# Patient Record
Sex: FEMALE | Race: WHITE | Employment: OTHER | URBAN - METROPOLITAN AREA
[De-identification: names, ages, dates, MRNs, and addresses within clinical notes are randomized per-mention and may not be internally consistent; named-entity substitution may affect disease eponyms.]

---

## 2018-04-10 ENCOUNTER — HOSPITAL ENCOUNTER (OUTPATIENT)
Dept: ULTRASOUND IMAGING | Age: 68
Discharge: HOME | End: 2018-04-12
Payer: MEDICARE

## 2018-04-10 DIAGNOSIS — M79.672 BILATERAL PAIN OF LEG AND FOOT: ICD-10-CM

## 2018-04-10 DIAGNOSIS — M79.605 BILATERAL PAIN OF LEG AND FOOT: ICD-10-CM

## 2018-04-10 DIAGNOSIS — G60.3 IDIOPATHIC PROGRESSIVE NEUROPATHY: ICD-10-CM

## 2018-04-10 DIAGNOSIS — M79.604 BILATERAL PAIN OF LEG AND FOOT: ICD-10-CM

## 2018-04-10 DIAGNOSIS — M79.671 BILATERAL PAIN OF LEG AND FOOT: ICD-10-CM

## 2018-04-10 PROCEDURE — 93922 UPR/L XTREMITY ART 2 LEVELS: CPT

## 2018-05-03 ENCOUNTER — OFFICE VISIT (OUTPATIENT)
Dept: VASCULAR SURGERY | Age: 68
End: 2018-05-03
Payer: MEDICARE

## 2018-05-03 DIAGNOSIS — M79.672 PAIN IN BOTH FEET: ICD-10-CM

## 2018-05-03 DIAGNOSIS — R09.89 BRUIT OF RIGHT CAROTID ARTERY: ICD-10-CM

## 2018-05-03 DIAGNOSIS — Z87.891 HISTORY OF TOBACCO USE: ICD-10-CM

## 2018-05-03 DIAGNOSIS — I70.222 ATHEROSCLEROSIS OF NATIVE ARTERY OF LEFT LOWER EXTREMITY WITH REST PAIN (HCC): ICD-10-CM

## 2018-05-03 DIAGNOSIS — I73.9 PVD (PERIPHERAL VASCULAR DISEASE) WITH CLAUDICATION (HCC): Primary | ICD-10-CM

## 2018-05-03 DIAGNOSIS — M79.671 PAIN IN BOTH FEET: ICD-10-CM

## 2018-05-03 PROBLEM — I70.229 ATHEROSCLEROSIS OF NATIVE ARTERIES OF EXTREMITY WITH REST PAIN (HCC): Status: ACTIVE | Noted: 2018-05-03

## 2018-05-03 PROCEDURE — G8400 PT W/DXA NO RESULTS DOC: HCPCS | Performed by: SURGERY

## 2018-05-03 PROCEDURE — G8419 CALC BMI OUT NRM PARAM NOF/U: HCPCS | Performed by: SURGERY

## 2018-05-03 PROCEDURE — G8427 DOCREV CUR MEDS BY ELIG CLIN: HCPCS | Performed by: SURGERY

## 2018-05-03 PROCEDURE — 4040F PNEUMOC VAC/ADMIN/RCVD: CPT | Performed by: SURGERY

## 2018-05-03 PROCEDURE — 3017F COLORECTAL CA SCREEN DOC REV: CPT | Performed by: SURGERY

## 2018-05-03 PROCEDURE — 1036F TOBACCO NON-USER: CPT | Performed by: SURGERY

## 2018-05-03 PROCEDURE — G8598 ASA/ANTIPLAT THER USED: HCPCS | Performed by: SURGERY

## 2018-05-03 PROCEDURE — 1123F ACP DISCUSS/DSCN MKR DOCD: CPT | Performed by: SURGERY

## 2018-05-03 PROCEDURE — 99205 OFFICE O/P NEW HI 60 MIN: CPT | Performed by: SURGERY

## 2018-05-03 PROCEDURE — 1090F PRES/ABSN URINE INCON ASSESS: CPT | Performed by: SURGERY

## 2018-05-03 RX ORDER — ASPIRIN 81 MG/1
81 TABLET ORAL DAILY
Qty: 30 TABLET | Refills: 11 | Status: SHIPPED | OUTPATIENT
Start: 2018-05-03 | End: 2019-01-11 | Stop reason: ALTCHOICE

## 2018-05-03 RX ORDER — BUPROPION HYDROCHLORIDE 150 MG/1
150 TABLET, EXTENDED RELEASE ORAL DAILY
Refills: 0 | COMMUNITY
Start: 2018-02-16 | End: 2019-01-11 | Stop reason: ALTCHOICE

## 2018-05-03 RX ORDER — LIOTHYRONINE SODIUM 5 UG/1
5 TABLET ORAL DAILY
Refills: 0 | Status: ON HOLD | COMMUNITY
Start: 2018-03-30 | End: 2020-01-22

## 2018-05-03 RX ORDER — ETODOLAC 400 MG/1
400 TABLET, FILM COATED ORAL 2 TIMES DAILY
Refills: 0 | Status: ON HOLD | COMMUNITY
Start: 2018-04-03 | End: 2020-01-22

## 2018-05-03 RX ORDER — CILOSTAZOL 100 MG/1
100 TABLET ORAL 2 TIMES DAILY
Qty: 60 TABLET | Refills: 11 | Status: SHIPPED | OUTPATIENT
Start: 2018-05-03 | End: 2019-01-11 | Stop reason: ALTCHOICE

## 2018-05-04 DIAGNOSIS — I70.222 ATHEROSCLEROSIS OF NATIVE ARTERY OF LEFT LOWER EXTREMITY WITH REST PAIN (HCC): ICD-10-CM

## 2018-05-04 DIAGNOSIS — I70.229 ATHEROSCLEROSIS OF NATIVE ARTERY OF LOWER EXTREMITY WITH REST PAIN, UNSPECIFIED LATERALITY (HCC): Primary | ICD-10-CM

## 2018-05-15 ENCOUNTER — HOSPITAL ENCOUNTER (OUTPATIENT)
Dept: INTERVENTIONAL RADIOLOGY/VASCULAR | Age: 68
Discharge: HOME | End: 2018-05-17
Payer: MEDICARE

## 2018-05-15 ENCOUNTER — HOSPITAL ENCOUNTER (OUTPATIENT)
Dept: CT IMAGING | Age: 68
Discharge: HOME | End: 2018-05-17
Payer: MEDICARE

## 2018-05-15 ENCOUNTER — HOSPITAL ENCOUNTER (OUTPATIENT)
Dept: ULTRASOUND IMAGING | Age: 68
Discharge: HOME | End: 2018-05-17
Payer: MEDICARE

## 2018-05-15 DIAGNOSIS — R09.89 BRUIT OF RIGHT CAROTID ARTERY: ICD-10-CM

## 2018-05-15 DIAGNOSIS — I70.222 ATHEROSCLEROSIS OF NATIVE ARTERY OF LEFT LOWER EXTREMITY WITH REST PAIN (HCC): ICD-10-CM

## 2018-05-15 DIAGNOSIS — R09.89 BRUIT OF RIGHT CAROTID ARTERY: Primary | ICD-10-CM

## 2018-05-15 PROCEDURE — 93880 EXTRACRANIAL BILAT STUDY: CPT

## 2018-05-15 PROCEDURE — 6360000004 HC RX CONTRAST MEDICATION: Performed by: RADIOLOGY

## 2018-05-15 PROCEDURE — 75635 CT ANGIO ABDOMINAL ARTERIES: CPT

## 2018-05-15 PROCEDURE — 2580000003 HC RX 258: Performed by: RADIOLOGY

## 2018-05-15 PROCEDURE — 93923 UPR/LXTR ART STDY 3+ LVLS: CPT

## 2018-05-15 RX ORDER — SODIUM CHLORIDE 0.9 % (FLUSH) 0.9 %
10 SYRINGE (ML) INJECTION ONCE
Status: COMPLETED | OUTPATIENT
Start: 2018-05-15 | End: 2018-05-15

## 2018-05-15 RX ADMIN — IOPAMIDOL 140 ML: 755 INJECTION, SOLUTION INTRAVENOUS at 13:03

## 2018-05-15 RX ADMIN — Medication 10 ML: at 13:04

## 2018-05-18 ENCOUNTER — TELEPHONE (OUTPATIENT)
Dept: VASCULAR SURGERY | Age: 68
End: 2018-05-18

## 2018-05-22 PROBLEM — I65.23 BILATERAL CAROTID ARTERY STENOSIS: Status: ACTIVE | Noted: 2018-05-22

## 2018-05-24 ENCOUNTER — OFFICE VISIT (OUTPATIENT)
Dept: VASCULAR SURGERY | Age: 68
End: 2018-05-24
Payer: MEDICARE

## 2018-05-24 DIAGNOSIS — R09.89 BRUIT OF RIGHT CAROTID ARTERY: ICD-10-CM

## 2018-05-24 DIAGNOSIS — I70.229 ATHEROSCLEROSIS OF NATIVE ARTERY OF LOWER EXTREMITY WITH REST PAIN, UNSPECIFIED LATERALITY (HCC): ICD-10-CM

## 2018-05-24 DIAGNOSIS — I73.9 PVD (PERIPHERAL VASCULAR DISEASE) WITH CLAUDICATION (HCC): Primary | ICD-10-CM

## 2018-05-24 DIAGNOSIS — Z87.891 HISTORY OF TOBACCO USE: ICD-10-CM

## 2018-05-24 DIAGNOSIS — I65.23 BILATERAL CAROTID ARTERY STENOSIS: ICD-10-CM

## 2018-05-24 PROCEDURE — 1123F ACP DISCUSS/DSCN MKR DOCD: CPT | Performed by: SURGERY

## 2018-05-24 PROCEDURE — G8427 DOCREV CUR MEDS BY ELIG CLIN: HCPCS | Performed by: SURGERY

## 2018-05-24 PROCEDURE — G8400 PT W/DXA NO RESULTS DOC: HCPCS | Performed by: SURGERY

## 2018-05-24 PROCEDURE — G8419 CALC BMI OUT NRM PARAM NOF/U: HCPCS | Performed by: SURGERY

## 2018-05-24 PROCEDURE — 99214 OFFICE O/P EST MOD 30 MIN: CPT | Performed by: SURGERY

## 2018-05-24 PROCEDURE — G8598 ASA/ANTIPLAT THER USED: HCPCS | Performed by: SURGERY

## 2018-05-24 PROCEDURE — 1090F PRES/ABSN URINE INCON ASSESS: CPT | Performed by: SURGERY

## 2018-05-24 PROCEDURE — 3017F COLORECTAL CA SCREEN DOC REV: CPT | Performed by: SURGERY

## 2018-05-24 PROCEDURE — 4040F PNEUMOC VAC/ADMIN/RCVD: CPT | Performed by: SURGERY

## 2018-05-24 PROCEDURE — 1036F TOBACCO NON-USER: CPT | Performed by: SURGERY

## 2018-05-25 ENCOUNTER — TELEPHONE (OUTPATIENT)
Dept: VASCULAR SURGERY | Age: 68
End: 2018-05-25

## 2018-06-08 ENCOUNTER — TELEPHONE (OUTPATIENT)
Dept: VASCULAR SURGERY | Age: 68
End: 2018-06-08

## 2018-09-04 LAB
ALBUMIN SERPL-MCNC: NORMAL G/DL
ALP BLD-CCNC: NORMAL U/L
ALT SERPL-CCNC: NORMAL U/L
ANION GAP SERPL CALCULATED.3IONS-SCNC: NORMAL MMOL/L
AST SERPL-CCNC: NORMAL U/L
BILIRUB SERPL-MCNC: NORMAL MG/DL
BUN BLDV-MCNC: NORMAL MG/DL
CALCIUM SERPL-MCNC: NORMAL MG/DL
CHLORIDE BLD-SCNC: NORMAL MMOL/L
CO2: NORMAL
CREAT SERPL-MCNC: NORMAL MG/DL
GFR CALCULATED: NORMAL
GLUCOSE BLD-MCNC: NORMAL MG/DL
POTASSIUM SERPL-SCNC: NORMAL MMOL/L
SODIUM BLD-SCNC: NORMAL MMOL/L
TOTAL PROTEIN: NORMAL
TSH SERPL DL<=0.05 MIU/L-ACNC: NORMAL M[IU]/L

## 2018-10-10 ENCOUNTER — TELEPHONE (OUTPATIENT)
Dept: VASCULAR SURGERY | Age: 68
End: 2018-10-10

## 2018-12-03 ENCOUNTER — TELEPHONE (OUTPATIENT)
Dept: VASCULAR SURGERY | Age: 68
End: 2018-12-03

## 2019-01-11 ENCOUNTER — HOSPITAL ENCOUNTER (OUTPATIENT)
Dept: WOUND CARE | Age: 69
Discharge: HOME | End: 2019-01-11
Payer: MEDICARE

## 2019-01-11 VITALS
DIASTOLIC BLOOD PRESSURE: 64 MMHG | BODY MASS INDEX: 17.68 KG/M2 | SYSTOLIC BLOOD PRESSURE: 122 MMHG | HEART RATE: 72 BPM | TEMPERATURE: 98.4 F | RESPIRATION RATE: 18 BRPM | WEIGHT: 110 LBS | HEIGHT: 66 IN

## 2019-01-11 PROCEDURE — 99212 OFFICE O/P EST SF 10 MIN: CPT

## 2019-01-11 RX ORDER — SULFAMETHOXAZOLE AND TRIMETHOPRIM 800; 160 MG/1; MG/1
1 TABLET ORAL 2 TIMES DAILY
COMMUNITY
End: 2019-01-16 | Stop reason: ALTCHOICE

## 2019-01-11 RX ORDER — CILOSTAZOL 100 MG/1
100 TABLET ORAL 2 TIMES DAILY
COMMUNITY
End: 2019-02-15 | Stop reason: ALTCHOICE

## 2019-01-11 RX ORDER — GENTAMICIN SULFATE 1 MG/G
CREAM TOPICAL 2 TIMES DAILY
Status: ON HOLD | COMMUNITY
End: 2019-01-30 | Stop reason: HOSPADM

## 2019-01-11 RX ORDER — ESCITALOPRAM OXALATE 10 MG/1
10 TABLET ORAL DAILY
Status: ON HOLD | COMMUNITY
End: 2020-01-22

## 2019-01-11 ASSESSMENT — PAIN DESCRIPTION - PROGRESSION: CLINICAL_PROGRESSION: NOT CHANGED

## 2019-01-11 ASSESSMENT — PAIN DESCRIPTION - DESCRIPTORS: DESCRIPTORS: BURNING;SHARP

## 2019-01-11 ASSESSMENT — PAIN SCALES - GENERAL: PAINLEVEL_OUTOF10: 0

## 2019-01-11 ASSESSMENT — PAIN DESCRIPTION - FREQUENCY: FREQUENCY: INTERMITTENT

## 2019-01-11 ASSESSMENT — PAIN DESCRIPTION - ONSET: ONSET: ON-GOING

## 2019-01-11 ASSESSMENT — PAIN DESCRIPTION - ORIENTATION: ORIENTATION: LEFT

## 2019-01-11 ASSESSMENT — PAIN DESCRIPTION - PAIN TYPE: TYPE: ACUTE PAIN

## 2019-01-11 ASSESSMENT — PAIN DESCRIPTION - LOCATION: LOCATION: LEG

## 2019-01-16 ENCOUNTER — HOSPITAL ENCOUNTER (OUTPATIENT)
Dept: WOUND CARE | Age: 69
Discharge: HOME | End: 2019-01-16
Payer: MEDICARE

## 2019-01-16 ENCOUNTER — TELEPHONE (OUTPATIENT)
Dept: VASCULAR SURGERY | Age: 69
End: 2019-01-16

## 2019-01-16 VITALS
BODY MASS INDEX: 17.68 KG/M2 | RESPIRATION RATE: 20 BRPM | HEART RATE: 90 BPM | WEIGHT: 110 LBS | TEMPERATURE: 97.8 F | HEIGHT: 66 IN

## 2019-01-16 DIAGNOSIS — I74.09 AORTOILIAC OCCLUSIVE DISEASE (HCC): Chronic | ICD-10-CM

## 2019-01-16 DIAGNOSIS — I70.222 ATHEROSCLEROSIS OF NATIVE ARTERY OF LEFT LOWER EXTREMITY WITH REST PAIN (HCC): Chronic | ICD-10-CM

## 2019-01-16 DIAGNOSIS — I73.9 FEMORO-POPLITEAL ARTERY DISEASE (HCC): Chronic | ICD-10-CM

## 2019-01-16 DIAGNOSIS — I70.242 ATHEROSCLEROSIS OF NATIVE ARTERY OF LEFT LOWER EXTREMITY WITH ULCERATION OF CALF (HCC): Chronic | ICD-10-CM

## 2019-01-16 PROBLEM — I70.25 ATHEROSCLEROSIS OF NATIVE ARTERY OF EXTREMITY WITH ULCERATION (HCC): Chronic | Status: ACTIVE | Noted: 2019-01-16

## 2019-01-16 PROCEDURE — 11042 DBRDMT SUBQ TIS 1ST 20SQCM/<: CPT | Performed by: SURGERY

## 2019-01-16 PROCEDURE — 99214 OFFICE O/P EST MOD 30 MIN: CPT | Performed by: SURGERY

## 2019-01-16 PROCEDURE — 97597 DBRDMT OPN WND 1ST 20 CM/<: CPT

## 2019-01-16 PROCEDURE — 99213 OFFICE O/P EST LOW 20 MIN: CPT

## 2019-01-16 ASSESSMENT — PAIN DESCRIPTION - PROGRESSION: CLINICAL_PROGRESSION: NOT CHANGED

## 2019-01-16 ASSESSMENT — PAIN SCALES - GENERAL: PAINLEVEL_OUTOF10: 0

## 2019-01-17 ENCOUNTER — TELEPHONE (OUTPATIENT)
Dept: VASCULAR SURGERY | Age: 69
End: 2019-01-17

## 2019-01-25 ENCOUNTER — HOSPITAL ENCOUNTER (OUTPATIENT)
Dept: WOUND CARE | Age: 69
Discharge: HOME | End: 2019-01-25
Payer: MEDICARE

## 2019-01-25 VITALS
DIASTOLIC BLOOD PRESSURE: 70 MMHG | HEART RATE: 84 BPM | SYSTOLIC BLOOD PRESSURE: 140 MMHG | RESPIRATION RATE: 18 BRPM | TEMPERATURE: 97.8 F

## 2019-01-25 DIAGNOSIS — I70.242 ATHEROSCLEROSIS OF NATIVE ARTERY OF LEFT LOWER EXTREMITY WITH ULCERATION OF CALF (HCC): ICD-10-CM

## 2019-01-25 DIAGNOSIS — I73.9 FEMORO-POPLITEAL ARTERY DISEASE (HCC): ICD-10-CM

## 2019-01-25 DIAGNOSIS — I74.09 AORTOILIAC OCCLUSIVE DISEASE (HCC): ICD-10-CM

## 2019-01-25 DIAGNOSIS — I70.222 ATHEROSCLEROSIS OF NATIVE ARTERY OF LEFT LOWER EXTREMITY WITH REST PAIN (HCC): ICD-10-CM

## 2019-01-25 PROCEDURE — 11042 DBRDMT SUBQ TIS 1ST 20SQCM/<: CPT

## 2019-01-25 ASSESSMENT — PAIN DESCRIPTION - LOCATION: LOCATION: LEG

## 2019-01-25 ASSESSMENT — PAIN SCALES - GENERAL: PAINLEVEL_OUTOF10: 0

## 2019-01-25 ASSESSMENT — PAIN DESCRIPTION - DESCRIPTORS: DESCRIPTORS: BURNING

## 2019-01-25 ASSESSMENT — PAIN DESCRIPTION - PAIN TYPE: TYPE: ACUTE PAIN

## 2019-01-25 ASSESSMENT — PAIN DESCRIPTION - ORIENTATION: ORIENTATION: LEFT

## 2019-01-25 ASSESSMENT — PAIN DESCRIPTION - FREQUENCY: FREQUENCY: INTERMITTENT

## 2019-01-29 ENCOUNTER — HOSPITAL ENCOUNTER (OUTPATIENT)
Dept: CARDIAC CATH/INVASIVE PROCEDURES | Age: 69
Setting detail: OBSERVATION
Discharge: HOME | End: 2019-01-30
Attending: SURGERY | Admitting: SURGERY
Payer: MEDICARE

## 2019-01-29 DIAGNOSIS — I73.9 PVD (PERIPHERAL VASCULAR DISEASE) (HCC): ICD-10-CM

## 2019-01-29 DIAGNOSIS — I73.9 PAD (PERIPHERAL ARTERY DISEASE) (HCC): ICD-10-CM

## 2019-01-29 LAB
ABO/RH: NORMAL
ANION GAP SERPL CALCULATED.3IONS-SCNC: 10 MMOL/L (ref 7–16)
ANTIBODY SCREEN: NORMAL
BUN BLDV-MCNC: 32 MG/DL (ref 8–23)
CALCIUM SERPL-MCNC: 9.2 MG/DL (ref 8.6–10.2)
CHLORIDE BLD-SCNC: 102 MMOL/L (ref 98–107)
CO2: 29 MMOL/L (ref 22–29)
CREAT SERPL-MCNC: 0.6 MG/DL (ref 0.5–1)
GFR AFRICAN AMERICAN: >60
GFR NON-AFRICAN AMERICAN: >60 ML/MIN/1.73
GLUCOSE BLD-MCNC: 86 MG/DL (ref 74–99)
HCT VFR BLD CALC: 40.2 % (ref 34–48)
HEMOGLOBIN: 13 G/DL (ref 11.5–15.5)
INR BLD: 0.9
MCH RBC QN AUTO: 31.6 PG (ref 26–35)
MCHC RBC AUTO-ENTMCNC: 32.3 % (ref 32–34.5)
MCV RBC AUTO: 97.8 FL (ref 80–99.9)
PDW BLD-RTO: 13.2 FL (ref 11.5–15)
PLATELET # BLD: 446 E9/L (ref 130–450)
PMV BLD AUTO: 8.6 FL (ref 7–12)
POTASSIUM REFLEX MAGNESIUM: 3.8 MMOL/L (ref 3.5–5)
PROTHROMBIN TIME: 10.7 SEC (ref 9.3–12.4)
RBC # BLD: 4.11 E12/L (ref 3.5–5.5)
SODIUM BLD-SCNC: 141 MMOL/L (ref 132–146)
WBC # BLD: 6.5 E9/L (ref 4.5–11.5)

## 2019-01-29 PROCEDURE — 2709999900 HC NON-CHARGEABLE SUPPLY

## 2019-01-29 PROCEDURE — C2623 CATH, TRANSLUMIN, DRUG-COAT: HCPCS

## 2019-01-29 PROCEDURE — C1760 CLOSURE DEV, VASC: HCPCS

## 2019-01-29 PROCEDURE — C1714 CATH, TRANS ATHERECTOMY, DIR: HCPCS

## 2019-01-29 PROCEDURE — C1894 INTRO/SHEATH, NON-LASER: HCPCS

## 2019-01-29 PROCEDURE — 6370000000 HC RX 637 (ALT 250 FOR IP): Performed by: SURGERY

## 2019-01-29 PROCEDURE — 86850 RBC ANTIBODY SCREEN: CPT

## 2019-01-29 PROCEDURE — 6360000002 HC RX W HCPCS: Performed by: SURGERY

## 2019-01-29 PROCEDURE — 6360000002 HC RX W HCPCS

## 2019-01-29 PROCEDURE — 36415 COLL VENOUS BLD VENIPUNCTURE: CPT

## 2019-01-29 PROCEDURE — 2500000003 HC RX 250 WO HCPCS

## 2019-01-29 PROCEDURE — 37225 HC FEM POP TERRITORY ATHERECTOMY: CPT | Performed by: SURGERY

## 2019-01-29 PROCEDURE — 75710 ARTERY X-RAYS ARM/LEG: CPT | Performed by: SURGERY

## 2019-01-29 PROCEDURE — 37225 PR REVSC OPN/PRQ FEM/POP W/ATHRC/ANGIOP SM VSL: CPT | Performed by: SURGERY

## 2019-01-29 PROCEDURE — 85610 PROTHROMBIN TIME: CPT

## 2019-01-29 PROCEDURE — C1884 EMBOLIZATION PROTECT SYST: HCPCS

## 2019-01-29 PROCEDURE — 2580000003 HC RX 258: Performed by: SURGERY

## 2019-01-29 PROCEDURE — 80048 BASIC METABOLIC PNL TOTAL CA: CPT

## 2019-01-29 PROCEDURE — 85027 COMPLETE CBC AUTOMATED: CPT

## 2019-01-29 PROCEDURE — G0378 HOSPITAL OBSERVATION PER HR: HCPCS

## 2019-01-29 PROCEDURE — C1887 CATHETER, GUIDING: HCPCS

## 2019-01-29 PROCEDURE — 75774 ARTERY X-RAY EACH VESSEL: CPT | Performed by: SURGERY

## 2019-01-29 PROCEDURE — 86901 BLOOD TYPING SEROLOGIC RH(D): CPT

## 2019-01-29 PROCEDURE — 86900 BLOOD TYPING SEROLOGIC ABO: CPT

## 2019-01-29 PROCEDURE — C1769 GUIDE WIRE: HCPCS

## 2019-01-29 RX ORDER — SODIUM CHLORIDE 0.9 % (FLUSH) 0.9 %
10 SYRINGE (ML) INJECTION PRN
Status: DISCONTINUED | OUTPATIENT
Start: 2019-01-29 | End: 2019-01-29

## 2019-01-29 RX ORDER — SODIUM CHLORIDE 0.9 % (FLUSH) 0.9 %
10 SYRINGE (ML) INJECTION EVERY 12 HOURS SCHEDULED
Status: DISCONTINUED | OUTPATIENT
Start: 2019-01-29 | End: 2019-01-29

## 2019-01-29 RX ORDER — LOSARTAN POTASSIUM 25 MG/1
25 TABLET ORAL DAILY
Status: DISCONTINUED | OUTPATIENT
Start: 2019-01-30 | End: 2019-01-30 | Stop reason: HOSPADM

## 2019-01-29 RX ORDER — CILOSTAZOL 100 MG/1
100 TABLET ORAL 2 TIMES DAILY
Status: DISCONTINUED | OUTPATIENT
Start: 2019-01-29 | End: 2019-01-30 | Stop reason: HOSPADM

## 2019-01-29 RX ORDER — SODIUM CHLORIDE 9 MG/ML
INJECTION, SOLUTION INTRAVENOUS CONTINUOUS
Status: DISCONTINUED | OUTPATIENT
Start: 2019-01-29 | End: 2019-01-29

## 2019-01-29 RX ORDER — IBUPROFEN 200 MG
400 TABLET ORAL
Status: DISCONTINUED | OUTPATIENT
Start: 2019-01-29 | End: 2019-01-30 | Stop reason: HOSPADM

## 2019-01-29 RX ORDER — LEVOTHYROXINE SODIUM 112 UG/1
112 TABLET ORAL DAILY
Status: DISCONTINUED | OUTPATIENT
Start: 2019-01-30 | End: 2019-01-30 | Stop reason: HOSPADM

## 2019-01-29 RX ORDER — MAGNESIUM HYDROXIDE/ALUMINUM HYDROXICE/SIMETHICONE 120; 1200; 1200 MG/30ML; MG/30ML; MG/30ML
30 SUSPENSION ORAL EVERY 6 HOURS PRN
Status: DISCONTINUED | OUTPATIENT
Start: 2019-01-29 | End: 2019-01-30 | Stop reason: HOSPADM

## 2019-01-29 RX ORDER — ESCITALOPRAM OXALATE 10 MG/1
10 TABLET ORAL DAILY
Status: DISCONTINUED | OUTPATIENT
Start: 2019-01-30 | End: 2019-01-30 | Stop reason: HOSPADM

## 2019-01-29 RX ORDER — CLOPIDOGREL BISULFATE 75 MG/1
75 TABLET ORAL DAILY
Status: DISCONTINUED | OUTPATIENT
Start: 2019-01-29 | End: 2019-01-30 | Stop reason: HOSPADM

## 2019-01-29 RX ORDER — LIOTHYRONINE SODIUM 5 UG/1
5 TABLET ORAL DAILY
Status: DISCONTINUED | OUTPATIENT
Start: 2019-01-30 | End: 2019-01-30 | Stop reason: HOSPADM

## 2019-01-29 RX ORDER — CEFAZOLIN SODIUM 2 G/50ML
2 SOLUTION INTRAVENOUS
Status: COMPLETED | OUTPATIENT
Start: 2019-01-29 | End: 2019-01-29

## 2019-01-29 RX ADMIN — ALUMINUM HYDROXIDE, MAGNESIUM HYDROXIDE, AND SIMETHICONE 30 ML: 200; 200; 20 SUSPENSION ORAL at 16:31

## 2019-01-29 RX ADMIN — CILOSTAZOL 100 MG: 100 TABLET ORAL at 21:19

## 2019-01-29 RX ADMIN — SODIUM CHLORIDE: 9 INJECTION, SOLUTION INTRAVENOUS at 08:50

## 2019-01-29 RX ADMIN — CEFAZOLIN SODIUM 2 G: 2 SOLUTION INTRAVENOUS at 11:36

## 2019-01-29 RX ADMIN — IBUPROFEN 400 MG: 200 TABLET, FILM COATED ORAL at 21:19

## 2019-01-29 ASSESSMENT — PAIN DESCRIPTION - ORIENTATION
ORIENTATION: LEFT
ORIENTATION: LEFT

## 2019-01-29 ASSESSMENT — PAIN SCALES - GENERAL
PAINLEVEL_OUTOF10: 0
PAINLEVEL_OUTOF10: 5
PAINLEVEL_OUTOF10: 0
PAINLEVEL_OUTOF10: 9
PAINLEVEL_OUTOF10: 9
PAINLEVEL_OUTOF10: 0

## 2019-01-29 ASSESSMENT — PAIN DESCRIPTION - PAIN TYPE
TYPE: CHRONIC PAIN
TYPE: CHRONIC PAIN

## 2019-01-29 ASSESSMENT — PAIN DESCRIPTION - LOCATION
LOCATION: LEG
LOCATION: LEG

## 2019-01-30 VITALS
OXYGEN SATURATION: 96 % | WEIGHT: 110 LBS | HEART RATE: 86 BPM | RESPIRATION RATE: 16 BRPM | HEIGHT: 66 IN | BODY MASS INDEX: 17.68 KG/M2 | TEMPERATURE: 98.6 F | SYSTOLIC BLOOD PRESSURE: 131 MMHG | DIASTOLIC BLOOD PRESSURE: 85 MMHG

## 2019-01-30 PROBLEM — I70.25 ATHEROSCLEROSIS OF NATIVE ARTERY OF EXTREMITY WITH ULCERATION (HCC): Chronic | Status: RESOLVED | Noted: 2019-01-16 | Resolved: 2019-01-30

## 2019-01-30 PROBLEM — I74.09 AORTOILIAC OCCLUSIVE DISEASE (HCC): Chronic | Status: RESOLVED | Noted: 2019-01-16 | Resolved: 2019-01-30

## 2019-01-30 LAB
ANION GAP SERPL CALCULATED.3IONS-SCNC: 9 MMOL/L (ref 7–16)
BUN BLDV-MCNC: 24 MG/DL (ref 8–23)
CALCIUM SERPL-MCNC: 8.3 MG/DL (ref 8.6–10.2)
CHLORIDE BLD-SCNC: 106 MMOL/L (ref 98–107)
CHOLESTEROL, TOTAL: 169 MG/DL (ref 0–199)
CO2: 27 MMOL/L (ref 22–29)
CREAT SERPL-MCNC: 0.7 MG/DL (ref 0.5–1)
GFR AFRICAN AMERICAN: >60
GFR NON-AFRICAN AMERICAN: >60 ML/MIN/1.73
GLUCOSE BLD-MCNC: 89 MG/DL (ref 74–99)
HBA1C MFR BLD: 5.3 % (ref 4–5.6)
HCT VFR BLD CALC: 30.8 % (ref 34–48)
HDLC SERPL-MCNC: 76 MG/DL
HEMOGLOBIN: 10.2 G/DL (ref 11.5–15.5)
LDL CHOLESTEROL CALCULATED: 76 MG/DL (ref 0–99)
MCH RBC QN AUTO: 32.1 PG (ref 26–35)
MCHC RBC AUTO-ENTMCNC: 33.1 % (ref 32–34.5)
MCV RBC AUTO: 96.9 FL (ref 80–99.9)
PDW BLD-RTO: 13.3 FL (ref 11.5–15)
PLATELET # BLD: 369 E9/L (ref 130–450)
PMV BLD AUTO: 9 FL (ref 7–12)
POTASSIUM SERPL-SCNC: 3.9 MMOL/L (ref 3.5–5)
PREALBUMIN: 20 MG/DL (ref 20–40)
RBC # BLD: 3.18 E12/L (ref 3.5–5.5)
SODIUM BLD-SCNC: 142 MMOL/L (ref 132–146)
TRIGL SERPL-MCNC: 85 MG/DL (ref 0–149)
VLDLC SERPL CALC-MCNC: 17 MG/DL
WBC # BLD: 6.2 E9/L (ref 4.5–11.5)

## 2019-01-30 PROCEDURE — 80061 LIPID PANEL: CPT

## 2019-01-30 PROCEDURE — 84134 ASSAY OF PREALBUMIN: CPT

## 2019-01-30 PROCEDURE — 80048 BASIC METABOLIC PNL TOTAL CA: CPT

## 2019-01-30 PROCEDURE — 83036 HEMOGLOBIN GLYCOSYLATED A1C: CPT

## 2019-01-30 PROCEDURE — 85027 COMPLETE CBC AUTOMATED: CPT

## 2019-01-30 PROCEDURE — 36415 COLL VENOUS BLD VENIPUNCTURE: CPT

## 2019-01-30 PROCEDURE — 6370000000 HC RX 637 (ALT 250 FOR IP): Performed by: SURGERY

## 2019-01-30 PROCEDURE — G0378 HOSPITAL OBSERVATION PER HR: HCPCS

## 2019-01-30 RX ORDER — CLOPIDOGREL BISULFATE 75 MG/1
75 TABLET ORAL DAILY
Qty: 30 TABLET | Refills: 3 | Status: SHIPPED | OUTPATIENT
Start: 2019-01-31 | End: 2020-01-07 | Stop reason: SDUPTHER

## 2019-01-30 RX ADMIN — VITAMIN D, TAB 1000IU (100/BT) 1000 UNITS: 25 TAB at 09:00

## 2019-01-30 RX ADMIN — LEVOTHYROXINE SODIUM 112 MCG: 112 TABLET ORAL at 06:22

## 2019-01-30 RX ADMIN — CLOPIDOGREL 75 MG: 75 TABLET, FILM COATED ORAL at 09:00

## 2019-01-30 RX ADMIN — CILOSTAZOL 100 MG: 100 TABLET ORAL at 09:00

## 2019-01-30 RX ADMIN — ESCITALOPRAM OXALATE 10 MG: 10 TABLET, FILM COATED ORAL at 09:00

## 2019-01-30 RX ADMIN — IBUPROFEN 400 MG: 200 TABLET, FILM COATED ORAL at 09:00

## 2019-01-30 RX ADMIN — LIOTHYRONINE SODIUM 5 MCG: 5 TABLET ORAL at 09:00

## 2019-01-30 RX ADMIN — LOSARTAN POTASSIUM 25 MG: 25 TABLET, FILM COATED ORAL at 09:00

## 2019-01-30 ASSESSMENT — PAIN SCALES - GENERAL
PAINLEVEL_OUTOF10: 0
PAINLEVEL_OUTOF10: 0

## 2019-02-06 ENCOUNTER — HOSPITAL ENCOUNTER (OUTPATIENT)
Dept: ULTRASOUND IMAGING | Age: 69
Discharge: HOME | End: 2019-02-08
Payer: MEDICARE

## 2019-02-06 ENCOUNTER — HOSPITAL ENCOUNTER (OUTPATIENT)
Age: 69
Discharge: HOME | End: 2019-02-08
Payer: MEDICARE

## 2019-02-06 DIAGNOSIS — I82.402 DEEP VEIN THROMBOSIS (DVT) OF LEFT LOWER EXTREMITY, UNSPECIFIED CHRONICITY, UNSPECIFIED VEIN (HCC): ICD-10-CM

## 2019-02-06 PROCEDURE — 93971 EXTREMITY STUDY: CPT

## 2019-02-08 ENCOUNTER — HOSPITAL ENCOUNTER (OUTPATIENT)
Dept: WOUND CARE | Age: 69
Discharge: HOME | End: 2019-02-08
Payer: MEDICARE

## 2019-02-08 VITALS
RESPIRATION RATE: 18 BRPM | TEMPERATURE: 97.6 F | SYSTOLIC BLOOD PRESSURE: 140 MMHG | HEART RATE: 80 BPM | DIASTOLIC BLOOD PRESSURE: 74 MMHG

## 2019-02-08 PROCEDURE — 11042 DBRDMT SUBQ TIS 1ST 20SQCM/<: CPT

## 2019-02-15 ENCOUNTER — HOSPITAL ENCOUNTER (OUTPATIENT)
Dept: WOUND CARE | Age: 69
Discharge: HOME | End: 2019-02-15
Payer: MEDICARE

## 2019-02-15 VITALS
DIASTOLIC BLOOD PRESSURE: 74 MMHG | RESPIRATION RATE: 18 BRPM | HEART RATE: 80 BPM | SYSTOLIC BLOOD PRESSURE: 130 MMHG | TEMPERATURE: 97.6 F

## 2019-02-15 PROCEDURE — 11042 DBRDMT SUBQ TIS 1ST 20SQCM/<: CPT

## 2019-02-15 ASSESSMENT — PAIN DESCRIPTION - ONSET: ONSET: AWAKENED FROM SLEEP

## 2019-02-15 ASSESSMENT — PAIN DESCRIPTION - ORIENTATION: ORIENTATION: LEFT

## 2019-02-15 ASSESSMENT — PAIN SCALES - GENERAL: PAINLEVEL_OUTOF10: 3

## 2019-02-15 ASSESSMENT — PAIN DESCRIPTION - LOCATION: LOCATION: LEG

## 2019-02-15 ASSESSMENT — PAIN DESCRIPTION - DESCRIPTORS: DESCRIPTORS: BURNING

## 2019-02-15 ASSESSMENT — PAIN DESCRIPTION - PROGRESSION: CLINICAL_PROGRESSION: NOT CHANGED

## 2019-02-15 ASSESSMENT — PAIN DESCRIPTION - FREQUENCY: FREQUENCY: INTERMITTENT

## 2019-02-15 ASSESSMENT — PAIN DESCRIPTION - PAIN TYPE: TYPE: ACUTE PAIN

## 2019-02-20 ENCOUNTER — HOSPITAL ENCOUNTER (OUTPATIENT)
Dept: WOUND CARE | Age: 69
Discharge: HOME | End: 2019-02-20
Payer: MEDICARE

## 2019-02-27 ENCOUNTER — HOSPITAL ENCOUNTER (OUTPATIENT)
Dept: WOUND CARE | Age: 69
Discharge: HOME | End: 2019-02-27
Payer: MEDICARE

## 2019-02-27 VITALS
WEIGHT: 110 LBS | BODY MASS INDEX: 17.68 KG/M2 | SYSTOLIC BLOOD PRESSURE: 136 MMHG | TEMPERATURE: 97.9 F | HEIGHT: 66 IN | DIASTOLIC BLOOD PRESSURE: 66 MMHG | RESPIRATION RATE: 18 BRPM | HEART RATE: 62 BPM

## 2019-02-27 DIAGNOSIS — I70.242 ATHEROSCLEROSIS OF NATIVE ARTERY OF LEFT LOWER EXTREMITY WITH ULCERATION OF CALF (HCC): Primary | Chronic | ICD-10-CM

## 2019-02-27 DIAGNOSIS — L97.222 VENOUS STASIS ULCER OF LEFT CALF WITH FAT LAYER EXPOSED WITHOUT VARICOSE VEINS (HCC): Chronic | ICD-10-CM

## 2019-02-27 DIAGNOSIS — I73.9 FEMORO-POPLITEAL ARTERY DISEASE (HCC): ICD-10-CM

## 2019-02-27 DIAGNOSIS — I87.2 VENOUS STASIS ULCER OF LEFT CALF WITH FAT LAYER EXPOSED WITHOUT VARICOSE VEINS (HCC): Chronic | ICD-10-CM

## 2019-02-27 DIAGNOSIS — I70.222 ATHEROSCLEROSIS OF NATIVE ARTERY OF LEFT LOWER EXTREMITY WITH REST PAIN (HCC): ICD-10-CM

## 2019-02-27 PROCEDURE — 11042 DBRDMT SUBQ TIS 1ST 20SQCM/<: CPT | Performed by: SURGERY

## 2019-02-27 PROCEDURE — 11042 DBRDMT SUBQ TIS 1ST 20SQCM/<: CPT

## 2019-02-27 RX ORDER — CILOSTAZOL 50 MG/1
50 TABLET ORAL 2 TIMES DAILY
Qty: 60 TABLET | Refills: 3 | Status: SHIPPED | OUTPATIENT
Start: 2019-02-27 | End: 2020-01-07 | Stop reason: SDUPTHER

## 2019-03-01 ENCOUNTER — HOSPITAL ENCOUNTER (OUTPATIENT)
Dept: WOUND CARE | Age: 69
Discharge: HOME | End: 2019-03-01
Payer: MEDICARE

## 2019-03-08 ENCOUNTER — HOSPITAL ENCOUNTER (OUTPATIENT)
Dept: WOUND CARE | Age: 69
Discharge: HOME | End: 2019-03-08
Payer: MEDICARE

## 2019-03-08 VITALS
BODY MASS INDEX: 17.68 KG/M2 | TEMPERATURE: 97.8 F | RESPIRATION RATE: 18 BRPM | WEIGHT: 110 LBS | HEIGHT: 66 IN | DIASTOLIC BLOOD PRESSURE: 70 MMHG | HEART RATE: 78 BPM | SYSTOLIC BLOOD PRESSURE: 140 MMHG

## 2019-03-08 DIAGNOSIS — I70.222 ATHEROSCLEROSIS OF NATIVE ARTERY OF LEFT LOWER EXTREMITY WITH REST PAIN (HCC): ICD-10-CM

## 2019-03-08 DIAGNOSIS — I70.242 ATHEROSCLEROSIS OF NATIVE ARTERY OF LEFT LOWER EXTREMITY WITH ULCERATION OF CALF (HCC): ICD-10-CM

## 2019-03-08 PROCEDURE — 11042 DBRDMT SUBQ TIS 1ST 20SQCM/<: CPT

## 2019-03-12 ENCOUNTER — HOSPITAL ENCOUNTER (OUTPATIENT)
Dept: INTERVENTIONAL RADIOLOGY/VASCULAR | Age: 69
Discharge: HOME | End: 2019-03-14
Payer: MEDICARE

## 2019-03-12 DIAGNOSIS — I70.242 ATHEROSCLEROSIS OF NATIVE ARTERY OF LEFT LOWER EXTREMITY WITH ULCERATION OF CALF (HCC): Chronic | ICD-10-CM

## 2019-03-12 PROCEDURE — 93923 UPR/LXTR ART STDY 3+ LVLS: CPT

## 2019-03-13 ENCOUNTER — TELEPHONE (OUTPATIENT)
Dept: VASCULAR SURGERY | Age: 69
End: 2019-03-13

## 2019-03-15 ENCOUNTER — HOSPITAL ENCOUNTER (OUTPATIENT)
Dept: WOUND CARE | Age: 69
Discharge: HOME | End: 2019-03-15
Payer: MEDICARE

## 2019-03-15 VITALS
WEIGHT: 110 LBS | TEMPERATURE: 97.7 F | DIASTOLIC BLOOD PRESSURE: 74 MMHG | RESPIRATION RATE: 16 BRPM | HEART RATE: 76 BPM | HEIGHT: 66 IN | BODY MASS INDEX: 17.68 KG/M2 | SYSTOLIC BLOOD PRESSURE: 140 MMHG

## 2019-03-15 DIAGNOSIS — I70.242 ATHEROSCLEROSIS OF NATIVE ARTERY OF LEFT LOWER EXTREMITY WITH ULCERATION OF CALF (HCC): ICD-10-CM

## 2019-03-15 DIAGNOSIS — I70.222 ATHEROSCLEROSIS OF NATIVE ARTERY OF LEFT LOWER EXTREMITY WITH REST PAIN (HCC): ICD-10-CM

## 2019-03-15 PROCEDURE — 11042 DBRDMT SUBQ TIS 1ST 20SQCM/<: CPT

## 2019-03-22 ENCOUNTER — HOSPITAL ENCOUNTER (OUTPATIENT)
Dept: WOUND CARE | Age: 69
Discharge: HOME | End: 2019-03-22
Payer: MEDICARE

## 2019-03-22 VITALS
SYSTOLIC BLOOD PRESSURE: 120 MMHG | RESPIRATION RATE: 16 BRPM | WEIGHT: 110 LBS | HEIGHT: 66 IN | HEART RATE: 64 BPM | BODY MASS INDEX: 17.68 KG/M2 | TEMPERATURE: 97.8 F | DIASTOLIC BLOOD PRESSURE: 70 MMHG

## 2019-03-22 PROCEDURE — 11042 DBRDMT SUBQ TIS 1ST 20SQCM/<: CPT

## 2019-03-29 ENCOUNTER — HOSPITAL ENCOUNTER (OUTPATIENT)
Dept: WOUND CARE | Age: 69
Discharge: HOME | End: 2019-03-29
Payer: MEDICARE

## 2019-03-29 VITALS
TEMPERATURE: 97.9 F | DIASTOLIC BLOOD PRESSURE: 74 MMHG | HEIGHT: 66 IN | BODY MASS INDEX: 18.8 KG/M2 | SYSTOLIC BLOOD PRESSURE: 132 MMHG | HEART RATE: 64 BPM | WEIGHT: 117 LBS

## 2019-03-29 DIAGNOSIS — I70.222 ATHEROSCLEROSIS OF NATIVE ARTERY OF LEFT LOWER EXTREMITY WITH REST PAIN (HCC): ICD-10-CM

## 2019-03-29 DIAGNOSIS — I70.242 ATHEROSCLEROSIS OF NATIVE ARTERY OF LEFT LOWER EXTREMITY WITH ULCERATION OF CALF (HCC): ICD-10-CM

## 2019-03-29 PROCEDURE — 11042 DBRDMT SUBQ TIS 1ST 20SQCM/<: CPT

## 2019-03-29 ASSESSMENT — PAIN SCALES - GENERAL: PAINLEVEL_OUTOF10: 0

## 2019-04-05 ENCOUNTER — HOSPITAL ENCOUNTER (OUTPATIENT)
Dept: WOUND CARE | Age: 69
Discharge: HOME | End: 2019-04-05
Payer: MEDICARE

## 2019-04-05 VITALS
TEMPERATURE: 97.9 F | RESPIRATION RATE: 16 BRPM | HEART RATE: 66 BPM | WEIGHT: 117 LBS | SYSTOLIC BLOOD PRESSURE: 150 MMHG | HEIGHT: 66 IN | DIASTOLIC BLOOD PRESSURE: 68 MMHG | BODY MASS INDEX: 18.8 KG/M2

## 2019-04-05 DIAGNOSIS — I70.242 ATHEROSCLEROSIS OF NATIVE ARTERY OF LEFT LOWER EXTREMITY WITH ULCERATION OF CALF (HCC): ICD-10-CM

## 2019-04-05 DIAGNOSIS — I70.222 ATHEROSCLEROSIS OF NATIVE ARTERY OF LEFT LOWER EXTREMITY WITH REST PAIN (HCC): ICD-10-CM

## 2019-04-05 PROCEDURE — 11042 DBRDMT SUBQ TIS 1ST 20SQCM/<: CPT

## 2019-04-05 ASSESSMENT — PAIN SCALES - GENERAL: PAINLEVEL_OUTOF10: 0

## 2019-04-05 NOTE — PROGRESS NOTES
[facility] Followup Visit Note    Referring Physician : Simeon Hodgson  MEDICAL RECORD NUMBER:  [id number]  AGE: 68 y.o. GENDER: female  : 1950  EPISODE DATE:  2019    Subjective:     Chief Complaint   Patient presents with    Wound Check     lt leg      HISTORY of PRESENT ILLNESS HPI   Brittany Harris is a 68 y.o. female who presents today in regards to follow up evaluation and treatment of wound/ulcer. That patient's past medical, family and social hx were reviewed and changes were made if present. History of Wound Context:  Patient denies any nausea, vomiting, fever, chills, shortness of breath, chest pain, calf cramping and/or pain. Patient is tolerating the current dressing regimen without issues. No other new issues noted at this time.      Wound/Ulcer Pain Timing/Severity: none  Quality of pain: N/A  Severity:  0 / 10   Modifying Factors: None  Associated Signs/Symptoms: none    Ulcer Identification:  Ulcer Type: venous and arterial  Contributing Factors: edema and arterial insufficiency    Diabetic/Pressure/Non Pressure Ulcers only:  Ulcer: Non-Pressure ulcer, fat layer exposed    Wound: Arterial ulceration left leg        PAST MEDICAL HISTORY      Diagnosis Date    Aortoiliac occlusive disease ([address] 2019    Atherosclerosis of native arteries of extremity with rest pain ([address] 5/3/2018    Atherosclerosis of native artery of extremity with ulceration ([address] 2019    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 with rest pain ([address] 2019    Bilateral carotid artery stenosis 2018    Bruit of right carotid artery 5/3/2018    CAD (coronary artery disease)     Cancer (HCC)     SKIN CA/Thigh    [facility]) [facility]     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    History of tobacco use 5/3/2018    Hydrocephalus     Hyperlipidemia     Hypertension     Pain in both feet 5/3/2018    [facility]VD [facility]peripheral vascular disease) [facility]     PVD (peripheral vascular disease) with claudication (HCC) 5/3/2018    Rheumatoid arthritis (HCC)     Thyroid disease     Venous stasis ulcer of left calf with fat layer exposed without varicose veins ([facility] 2019     Past Surgical History:   Procedure Laterality Date     SECTION      COLONOSCOPY      DILATION AND CURETTAGE OF UTERUS      PERIPHERAL PERCUTANEOUS ARTERIAL INTERVENTION  2019    L SFA angioplasty    TUBAL LIGATION       Family History   Problem Relation Age of Onset    Kidney Disease Mother     Coronary Art Dis Mother     High Blood Pressure Mother     Cancer Father     Other Father      Social History     Tobacco Use    Smoking status: Former Smoker     Packs/day: 0.25     Types: Cigarettes     Last attempt to quit: 5/3/2017     Years since quittin.9    Smokeless tobacco: Never Used   Substance Use Topics    Alcohol use: Yes     Comment: beer/Weekly     Drug use: No     Allergies   Allergen Reactions    Flagyl [Metronidazole]     Hornet Venom     Wasp Venom      Current Outpatient Medications on File Prior to Encounter   Medication Sig Dispense Refill    collagenase 250 UNIT/GM ointment Apply topically daily Apply topically daily.  cilostazol (PLETAL) 50 MG tablet Take 1 tablet by mouth 2 times daily 60 tablet 3    clopidogrel (PLAVIX) 75 MG tablet Take 1 tablet by mouth daily 30 tablet 3    escitalopram (LEXAPRO) 10 MG tablet Take 10 mg by mouth daily      Umeclidinium Bromide 62.5 MCG/INH AEPB Inhale 62.5 mcg into the lungs      liothyronine (CYTOMEL) 5 MCG tablet Take 5 mcg by mouth daily  0    etodolac (LODINE) 400 MG tablet Take 400 mg by mouth 2 times daily  0    B Complex Vitamins (VITAMIN B COMPLEX 100 IJ) Inject as directed      vitamin D (CHOLECALCIFEROL) 1000 UNIT TABS tablet Take 1,000 Units by mouth daily      levothyroxine (SYNTHROID) 112 MCG tablet Take 112 mcg by mouth Daily.       4/5/2019  1:46 PM   Post-Procedure Volume (cm^3) 0.08 cm^3 4/5/2019  1:46 PM   Wound Assessment Red 4/5/2019  1:30 PM   Drainage Amount Small 4/5/2019  1:30 PM   Drainage Description Serosanguinous 4/5/2019  1:30 PM   Odor None 4/5/2019  1:30 PM   Rhoda-wound Assessment Pink 4/5/2019  1:30 PM   Number of days: 69     Percent of Wound/Ulcer Debrided: 100%    Total Surface Area Debrided:  1.2 sq cm     Estimated Blood Loss:  Minimal  Hemostasis Achieved:  by pressure    Procedural Pain:  2  / 10   Post Procedural Pain:  0 / 10     Response to treatment:  Well tolerated by patient. Plan:   Treatment Note please see attached Discharge Instructions    Written patient dismissal instructions given to patient and signed by patient or POA. Discharge Instructions       Visit Discharge/Physician Orders     Discharge condition: Stable     Assessment of pain at discharge: none     Anesthetic used: 2 % lidocaine     Discharge to: Home     Left via:Private automobile     Accompanied by: self     ECF/HHA:      Dressing Orders:  Cleanse left lower leg ulcer with normal saline apply alginate and coban 2, weekly at wound care.        Treatment Orders:       Arterial studies post arteriogram reviewed     Avoid foods high in vitamin k     Eat foods high in protein and vitamin c     Take multivitamin daily      WCC followup visit 1 week with  Dr. Díaz__________________________________  (Please note your next appointment above and if you are unable to keep, kindly give a 24 hour notice.  Thank you.)     Physician signature:__________________________        If you experience any of the following, please call the [address] [place] [address] during business hours:     * Increase in Pain  * Temperature over 101  * Increase in drainage from your wound  * Drainage with a foul odor  * Bleeding  * Increase in swelling  * Need for compression bandage changes due to slippage, breakthrough drainage.     If you need medical attention outside of the business hours of the [address] [place] [address] Road please contact your PCP or go to the n                                                                                                                                  Electronically signed by Bernice Cassidy DPM on 4/5/2019 at 2:21 PM

## 2019-04-08 ENCOUNTER — OFFICE VISIT (OUTPATIENT)
Dept: VASCULAR SURGERY | Age: 69
End: 2019-04-08

## 2019-04-08 DIAGNOSIS — I70.242 ATHEROSCLEROSIS OF NATIVE ARTERY OF LEFT LOWER EXTREMITY WITH ULCERATION OF CALF (HCC): Primary | ICD-10-CM

## 2019-04-08 PROCEDURE — 99024 POSTOP FOLLOW-UP VISIT: CPT | Performed by: SURGERY

## 2019-04-08 NOTE — PATIENT INSTRUCTIONS
Patient Education        Learning About Peripheral Arterial Disease of the Legs  What is peripheral arterial disease? Peripheral arterial disease (PAD) is narrowing or blockage of arteries in your arms and legs. The most common cause of PAD is the buildup of plaque on the inside of arteries. Plaque is made of extra cholesterol, calcium, and other material in your blood. Over time, plaque builds up in the walls of the arteries, including those that supply blood to your legs. This buildup leads to poor blood flow. When you have PAD, you have a risk of having plaque in other arteries in your body. This raises your risk of a heart attack and stroke. This information focuses on peripheral arterial disease of the legs, the area where it is most common. When you have PAD of the legs and you walk or exercise, your leg muscles may not get enough blood. This may cause symptoms, such as leg pain during exercise. Peripheral arterial disease is also called peripheral vascular disease. What are the symptoms? Many people who have PAD do not have any symptoms. But if you have symptoms, you may have weak or tired legs, difficulty walking or balancing, or pain. If you have pain, you might feel a tight, aching, or squeezing pain in the calf, thigh, or buttock. This pain usually happens after you have walked a certain distance. The pain goes away if you stop walking. This pain is called intermittent claudication. If PAD gets worse, you may have other symptoms that are caused by poor blood flow to your legs and feet. You may have:  · Cold or numb feet or toes. · Sores that are slow to heal.  · Leg or foot pain while you are at rest.  · Feet and toes that become pale from exercise or when elevated. · Feet that turn red when dangled. · Blue or purple marks on your legs, feet, or toes. How can you prevent PAD? · Quit smoking. Quitting smoking is one of the best things you can do to help prevent PAD.  If you need help stroke. · Procedures, such as opening narrowed or blocked arteries (angioplasty) or using healthy blood vessels to create detours around narrowed or blocked arteries (bypass surgery). Follow-up care is a key part of your treatment and safety. Be sure to make and go to all appointments, and call your doctor if you are having problems. It's also a good idea to know your test results and keep a list of the medicines you take. Where can you learn more? Go to https://[employer]. org and sign in to your [employer] account. Enter V650 in the [employer] box to learn more about \"Learning About Peripheral Arterial Disease of the Legs. \"     If you do not have an account, please click on the \"Sign Up Now\" link. Current as of: July 22, 2018  Content Version: 11.9  © 5029-0592 [employer]. Care instructions adapted under license by [facility] ([facility]). If you have questions about a medical condition or this instruction, always ask your healthcare professional. [place] [address] any warranty or liability for your use of this information.

## 2019-04-08 NOTE — PROGRESS NOTES
4/8/2019    Landen [facility]  1950    Previous Procedures  1/29/19 L sfa, popliteal atherectomy, dcb     Patient returns for post operative evaluation. It was done for L LE tissue loss and currently patient has had significant improvement in this issue. She is happy with her progress. She has been tolerating being in a wrap per Dr. Denise Dietrich    She has been having issues with diarrhea associated with pletal.  She cut back to daily pletal and has noted improvement but still has diarrhea.       Physical Exam:    Gen Awake and Alert  L LE wounds are present but much improved   DP biphasic, PT monophasic    A/P PVD with L LE ulceration  S/P sfa, popliteal atherectomy, dcb   · Continue Medical management with ASA and plavix  · If she feels diarrhea still an issue ok to stop pletal entirely  · Discussed with pt tobacco use and significant relationship to PVD and potential to cause increased problems post intervention   pt currently is not a smoker  · Walking Program  · Emphasized importance of foot care, and to call if develops any wounds or ulcerations, changes in appearance or symptoms  · I reviewed with the patient that normal activities can be resumed as tolerated  · Return in 6 months for follow-up office visit with abis and pvrs or sooner if wound is not continuing to progress    Dung

## 2019-04-12 ENCOUNTER — HOSPITAL ENCOUNTER (OUTPATIENT)
Dept: WOUND CARE | Age: 69
Discharge: HOME | End: 2019-04-12
Payer: MEDICARE

## 2019-04-12 VITALS
SYSTOLIC BLOOD PRESSURE: 150 MMHG | HEIGHT: 66 IN | RESPIRATION RATE: 16 BRPM | TEMPERATURE: 98.3 F | WEIGHT: 117 LBS | HEART RATE: 72 BPM | DIASTOLIC BLOOD PRESSURE: 70 MMHG | BODY MASS INDEX: 18.8 KG/M2

## 2019-04-12 DIAGNOSIS — I70.242 ATHEROSCLEROSIS OF NATIVE ARTERY OF LEFT LOWER EXTREMITY WITH ULCERATION OF CALF (HCC): ICD-10-CM

## 2019-04-12 DIAGNOSIS — I70.222 ATHEROSCLEROSIS OF NATIVE ARTERY OF LEFT LOWER EXTREMITY WITH REST PAIN (HCC): ICD-10-CM

## 2019-04-12 PROCEDURE — 11042 DBRDMT SUBQ TIS 1ST 20SQCM/<: CPT

## 2019-04-12 NOTE — PROGRESS NOTES
Wound Healing Center Followup Visit Note    Referring Physician : Shelly Bell  MEDICAL RECORD NUMBER:  [id number]  AGE: 68 y.o. GENDER: female  : 1950  EPISODE DATE:  2019    Subjective:     Chief Complaint   Patient presents with    Wound Check     patient here for treatment of left lower leg ulcers      HISTORY of PRESENT ILLNESS HPI   Chilo Man is a 68 y.o. female who presents today in regards to follow up evaluation and treatment of wound/ulcer. That patient's past medical, family and social hx were reviewed and changes were made if present. History of Wound Context:  Patient denies any nausea, vomiting, fever, chills, shortness of breath, chest pain, calf cramping and/or pain. Patient is tolerating the current dressing regimen without issues. No other new issues noted at this time.      Wound/Ulcer Pain Timing/Severity: none  Quality of pain: N/A  Severity:  0 / 10   Modifying Factors: None  Associated Signs/Symptoms: none    Ulcer Identification:  Ulcer Type: venous and arterial  Contributing Factors: edema and arterial insufficiency    Diabetic/Pressure/Non Pressure Ulcers only:  Ulcer: Non-Pressure ulcer, fat layer exposed    Wound: Arterial ulceration left lower extremity        PAST MEDICAL HISTORY      Diagnosis Date    Aortoiliac occlusive disease ([address] 2019    Atherosclerosis of native arteries of extremity with rest pain ([address] 5/3/2018    Atherosclerosis of native artery of extremity with ulceration ([address] 2019    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 with rest pain ([address] 2019    Bilateral carotid artery stenosis 2018    Bruit of right carotid artery 5/3/2018    CAD (coronary artery disease)     Cancer (HCC)     SKIN CA/Thigh    COPD (chronic obstructive pulmonary disease) ([address]     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    History of tobacco use 5/3/2018    Hydrocephalus     Hyperlipidemia     Hypertension     Pain in both feet 5/3/2018    [facility]     PVD (peripheral vascular disease) with claudication (HCC) 5/3/2018    Rheumatoid arthritis [facility]     Thyroid disease     Venous stasis ulcer of left calf with fat layer exposed without varicose veins ([facility] 2019     Past Surgical History:   Procedure Laterality Date     SECTION      COLONOSCOPY      DILATION AND CURETTAGE OF UTERUS      PERIPHERAL PERCUTANEOUS ARTERIAL INTERVENTION  2019    L SFA angioplasty    TUBAL LIGATION       Family History   Problem Relation Age of Onset    Kidney Disease Mother     Coronary Art Dis Mother     High Blood Pressure Mother     Cancer Father     Other Father      Social History     Tobacco Use    Smoking status: Former Smoker     Packs/day: 0.25     Types: Cigarettes     Last attempt to quit: 5/3/2017     Years since quittin.9    Smokeless tobacco: Never Used   Substance Use Topics    Alcohol use: Yes     Comment: beer/Weekly     Drug use: No     Allergies   Allergen Reactions    Flagyl [Metronidazole]     Hornet Venom     Wasp Venom      Current Outpatient Medications on File Prior to Encounter   Medication Sig Dispense Refill    collagenase 250 UNIT/GM ointment Apply topically daily Apply topically daily.  cilostazol (PLETAL) 50 MG tablet Take 1 tablet by mouth 2 times daily 60 tablet 3    clopidogrel (PLAVIX) 75 MG tablet Take 1 tablet by mouth daily 30 tablet 3    escitalopram (LEXAPRO) 10 MG tablet Take 10 mg by mouth daily      liothyronine (CYTOMEL) 5 MCG tablet Take 5 mcg by mouth daily  0    etodolac (LODINE) 400 MG tablet Take 400 mg by mouth 2 times daily  0    B Complex Vitamins (VITAMIN B COMPLEX 100 IJ) Inject as directed      vitamin D (CHOLECALCIFEROL) 1000 UNIT TABS tablet Take 1,000 Units by mouth daily      levothyroxine (SYNTHROID) 112 MCG tablet Take 112 mcg by mouth Daily.       losartan (COZAAR) 25 MG tablet Take 25 mg by mouth daily.  Umeclidinium Bromide 62.5 MCG/INH AEPB Inhale 62.5 mcg into the lungs       No current facility-administered medications on file prior to encounter. REVIEW OF SYSTEMS See HPI    Objective:    BP (!) 150/70   Pulse 72   Temp 98.3 °F (36.8 °C) (Oral)   Resp 16   Ht 5' 6\" (1.676 m)   Wt 117 lb (53.1 kg)   BMI 18.88 kg/m²   Wt Readings from Last 3 Encounters:   04/12/19 117 lb (53.1 kg)   04/05/19 117 lb (53.1 kg)   03/29/19 117 lb (53.1 kg)     PHYSICAL EXAM  CONSTITUTIONAL:   Awake, alert, cooperative and in no acute distress  SKIN:  Open wound Present    Assessment:     Problem List Items Addressed This Visit     Atherosclerosis of native artery of extremity with ulceration ([address] (Chronic)    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 with rest pain (HCC) (Chronic)        Procedure Note  Indications:  Based on my examination of this patient's wound(s)/ulcer(s) today, debridement is required to promote healing and evaluate the wound base. Performed by: Mary Alice Mchugh DPM    Consent obtained:  Yes    Time out taken:  Yes    Pain Control: Anesthetic  Anesthetic: 2% Lidocaine Gel Topical     Debridement:Excisional Debridement    Using curette the wound(s)/ulcer(s) was/were sharply debrided down through and including the removal of subcutaneous tissue. Devitalized Tissue Debrided:  fibrin and biofilm to stimulate bleeding to promote healing, post debridement good bleeding base and wound edges noted    Pre Debridement Measurements:  Are located in the Wound/Ulcer Documentation Flow Sheet    Wound/Ulcer #: 1 and 2    Post Debridement Measurements:  Wound/Ulcer Descriptions are Pre Debridement except measurements:    Wound 01/11/19 Leg Anterior;Mid;Lower; Left # 1 Left Lower Leg Mid anterior Venous Ulcer (Acquired August 2018) (Active)   Wound Image   3/22/2019 12:59 PM   Wound Venous 1/11/2019  3:18 PM   Dressing Status Clean;Dry; Intact 3/22/2019  1:25 PM   Dressing Changed Changed/New 3/22/2019  1:25 PM   Dressing/Treatment Alginate 3/22/2019  1:25 PM   Wound Cleansed Rinsed/Irrigated with saline 3/22/2019  1:25 PM   Wound Length (cm) 1.4 cm 4/12/2019  1:13 PM   Wound Width (cm) 0.9 cm 4/12/2019  1:13 PM   Wound Depth (cm) 0.1 cm 4/12/2019  1:13 PM   Wound Surface Area (cm^2) 1.26 cm^2 4/12/2019  1:13 PM   Change in Wound Size % (l*w) 41.67 4/12/2019  1:13 PM   Wound Volume (cm^3) 0.13 cm^3 4/12/2019  1:13 PM   Wound Healing % 80 4/12/2019  1:13 PM   Post-Procedure Length (cm) 1.5 cm 4/12/2019  1:30 PM   Post-Procedure Width (cm) 0.9 cm 4/12/2019  1:30 PM   Post-Procedure Depth (cm) 0.2 cm 4/12/2019  1:30 PM   Post-Procedure Surface Area (cm^2) 1.35 cm^2 4/12/2019  1:30 PM   Post-Procedure Volume (cm^3) 0.27 cm^3 4/12/2019  1:30 PM   Wound Assessment Dry;Brown 4/12/2019  1:13 PM   Drainage Amount None 4/12/2019  1:13 PM   Drainage Description Serosanguinous 4/5/2019  1:30 PM   Odor None 4/12/2019  1:13 PM   Rhoda-wound Assessment Pink; Intact 4/12/2019  1:13 PM   Number of days: 90       Wound 01/25/19 Leg Left;Medial #2 LLE Medial Venous Ulcer (Acquired January 2019) (Active)   Wound Image   3/22/2019 12:59 PM   Dressing Status Clean;Dry; Intact 3/29/2019  1:37 PM   Dressing Changed Changed/New 3/29/2019  1:37 PM   Dressing/Treatment Alginate;Dry Dressing 3/29/2019  1:37 PM   Wound Cleansed Rinsed/Irrigated with saline; Other (Comment) 3/29/2019  1:37 PM   Wound Length (cm) 0.4 cm 4/12/2019  1:13 PM   Wound Width (cm) 0.2 cm 4/12/2019  1:13 PM   Wound Depth (cm) 0.1 cm 4/12/2019  1:13 PM   Wound Surface Area (cm^2) 0.08 cm^2 4/12/2019  1:13 PM   Change in Wound Size % (l*w) 86.67 4/12/2019  1:13 PM   Wound Volume (cm^3) 0.01 cm^3 4/12/2019  1:13 PM   Wound Healing % 83 4/12/2019  1:13 PM   Post-Procedure Length (cm) 0.4 cm 4/12/2019  1:30 PM   Post-Procedure Width (cm) 0.2 cm 4/12/2019  1:30 PM   Post-Procedure Depth (cm) 0.2 cm 4/12/2019  1:30 PM   Post-Procedure Surface Area (cm^2) 0.08 cm^2 4/12/2019  1:30 PM   Post-Procedure Volume (cm^3) 0.02 cm^3 4/12/2019  1:30 PM   Wound Assessment Pink 4/12/2019  1:13 PM   Drainage Amount Scant 4/12/2019  1:13 PM   Drainage Description Serosanguinous 4/12/2019  1:13 PM   Odor None 4/12/2019  1:13 PM   Rhoda-wound Assessment Pink; Intact;Dry 4/12/2019  1:13 PM   Number of days: 76     Percent of Wound/Ulcer Debrided: 100%    Total Surface Area Debrided:  1.34 sq cm     Estimated Blood Loss:  Minimal  Hemostasis Achieved:  by pressure    Procedural Pain:  3  / 10   Post Procedural Pain:  0 / 10     Response to treatment:  Well tolerated by patient. Plan:   Treatment Note please see attached Discharge Instructions    Written patient dismissal instructions given to patient and signed by patient or POA. Discharge Instructions         Visit Discharge/Physician Orders     Discharge condition: Stable     Assessment of pain at discharge: none     Anesthetic used: 2 % lidocaine     Discharge to: Home     Left via:Private automobile     Accompanied by: self     ECF/HHA:      Dressing Orders:  Cleanse left lower leg ulcer with normal saline apply alginate and coban 2, weekly at wound care.        Treatment Orders:       Arterial studies post arteriogram reviewed     Avoid foods high in vitamin k     Eat foods high in protein and vitamin c     Take multivitamin daily      [facility] followup visit  1 week at Dr. Sridevi Beckham office  2 week with  Dr. Radha Hughes wound care_________________________________  (Please note your next appointment above and if you are unable to keep, kindly give a 24 hour notice.  Thank you.)     Physician signature:__________________________        If you experience any of the following, please call the [facility] [address] during business hours:     * Increase in Pain  * Temperature over 101  * Increase in drainage from your wound  * Drainage with a foul odor  * Bleeding  * Increase in

## 2019-04-26 ENCOUNTER — HOSPITAL ENCOUNTER (OUTPATIENT)
Dept: WOUND CARE | Age: 69
Discharge: HOME | End: 2019-04-26
Payer: MEDICARE

## 2019-04-26 VITALS
TEMPERATURE: 97.6 F | DIASTOLIC BLOOD PRESSURE: 64 MMHG | HEIGHT: 66 IN | WEIGHT: 117 LBS | HEART RATE: 72 BPM | SYSTOLIC BLOOD PRESSURE: 136 MMHG | BODY MASS INDEX: 18.8 KG/M2 | RESPIRATION RATE: 16 BRPM

## 2019-04-26 DIAGNOSIS — I70.222 ATHEROSCLEROSIS OF NATIVE ARTERY OF LEFT LOWER EXTREMITY WITH REST PAIN (HCC): ICD-10-CM

## 2019-04-26 DIAGNOSIS — I70.242 ATHEROSCLEROSIS OF NATIVE ARTERY OF LEFT LOWER EXTREMITY WITH ULCERATION OF CALF (HCC): ICD-10-CM

## 2019-04-26 PROCEDURE — 11042 DBRDMT SUBQ TIS 1ST 20SQCM/<: CPT

## 2019-04-26 ASSESSMENT — PAIN SCALES - GENERAL: PAINLEVEL_OUTOF10: 0

## 2019-04-26 NOTE — PROGRESS NOTES
Wound Healing Center Followup Visit Note    Referring Physician : Darwin Contreras  MEDICAL RECORD NUMBER:  [id number]  AGE: 68 y.o. GENDER: female  : 1950  EPISODE DATE:  2019    Subjective:     Chief Complaint   Patient presents with    Wound Check      [facility] [address] [place] [address],[address] Follow Up  Appt with Dr Saw Ramirez of PRESENT ILLNESS HPI   Rory Frank is a 68 y.o. female who presents today in regards to follow up evaluation and treatment of wound/ulcer. That patient's past medical, family and social hx were reviewed and changes were made if present. History of Wound Context:  Patient denies any nausea, vomiting, fever, chills, shortness of breath, chest pain, calf cramping and/or pain. Patient is tolerating the current dressing regimen without issues. No other new issues noted at this time.      Wound/Ulcer Pain Timing/Severity: none  Quality of pain: N/A  Severity:  0 / 10   Modifying Factors: None  Associated Signs/Symptoms: edema    Ulcer Identification:  Ulcer Type: venous and arterial  Contributing Factors: venous stasis and arterial insufficiency    Diabetic/Pressure/Non Pressure Ulcers only:  Ulcer: Non-Pressure ulcer, fat layer exposed    Wound: Contusion        PAST MEDICAL HISTORY      Diagnosis Date    Aortoiliac occlusive disease ([address] 2019    Atherosclerosis of native arteries of extremity with rest pain ([address] 5/3/2018    Atherosclerosis of native artery of extremity with ulceration ([address] 2019    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 with rest pain ([address] 2019    Bilateral carotid artery stenosis 2018    Bruit of right carotid artery 5/3/2018    CAD (coronary artery disease)     Cancer (HCC)     SKIN CA/Thigh    [facility]     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    History of tobacco use 5/3/2018    Hydrocephalus     Hyperlipidemia     Hypertension     Pain in both feet 5/3/2018    [facility]     PVD (peripheral vascular disease) with claudication (HCC) 5/3/2018    Rheumatoid arthritis (HCC)     Thyroid disease     Venous stasis ulcer of left calf with fat layer exposed without varicose veins ([facility] 2019     Past Surgical History:   Procedure Laterality Date     SECTION      COLONOSCOPY      DILATION AND CURETTAGE OF UTERUS      PERIPHERAL PERCUTANEOUS ARTERIAL INTERVENTION  2019    L SFA angioplasty    TUBAL LIGATION       Family History   Problem Relation Age of Onset    Kidney Disease Mother     Coronary Art Dis Mother     High Blood Pressure Mother     Cancer Father     Other Father      Social History     Tobacco Use    Smoking status: Former Smoker     Packs/day: 0.25     Types: Cigarettes     Last attempt to quit: 5/3/2017     Years since quittin.9    Smokeless tobacco: Never Used   Substance Use Topics    Alcohol use: Yes     Comment: beer/Weekly     Drug use: No     Allergies   Allergen Reactions    Flagyl [Metronidazole]     Hornet Venom     Wasp Venom      Current Outpatient Medications on File Prior to Encounter   Medication Sig Dispense Refill    Apoaequorin 10 MG CAPS Take by mouth      collagenase 250 UNIT/GM ointment Apply topically daily Apply topically daily.       cilostazol (PLETAL) 50 MG tablet Take 1 tablet by mouth 2 times daily 60 tablet 3    clopidogrel (PLAVIX) 75 MG tablet Take 1 tablet by mouth daily 30 tablet 3    escitalopram (LEXAPRO) 10 MG tablet Take 10 mg by mouth daily      Umeclidinium Bromide 62.5 MCG/INH AEPB Inhale 62.5 mcg into the lungs      liothyronine (CYTOMEL) 5 MCG tablet Take 5 mcg by mouth daily  0    etodolac (LODINE) 400 MG tablet Take 400 mg by mouth 2 times daily  0    B Complex Vitamins (VITAMIN B COMPLEX 100 IJ) Inject as directed      vitamin D (CHOLECALCIFEROL) 1000 UNIT TABS tablet Take 1,000 Units by mouth daily      care.        Treatment Orders:       Arterial studies post arteriogram reviewed     Avoid foods high in vitamin k     Eat foods high in protein and vitamin c     Take multivitamin daily      [facility] followup visit  1 week at Dr. Gino Chi _________________________  (Please note your next appointment above and if you are unable to keep, kindly give a 24 hour notice.  Thank you.)     Physician signature:__________________________        If you experience any of the following, please call the [facility] during business hours:     * Increase in Pain  * Temperature over 101  * Increase in drainage from your wound  * Drainage with a foul odor  * Bleeding  * Increase in swelling  * Need for compression bandage changes due to slippage, breakthrough drainage.     If you need medical attention outside of the business hours of the [facility] please contact your PCP or go to the n                                                                                                                                                                  Electronically signed by Nemo Martinez DPM on 4/26/2019 at 1:53 PM

## 2019-05-03 ENCOUNTER — HOSPITAL ENCOUNTER (OUTPATIENT)
Dept: WOUND CARE | Age: 69
Discharge: HOME | End: 2019-05-03
Payer: MEDICARE

## 2019-05-03 VITALS
WEIGHT: 117 LBS | HEIGHT: 66 IN | TEMPERATURE: 97.9 F | DIASTOLIC BLOOD PRESSURE: 76 MMHG | SYSTOLIC BLOOD PRESSURE: 130 MMHG | BODY MASS INDEX: 18.8 KG/M2 | RESPIRATION RATE: 16 BRPM | HEART RATE: 76 BPM

## 2019-05-03 PROCEDURE — 99212 OFFICE O/P EST SF 10 MIN: CPT

## 2019-05-03 NOTE — PROGRESS NOTES
Wound Healing Center Followup Visit Note    Referring Physician : Landon Serrato  MEDICAL RECORD NUMBER:  [id number]  AGE: 68 y.o. GENDER: female  : 1950  EPISODE DATE:  5/3/2019    Subjective:     Chief Complaint   Patient presents with    Wound Check     patient here for treatment of left lower leg ulcer      HISTORY of PRESENT ILLNESS RAMSEY Jara is a 68 y.o. female who presents today in regards to follow up evaluation and treatment of wound/ulcer. That patient's past medical, family and social hx were reviewed and changes were made if present. History of Wound Context:  Patient denies any nausea, vomiting, fever, chills, shortness of breath, chest pain, calf cramping and/or pain. Patient is tolerating the current dressing regimen without issues. No other new issues noted at this time.      Wound/Ulcer Pain Timing/Severity: none  Quality of pain: N/A  Severity:  0 / 10   Modifying Factors: None  Associated Signs/Symptoms: edema    Ulcer Identification:  Ulcer Type: arterial and traumatic  Contributing Factors: edema and arterial insufficiency    Diabetic/Pressure/Non Pressure Ulcers only:  Ulcer: Ulcerations left lower extremity are healed    Wound: Contusion        PAST MEDICAL HISTORY      Diagnosis Date    Aortoiliac occlusive disease ([address] 2019    Atherosclerosis of native arteries of extremity with rest pain ([address] 5/3/2018    Atherosclerosis of native artery of extremity with ulceration ([address] 2019    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 with rest pain ([address] 2019    Bilateral carotid artery stenosis 2018    Bruit of right carotid artery 5/3/2018    CAD (coronary artery disease)     Cancer (HCC)     SKIN CA/Thigh    COPD (chronic obstructive pulmonary disease) ([address]     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    History of tobacco use 5/3/2018    Hydrocephalus     Hyperlipidemia     Hypertension  Pain in both feet 5/3/2018    [facility]     PVD (peripheral vascular disease) with claudication (HCC) 5/3/2018    Rheumatoid arthritis (HCC)     Thyroid disease     Venous stasis ulcer of left calf with fat layer exposed without varicose veins ([facility] 2019     Past Surgical History:   Procedure Laterality Date     SECTION      COLONOSCOPY      DILATION AND CURETTAGE OF UTERUS      PERIPHERAL PERCUTANEOUS ARTERIAL INTERVENTION  2019    L SFA angioplasty    TUBAL LIGATION       Family History   Problem Relation Age of Onset    Kidney Disease Mother     Coronary Art Dis Mother     High Blood Pressure Mother     Cancer Father     Other Father      Social History     Tobacco Use    Smoking status: Former Smoker     Packs/day: 0.25     Types: Cigarettes     Last attempt to quit: 5/3/2017     Years since quittin.0    Smokeless tobacco: Never Used   Substance Use Topics    Alcohol use: Yes     Comment: beer/Weekly     Drug use: No     Allergies   Allergen Reactions    Flagyl [Metronidazole]     Hornet Venom     Wasp Venom      Current Outpatient Medications on File Prior to Encounter   Medication Sig Dispense Refill    Apoaequorin 10 MG CAPS Take by mouth      collagenase 250 UNIT/GM ointment Apply topically daily Apply topically daily.       cilostazol (PLETAL) 50 MG tablet Take 1 tablet by mouth 2 times daily 60 tablet 3    clopidogrel (PLAVIX) 75 MG tablet Take 1 tablet by mouth daily 30 tablet 3    escitalopram (LEXAPRO) 10 MG tablet Take 10 mg by mouth daily      Umeclidinium Bromide 62.5 MCG/INH AEPB Inhale 62.5 mcg into the lungs      liothyronine (CYTOMEL) 5 MCG tablet Take 5 mcg by mouth daily  0    etodolac (LODINE) 400 MG tablet Take 400 mg by mouth 2 times daily  0    B Complex Vitamins (VITAMIN B COMPLEX 100 IJ) Inject as directed      vitamin D (CHOLECALCIFEROL) 1000 UNIT TABS tablet Take 1,000 Units by mouth daily      levothyroxine (SYNTHROID) 112 MCG tablet Take 112 mcg by mouth Daily.  losartan (COZAAR) 25 MG tablet Take 25 mg by mouth daily. No current facility-administered medications on file prior to encounter. REVIEW OF SYSTEMS See HPI    Objective:    /76   Pulse 76   Temp 97.9 °F (36.6 °C) (Oral)   Resp 16   Ht 5' 6\" (1.676 m)   Wt 117 lb (53.1 kg)   BMI 18.88 kg/m²   Wt Readings from Last 3 Encounters:   05/03/19 117 lb (53.1 kg)   04/26/19 117 lb (53.1 kg)   04/12/19 117 lb (53.1 kg)     PHYSICAL EXAM  CONSTITUTIONAL:   Awake, alert, cooperative and in no acute distress  SKIN:  Open wound Absent     Assessment:     Problem List Items Addressed This Visit     None        Procedure Note  Indications:  Based on my examination of this patient's wound(s)/ulcer(s) today, debridement is not required to promote healing and evaluate the wound base. 1. Evaluation & Management  2. No debridement performed, wound is healed. Wound 01/11/19 Leg Anterior;Mid;Lower; Left # 1 Left Lower Leg Mid anterior Venous Ulcer (Acquired August 2018) (Active)   Wound Image   5/3/2019  1:10 PM   Wound Venous 1/11/2019  3:18 PM   Dressing Status Clean;Dry; Intact 4/26/2019  1:54 PM   Dressing Changed Changed/New 4/26/2019  1:54 PM   Dressing/Treatment Alginate;Dry Dressing 4/26/2019  1:54 PM   Wound Cleansed Rinsed/Irrigated with saline 4/26/2019  1:54 PM   Wound Length (cm) 0 cm 5/3/2019  1:10 PM   Wound Width (cm) 0 cm 5/3/2019  1:10 PM   Wound Depth (cm) 0 cm 5/3/2019  1:10 PM   Wound Surface Area (cm^2) 0 cm^2 5/3/2019  1:10 PM   Change in Wound Size % (l*w) 100 5/3/2019  1:10 PM   Wound Volume (cm^3) 0 cm^3 5/3/2019  1:10 PM   Wound Healing % 100 5/3/2019  1:10 PM   Post-Procedure Length (cm) 0.3 cm 4/26/2019  1:42 PM   Post-Procedure Width (cm) 0.2 cm 4/26/2019  1:42 PM   Post-Procedure Depth (cm) 0.1 cm 4/26/2019  1:42 PM   Post-Procedure Surface Area (cm^2) 0.06 cm^2 4/26/2019  1:42 PM   Post-Procedure Volume (cm^3) 0.01 cm^3 4/26/2019  1:42 PM   Wound Assessment Intact; Alon Mc; Hernandes 4/26/2019  1:19 PM   Drainage Amount None 4/26/2019  1:19 PM   Drainage Description Serosanguinous 4/5/2019  1:30 PM   Odor None 4/26/2019  1:19 PM   Rhoda-wound Assessment Clean;Dry; Intact; Pink 4/26/2019  1:19 PM   Number of days: 111     -  Patient to be discharged from the wound care center.   -  Patient was advised on proper skin care techniques to prevent reoccurrence. -  Patient was advised to notify the wound care center and/or health care professional if any new wounds arise. Patient is to follow up in my office in one month or sooner if needed. Plan:   Treatment Note please see attached Discharge Instructions    Written patient dismissal instructions given to patient and signed by patient or POA. Discharge Instructions         Visit Discharge/Physician Orders     Discharge condition: Stable     Assessment of pain at discharge: none     Anesthetic used: 2 % lidocaine     Discharge to: Home     Left via:Private automobile     Accompanied by: self     ECF/HHA:      Dressing Orders: healed      Treatment Orders:  single tubi  left leg     Arterial studies post arteriogram reviewed     Avoid foods high in vitamin k     Eat foods high in protein and vitamin c     Take multivitamin daily      C followup visit  1 month at office in [facility]______________________  (Please note your next appointment above and if you are unable to keep, kindly give a 24 hour notice.  Thank you.)     Physician signature:__________________________        If you experience any of the following, please call the [address] [place] [address] Road during business hours:     * Increase in Pain  * Temperature over 101  * Increase in drainage from your wound  * Drainage with a foul odor  * Bleeding  * Increase in swelling  * Need for compression bandage changes due to slippage, breakthrough drainage.     If you need medical attention outside of the business hours of the [address] please contact your PCP or go to the n                                                                                                                                                                                 Electronically signed by Bernice Cassidy DPM on 5/3/2019 at 1:22 PM

## 2019-05-31 ENCOUNTER — OFFICE VISIT (OUTPATIENT)
Dept: PODIATRY | Age: 69
End: 2019-05-31
Payer: MEDICARE

## 2019-05-31 VITALS
HEIGHT: 66 IN | SYSTOLIC BLOOD PRESSURE: 114 MMHG | BODY MASS INDEX: 18.8 KG/M2 | WEIGHT: 117 LBS | DIASTOLIC BLOOD PRESSURE: 66 MMHG

## 2019-05-31 DIAGNOSIS — L97.222 VENOUS STASIS ULCER OF LEFT CALF WITH FAT LAYER EXPOSED WITHOUT VARICOSE VEINS (HCC): Primary | Chronic | ICD-10-CM

## 2019-05-31 DIAGNOSIS — F17.219 CIGARETTE NICOTINE DEPENDENCE WITH NICOTINE-INDUCED DISORDER: ICD-10-CM

## 2019-05-31 DIAGNOSIS — I87.2 VENOUS STASIS ULCER OF LEFT CALF WITH FAT LAYER EXPOSED WITHOUT VARICOSE VEINS (HCC): Primary | Chronic | ICD-10-CM

## 2019-05-31 DIAGNOSIS — I70.242 ATHEROSCLEROSIS OF NATIVE ARTERY OF LEFT LOWER EXTREMITY WITH ULCERATION OF CALF (HCC): Chronic | ICD-10-CM

## 2019-05-31 PROCEDURE — 99213 OFFICE O/P EST LOW 20 MIN: CPT | Performed by: PODIATRIST

## 2019-05-31 NOTE — PROGRESS NOTES
19     Kg Hernandez    : 1950   Sex: female    Age: 68 y.o. Patient's PCP/Provider is:  Mala Radford DO    Subjective:  Patient is seen today follow-up regarding continued treatment stasis/traumatic ulcerative area noted to the left lower extremity. Patient has been wearing her compression garment as recommended without issues. He shouldn't stated that she has maintained the previous ulcer any healed status. Patient denies any nausea, vomiting, fever, chills. No other new complaints noted at this time. Chief Complaint   Patient presents with    Follow-up       ROS:  Const: Positives and pertinent negatives as per HPI. Musculo: Denies symptoms other than stated above. Neuro: Denies symptoms other than stated above. Skin: Denies symptoms other than stated above. Current Medications:    Current Outpatient Medications:     Apoaequorin 10 MG CAPS, Take by mouth, Disp: , Rfl:     collagenase 250 UNIT/GM ointment, Apply topically daily Apply topically daily. , Disp: , Rfl:     cilostazol (PLETAL) 50 MG tablet, Take 1 tablet by mouth 2 times daily, Disp: 60 tablet, Rfl: 3    clopidogrel (PLAVIX) 75 MG tablet, Take 1 tablet by mouth daily, Disp: 30 tablet, Rfl: 3    escitalopram (LEXAPRO) 10 MG tablet, Take 10 mg by mouth daily, Disp: , Rfl:     Umeclidinium Bromide 62.5 MCG/INH AEPB, Inhale 62.5 mcg into the lungs, Disp: , Rfl:     liothyronine (CYTOMEL) 5 MCG tablet, Take 5 mcg by mouth daily, Disp: , Rfl: 0    etodolac (LODINE) 400 MG tablet, Take 400 mg by mouth 2 times daily, Disp: , Rfl: 0    B Complex Vitamins (VITAMIN B COMPLEX 100 IJ), Inject as directed, Disp: , Rfl:     vitamin D (CHOLECALCIFEROL) 1000 UNIT TABS tablet, Take 1,000 Units by mouth daily, Disp: , Rfl:     levothyroxine (SYNTHROID) 112 MCG tablet, Take 112 mcg by mouth Daily. , Disp: , Rfl:     losartan (COZAAR) 25 MG tablet, Take 25 mg by mouth daily. , Disp: , Rfl:     Allergies:   Allergies   Allergen Reactions    Flagyl [Metronidazole]     Hornet Venom     Wasp Venom        Vitals:    05/31/19 1109   BP: 114/66   Weight: 117 lb (53.1 kg)   Height: 5' 6\" (1.676 m)       Exam:  Neurovascular status unchanged. Previous ulcerative area anterior aspect left lower extremity remains healed at this time. Edematous changes are stable with use of current compression dressings. No ecchymotic skin changes noted or any hyperpigmentation areas noted healed ulcerative area left lower extremity. No tenderness noted to palpation left lower extremity. Diagnostic Studies:     No results found. No results found. Procedures:    None    Plan Per Assessment  James Foley was seen today for follow-up. Diagnoses and all orders for this visit:    Venous stasis ulcer of left calf with fat layer exposed without varicose veins (HCC)    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 with ulceration of calf (HCC)    Cigarette nicotine dependence with nicotine-induced disorder      1. Evaluation and management  2. We did discuss continued skincare techniques to prevent ulcer reoccurrence. We did discuss continued use of the compression garment on the left lower extremity for protection and prevention of recurrence. We did discuss the importance of cutting back on her cigarette usage to prevent reoccurrence of symptoms. 3. It was discussed in detail with the patient proper caring for the vascular compromised foot. The fact that they have compromised blood flow put the patient at risk for infection/gangrene/amputation. The patient should not walk barefoot. Shoe gear should fit properly and socks should be worn with shoes. Exercise is very important to prevent worsening of the disease process but before performing an exercise program should check with their family physician first.  If any skin lesions are noted, they are instructed to contact the office immediately.     4. Patient will be followed up in 3 months time or sooner if needed for reevaluation. She was advised to call the office with any questions or concerns in the interim. Seen By:    Brennan Wilkinson DPM    Electronically signed by Brennan Wilkinson DPM on 5/31/2019 at 11:54 AM    This note was created using voice recognition software. The note was reviewed however may contain grammatical errors.

## 2019-05-31 NOTE — PROGRESS NOTES
Patient in today for follow up evaluation from wound care center.  pcp is Becca Pompa DO last ov December 2018

## 2019-08-30 ENCOUNTER — OFFICE VISIT (OUTPATIENT)
Dept: PODIATRY | Age: 69
End: 2019-08-30
Payer: MEDICARE

## 2019-08-30 VITALS — BODY MASS INDEX: 17.71 KG/M2 | WEIGHT: 110.2 LBS | RESPIRATION RATE: 18 BRPM | HEIGHT: 66 IN

## 2019-08-30 DIAGNOSIS — I73.9 PVD (PERIPHERAL VASCULAR DISEASE) WITH CLAUDICATION (HCC): Primary | ICD-10-CM

## 2019-08-30 DIAGNOSIS — I70.242 ATHEROSCLEROSIS OF NATIVE ARTERY OF LEFT LOWER EXTREMITY WITH ULCERATION OF CALF (HCC): Chronic | ICD-10-CM

## 2019-08-30 PROBLEM — L97.222 VENOUS STASIS ULCER OF LEFT CALF WITH FAT LAYER EXPOSED WITHOUT VARICOSE VEINS (HCC): Chronic | Status: RESOLVED | Noted: 2019-02-27 | Resolved: 2019-08-30

## 2019-08-30 PROBLEM — M79.671 PAIN IN BOTH FEET: Status: RESOLVED | Noted: 2018-05-03 | Resolved: 2019-08-30

## 2019-08-30 PROBLEM — I87.2 VENOUS STASIS ULCER OF LEFT CALF WITH FAT LAYER EXPOSED WITHOUT VARICOSE VEINS (HCC): Chronic | Status: RESOLVED | Noted: 2019-02-27 | Resolved: 2019-08-30

## 2019-08-30 PROBLEM — M79.672 PAIN IN BOTH FEET: Status: RESOLVED | Noted: 2018-05-03 | Resolved: 2019-08-30

## 2019-08-30 PROCEDURE — 99213 OFFICE O/P EST LOW 20 MIN: CPT | Performed by: PODIATRIST

## 2019-08-30 NOTE — PROGRESS NOTES
19     Britany Hung    : 1950   Sex: female    Age: 68 y.o. Patient's PCP/Provider is:  Wendie Carrillo MD    Subjective:  Patient is seen today follow-up regarding continued treatment ulceration left lower extremity. Patient has been doing great since the ulceration has healed. She has been performing daily skin care techniques as previously discussed to prevent recurrence. She does have her follow-up appointment with the vascular surgeon within the next 2 weeks for follow-up. No new complaints noted at this time. Chief Complaint   Patient presents with    Nail Problem    Wound Check       ROS:  Const: Positives and pertinent negatives as per HPI. Musculo: Denies symptoms other than stated above. Neuro: Denies symptoms other than stated above. Skin: Denies symptoms other than stated above. Current Medications:    Current Outpatient Medications:     Apoaequorin 10 MG CAPS, Take by mouth, Disp: , Rfl:     collagenase 250 UNIT/GM ointment, Apply topically daily Apply topically daily. , Disp: , Rfl:     cilostazol (PLETAL) 50 MG tablet, Take 1 tablet by mouth 2 times daily, Disp: 60 tablet, Rfl: 3    clopidogrel (PLAVIX) 75 MG tablet, Take 1 tablet by mouth daily, Disp: 30 tablet, Rfl: 3    escitalopram (LEXAPRO) 10 MG tablet, Take 10 mg by mouth daily, Disp: , Rfl:     Umeclidinium Bromide 62.5 MCG/INH AEPB, Inhale 62.5 mcg into the lungs, Disp: , Rfl:     liothyronine (CYTOMEL) 5 MCG tablet, Take 5 mcg by mouth daily, Disp: , Rfl: 0    etodolac (LODINE) 400 MG tablet, Take 400 mg by mouth 2 times daily, Disp: , Rfl: 0    B Complex Vitamins (VITAMIN B COMPLEX 100 IJ), Inject as directed, Disp: , Rfl:     vitamin D (CHOLECALCIFEROL) 1000 UNIT TABS tablet, Take 1,000 Units by mouth daily, Disp: , Rfl:     levothyroxine (SYNTHROID) 112 MCG tablet, Take 112 mcg by mouth Daily. , Disp: , Rfl:     losartan (COZAAR) 25 MG tablet, Take 25 mg by mouth daily. , Disp: , Rfl:

## 2019-10-02 DIAGNOSIS — I70.242 ATHEROSCLEROSIS OF NATIVE ARTERY OF LEFT LOWER EXTREMITY WITH ULCERATION OF CALF (HCC): Primary | ICD-10-CM

## 2019-12-02 ENCOUNTER — OFFICE VISIT (OUTPATIENT)
Dept: VASCULAR SURGERY | Age: 69
End: 2019-12-02
Payer: MEDICARE

## 2019-12-02 ENCOUNTER — HOSPITAL ENCOUNTER (OUTPATIENT)
Dept: CARDIOLOGY | Age: 69
Discharge: HOME | End: 2019-12-02
Payer: MEDICARE

## 2019-12-02 DIAGNOSIS — I70.242 ATHEROSCLEROSIS OF NATIVE ARTERY OF LEFT LOWER EXTREMITY WITH ULCERATION OF CALF (HCC): ICD-10-CM

## 2019-12-02 DIAGNOSIS — I73.9 PVD (PERIPHERAL VASCULAR DISEASE) WITH CLAUDICATION (HCC): Primary | ICD-10-CM

## 2019-12-02 PROCEDURE — G8419 CALC BMI OUT NRM PARAM NOF/U: HCPCS | Performed by: SURGERY

## 2019-12-02 PROCEDURE — G8427 DOCREV CUR MEDS BY ELIG CLIN: HCPCS | Performed by: SURGERY

## 2019-12-02 PROCEDURE — 1123F ACP DISCUSS/DSCN MKR DOCD: CPT | Performed by: SURGERY

## 2019-12-02 PROCEDURE — G8484 FLU IMMUNIZE NO ADMIN: HCPCS | Performed by: SURGERY

## 2019-12-02 PROCEDURE — 4040F PNEUMOC VAC/ADMIN/RCVD: CPT | Performed by: SURGERY

## 2019-12-02 PROCEDURE — 1036F TOBACCO NON-USER: CPT | Performed by: SURGERY

## 2019-12-02 PROCEDURE — 93923 UPR/LXTR ART STDY 3+ LVLS: CPT

## 2019-12-02 PROCEDURE — G8400 PT W/DXA NO RESULTS DOC: HCPCS | Performed by: SURGERY

## 2019-12-02 PROCEDURE — 1090F PRES/ABSN URINE INCON ASSESS: CPT | Performed by: SURGERY

## 2019-12-02 PROCEDURE — 99213 OFFICE O/P EST LOW 20 MIN: CPT | Performed by: SURGERY

## 2019-12-02 PROCEDURE — G8598 ASA/ANTIPLAT THER USED: HCPCS | Performed by: SURGERY

## 2019-12-02 PROCEDURE — 3017F COLORECTAL CA SCREEN DOC REV: CPT | Performed by: SURGERY

## 2019-12-02 RX ORDER — VENLAFAXINE 75 MG/1
75 TABLET ORAL DAILY
Status: ON HOLD | COMMUNITY
End: 2020-02-17

## 2019-12-02 RX ORDER — MELOXICAM 15 MG/1
15 TABLET ORAL DAILY
Status: ON HOLD | COMMUNITY
End: 2020-03-06 | Stop reason: HOSPADM

## 2019-12-02 RX ORDER — OXYBUTYNIN CHLORIDE 5 MG/1
5 TABLET, EXTENDED RELEASE ORAL DAILY
Status: ON HOLD | COMMUNITY
End: 2020-03-06 | Stop reason: HOSPADM

## 2019-12-09 ENCOUNTER — TELEPHONE (OUTPATIENT)
Dept: VASCULAR SURGERY | Age: 69
End: 2019-12-09

## 2020-01-06 ENCOUNTER — HOSPITAL ENCOUNTER (OUTPATIENT)
Dept: GENERAL RADIOLOGY | Age: 70
Discharge: HOME | End: 2020-01-08
Payer: MEDICARE

## 2020-01-06 ENCOUNTER — HOSPITAL ENCOUNTER (OUTPATIENT)
Age: 70
Discharge: HOME | End: 2020-01-08
Payer: MEDICARE

## 2020-01-06 PROBLEM — I70.25 ATHEROSCLEROSIS OF NATIVE ARTERY OF EXTREMITY WITH ULCERATION (HCC): Chronic | Status: RESOLVED | Noted: 2019-01-16 | Resolved: 2020-01-06

## 2020-01-06 PROBLEM — I70.222 ATHEROSCLEROSIS OF NATIVE ARTERY OF LEFT LOWER EXTREMITY WITH REST PAIN (HCC): Chronic | Status: RESOLVED | Noted: 2019-01-16 | Resolved: 2020-01-06

## 2020-01-06 PROCEDURE — 73030 X-RAY EXAM OF SHOULDER: CPT

## 2020-01-07 ENCOUNTER — HOSPITAL ENCOUNTER (OUTPATIENT)
Dept: CARDIAC CATH/INVASIVE PROCEDURES | Age: 70
Discharge: HOME | End: 2020-01-07
Payer: MEDICARE

## 2020-01-07 PROBLEM — I65.23 ASYMPTOMATIC BILATERAL CAROTID ARTERY STENOSIS: Chronic | Status: ACTIVE | Noted: 2018-05-22

## 2020-01-07 LAB
ABO/RH: NORMAL
ANION GAP SERPL CALCULATED.3IONS-SCNC: 10 MMOL/L (ref 7–16)
ANTIBODY SCREEN: NORMAL
BUN BLDV-MCNC: 23 MG/DL (ref 8–23)
CALCIUM SERPL-MCNC: 9.2 MG/DL (ref 8.6–10.2)
CHLORIDE BLD-SCNC: 104 MMOL/L (ref 98–107)
CO2: 30 MMOL/L (ref 22–29)
CREAT SERPL-MCNC: 0.7 MG/DL (ref 0.5–1)
GFR AFRICAN AMERICAN: >60
GFR NON-AFRICAN AMERICAN: >60 ML/MIN/1.73
GLUCOSE BLD-MCNC: 93 MG/DL (ref 74–99)
HCT VFR BLD CALC: 44 % (ref 34–48)
HEMOGLOBIN: 13.9 G/DL (ref 11.5–15.5)
INR BLD: 1
MCH RBC QN AUTO: 32.3 PG (ref 26–35)
MCHC RBC AUTO-ENTMCNC: 31.6 % (ref 32–34.5)
MCV RBC AUTO: 102.3 FL (ref 80–99.9)
PDW BLD-RTO: 14.2 FL (ref 11.5–15)
PLATELET # BLD: 395 E9/L (ref 130–450)
PMV BLD AUTO: 9.3 FL (ref 7–12)
POTASSIUM REFLEX MAGNESIUM: 3.9 MMOL/L (ref 3.5–5)
PROTHROMBIN TIME: 11 SEC (ref 9.3–12.4)
RBC # BLD: 4.3 E12/L (ref 3.5–5.5)
SODIUM BLD-SCNC: 144 MMOL/L (ref 132–146)
WBC # BLD: 7.1 E9/L (ref 4.5–11.5)

## 2020-01-07 PROCEDURE — 76937 US GUIDE VASCULAR ACCESS: CPT | Performed by: SURGERY

## 2020-01-07 PROCEDURE — C1769 GUIDE WIRE: HCPCS

## 2020-01-07 PROCEDURE — 6360000002 HC RX W HCPCS: Performed by: SURGERY

## 2020-01-07 PROCEDURE — 6360000002 HC RX W HCPCS

## 2020-01-07 PROCEDURE — 80048 BASIC METABOLIC PNL TOTAL CA: CPT

## 2020-01-07 PROCEDURE — 85027 COMPLETE CBC AUTOMATED: CPT

## 2020-01-07 PROCEDURE — 86901 BLOOD TYPING SEROLOGIC RH(D): CPT

## 2020-01-07 PROCEDURE — 86900 BLOOD TYPING SEROLOGIC ABO: CPT

## 2020-01-07 PROCEDURE — 85610 PROTHROMBIN TIME: CPT

## 2020-01-07 PROCEDURE — 2500000003 HC RX 250 WO HCPCS

## 2020-01-07 PROCEDURE — 75716 ARTERY X-RAYS ARMS/LEGS: CPT | Performed by: SURGERY

## 2020-01-07 PROCEDURE — G0269 OCCLUSIVE DEVICE IN VEIN ART: HCPCS | Performed by: SURGERY

## 2020-01-07 PROCEDURE — 36415 COLL VENOUS BLD VENIPUNCTURE: CPT

## 2020-01-07 PROCEDURE — C1887 CATHETER, GUIDING: HCPCS

## 2020-01-07 PROCEDURE — C1760 CLOSURE DEV, VASC: HCPCS

## 2020-01-07 PROCEDURE — 36247 INS CATH ABD/L-EXT ART 3RD: CPT | Performed by: SURGERY

## 2020-01-07 PROCEDURE — 2709999900 HC NON-CHARGEABLE SUPPLY

## 2020-01-07 PROCEDURE — 86850 RBC ANTIBODY SCREEN: CPT

## 2020-01-07 PROCEDURE — C1894 INTRO/SHEATH, NON-LASER: HCPCS

## 2020-01-07 RX ORDER — CLOPIDOGREL BISULFATE 75 MG/1
75 TABLET ORAL DAILY
Qty: 30 TABLET | Refills: 3 | Status: ON HOLD
Start: 2020-01-07 | End: 2020-03-06 | Stop reason: HOSPADM

## 2020-01-07 RX ORDER — SODIUM CHLORIDE 9 MG/ML
INJECTION, SOLUTION INTRAVENOUS CONTINUOUS
Status: DISCONTINUED | OUTPATIENT
Start: 2020-01-07 | End: 2020-01-08 | Stop reason: HOSPADM

## 2020-01-07 RX ORDER — CEFAZOLIN SODIUM 2 G/50ML
2 SOLUTION INTRAVENOUS
Status: COMPLETED | OUTPATIENT
Start: 2020-01-07 | End: 2020-01-07

## 2020-01-07 RX ORDER — CILOSTAZOL 50 MG/1
100 TABLET ORAL 2 TIMES DAILY
Qty: 60 TABLET | Refills: 3 | Status: ON HOLD
Start: 2020-01-07 | End: 2020-03-06 | Stop reason: HOSPADM

## 2020-01-07 RX ORDER — SODIUM CHLORIDE 0.9 % (FLUSH) 0.9 %
10 SYRINGE (ML) INJECTION PRN
Status: DISCONTINUED | OUTPATIENT
Start: 2020-01-07 | End: 2020-01-08 | Stop reason: HOSPADM

## 2020-01-07 RX ORDER — BUPROPION HYDROCHLORIDE 100 MG/1
150 TABLET ORAL DAILY
Status: ON HOLD | COMMUNITY
End: 2020-01-22 | Stop reason: SDUPTHER

## 2020-01-07 RX ORDER — ASPIRIN 325 MG
325 TABLET ORAL DAILY
COMMUNITY
End: 2020-01-07 | Stop reason: HOSPADM

## 2020-01-07 RX ORDER — SODIUM CHLORIDE 0.9 % (FLUSH) 0.9 %
10 SYRINGE (ML) INJECTION EVERY 12 HOURS SCHEDULED
Status: DISCONTINUED | OUTPATIENT
Start: 2020-01-07 | End: 2020-01-08 | Stop reason: HOSPADM

## 2020-01-07 RX ADMIN — CEFAZOLIN SODIUM 2 G: 2 SOLUTION INTRAVENOUS at 08:14

## 2020-01-07 NOTE — OP NOTE
Cardiovascular Lab Procedure Report    Elissa Wright  1950    Date : 1/7/2020  Surgeon: Chao Knox M.D. Pre-procedure Diagnosis: R LE lifestyle limiting claudication  Post-procedure Diagnosis: Same  Procedure:      Left  common femoral artery access   with US guidance    5 fr exoseal used for closure   31138 Bilateral lower extremity angiogram    TF Right lower extremity angiogram via right common femoral   53376 Catheter placement in right sfa   Anesthesia: Local with IV sedation  Assistants: Cath Lab Staff  Estimated Blood Loss: less than 50   Complications: none  Findings: Abdominal aorta :  Patent   Right Left   Common Iliac Art Patent Patent   External Iliac Art Patent Patent but diseased > 50% stenosis distally   Internal Iliac Art Minimal flow Minimal flow   Common Femoral Art Patent but diseased Patent but diseased   Superficial Femoral Art Proximally occluded, recons at addutor hiatus Patent   Profunda Femoral Art Patent Patent   AK Popliteal Art Patent Patent   BK Popliteal Art Patent Patent   Anterior Tibial Art Proximally occluded Patent proximally   Tibioperoneal Trunk patent Patent   Peroneal Art Patent, occludes distally Patent proximally   Posterior Tibial Art Patent, primary runoff Patent proximally     Procedure Details : There was not previous CTA  or catheter based diagnostic imaging preformed prior to today's procedure. Timeout preformed identifying pt and procedure. Groins prepped and draped in sterile fashion. Patient given sedation as needed throughout the case. Left  common femoral artery was noted to be patent and was accessed under ultrasound guidance after infiltrating with local.  A printer was used to print out an image. Micropuncture placed, exchanged out for 5 fr sheath. Advantage glide wire and contra 2 catheter advanced into distal aorta. Bilateral lower extremity angiogram preformed.  Contra and wire used to access Right  iliac system and catheter placed at common femoral and [facility]  [facility] preformed. Significant occlusive disease was noted in the sfa which was chronically occluded proximally. Attempts to cross  were unsuccessful.       After femoral angiogram a 5 fr exooseal device used to close the Left common femoral artery    Postop Exam  Right DP no signal  PT monophasic  Left DP weakly biphasic  PT monophasic    Plan  Stop asa 325 mg  Restart plavix 75 mg, and change pletal to 100 mg bid  Encourage contniued tobacco cessation  Patient needs R fem ak pop bypass   Imaging done does note some distal L external iliac disease - but previous intervention on L sfa is patent    Radha Carranza    PCP : Reanna Yadav MD  Podiatrist : Dr. Chano Giang     Previous Vascular Procedure  1/29/19 L sfa, popliteal atherectomy, dcb   1/7/2020 R LE angiogram - SFA  - LL claudication - needs fem ak pop bypass

## 2020-01-20 ENCOUNTER — APPOINTMENT (OUTPATIENT)
Dept: CT IMAGING | Age: 70
DRG: 300 | End: 2020-01-20
Payer: MEDICARE

## 2020-01-20 ENCOUNTER — APPOINTMENT (OUTPATIENT)
Dept: ULTRASOUND IMAGING | Age: 70
DRG: 300 | End: 2020-01-20
Payer: MEDICARE

## 2020-01-20 ENCOUNTER — HOSPITAL ENCOUNTER (INPATIENT)
Age: 70
LOS: 3 days | Discharge: NURSING, REHAB OR LONG-TERM CARE | DRG: 300 | End: 2020-01-23
Attending: EMERGENCY MEDICINE | Admitting: INTERNAL MEDICINE
Payer: MEDICARE

## 2020-01-20 PROBLEM — I73.9 LEFT LEG CLAUDICATION (HCC): Status: ACTIVE | Noted: 2020-01-20

## 2020-01-20 PROBLEM — R29.6 FALLS FREQUENTLY: Status: ACTIVE | Noted: 2020-01-20

## 2020-01-20 PROBLEM — I73.9 PVD (PERIPHERAL VASCULAR DISEASE) (HCC): Status: RESOLVED | Noted: 2019-01-29 | Resolved: 2020-01-20

## 2020-01-20 PROBLEM — M79.652 LEFT THIGH PAIN: Status: ACTIVE | Noted: 2020-01-20

## 2020-01-20 LAB
ALBUMIN SERPL-MCNC: 4.2 G/DL (ref 3.5–5.2)
ALP BLD-CCNC: 83 U/L (ref 35–104)
ALT SERPL-CCNC: 21 U/L (ref 0–32)
ANION GAP SERPL CALCULATED.3IONS-SCNC: 11 MMOL/L (ref 7–16)
APTT: 31.3 SEC (ref 24.5–35.1)
AST SERPL-CCNC: 30 U/L (ref 0–31)
BASOPHILS ABSOLUTE: 0.02 E9/L (ref 0–0.2)
BASOPHILS RELATIVE PERCENT: 0.3 % (ref 0–2)
BILIRUB SERPL-MCNC: 0.7 MG/DL (ref 0–1.2)
BUN BLDV-MCNC: 30 MG/DL (ref 8–23)
CALCIUM SERPL-MCNC: 9.5 MG/DL (ref 8.6–10.2)
CHLORIDE BLD-SCNC: 104 MMOL/L (ref 98–107)
CO2: 27 MMOL/L (ref 22–29)
CREAT SERPL-MCNC: 0.6 MG/DL (ref 0.5–1)
EOSINOPHILS ABSOLUTE: 0.06 E9/L (ref 0.05–0.5)
EOSINOPHILS RELATIVE PERCENT: 0.9 % (ref 0–6)
GFR AFRICAN AMERICAN: >60
GFR NON-AFRICAN AMERICAN: >60 ML/MIN/1.73
GLUCOSE BLD-MCNC: 95 MG/DL (ref 74–99)
HCT VFR BLD CALC: 40.7 % (ref 34–48)
HEMOGLOBIN: 12.9 G/DL (ref 11.5–15.5)
IMMATURE GRANULOCYTES #: 0.01 E9/L
IMMATURE GRANULOCYTES %: 0.2 % (ref 0–5)
INR BLD: 1
LYMPHOCYTES ABSOLUTE: 0.67 E9/L (ref 1.5–4)
LYMPHOCYTES RELATIVE PERCENT: 10.3 % (ref 20–42)
MCH RBC QN AUTO: 31.7 PG (ref 26–35)
MCHC RBC AUTO-ENTMCNC: 31.7 % (ref 32–34.5)
MCV RBC AUTO: 100 FL (ref 80–99.9)
MONOCYTES ABSOLUTE: 0.53 E9/L (ref 0.1–0.95)
MONOCYTES RELATIVE PERCENT: 8.1 % (ref 2–12)
NEUTROPHILS ABSOLUTE: 5.22 E9/L (ref 1.8–7.3)
NEUTROPHILS RELATIVE PERCENT: 80.2 % (ref 43–80)
PDW BLD-RTO: 13.6 FL (ref 11.5–15)
PLATELET # BLD: 414 E9/L (ref 130–450)
PMV BLD AUTO: 8.9 FL (ref 7–12)
POTASSIUM REFLEX MAGNESIUM: 4 MMOL/L (ref 3.5–5)
PROTHROMBIN TIME: 11.2 SEC (ref 9.3–12.4)
RBC # BLD: 4.07 E12/L (ref 3.5–5.5)
SODIUM BLD-SCNC: 142 MMOL/L (ref 132–146)
T4 FREE: 1.29 NG/DL (ref 0.93–1.7)
TOTAL PROTEIN: 7.3 G/DL (ref 6.4–8.3)
TSH SERPL DL<=0.05 MIU/L-ACNC: 10.75 UIU/ML (ref 0.27–4.2)
WBC # BLD: 6.5 E9/L (ref 4.5–11.5)

## 2020-01-20 PROCEDURE — 6360000004 HC RX CONTRAST MEDICATION: Performed by: RADIOLOGY

## 2020-01-20 PROCEDURE — 84439 ASSAY OF FREE THYROXINE: CPT

## 2020-01-20 PROCEDURE — 80053 COMPREHEN METABOLIC PANEL: CPT

## 2020-01-20 PROCEDURE — 85730 THROMBOPLASTIN TIME PARTIAL: CPT

## 2020-01-20 PROCEDURE — 84443 ASSAY THYROID STIM HORMONE: CPT

## 2020-01-20 PROCEDURE — 2140000000 HC CCU INTERMEDIATE R&B

## 2020-01-20 PROCEDURE — 99285 EMERGENCY DEPT VISIT HI MDM: CPT

## 2020-01-20 PROCEDURE — 99222 1ST HOSP IP/OBS MODERATE 55: CPT | Performed by: SURGERY

## 2020-01-20 PROCEDURE — 85610 PROTHROMBIN TIME: CPT

## 2020-01-20 PROCEDURE — 96374 THER/PROPH/DIAG INJ IV PUSH: CPT

## 2020-01-20 PROCEDURE — 2580000003 HC RX 258: Performed by: NURSE PRACTITIONER

## 2020-01-20 PROCEDURE — 2580000003 HC RX 258: Performed by: RADIOLOGY

## 2020-01-20 PROCEDURE — 85025 COMPLETE CBC W/AUTO DIFF WBC: CPT

## 2020-01-20 PROCEDURE — 36415 COLL VENOUS BLD VENIPUNCTURE: CPT

## 2020-01-20 PROCEDURE — 6360000002 HC RX W HCPCS: Performed by: EMERGENCY MEDICINE

## 2020-01-20 PROCEDURE — 75635 CT ANGIO ABDOMINAL ARTERIES: CPT

## 2020-01-20 PROCEDURE — 93971 EXTREMITY STUDY: CPT

## 2020-01-20 RX ORDER — CLOPIDOGREL BISULFATE 75 MG/1
75 TABLET ORAL DAILY
Status: DISCONTINUED | OUTPATIENT
Start: 2020-01-21 | End: 2020-01-23 | Stop reason: HOSPADM

## 2020-01-20 RX ORDER — LIOTHYRONINE SODIUM 5 UG/1
5 TABLET ORAL DAILY
Status: DISCONTINUED | OUTPATIENT
Start: 2020-01-21 | End: 2020-01-23 | Stop reason: HOSPADM

## 2020-01-20 RX ORDER — SODIUM CHLORIDE 0.9 % (FLUSH) 0.9 %
10 SYRINGE (ML) INJECTION PRN
Status: DISCONTINUED | OUTPATIENT
Start: 2020-01-20 | End: 2020-01-20 | Stop reason: SDUPTHER

## 2020-01-20 RX ORDER — MORPHINE SULFATE 2 MG/ML
2 INJECTION, SOLUTION INTRAMUSCULAR; INTRAVENOUS ONCE
Status: COMPLETED | OUTPATIENT
Start: 2020-01-20 | End: 2020-01-20

## 2020-01-20 RX ORDER — SODIUM CHLORIDE 0.9 % (FLUSH) 0.9 %
10 SYRINGE (ML) INJECTION PRN
Status: DISCONTINUED | OUTPATIENT
Start: 2020-01-20 | End: 2020-01-23 | Stop reason: HOSPADM

## 2020-01-20 RX ORDER — LEVOTHYROXINE SODIUM 112 UG/1
112 TABLET ORAL DAILY
Status: DISCONTINUED | OUTPATIENT
Start: 2020-01-21 | End: 2020-01-23 | Stop reason: HOSPADM

## 2020-01-20 RX ORDER — OXYBUTYNIN CHLORIDE 5 MG/1
5 TABLET, EXTENDED RELEASE ORAL DAILY
Status: DISCONTINUED | OUTPATIENT
Start: 2020-01-21 | End: 2020-01-23 | Stop reason: HOSPADM

## 2020-01-20 RX ORDER — VENLAFAXINE 75 MG/1
75 TABLET ORAL 2 TIMES DAILY
Status: DISCONTINUED | OUTPATIENT
Start: 2020-01-20 | End: 2020-01-23 | Stop reason: HOSPADM

## 2020-01-20 RX ORDER — MELOXICAM 7.5 MG/1
15 TABLET ORAL DAILY
Status: DISCONTINUED | OUTPATIENT
Start: 2020-01-21 | End: 2020-01-23 | Stop reason: HOSPADM

## 2020-01-20 RX ORDER — ESCITALOPRAM OXALATE 10 MG/1
10 TABLET ORAL DAILY
Status: DISCONTINUED | OUTPATIENT
Start: 2020-01-21 | End: 2020-01-23 | Stop reason: HOSPADM

## 2020-01-20 RX ORDER — SODIUM CHLORIDE 0.9 % (FLUSH) 0.9 %
10 SYRINGE (ML) INJECTION EVERY 12 HOURS SCHEDULED
Status: DISCONTINUED | OUTPATIENT
Start: 2020-01-20 | End: 2020-01-23 | Stop reason: HOSPADM

## 2020-01-20 RX ORDER — CILOSTAZOL 100 MG/1
100 TABLET ORAL 2 TIMES DAILY
Status: DISCONTINUED | OUTPATIENT
Start: 2020-01-20 | End: 2020-01-23 | Stop reason: HOSPADM

## 2020-01-20 RX ORDER — ONDANSETRON 2 MG/ML
4 INJECTION INTRAMUSCULAR; INTRAVENOUS EVERY 6 HOURS PRN
Status: DISCONTINUED | OUTPATIENT
Start: 2020-01-20 | End: 2020-01-23 | Stop reason: HOSPADM

## 2020-01-20 RX ORDER — ACETAMINOPHEN 325 MG/1
650 TABLET ORAL EVERY 4 HOURS PRN
Status: DISCONTINUED | OUTPATIENT
Start: 2020-01-20 | End: 2020-01-23 | Stop reason: HOSPADM

## 2020-01-20 RX ORDER — BUPROPION HYDROCHLORIDE 75 MG/1
150 TABLET ORAL DAILY
Status: DISCONTINUED | OUTPATIENT
Start: 2020-01-21 | End: 2020-01-23 | Stop reason: HOSPADM

## 2020-01-20 RX ORDER — LOSARTAN POTASSIUM 25 MG/1
25 TABLET ORAL DAILY
Status: DISCONTINUED | OUTPATIENT
Start: 2020-01-21 | End: 2020-01-23 | Stop reason: HOSPADM

## 2020-01-20 RX ADMIN — Medication 10 ML: at 17:10

## 2020-01-20 RX ADMIN — MORPHINE SULFATE 2 MG: 2 INJECTION, SOLUTION INTRAMUSCULAR; INTRAVENOUS at 16:02

## 2020-01-20 RX ADMIN — IOPAMIDOL 120 ML: 755 INJECTION, SOLUTION INTRAVENOUS at 17:10

## 2020-01-20 RX ADMIN — SODIUM CHLORIDE, PRESERVATIVE FREE 10 ML: 5 INJECTION INTRAVENOUS at 23:08

## 2020-01-20 ASSESSMENT — ENCOUNTER SYMPTOMS
DIARRHEA: 0
NAUSEA: 0
SHORTNESS OF BREATH: 0
COUGH: 0
ABDOMINAL PAIN: 0
VOMITING: 0
BACK PAIN: 0
BLOOD IN STOOL: 0

## 2020-01-20 ASSESSMENT — PAIN SCALES - GENERAL
PAINLEVEL_OUTOF10: 8
PAINLEVEL_OUTOF10: 8
PAINLEVEL_OUTOF10: 5

## 2020-01-20 NOTE — CONSULTS
Vascular Surgery Consult Note      Chief Complaint: Left groin and thigh pain. HISTORY OF PRESENT ILLNESS:                The patient is a 68 y.o. female who presents to the hospital secondary to left groin and thigh pain. She has a history of extensive peripheral vascular disease. She is underwent prior interventions. She presents with a 1 to 2-day history of left groin and thigh pain she describes it is sharp and shooting down the middle of portion of her thigh. On a scale of 1-10 she states is about a 5 or 6 worse with pressure and walking. She has chronic left lower extremity claudication. She denies any motor sensation loss to the left or right lower extremity. She denies any active chest pain or shortness of breath. According to her history as well is the daughter's history my partner is planning a possible bypass of the left extremity. She had on 1/7/2020. A left common femoral artery access. With a 5 [employer] XO seal for closure.   She had bilateral lower extremity arteriograms      Past Medical History:   Diagnosis Date    Aortoiliac occlusive disease ([address] 1/16/2019    Atherosclerosis of native arteries of extremity with rest pain ([address] 5/3/2018    Atherosclerosis of native artery of extremity with ulceration ([address] 1/16/2019    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 with rest pain ([address] 1/16/2019    Bilateral carotid artery stenosis 5/22/2018    Bruit of right carotid artery 5/3/2018    CAD (coronary artery disease)     Cancer (HCC)     SKIN CA/Thigh    COPD (chronic obstructive pulmonary disease) (HCC)     Depression     Femoro-popliteal artery disease ([address] 1/16/2019    History of tobacco use 5/3/2018    Hydrocephalus (HCC)     Hyperlipidemia     Hypertension     Pain in both feet 5/3/2018    PVD (peripheral vascular disease) ([address]     PVD (peripheral vascular disease) with claudication ([address] 5/3/2018    Rheumatoid arthritis ([address]     Thyroid disease  Venous stasis ulcer of left calf with fat layer exposed without varicose veins (HCC) 2019        Past Surgical History:   Procedure Laterality Date     SECTION      COLONOSCOPY      DILATION AND CURETTAGE OF UTERUS      PERIPHERAL PERCUTANEOUS ARTERIAL INTERVENTION  2019    L SFA angioplasty    TUBAL LIGATION         Current Medications:     Current Facility-Administered Medications:     morphine (PF) injection 2 mg, 2 mg, Intravenous, Once, Mera Pelayo,     Current Outpatient Medications:     buPROPion (WELLBUTRIN) 100 MG tablet, Take 150 mg by mouth daily, Disp: , Rfl:     cilostazol (PLETAL) 50 MG tablet, Take 2 tablets by mouth 2 times daily, Disp: 60 tablet, Rfl: 3    clopidogrel (PLAVIX) 75 MG tablet, Take 1 tablet by mouth daily, Disp: 30 tablet, Rfl: 3    venlafaxine (EFFEXOR) 75 MG tablet, Take 75 mg by mouth 2 times daily, Disp: , Rfl:     oxybutynin (DITROPAN-XL) 5 MG extended release tablet, Take 5 mg by mouth daily, Disp: , Rfl:     meloxicam (MOBIC) 15 MG tablet, Take 15 mg by mouth daily, Disp: , Rfl:     Apoaequorin 10 MG CAPS, Take by mouth, Disp: , Rfl:     escitalopram (LEXAPRO) 10 MG tablet, Take 10 mg by mouth daily, Disp: , Rfl:     Umeclidinium Bromide 62.5 MCG/INH AEPB, Inhale 62.5 mcg into the lungs, Disp: , Rfl:     liothyronine (CYTOMEL) 5 MCG tablet, Take 5 mcg by mouth daily, Disp: , Rfl: 0    etodolac (LODINE) 400 MG tablet, Take 400 mg by mouth 2 times daily, Disp: , Rfl: 0    B Complex Vitamins (VITAMIN B COMPLEX 100 IJ), Inject as directed, Disp: , Rfl:     vitamin D (CHOLECALCIFEROL) 1000 UNIT TABS tablet, Take 1,000 Units by mouth daily, Disp: , Rfl:     levothyroxine (SYNTHROID) 112 MCG tablet, Take 112 mcg by mouth Daily. , Disp: , Rfl:     losartan (COZAAR) 25 MG tablet, Take 25 mg by mouth daily. , Disp: , Rfl:     Allergies:  Flagyl [metronidazole];  Hornet venom; and Wasp venom    Social History     Socioeconomic History    shortness of breath while at rest or lying down  GI: Negative for nausea, vomiting, diarrhea, or constipation  : Negative for dysuria, hematuria, increased frequency or urgency  Endo: Negative for polydipsia, polyuria, or heat or cold intolerances. Heme: Negative leg swelling, blood or bleeding disorders  Psych: Negative for Depression or anxiety  Ortho: Negative for pain in the joints, arthritis or gout  Vascular: Chronic peripheral vascular disease with lower extremity claudication pain and discomfort with history of prior catheterizations. PHYSICAL EXAM:    Vitals:    01/20/20 1239   BP: (!) 187/87   Pulse: 79   Resp: 18   Temp: 97.7 °F (36.5 °C)   SpO2: 95%     GENERAL APPEARANCE: She is alert oriented answers questions appropriately currently in no acute distress. She is cachectic and appears frail. HEAD: Head is normocephalic atraumatic, with Normal range of motion. EYES: Inspection of the conjunctiva and lids demonstrated no abnormalities, no jaundice, no scleral icterus, PERRL, EOMI, and vision are grossly intact. EARS:  External auditory canals demonstrate no abnormalities. Ears are well set hearing is grossly intact. SKIN: Left groin demonstrates some ecchymosis. The mons pubis also has some ecchymosis. This appears to be old and resolving. There is no hematoma. Both lower extremities are warm to touch. NECK: Supple, nontender no lymphadenopathy trachea is midline no jugular venous distention no carotid bruits auscultated. LUNGS:  Clear to auscultation bilaterally no wheezes rales or rhonchi good respiratory changes noted. CARDIOVASCULAR: Currently regular rate and rhythm no murmur rub or gallop that I could appreciate. ABDOMEN: Soft nontender no rebound or guarding, positive bowel sounds no pulsatile abdominal masses. No organosplenomegaly that I can appreciate. EXTREMITIES: Bilateral palpable brachial radial pulses 1-2+. Bilateral palpable femorals.   There is no evidence of hematoma of the left groin or right groin. There is no bruit there is no thrill over the access point or the left groin. She has monophasic DP and PT signals bilaterally. Motor and sensation are intact as above. MUSKULOSKELETAL: See above adequately aligned spine, range of motion appears to be intact with regards to the spine and upper and lower extremities. No joint tenderness or erythema. Normal muscular development. NEURO: Cranial nerves II through XII grossly intact. Strength and sensation are symmetric and intact throughout. Psychiatric: Mental examination revealed the patient was oriented to person, place, and time. The patient was able to demonstrate adequate judgment and reason, without any hallucinations abnormal affect or abnormal behavior on today's exam.      LABS:    Lab Results   Component Value Date    WBC 6.5 01/20/2020    HGB 12.9 01/20/2020    HCT 40.7 01/20/2020     01/20/2020    PROTIME 11.2 01/20/2020    INR 1.0 01/20/2020    APTT 31.3 01/20/2020    K 4.0 01/20/2020    BUN 30 (H) 01/20/2020    CREATININE 0.6 01/20/2020       RADIOLOGY:  US DUP LOWER EXTREMITY LEFT JUSTO   Final Result   No evidence of left leg DVT               CTA ABDOMINAL AORTA W BILAT RUNOFF W CONTRAST    (Results Pending)       IMPRESSION:   Active Hospital Problems    Diagnosis    Left thigh pain [M79.652]    PVD (peripheral vascular disease) with claudication ([facility] [I73.9]       PLAN: #1 left thigh pain with chronic peripheral vascular disease. She is neurovascularly intact. This sounds musculoskeletal.  We will order a CT scan of the abdomen pelvis is to make sure that there is nothing intravascular in the abdomen or pelvis. But other than that I think this is musculoskeletal related. Is worse with pressure and movement. There is no gross signs of hematoma there is no evidence of a pseudoaneurysm. And she has an intact vascular exam distally.     Of note have also looked at her PVR examination in the past her ANTONY on the left side with 0.55. On the right side was point 4 8.     This was discussed with the emergency room physician    Electronically signed by Nile Carrillo MD on 1/20/2020 at 3:56 PM

## 2020-01-21 ENCOUNTER — APPOINTMENT (OUTPATIENT)
Dept: GENERAL RADIOLOGY | Age: 70
DRG: 300 | End: 2020-01-21
Payer: MEDICARE

## 2020-01-21 PROBLEM — R09.89 BRUIT OF RIGHT CAROTID ARTERY: Status: RESOLVED | Noted: 2018-05-03 | Resolved: 2020-01-21

## 2020-01-21 PROBLEM — R29.6 FALLS FREQUENTLY: Status: RESOLVED | Noted: 2020-01-20 | Resolved: 2020-01-21

## 2020-01-21 PROBLEM — I73.9 LEFT LEG CLAUDICATION (HCC): Status: RESOLVED | Noted: 2020-01-20 | Resolved: 2020-01-21

## 2020-01-21 PROBLEM — E03.9 ACQUIRED HYPOTHYROIDISM: Chronic | Status: ACTIVE | Noted: 2020-01-21

## 2020-01-21 PROBLEM — E78.5 HYPERLIPIDEMIA LDL GOAL <100: Chronic | Status: ACTIVE | Noted: 2020-01-21

## 2020-01-21 PROBLEM — S70.12XA CONTUSION OF LEFT THIGH: Status: ACTIVE | Noted: 2020-01-21

## 2020-01-21 PROBLEM — M79.652 LEFT THIGH PAIN: Status: RESOLVED | Noted: 2020-01-20 | Resolved: 2020-01-21

## 2020-01-21 LAB
ANION GAP SERPL CALCULATED.3IONS-SCNC: 15 MMOL/L (ref 7–16)
BUN BLDV-MCNC: 19 MG/DL (ref 8–23)
CALCIUM SERPL-MCNC: 8.5 MG/DL (ref 8.6–10.2)
CHLORIDE BLD-SCNC: 106 MMOL/L (ref 98–107)
CO2: 22 MMOL/L (ref 22–29)
CREAT SERPL-MCNC: 0.6 MG/DL (ref 0.5–1)
GFR AFRICAN AMERICAN: >60
GFR NON-AFRICAN AMERICAN: >60 ML/MIN/1.73
GLUCOSE BLD-MCNC: 96 MG/DL (ref 74–99)
HCT VFR BLD CALC: 35.9 % (ref 34–48)
HEMOGLOBIN: 11.4 G/DL (ref 11.5–15.5)
MAGNESIUM: 1.9 MG/DL (ref 1.6–2.6)
MCH RBC QN AUTO: 31.7 PG (ref 26–35)
MCHC RBC AUTO-ENTMCNC: 31.8 % (ref 32–34.5)
MCV RBC AUTO: 99.7 FL (ref 80–99.9)
PDW BLD-RTO: 13.6 FL (ref 11.5–15)
PLATELET # BLD: 395 E9/L (ref 130–450)
PMV BLD AUTO: 9.3 FL (ref 7–12)
POTASSIUM REFLEX MAGNESIUM: 3.4 MMOL/L (ref 3.5–5)
RBC # BLD: 3.6 E12/L (ref 3.5–5.5)
SODIUM BLD-SCNC: 143 MMOL/L (ref 132–146)
WBC # BLD: 5.5 E9/L (ref 4.5–11.5)

## 2020-01-21 PROCEDURE — 6370000000 HC RX 637 (ALT 250 FOR IP): Performed by: NURSE PRACTITIONER

## 2020-01-21 PROCEDURE — 2140000000 HC CCU INTERMEDIATE R&B

## 2020-01-21 PROCEDURE — 73552 X-RAY EXAM OF FEMUR 2/>: CPT

## 2020-01-21 PROCEDURE — 97535 SELF CARE MNGMENT TRAINING: CPT

## 2020-01-21 PROCEDURE — 2580000003 HC RX 258: Performed by: NURSE PRACTITIONER

## 2020-01-21 PROCEDURE — 99232 SBSQ HOSP IP/OBS MODERATE 35: CPT | Performed by: SURGERY

## 2020-01-21 PROCEDURE — 6360000002 HC RX W HCPCS: Performed by: NURSE PRACTITIONER

## 2020-01-21 PROCEDURE — 6360000002 HC RX W HCPCS: Performed by: INTERNAL MEDICINE

## 2020-01-21 PROCEDURE — 80048 BASIC METABOLIC PNL TOTAL CA: CPT

## 2020-01-21 PROCEDURE — 36415 COLL VENOUS BLD VENIPUNCTURE: CPT

## 2020-01-21 PROCEDURE — 73502 X-RAY EXAM HIP UNI 2-3 VIEWS: CPT

## 2020-01-21 PROCEDURE — 97165 OT EVAL LOW COMPLEX 30 MIN: CPT

## 2020-01-21 PROCEDURE — 83735 ASSAY OF MAGNESIUM: CPT

## 2020-01-21 PROCEDURE — 97162 PT EVAL MOD COMPLEX 30 MIN: CPT | Performed by: PHYSICAL THERAPIST

## 2020-01-21 PROCEDURE — 85027 COMPLETE CBC AUTOMATED: CPT

## 2020-01-21 PROCEDURE — 97530 THERAPEUTIC ACTIVITIES: CPT | Performed by: PHYSICAL THERAPIST

## 2020-01-21 PROCEDURE — 6370000000 HC RX 637 (ALT 250 FOR IP): Performed by: INTERNAL MEDICINE

## 2020-01-21 RX ORDER — MAGNESIUM SULFATE IN WATER 40 MG/ML
2 INJECTION, SOLUTION INTRAVENOUS ONCE
Status: COMPLETED | OUTPATIENT
Start: 2020-01-21 | End: 2020-01-21

## 2020-01-21 RX ORDER — OXYCODONE HYDROCHLORIDE AND ACETAMINOPHEN 5; 325 MG/1; MG/1
2 TABLET ORAL EVERY 4 HOURS PRN
Status: DISCONTINUED | OUTPATIENT
Start: 2020-01-21 | End: 2020-01-23 | Stop reason: HOSPADM

## 2020-01-21 RX ORDER — OXYCODONE HYDROCHLORIDE AND ACETAMINOPHEN 5; 325 MG/1; MG/1
1 TABLET ORAL EVERY 4 HOURS PRN
Qty: 18 TABLET | Refills: 0 | Status: SHIPPED | OUTPATIENT
Start: 2020-01-21 | End: 2020-01-24

## 2020-01-21 RX ORDER — OXYCODONE HYDROCHLORIDE AND ACETAMINOPHEN 5; 325 MG/1; MG/1
1 TABLET ORAL EVERY 4 HOURS PRN
Status: DISCONTINUED | OUTPATIENT
Start: 2020-01-21 | End: 2020-01-23 | Stop reason: HOSPADM

## 2020-01-21 RX ORDER — POTASSIUM CHLORIDE 20 MEQ/1
40 TABLET, EXTENDED RELEASE ORAL ONCE
Status: COMPLETED | OUTPATIENT
Start: 2020-01-21 | End: 2020-01-21

## 2020-01-21 RX ADMIN — POTASSIUM CHLORIDE 40 MEQ: 1500 TABLET, EXTENDED RELEASE ORAL at 11:13

## 2020-01-21 RX ADMIN — ENOXAPARIN SODIUM 40 MG: 40 INJECTION SUBCUTANEOUS at 11:12

## 2020-01-21 RX ADMIN — CILOSTAZOL 100 MG: 100 TABLET ORAL at 11:11

## 2020-01-21 RX ADMIN — LOSARTAN POTASSIUM 25 MG: 25 TABLET, FILM COATED ORAL at 11:13

## 2020-01-21 RX ADMIN — SODIUM CHLORIDE, PRESERVATIVE FREE 10 ML: 5 INJECTION INTRAVENOUS at 11:15

## 2020-01-21 RX ADMIN — ESCITALOPRAM 10 MG: 10 TABLET, FILM COATED ORAL at 11:13

## 2020-01-21 RX ADMIN — VENLAFAXINE 75 MG: 75 TABLET ORAL at 11:14

## 2020-01-21 RX ADMIN — CILOSTAZOL 100 MG: 100 TABLET ORAL at 21:41

## 2020-01-21 RX ADMIN — MAGNESIUM SULFATE HEPTAHYDRATE 2 G: 40 INJECTION, SOLUTION INTRAVENOUS at 11:11

## 2020-01-21 RX ADMIN — BUPROPION HYDROCHLORIDE 150 MG: 75 TABLET, FILM COATED ORAL at 11:12

## 2020-01-21 RX ADMIN — LEVOTHYROXINE SODIUM 112 MCG: 0.11 TABLET ORAL at 05:33

## 2020-01-21 RX ADMIN — OXYBUTYNIN CHLORIDE 5 MG: 5 TABLET, EXTENDED RELEASE ORAL at 11:14

## 2020-01-21 RX ADMIN — SODIUM CHLORIDE, PRESERVATIVE FREE 10 ML: 5 INJECTION INTRAVENOUS at 21:41

## 2020-01-21 RX ADMIN — MELOXICAM 15 MG: 7.5 TABLET ORAL at 11:13

## 2020-01-21 RX ADMIN — CLOPIDOGREL 75 MG: 75 TABLET, FILM COATED ORAL at 11:12

## 2020-01-21 RX ADMIN — LIOTHYRONINE SODIUM 5 MCG: 5 TABLET ORAL at 11:13

## 2020-01-21 ASSESSMENT — PAIN SCALES - GENERAL
PAINLEVEL_OUTOF10: 0

## 2020-01-21 NOTE — PROGRESS NOTES
unremarkable in this anatomical location. From our standpoint no vascular intervention is planned. Recommend to continue working with physical therapy.   Please follow-up with Dr. Angella Jennings upon discharge        Electronically signed by Bindu Davey MD on 1/21/2020 at 4:21 PM

## 2020-01-21 NOTE — PROGRESS NOTES
completion of standardized testing, informal observation of tasks, consultation with other medical professions/disciplines, assessment of data & development of POC/goals. Tx. Time in: 4:20  Tx.  Time out: 4:35  low Evaluation + 15 timed treatment minutes    Lonnie Figueroa, OTR/L [id number]

## 2020-01-21 NOTE — PROGRESS NOTES
Evaluating Therapist: Aurelia Melvin, PT, DPT    Room #: 5659/7741-Z  Diagnosis: L leg claudication  Past medical history: RA, PVD, HTN, COPD, Skin CA, CAD  The admitting diagnosis and active problem list as listed above have been reviewed prior to the initiation of this evaluation. Equipment owned: None  PRECAUTIONS: Multiple Falls     Social:  Pt lives with daughter in a 2 floor plan with 4 step(s) and 1 rail(s) to enter with bed and bath on the 2nd floor. Prior to admission pt walked with IND with no AD. Initial Evaluation  Date: 1/21/2020 Treatment  Date:     Short Term/ Long Term   Goals   AM-PAC 6 Clicks 54/60     Does pt have pain? 8/10 anterior thigh during WB     Bed Mobility  Rolling: SBA  Supine to sit: Hany  Sit to supine: Hany  Scooting: SBA  IND   Transfers Sit to stand: Hany  Stand to sit: Hany  Stand pivot: Min/ModA  IND   Ambulation   30 feet with Min/ModA with [employer]  150 feet with Mod I with [employer]   Stair negotiation: ascended and descended NA  4 steps with 1 rail with SBA   BLE ROM WNL     BLE strength Grossly 4/5 except for L hip and knee 3+/5  Increase by 1/3 MMT grade   Balance Sitting: Fair+  Standing: Fair-  Sitting: Good  Standing: Fair     Pt is alert and oriented x 4  Sensation: WNL  Edema: None    ASSESSMENT  Patient exhibits decreased strength, balance, coordination impairing functional mobility. Educated pt on techniques to improve safety and independence with bed mobility, transfers, balance, functional transfers, and functional mobility. Pt sat EOB for 15 minutes with Supervision in order to improve static and dynamic sitting balance and activity tolerance. Pt ambulated with decreased robinson and stride length 30' with Min/ModA with [employer]. Pt ambulated very slowly and was unsteady throughout requiring constant VC for safety, ww approximation, and foot sequencing. Pt was able to return demonstration < 50% of the time.  Pt required increased time for all activity and was limited by her L

## 2020-01-21 NOTE — CONSULTS
magnesium sulfate 2 g in 50 mL IVPB premix, 2 g, Intravenous, Once  oxyCODONE-acetaminophen (PERCOCET) 5-325 MG per tablet 1 tablet, 1 tablet, Oral, Q4H PRN **OR** oxyCODONE-acetaminophen (PERCOCET) 5-325 MG per tablet 2 tablet, 2 tablet, Oral, Q4H PRN  sodium chloride flush 0.9 % injection 10 mL, 10 mL, Intravenous, PRN  buPROPion (WELLBUTRIN) tablet 150 mg, 150 mg, Oral, Daily  cilostazol (PLETAL) tablet 100 mg, 100 mg, Oral, BID  clopidogrel (PLAVIX) tablet 75 mg, 75 mg, Oral, Daily  escitalopram (LEXAPRO) tablet 10 mg, 10 mg, Oral, Daily  levothyroxine (SYNTHROID) tablet 112 mcg, 112 mcg, Oral, Daily  liothyronine (CYTOMEL) tablet 5 mcg, 5 mcg, Oral, Daily  losartan (COZAAR) tablet 25 mg, 25 mg, Oral, Daily  meloxicam (MOBIC) tablet 15 mg, 15 mg, Oral, Daily  oxybutynin (DITROPAN-XL) extended release tablet 5 mg, 5 mg, Oral, Daily  venlafaxine (EFFEXOR) tablet 75 mg, 75 mg, Oral, BID  sodium chloride flush 0.9 % injection 10 mL, 10 mL, Intravenous, 2 times per day  magnesium hydroxide (MILK OF MAGNESIA) 400 MG/5ML suspension 30 mL, 30 mL, Oral, Daily PRN  ondansetron (ZOFRAN) injection 4 mg, 4 mg, Intravenous, Q6H PRN  enoxaparin (LOVENOX) injection 40 mg, 40 mg, Subcutaneous, Daily  acetaminophen (TYLENOL) tablet 650 mg, 650 mg, Oral, Q4H PRN  Allergies:  Flagyl [metronidazole]; Hornet venom; and Wasp venom    Social History:   TOBACCO:   reports that she quit smoking about 2 years ago. Her smoking use included cigarettes. She smoked 0.25 packs per day. She has never used smokeless tobacco.  ETOH:   reports current alcohol use. DRUGS:   reports no history of drug use.   ACTIVITIES OF DAILY LIVING:    OCCUPATION:    Family History:       Problem Relation Age of Onset    Kidney Disease Mother     Coronary Art Dis Mother     High Blood Pressure Mother     Cancer Father     Other Father        General ROS: negative  Cardiovascular ROS: no chest pain or dyspnea on exertion  Respiratory ROS: no cough, shortness of breath, or wheezing  Gastrointestinal ROS: no abdominal pain, change in bowel habits, or black or bloody stools  Neurological ROS: no TIA or stroke symptoms  Musculoskeletal ROS: Left thigh and hip pain    PHYSICAL EXAM:    VITALS:  BP (!) 143/84   Pulse 100   Temp 99.5 °F (37.5 °C) (Temporal)   Resp 16   Ht 5' 6\" (1.676 m)   Wt 110 lb (49.9 kg)   SpO2 96%   BMI 17.75 kg/m²   CONSTITUTIONAL:  awake, alert, cooperative, no apparent distress, and appears stated age  MUSCULOSKELETAL:  LLE  · Skin intact  · Compartment soft compressible  · +2 DP, PT pulses  · Sensation intact light touch to deep peroneal, superficial peroneal, posterior tibial, sural, saphenous nerves  · Positive active range of motion with plantarflexion, dorsiflexion, HL  · Negative straight leg raise test for radiculopathy  · She is able to hold a straight leg raise, no palpable defects in the extensor mechanism  · Negative knee effusion  · Stable varus valgus exam of the knee and stable drawer testing  · Patient has pain in her groin with flexion and internal rotation of the leg as well as pain in the groin with flexion and external rotation  · There is no tenderness to palpation over the trochanteric bursa    SECONDARY EXAM    LUE: skin intact, -TTP, Radial pulses +2, cap refill <2 seconds, +sensation to radial/ulnar/median nerves sensation, +motor to AIN/PIN/ulnar nerves, compartments soft and compressible    RUE: skin intact, -TTP, Radial pulses +2, cap refill <2 seconds, +sensation to radial/ulnar/median nerves sensation, +motor to AIN/PIN/ulnar nerves, compartments soft and compressible    RLE:skin intack, -TTP, DP/PT pulses +2, cap refill < 2 seconds, sensation to dp/sp/pt/s/s nerves intact, demonstrates active plantar and dorsiflexion of the ankle, compartments soft and compressible        DATA:    CBC:   Lab Results   Component Value Date    WBC 5.5 01/21/2020    RBC 3.60 01/21/2020    HGB 11.4 01/21/2020    HCT 35.9 01/21/2020

## 2020-01-21 NOTE — H&P
[address] [facility]  Attending History and Physical      CHIEF COMPLAINT:  Left leg pain and weakness      HISTORY OF PRESENT ILLNESS:      The patient is a 68 y.o. female patient of dr Suzy Manriquez who presents with complains of left leg pain and weakness. Patient states she fell at home Saturday. She falls frequently secondary to chronic back issues and lower extremity PAD. She loses her balance when her legs give out. Nonetheless, she had pain in her left thigh. She has been unsteady on her feet secondary to pain. She did not land on her left side during the fall. She actually landed on her right hip. She denied chest pain, shortness of breath, abdominal pain, nausea, vomiting, fevers, chills and diaphoresis. She is up walking with PT. She is using a walker. She does not want to put pressure on her left leg secondary to pain.          Past Medical History:    Past Medical History:   Diagnosis Date    Aortoiliac occlusive disease ([address] 1/16/2019    Atherosclerosis of native arteries of extremity with rest pain ([address] 5/3/2018    Atherosclerosis of native artery of extremity with ulceration ([address] 1/16/2019    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 with rest pain ([address] 1/16/2019    Bilateral carotid artery stenosis 5/22/2018    Bruit of right carotid artery 5/3/2018    CAD (coronary artery disease)     Cancer ([facility])     SKIN CA/Thigh    COPD (chronic obstructive pulmonary disease) ([facility])     Depression     Femoro-popliteal artery disease ([address] 1/16/2019    History of tobacco use 5/3/2018    Hydrocephalus ([facility])     Hyperlipidemia     Hypertension     Pain in both feet 5/3/2018    PVD (peripheral vascular disease) ([address]     PVD (peripheral vascular disease) with claudication ([facility]) 5/3/2018    Rheumatoid arthritis ([facility])     Thyroid disease     Venous stasis ulcer of left calf with fat layer exposed without varicose veins ([address] 2/27/2019       Past Surgical History:    Past Surgical History:   Procedure Laterality Date     SECTION      COLONOSCOPY      DILATION AND CURETTAGE OF UTERUS      PERIPHERAL PERCUTANEOUS ARTERIAL INTERVENTION  2019    L SFA angioplasty    TUBAL LIGATION         Medications Prior to Admission:    Medications Prior to Admission: buPROPion (WELLBUTRIN) 100 MG tablet, Take 150 mg by mouth daily  cilostazol (PLETAL) 50 MG tablet, Take 2 tablets by mouth 2 times daily  clopidogrel (PLAVIX) 75 MG tablet, Take 1 tablet by mouth daily  venlafaxine (EFFEXOR) 75 MG tablet, Take 75 mg by mouth 2 times daily  oxybutynin (DITROPAN-XL) 5 MG extended release tablet, Take 5 mg by mouth daily  meloxicam (MOBIC) 15 MG tablet, Take 15 mg by mouth daily  levothyroxine (SYNTHROID) 112 MCG tablet, Take 100 mcg by mouth Daily   losartan (COZAAR) 25 MG tablet, Take 25 mg by mouth daily. Apoaequorin 10 MG CAPS, Take by mouth  escitalopram (LEXAPRO) 10 MG tablet, Take 10 mg by mouth daily  Umeclidinium Bromide 62.5 MCG/INH AEPB, Inhale 62.5 mcg into the lungs  liothyronine (CYTOMEL) 5 MCG tablet, Take 5 mcg by mouth daily  etodolac (LODINE) 400 MG tablet, Take 400 mg by mouth 2 times daily  B Complex Vitamins (VITAMIN B COMPLEX 100 IJ), Inject as directed  vitamin D (CHOLECALCIFEROL) 1000 UNIT TABS tablet, Take 1,000 Units by mouth daily    Allergies:    Flagyl [metronidazole]; Hornet venom; and Wasp venom    Social History:    reports that she quit smoking about 2 years ago. Her smoking use included cigarettes. She smoked 0.25 packs per day. She has never used smokeless tobacco. She reports current alcohol use. She reports that she does not use drugs. Family History:   family history includes Cancer in her father; Coronary Art Dis in her mother; High Blood Pressure in her mother; Kidney Disease in her mother; Other in her father.     REVIEW OF SYSTEMS:  As above in the HPI, otherwise negative    PHYSICAL EXAM:    Vitals:  BP (!) 178/78   Pulse 94 19 01/21/2020    LABALBU 4.2 01/20/2020    LABALBU 4.5 10/07/2010    CREATININE 0.6 01/21/2020    CALCIUM 8.5 01/21/2020    GFRAA >60 01/21/2020    LABGLOM >60 01/21/2020    GLUCOSE 96 01/21/2020    GLUCOSE 102 10/07/2010     Magnesium:    Lab Results   Component Value Date    MG 1.9 01/21/2020     Phosphorus:  No results found for: PHOS  PT/INR:    Lab Results   Component Value Date    PROTIME 11.2 01/20/2020    PROTIME 11.1 04/13/2011    INR 1.0 01/20/2020     PTT:    Lab Results   Component Value Date    APTT 31.3 01/20/2020   [APTT}  Troponin:  No results found for: TROPONINI  Last 3 Troponin:  No results found for: TROPONINI  U/A:    Lab Results   Component Value Date    COLORU Yellow 05/29/2014    PROTEINU 30 05/29/2014    PHUR 5.0 05/29/2014    WBCUA 0-1 05/29/2014    WBCUA 5-10 10/07/2010    RBCUA 5-10 05/29/2014    RBCUA 2-5 10/07/2010    MUCUS Present 05/29/2014    BACTERIA NONE 05/29/2014    CLARITYU Clear 05/29/2014    SPECGRAV >=1.030 05/29/2014    LEUKOCYTESUR Negative 05/29/2014    UROBILINOGEN 0.2 05/29/2014    BILIRUBINUR SMALL 05/29/2014    BILIRUBINUR NEGATIVE 10/07/2010    BLOODU TRACE-LYSED 05/29/2014    GLUCOSEU Negative 05/29/2014    GLUCOSEU NEGATIVE 10/07/2010     HgBA1c:    Lab Results   Component Value Date    LABA1C 5.3 01/30/2019     FLP:    Lab Results   Component Value Date    TRIG 85 01/30/2019    HDL 76 01/30/2019    LDLCALC 76 01/30/2019    LABVLDL 17 01/30/2019     TSH:    Lab Results   Component Value Date    TSH 10.750 01/20/2020       ASSESSMENT:      Patient Active Problem List   Diagnosis    Essential hypertension    Depression    PVD (peripheral vascular disease) with claudication ([facility]    History of tobacco use    Asymptomatic bilateral carotid artery stenosis    Hyperlipidemia LDL goal <100    Acquired hypothyroidism         PLAN:    Blood pressure ok, continue current medications  Continue psychiatric medications. Vascular surgery evaluation appreciated.   Will ask

## 2020-01-21 NOTE — ED NOTES
Assisted patient onto bedpan per request to urinate. Provided santos care and repositioned.       Luiza Clifton RN  01/20/20 2055

## 2020-01-21 NOTE — DISCHARGE INSTR - COC
Continuity of Care Form    Patient Name: Henry Rodriguez   :  1950  MRN:  [id number]    Admit date:  2020  Discharge date:  2020    Code Status Order: Full Code   Advance Directives:   [facility] Documentation     Date/Time Healthcare Directive Type of Healthcare Directive Copy in [address] [place] [address] [address] Agent's Name Healthcare Agent's Phone Number    20 [phone]  No, patient does not have an advance directive for healthcare treatment -- -- -- -- --          Admitting Physician:  Tonya Mays DO  PCP: Juwan Barrientos MD    Discharging Nurse: Jose CHADWICK Unit/Room#: 7643/1924-L  Discharging Unit Phone Number: [phone]    Emergency Contact:   Extended Emergency Contact Information  Primary Emergency Contact: Thalia Elizabeth  Address: [address] [place] [address] Phone: [phone]  Work Phone: [phone]  Mobile Phone: [phone]  Relation: Child    Past Surgical History:  Past Surgical History:   Procedure Laterality Date     SECTION      COLONOSCOPY      DILATION AND CURETTAGE OF UTERUS      PERIPHERAL PERCUTANEOUS ARTERIAL INTERVENTION  2019    L SFA angioplasty    TUBAL LIGATION         Immunization History:   Immunization History   Administered Date(s) Administered    Tdap (Boostrix, Adacel) 2010       Active Problems:  Patient Active Problem List   Diagnosis Code    Essential hypertension I10    Depression F32.9    PVD (peripheral vascular disease) with claudication ([facility] I73.9    History of tobacco use Z87.891    Asymptomatic bilateral carotid artery stenosis I65.23    Hyperlipidemia LDL goal <100 E78.5    Acquired hypothyroidism E03.9       Isolation/Infection:   Isolation          No Isolation        Patient Infection Status     None to display          Nurse Assessment:  Last Vital Signs: BP (!) 178/78   Pulse 94   Temp 98.5 °F

## 2020-01-22 PROCEDURE — 2580000003 HC RX 258: Performed by: NURSE PRACTITIONER

## 2020-01-22 PROCEDURE — 6370000000 HC RX 637 (ALT 250 FOR IP): Performed by: INTERNAL MEDICINE

## 2020-01-22 PROCEDURE — 6370000000 HC RX 637 (ALT 250 FOR IP): Performed by: NURSE PRACTITIONER

## 2020-01-22 PROCEDURE — 97530 THERAPEUTIC ACTIVITIES: CPT | Performed by: PHYSICAL THERAPIST

## 2020-01-22 PROCEDURE — 2140000000 HC CCU INTERMEDIATE R&B

## 2020-01-22 PROCEDURE — 99221 1ST HOSP IP/OBS SF/LOW 40: CPT | Performed by: ORTHOPAEDIC SURGERY

## 2020-01-22 RX ORDER — BUPROPION HYDROCHLORIDE 150 MG/1
150 TABLET ORAL DAILY
Status: ON HOLD | COMMUNITY
End: 2020-03-06 | Stop reason: HOSPADM

## 2020-01-22 RX ADMIN — OXYBUTYNIN CHLORIDE 5 MG: 5 TABLET, EXTENDED RELEASE ORAL at 09:15

## 2020-01-22 RX ADMIN — SODIUM CHLORIDE, PRESERVATIVE FREE 10 ML: 5 INJECTION INTRAVENOUS at 09:15

## 2020-01-22 RX ADMIN — BUPROPION HYDROCHLORIDE 150 MG: 75 TABLET, FILM COATED ORAL at 09:15

## 2020-01-22 RX ADMIN — MELOXICAM 15 MG: 7.5 TABLET ORAL at 09:15

## 2020-01-22 RX ADMIN — LEVOTHYROXINE SODIUM 112 MCG: 0.11 TABLET ORAL at 07:08

## 2020-01-22 RX ADMIN — LIOTHYRONINE SODIUM 5 MCG: 5 TABLET ORAL at 09:15

## 2020-01-22 RX ADMIN — CILOSTAZOL 100 MG: 100 TABLET ORAL at 09:15

## 2020-01-22 RX ADMIN — LOSARTAN POTASSIUM 25 MG: 25 TABLET, FILM COATED ORAL at 09:15

## 2020-01-22 RX ADMIN — SODIUM CHLORIDE, PRESERVATIVE FREE 10 ML: 5 INJECTION INTRAVENOUS at 21:00

## 2020-01-22 RX ADMIN — ESCITALOPRAM 10 MG: 10 TABLET, FILM COATED ORAL at 09:15

## 2020-01-22 RX ADMIN — CILOSTAZOL 100 MG: 100 TABLET ORAL at 21:01

## 2020-01-22 RX ADMIN — CLOPIDOGREL 75 MG: 75 TABLET, FILM COATED ORAL at 09:15

## 2020-01-22 RX ADMIN — VENLAFAXINE 75 MG: 75 TABLET ORAL at 09:15

## 2020-01-22 RX ADMIN — OXYCODONE AND ACETAMINOPHEN 2 TABLET: 5; 325 TABLET ORAL at 10:29

## 2020-01-22 ASSESSMENT — PAIN DESCRIPTION - PROGRESSION
CLINICAL_PROGRESSION: NOT CHANGED
CLINICAL_PROGRESSION: NOT CHANGED

## 2020-01-22 ASSESSMENT — PAIN DESCRIPTION - FREQUENCY
FREQUENCY: CONTINUOUS
FREQUENCY: CONTINUOUS

## 2020-01-22 ASSESSMENT — PAIN DESCRIPTION - ORIENTATION
ORIENTATION: LEFT
ORIENTATION: LEFT

## 2020-01-22 ASSESSMENT — PAIN DESCRIPTION - ONSET
ONSET: ON-GOING
ONSET: ON-GOING

## 2020-01-22 ASSESSMENT — PAIN DESCRIPTION - DESCRIPTORS
DESCRIPTORS: ACHING;SORE;THROBBING
DESCRIPTORS: ACHING;SORE;THROBBING

## 2020-01-22 ASSESSMENT — PAIN - FUNCTIONAL ASSESSMENT
PAIN_FUNCTIONAL_ASSESSMENT: PREVENTS OR INTERFERES SOME ACTIVE ACTIVITIES AND ADLS
PAIN_FUNCTIONAL_ASSESSMENT: PREVENTS OR INTERFERES SOME ACTIVE ACTIVITIES AND ADLS

## 2020-01-22 ASSESSMENT — PAIN DESCRIPTION - LOCATION
LOCATION: LEG
LOCATION: LEG

## 2020-01-22 ASSESSMENT — PAIN SCALES - GENERAL
PAINLEVEL_OUTOF10: 7
PAINLEVEL_OUTOF10: 0
PAINLEVEL_OUTOF10: 2
PAINLEVEL_OUTOF10: 0

## 2020-01-22 ASSESSMENT — PAIN DESCRIPTION - PAIN TYPE
TYPE: ACUTE PAIN
TYPE: ACUTE PAIN

## 2020-01-22 NOTE — PROGRESS NOTES
Subjective: The patient is awake and alert. No problems overnight. Denies chest pain, angina, and dyspnea. Denies abdominal pain. Tolerating diet. No nausea or vomiting. Objective:    BP (!) 140/77   Pulse 80   Temp 99.1 °F (37.3 °C) (Temporal)   Resp 18   Ht 5' 6\" (1.676 m)   Wt 110 lb (49.9 kg)   SpO2 97%   BMI 17.75 kg/m²     Current medications that patient is taking have been reviewed. Heart:  RRR, no murmurs, gallops, or rubs.   Lungs:  CTA bilaterally, no wheeze, rales or rhonchi  Abd: bowel sounds present, soft, nontender, nondistended, no masses  Extrem:  No cyanosis or edema    CBC with Differential:    Lab Results   Component Value Date    WBC 5.5 01/21/2020    RBC 3.60 01/21/2020    HGB 11.4 01/21/2020    HCT 35.9 01/21/2020     01/21/2020    MCV 99.7 01/21/2020    MCH 31.7 01/21/2020    MCHC 31.8 01/21/2020    RDW 13.6 01/21/2020    LYMPHOPCT 10.3 01/20/2020    MONOPCT 8.1 01/20/2020    EOSPCT 3 10/07/2010    BASOPCT 0.3 01/20/2020    MONOSABS 0.53 01/20/2020    LYMPHSABS 0.67 01/20/2020    EOSABS 0.06 01/20/2020    BASOSABS 0.02 01/20/2020     CMP:    Lab Results   Component Value Date     01/21/2020    K 3.4 01/21/2020     01/21/2020    CO2 22 01/21/2020    BUN 19 01/21/2020    CREATININE 0.6 01/21/2020    GFRAA >60 01/21/2020    LABGLOM >60 01/21/2020    GLUCOSE 96 01/21/2020    GLUCOSE 102 10/07/2010    PROT 7.3 01/20/2020    LABALBU 4.2 01/20/2020    LABALBU 4.5 10/07/2010    CALCIUM 8.5 01/21/2020    BILITOT 0.7 01/20/2020    ALKPHOS 83 01/20/2020    AST 30 01/20/2020    ALT 21 01/20/2020     BMP:    Lab Results   Component Value Date     01/21/2020    K 3.4 01/21/2020     01/21/2020    CO2 22 01/21/2020    BUN 19 01/21/2020    LABALBU 4.2 01/20/2020    LABALBU 4.5 10/07/2010    CREATININE 0.6 01/21/2020    CALCIUM 8.5 01/21/2020    GFRAA >60 01/21/2020    LABGLOM >60 01/21/2020    GLUCOSE 96 01/21/2020    GLUCOSE 102 10/07/2010     Magnesium: Lab Results   Component Value Date    MG 1.9 01/21/2020     Phosphorus:  No results found for: PHOS  PT/INR:    Lab Results   Component Value Date    PROTIME 11.2 01/20/2020    PROTIME 11.1 04/13/2011    INR 1.0 01/20/2020     PTT:    Lab Results   Component Value Date    APTT 31.3 01/20/2020   [APTT}     Assessment:    Patient Active Problem List   Diagnosis    Essential hypertension    Depression    PVD (peripheral vascular disease) with claudication ([facility]    History of tobacco use    Asymptomatic bilateral carotid artery stenosis    Hyperlipidemia LDL goal <100    Acquired hypothyroidism    Contusion of left thigh       Plan:  Blood pressure ok, continue current medications  Continue psychiatric medications. Stable. Discussed cessation. No acute issues. Continue aggressive lipid therapy  Continue synthroid. Appreciate ortho evaluation. Continue conservative therapy. Pt/Ot evaluations for discharge planning. Ok to discharge once social aspects of medicine finalized.     Dottie Dates    8:16 AM  1/22/2020

## 2020-01-23 ENCOUNTER — TELEPHONE (OUTPATIENT)
Dept: VASCULAR SURGERY | Age: 70
End: 2020-01-23

## 2020-01-23 VITALS
WEIGHT: 110 LBS | SYSTOLIC BLOOD PRESSURE: 138 MMHG | DIASTOLIC BLOOD PRESSURE: 60 MMHG | TEMPERATURE: 98.2 F | HEART RATE: 89 BPM | HEIGHT: 66 IN | RESPIRATION RATE: 16 BRPM | BODY MASS INDEX: 17.68 KG/M2 | OXYGEN SATURATION: 96 %

## 2020-01-23 PROCEDURE — 6370000000 HC RX 637 (ALT 250 FOR IP): Performed by: NURSE PRACTITIONER

## 2020-01-23 PROCEDURE — 2580000003 HC RX 258: Performed by: NURSE PRACTITIONER

## 2020-01-23 RX ADMIN — CLOPIDOGREL 75 MG: 75 TABLET, FILM COATED ORAL at 09:00

## 2020-01-23 RX ADMIN — LIOTHYRONINE SODIUM 5 MCG: 5 TABLET ORAL at 09:00

## 2020-01-23 RX ADMIN — LOSARTAN POTASSIUM 25 MG: 25 TABLET, FILM COATED ORAL at 10:01

## 2020-01-23 RX ADMIN — OXYBUTYNIN CHLORIDE 5 MG: 5 TABLET, EXTENDED RELEASE ORAL at 09:00

## 2020-01-23 RX ADMIN — SODIUM CHLORIDE, PRESERVATIVE FREE 10 ML: 5 INJECTION INTRAVENOUS at 09:00

## 2020-01-23 RX ADMIN — CILOSTAZOL 100 MG: 100 TABLET ORAL at 08:59

## 2020-01-23 RX ADMIN — MELOXICAM 15 MG: 7.5 TABLET ORAL at 09:00

## 2020-01-23 RX ADMIN — LEVOTHYROXINE SODIUM 112 MCG: 0.11 TABLET ORAL at 06:29

## 2020-01-23 RX ADMIN — BUPROPION HYDROCHLORIDE 150 MG: 75 TABLET, FILM COATED ORAL at 08:59

## 2020-01-23 RX ADMIN — ESCITALOPRAM 10 MG: 10 TABLET, FILM COATED ORAL at 08:59

## 2020-01-23 RX ADMIN — VENLAFAXINE 75 MG: 75 TABLET ORAL at 09:00

## 2020-01-23 ASSESSMENT — PAIN DESCRIPTION - ORIENTATION: ORIENTATION: LEFT

## 2020-01-23 ASSESSMENT — PAIN - FUNCTIONAL ASSESSMENT: PAIN_FUNCTIONAL_ASSESSMENT: PREVENTS OR INTERFERES SOME ACTIVE ACTIVITIES AND ADLS

## 2020-01-23 ASSESSMENT — PAIN DESCRIPTION - LOCATION: LOCATION: LEG

## 2020-01-23 ASSESSMENT — PAIN DESCRIPTION - FREQUENCY: FREQUENCY: CONTINUOUS

## 2020-01-23 ASSESSMENT — PAIN DESCRIPTION - ONSET: ONSET: ON-GOING

## 2020-01-23 ASSESSMENT — PAIN DESCRIPTION - PAIN TYPE: TYPE: ACUTE PAIN

## 2020-01-23 ASSESSMENT — PAIN SCALES - GENERAL
PAINLEVEL_OUTOF10: 0
PAINLEVEL_OUTOF10: 2

## 2020-01-23 ASSESSMENT — PAIN DESCRIPTION - PROGRESSION: CLINICAL_PROGRESSION: NOT CHANGED

## 2020-01-23 ASSESSMENT — PAIN DESCRIPTION - DESCRIPTORS: DESCRIPTORS: ACHING;SORE;DISCOMFORT

## 2020-01-23 NOTE — PROGRESS NOTES
Nurse to nurse given to Matt Snowden at [place], discharge AVS and [facility] report faxed to [phone]

## 2020-01-23 NOTE — PROGRESS NOTES
Dr. Cee Lemons paged through the answering service regarding discharge.  Transport set up between 1 and 2 pm.   Nader Bernabe

## 2020-01-23 NOTE — PROGRESS NOTES
[facility] transporter here to  patient for discharge. All belongings as well as home medications sent with patient.

## 2020-01-23 NOTE — PROGRESS NOTES
Hospital Medicine    Subjective:  Pt seen this afternoon ambulating with walker c/o l thigh pain but improved since admit      Current Facility-Administered Medications:     oxyCODONE-acetaminophen (PERCOCET) 5-325 MG per tablet 1 tablet, 1 tablet, Oral, Q4H PRN **OR** oxyCODONE-acetaminophen (PERCOCET) 5-325 MG per tablet 2 tablet, 2 tablet, Oral, Q4H PRN, Neda Rojas MD, 2 tablet at 01/22/20 1029    sodium chloride flush 0.9 % injection 10 mL, 10 mL, Intravenous, PRN, Ebnoy Parikh, APRN - CNP, 10 mL at 01/20/20 1710    buPROPion (WELLBUTRIN) tablet 150 mg, 150 mg, Oral, Daily, Ebony Parikh, APRN - CNP, 150 mg at 01/23/20 0859    cilostazol (PLETAL) tablet 100 mg, 100 mg, Oral, BID, Ebony Parikh, APRN - CNP, 100 mg at 01/23/20 0859    clopidogrel (PLAVIX) tablet 75 mg, 75 mg, Oral, Daily, Ebony Curraneet, APRN - CNP, 75 mg at 01/23/20 0900    escitalopram (LEXAPRO) tablet 10 mg, 10 mg, Oral, Daily, Ebony Parikh, APRN - CNP, 10 mg at 01/23/20 0859    levothyroxine (SYNTHROID) tablet 112 mcg, 112 mcg, Oral, Daily, Ebony Parikh, APRN - CNP, 112 mcg at 01/23/20 6944    liothyronine (CYTOMEL) tablet 5 mcg, 5 mcg, Oral, Daily, Ebony Parikh, APRN - CNP, 5 mcg at 01/23/20 0900    losartan (COZAAR) tablet 25 mg, 25 mg, Oral, Daily, Ebony Parikh, APRN - CNP, 25 mg at 01/23/20 1001    meloxicam (MOBIC) tablet 15 mg, 15 mg, Oral, Daily, Ebony Curraneecristi, APRN - CNP, 15 mg at 01/23/20 0900    oxybutynin (DITROPAN-XL) extended release tablet 5 mg, 5 mg, Oral, Daily, BRIAN Salazar CNP, 5 mg at 01/23/20 0900    venlafaxine (EFFEXOR) tablet 75 mg, 75 mg, Oral, BID, BRIAN Salazar CNP, 75 mg at 01/23/20 0900    sodium chloride flush 0.9 % injection 10 mL, 10 mL, Intravenous, 2 times per day, BRIAN Salazar CNP, 10 mL at 01/23/20 0900    magnesium hydroxide (MILK OF MAGNESIA) 400 MG/5ML suspension 30 mL, 30 01/21/2020    RDW 13.6 01/21/2020    LYMPHOPCT 10.3 01/20/2020    MONOPCT 8.1 01/20/2020    EOSPCT 3 10/07/2010    BASOPCT 0.3 01/20/2020    MONOSABS 0.53 01/20/2020    LYMPHSABS 0.67 01/20/2020    EOSABS 0.06 01/20/2020    BASOSABS 0.02 01/20/2020     CMP:    Lab Results   Component Value Date     01/21/2020    K 3.4 01/21/2020     01/21/2020    CO2 22 01/21/2020    BUN 19 01/21/2020    CREATININE 0.6 01/21/2020    GFRAA >60 01/21/2020    LABGLOM >60 01/21/2020    GLUCOSE 96 01/21/2020    GLUCOSE 102 10/07/2010    PROT 7.3 01/20/2020    LABALBU 4.2 01/20/2020    LABALBU 4.5 10/07/2010    CALCIUM 8.5 01/21/2020    BILITOT 0.7 01/20/2020    ALKPHOS 83 01/20/2020    AST 30 01/20/2020    ALT 21 01/20/2020     Warfarin PT/INR:    Lab Results   Component Value Date    INR 1.0 01/20/2020    INR 1.0 01/07/2020    INR 0.9 01/29/2019    PROTIME 11.2 01/20/2020    PROTIME 11.0 01/07/2020    PROTIME 10.7 01/29/2019       Assessment:    Principal Problem:    PVD (peripheral vascular disease) with claudication (HCC)  Active Problems:    Essential hypertension    Depression    History of tobacco use    Asymptomatic bilateral carotid artery stenosis    Hyperlipidemia LDL goal <100    Acquired hypothyroidism    Contusion of left thigh    Left groin pain  Resolved Problems:    Hypothyroidism    Hyperlipidemia    Left thigh pain    Left leg claudication ([address]    Falls frequently      Plan:  Dc to rehab with outpt f/u with vascular and ortho        Precilla Brand  3:20 PM  1/23/2020

## 2020-01-23 NOTE — TELEPHONE ENCOUNTER
Attempted to call patient to schedule follow up with Dr. Kemar Gonzales, phone was not accepting calls at this time.

## 2020-01-23 NOTE — TELEPHONE ENCOUNTER
----- Message from César Constantino MD sent at 1/20/2020  7:09 PM EST -----  I saw this patient in the emergency room. She had left anterior thigh pain. CT scan was unremarkable other than her chronic peripheral vascular disease.   Please have her see pk next monday

## 2020-01-24 ENCOUNTER — TELEPHONE (OUTPATIENT)
Dept: VASCULAR SURGERY | Age: 70
End: 2020-01-24

## 2020-01-24 NOTE — TELEPHONE ENCOUNTER
Attempted to call patient to schedule follow up with Dr. Niurka Mas next week. Phone not accepting calls. She does have a follow up appt scheduled on 2-10-20.

## 2020-02-03 ENCOUNTER — OFFICE VISIT (OUTPATIENT)
Dept: VASCULAR SURGERY | Age: 70
End: 2020-02-03
Payer: MEDICARE

## 2020-02-03 VITALS
SYSTOLIC BLOOD PRESSURE: 130 MMHG | DIASTOLIC BLOOD PRESSURE: 78 MMHG | RESPIRATION RATE: 16 BRPM | HEIGHT: 66 IN | WEIGHT: 107 LBS | BODY MASS INDEX: 17.19 KG/M2

## 2020-02-03 PROCEDURE — 1111F DSCHRG MED/CURRENT MED MERGE: CPT | Performed by: SURGERY

## 2020-02-03 PROCEDURE — G8400 PT W/DXA NO RESULTS DOC: HCPCS | Performed by: SURGERY

## 2020-02-03 PROCEDURE — 1123F ACP DISCUSS/DSCN MKR DOCD: CPT | Performed by: SURGERY

## 2020-02-03 PROCEDURE — 99213 OFFICE O/P EST LOW 20 MIN: CPT | Performed by: SURGERY

## 2020-02-03 PROCEDURE — 1090F PRES/ABSN URINE INCON ASSESS: CPT | Performed by: SURGERY

## 2020-02-03 PROCEDURE — 1036F TOBACCO NON-USER: CPT | Performed by: SURGERY

## 2020-02-03 PROCEDURE — 4040F PNEUMOC VAC/ADMIN/RCVD: CPT | Performed by: SURGERY

## 2020-02-03 PROCEDURE — G8419 CALC BMI OUT NRM PARAM NOF/U: HCPCS | Performed by: SURGERY

## 2020-02-03 PROCEDURE — G8427 DOCREV CUR MEDS BY ELIG CLIN: HCPCS | Performed by: SURGERY

## 2020-02-03 PROCEDURE — 3017F COLORECTAL CA SCREEN DOC REV: CPT | Performed by: SURGERY

## 2020-02-03 PROCEDURE — G8484 FLU IMMUNIZE NO ADMIN: HCPCS | Performed by: SURGERY

## 2020-02-03 RX ORDER — TRAMADOL HYDROCHLORIDE 50 MG/1
50 TABLET ORAL EVERY 6 HOURS PRN
Status: ON HOLD | COMMUNITY
End: 2020-03-06 | Stop reason: HOSPADM

## 2020-02-03 NOTE — PROGRESS NOTES
Vascular Surgery Outpatient Followup    PCP : Isi Mays MD  Podiatrist : Dr. Swapnil Thompson     Previous Vascular Procedure  1/29/19 L sfa, popliteal atherectomy, dcb   1/7/2020 R LE angiogram - SFA  - LL claudication - needs fem ak pop bypass      HISTORY OF PRESENT ILLNESS:    This is a 68 y.o. female who presents in fu regarding pvd, lifestyle limiting claudication. She had undergone angiogram 1/7/20. She will need R LE fem ak pop bypass in future to address this. She had fall after angiogram and started to develop pain ~ 1/17/20. She was admitted to hospital with this issue. She was seen by Dr. Aureliano Cesar. Her pain is in her left groin and left hip. It is achey sore and intermittently sharp. The pain is a 7/10 and it is worse with activity. She denies previous similar problems. She has had previous L LE intervention for wound of the left calf which was first noted approximately 7/2018 after trauma.   She followed with Dr. Isela Cisse and after intervention it did heal.      Past Medical History:        Diagnosis Date    Aortoiliac occlusive disease ([address] 1/16/2019    Atherosclerosis of native arteries of extremity with rest pain ([address] 5/3/2018    Atherosclerosis of native artery of extremity with ulceration ([address] 1/16/2019    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 with rest pain ([address] 1/16/2019    Bilateral carotid artery stenosis 5/22/2018    Bruit of right carotid artery 5/3/2018    CAD (coronary artery disease)     Cancer ([facility])     SKIN CA/Thigh    COPD (chronic obstructive pulmonary disease) ([facility])     Depression     Femoro-popliteal artery disease ([address] 1/16/2019    History of tobacco use 5/3/2018    Hydrocephalus ([facility])     Hyperlipidemia     Hypertension     Pain in both feet 5/3/2018    PVD (peripheral vascular disease) ([address]     PVD (peripheral vascular disease) with claudication ([facility]) 5/3/2018    Rheumatoid arthritis ([address]     Thyroid induration  EXTREMITIES:   R LE Edema absent  L LE Edema absent   Groin no hematoma   ttp left hip laterally and medial groin  R femoral 1+ L femoral 1+   R posterior tibial monophasic L posterior tibial monophasic   R dorsalis pedis No signal L dorsalis pedis Weakly biophasic     RADIOLOGY:    A/P PVD with R LE claudication  · Continue Medical management with plavix and pletal  · Discussed with pt tobacco use and relationship to PVD  pt currently is not a smoker  Pt understands tobacco use can contribute to worsening of pvd and development of limb loss   · Emphasized importance of foot care and to call if develops any wounds, ulcerations, changes in appearance, claudication and/or symptoms  · We discussed importance of a walking program and them gauging how far they have walked on a daily basis and progressively increasing that distance and walking through the pain  · Fu 6 weeks  · R fem ak pop bypass in future  · Will need cardiac risk stratification    Left groin pain  · L groin pain unlikely to be related to access  · Needs L LE to be stronger prior to R LE intervention    Chrissy Salazar

## 2020-02-07 ENCOUNTER — TELEPHONE (OUTPATIENT)
Dept: CARDIOLOGY CLINIC | Age: 70
End: 2020-02-07

## 2020-02-17 ENCOUNTER — APPOINTMENT (OUTPATIENT)
Dept: GENERAL RADIOLOGY | Age: 70
DRG: 329 | End: 2020-02-17
Payer: MEDICARE

## 2020-02-17 ENCOUNTER — HOSPITAL ENCOUNTER (INPATIENT)
Age: 70
LOS: 18 days | Discharge: NURSING, REHAB OR LONG-TERM CARE | DRG: 329 | End: 2020-03-06
Attending: EMERGENCY MEDICINE | Admitting: INTERNAL MEDICINE
Payer: MEDICARE

## 2020-02-17 ENCOUNTER — ANESTHESIA EVENT (OUTPATIENT)
Dept: OPERATING ROOM | Age: 70
DRG: 329 | End: 2020-02-17
Payer: MEDICARE

## 2020-02-17 ENCOUNTER — ANESTHESIA (OUTPATIENT)
Dept: OPERATING ROOM | Age: 70
DRG: 329 | End: 2020-02-17
Payer: MEDICARE

## 2020-02-17 ENCOUNTER — APPOINTMENT (OUTPATIENT)
Dept: CT IMAGING | Age: 70
DRG: 329 | End: 2020-02-17
Payer: MEDICARE

## 2020-02-17 VITALS
OXYGEN SATURATION: 100 % | TEMPERATURE: 96.4 F | SYSTOLIC BLOOD PRESSURE: 152 MMHG | DIASTOLIC BLOOD PRESSURE: 80 MMHG | RESPIRATION RATE: 12 BRPM

## 2020-02-17 PROBLEM — K56.609 SBO (SMALL BOWEL OBSTRUCTION) (HCC): Status: ACTIVE | Noted: 2020-02-17

## 2020-02-17 PROBLEM — I73.9 PAD (PERIPHERAL ARTERY DISEASE) (HCC): Chronic | Status: ACTIVE | Noted: 2019-01-29

## 2020-02-17 PROBLEM — S70.12XA CONTUSION OF LEFT THIGH: Status: RESOLVED | Noted: 2020-01-21 | Resolved: 2020-02-17

## 2020-02-17 LAB
ABO/RH: NORMAL
ALBUMIN SERPL-MCNC: 2.5 G/DL (ref 3.5–5.2)
ALBUMIN SERPL-MCNC: 3.5 G/DL (ref 3.5–5.2)
ALP BLD-CCNC: 110 U/L (ref 35–104)
ALP BLD-CCNC: 83 U/L (ref 35–104)
ALT SERPL-CCNC: 20 U/L (ref 0–32)
ALT SERPL-CCNC: 21 U/L (ref 0–32)
ANION GAP SERPL CALCULATED.3IONS-SCNC: 11 MMOL/L (ref 7–16)
ANION GAP SERPL CALCULATED.3IONS-SCNC: 18 MMOL/L (ref 7–16)
ANTIBODY SCREEN: NORMAL
AST SERPL-CCNC: 27 U/L (ref 0–31)
AST SERPL-CCNC: 34 U/L (ref 0–31)
BILIRUB SERPL-MCNC: 0.5 MG/DL (ref 0–1.2)
BILIRUB SERPL-MCNC: 1.1 MG/DL (ref 0–1.2)
BUN BLDV-MCNC: 50 MG/DL (ref 8–23)
BUN BLDV-MCNC: 70 MG/DL (ref 8–23)
CALCIUM IONIZED: 1.13 MMOL/L (ref 1.15–1.33)
CALCIUM SERPL-MCNC: 7.2 MG/DL (ref 8.6–10.2)
CALCIUM SERPL-MCNC: 9.4 MG/DL (ref 8.6–10.2)
CHLORIDE BLD-SCNC: 104 MMOL/L (ref 98–107)
CHLORIDE BLD-SCNC: 91 MMOL/L (ref 98–107)
CO2: 20 MMOL/L (ref 22–29)
CO2: 20 MMOL/L (ref 22–29)
CREAT SERPL-MCNC: 0.8 MG/DL (ref 0.5–1)
CREAT SERPL-MCNC: 1.3 MG/DL (ref 0.5–1)
GFR AFRICAN AMERICAN: 36
GFR AFRICAN AMERICAN: 49
GFR AFRICAN AMERICAN: >60
GFR NON-AFRICAN AMERICAN: 30 ML/MIN/1.73
GFR NON-AFRICAN AMERICAN: 40 ML/MIN/1.73
GFR NON-AFRICAN AMERICAN: >60 ML/MIN/1.73
GLUCOSE BLD-MCNC: 109 MG/DL (ref 74–99)
GLUCOSE BLD-MCNC: 129 MG/DL (ref 74–99)
GLUCOSE BLD-MCNC: 141 MG/DL (ref 74–99)
HCT VFR BLD CALC: 37.8 % (ref 34–48)
HEMOGLOBIN: 12.1 G/DL (ref 11.5–15.5)
INR BLD: 1
LACTIC ACID, SEPSIS: 2.1 MMOL/L (ref 0.5–1.9)
LACTIC ACID: 0.9 MMOL/L (ref 0.5–2.2)
LIPASE: 8 U/L (ref 13–60)
MAGNESIUM: 2 MG/DL (ref 1.6–2.6)
MCH RBC QN AUTO: 30.5 PG (ref 26–35)
MCHC RBC AUTO-ENTMCNC: 32 % (ref 32–34.5)
MCV RBC AUTO: 95.2 FL (ref 80–99.9)
PDW BLD-RTO: 13.2 FL (ref 11.5–15)
PERFORMED ON: ABNORMAL
PHOSPHORUS: 4 MG/DL (ref 2.5–4.5)
PLATELET # BLD: 402 E9/L (ref 130–450)
PMV BLD AUTO: 9.4 FL (ref 7–12)
POC CHLORIDE: 97 MMOL/L (ref 100–108)
POC CREATININE: 1.7 MG/DL (ref 0.5–1)
POC POTASSIUM: 3.4 MMOL/L (ref 3.5–5)
POC SODIUM: 129 MMOL/L (ref 132–146)
POTASSIUM SERPL-SCNC: 3.6 MMOL/L (ref 3.5–5)
POTASSIUM SERPL-SCNC: 3.7 MMOL/L (ref 3.5–5)
PROTHROMBIN TIME: 11.8 SEC (ref 9.3–12.4)
RBC # BLD: 3.97 E12/L (ref 3.5–5.5)
REASON FOR REJECTION: NORMAL
REJECTED TEST: NORMAL
SODIUM BLD-SCNC: 129 MMOL/L (ref 132–146)
SODIUM BLD-SCNC: 135 MMOL/L (ref 132–146)
TOTAL PROTEIN: 5.1 G/DL (ref 6.4–8.3)
TOTAL PROTEIN: 7.2 G/DL (ref 6.4–8.3)
TROPONIN: <0.01 NG/ML (ref 0–0.03)
WBC # BLD: 14 E9/L (ref 4.5–11.5)

## 2020-02-17 PROCEDURE — 2500000003 HC RX 250 WO HCPCS: Performed by: REGISTERED NURSE

## 2020-02-17 PROCEDURE — 2720000010 HC SURG SUPPLY STERILE: Performed by: SURGERY

## 2020-02-17 PROCEDURE — 85610 PROTHROMBIN TIME: CPT

## 2020-02-17 PROCEDURE — 99285 EMERGENCY DEPT VISIT HI MDM: CPT

## 2020-02-17 PROCEDURE — 85025 COMPLETE CBC W/AUTO DIFF WBC: CPT

## 2020-02-17 PROCEDURE — 84295 ASSAY OF SERUM SODIUM: CPT

## 2020-02-17 PROCEDURE — 86901 BLOOD TYPING SEROLOGIC RH(D): CPT

## 2020-02-17 PROCEDURE — 83735 ASSAY OF MAGNESIUM: CPT

## 2020-02-17 PROCEDURE — 82565 ASSAY OF CREATININE: CPT

## 2020-02-17 PROCEDURE — 2500000003 HC RX 250 WO HCPCS: Performed by: SURGERY

## 2020-02-17 PROCEDURE — 82435 ASSAY OF BLOOD CHLORIDE: CPT

## 2020-02-17 PROCEDURE — 0DTF0ZZ RESECTION OF RIGHT LARGE INTESTINE, OPEN APPROACH: ICD-10-PCS | Performed by: SURGERY

## 2020-02-17 PROCEDURE — 74018 RADEX ABDOMEN 1 VIEW: CPT

## 2020-02-17 PROCEDURE — 82330 ASSAY OF CALCIUM: CPT

## 2020-02-17 PROCEDURE — 87081 CULTURE SCREEN ONLY: CPT

## 2020-02-17 PROCEDURE — 83605 ASSAY OF LACTIC ACID: CPT

## 2020-02-17 PROCEDURE — 83690 ASSAY OF LIPASE: CPT

## 2020-02-17 PROCEDURE — 93005 ELECTROCARDIOGRAM TRACING: CPT | Performed by: NURSE PRACTITIONER

## 2020-02-17 PROCEDURE — 6360000002 HC RX W HCPCS: Performed by: STUDENT IN AN ORGANIZED HEALTH CARE EDUCATION/TRAINING PROGRAM

## 2020-02-17 PROCEDURE — 74177 CT ABD & PELVIS W/CONTRAST: CPT

## 2020-02-17 PROCEDURE — 2580000003 HC RX 258: Performed by: STUDENT IN AN ORGANIZED HEALTH CARE EDUCATION/TRAINING PROGRAM

## 2020-02-17 PROCEDURE — 3700000001 HC ADD 15 MINUTES (ANESTHESIA): Performed by: SURGERY

## 2020-02-17 PROCEDURE — 7100000000 HC PACU RECOVERY - FIRST 15 MIN: Performed by: SURGERY

## 2020-02-17 PROCEDURE — 2580000003 HC RX 258: Performed by: REGISTERED NURSE

## 2020-02-17 PROCEDURE — 82947 ASSAY GLUCOSE BLOOD QUANT: CPT

## 2020-02-17 PROCEDURE — 6370000000 HC RX 637 (ALT 250 FOR IP): Performed by: SURGERY

## 2020-02-17 PROCEDURE — 2580000003 HC RX 258: Performed by: EMERGENCY MEDICINE

## 2020-02-17 PROCEDURE — 96374 THER/PROPH/DIAG INJ IV PUSH: CPT

## 2020-02-17 PROCEDURE — 6360000002 HC RX W HCPCS

## 2020-02-17 PROCEDURE — 84484 ASSAY OF TROPONIN QUANT: CPT

## 2020-02-17 PROCEDURE — 80053 COMPREHEN METABOLIC PANEL: CPT

## 2020-02-17 PROCEDURE — 2500000003 HC RX 250 WO HCPCS

## 2020-02-17 PROCEDURE — 84100 ASSAY OF PHOSPHORUS: CPT

## 2020-02-17 PROCEDURE — 6360000002 HC RX W HCPCS: Performed by: REGISTERED NURSE

## 2020-02-17 PROCEDURE — 71045 X-RAY EXAM CHEST 1 VIEW: CPT

## 2020-02-17 PROCEDURE — 7100000001 HC PACU RECOVERY - ADDTL 15 MIN: Performed by: SURGERY

## 2020-02-17 PROCEDURE — 86900 BLOOD TYPING SEROLOGIC ABO: CPT

## 2020-02-17 PROCEDURE — 94002 VENT MGMT INPAT INIT DAY: CPT

## 2020-02-17 PROCEDURE — 2709999900 HC NON-CHARGEABLE SUPPLY: Performed by: SURGERY

## 2020-02-17 PROCEDURE — 84132 ASSAY OF SERUM POTASSIUM: CPT

## 2020-02-17 PROCEDURE — 6360000002 HC RX W HCPCS: Performed by: EMERGENCY MEDICINE

## 2020-02-17 PROCEDURE — 2580000003 HC RX 258: Performed by: RADIOLOGY

## 2020-02-17 PROCEDURE — 3600000014 HC SURGERY LEVEL 4 ADDTL 15MIN: Performed by: SURGERY

## 2020-02-17 PROCEDURE — 86923 COMPATIBILITY TEST ELECTRIC: CPT

## 2020-02-17 PROCEDURE — 86850 RBC ANTIBODY SCREEN: CPT

## 2020-02-17 PROCEDURE — 3700000000 HC ANESTHESIA ATTENDED CARE: Performed by: SURGERY

## 2020-02-17 PROCEDURE — 36415 COLL VENOUS BLD VENIPUNCTURE: CPT

## 2020-02-17 PROCEDURE — 3600000004 HC SURGERY LEVEL 4 BASE: Performed by: SURGERY

## 2020-02-17 PROCEDURE — 2000000000 HC ICU R&B

## 2020-02-17 PROCEDURE — P9016 RBC LEUKOCYTES REDUCED: HCPCS

## 2020-02-17 PROCEDURE — 88307 TISSUE EXAM BY PATHOLOGIST: CPT

## 2020-02-17 PROCEDURE — 85027 COMPLETE CBC AUTOMATED: CPT

## 2020-02-17 PROCEDURE — 6360000004 HC RX CONTRAST MEDICATION: Performed by: RADIOLOGY

## 2020-02-17 RX ORDER — PROPOFOL 10 MG/ML
INJECTION, EMULSION INTRAVENOUS PRN
Status: DISCONTINUED | OUTPATIENT
Start: 2020-02-17 | End: 2020-02-17 | Stop reason: SDUPTHER

## 2020-02-17 RX ORDER — METOPROLOL TARTRATE 5 MG/5ML
2.5 INJECTION INTRAVENOUS EVERY 6 HOURS
Status: DISCONTINUED | OUTPATIENT
Start: 2020-02-17 | End: 2020-02-17

## 2020-02-17 RX ORDER — NALOXONE HYDROCHLORIDE 0.4 MG/ML
0.4 INJECTION, SOLUTION INTRAMUSCULAR; INTRAVENOUS; SUBCUTANEOUS PRN
Status: DISCONTINUED | OUTPATIENT
Start: 2020-02-17 | End: 2020-02-17

## 2020-02-17 RX ORDER — MEPERIDINE HYDROCHLORIDE 50 MG/ML
12.5 INJECTION INTRAMUSCULAR; INTRAVENOUS; SUBCUTANEOUS
Status: DISCONTINUED | OUTPATIENT
Start: 2020-02-17 | End: 2020-02-17

## 2020-02-17 RX ORDER — CEFAZOLIN SODIUM 1 G/3ML
INJECTION, POWDER, FOR SOLUTION INTRAMUSCULAR; INTRAVENOUS PRN
Status: DISCONTINUED | OUTPATIENT
Start: 2020-02-17 | End: 2020-02-17 | Stop reason: SDUPTHER

## 2020-02-17 RX ORDER — PROPOFOL 10 MG/ML
10 INJECTION, EMULSION INTRAVENOUS
Status: DISCONTINUED | OUTPATIENT
Start: 2020-02-17 | End: 2020-02-18

## 2020-02-17 RX ORDER — PANTOPRAZOLE SODIUM 40 MG/10ML
40 INJECTION, POWDER, LYOPHILIZED, FOR SOLUTION INTRAVENOUS DAILY
Status: DISCONTINUED | OUTPATIENT
Start: 2020-02-18 | End: 2020-02-18

## 2020-02-17 RX ORDER — SODIUM CHLORIDE, SODIUM LACTATE, POTASSIUM CHLORIDE, CALCIUM CHLORIDE 600; 310; 30; 20 MG/100ML; MG/100ML; MG/100ML; MG/100ML
INJECTION, SOLUTION INTRAVENOUS CONTINUOUS PRN
Status: DISCONTINUED | OUTPATIENT
Start: 2020-02-17 | End: 2020-02-17 | Stop reason: SDUPTHER

## 2020-02-17 RX ORDER — LABETALOL HYDROCHLORIDE 5 MG/ML
INJECTION, SOLUTION INTRAVENOUS
Status: COMPLETED
Start: 2020-02-17 | End: 2020-02-17

## 2020-02-17 RX ORDER — ONDANSETRON 2 MG/ML
4 INJECTION INTRAMUSCULAR; INTRAVENOUS EVERY 6 HOURS PRN
Status: DISCONTINUED | OUTPATIENT
Start: 2020-02-17 | End: 2020-02-17 | Stop reason: SDUPTHER

## 2020-02-17 RX ORDER — VENLAFAXINE HYDROCHLORIDE 75 MG/1
75 CAPSULE, EXTENDED RELEASE ORAL DAILY
Status: ON HOLD | COMMUNITY
End: 2020-03-06 | Stop reason: HOSPADM

## 2020-02-17 RX ORDER — NEOSTIGMINE METHYLSULFATE 1 MG/ML
INJECTION, SOLUTION INTRAVENOUS PRN
Status: DISCONTINUED | OUTPATIENT
Start: 2020-02-17 | End: 2020-02-17 | Stop reason: SDUPTHER

## 2020-02-17 RX ORDER — MORPHINE SULFATE 2 MG/ML
2 INJECTION, SOLUTION INTRAMUSCULAR; INTRAVENOUS EVERY 5 MIN PRN
Status: DISCONTINUED | OUTPATIENT
Start: 2020-02-17 | End: 2020-02-17

## 2020-02-17 RX ORDER — LEVOTHYROXINE SODIUM ANHYDROUS 100 UG/5ML
50 INJECTION, POWDER, LYOPHILIZED, FOR SOLUTION INTRAVENOUS DAILY
Status: DISCONTINUED | OUTPATIENT
Start: 2020-02-23 | End: 2020-02-18

## 2020-02-17 RX ORDER — ONDANSETRON 2 MG/ML
4 INJECTION INTRAMUSCULAR; INTRAVENOUS EVERY 6 HOURS PRN
Status: DISCONTINUED | OUTPATIENT
Start: 2020-02-17 | End: 2020-02-25

## 2020-02-17 RX ORDER — SODIUM CHLORIDE 0.9 % (FLUSH) 0.9 %
10 SYRINGE (ML) INJECTION PRN
Status: DISCONTINUED | OUTPATIENT
Start: 2020-02-17 | End: 2020-02-27

## 2020-02-17 RX ORDER — METOPROLOL TARTRATE 5 MG/5ML
INJECTION INTRAVENOUS PRN
Status: DISCONTINUED | OUTPATIENT
Start: 2020-02-17 | End: 2020-02-17 | Stop reason: SDUPTHER

## 2020-02-17 RX ORDER — SODIUM CHLORIDE 9 MG/ML
INJECTION, SOLUTION INTRAVENOUS CONTINUOUS PRN
Status: DISCONTINUED | OUTPATIENT
Start: 2020-02-17 | End: 2020-02-17 | Stop reason: SDUPTHER

## 2020-02-17 RX ORDER — 0.9 % SODIUM CHLORIDE 0.9 %
1000 INTRAVENOUS SOLUTION INTRAVENOUS ONCE
Status: COMPLETED | OUTPATIENT
Start: 2020-02-17 | End: 2020-02-17

## 2020-02-17 RX ORDER — FENTANYL CITRATE 50 UG/ML
50 INJECTION, SOLUTION INTRAMUSCULAR; INTRAVENOUS ONCE
Status: COMPLETED | OUTPATIENT
Start: 2020-02-17 | End: 2020-02-17

## 2020-02-17 RX ORDER — SODIUM CHLORIDE 0.9 % (FLUSH) 0.9 %
10 SYRINGE (ML) INJECTION ONCE
Status: COMPLETED | OUTPATIENT
Start: 2020-02-17 | End: 2020-02-17

## 2020-02-17 RX ORDER — DEXAMETHASONE SODIUM PHOSPHATE 10 MG/ML
INJECTION INTRAMUSCULAR; INTRAVENOUS PRN
Status: DISCONTINUED | OUTPATIENT
Start: 2020-02-17 | End: 2020-02-17 | Stop reason: SDUPTHER

## 2020-02-17 RX ORDER — OXYCODONE HYDROCHLORIDE AND ACETAMINOPHEN 5; 325 MG/1; MG/1
2 TABLET ORAL PRN
Status: DISCONTINUED | OUTPATIENT
Start: 2020-02-17 | End: 2020-02-17

## 2020-02-17 RX ORDER — LABETALOL HYDROCHLORIDE 5 MG/ML
5 INJECTION, SOLUTION INTRAVENOUS ONCE
Status: COMPLETED | OUTPATIENT
Start: 2020-02-17 | End: 2020-02-17

## 2020-02-17 RX ORDER — CHLORHEXIDINE GLUCONATE 0.12 MG/ML
15 RINSE ORAL 2 TIMES DAILY
Status: DISCONTINUED | OUTPATIENT
Start: 2020-02-17 | End: 2020-02-18

## 2020-02-17 RX ORDER — SODIUM CHLORIDE 9 MG/ML
INJECTION, SOLUTION INTRAVENOUS CONTINUOUS
Status: DISCONTINUED | OUTPATIENT
Start: 2020-02-17 | End: 2020-02-20

## 2020-02-17 RX ORDER — SUCCINYLCHOLINE/SOD CL,ISO/PF 200MG/10ML
SYRINGE (ML) INTRAVENOUS PRN
Status: DISCONTINUED | OUTPATIENT
Start: 2020-02-17 | End: 2020-02-17 | Stop reason: SDUPTHER

## 2020-02-17 RX ORDER — MORPHINE SULFATE 2 MG/ML
1 INJECTION, SOLUTION INTRAMUSCULAR; INTRAVENOUS EVERY 5 MIN PRN
Status: DISCONTINUED | OUTPATIENT
Start: 2020-02-17 | End: 2020-02-17

## 2020-02-17 RX ORDER — GLYCOPYRROLATE 1 MG/5 ML
SYRINGE (ML) INTRAVENOUS PRN
Status: DISCONTINUED | OUTPATIENT
Start: 2020-02-17 | End: 2020-02-17 | Stop reason: SDUPTHER

## 2020-02-17 RX ORDER — SODIUM CHLORIDE 0.9 % (FLUSH) 0.9 %
10 SYRINGE (ML) INJECTION PRN
Status: DISCONTINUED | OUTPATIENT
Start: 2020-02-17 | End: 2020-02-17 | Stop reason: SDUPTHER

## 2020-02-17 RX ORDER — SODIUM CHLORIDE 0.9 % (FLUSH) 0.9 %
10 SYRINGE (ML) INJECTION EVERY 12 HOURS SCHEDULED
Status: DISCONTINUED | OUTPATIENT
Start: 2020-02-17 | End: 2020-03-06 | Stop reason: HOSPADM

## 2020-02-17 RX ORDER — VENLAFAXINE 75 MG/1
75 TABLET ORAL DAILY
Status: DISCONTINUED | OUTPATIENT
Start: 2020-02-18 | End: 2020-02-17 | Stop reason: CLARIF

## 2020-02-17 RX ORDER — SODIUM CHLORIDE 9 MG/ML
10 INJECTION INTRAVENOUS DAILY
Status: DISCONTINUED | OUTPATIENT
Start: 2020-02-18 | End: 2020-02-18

## 2020-02-17 RX ORDER — ONDANSETRON 2 MG/ML
INJECTION INTRAMUSCULAR; INTRAVENOUS PRN
Status: DISCONTINUED | OUTPATIENT
Start: 2020-02-17 | End: 2020-02-17 | Stop reason: SDUPTHER

## 2020-02-17 RX ORDER — HEPARIN SODIUM 10000 [USP'U]/ML
5000 INJECTION, SOLUTION INTRAVENOUS; SUBCUTANEOUS EVERY 8 HOURS
Status: DISCONTINUED | OUTPATIENT
Start: 2020-02-18 | End: 2020-02-21

## 2020-02-17 RX ORDER — 0.9 % SODIUM CHLORIDE 0.9 %
1000 INTRAVENOUS SOLUTION INTRAVENOUS ONCE
Status: DISCONTINUED | OUTPATIENT
Start: 2020-02-17 | End: 2020-02-18

## 2020-02-17 RX ORDER — SODIUM CHLORIDE 0.9 % (FLUSH) 0.9 %
10 SYRINGE (ML) INJECTION EVERY 12 HOURS SCHEDULED
Status: DISCONTINUED | OUTPATIENT
Start: 2020-02-17 | End: 2020-02-17 | Stop reason: SDUPTHER

## 2020-02-17 RX ORDER — VENLAFAXINE HYDROCHLORIDE 75 MG/1
75 CAPSULE, EXTENDED RELEASE ORAL DAILY
Status: DISCONTINUED | OUTPATIENT
Start: 2020-02-18 | End: 2020-02-20

## 2020-02-17 RX ORDER — BUPROPION HYDROCHLORIDE 150 MG/1
150 TABLET ORAL DAILY
Status: DISCONTINUED | OUTPATIENT
Start: 2020-02-18 | End: 2020-02-20

## 2020-02-17 RX ORDER — ROCURONIUM BROMIDE 10 MG/ML
INJECTION, SOLUTION INTRAVENOUS PRN
Status: DISCONTINUED | OUTPATIENT
Start: 2020-02-17 | End: 2020-02-17 | Stop reason: SDUPTHER

## 2020-02-17 RX ORDER — PROPOFOL 10 MG/ML
INJECTION, EMULSION INTRAVENOUS
Status: COMPLETED
Start: 2020-02-17 | End: 2020-02-17

## 2020-02-17 RX ORDER — OXYCODONE HYDROCHLORIDE AND ACETAMINOPHEN 5; 325 MG/1; MG/1
1 TABLET ORAL PRN
Status: DISCONTINUED | OUTPATIENT
Start: 2020-02-17 | End: 2020-02-17

## 2020-02-17 RX ORDER — LIDOCAINE HYDROCHLORIDE 20 MG/ML
JELLY TOPICAL PRN
Status: DISCONTINUED | OUTPATIENT
Start: 2020-02-17 | End: 2020-02-17

## 2020-02-17 RX ORDER — FENTANYL CITRATE 50 UG/ML
INJECTION, SOLUTION INTRAMUSCULAR; INTRAVENOUS PRN
Status: DISCONTINUED | OUTPATIENT
Start: 2020-02-17 | End: 2020-02-17 | Stop reason: SDUPTHER

## 2020-02-17 RX ORDER — LOSARTAN POTASSIUM 25 MG/1
25 TABLET ORAL DAILY
Status: DISCONTINUED | OUTPATIENT
Start: 2020-02-18 | End: 2020-02-20

## 2020-02-17 RX ORDER — ONDANSETRON 2 MG/ML
4 INJECTION INTRAMUSCULAR; INTRAVENOUS
Status: DISCONTINUED | OUTPATIENT
Start: 2020-02-17 | End: 2020-02-17

## 2020-02-17 RX ORDER — HYDROMORPHONE HCL 110MG/55ML
PATIENT CONTROLLED ANALGESIA SYRINGE INTRAVENOUS PRN
Status: DISCONTINUED | OUTPATIENT
Start: 2020-02-17 | End: 2020-02-17 | Stop reason: SDUPTHER

## 2020-02-17 RX ADMIN — PROPOFOL 30 MG: 10 INJECTION, EMULSION INTRAVENOUS at 20:24

## 2020-02-17 RX ADMIN — Medication 10 ML: at 15:53

## 2020-02-17 RX ADMIN — Medication 25 MCG/HR: at 23:30

## 2020-02-17 RX ADMIN — LABETALOL HYDROCHLORIDE 5 MG: 5 INJECTION INTRAVENOUS at 22:27

## 2020-02-17 RX ADMIN — METOPROLOL TARTRATE 5 MG: 5 INJECTION, SOLUTION INTRAVENOUS at 21:36

## 2020-02-17 RX ADMIN — ROCURONIUM BROMIDE 50 MG: 10 INJECTION, SOLUTION INTRAVENOUS at 20:18

## 2020-02-17 RX ADMIN — PROPOFOL 120 MG: 10 INJECTION, EMULSION INTRAVENOUS at 20:10

## 2020-02-17 RX ADMIN — PIPERACILLIN AND TAZOBACTAM 3.38 G: 3; .375 INJECTION, POWDER, LYOPHILIZED, FOR SOLUTION INTRAVENOUS at 23:34

## 2020-02-17 RX ADMIN — SODIUM CHLORIDE: 9 INJECTION, SOLUTION INTRAVENOUS at 21:01

## 2020-02-17 RX ADMIN — DEXAMETHASONE SODIUM PHOSPHATE 10 MG: 10 INJECTION INTRAMUSCULAR; INTRAVENOUS at 20:36

## 2020-02-17 RX ADMIN — CEFAZOLIN 2000 MG: 1 INJECTION, POWDER, FOR SOLUTION INTRAMUSCULAR; INTRAVENOUS at 20:20

## 2020-02-17 RX ADMIN — SODIUM CHLORIDE: 9 INJECTION, SOLUTION INTRAVENOUS at 20:04

## 2020-02-17 RX ADMIN — Medication 0.4 MG: at 21:21

## 2020-02-17 RX ADMIN — PROPOFOL 15 MCG/KG/MIN: 10 INJECTION, EMULSION INTRAVENOUS at 23:31

## 2020-02-17 RX ADMIN — Medication 3 MG: at 21:21

## 2020-02-17 RX ADMIN — SODIUM CHLORIDE, POTASSIUM CHLORIDE, SODIUM LACTATE AND CALCIUM CHLORIDE: 600; 310; 30; 20 INJECTION, SOLUTION INTRAVENOUS at 20:08

## 2020-02-17 RX ADMIN — Medication 140 MG: at 20:10

## 2020-02-17 RX ADMIN — LABETALOL HYDROCHLORIDE 5 MG: 5 INJECTION, SOLUTION INTRAVENOUS at 22:27

## 2020-02-17 RX ADMIN — SODIUM CHLORIDE, POTASSIUM CHLORIDE, SODIUM LACTATE AND CALCIUM CHLORIDE: 600; 310; 30; 20 INJECTION, SOLUTION INTRAVENOUS at 20:26

## 2020-02-17 RX ADMIN — FENTANYL CITRATE 50 MCG: 50 INJECTION, SOLUTION INTRAMUSCULAR; INTRAVENOUS at 13:56

## 2020-02-17 RX ADMIN — HYDROMORPHONE HYDROCHLORIDE 1 MG: 2 INJECTION, SOLUTION INTRAMUSCULAR; INTRAVENOUS; SUBCUTANEOUS at 20:34

## 2020-02-17 RX ADMIN — FENTANYL CITRATE 50 MCG: 50 INJECTION, SOLUTION INTRAMUSCULAR; INTRAVENOUS at 20:16

## 2020-02-17 RX ADMIN — ONDANSETRON HYDROCHLORIDE 4 MG: 2 INJECTION, SOLUTION INTRAMUSCULAR; INTRAVENOUS at 20:36

## 2020-02-17 RX ADMIN — CHLORHEXIDINE GLUCONATE 0.12% ORAL RINSE 15 ML: 1.2 LIQUID ORAL at 23:34

## 2020-02-17 RX ADMIN — IOPAMIDOL 110 ML: 755 INJECTION, SOLUTION INTRAVENOUS at 15:53

## 2020-02-17 RX ADMIN — FENTANYL CITRATE 200 MCG: 50 INJECTION, SOLUTION INTRAMUSCULAR; INTRAVENOUS at 20:19

## 2020-02-17 RX ADMIN — SODIUM CHLORIDE 1000 ML: 9 INJECTION, SOLUTION INTRAVENOUS at 13:56

## 2020-02-17 RX ADMIN — HYDROMORPHONE HYDROCHLORIDE 1 MG: 2 INJECTION, SOLUTION INTRAMUSCULAR; INTRAVENOUS; SUBCUTANEOUS at 20:29

## 2020-02-17 ASSESSMENT — PULMONARY FUNCTION TESTS
PIF_VALUE: 19
PIF_VALUE: 18
PIF_VALUE: 22
PIF_VALUE: 22
PIF_VALUE: 27
PIF_VALUE: 23
PIF_VALUE: 18
PIF_VALUE: 22
PIF_VALUE: 23
PIF_VALUE: 20
PIF_VALUE: 19
PIF_VALUE: 3
PIF_VALUE: 15
PIF_VALUE: 20
PIF_VALUE: 23
PIF_VALUE: 20
PIF_VALUE: 19
PIF_VALUE: 19
PIF_VALUE: 21
PIF_VALUE: 22
PIF_VALUE: 17
PIF_VALUE: 17
PIF_VALUE: 24
PIF_VALUE: 23
PIF_VALUE: 22
PIF_VALUE: 21
PIF_VALUE: 22
PIF_VALUE: 20
PIF_VALUE: 18
PIF_VALUE: 18
PIF_VALUE: 19
PIF_VALUE: 15
PIF_VALUE: 18
PIF_VALUE: 18
PIF_VALUE: 17
PIF_VALUE: 18
PIF_VALUE: 19
PIF_VALUE: 22
PIF_VALUE: 13
PIF_VALUE: 33
PIF_VALUE: 4
PIF_VALUE: 18
PIF_VALUE: 18
PIF_VALUE: 21
PIF_VALUE: 20
PIF_VALUE: 2
PIF_VALUE: 2
PIF_VALUE: 18
PIF_VALUE: 13
PIF_VALUE: 17
PIF_VALUE: 17
PIF_VALUE: 1
PIF_VALUE: 18
PIF_VALUE: 21
PIF_VALUE: 21
PIF_VALUE: 16
PIF_VALUE: 20
PIF_VALUE: 3
PIF_VALUE: 16
PIF_VALUE: 1
PIF_VALUE: 3
PIF_VALUE: 19
PIF_VALUE: 2
PIF_VALUE: 18
PIF_VALUE: 15
PIF_VALUE: 20
PIF_VALUE: 18
PIF_VALUE: 16
PIF_VALUE: 18
PIF_VALUE: 18
PIF_VALUE: 19
PIF_VALUE: 18
PIF_VALUE: 1
PIF_VALUE: 2
PIF_VALUE: 16
PIF_VALUE: 18
PIF_VALUE: 20
PIF_VALUE: 17
PIF_VALUE: 3
PIF_VALUE: 23
PIF_VALUE: 1
PIF_VALUE: 3
PIF_VALUE: 20
PIF_VALUE: 3
PIF_VALUE: 20
PIF_VALUE: 16
PIF_VALUE: 21
PIF_VALUE: 24

## 2020-02-17 ASSESSMENT — LIFESTYLE VARIABLES: SMOKING_STATUS: 0

## 2020-02-17 ASSESSMENT — PAIN SCALES - GENERAL
PAINLEVEL_OUTOF10: 10
PAINLEVEL_OUTOF10: 0
PAINLEVEL_OUTOF10: 10

## 2020-02-17 ASSESSMENT — ENCOUNTER SYMPTOMS: SHORTNESS OF BREATH: 1

## 2020-02-17 NOTE — ED NOTES
Bed: 16  Expected date:   Expected time:   Means of arrival:   Comments:  triage     Von Peterson RN  02/17/20 2697

## 2020-02-17 NOTE — CONSULTS
GENERAL SURGERY  CONSULT NOTE  2020    Physician Consulted: Dr. Dre Vyas  Reason for Consult: SBO  Referring Physician: Dr. Minnie Calles    HPI  Salima Aleman is a 68 y.o. female with extensive PVD on plavix and pletal, who presents for evaluation of SBO. Patient's daughter was at bedside and assisted in providing history. Patient states she began having abdominal pain, nausea, vomiting starting . Patient states she has not been passing gas for several days. Had a couple large BMs, last one was yesterday however that did not alleviate her pain. She states she has never had pain like this before, denies fevers, chills. History of 1  and a tubal ligation in the past. Last colonoscopy was 4-5 years ago by Dr. John Robins who she said stated that she \"acted like she had Crohn's disease\". Patient has been tachycardic since presentation however has been hypertensive. Does have electrolyte derangement consistent with dehydration. Cr 1.3, LA 2.1, WBC 14.0. CT abdomen pelvis shows severe dilation of small bowel with transition point, possible mesenteric swirl.        Past Medical History:   Diagnosis Date    Aortoiliac occlusive disease ([address] 2019    Atherosclerosis of native arteries of extremity with rest pain ([address] 5/3/2018    Atherosclerosis of native artery of extremity with ulceration ([address] 2019    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 with rest pain ([address] 2019    Bilateral carotid artery stenosis 2018    Bruit of right carotid artery 5/3/2018    CAD (coronary artery disease)     Cancer (HCC)     SKIN CA/Thigh    [facility]) [facility]     Depression     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    History of tobacco use 5/3/2018    Hydrocephalus (HCC)     Hyperlipidemia     Hypertension     Pain in both feet 5/3/2018    PVD (peripheral vascular disease) ([address]     PVD (peripheral vascular disease) with claudication ([facility] 5/3/2018    Rheumatoid arthritis ([facility]     Thyroid disease     Venous stasis ulcer of left calf with fat layer exposed without varicose veins ([facility] 2019       Past Surgical History:   Procedure Laterality Date     SECTION      COLONOSCOPY      DILATION AND CURETTAGE OF UTERUS      PERIPHERAL PERCUTANEOUS ARTERIAL INTERVENTION  2019    L SFA angioplasty    TUBAL LIGATION         Medications Prior to Admission:    Prior to Admission medications    Medication Sig Start Date End Date Taking? Authorizing Provider   traMADol (ULTRAM) 50 MG tablet Take 50 mg by mouth every 6 hours as needed for Pain. Historical Provider, MD   buPROPion (WELLBUTRIN XL) 150 MG extended release tablet Take 150 mg by mouth daily As directed    Historical Provider, MD   cilostazol (PLETAL) 50 MG tablet Take 2 tablets by mouth 2 times daily 20   Jessica Martinez MD   clopidogrel (PLAVIX) 75 MG tablet Take 1 tablet by mouth daily 20   Jessica Martinez MD   venlafaxine (EFFEXOR) 75 MG tablet Take 75 mg by mouth daily Ordered as twice a day but patient only takes once a day    Historical Provider, MD   oxybutynin (DITROPAN-XL) 5 MG extended release tablet Take 5 mg by mouth daily    Historical Provider, MD   meloxicam (MOBIC) 15 MG tablet Take 15 mg by mouth daily    Historical Provider, MD   levothyroxine (SYNTHROID) 100 MCG tablet Take 100 mcg by mouth Daily     Historical Provider, MD   losartan (COZAAR) 25 MG tablet Take 25 mg by mouth daily.     Historical Provider, MD       Allergies   Allergen Reactions    Flagyl [Metronidazole]     Hornet Venom     Wasp Venom        Family History   Problem Relation Age of Onset    Kidney Disease Mother     Coronary Art Dis Mother     High Blood Pressure Mother     Cancer Father     Other Father        Social History     Tobacco Use    Smoking status: Former Smoker     Packs/day: 0.25     Types: Cigarettes     Last attempt to quit: 5/3/2017     Years since quittin.7    Smokeless tobacco: Never Used   Substance Use Topics    Alcohol use: Yes     Comment: beer/Weekly     Drug use: No         Review of Systems   General ROS: dizziness, lightheaded  Hematological and Lymphatic ROS: negative  Respiratory ROS: SOB  Cardiovascular ROS: negative  Gastrointestinal ROS: See HPI  Genito-Urinary ROS: negative  Musculoskeletal ROS: negative      PHYSICAL EXAM:    Vitals:    20 1755   BP: (!) 129/99   Pulse: 135   Resp: 24   Temp: 98.1 °F (36.7 °C)   SpO2: 94%       General Appearance:  awake, alert, oriented, in moderate distress  Skin:  Skin color, texture, turgor normal. No rashes or lesions. Head/face:  NCAT  Eyes:  No gross abnormalities. Lungs:  No increased work of breathing  Heart:  Tachycardic, irregular  Abdomen:  Severely distended, diffusely tender with voluntary guarding, firm. No rebound, rigidity. NGT placed at bedside with some difficulty  Extremities: extremities cool to touch    LABS:    CBC  Recent Labs     20  1328   WBC 14.0*   HGB 12.1   HCT 37.8        BMP  Recent Labs     20  1436 20  1452   *  --    K 3.6  --    CL 91*  --    CO2 20*  --    BUN 70*  --    CREATININE 1.3* 1.7*   CALCIUM 9.4  --      Liver Function  Recent Labs     20  1328 20  1436   LIPASE 8*  --    BILITOT  --  1.1   AST  --  27   ALT  --  20   ALKPHOS  --  110*   PROT  --  7.2   LABALBU  --  3.5     No results for input(s): LACTATE in the last 72 hours. No results for input(s): INR, PTT in the last 72 hours.     Invalid input(s): PT    RADIOLOGY    Ct Abdomen Pelvis W Iv Contrast Additional Contrast? None    Result Date: 2020  Patient MRN:  [id number] : 1950 Age: 68 years Gender: Female Order Date:  2020 1:45 PM EXAM: CT ABDOMEN PELVIS W IV CONTRAST number of images 319 Contrast. Isovue-370, 110 mL IV Technique: Low-dose CT  acquisition technique included one of following options; 1 . Plan discussed with Dr. Beba Trevino    Electronically signed by Chetan Nunez MD on 2/17/20 at 6:52 PM    Seen/examined  Agree with above  For operative exploration- concern for ischemia based on amount of pain and distention  Dio

## 2020-02-17 NOTE — ED PROVIDER NOTES
Department of Emergency Medicine   ED  Provider Note  Admit Date/RoomTime: 2/17/2020  1:30 PM  ED Room: 16/16          History of Present Illness:  2/17/20, Time: 3:38 PM  Chief Complaint   Patient presents with    Abdominal Pain     generalized abd pain starting friday getting worse. family states she is unable to ambulate because of the pain even with walker . was in the hospital recently for vascular surgery with Dr. Zoie Kan has been home from rehab for 1 week. Cooper Cortés is a 68 y.o. female presenting to the ED for abdominal pain. Patient said diffuse abdominal pain since Friday. Nothing makes it better or worse, does not rate anywhere. She says she is having trouble to ambulate doing the pain. She says she feels distended. Last bowel movement was a couple of days ago, she does pass gas intermittently, denies any urinary symptoms. Does have a history of a vacular procedure done by Dr. Zoie Kan about a month and a half ago. No pain in her leg at this time. Denies fever, chills, nausea, vomiting, back pain, or any other symptoms or complaints.     Review of Systems:   Pertinent positives and negatives are stated within HPI, all other systems reviewed and are negative.        --------------------------------------------- PAST HISTORY ---------------------------------------------  Past Medical History:  has a past medical history of Aortoiliac occlusive disease ([address] Atherosclerosis of native arteries of extremity with rest pain ([address] Atherosclerosis of native artery of extremity with ulceration ([address] 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 with rest pain ([address] Bilateral carotid artery stenosis, Bruit of right carotid artery, CAD (coronary artery disease), Cancer ([address] COPD (chronic obstructive pulmonary disease) ([address] Depression, Femoro-popliteal artery disease ([address] History of tobacco use, Hydrocephalus ([facility] Hyperlipidemia, Hypertension, Pain in both feet, PVD symmetric strength 5/5 in the upper and lower extremities bilaterally  Psychiatric: Normal Affect          -------------------------------------------------- RESULTS -------------------------------------------------  I have personally reviewed all laboratory and imaging results for this patient. Results are listed below.      LABS: (Lab results interpreted by me)  Results for orders placed or performed during the hospital encounter of 02/17/20   CBC   Result Value Ref Range    WBC 14.0 (H) 4.5 - 11.5 E9/L    RBC 3.97 3.50 - 5.50 E12/L    Hemoglobin 12.1 11.5 - 15.5 g/dL    Hematocrit 37.8 34.0 - 48.0 %    MCV 95.2 80.0 - 99.9 fL    MCH 30.5 26.0 - 35.0 pg    MCHC 32.0 32.0 - 34.5 %    RDW 13.2 11.5 - 15.0 fL    Platelets 097 276 - 685 E9/L    MPV 9.4 7.0 - 12.0 fL   Lipase   Result Value Ref Range    Lipase 8 (L) 13 - 60 U/L   Lactate, Sepsis   Result Value Ref Range    Lactic Acid, Sepsis 2.1 (H) 0.5 - 1.9 mmol/L   SPECIMEN REJECTION   Result Value Ref Range    Rejected Test CMP/TROP     Reason for Rejection see below    Comprehensive metabolic panel   Result Value Ref Range    Sodium 129 (L) 132 - 146 mmol/L    Potassium 3.6 3.5 - 5.0 mmol/L    Chloride 91 (L) 98 - 107 mmol/L    CO2 20 (L) 22 - 29 mmol/L    Anion Gap 18 (H) 7 - 16 mmol/L    Glucose 129 (H) 74 - 99 mg/dL    BUN 70 (H) 8 - 23 mg/dL    CREATININE 1.3 (H) 0.5 - 1.0 mg/dL    GFR Non-African American 40 >=60 mL/min/1.73    GFR African American 49     Calcium 9.4 8.6 - 10.2 mg/dL    Total Protein 7.2 6.4 - 8.3 g/dL    Alb 3.5 3.5 - 5.2 g/dL    Total Bilirubin 1.1 0.0 - 1.2 mg/dL    Alkaline Phosphatase 110 (H) 35 - 104 U/L    ALT 20 0 - 32 U/L    AST 27 0 - 31 U/L   Troponin   Result Value Ref Range    Troponin <0.01 0.00 - 0.03 ng/mL   POCT Venous   Result Value Ref Range    POC Sodium 129 (L) 132 - 146 mmol/L    POC Potassium 3.4 (L) 3.5 - 5.0 mmol/L    POC Chloride 97 (L) 100 - 108 mmol/L    POC Glucose 141 (H) 74 - 99 mg/dl    POC Creatinine 1.7 (H) 0.5 - 1.0 mg/dL    GFR Non-African American 30 >=60 mL/min/1.73    GFR  36     Performed on SEE BELOW    EKG 12 Lead   Result Value Ref Range    Ventricular Rate 140 BPM    Atrial Rate 140 BPM    P-R Interval 128 ms    QRS Duration 72 ms    Q-T Interval 280 ms    QTc Calculation (Bazett) 427 ms    P Axis 75 degrees    R Axis 78 degrees    T Axis -71 degrees   ,       RADIOLOGY:  Interpreted by Radiologist unless otherwise specified  CT ABDOMEN PELVIS W IV CONTRAST Additional Contrast? None    (Results Pending)         EKG Interpretation  Interpreted by emergency department physician, Dr. Hector Mccarthy, rate 140, no STEMI,      ------------------------- NURSING NOTES AND VITALS REVIEWED ---------------------------   The nursing notes within the ED encounter and vital signs as below have been reviewed by myself  BP (!) 179/103   Pulse 114   Temp 97.9 °F (36.6 °C)   Resp 18   SpO2 96%     Oxygen Saturation Interpretation: Normal    The patients available past medical records and past encounters were reviewed. ------------------------------ ED COURSE/MEDICAL DECISION MAKING----------------------  Medications   0.9 % sodium chloride infusion (has no administration in time range)   0.9 % sodium chloride bolus (1,000 mLs Intravenous New Bag 2/17/20 1356)   fentaNYL (SUBLIMAZE) injection 50 mcg (50 mcg Intravenous Given 2/17/20 1356)   iopamidol (ISOVUE-370) 76 % injection 110 mL (110 mLs Intravenous Given 2/17/20 1553)   sodium chloride flush 0.9 % injection 10 mL (10 mLs Intravenous Given 2/17/20 1553)           The cardiac monitor revealed sinus with a heart rate in the 114s as interpreted by me. The cardiac monitor was ordered secondary to the patient's tachycardia and to monitor the patient for dysrhythmia. CPT [id number]         Medical Decision Making:    Patient received IV fluids. Labs and imaging reviewed. CT concerning for small bowel obstruction. NG tube was placed.   Surgery to be consulted, patient admitted to medicine. Counseling: The emergency provider has spoken with the patient and discussed todays results, in addition to providing specific details for the plan of care and counseling regarding the diagnosis and prognosis. Questions are answered at this time and they are agreeable with the plan.       --------------------------------- IMPRESSION AND DISPOSITION ---------------------------------    IMPRESSION  1. ROSELYN (acute kidney injury) ([facility]    2. SBO (small bowel obstruction) ([facility]    3. Dehydration        DISPOSITION  Disposition: Admit to telemetry  Patient condition is stable        NOTE: This report was transcribed using voice recognition software.  Every effort was made to ensure accuracy; however, inadvertent computerized transcription errors may be present        Christine Hassan MD  02/17/20 [id number]

## 2020-02-18 PROBLEM — K56.2 CECAL VOLVULUS (HCC): Status: ACTIVE | Noted: 2020-02-18

## 2020-02-18 LAB
AADO2: 104.1 MMHG
ALBUMIN SERPL-MCNC: 2.3 G/DL (ref 3.5–5.2)
ALP BLD-CCNC: 68 U/L (ref 35–104)
ALT SERPL-CCNC: 21 U/L (ref 0–32)
ANION GAP SERPL CALCULATED.3IONS-SCNC: 16 MMOL/L (ref 7–16)
ANISOCYTOSIS: ABNORMAL
AST SERPL-CCNC: 36 U/L (ref 0–31)
B.E.: -8.3 MMOL/L (ref -3–3)
BASOPHILS ABSOLUTE: 0 E9/L (ref 0–0.2)
BASOPHILS ABSOLUTE: 0.03 E9/L (ref 0–0.2)
BASOPHILS RELATIVE PERCENT: 0.1 % (ref 0–2)
BASOPHILS RELATIVE PERCENT: 0.5 % (ref 0–2)
BILIRUB SERPL-MCNC: 0.5 MG/DL (ref 0–1.2)
BUN BLDV-MCNC: 57 MG/DL (ref 8–23)
BURR CELLS: ABNORMAL
BURR CELLS: ABNORMAL
CALCIUM IONIZED: 1.15 MMOL/L (ref 1.15–1.33)
CALCIUM SERPL-MCNC: 7.6 MG/DL (ref 8.6–10.2)
CHLORIDE BLD-SCNC: 99 MMOL/L (ref 98–107)
CO2: 13 MMOL/L (ref 22–29)
COHB: 0.2 % (ref 0–1.5)
CREAT SERPL-MCNC: 1.3 MG/DL (ref 0.5–1)
CRITICAL: ABNORMAL
DATE ANALYZED: ABNORMAL
DATE OF COLLECTION: ABNORMAL
EOSINOPHILS ABSOLUTE: 0 E9/L (ref 0.05–0.5)
EOSINOPHILS ABSOLUTE: 0 E9/L (ref 0.05–0.5)
EOSINOPHILS RELATIVE PERCENT: 0 % (ref 0–6)
EOSINOPHILS RELATIVE PERCENT: 0 % (ref 0–6)
FIO2: 40 %
GFR AFRICAN AMERICAN: 49
GFR NON-AFRICAN AMERICAN: 40 ML/MIN/1.73
GLUCOSE BLD-MCNC: 177 MG/DL (ref 74–99)
HCO3: 16.4 MMOL/L (ref 22–26)
HCT VFR BLD CALC: 26 % (ref 34–48)
HCT VFR BLD CALC: 31.3 % (ref 34–48)
HEMOGLOBIN: 10.4 G/DL (ref 11.5–15.5)
HEMOGLOBIN: 8.3 G/DL (ref 11.5–15.5)
HHB: 2 % (ref 0–5)
IMMATURE GRANULOCYTES #: 0.04 E9/L
IMMATURE GRANULOCYTES %: 0.7 % (ref 0–5)
LAB: ABNORMAL
LYMPHOCYTES ABSOLUTE: 0.19 E9/L (ref 1.5–4)
LYMPHOCYTES ABSOLUTE: 0.24 E9/L (ref 1.5–4)
LYMPHOCYTES RELATIVE PERCENT: 1.7 % (ref 20–42)
LYMPHOCYTES RELATIVE PERCENT: 3.1 % (ref 20–42)
Lab: ABNORMAL
MAGNESIUM: 2.2 MG/DL (ref 1.6–2.6)
MCH RBC QN AUTO: 30.5 PG (ref 26–35)
MCH RBC QN AUTO: 31.3 PG (ref 26–35)
MCHC RBC AUTO-ENTMCNC: 31.9 % (ref 32–34.5)
MCHC RBC AUTO-ENTMCNC: 33.2 % (ref 32–34.5)
MCV RBC AUTO: 94.3 FL (ref 80–99.9)
MCV RBC AUTO: 95.6 FL (ref 80–99.9)
METER GLUCOSE: 132 MG/DL (ref 74–99)
METER GLUCOSE: 137 MG/DL (ref 74–99)
METER GLUCOSE: 153 MG/DL (ref 74–99)
METHB: 0.2 % (ref 0–1.5)
MODE: AC
MONOCYTES ABSOLUTE: 0.42 E9/L (ref 0.1–0.95)
MONOCYTES ABSOLUTE: 0.6 E9/L (ref 0.1–0.95)
MONOCYTES RELATIVE PERCENT: 5.2 % (ref 2–12)
MONOCYTES RELATIVE PERCENT: 6.8 % (ref 2–12)
NEUTROPHILS ABSOLUTE: 11.25 E9/L (ref 1.8–7.3)
NEUTROPHILS ABSOLUTE: 5.46 E9/L (ref 1.8–7.3)
NEUTROPHILS RELATIVE PERCENT: 88.9 % (ref 43–80)
NEUTROPHILS RELATIVE PERCENT: 93 % (ref 43–80)
O2 CONTENT: 13.4 ML/DL
O2 SATURATION: 98 % (ref 92–98.5)
O2HB: 97.6 % (ref 94–97)
OPERATOR ID: ABNORMAL
OVALOCYTES: ABNORMAL
PATIENT TEMP: 37 C
PCO2: 30.9 MMHG (ref 35–45)
PDW BLD-RTO: 13.2 FL (ref 11.5–15)
PDW BLD-RTO: 13.3 FL (ref 11.5–15)
PEEP/CPAP: 5 CMH2O
PFO2: 3.39 MMHG/%
PH BLOOD GAS: 7.34 (ref 7.35–7.45)
PHOSPHORUS: 5.8 MG/DL (ref 2.5–4.5)
PLATELET # BLD: 324 E9/L (ref 130–450)
PLATELET # BLD: 366 E9/L (ref 130–450)
PMV BLD AUTO: 9.2 FL (ref 7–12)
PMV BLD AUTO: 9.5 FL (ref 7–12)
PO2: 135.6 MMHG (ref 75–100)
POIKILOCYTES: ABNORMAL
POIKILOCYTES: ABNORMAL
POLYCHROMASIA: ABNORMAL
POTASSIUM SERPL-SCNC: 4.3 MMOL/L (ref 3.5–5)
RBC # BLD: 2.72 E12/L (ref 3.5–5.5)
RBC # BLD: 3.32 E12/L (ref 3.5–5.5)
RI(T): 0.77
RR MECHANICAL: 14 B/MIN
SCHISTOCYTES: ABNORMAL
SODIUM BLD-SCNC: 128 MMOL/L (ref 132–146)
SOURCE, BLOOD GAS: ABNORMAL
TARGET CELLS: ABNORMAL
THB: 9.6 G/DL (ref 11.5–16.5)
TIME ANALYZED: 459
TOTAL PROTEIN: 4.8 G/DL (ref 6.4–8.3)
VT MECHANICAL: 450 ML
WBC # BLD: 12.1 E9/L (ref 4.5–11.5)
WBC # BLD: 6.1 E9/L (ref 4.5–11.5)

## 2020-02-18 PROCEDURE — 6370000000 HC RX 637 (ALT 250 FOR IP): Performed by: STUDENT IN AN ORGANIZED HEALTH CARE EDUCATION/TRAINING PROGRAM

## 2020-02-18 PROCEDURE — 97530 THERAPEUTIC ACTIVITIES: CPT

## 2020-02-18 PROCEDURE — 85025 COMPLETE CBC W/AUTO DIFF WBC: CPT

## 2020-02-18 PROCEDURE — 2580000003 HC RX 258: Performed by: INTERNAL MEDICINE

## 2020-02-18 PROCEDURE — 6370000000 HC RX 637 (ALT 250 FOR IP): Performed by: SURGERY

## 2020-02-18 PROCEDURE — 82805 BLOOD GASES W/O2 SATURATION: CPT

## 2020-02-18 PROCEDURE — 97162 PT EVAL MOD COMPLEX 30 MIN: CPT

## 2020-02-18 PROCEDURE — 2580000003 HC RX 258: Performed by: SURGERY

## 2020-02-18 PROCEDURE — 2000000000 HC ICU R&B

## 2020-02-18 PROCEDURE — 82962 GLUCOSE BLOOD TEST: CPT

## 2020-02-18 PROCEDURE — C9113 INJ PANTOPRAZOLE SODIUM, VIA: HCPCS | Performed by: STUDENT IN AN ORGANIZED HEALTH CARE EDUCATION/TRAINING PROGRAM

## 2020-02-18 PROCEDURE — 6360000002 HC RX W HCPCS: Performed by: STUDENT IN AN ORGANIZED HEALTH CARE EDUCATION/TRAINING PROGRAM

## 2020-02-18 PROCEDURE — 80053 COMPREHEN METABOLIC PANEL: CPT

## 2020-02-18 PROCEDURE — 84100 ASSAY OF PHOSPHORUS: CPT

## 2020-02-18 PROCEDURE — 83735 ASSAY OF MAGNESIUM: CPT

## 2020-02-18 PROCEDURE — 82330 ASSAY OF CALCIUM: CPT

## 2020-02-18 PROCEDURE — 36415 COLL VENOUS BLD VENIPUNCTURE: CPT

## 2020-02-18 PROCEDURE — 97166 OT EVAL MOD COMPLEX 45 MIN: CPT

## 2020-02-18 PROCEDURE — 99291 CRITICAL CARE FIRST HOUR: CPT | Performed by: SURGERY

## 2020-02-18 PROCEDURE — 2580000003 HC RX 258: Performed by: STUDENT IN AN ORGANIZED HEALTH CARE EDUCATION/TRAINING PROGRAM

## 2020-02-18 PROCEDURE — 94003 VENT MGMT INPAT SUBQ DAY: CPT

## 2020-02-18 PROCEDURE — 6360000002 HC RX W HCPCS: Performed by: SURGERY

## 2020-02-18 PROCEDURE — 2700000000 HC OXYGEN THERAPY PER DAY

## 2020-02-18 RX ORDER — DEXTROSE MONOHYDRATE 25 G/50ML
12.5 INJECTION, SOLUTION INTRAVENOUS PRN
Status: DISCONTINUED | OUTPATIENT
Start: 2020-02-18 | End: 2020-02-22

## 2020-02-18 RX ORDER — LEVOTHYROXINE SODIUM 0.1 MG/1
100 TABLET ORAL DAILY
Status: DISCONTINUED | OUTPATIENT
Start: 2020-02-18 | End: 2020-03-06 | Stop reason: HOSPADM

## 2020-02-18 RX ORDER — MORPHINE SULFATE 2 MG/ML
1 INJECTION, SOLUTION INTRAMUSCULAR; INTRAVENOUS
Status: DISCONTINUED | OUTPATIENT
Start: 2020-02-18 | End: 2020-02-18

## 2020-02-18 RX ORDER — NICOTINE POLACRILEX 4 MG
15 LOZENGE BUCCAL PRN
Status: DISCONTINUED | OUTPATIENT
Start: 2020-02-18 | End: 2020-03-04

## 2020-02-18 RX ORDER — FAMOTIDINE 20 MG/1
20 TABLET, FILM COATED ORAL DAILY
Status: DISCONTINUED | OUTPATIENT
Start: 2020-02-18 | End: 2020-02-19

## 2020-02-18 RX ORDER — OXYBUTYNIN CHLORIDE 5 MG/1
5 TABLET, EXTENDED RELEASE ORAL DAILY
Status: DISCONTINUED | OUTPATIENT
Start: 2020-02-18 | End: 2020-02-20

## 2020-02-18 RX ORDER — TRAMADOL HYDROCHLORIDE 50 MG/1
50 TABLET ORAL EVERY 6 HOURS PRN
Status: DISCONTINUED | OUTPATIENT
Start: 2020-02-18 | End: 2020-02-19

## 2020-02-18 RX ORDER — DEXTROSE MONOHYDRATE 50 MG/ML
100 INJECTION, SOLUTION INTRAVENOUS PRN
Status: DISCONTINUED | OUTPATIENT
Start: 2020-02-18 | End: 2020-03-04

## 2020-02-18 RX ORDER — MORPHINE SULFATE 2 MG/ML
2 INJECTION, SOLUTION INTRAMUSCULAR; INTRAVENOUS
Status: DISCONTINUED | OUTPATIENT
Start: 2020-02-18 | End: 2020-02-18

## 2020-02-18 RX ORDER — MORPHINE SULFATE 2 MG/ML
1 INJECTION, SOLUTION INTRAMUSCULAR; INTRAVENOUS
Status: DISCONTINUED | OUTPATIENT
Start: 2020-02-18 | End: 2020-02-23

## 2020-02-18 RX ADMIN — FAMOTIDINE 20 MG: 20 TABLET ORAL at 12:39

## 2020-02-18 RX ADMIN — Medication 10 ML: at 08:51

## 2020-02-18 RX ADMIN — SODIUM CHLORIDE, PRESERVATIVE FREE 10 ML: 5 INJECTION INTRAVENOUS at 08:50

## 2020-02-18 RX ADMIN — TRAMADOL HYDROCHLORIDE 50 MG: 50 TABLET, FILM COATED ORAL at 17:35

## 2020-02-18 RX ADMIN — VENLAFAXINE HYDROCHLORIDE 75 MG: 75 CAPSULE, EXTENDED RELEASE ORAL at 08:51

## 2020-02-18 RX ADMIN — SODIUM CHLORIDE: 9 INJECTION, SOLUTION INTRAVENOUS at 19:52

## 2020-02-18 RX ADMIN — CALCIUM GLUCONATE 1 G: 98 INJECTION, SOLUTION INTRAVENOUS at 01:46

## 2020-02-18 RX ADMIN — INSULIN LISPRO 1 UNITS: 100 INJECTION, SOLUTION INTRAVENOUS; SUBCUTANEOUS at 11:38

## 2020-02-18 RX ADMIN — LEVOTHYROXINE SODIUM 100 MCG: 0.1 TABLET ORAL at 13:10

## 2020-02-18 RX ADMIN — OXYBUTYNIN CHLORIDE 5 MG: 5 TABLET, EXTENDED RELEASE ORAL at 12:58

## 2020-02-18 RX ADMIN — PANTOPRAZOLE SODIUM 40 MG: 40 INJECTION, POWDER, FOR SOLUTION INTRAVENOUS at 08:50

## 2020-02-18 RX ADMIN — PIPERACILLIN AND TAZOBACTAM 3.38 G: 3; .375 INJECTION, POWDER, LYOPHILIZED, FOR SOLUTION INTRAVENOUS at 14:50

## 2020-02-18 RX ADMIN — BUPROPION HYDROCHLORIDE 150 MG: 150 TABLET, EXTENDED RELEASE ORAL at 08:51

## 2020-02-18 RX ADMIN — PIPERACILLIN AND TAZOBACTAM 3.38 G: 3; .375 INJECTION, POWDER, LYOPHILIZED, FOR SOLUTION INTRAVENOUS at 06:37

## 2020-02-18 RX ADMIN — SODIUM CHLORIDE, PRESERVATIVE FREE 10 ML: 5 INJECTION INTRAVENOUS at 20:25

## 2020-02-18 RX ADMIN — HEPARIN SODIUM 5000 UNITS: 10000 INJECTION INTRAVENOUS; SUBCUTANEOUS at 14:20

## 2020-02-18 RX ADMIN — CHLORHEXIDINE GLUCONATE 0.12% ORAL RINSE 15 ML: 1.2 LIQUID ORAL at 08:50

## 2020-02-18 RX ADMIN — HEPARIN SODIUM 5000 UNITS: 10000 INJECTION INTRAVENOUS; SUBCUTANEOUS at 06:35

## 2020-02-18 RX ADMIN — LOSARTAN POTASSIUM 25 MG: 25 TABLET, FILM COATED ORAL at 08:51

## 2020-02-18 RX ADMIN — PIPERACILLIN AND TAZOBACTAM 3.38 G: 3; .375 INJECTION, POWDER, LYOPHILIZED, FOR SOLUTION INTRAVENOUS at 23:21

## 2020-02-18 RX ADMIN — MORPHINE SULFATE 1 MG: 2 INJECTION, SOLUTION INTRAMUSCULAR; INTRAVENOUS at 16:32

## 2020-02-18 RX ADMIN — HEPARIN SODIUM 5000 UNITS: 10000 INJECTION INTRAVENOUS; SUBCUTANEOUS at 23:21

## 2020-02-18 RX ADMIN — SODIUM CHLORIDE: 9 INJECTION, SOLUTION INTRAVENOUS at 08:43

## 2020-02-18 RX ADMIN — SODIUM CHLORIDE: 9 INJECTION, SOLUTION INTRAVENOUS at 10:50

## 2020-02-18 RX ADMIN — SODIUM CHLORIDE: 9 INJECTION, SOLUTION INTRAVENOUS at 03:53

## 2020-02-18 ASSESSMENT — PULMONARY FUNCTION TESTS
PIF_VALUE: 20
PIF_VALUE: 19
PIF_VALUE: 19
PIF_VALUE: 14
PIF_VALUE: 20
PIF_VALUE: 14
PIF_VALUE: 14
PIF_VALUE: 19
PIF_VALUE: 18
PIF_VALUE: 19
PIF_VALUE: 19
PIF_VALUE: 16
PIF_VALUE: 21
PIF_VALUE: 17

## 2020-02-18 ASSESSMENT — PAIN SCALES - GENERAL
PAINLEVEL_OUTOF10: 3
PAINLEVEL_OUTOF10: 7
PAINLEVEL_OUTOF10: 4
PAINLEVEL_OUTOF10: 4
PAINLEVEL_OUTOF10: 3

## 2020-02-18 ASSESSMENT — PAIN - FUNCTIONAL ASSESSMENT: PAIN_FUNCTIONAL_ASSESSMENT: PREVENTS OR INTERFERES WITH MANY ACTIVE NOT PASSIVE ACTIVITIES

## 2020-02-18 ASSESSMENT — PAIN DESCRIPTION - PROGRESSION
CLINICAL_PROGRESSION: GRADUALLY IMPROVING
CLINICAL_PROGRESSION: GRADUALLY IMPROVING

## 2020-02-18 ASSESSMENT — PAIN DESCRIPTION - ORIENTATION: ORIENTATION: LOWER

## 2020-02-18 ASSESSMENT — PAIN DESCRIPTION - ONSET: ONSET: ON-GOING

## 2020-02-18 ASSESSMENT — PAIN DESCRIPTION - PAIN TYPE: TYPE: ACUTE PAIN;SURGICAL PAIN

## 2020-02-18 ASSESSMENT — PAIN DESCRIPTION - DESCRIPTORS: DESCRIPTORS: ACHING;DISCOMFORT

## 2020-02-18 ASSESSMENT — PAIN DESCRIPTION - FREQUENCY: FREQUENCY: INTERMITTENT

## 2020-02-18 ASSESSMENT — PAIN DESCRIPTION - LOCATION: LOCATION: ABDOMEN

## 2020-02-18 NOTE — PROGRESS NOTES
volvulus  ? Zosyn day 1 continue for 3 days  · Endocrine: Hypothyroidism  ? On Synthroid  · MSK: History of frequent falls  ? Known to Ortho, vascular for claudication work-up no acute issues  · Heme: Hemoglobin stable  ? Monitor H&H  ? Transfuse as needed      Pain/Analgesia:  Fentanyl and propofol  Bowel regimen: N.p.o. await bowel function  Diet: Diet NPO Effective Now Exceptions are: Sips with Meds  DVT proph: Heparin  GI proph: Protonix  Seizure proph: None  Glucose protocol: As needed  Mouth/eye care: As needed  Barry: Continue Barry catheter for fluid balance monitoring  Ancillary consults: General surgery, admitted to medicine, critical care  Family Update:  At bedside as needed  CODE Status: Full Code     Dispo: SICU      Electronically signed by Ya Martinez MD 2/18/2020  5:59 AM

## 2020-02-18 NOTE — ANESTHESIA PRE PROCEDURE
Department of Anesthesiology  Preprocedure Note       Name:  Misbah Kingsley   Age:  68 y.o.  :  1950                                          MRN:  [id number]         Date:  2020      Surgeon: Sailaja Rubalcava):  Sisi Cross MD    Procedure: LAPAROTOMY EXPLORATORY, POSSIBLE BOWEL RESECTION, POSSIBLE OSTOMY, POSSIBLE WOUND VAC (N/A )    Medications prior to admission:   Prior to Admission medications    Medication Sig Start Date End Date Taking? Authorizing Provider   traMADol (ULTRAM) 50 MG tablet Take 50 mg by mouth every 6 hours as needed for Pain. Historical Provider, MD   buPROPion (WELLBUTRIN XL) 150 MG extended release tablet Take 150 mg by mouth daily As directed    Historical Provider, MD   cilostazol (PLETAL) 50 MG tablet Take 2 tablets by mouth 2 times daily 20   Bang Harrison MD   clopidogrel (PLAVIX) 75 MG tablet Take 1 tablet by mouth daily 20   Bang Harrison MD   venlafaxine (EFFEXOR) 75 MG tablet Take 75 mg by mouth daily Ordered as twice a day but patient only takes once a day    Historical Provider, MD   oxybutynin (DITROPAN-XL) 5 MG extended release tablet Take 5 mg by mouth daily    Historical Provider, MD   meloxicam (MOBIC) 15 MG tablet Take 15 mg by mouth daily    Historical Provider, MD   levothyroxine (SYNTHROID) 100 MCG tablet Take 100 mcg by mouth Daily     Historical Provider, MD   losartan (COZAAR) 25 MG tablet Take 25 mg by mouth daily.     Historical Provider, MD       Current medications:    Current Facility-Administered Medications   Medication Dose Route Frequency Provider Last Rate Last Dose    0.9 % sodium chloride infusion   Intravenous Continuous Jose Luis Shirley MD        lidocaine (XYLOCAINE) 2 % jelly   Topical PRN Aleksander Bigness, DO        phenylephrine (SULLY-SYNEPHRINE) 0.25 % nasal spray 1 spray  1 spray Each Nostril Once Aleksander Bigness, DO        [START ON 2020] levothyroxine (SYNTHROID) injection 50 mcg  50 mcg Intravenous Daily 5/3/2017     Years since quittin.7    Smokeless tobacco: Never Used   Substance Use Topics    Alcohol use: Yes     Comment: beer/Weekly                                 Counseling given: Not Answered      Vital Signs (Current):   Vitals:    20 1249 20 1255 20 1641 20 1755   BP:  (!) 179/103 (!) 163/92 (!) 129/99   Pulse: 110 114 124 135   Resp:  18 24 24   Temp:  97.9 °F (36.6 °C)  98.1 °F (36.7 °C)   TempSrc:    Oral   SpO2: 91% 96% 95% 94%                                              BP Readings from Last 3 Encounters:   20 (!) 129/99   20 130/78   20 138/60       NPO Status: Time of last liquid consumption:                         Time of last solid consumption:                         Date of last liquid consumption: 20                        Date of last solid food consumption: 20    BMI:   Wt Readings from Last 3 Encounters:   20 107 lb (48.5 kg)   20 110 lb (49.9 kg)   19 110 lb 3.2 oz (50 kg)     There is no height or weight on file to calculate BMI.    CBC:   Lab Results   Component Value Date    WBC 14.0 2020    RBC 3.97 2020    HGB 12.1 2020    HCT 37.8 2020    MCV 95.2 2020    RDW 13.2 2020     2020       CMP:   Lab Results   Component Value Date     2020    K 3.6 2020    K 3.4 2020    CL 91 2020    CO2 20 2020    BUN 70 2020    CREATININE 1.7 2020    CREATININE 1.3 2020    GFRAA 36 2020    LABGLOM 30 2020    GLUCOSE 129 2020    GLUCOSE 102 10/07/2010    PROT 7.2 2020    CALCIUM 9.4 2020    BILITOT 1.1 2020    ALKPHOS 110 2020    AST 27 2020    ALT 20 2020       POC Tests:   Recent Labs     20  1452   POCGLU 141*   POCNA 129*   POCK 3.4*   POCCL 97*       Coags:   Lab Results   Component Value Date    PROTIME 11.8 2020    PROTIME 11.1 2011    INR 1.0 02/17/2020    APTT 31.3 01/20/2020       HCG (If Applicable): No results found for: PREGTESTUR, PREGSERUM, HCG, HCGQUANT     ABGs: No results found for: PHART, PO2ART, RSN4SVK, WML3QKY, BEART, J6WGUCID     Type & Screen (If Applicable):  No results found for: LABABO, LABRH    EKG 2/17/2020  Narrative & Impression     Sinus tachycardia  ST & T wave abnormality, consider inferolateral ischemia  Abnormal ECG  No previous ECGs available     CT Abd 2/17/2020  Impression   High-grade small bowel obstruction with a significantly dilated   fluid-filled small bowel loops and stomach and collapsed colon. Surgical intervention is recommended.       Infiltrates and pleural effusion in the right lower lobe concerning   for aspiration pneumonia.       Severe vascular calcification of aorta and iliac arteries with   occlusion of right SFA.       ALERT:  THIS IS AN ABNORMAL REPORT         Anesthesia Evaluation  Patient summary reviewed and Nursing notes reviewed no history of anesthetic complications:   Airway: Mallampati: II  TM distance: >3 FB   Neck ROM: full  Mouth opening: > = 3 FB Dental:      Comment: Denies loose chipped or missing teeth, overall poor dentition    Pulmonary:   (+) COPD:  shortness of breath: recurrent,  decreased breath sounds,      (-) not a current smoker ( Quit 2017)                           Cardiovascular:  Exercise tolerance: poor (<4 METS),   (+) hypertension: moderate, CAD: no interval change, orthopnea, PONCE:, hyperlipidemia      ECG reviewed  Rhythm: irregular  Rate: abnormal                    Neuro/Psych:   (+) psychiatric history: stable with treatment            GI/Hepatic/Renal:   (+) GERD: poorly controlled,          ROS comment: SBO. Endo/Other:    (+) hypothyroidism, blood dyscrasia: anticoagulation therapy, arthritis: rheumatoid. , malignancy/cancer (Skin). Abdominal:   (+) obese,     Abdomen: rigid and tender. Vascular:   + PVD, aortic or cerebral, .

## 2020-02-18 NOTE — OP NOTE
Operative Note    Emelina Fernando     DATE OF PROCEDURE: 2020  SURGEON: Surgeon(s):  Tracy Read MD    PREOPERATIVE DIAGNOSES: (1) SBO, concern for volvulus    POSTOPERATIVE DIAGNOSES: (1) SBO, perforated cecal volvulus    OPERATION: Exploratory laparotomy, enterolysis, right hemicolectomy with side-to-side, functional end-to-end anastomosis. ANESTHESIA: General endotracheal.     ESTIMATED BLOOD LOSS: 468NI    COMPLICATIONS: None. SPECIMEN: Right colon and terminal ileum. BRIEF HISTORY: Emelina Fernando is a 68 y.o.  female  with a history of  section and tubal ligation with 4 days of nausea, and 1 day of acute abdominal pain with a CT scan concerning for volvulus. I discussed \"Right Hemicolectomy\" with her, including the procedure, benefits, risks, and alternatives, and she agreed. PROCEDURE: The patient was taken to the operative suite and was placed on the table in a supine position. General endotracheal anesthesia was administered. A Barry catheter was placed and the abdomen was prepped with Duraprep and draped in a sterile fashion. A midline incision was made from the epigastrium to the pubic symphysis using a #15-blade scalpel and the incision was carried down through the dermis and subcutaneous tissue through the linea alba using electrocautery. The peritoneum was entered sharply secondary to underlying bowel distension. Once the abdomen was safely entered, the small bowel was run from the ileocecal valve to the ligament of Treitz. An exploration of the abdomen was done. The liver was normal. The stomach was normal. The small bowel was abnormal, dusky appearing with abrupt transition points between frankly ischemic and perforated terminal ileum and distended dusky appearing proximal bowel.  The distal transverse colon, left colon, sigmoid colon and rectum were normal. The gallbladder was normal. The lesion in question was located in the Right colon which had volvulized with frankly ischemic and perforated areas of the wall with no surrounding contamination or fecal leakage. The right lateral line of Toldt was incised using electrocautery and the right colon was mobilized away from the right ureter and retroperitoneum towards the midline. A BERTA stapler was used to create a proximal and distal line of resection in the terminal ileum and in the mid-right colon near a palpably pulsatile blood supply on normal appearing bowel. The anterior and posterior surfaces of the right colon mesentery were scored and then the intervening mesentery was ligated with the enseal device. Care was taken to avoid the right ureter and duodenum to protect it from injury at all times. Next, using BERTA 75 staplers, a side-to-side functional end-to-end anastomosis was created. A TA 60 stapler was used to close the final enterotomy and then a 3-0 vicryl suture was placed at the crotch of the anastomosis to take tension off of the staple line. The opening in the mesentery was then closed with 3-0 vicryl suture and adjacent omentum was used to cover the anastomosis. Once the opening was closed, the colon and small bowel were returned to the peritoneal cavity in normal continuity, taking care to make sure that there were no twists in the bowel. After placing the bowel in its normal anatomical position, the peritoneal cavity was copiously irrigated with several liters of warm saline and suctioned dry until the effluent was clear. The greater omentum was then placed over the small bowel in preparation for closure of the abdomen. The linea alba was closed with two separate 3-rcdnlu-ESR sutures run from the upper and lower poles of the incision and tied above the umbilicus. The wound was copiously irrigated with saline and suctioned dry and again any bleeding points were cauterized and the skin was closed with staples. A sterile dressing was applied.  The patient tolerated the procedure

## 2020-02-18 NOTE — PROGRESS NOTES
Physical Therapy  Physical Therapy Initial Assessment     Name: Mansoor Graham  : 1950  MRN: [id number]    Referring Provider:  Hernán Goyal DO    Date of Service: 2020    Evaluating PT:  Kayla Pizano, PT, DPT [id number]    Room #:  8874/4440-X  Diagnosis:  SBO  Precautions: Falls, NG tube, Arterial line, O2, Ice chips only  Procedure/Surgery:   Exploratory laparotomy, enterolysis, right hemicolectomy with side-to-side, functional end-to-end anastomosis  PMHx/PSHx:  CA, CAD, COPD, HLD, HTN, PVD, RA, Thyroid disease   Equipment Needs:  TBD    SUBJECTIVE:    Pt lives with daughter in a 2 story home with 3 stairs to enter and 2 rail. Full flight of steps and 1 rail to bedroom. Pt ambulated with [employer] and required some assistance for ADLs PTA. OBJECTIVE:   Initial Evaluation  Date: 20 Treatment Short Term/ Long Term   Goals   AM-PAC 6 Clicks      Was pt agreeable to Eval/treatment? Yes     Does pt have pain?  5/10 abdominal pain     Bed Mobility  Rolling: NT  Supine to sit: MaxA x 2  Sit to supine: MaxA x 2  Scooting: MaxA  Hany   Transfers Sit to stand: ModA x 2  Stand to sit: ModA x 2  Stand pivot: NT  Hany with [employer]   Ambulation   3 feet with MaxA x 2 with  [employer]  >75 feet with Hany with [employer]   Stair negotiation: ascended and descended NT  >2 steps with 1 rail Hany   ROM BUE:  Defer to OT note  BLE:  WNL     Strength BUE:  Defer to OT note  BLE:  3+ to 4-/5  Increase by 1/3 MMT grade   Balance Sitting EOB:  MaxA  Dynamic Standing:  MaxA x 2 with [employer]  Sitting EOB:  SBA  Dynamic Standing:  Hany with [employer]     Pt is A & O x 3 but not situation  CAM-ICU: NT  RASS: 0  Sensation:  WNL  Edema:  WNL    Therapeutic Exercises:  NA    Patient education  Pt educated on safety    Patient response to education:   Pt verbalized understanding Pt demonstrated skill Pt requires further education in this area   partial partial x     ASSESSMENT:    Comments:  Pt was supine in bed upon arrival, agreeable to initial practice will be used as well as appropriate assistive devices or modalities to obtain goals. Patient and family education will also be administered as needed. Frequency of treatments: 2-5x/week x 1-2 weeks. Time in  1330  Time out  1405    Total Treatment Time  28 minutes     Evaluation Time includes thorough review of current medical information, gathering information on past medical history/social history and prior level of function, completion of standardized testing/informal observation of tasks, assessment of data and education on plan of care and goals.     CPT codes:  [] Low Complexity PT evaluation 49933  [x] Moderate Complexity PT evaluation 70351  [] High Complexity PT evaluation 00784  [] PT Re-evaluation 25313  [] Gait training 61620 - minutes  [] Manual therapy 50874 - minutes  [x] Therapeutic activities 44210 28 minutes  [] Therapeutic exercises 19353 - minutes  [] Neuromuscular reeducation 42196 - minutes     Stacey Antonio, PT, DPT  [id number]

## 2020-02-18 NOTE — ANESTHESIA POSTPROCEDURE EVALUATION
Department of Anesthesiology  Postprocedure Note    Patient: Ching Amaya  MRN: [id number]  YOB: 1950  Date of evaluation: 2/18/2020  Time:  6:09 AM     Procedure Summary     Date:  02/17/20 Room / Location:  [facility] OR 10 / [facility]    Anesthesia Start:  2004 Anesthesia Stop:  2153    Procedure:  LAPAROTOMY EXPLORATORY, RIGHT HEMICOLECTOMY (N/A Abdomen) Diagnosis:  (ABDOMINALPAIN)    Surgeon:  Richard Wick MD Responsible Provider:  Richie Perales DO    Anesthesia Type:  general ASA Status:  4 - Emergent          Anesthesia Type: general    Obie Phase I: Obie Score: 3    Obie Phase II:      Last vitals: Reviewed and per EMR flowsheets.        Anesthesia Post Evaluation    Patient location during evaluation: ICU  Patient participation: complete - patient cannot participate  Level of consciousness: sedated and ventilated  Airway patency: patent  Nausea & Vomiting: no nausea and no vomiting  Complications: no  Cardiovascular status: blood pressure returned to baseline  Respiratory status: acceptable  Hydration status: euvolemic

## 2020-02-18 NOTE — BRIEF OP NOTE
Brief Postoperative Note  ______________________________________________________________    Patient: Kenneth Arnett  YOB: 1950  MRN: [id number]  Date of Procedure: 2/17/2020    Pre-Op Diagnosis: ABDOMINALPAIN    Post-Op Diagnosis: Same       Procedure(s):  LAPAROTOMY EXPLORATORY, RIGHT HEMICOLECTOMY    Anesthesia: General    Surgeon(s):  Joanna Cesar MD    Assistant: Chris Lowe MD    Estimated Blood Loss (mL): 575     Complications: None    Specimens:   ID Type Source Tests Collected by Time Destination   A : RIGHT COLON Tissue Tissue SURGICAL PATHOLOGY Joanna Cesar MD 2/17/2020 2100        Implants:  * No implants in log *      Drains:   NG/OG/NJ/NE Tube Nasogastric Right nostril (Active)       Urethral Catheter Non-latex 16 fr (Active)       [REMOVED] Urethral Catheter Temperature probe 16 fr (Removed)       Findings: perforated cecal volvulus    Celi Kingston MD  Date: 2/17/2020  Time: 9:47 PM

## 2020-02-18 NOTE — H&P
[address]                  [address] [facility]  [facility]                              HISTORY AND PHYSICAL    PATIENT NAME: Neli Ag                 :        1950  MED REC NO:   [id number]                            ROOM:       3809  ACCOUNT NO:   [de-identified]                           ADMIT DATE: 2020  PROVIDER:     Richard Alatorre DO    CHIEF COMPLAINT:  Abdominal pain. HISTORY OF PRESENT ILLNESS:  The patient is a 77-year-old   female presented to the emergency room with complaints of several-day  history of abdominal pain. Diagnostic evaluation in the emergency room  revealed a small bowel obstruction. The patient was seen by Surgery and  taken emergently to the operating room and underwent hemicolectomy for  perforated cecal volvulus. The patient was then admitted to the  surgical intensive care unit under the Medicine Service. PAST MEDICAL HISTORY:  Peripheral artery disease, cerebrovascular  disease, coronary artery disease, skin cancer, COPD, depression,  hyperlipidemia, hypertension, hypothyroidism. PAST SURGICAL HISTORY:  , D and C, lower extremity angioplasty,  tubal ligation. MEDICATIONS PRIOR TO ADMISSION:  Effexor, Ultram p.r.n., Wellbutrin,  Pletal, Plavix, Ditropan XL, Mobic, Synthroid, Cozaar. SOCIAL HISTORY:  The patient quit smoking, history of occasional alcohol  use. REVIEW OF SYSTEMS:  Remarkable for above-stated chief complaint. ALLERGIES:  FLAGYL, HORNET VENOM, and WASP VENOM. PRIMARY CARE PHYSICIAN:  Josefina Valenzuela MD    PHYSICAL EXAMINATION:  GENERAL APPEARANCE:  Reveals a 77-year-old  female who is seen  in the surgical intensive care unit. She is intubated, sedated on the  ventilator. VITAL SIGNS:  On admission, temperature 97.9, pulse 110, respirations  18, blood pressure 179/103. HEENT:  Head:  Normocephalic, atraumatic.   Eyes:  Pupils equal

## 2020-02-18 NOTE — PROGRESS NOTES
Toileting?: Total  How much help for putting on and taking off regular upper body clothing?: A Lot  How much help for taking care of personal grooming?: A Lot  How much help for eating meals?: A Little  AM-[facility] Inpatient Daily Activity Raw Score: 11  AM-PAC Inpatient ADL T-Scale Score : 29.04  ADL Inpatient CMS 0-100% Score: 70.42  ADL Inpatient CMS G-Code Modifier : CL      Mod Evaluation +     Treatment Time In: 1333            Treatment Time Out: 1216              Treatment Charges: Mins Units   Ther Ex  96758     Manual Therapy 30232     Thera Activities 71459 24 2   ADL/Home Mgt 89591 5    Neuro Re-ed 18885     Group Therapy      Orthotic manage/training  28125     Non-Billable Time     Total Timed Treatment 29 2             Albino Bateman OTR/L #[id number]

## 2020-02-18 NOTE — ANESTHESIA PROCEDURE NOTES
Arterial Line:    An arterial line was placed using surface landmarks, in the OR for the following indication(s): continuous blood pressure monitoring. A 20 gauge (size), 1 and 3/4 inch (length), Arrow (type) catheter was placed, Seldinger technique used, into the left radial artery, secured by tape. Anesthesia type: General    Events:  patient tolerated procedure well with no complications and EBL < 5mL.   2/17/2020 8:10 PM2/17/2020 8:20 PM  Anesthesiologist: Иван Maria DO  Resident/CRNA: BRIAN Valentino - [place]  Other anesthesia staff: Shireen Ronquillo RN  Performed: Resident/CRNA   Preanesthetic Checklist  Completed: patient identified, site marked, surgical consent, pre-op evaluation, timeout performed, IV checked, risks and benefits discussed, monitors and equipment checked, anesthesia consent given, oxygen available and patient being monitored

## 2020-02-18 NOTE — PROGRESS NOTES
GENERAL SURGERY  DAILY PROGRESS NOTE  2/18/2020    Subjective:  Patient intubated sedated    Objective:  BP (!) 173/81   Pulse 117   Temp 96.8 °F (36 °C) (Temporal)   Resp 15   Ht 5' 6\" (1.676 m)   Wt 111 lb 8 oz (50.6 kg)   SpO2 100%   BMI 18.00 kg/m²     GENERAL:  Laying in bed, intubated, sedated  HEAD: Normocephalic, atraumatic  EYES: No sclera icterus, pupils equal  LUNGS:  Intubated on pressure support  CARDIOVASCULAR:  Tachycardic rate  ABDOMEN:  Soft, non-tender, non-distended, incision covered with dress, some strikethrough noted  EXTREMITIES: No edema or swelling  SKIN: Warm and dry    Assessment/Plan:  68 y.o. female with perforated cecal volvulus, s/p right hemicolectomy with side-to-side, functional end-to-end anastomosis 2/17.    - Hopefully for extubation today  - awaiting bowel function  - NPO, IVFs  - Pain/nausea control prn  - Appreciate critical care management    Electronically signed by Vanessa Langston MD on 2/18/2020 at 8:15 AM     Seen/examined  Agree with above  [email or web address]

## 2020-02-19 PROBLEM — K72.00 SHOCK LIVER: Status: ACTIVE | Noted: 2020-02-19

## 2020-02-19 LAB
ALBUMIN SERPL-MCNC: 2.2 G/DL (ref 3.5–5.2)
ALP BLD-CCNC: 62 U/L (ref 35–104)
ALT SERPL-CCNC: 2110 U/L (ref 0–32)
ANION GAP SERPL CALCULATED.3IONS-SCNC: 18 MMOL/L (ref 7–16)
ANISOCYTOSIS: ABNORMAL
AST SERPL-CCNC: 4165 U/L (ref 0–31)
BASOPHILS ABSOLUTE: 0 E9/L (ref 0–0.2)
BASOPHILS RELATIVE PERCENT: 0.5 % (ref 0–2)
BILIRUB SERPL-MCNC: 0.6 MG/DL (ref 0–1.2)
BUN BLDV-MCNC: 77 MG/DL (ref 8–23)
BURR CELLS: ABNORMAL
CALCIUM IONIZED: 1.1 MMOL/L (ref 1.15–1.33)
CALCIUM SERPL-MCNC: 7.1 MG/DL (ref 8.6–10.2)
CHLORIDE BLD-SCNC: 108 MMOL/L (ref 98–107)
CO2: 12 MMOL/L (ref 22–29)
CREAT SERPL-MCNC: 2.1 MG/DL (ref 0.5–1)
CREATININE URINE: 47 MG/DL (ref 29–226)
EOSINOPHILS ABSOLUTE: 0 E9/L (ref 0.05–0.5)
EOSINOPHILS RELATIVE PERCENT: 1.8 % (ref 0–6)
GFR AFRICAN AMERICAN: 28
GFR NON-AFRICAN AMERICAN: 23 ML/MIN/1.73
GLUCOSE BLD-MCNC: 120 MG/DL (ref 74–99)
HCT VFR BLD CALC: 18.3 % (ref 34–48)
HCT VFR BLD CALC: 25.9 % (ref 34–48)
HEMOGLOBIN: 5.9 G/DL (ref 11.5–15.5)
HEMOGLOBIN: 8.4 G/DL (ref 11.5–15.5)
LYMPHOCYTES ABSOLUTE: 0.31 E9/L (ref 1.5–4)
LYMPHOCYTES RELATIVE PERCENT: 3.5 % (ref 20–42)
MAGNESIUM: 2.1 MG/DL (ref 1.6–2.6)
MCH RBC QN AUTO: 31.1 PG (ref 26–35)
MCHC RBC AUTO-ENTMCNC: 32.2 % (ref 32–34.5)
MCV RBC AUTO: 96.3 FL (ref 80–99.9)
METER GLUCOSE: 107 MG/DL (ref 74–99)
METER GLUCOSE: 110 MG/DL (ref 74–99)
METER GLUCOSE: 82 MG/DL (ref 74–99)
METER GLUCOSE: 92 MG/DL (ref 74–99)
MONOCYTES ABSOLUTE: 0.78 E9/L (ref 0.1–0.95)
MONOCYTES RELATIVE PERCENT: 10.4 % (ref 2–12)
MRSA CULTURE ONLY: NORMAL
MYELOCYTE PERCENT: 0.9 % (ref 0–0)
NEUTROPHILS ABSOLUTE: 6.71 E9/L (ref 1.8–7.3)
NEUTROPHILS RELATIVE PERCENT: 85.2 % (ref 43–80)
NUCLEATED RED BLOOD CELLS: 0.9 /100 WBC
OVALOCYTES: ABNORMAL
PDW BLD-RTO: 13.4 FL (ref 11.5–15)
PHOSPHORUS: 5.2 MG/DL (ref 2.5–4.5)
PLATELET # BLD: 256 E9/L (ref 130–450)
PMV BLD AUTO: 10.1 FL (ref 7–12)
POIKILOCYTES: ABNORMAL
POLYCHROMASIA: ABNORMAL
POTASSIUM SERPL-SCNC: 3.8 MMOL/L (ref 3.5–5)
RBC # BLD: 1.9 E12/L (ref 3.5–5.5)
SODIUM BLD-SCNC: 138 MMOL/L (ref 132–146)
SODIUM URINE: 29 MMOL/L
TOTAL PROTEIN: 4.6 G/DL (ref 6.4–8.3)
TROPONIN: 0.02 NG/ML (ref 0–0.03)
WBC # BLD: 7.8 E9/L (ref 4.5–11.5)

## 2020-02-19 PROCEDURE — 2580000003 HC RX 258: Performed by: INTERNAL MEDICINE

## 2020-02-19 PROCEDURE — 6360000002 HC RX W HCPCS: Performed by: SURGERY

## 2020-02-19 PROCEDURE — 82962 GLUCOSE BLOOD TEST: CPT

## 2020-02-19 PROCEDURE — 80053 COMPREHEN METABOLIC PANEL: CPT

## 2020-02-19 PROCEDURE — 93005 ELECTROCARDIOGRAM TRACING: CPT | Performed by: STUDENT IN AN ORGANIZED HEALTH CARE EDUCATION/TRAINING PROGRAM

## 2020-02-19 PROCEDURE — 6360000002 HC RX W HCPCS: Performed by: STUDENT IN AN ORGANIZED HEALTH CARE EDUCATION/TRAINING PROGRAM

## 2020-02-19 PROCEDURE — 6370000000 HC RX 637 (ALT 250 FOR IP): Performed by: STUDENT IN AN ORGANIZED HEALTH CARE EDUCATION/TRAINING PROGRAM

## 2020-02-19 PROCEDURE — 97530 THERAPEUTIC ACTIVITIES: CPT

## 2020-02-19 PROCEDURE — 2000000000 HC ICU R&B

## 2020-02-19 PROCEDURE — 2580000003 HC RX 258: Performed by: STUDENT IN AN ORGANIZED HEALTH CARE EDUCATION/TRAINING PROGRAM

## 2020-02-19 PROCEDURE — 84300 ASSAY OF URINE SODIUM: CPT

## 2020-02-19 PROCEDURE — 85025 COMPLETE CBC W/AUTO DIFF WBC: CPT

## 2020-02-19 PROCEDURE — 85014 HEMATOCRIT: CPT

## 2020-02-19 PROCEDURE — 84484 ASSAY OF TROPONIN QUANT: CPT

## 2020-02-19 PROCEDURE — 2500000003 HC RX 250 WO HCPCS: Performed by: STUDENT IN AN ORGANIZED HEALTH CARE EDUCATION/TRAINING PROGRAM

## 2020-02-19 PROCEDURE — 2700000000 HC OXYGEN THERAPY PER DAY

## 2020-02-19 PROCEDURE — 99291 CRITICAL CARE FIRST HOUR: CPT | Performed by: SURGERY

## 2020-02-19 PROCEDURE — 85018 HEMOGLOBIN: CPT

## 2020-02-19 PROCEDURE — 2580000003 HC RX 258: Performed by: SURGERY

## 2020-02-19 PROCEDURE — 83735 ASSAY OF MAGNESIUM: CPT

## 2020-02-19 PROCEDURE — 36415 COLL VENOUS BLD VENIPUNCTURE: CPT

## 2020-02-19 PROCEDURE — 82330 ASSAY OF CALCIUM: CPT

## 2020-02-19 PROCEDURE — 82570 ASSAY OF URINE CREATININE: CPT

## 2020-02-19 PROCEDURE — 84100 ASSAY OF PHOSPHORUS: CPT

## 2020-02-19 RX ORDER — SENNA PLUS 8.6 MG/1
1 TABLET ORAL NIGHTLY
Status: DISCONTINUED | OUTPATIENT
Start: 2020-02-19 | End: 2020-02-24

## 2020-02-19 RX ORDER — OXYCODONE HYDROCHLORIDE 5 MG/1
5 TABLET ORAL EVERY 4 HOURS PRN
Status: DISCONTINUED | OUTPATIENT
Start: 2020-02-19 | End: 2020-02-24

## 2020-02-19 RX ORDER — 0.9 % SODIUM CHLORIDE 0.9 %
20 INTRAVENOUS SOLUTION INTRAVENOUS ONCE
Status: COMPLETED | OUTPATIENT
Start: 2020-02-19 | End: 2020-02-19

## 2020-02-19 RX ORDER — HYDRALAZINE HYDROCHLORIDE 20 MG/ML
10 INJECTION INTRAMUSCULAR; INTRAVENOUS EVERY 30 MIN PRN
Status: DISCONTINUED | OUTPATIENT
Start: 2020-02-19 | End: 2020-02-27

## 2020-02-19 RX ORDER — DOCUSATE SODIUM 100 MG/1
100 CAPSULE, LIQUID FILLED ORAL 2 TIMES DAILY
Status: DISCONTINUED | OUTPATIENT
Start: 2020-02-19 | End: 2020-02-24

## 2020-02-19 RX ORDER — LABETALOL HYDROCHLORIDE 5 MG/ML
10 INJECTION, SOLUTION INTRAVENOUS EVERY 30 MIN PRN
Status: DISCONTINUED | OUTPATIENT
Start: 2020-02-19 | End: 2020-02-27

## 2020-02-19 RX ORDER — PANTOPRAZOLE SODIUM 40 MG/1
40 TABLET, DELAYED RELEASE ORAL
Status: DISCONTINUED | OUTPATIENT
Start: 2020-02-20 | End: 2020-02-19

## 2020-02-19 RX ADMIN — DOCUSATE SODIUM 100 MG: 100 CAPSULE, LIQUID FILLED ORAL at 11:40

## 2020-02-19 RX ADMIN — FAMOTIDINE 20 MG: 20 TABLET ORAL at 08:58

## 2020-02-19 RX ADMIN — MORPHINE SULFATE 1 MG: 2 INJECTION, SOLUTION INTRAMUSCULAR; INTRAVENOUS at 08:50

## 2020-02-19 RX ADMIN — LABETALOL HYDROCHLORIDE 10 MG: 5 INJECTION INTRAVENOUS at 12:06

## 2020-02-19 RX ADMIN — BUPROPION HYDROCHLORIDE 150 MG: 150 TABLET, EXTENDED RELEASE ORAL at 08:58

## 2020-02-19 RX ADMIN — HEPARIN SODIUM 5000 UNITS: 10000 INJECTION INTRAVENOUS; SUBCUTANEOUS at 21:21

## 2020-02-19 RX ADMIN — OXYBUTYNIN CHLORIDE 5 MG: 5 TABLET, EXTENDED RELEASE ORAL at 08:57

## 2020-02-19 RX ADMIN — OXYCODONE 5 MG: 5 TABLET ORAL at 18:25

## 2020-02-19 RX ADMIN — SENNOSIDES 8.6 MG: 8.6 TABLET, FILM COATED ORAL at 21:20

## 2020-02-19 RX ADMIN — HEPARIN SODIUM 5000 UNITS: 10000 INJECTION INTRAVENOUS; SUBCUTANEOUS at 05:31

## 2020-02-19 RX ADMIN — TRAMADOL HYDROCHLORIDE 50 MG: 50 TABLET, FILM COATED ORAL at 01:23

## 2020-02-19 RX ADMIN — PIPERACILLIN AND TAZOBACTAM 3.38 G: 3; .375 INJECTION, POWDER, LYOPHILIZED, FOR SOLUTION INTRAVENOUS at 08:49

## 2020-02-19 RX ADMIN — VENLAFAXINE HYDROCHLORIDE 75 MG: 75 CAPSULE, EXTENDED RELEASE ORAL at 08:57

## 2020-02-19 RX ADMIN — SODIUM CHLORIDE: 9 INJECTION, SOLUTION INTRAVENOUS at 06:07

## 2020-02-19 RX ADMIN — MORPHINE SULFATE 1 MG: 2 INJECTION, SOLUTION INTRAMUSCULAR; INTRAVENOUS at 05:32

## 2020-02-19 RX ADMIN — OXYCODONE 5 MG: 5 TABLET ORAL at 11:40

## 2020-02-19 RX ADMIN — LABETALOL HYDROCHLORIDE 10 MG: 5 INJECTION INTRAVENOUS at 10:02

## 2020-02-19 RX ADMIN — Medication 10 ML: at 21:20

## 2020-02-19 RX ADMIN — DOCUSATE SODIUM 100 MG: 100 CAPSULE, LIQUID FILLED ORAL at 21:20

## 2020-02-19 RX ADMIN — PIPERACILLIN AND TAZOBACTAM 3.38 G: 3; .375 INJECTION, POWDER, LYOPHILIZED, FOR SOLUTION INTRAVENOUS at 22:33

## 2020-02-19 RX ADMIN — SODIUM CHLORIDE 20 ML: 9 INJECTION, SOLUTION INTRAVENOUS at 08:34

## 2020-02-19 RX ADMIN — HEPARIN SODIUM 5000 UNITS: 10000 INJECTION INTRAVENOUS; SUBCUTANEOUS at 14:00

## 2020-02-19 RX ADMIN — SODIUM CHLORIDE: 9 INJECTION, SOLUTION INTRAVENOUS at 18:12

## 2020-02-19 RX ADMIN — LEVOTHYROXINE SODIUM 100 MCG: 0.1 TABLET ORAL at 05:32

## 2020-02-19 RX ADMIN — PIPERACILLIN AND TAZOBACTAM 3.38 G: 3; .375 INJECTION, POWDER, LYOPHILIZED, FOR SOLUTION INTRAVENOUS at 14:30

## 2020-02-19 ASSESSMENT — PAIN DESCRIPTION - FREQUENCY
FREQUENCY: INTERMITTENT
FREQUENCY: CONTINUOUS

## 2020-02-19 ASSESSMENT — PAIN DESCRIPTION - PAIN TYPE
TYPE: ACUTE PAIN;SURGICAL PAIN
TYPE: SURGICAL PAIN

## 2020-02-19 ASSESSMENT — PAIN DESCRIPTION - ORIENTATION
ORIENTATION: LOWER
ORIENTATION: MID;LOWER
ORIENTATION: MID
ORIENTATION: MID;LOWER

## 2020-02-19 ASSESSMENT — PAIN DESCRIPTION - PROGRESSION
CLINICAL_PROGRESSION: GRADUALLY IMPROVING
CLINICAL_PROGRESSION: GRADUALLY IMPROVING
CLINICAL_PROGRESSION: GRADUALLY WORSENING
CLINICAL_PROGRESSION: GRADUALLY WORSENING
CLINICAL_PROGRESSION: GRADUALLY IMPROVING

## 2020-02-19 ASSESSMENT — PAIN SCALES - GENERAL
PAINLEVEL_OUTOF10: 7
PAINLEVEL_OUTOF10: 4
PAINLEVEL_OUTOF10: 2
PAINLEVEL_OUTOF10: 7
PAINLEVEL_OUTOF10: 2
PAINLEVEL_OUTOF10: 3
PAINLEVEL_OUTOF10: 2
PAINLEVEL_OUTOF10: 4
PAINLEVEL_OUTOF10: 7

## 2020-02-19 ASSESSMENT — PAIN DESCRIPTION - ONSET
ONSET: ON-GOING
ONSET: GRADUAL

## 2020-02-19 ASSESSMENT — PAIN - FUNCTIONAL ASSESSMENT
PAIN_FUNCTIONAL_ASSESSMENT: PREVENTS OR INTERFERES SOME ACTIVE ACTIVITIES AND ADLS
PAIN_FUNCTIONAL_ASSESSMENT: PREVENTS OR INTERFERES WITH MANY ACTIVE NOT PASSIVE ACTIVITIES
PAIN_FUNCTIONAL_ASSESSMENT: PREVENTS OR INTERFERES SOME ACTIVE ACTIVITIES AND ADLS

## 2020-02-19 ASSESSMENT — PAIN DESCRIPTION - LOCATION
LOCATION: ABDOMEN

## 2020-02-19 ASSESSMENT — PAIN DESCRIPTION - DESCRIPTORS
DESCRIPTORS: ACHING;DISCOMFORT
DESCRIPTORS: ACHING;DISCOMFORT;SORE

## 2020-02-19 NOTE — PROGRESS NOTES
Patient had a run of tachycardia with HR as fast as 180s with irregular rhythm for about a minute, but spontaneously converted back to sinus rhythm, possibly paroxysmal afib. Asymptomatic.  Will check EKG, troponin, and echocardiogram.     Electronically signed by Melquiades Vyas MD on 2/19/2020 at 3:14 PM

## 2020-02-19 NOTE — PROGRESS NOTES
Hospital Medicine    Subjective:  Pt seen in icu this am extubated alert conversive c/o pain    Current Facility-Administered Medications:     hydrALAZINE (APRESOLINE) injection 10 mg, 10 mg, Intravenous, Q30 Min PRN, Felicia Guzman MD    labetalol (NORMODYNE;TRANDATE) injection 10 mg, 10 mg, Intravenous, Q30 Min PRN, Feilcia Guzman MD, 10 mg at 02/19/20 1206    senna (SENOKOT) tablet 8.6 mg, 1 tablet, Oral, Nightly, Felicia Guzman MD    docusate sodium (COLACE) capsule 100 mg, 100 mg, Oral, BID, Felicia Guzman MD, 100 mg at 02/19/20 1140    oxyCODONE (ROXICODONE) immediate release tablet 5 mg, 5 mg, Oral, Q4H PRN, Felicia Guzman MD, 5 mg at 02/19/20 1140    glucose (GLUTOSE) 40 % oral gel 15 g, 15 g, Oral, PRN, Felicia Guzman MD    dextrose 50 % IV solution, 12.5 g, Intravenous, PRN, Felicia Guzman MD    glucagon (rDNA) injection 1 mg, 1 mg, Intramuscular, PRN, Felicia Guzman MD    dextrose 5 % solution, 100 mL/hr, Intravenous, PRN, Felicia Guzman MD    insulin lispro (HUMALOG) injection vial 0-6 Units, 0-6 Units, Subcutaneous, Q4H, Felicia Guzman MD, 1 Units at 02/18/20 1138    levothyroxine (SYNTHROID) tablet 100 mcg, 100 mcg, Oral, Daily, Felicia Guzman MD, 100 mcg at 02/19/20 0532    oxybutynin (DITROPAN-XL) extended release tablet 5 mg, 5 mg, Oral, Daily, Felicia Guzman MD, 5 mg at 02/19/20 0857    morphine (PF) injection 1 mg, 1 mg, Intravenous, Q3H PRN, 1 mg at 02/19/20 0850 **OR** [DISCONTINUED] morphine (PF) injection 2 mg, 2 mg, Intravenous, Q3H PRN, Felicia Guzman MD    0.9 % sodium chloride infusion, , Intravenous, Continuous, Constantino Diaz MD, Last Rate: 100 mL/hr at 02/19/20 0607    sodium chloride flush 0.9 % injection 10 mL, 10 mL, Intravenous, 2 times per day, Gustavo Borges DO, 10 mL at 02/18/20 0851    sodium chloride flush 0.9 % injection 10 mL, 10 mL, Intravenous, PRN, Gustavo Borges DO, 10 mL at 02/18/20 2025    ondansetron (ZOFRAN) injection 4 mg, 4 mg, Intravenous, Q6H PRN, Jess Borges DO    buPROPion (WELLBUTRIN XL) extended release tablet 150 mg, 150 mg, Oral, Daily, Zachary Montemayor MD, 150 mg at 02/19/20 0858    [Held by provider] losartan (COZAAR) tablet 25 mg, 25 mg, Oral, Daily, Zachary Montemayor MD, 25 mg at 02/18/20 0851    piperacillin-tazobactam (ZOSYN) 3.375 g in dextrose 5 % 100 mL IVPB extended infusion (mini-bag), 3.375 g, Intravenous, Q8H, Cheri Yu MD, Last Rate: 25 mL/hr at 02/19/20 0849, 3.375 g at 02/19/20 0849    venlafaxine (EFFEXOR XR) extended release capsule 75 mg, 75 mg, Oral, Daily, Zachary Montemayor MD, 75 mg at 02/19/20 0857    heparin (porcine) injection 5,000 Units, 5,000 Units, Subcutaneous, Q8H, Berlin Delvalle MD, 5,000 Units at 02/19/20 0531    Objective:    BP (!) 171/80   Pulse 93   Temp 98 °F (36.7 °C) (Oral)   Resp 17   Ht 5' 6\" (1.676 m)   Wt 118 lb 3.2 oz (53.6 kg)   SpO2 93%   BMI 19.08 kg/m²     Heart:  Reg  Lungs:  CTA bilaterally, no wheeze, rales or rhonchi  Abd: bowel sounds present, min distended  Extrem:  No clubbing, cyanosis, or edema    CBC with Differential:    Lab Results   Component Value Date    WBC 7.8 02/19/2020    RBC 1.90 02/19/2020    HGB 8.4 02/19/2020    HCT 25.9 02/19/2020     02/19/2020    MCV 96.3 02/19/2020    MCH 31.1 02/19/2020    MCHC 32.2 02/19/2020    RDW 13.4 02/19/2020    NRBC 0.9 02/19/2020    LYMPHOPCT 3.5 02/19/2020    MONOPCT 10.4 02/19/2020    MYELOPCT 0.9 02/19/2020    EOSPCT 3 10/07/2010    BASOPCT 0.5 02/19/2020    MONOSABS 0.78 02/19/2020    LYMPHSABS 0.31 02/19/2020    EOSABS 0.00 02/19/2020    BASOSABS 0.00 02/19/2020     CMP:    Lab Results   Component Value Date     02/19/2020    K 3.8 02/19/2020    K 3.4 01/21/2020     02/19/2020    CO2 12 02/19/2020    BUN 77 02/19/2020    CREATININE 2.1 02/19/2020    GFRAA 28 02/19/2020    LABGLOM 23 02/19/2020    GLUCOSE 120 02/19/2020    GLUCOSE 102 10/07/2010    PROT 4.6 02/19/2020    LABALBU 2.2 02/19/2020 LABALBU 4.5 10/07/2010    CALCIUM 7.1 02/19/2020    BILITOT 0.6 02/19/2020    ALKPHOS 62 02/19/2020    AST 4,165 02/19/2020    ALT 2,110 02/19/2020     Warfarin PT/INR:    Lab Results   Component Value Date    INR 1.0 02/17/2020    INR 1.0 01/20/2020    INR 1.0 01/07/2020    PROTIME 11.8 02/17/2020    PROTIME 11.2 01/20/2020    PROTIME 11.0 01/07/2020       Assessment:    Principal Problem:    SBO (small bowel obstruction) (HCC)  Active Problems:    Essential hypertension    Depression    PAD (peripheral artery disease) (HCC)    Acquired hypothyroidism    Cecal volvulus (HCC)    ROSELYN (acute kidney injury) ([facility] [address]    Shock liver  Resolved Problems:    * No resolved hospital problems.  *      Plan:  Cont postop care as per surgery cont iv hydration dvt/gi prophylaxis        Precilla Brand  12:45 PM  2/19/2020

## 2020-02-19 NOTE — PROGRESS NOTES
OT BEDSIDE TREATMENT NOTE      Date:2020  Patient Name: Sunita Rendon  MRN: [id number]  : 1950  Room: [address] [place] [address]-A      Evaluating OT: Mehul Kale OTR/L #[id number]      AM-PAC Daily Activity Raw Score:      Recommended Adaptive Equipment: TBD      Diagnosis:  SBO  Patient presented to ED for abdominal pain      Surgery: 20  LAPAROTOMY EXPLORATORY, POSSIBLE BOWEL RESECTION, POSSIBLE OSTOMY, POSSIBLE WOUND VAC  20  LAPAROTOMY EXPLORATORY, RIGHT HEMICOLECTOMY  Pertinent Medical History: cancer, CAD, COPD, HTN, PVD      Precautions:  Falls, O2, NG tube to suction, ice chips only, art line, abdominal precautions      Home Living: Pt lives in 2 story home with daughter; 3 MATT with B handrails   Bathroom setup: walk in shower    Equipment owned: ww     Prior Level of Function: Mod I with ADLs , Mod I with IADLs; ambulated with ww  Driving: not reported  Occupation: not reported      Pain Level: 5/10 abdominal; Therapist facilitated repositioning in bed to address pain.      Cognition: A&O: 3/4 (self, place, time); Follows 1 step directions  Delayed processing; impaired sequencing               Memory:  Fair-              Sequencing:  P+              Problem solving:  P+              Judgement/safety:  P+                Functional Assessment:    Initial Eval Status  Date: 20 Treatment Status  Date: Short Term Goals = Long Term Goals  Treatment frequency: 1-4x/wk; PRN   Feeding NG tube to suction     (min A self feeding with spoon for ice chips; per doctor)   S;   to eat ice chips without spoon; pt with c/o dry mouth throughout session Independent    Grooming Moderate Assist     (semi-supine; impaired sequencing)  NT Stand by Assist    UB Dressing Moderate Assist   Mod A Stand by Assist    LB Dressing Dependent  Max A   Seated EOB; poor dynamic balance to reach socks.  Pt instructed on adapted techniques for LB dressing - demo poor+ follow through Moderate Assist    Bathing Maximal Assist   Max A Minimal Assist    Toileting Dependent   Dep Moderate Assist    Bed Mobility  Supine to sit: Maximal Assist x2  Sit to supine: Maximal Assist x2 Supine to sit: Max A+2    Sit to supine: Max A+2  Supine to sit: Moderate Assist   Sit to supine: Moderate Assist    Functional Transfers Moderate Assist x2     (sit to stand; posterior lean) Sit to stand: Max A; posterior LOB- mod cues for hand placement onto walker; min cues for posture    Stand to sit: Max A Minimal Assist    Functional Mobility Maximal Assist x2     (side steps with ww) Max A WW  to side step to [facility] with mod cues for technique; mod assist to weight shift; pt presents with poor sequencing/processing during transfers Moderate Assist    Balance Sitting:     Static:  Max A (posterior lean)    Dynamic:Max A  Standing: Max A Sitting: Max A -posterior LOB    Standing: Max A WW-posterior LOB      Activity Tolerance Fair   fair with light to moderate tasks; pt c/o feeling weak Fair +   Visual/  Perceptual WFL                           Hand dominance: Right        Strength ROM Additional Info:    RUE  3+/5  WFL fair  and wfl FMC/dexterity noted during ADL tasks         LUE 3+/5  WFL fair  and wfl FMC/dexterity noted during ADL tasks            Hearing: WFL   Sensation:  Pt denies numbness or tingling   Tone: WFL   Edema: none noted     Vitals:  Heart Rate at rest 90 bpm Heart Rate post session 85 bpm   SpO2 at rest 100% SpO2 post session 100%   Blood Pressure at rest 139/50 mmHg Blood Pressure post session 146/88 mmHg        Treatment: OT services provided include:  Instruction on precautions prior to bed mobility to facilitate safe transfers and ADLS; sitting/standing balance retraining ex's to improve righting reactions with postural changes during ADLS; adapted techniques to increase independence and safe reach during dressing tasks due to poor dynamic balance; functional transfer training including postural cues, hand placement/sequencing to improve Meka, OTR/L [id number]

## 2020-02-19 NOTE — PROGRESS NOTES
GENERAL SURGERY  DAILY PROGRESS NOTE  2/19/2020    Subjective:  Patient extubated  Pain controlled  NPO  Denied n/v  -F/-BM    Objective:  /75   Pulse 103   Temp 98.5 °F (36.9 °C) (Oral)   Resp 21   Ht 5' 6\" (1.676 m)   Wt 118 lb 3.2 oz (53.6 kg)   SpO2 (!) 83%   BMI 19.08 kg/m²     General: NAD, awake and alert. Head: Normocephalic, atraumatic  Eyes: PERRLA, EOMI. No sclera icterus. Lungs: No increased work of breathing. Cardiovascular: RRR. Abdomen: Soft, ND, TTP at incision site. Incision c/d/i. NGT  cc. No rebound, guarding or rigidity. Extremities: Atraumatic, full range of motion  Skin: Warm, dry and intact    Assessment/Plan:  68 y.o. female with perforated cecal volvulus, s/p right hemicolectomy with side-to-side, functional end-to-end anastomosis 2/17. Pain and nausea control PRN  NPO w/ ice chips  Clamp NGT, check residual. Should be able to remove today. Acute blood loss anemia - hgb 5.9. Transfusing 1 uPRBC's per SICU team. Monitor CBC.   IVF's  ROSELYN - monitor UOP  Elevated AST/ALT - monitor CMP    Electronically signed by Clfif Tidwell MD on 2/19/2020 at 8:03 AM     Seen/examined  Agree with above  Minimal NG residual- removed  JENNIGaElder

## 2020-02-19 NOTE — DISCHARGE INSTR - COC
Continuity of Care Form    Patient Name: Mansoor Graham   :  1950  MRN:  [id number]    Admit date:  2020  Discharge date:  2020    Code Status Order: Full Code   Advance Directives:   [facility] Documentation     Date/Time Healthcare Directive Type of Healthcare Directive Copy in [facility] [address] Agent's Name Healthcare Agent's Phone Number    20 0029  No, patient does not have an advance directive for healthcare treatment -- -- -- -- --          Admitting Physician:  Henderson Severs, DO  PCP: Kassie Rojas MD    Discharging Nurse: Lashae Greenfield RN  [address] Unit/Room#: 1463/2592-Z  Discharging Unit Phone Number: [phone]    Emergency Contact:   Extended Emergency Contact Information  Primary Emergency Contact: Thalia Elizabeth  Address: [address] [place] [address] Phone: [phone]  Work Phone: [phone]  Mobile Phone: [phone]  Relation: Child    Past Surgical History:  Past Surgical History:   Procedure Laterality Date     SECTION      COLONOSCOPY      DILATION AND CURETTAGE OF UTERUS      LAPAROTOMY N/A 2020    LAPAROTOMY EXPLORATORY, RIGHT HEMICOLECTOMY performed by Maral Siegel MD at [address] [place] [address] ARTERIAL INTERVENTION  2019    L SFA angioplasty    TUBAL LIGATION         Immunization History:   Immunization History   Administered Date(s) Administered    Tdap (Boostrix, Adacel) 2010       Active Problems:  Patient Active Problem List   Diagnosis Code    Essential hypertension I10    Depression F32.9    PVD (peripheral vascular disease) with claudication ([facility] [address] I73.9    History of tobacco use Z87.891    Asymptomatic bilateral carotid artery stenosis I65.23    PAD (peripheral artery disease) ([facility] [address] I73.9    Hyperlipidemia LDL goal <100 E78.5    Acquired hypothyroidism E03.9    Left groin pain R10.32    SBO

## 2020-02-19 NOTE — PROGRESS NOTES
Surgical Intensive Care Unit   Daily Progress Note     Patient's name:  Ziggy Zepeda  Age/Gender: 68 y.o. female  Date of Admission: 2/17/2020  1:30 PM  Length of Stay: 2    Reason for ICU: Postoperative monitoring after right hemicolectomy for cecal volvulus     HPI: Mary Ellen Elizabeth is a 68 y. o. female with extensive PVD on plavix and pletal, who presents for evaluation of SBO. She has had diffuse abdominal pain since last Friday. She had been tachycardic and hypotensive decision was made for exploratory laparotomy where she was found to have a perforated cecal volvulus for which she underwent a right hemicolectomy with side to side ileocolonic anastomosis 2/17.     Hospital Course:   2/17: Ex lap for perforated cecal volvulus. She was extubated postoperatively started on a Dilaudid PCA and admitted to SICU for postoperative care. 2/18: Weaning vent settings and sedation for possible extubation. 2/19: Denies passing gas or bowel movements overnight. Minimal bloody drainage from midline abdominal wound.   Transfuse 1 unit PRBCs    Problem List:   Patient Active Problem List   Diagnosis    Essential hypertension    Depression    PVD (peripheral vascular disease) with claudication ([address]    History of tobacco use    Asymptomatic bilateral carotid artery stenosis    PAD (peripheral artery disease) ([address]    Hyperlipidemia LDL goal <100    Acquired hypothyroidism    Left groin pain    SBO (small bowel obstruction) ([address]    Cecal volvulus ([address]    ROSELYN (acute kidney injury) ([address]       Surgical/Interventional Procedures:   2/17: Exploratory laparotomy with right hemicolectomy    Vent Settings: Additional Respiratory  Assessments  Pulse: 102  Resp: 19  SpO2: 100 %  Humidification Source: Heated wire  Humidification Temp: 37  Airway Type: ET  Airway Size: 8  Measured From: Lips  ABG:   Recent Labs     02/18/20  0459   PH 7.343*   PCO2 30.9*   PO2 135.6*   HCO3 16.4*   BE -8.3*   O2SAT 98.0 ileocolonic anastomosis  ? N.p.o. with ice chips  ? NG tube 100 cc out  · Renal: ROSELYN  ? Monitor creatinine  ? Monitor urine output  · ID: Perforated cecal volvulus  ? Zosyn continue for 3 days  · Endocrine: Hypothyroidism  ? On Synthroid  · MSK: History of frequent falls  ? Known to Ortho, vascular for claudication work-up no acute issues  · Heme:  Hemoglobin 5.9  ? transfuse 1 unit repeat H&H posttransfusion      Pain/Analgesia:  Tramadol, morphine   Diet: Diet NPO Effective Now Exceptions are: Sips with Meds  DVT proph: Heparin  GI proph:  Pepcid  Seizure proph: None  Glucose protocol: As needed  Mouth/eye care: As needed  Barry: Continue Barry catheter for fluid balance monitoring  Ancillary consults: General surgery, admitted to medicine, critical care  Family Update:  At bedside as needed  CODE Status: Full Code     Dispo: SICU      Electronically signed by Aundrea Galarza MD 2/19/2020  5:57 AM

## 2020-02-20 LAB
ALBUMIN SERPL-MCNC: 2.8 G/DL (ref 3.5–5.2)
ALP BLD-CCNC: 81 U/L (ref 35–104)
ALT SERPL-CCNC: 1429 U/L (ref 0–32)
ANION GAP SERPL CALCULATED.3IONS-SCNC: 10 MMOL/L (ref 7–16)
ANISOCYTOSIS: ABNORMAL
AST SERPL-CCNC: 1592 U/L (ref 0–31)
BASOPHILS ABSOLUTE: 0 E9/L (ref 0–0.2)
BASOPHILS RELATIVE PERCENT: 0.2 % (ref 0–2)
BILIRUB SERPL-MCNC: 0.7 MG/DL (ref 0–1.2)
BUN BLDV-MCNC: 65 MG/DL (ref 8–23)
BURR CELLS: ABNORMAL
CALCIUM IONIZED: 0.99 MMOL/L (ref 1.15–1.33)
CALCIUM SERPL-MCNC: 8 MG/DL (ref 8.6–10.2)
CHLORIDE BLD-SCNC: 109 MMOL/L (ref 98–107)
CO2: 19 MMOL/L (ref 22–29)
CREAT SERPL-MCNC: 1.7 MG/DL (ref 0.5–1)
EKG ATRIAL RATE: 140 BPM
EKG P AXIS: 75 DEGREES
EKG P-R INTERVAL: 128 MS
EKG Q-T INTERVAL: 280 MS
EKG QRS DURATION: 72 MS
EKG QTC CALCULATION (BAZETT): 427 MS
EKG R AXIS: 78 DEGREES
EKG T AXIS: -71 DEGREES
EKG VENTRICULAR RATE: 140 BPM
EOSINOPHILS ABSOLUTE: 0 E9/L (ref 0.05–0.5)
EOSINOPHILS RELATIVE PERCENT: 0 % (ref 0–6)
GFR AFRICAN AMERICAN: 36
GFR NON-AFRICAN AMERICAN: 30 ML/MIN/1.73
GLUCOSE BLD-MCNC: 94 MG/DL (ref 74–99)
HCT VFR BLD CALC: 27.7 % (ref 34–48)
HEMOGLOBIN: 8.7 G/DL (ref 11.5–15.5)
LV EF: 65 %
LVEF MODALITY: NORMAL
LYMPHOCYTES ABSOLUTE: 0.48 E9/L (ref 1.5–4)
LYMPHOCYTES RELATIVE PERCENT: 5.2 % (ref 20–42)
MAGNESIUM: 2.4 MG/DL (ref 1.6–2.6)
MCH RBC QN AUTO: 30.7 PG (ref 26–35)
MCHC RBC AUTO-ENTMCNC: 31.4 % (ref 32–34.5)
MCV RBC AUTO: 97.9 FL (ref 80–99.9)
METER GLUCOSE: 73 MG/DL (ref 74–99)
METER GLUCOSE: 77 MG/DL (ref 74–99)
METER GLUCOSE: 88 MG/DL (ref 74–99)
METER GLUCOSE: 88 MG/DL (ref 74–99)
METER GLUCOSE: 96 MG/DL (ref 74–99)
METER GLUCOSE: 99 MG/DL (ref 74–99)
MONOCYTES ABSOLUTE: 1.62 E9/L (ref 0.1–0.95)
MONOCYTES RELATIVE PERCENT: 16.5 % (ref 2–12)
MYELOCYTE PERCENT: 1.7 % (ref 0–0)
NEUTROPHILS ABSOLUTE: 7.41 E9/L (ref 1.8–7.3)
NEUTROPHILS RELATIVE PERCENT: 76.5 % (ref 43–80)
PDW BLD-RTO: 14.7 FL (ref 11.5–15)
PHOSPHORUS: 3.5 MG/DL (ref 2.5–4.5)
PLATELET # BLD: 297 E9/L (ref 130–450)
PMV BLD AUTO: 10.4 FL (ref 7–12)
POIKILOCYTES: ABNORMAL
POLYCHROMASIA: ABNORMAL
POTASSIUM REFLEX MAGNESIUM: 4 MMOL/L (ref 3.5–5)
RBC # BLD: 2.83 E12/L (ref 3.5–5.5)
REASON FOR REJECTION: NORMAL
REJECTED TEST: NORMAL
SODIUM BLD-SCNC: 138 MMOL/L (ref 132–146)
TOTAL PROTEIN: 5.4 G/DL (ref 6.4–8.3)
TSH SERPL DL<=0.05 MIU/L-ACNC: 4.05 UIU/ML (ref 0.27–4.2)
WBC # BLD: 9.5 E9/L (ref 4.5–11.5)

## 2020-02-20 PROCEDURE — 2000000000 HC ICU R&B

## 2020-02-20 PROCEDURE — 2580000003 HC RX 258: Performed by: STUDENT IN AN ORGANIZED HEALTH CARE EDUCATION/TRAINING PROGRAM

## 2020-02-20 PROCEDURE — 6360000002 HC RX W HCPCS: Performed by: SURGERY

## 2020-02-20 PROCEDURE — 97530 THERAPEUTIC ACTIVITIES: CPT

## 2020-02-20 PROCEDURE — 6370000000 HC RX 637 (ALT 250 FOR IP): Performed by: STUDENT IN AN ORGANIZED HEALTH CARE EDUCATION/TRAINING PROGRAM

## 2020-02-20 PROCEDURE — 83735 ASSAY OF MAGNESIUM: CPT

## 2020-02-20 PROCEDURE — 6360000002 HC RX W HCPCS: Performed by: INTERNAL MEDICINE

## 2020-02-20 PROCEDURE — 2500000003 HC RX 250 WO HCPCS: Performed by: STUDENT IN AN ORGANIZED HEALTH CARE EDUCATION/TRAINING PROGRAM

## 2020-02-20 PROCEDURE — 97535 SELF CARE MNGMENT TRAINING: CPT

## 2020-02-20 PROCEDURE — 93306 TTE W/DOPPLER COMPLETE: CPT

## 2020-02-20 PROCEDURE — 85025 COMPLETE CBC W/AUTO DIFF WBC: CPT

## 2020-02-20 PROCEDURE — 6370000000 HC RX 637 (ALT 250 FOR IP): Performed by: INTERNAL MEDICINE

## 2020-02-20 PROCEDURE — 80053 COMPREHEN METABOLIC PANEL: CPT

## 2020-02-20 PROCEDURE — 84100 ASSAY OF PHOSPHORUS: CPT

## 2020-02-20 PROCEDURE — 84443 ASSAY THYROID STIM HORMONE: CPT

## 2020-02-20 PROCEDURE — 2580000003 HC RX 258: Performed by: INTERNAL MEDICINE

## 2020-02-20 PROCEDURE — 6360000002 HC RX W HCPCS: Performed by: STUDENT IN AN ORGANIZED HEALTH CARE EDUCATION/TRAINING PROGRAM

## 2020-02-20 PROCEDURE — 2700000000 HC OXYGEN THERAPY PER DAY

## 2020-02-20 PROCEDURE — 99291 CRITICAL CARE FIRST HOUR: CPT | Performed by: SURGERY

## 2020-02-20 PROCEDURE — 82330 ASSAY OF CALCIUM: CPT

## 2020-02-20 PROCEDURE — 36415 COLL VENOUS BLD VENIPUNCTURE: CPT

## 2020-02-20 PROCEDURE — 82962 GLUCOSE BLOOD TEST: CPT

## 2020-02-20 RX ORDER — MORPHINE SULFATE 2 MG/ML
2 INJECTION, SOLUTION INTRAMUSCULAR; INTRAVENOUS ONCE
Status: COMPLETED | OUTPATIENT
Start: 2020-02-20 | End: 2020-02-20

## 2020-02-20 RX ORDER — AMLODIPINE BESYLATE 5 MG/1
5 TABLET ORAL DAILY
Status: DISCONTINUED | OUTPATIENT
Start: 2020-02-20 | End: 2020-02-21

## 2020-02-20 RX ADMIN — LABETALOL HYDROCHLORIDE 10 MG: 5 INJECTION INTRAVENOUS at 13:53

## 2020-02-20 RX ADMIN — AMLODIPINE BESYLATE 5 MG: 5 TABLET ORAL at 08:42

## 2020-02-20 RX ADMIN — Medication 10 ML: at 20:48

## 2020-02-20 RX ADMIN — PIPERACILLIN AND TAZOBACTAM 3.38 G: 3; .375 INJECTION, POWDER, LYOPHILIZED, FOR SOLUTION INTRAVENOUS at 11:27

## 2020-02-20 RX ADMIN — PIPERACILLIN AND TAZOBACTAM 3.38 G: 3; .375 INJECTION, POWDER, LYOPHILIZED, FOR SOLUTION INTRAVENOUS at 23:30

## 2020-02-20 RX ADMIN — SODIUM CHLORIDE: 9 INJECTION, SOLUTION INTRAVENOUS at 05:03

## 2020-02-20 RX ADMIN — HEPARIN SODIUM 5000 UNITS: 10000 INJECTION INTRAVENOUS; SUBCUTANEOUS at 21:44

## 2020-02-20 RX ADMIN — OXYCODONE 5 MG: 5 TABLET ORAL at 02:35

## 2020-02-20 RX ADMIN — MORPHINE SULFATE 1 MG: 2 INJECTION, SOLUTION INTRAMUSCULAR; INTRAVENOUS at 15:46

## 2020-02-20 RX ADMIN — LEVOTHYROXINE SODIUM 100 MCG: 0.1 TABLET ORAL at 06:14

## 2020-02-20 RX ADMIN — LABETALOL HYDROCHLORIDE 10 MG: 5 INJECTION INTRAVENOUS at 21:06

## 2020-02-20 RX ADMIN — OXYCODONE 5 MG: 5 TABLET ORAL at 06:14

## 2020-02-20 RX ADMIN — SODIUM CHLORIDE: 9 INJECTION, SOLUTION INTRAVENOUS at 18:10

## 2020-02-20 RX ADMIN — LABETALOL HYDROCHLORIDE 10 MG: 5 INJECTION INTRAVENOUS at 03:06

## 2020-02-20 RX ADMIN — OXYCODONE 5 MG: 5 TABLET ORAL at 20:45

## 2020-02-20 RX ADMIN — HEPARIN SODIUM 5000 UNITS: 10000 INJECTION INTRAVENOUS; SUBCUTANEOUS at 14:48

## 2020-02-20 RX ADMIN — DEXTROSE 50 % IN WATER (D50W) INTRAVENOUS SYRINGE 12.5 G: at 23:58

## 2020-02-20 RX ADMIN — MORPHINE SULFATE 2 MG: 2 INJECTION, SOLUTION INTRAMUSCULAR; INTRAVENOUS at 21:45

## 2020-02-20 RX ADMIN — CALCIUM GLUCONATE 3 G: 98 INJECTION, SOLUTION INTRAVENOUS at 07:43

## 2020-02-20 RX ADMIN — Medication 10 ML: at 08:39

## 2020-02-20 RX ADMIN — LABETALOL HYDROCHLORIDE 10 MG: 5 INJECTION INTRAVENOUS at 17:02

## 2020-02-20 RX ADMIN — SENNOSIDES 8.6 MG: 8.6 TABLET, FILM COATED ORAL at 20:45

## 2020-02-20 RX ADMIN — DOCUSATE SODIUM 100 MG: 100 CAPSULE, LIQUID FILLED ORAL at 08:43

## 2020-02-20 RX ADMIN — LABETALOL HYDROCHLORIDE 10 MG: 5 INJECTION INTRAVENOUS at 09:35

## 2020-02-20 RX ADMIN — LABETALOL HYDROCHLORIDE 10 MG: 5 INJECTION INTRAVENOUS at 08:01

## 2020-02-20 RX ADMIN — BUPROPION HYDROCHLORIDE 150 MG: 150 TABLET, EXTENDED RELEASE ORAL at 08:39

## 2020-02-20 RX ADMIN — PIPERACILLIN AND TAZOBACTAM 3.38 G: 3; .375 INJECTION, POWDER, LYOPHILIZED, FOR SOLUTION INTRAVENOUS at 17:06

## 2020-02-20 RX ADMIN — VENLAFAXINE HYDROCHLORIDE 75 MG: 75 CAPSULE, EXTENDED RELEASE ORAL at 08:39

## 2020-02-20 RX ADMIN — DOCUSATE SODIUM 100 MG: 100 CAPSULE, LIQUID FILLED ORAL at 20:45

## 2020-02-20 RX ADMIN — HEPARIN SODIUM 5000 UNITS: 10000 INJECTION INTRAVENOUS; SUBCUTANEOUS at 06:13

## 2020-02-20 ASSESSMENT — PAIN SCALES - GENERAL
PAINLEVEL_OUTOF10: 0
PAINLEVEL_OUTOF10: 6
PAINLEVEL_OUTOF10: 5
PAINLEVEL_OUTOF10: 5
PAINLEVEL_OUTOF10: 0
PAINLEVEL_OUTOF10: 5
PAINLEVEL_OUTOF10: 0
PAINLEVEL_OUTOF10: 3

## 2020-02-20 ASSESSMENT — PAIN DESCRIPTION - LOCATION
LOCATION: ABDOMEN

## 2020-02-20 ASSESSMENT — PAIN DESCRIPTION - PAIN TYPE
TYPE: ACUTE PAIN

## 2020-02-20 ASSESSMENT — PAIN DESCRIPTION - DESCRIPTORS
DESCRIPTORS: ACHING;DISCOMFORT;SORE
DESCRIPTORS: ACHING;DISCOMFORT

## 2020-02-20 ASSESSMENT — PAIN DESCRIPTION - FREQUENCY: FREQUENCY: CONTINUOUS

## 2020-02-20 ASSESSMENT — PAIN DESCRIPTION - ORIENTATION: ORIENTATION: MID

## 2020-02-20 ASSESSMENT — PAIN DESCRIPTION - ONSET: ONSET: GRADUAL

## 2020-02-20 NOTE — PROGRESS NOTES
Hospital Medicine    Subjective:  Pt seen this am alert confused pt with transient self limited episode of at fib with rvr      Current Facility-Administered Medications:     amLODIPine (NORVASC) tablet 5 mg, 5 mg, Oral, Daily, Marlene Ped Malmer, DO, 5 mg at 02/20/20 4974    hydrALAZINE (APRESOLINE) injection 10 mg, 10 mg, Intravenous, Q30 Min PRN, Tawana Rodriguez MD    labetalol (NORMODYNE;TRANDATE) injection 10 mg, 10 mg, Intravenous, Q30 Min PRN, Tawana Rodriguez MD, 10 mg at 02/20/20 0935    senna (SENOKOT) tablet 8.6 mg, 1 tablet, Oral, Nightly, Tawana Rodriguez MD, 8.6 mg at 02/19/20 2120    docusate sodium (COLACE) capsule 100 mg, 100 mg, Oral, BID, Tawana Rodriguez MD, 100 mg at 02/20/20 7043    oxyCODONE (ROXICODONE) immediate release tablet 5 mg, 5 mg, Oral, Q4H PRN, Tawana Rodriguez MD, 5 mg at 02/20/20 5886    perflutren lipid microspheres (DEFINITY) injection 1.65 mg, 1.5 mL, Intravenous, ONCE PRN, Tawana Rodriguez MD    glucose (GLUTOSE) 40 % oral gel 15 g, 15 g, Oral, PRN, Tawana Rodriguez MD    dextrose 50 % IV solution, 12.5 g, Intravenous, PRN, Tawana Rodriguez MD    glucagon (rDNA) injection 1 mg, 1 mg, Intramuscular, PRN, Tawana Rodriguez MD    dextrose 5 % solution, 100 mL/hr, Intravenous, PRN, Tawana Rodriguez MD    insulin lispro (HUMALOG) injection vial 0-6 Units, 0-6 Units, Subcutaneous, Q4H, Tawana Rodriguez MD, 1 Units at 02/18/20 1138    levothyroxine (SYNTHROID) tablet 100 mcg, 100 mcg, Oral, Daily, Tawana Rodriguez MD, 100 mcg at 02/20/20 9922    morphine (PF) injection 1 mg, 1 mg, Intravenous, Q3H PRN, 1 mg at 02/19/20 0850 **OR** [DISCONTINUED] morphine (PF) injection 2 mg, 2 mg, Intravenous, Q3H PRN, Tawana Rodriguez MD    0.9 % sodium chloride infusion, , Intravenous, Continuous, Ritu Ramesh MD, Last Rate: 100 mL/hr at 02/20/20 0503    sodium chloride flush 0.9 % injection 10 mL, 10 mL, Intravenous, 2 times per day, Marlene Borges DO, 10 mL at 02/20/20 0839    sodium chloride flush 0.9 % injection 10 mL, 10 mL, Intravenous, PRN, Kris Borges DO, 10 mL at 02/18/20 2025    ondansetron (ZOFRAN) injection 4 mg, 4 mg, Intravenous, Q6H PRN, Kris Borges DO    piperacillin-tazobactam (ZOSYN) 3.375 g in dextrose 5 % 100 mL IVPB extended infusion (mini-bag), 3.375 g, Intravenous, Q8H, Devon Young MD, Last Rate: 25 mL/hr at 02/20/20 1127, 3.375 g at 02/20/20 1127    heparin (porcine) injection 5,000 Units, 5,000 Units, Subcutaneous, Q8H, Cherelle Carlson MD, 5,000 Units at 02/20/20 3640    Objective:    BP (!) 149/85   Pulse 74   Temp 98.7 °F (37.1 °C) (Axillary)   Resp 17   Ht 5' 6\" (1.676 m)   Wt 127 lb 1.6 oz (57.7 kg)   SpO2 97%   BMI 20.51 kg/m²     Heart:  Reg  Lungs:  CTA bilaterally, no wheeze, rales or rhonchi  Abd: bowel sounds present, nondistended  Extrem:  No clubbing, cyanosis, or edema    CBC with Differential:    Lab Results   Component Value Date    WBC 9.5 02/20/2020    RBC 2.83 02/20/2020    HGB 8.7 02/20/2020    HCT 27.7 02/20/2020     02/20/2020    MCV 97.9 02/20/2020    MCH 30.7 02/20/2020    MCHC 31.4 02/20/2020    RDW 14.7 02/20/2020    NRBC 0.9 02/19/2020    LYMPHOPCT 5.2 02/20/2020    MONOPCT 16.5 02/20/2020    MYELOPCT 1.7 02/20/2020    EOSPCT 3 10/07/2010    BASOPCT 0.2 02/20/2020    MONOSABS 1.62 02/20/2020    LYMPHSABS 0.48 02/20/2020    EOSABS 0.00 02/20/2020    BASOSABS 0.00 02/20/2020     CMP:    Lab Results   Component Value Date     02/20/2020    K 4.0 02/20/2020     02/20/2020    CO2 19 02/20/2020    BUN 65 02/20/2020    CREATININE 1.7 02/20/2020    GFRAA 36 02/20/2020    LABGLOM 30 02/20/2020    GLUCOSE 94 02/20/2020    GLUCOSE 102 10/07/2010    PROT 5.4 02/20/2020    LABALBU 2.8 02/20/2020    LABALBU 4.5 10/07/2010    CALCIUM 8.0 02/20/2020    BILITOT 0.7 02/20/2020    ALKPHOS 81 02/20/2020    AST 1,592 02/20/2020    ALT 1,429 02/20/2020     Warfarin PT/INR:    Lab Results   Component Value Date    INR 1.0 02/17/2020    INR

## 2020-02-20 NOTE — PROGRESS NOTES
OT BEDSIDE TREATMENT NOTE      Date:2020  Patient Name: Calixto Kaplan  MRN: [id number]  : 1950  Room: [address] [place], [place] [address]      Evaluating OT: Araceli Olivier OTR/L #[id number]      AM-PAC Daily Activity Raw Score:      Recommended Adaptive Equipment: TBD      Diagnosis:  SBO  Patient presented to ED for abdominal pain      Surgery: 20  LAPAROTOMY EXPLORATORY, POSSIBLE BOWEL RESECTION, POSSIBLE OSTOMY, POSSIBLE WOUND VAC  20  LAPAROTOMY EXPLORATORY, RIGHT HEMICOLECTOMY  Pertinent Medical History: cancer, CAD, COPD, HTN, PVD      Precautions:  Falls, O2, NG tube to suction, ice chips only, art line, abdominal precautions      Home Living: Pt lives in 2 story home with daughter; 3 MATT with B handrails   Bathroom setup: walk in shower    Equipment owned: ww     Prior Level of Function: Mod I with ADLs , Mod I with IADLs; ambulated with ww  Driving: not reported  Occupation: not reported      Pain Level: 5/10 abdominal; Therapist facilitated repositioning in bed to address pain.      Cognition: A&O: 3/4 (self, place, time); Follows 1 step directions  Delayed processing; impaired sequencing               Memory:  Fair-              Sequencing:  P+              Problem solving:  P+              Judgement/safety:  P+                Functional Assessment:    Initial Eval Status  Date: 20 Treatment Status  Date: Short Term Goals = Long Term Goals  Treatment frequency: 1-4x/wk; PRN   Feeding NG tube to suction     (min A self feeding with spoon for ice chips; per doctor)  Min A  To hold onto cup during oral care task; pt presenting with increased confusion today; max cues to sequence steps Independent    Grooming Moderate Assist     (semi-supine; impaired sequencing) Max A  after set up while seated up in bed to brush teeth. Pt required Max hand over hand assist to brush teeth. After task completed, pt continued to perseverate on brushing teeth with toothbrush no longer in hand.  Pt not able to be re-directed. Stand by Assist    UB Dressing Moderate Assist   Max A  to jarret gown; poor processing requiring max cues and [place] assist. Stand by Assist    LB Dressing Dependent  Max A   Seated EOB; poor dynamic balance to reach socks. Pt required mod cue to focus on task. Moderate Assist    Bathing Maximal Assist   Max A Minimal Assist    Toileting Dependent   Dep Moderate Assist    Bed Mobility  Supine to sit: Maximal Assist x2  Sit to supine: Maximal Assist x2 Supine to sit: Mod A+2    Sit to supine: Max A+2  Supine to sit: Moderate Assist   Sit to supine: Moderate Assist    Functional Transfers Moderate Assist x2     (sit to stand; posterior lean) Sit to stand: Max A; posterior LOB- mod cues for hand placement onto walker; max cues for posture. Pt not able to self correct. Stand to sit: Max A Minimal Assist    Functional Mobility Maximal Assist x2     (side steps with ww) Max A WW  to side step to [facility] with max cues for technique; mod assist to weight shift; pt presents with poor sequencing/processing during transfers Moderate Assist    Balance Sitting:     Static:  Max A (posterior lean)    Dynamic:Max A  Standing: Max A Sitting: Max A -posterior LOB    Standing: Max A WW-posterior LOB      Activity Tolerance Fair   fair with light to moderate tasks; pt c/o feeling weak Fair +   Visual/  Perceptual WFL                           Hand dominance: Right        Strength ROM Additional Info:    RUE  3+/5  WFL fair  and wfl FMC/dexterity noted during ADL tasks         LUE 3+/5  WFL fair  and wfl FMC/dexterity noted during ADL tasks            Hearing: WFL   Sensation:  Pt denies numbness or tingling   Tone: WFL   Edema: none noted     Vitals:  Heart Rate at rest 77 bpm Heart Rate post session 79 bpm   SpO2 at rest 94% SpO2 post session 90%   Blood Pressure at rest 160/95 mmHg Blood Pressure post session 177/134 mmHg        Treatment: OT services provided include:  Instruction on transfer techniques including bed mobility, STS, walker mobility. Pt demonstrating poor ability to comprehend instruction & follow through. Pt required max cues for hand/foot placement and sequencing. Pt lethargic throughout session. Pt  tolerated ~8 min standing WW level with short breaks. Pt participated in cognitive re-training/memory ex's while seated POORNIMA to improve focus and problem solving skills to assist with transfer/ADL retraining. Pt engaged in light ADLs to improve B UE integration/coordination skills and body awareness during tasks. Pt required max cues throughout session to keep eye open and participate. Pt returned to bed and bed placed in partial chair position to increase interaction with family and staff. Comments: OK from RN to see patient. Upon arrival, patient supine in bed; motivated for OOB activity. Daughter present. Pt assisted to EOB to increase tolerance, balance for ADLs. Upon sitting, pt noted to be more confused & more lethargic. Pt encouraged to stay awake and engaged in activities to improve alertness. Pt performed transfers/ADLS as documented above. Per daughter, pt has been experiencing cognitive deficits for the past year. Daughter instructed on maintaining a structured routine and engaging pt in familiar tasks at home to assist with mentation & follow through. At end of session, patient returned to bed with bed placed in chair position to increase activity tolerance. Call light within reach, all lines and tubes intact. Skilled monitoring of HR, O2 saturation, blood pressure and patient's response to activity performed throughout session. BP taken at end of session: 177/134, Nurse notified- present to administer medication. Pt left in nurse's care. · Pt has made F progress towards set goals.    · Continue with current plan of care       Treatment Time In:1320              Treatment Time Out: 1400        ]  Treatment Charges: Mins Units   Ther Ex  [phone]     Manual Therapy Jodi Saunders [phone]

## 2020-02-20 NOTE — PROGRESS NOTES
OCCUPATIONAL THERAPY     Date:2020  Patient Name: Sachi Lewis  MRN: [id number]  : 1950  Room: [address] [place] [address]-A     Chart reviewed; attempted OT session. Pt undergoing ultrasound. Will try back at later time.    Capo Ricketts, OTR/L [id number]

## 2020-02-20 NOTE — PROGRESS NOTES
Physical Therapy  Physical Therapy Treatment Note     Name: Aixa Martin  : 1950  MRN: [id number]    Referring Provider:  Eva Dumont DO    Date of Service: 2020    Evaluating PT:  Kathrine Lilly, PT, DPT [id number]    Room #:  4048/1322-Z  Diagnosis:  SBO  Precautions: Falls, NG tube, Arterial line, O2, Ice chips only  Procedure/Surgery:   Exploratory laparotomy, enterolysis, right hemicolectomy with side-to-side, functional end-to-end anastomosis  PMHx/PSHx:  CA, CAD, COPD, HLD, HTN, PVD, RA, Thyroid disease   Equipment Needs:  TBD    SUBJECTIVE:    Pt lives with daughter in a 2 story home with 3 stairs to enter and 2 rail. Full flight of steps and 1 rail to bedroom. Pt ambulated with [facility] and required some assistance for ADLs PTA. OBJECTIVE:   Initial Evaluation  Date: 20 Treatment  20 Short Term/ Long Term   Goals   AM-PAC 6 Clicks     Was pt agreeable to Eval/treatment? Yes Yes    Does pt have pain? 5/10 abdominal pain Pt reported abdominal pain but unable to rate    Bed Mobility  Rolling: NT  Supine to sit: MaxA x 2  Sit to supine: MaxA x 2  Scooting: MaxA Rolling: NT  Supine to sit: ModA x 2  Sit to supine: MaxA x 2  Scooting: MaxA Hany   Transfers Sit to stand: ModA x 2  Stand to sit: ModA x 2  Stand pivot: NT Sit to stand: MaxA  Stand to sit: MaxA  Stand pivot: NT Hany with [facility]   Ambulation   3 feet with MaxA x 2 with  [facility] 2 feet F/B and side stepping to [facility] with MaxA with [facility] >75 feet with Hany with [facility]   Stair negotiation: ascended and descended NT NT >2 steps with 1 rail Hany   ROM BUE:  Defer to OT note  BLE:  WNL     Strength BUE:  Defer to OT note  BLE:  3+ to 4-/5  Increase by 1/3 MMT grade   Balance Sitting EOB:  MaxA  Dynamic Standing:  MaxA x 2 with [facility] Sitting: ModA  Standing: MaxA with [facility] Sitting EOB:  SBA  Dynamic Standing:  Hany with [facility]     Pt is A & O x 2 (self and time)  CAM-ICU: Positive  RASS: -1  Sensation:  WNL  Edema:   WNL    Vitals:  Heart Rate at rest 77 bpm Heart Rate post session 79 bpm   SpO2 at rest 94% SpO2 post session 90%   Blood Pressure at rest 160/95 mmHg Blood Pressure post session 177/134 mmHg     Functional Status Score-Intensive Care Unit (FSS-ICU)   Rolling 2/7   Supine to sit transfer 2/7   Unsupported sitting  3/7   Sit to stand transfers 2/7   Ambulation 1/7   Total  10/35       Therapeutic Exercises:  NA    Patient education  Pt educated on safety    Patient response to education:   Pt verbalized understanding Pt demonstrated skill Pt requires further education in this area   x partial x     ASSESSMENT:    Comments:  Pt was supine in bed upon arrival, agreeable to treatment session. Pt's daughter was present for session. Pt demonstrated improved bed mobility but once seated EOB, pt became less alert. Pt would respond with cues with repetition but reported being \"sleepy\" and had difficulty maintaining eyes open. Pt stood with less assistance but still exhibited a posterior lean with feet anterior to center of mass. Pt had significant difficulty attempting to ambulate. Pt was returned to bed with all needs met and call light in reach. RN aware of BP and bouts of lethargy. Treatment:  Patient practiced and was instructed in the following treatment:     Bed mobility training - pt given verbal and tactile cues to facilitate proper sequencing and safety during supine>sit as well as provided with physical assistance to complete task    Sitting EOB for >10 minutes minutes for upright tolerance, postural awareness and BLE ROM   Transfer training - pt was given verbal and tactile cues to facilitate proper hand placement, technique and safety during sit to stand and stand to sit as well as provided with physical assistance to complete task.  Gait training- pt was given verbal and tactile cues to facilitate weight shifting and BLE advancement, erect posture and safety as well as provided with physical assistance to complete task.    Pt

## 2020-02-21 PROBLEM — E43 SEVERE PROTEIN-CALORIE MALNUTRITION (HCC): Chronic | Status: ACTIVE | Noted: 2020-02-21

## 2020-02-21 PROBLEM — I48.0 PAROXYSMAL ATRIAL FIBRILLATION (HCC): Status: ACTIVE | Noted: 2020-02-21

## 2020-02-21 LAB
ALBUMIN SERPL-MCNC: 2.8 G/DL (ref 3.5–5.2)
ALP BLD-CCNC: 77 U/L (ref 35–104)
ALT SERPL-CCNC: 1162 U/L (ref 0–32)
ANION GAP SERPL CALCULATED.3IONS-SCNC: 13 MMOL/L (ref 7–16)
ANISOCYTOSIS: ABNORMAL
AST SERPL-CCNC: 828 U/L (ref 0–31)
BASOPHILS ABSOLUTE: 0 E9/L (ref 0–0.2)
BASOPHILS RELATIVE PERCENT: 0.5 % (ref 0–2)
BILIRUB SERPL-MCNC: 0.7 MG/DL (ref 0–1.2)
BLOOD BANK DISPENSE STATUS: NORMAL
BLOOD BANK DISPENSE STATUS: NORMAL
BLOOD BANK PRODUCT CODE: NORMAL
BLOOD BANK PRODUCT CODE: NORMAL
BPU ID: NORMAL
BPU ID: NORMAL
BUN BLDV-MCNC: 47 MG/DL (ref 8–23)
CALCIUM IONIZED: 1.26 MMOL/L (ref 1.15–1.33)
CALCIUM SERPL-MCNC: 8.4 MG/DL (ref 8.6–10.2)
CHLORIDE BLD-SCNC: 109 MMOL/L (ref 98–107)
CO2: 18 MMOL/L (ref 22–29)
CREAT SERPL-MCNC: 1.2 MG/DL (ref 0.5–1)
DESCRIPTION BLOOD BANK: NORMAL
DESCRIPTION BLOOD BANK: NORMAL
EKG ATRIAL RATE: 82 BPM
EKG P AXIS: 72 DEGREES
EKG P-R INTERVAL: 134 MS
EKG Q-T INTERVAL: 400 MS
EKG QRS DURATION: 80 MS
EKG QTC CALCULATION (BAZETT): 467 MS
EKG R AXIS: 64 DEGREES
EKG T AXIS: 62 DEGREES
EKG VENTRICULAR RATE: 82 BPM
EOSINOPHILS ABSOLUTE: 0.07 E9/L (ref 0.05–0.5)
EOSINOPHILS RELATIVE PERCENT: 0.9 % (ref 0–6)
GFR AFRICAN AMERICAN: 53
GFR NON-AFRICAN AMERICAN: 44 ML/MIN/1.73
GLUCOSE BLD-MCNC: 112 MG/DL (ref 74–99)
HCT VFR BLD CALC: 25 % (ref 34–48)
HEMOGLOBIN: 7.8 G/DL (ref 11.5–15.5)
LYMPHOCYTES ABSOLUTE: 0.33 E9/L (ref 1.5–4)
LYMPHOCYTES RELATIVE PERCENT: 4.4 % (ref 20–42)
MAGNESIUM: 2.3 MG/DL (ref 1.6–2.6)
MCH RBC QN AUTO: 30.1 PG (ref 26–35)
MCHC RBC AUTO-ENTMCNC: 31.2 % (ref 32–34.5)
MCV RBC AUTO: 96.5 FL (ref 80–99.9)
METAMYELOCYTES RELATIVE PERCENT: 0.9 % (ref 0–1)
METER GLUCOSE: 108 MG/DL (ref 74–99)
METER GLUCOSE: 111 MG/DL (ref 74–99)
METER GLUCOSE: 115 MG/DL (ref 74–99)
METER GLUCOSE: 149 MG/DL (ref 74–99)
METER GLUCOSE: 49 MG/DL (ref 74–99)
METER GLUCOSE: 95 MG/DL (ref 74–99)
METER GLUCOSE: 98 MG/DL (ref 74–99)
MONOCYTES ABSOLUTE: 0.42 E9/L (ref 0.1–0.95)
MONOCYTES RELATIVE PERCENT: 5.3 % (ref 2–12)
NEUTROPHILS ABSOLUTE: 7.39 E9/L (ref 1.8–7.3)
NEUTROPHILS RELATIVE PERCENT: 88.5 % (ref 43–80)
OVALOCYTES: ABNORMAL
PDW BLD-RTO: 14 FL (ref 11.5–15)
PHOSPHORUS: 2.6 MG/DL (ref 2.5–4.5)
PLATELET # BLD: 267 E9/L (ref 130–450)
PMV BLD AUTO: 9.9 FL (ref 7–12)
POIKILOCYTES: ABNORMAL
POLYCHROMASIA: ABNORMAL
POTASSIUM SERPL-SCNC: 3.7 MMOL/L (ref 3.5–5)
RBC # BLD: 2.59 E12/L (ref 3.5–5.5)
SODIUM BLD-SCNC: 140 MMOL/L (ref 132–146)
TARGET CELLS: ABNORMAL
TOTAL PROTEIN: 5.5 G/DL (ref 6.4–8.3)
WBC # BLD: 8.3 E9/L (ref 4.5–11.5)

## 2020-02-21 PROCEDURE — 2500000003 HC RX 250 WO HCPCS: Performed by: STUDENT IN AN ORGANIZED HEALTH CARE EDUCATION/TRAINING PROGRAM

## 2020-02-21 PROCEDURE — 2580000003 HC RX 258: Performed by: INTERNAL MEDICINE

## 2020-02-21 PROCEDURE — 93005 ELECTROCARDIOGRAM TRACING: CPT | Performed by: SURGERY

## 2020-02-21 PROCEDURE — 85025 COMPLETE CBC W/AUTO DIFF WBC: CPT

## 2020-02-21 PROCEDURE — 36415 COLL VENOUS BLD VENIPUNCTURE: CPT

## 2020-02-21 PROCEDURE — 6360000002 HC RX W HCPCS: Performed by: SURGERY

## 2020-02-21 PROCEDURE — 84100 ASSAY OF PHOSPHORUS: CPT

## 2020-02-21 PROCEDURE — 6370000000 HC RX 637 (ALT 250 FOR IP): Performed by: INTERNAL MEDICINE

## 2020-02-21 PROCEDURE — 2700000000 HC OXYGEN THERAPY PER DAY

## 2020-02-21 PROCEDURE — 2500000003 HC RX 250 WO HCPCS

## 2020-02-21 PROCEDURE — 93005 ELECTROCARDIOGRAM TRACING: CPT | Performed by: STUDENT IN AN ORGANIZED HEALTH CARE EDUCATION/TRAINING PROGRAM

## 2020-02-21 PROCEDURE — 83735 ASSAY OF MAGNESIUM: CPT

## 2020-02-21 PROCEDURE — 02HV33Z INSERTION OF INFUSION DEVICE INTO SUPERIOR VENA CAVA, PERCUTANEOUS APPROACH: ICD-10-PCS | Performed by: INTERNAL MEDICINE

## 2020-02-21 PROCEDURE — 2000000000 HC ICU R&B

## 2020-02-21 PROCEDURE — 2580000003 HC RX 258: Performed by: SURGERY

## 2020-02-21 PROCEDURE — 2580000003 HC RX 258: Performed by: STUDENT IN AN ORGANIZED HEALTH CARE EDUCATION/TRAINING PROGRAM

## 2020-02-21 PROCEDURE — 82330 ASSAY OF CALCIUM: CPT

## 2020-02-21 PROCEDURE — 99291 CRITICAL CARE FIRST HOUR: CPT | Performed by: SURGERY

## 2020-02-21 PROCEDURE — 6370000000 HC RX 637 (ALT 250 FOR IP): Performed by: STUDENT IN AN ORGANIZED HEALTH CARE EDUCATION/TRAINING PROGRAM

## 2020-02-21 PROCEDURE — 80053 COMPREHEN METABOLIC PANEL: CPT

## 2020-02-21 PROCEDURE — 36556 INSERT NON-TUNNEL CV CATH: CPT

## 2020-02-21 PROCEDURE — 6360000002 HC RX W HCPCS: Performed by: STUDENT IN AN ORGANIZED HEALTH CARE EDUCATION/TRAINING PROGRAM

## 2020-02-21 PROCEDURE — 82962 GLUCOSE BLOOD TEST: CPT

## 2020-02-21 RX ORDER — DILTIAZEM HYDROCHLORIDE 5 MG/ML
10 INJECTION INTRAVENOUS ONCE
Status: COMPLETED | OUTPATIENT
Start: 2020-02-21 | End: 2020-02-21

## 2020-02-21 RX ORDER — HALOPERIDOL 5 MG/ML
1 INJECTION INTRAMUSCULAR ONCE
Status: COMPLETED | OUTPATIENT
Start: 2020-02-21 | End: 2020-02-21

## 2020-02-21 RX ORDER — SOTALOL HYDROCHLORIDE 80 MG/1
80 TABLET ORAL 2 TIMES DAILY
Status: DISCONTINUED | OUTPATIENT
Start: 2020-02-21 | End: 2020-02-25

## 2020-02-21 RX ORDER — METOPROLOL TARTRATE 5 MG/5ML
5 INJECTION INTRAVENOUS EVERY 5 MIN PRN
Status: COMPLETED | OUTPATIENT
Start: 2020-02-21 | End: 2020-02-21

## 2020-02-21 RX ORDER — HEPARIN SODIUM (PORCINE) LOCK FLUSH IV SOLN 100 UNIT/ML 100 UNIT/ML
3 SOLUTION INTRAVENOUS EVERY 12 HOURS SCHEDULED
Status: DISCONTINUED | OUTPATIENT
Start: 2020-02-21 | End: 2020-02-21

## 2020-02-21 RX ORDER — DEXTROSE AND SODIUM CHLORIDE 5; .45 G/100ML; G/100ML
INJECTION, SOLUTION INTRAVENOUS CONTINUOUS
Status: DISCONTINUED | OUTPATIENT
Start: 2020-02-21 | End: 2020-02-23

## 2020-02-21 RX ORDER — SOTALOL HYDROCHLORIDE 80 MG/1
40 TABLET ORAL 2 TIMES DAILY
Status: DISCONTINUED | OUTPATIENT
Start: 2020-02-21 | End: 2020-02-21

## 2020-02-21 RX ORDER — SODIUM CHLORIDE 0.9 % (FLUSH) 0.9 %
10 SYRINGE (ML) INJECTION PRN
Status: DISCONTINUED | OUTPATIENT
Start: 2020-02-21 | End: 2020-02-21

## 2020-02-21 RX ORDER — LIDOCAINE HYDROCHLORIDE 10 MG/ML
5 INJECTION, SOLUTION EPIDURAL; INFILTRATION; INTRACAUDAL; PERINEURAL ONCE
Status: DISCONTINUED | OUTPATIENT
Start: 2020-02-21 | End: 2020-02-21

## 2020-02-21 RX ORDER — HEPARIN SODIUM (PORCINE) LOCK FLUSH IV SOLN 100 UNIT/ML 100 UNIT/ML
3 SOLUTION INTRAVENOUS PRN
Status: DISCONTINUED | OUTPATIENT
Start: 2020-02-21 | End: 2020-02-21

## 2020-02-21 RX ORDER — BISACODYL 10 MG
10 SUPPOSITORY, RECTAL RECTAL DAILY
Status: DISCONTINUED | OUTPATIENT
Start: 2020-02-21 | End: 2020-03-06 | Stop reason: HOSPADM

## 2020-02-21 RX ORDER — SOTALOL HYDROCHLORIDE 80 MG/1
40 TABLET ORAL ONCE
Status: COMPLETED | OUTPATIENT
Start: 2020-02-21 | End: 2020-02-21

## 2020-02-21 RX ORDER — POTASSIUM CHLORIDE 20 MEQ/1
40 TABLET, EXTENDED RELEASE ORAL ONCE
Status: DISCONTINUED | OUTPATIENT
Start: 2020-02-21 | End: 2020-02-21

## 2020-02-21 RX ORDER — METOPROLOL TARTRATE 5 MG/5ML
5 INJECTION INTRAVENOUS EVERY 6 HOURS
Status: DISCONTINUED | OUTPATIENT
Start: 2020-02-21 | End: 2020-02-21

## 2020-02-21 RX ORDER — METOPROLOL TARTRATE 5 MG/5ML
INJECTION INTRAVENOUS
Status: COMPLETED
Start: 2020-02-21 | End: 2020-02-21

## 2020-02-21 RX ORDER — LANOLIN ALCOHOL/MO/W.PET/CERES
3 CREAM (GRAM) TOPICAL NIGHTLY PRN
Status: DISCONTINUED | OUTPATIENT
Start: 2020-02-22 | End: 2020-02-23

## 2020-02-21 RX ADMIN — HYDRALAZINE HYDROCHLORIDE 10 MG: 20 INJECTION INTRAMUSCULAR; INTRAVENOUS at 04:00

## 2020-02-21 RX ADMIN — PIPERACILLIN AND TAZOBACTAM 3.38 G: 3; .375 INJECTION, POWDER, LYOPHILIZED, FOR SOLUTION INTRAVENOUS at 17:37

## 2020-02-21 RX ADMIN — HEPARIN SODIUM 5000 UNITS: 10000 INJECTION INTRAVENOUS; SUBCUTANEOUS at 06:30

## 2020-02-21 RX ADMIN — INSULIN LISPRO 1 UNITS: 100 INJECTION, SOLUTION INTRAVENOUS; SUBCUTANEOUS at 08:46

## 2020-02-21 RX ADMIN — DEXTROSE MONOHYDRATE 5 MG/HR: 50 INJECTION, SOLUTION INTRAVENOUS at 07:42

## 2020-02-21 RX ADMIN — SENNOSIDES 8.6 MG: 8.6 TABLET, FILM COATED ORAL at 20:29

## 2020-02-21 RX ADMIN — Medication 10 ML: at 08:00

## 2020-02-21 RX ADMIN — HALOPERIDOL LACTATE 1 MG: 5 INJECTION INTRAMUSCULAR at 22:52

## 2020-02-21 RX ADMIN — SOTALOL HYDROCHLORIDE 40 MG: 80 TABLET ORAL at 08:40

## 2020-02-21 RX ADMIN — METOPROLOL TARTRATE 5 MG: 5 INJECTION INTRAVENOUS at 07:36

## 2020-02-21 RX ADMIN — DEXTROSE AND SODIUM CHLORIDE: 5; 450 INJECTION, SOLUTION INTRAVENOUS at 23:57

## 2020-02-21 RX ADMIN — LABETALOL HYDROCHLORIDE 10 MG: 5 INJECTION INTRAVENOUS at 18:27

## 2020-02-21 RX ADMIN — POTASSIUM BICARBONATE 40 MEQ: 782 TABLET, EFFERVESCENT ORAL at 07:35

## 2020-02-21 RX ADMIN — MORPHINE SULFATE 1 MG: 2 INJECTION, SOLUTION INTRAMUSCULAR; INTRAVENOUS at 12:46

## 2020-02-21 RX ADMIN — DOCUSATE SODIUM 100 MG: 100 CAPSULE, LIQUID FILLED ORAL at 08:50

## 2020-02-21 RX ADMIN — LABETALOL HYDROCHLORIDE 10 MG: 5 INJECTION INTRAVENOUS at 19:18

## 2020-02-21 RX ADMIN — OXYCODONE 5 MG: 5 TABLET ORAL at 04:58

## 2020-02-21 RX ADMIN — DOCUSATE SODIUM 100 MG: 100 CAPSULE, LIQUID FILLED ORAL at 20:30

## 2020-02-21 RX ADMIN — SOTALOL HYDROCHLORIDE 80 MG: 80 TABLET ORAL at 21:02

## 2020-02-21 RX ADMIN — ENOXAPARIN SODIUM 60 MG: 60 INJECTION SUBCUTANEOUS at 21:02

## 2020-02-21 RX ADMIN — DILTIAZEM HYDROCHLORIDE 10 MG: 5 INJECTION INTRAVENOUS at 07:59

## 2020-02-21 RX ADMIN — METOPROLOL TARTRATE 5 MG: 5 INJECTION INTRAVENOUS at 07:25

## 2020-02-21 RX ADMIN — BISACODYL 10 MG: 10 SUPPOSITORY RECTAL at 16:15

## 2020-02-21 RX ADMIN — PIPERACILLIN AND TAZOBACTAM 3.38 G: 3; .375 INJECTION, POWDER, LYOPHILIZED, FOR SOLUTION INTRAVENOUS at 06:47

## 2020-02-21 RX ADMIN — HEPARIN SODIUM 5000 UNITS: 10000 INJECTION INTRAVENOUS; SUBCUTANEOUS at 15:16

## 2020-02-21 RX ADMIN — SOTALOL HYDROCHLORIDE 40 MG: 80 TABLET ORAL at 11:36

## 2020-02-21 RX ADMIN — MORPHINE SULFATE 1 MG: 2 INJECTION, SOLUTION INTRAMUSCULAR; INTRAVENOUS at 20:44

## 2020-02-21 RX ADMIN — OXYCODONE 5 MG: 5 TABLET ORAL at 17:59

## 2020-02-21 RX ADMIN — DEXTROSE AND SODIUM CHLORIDE: 5; 450 INJECTION, SOLUTION INTRAVENOUS at 00:03

## 2020-02-21 RX ADMIN — LABETALOL HYDROCHLORIDE 10 MG: 5 INJECTION INTRAVENOUS at 22:43

## 2020-02-21 RX ADMIN — PIPERACILLIN AND TAZOBACTAM 3.38 G: 3; .375 INJECTION, POWDER, LYOPHILIZED, FOR SOLUTION INTRAVENOUS at 23:58

## 2020-02-21 RX ADMIN — Medication 10 ML: at 20:30

## 2020-02-21 ASSESSMENT — PAIN SCALES - GENERAL
PAINLEVEL_OUTOF10: 6
PAINLEVEL_OUTOF10: 0
PAINLEVEL_OUTOF10: 7
PAINLEVEL_OUTOF10: 8
PAINLEVEL_OUTOF10: 3
PAINLEVEL_OUTOF10: 0
PAINLEVEL_OUTOF10: 7
PAINLEVEL_OUTOF10: 7
PAINLEVEL_OUTOF10: 4
PAINLEVEL_OUTOF10: 0

## 2020-02-21 ASSESSMENT — PAIN DESCRIPTION - PAIN TYPE
TYPE: ACUTE PAIN;SURGICAL PAIN
TYPE: ACUTE PAIN
TYPE: ACUTE PAIN;SURGICAL PAIN

## 2020-02-21 ASSESSMENT — PAIN DESCRIPTION - ORIENTATION
ORIENTATION: MID

## 2020-02-21 ASSESSMENT — PAIN DESCRIPTION - DESCRIPTORS
DESCRIPTORS: ACHING;DISCOMFORT
DESCRIPTORS: ACHING;DISCOMFORT

## 2020-02-21 ASSESSMENT — PAIN DESCRIPTION - FREQUENCY: FREQUENCY: INTERMITTENT

## 2020-02-21 ASSESSMENT — PAIN DESCRIPTION - ONSET: ONSET: GRADUAL

## 2020-02-21 ASSESSMENT — PAIN DESCRIPTION - LOCATION
LOCATION: ABDOMEN

## 2020-02-21 NOTE — PROGRESS NOTES
Patient found anxious and confused attempting to get out of bed. Had removed her monitor leads and her peripheral IV site. BGL checked. No need for intervention. Patient reoriented and new PIV inserted at this time. Will continue to monitor.

## 2020-02-21 NOTE — PROGRESS NOTES
Hospital Medicine    Subjective:  Pt alert conversive back in at fib with rvr now on iv cardizem gtt      Current Facility-Administered Medications:     dextrose 5 % and 0.45 % sodium chloride infusion, , Intravenous, Continuous, Ryley Rizvi MD, Last Rate: 100 mL/hr at 02/21/20 0626    dilTIAZem injection 10 mg, 10 mg, Intravenous, Once **FOLLOWED BY** dilTIAZem 100 mg in dextrose 5 % 100 mL infusion (ADD-[place]), 5 mg/hr, Intravenous, Continuous, Juancarlos Isaac MD, Last Rate: 5 mL/hr at 02/21/20 0742, 5 mg/hr at 02/21/20 0742    hydrALAZINE (APRESOLINE) injection 10 mg, 10 mg, Intravenous, Q30 Min PRN, Juancarlos Isaac MD, 10 mg at 02/21/20 0400    labetalol (NORMODYNE;TRANDATE) injection 10 mg, 10 mg, Intravenous, Q30 Min PRN, Juancarlos Isaac MD, 10 mg at 02/20/20 2106    senna (SENOKOT) tablet 8.6 mg, 1 tablet, Oral, Nightly, Juancarlos Isaac MD, 8.6 mg at 02/20/20 2045    docusate sodium (COLACE) capsule 100 mg, 100 mg, Oral, BID, Juancarlos Isaac MD, 100 mg at 02/20/20 2045    oxyCODONE (ROXICODONE) immediate release tablet 5 mg, 5 mg, Oral, Q4H PRN, Juancarlos Isaac MD, 5 mg at 02/21/20 0458    perflutren lipid microspheres (DEFINITY) injection 1.65 mg, 1.5 mL, Intravenous, ONCE PRN, Juancarlos Isaac MD    glucose (GLUTOSE) 40 % oral gel 15 g, 15 g, Oral, PRN, Juancarlos Isaac MD    dextrose 50 % IV solution, 12.5 g, Intravenous, PRN, Juancarlos Isaac MD, 12.5 g at 02/20/20 0823    glucagon (rDNA) injection 1 mg, 1 mg, Intramuscular, PRN, Juancarlos Isaac MD    dextrose 5 % solution, 100 mL/hr, Intravenous, PRN, Juancarlos Isaac MD    insulin lispro (HUMALOG) injection vial 0-6 Units, 0-6 Units, Subcutaneous, Q4H, Juancarlos Isaac MD, 1 Units at 02/18/20 1138    levothyroxine (SYNTHROID) tablet 100 mcg, 100 mcg, Oral, Daily, Juancarlos Isaac MD, 100 mcg at 02/20/20 1038    morphine (PF) injection 1 mg, 1 mg, Intravenous, Q3H PRN, 1 mg at 02/20/20 1546 **OR** [DISCONTINUED] morphine (PF) injection 2 mg, 2 mg, Intravenous, Q3H PRN, Elisa Smith MD    sodium chloride flush 0.9 % injection 10 mL, 10 mL, Intravenous, 2 times per day, Mata Borges DO, 10 mL at 02/20/20 2048    sodium chloride flush 0.9 % injection 10 mL, 10 mL, Intravenous, PRN, Mata Borges DO, 10 mL at 02/18/20 2025    ondansetron (ZOFRAN) injection 4 mg, 4 mg, Intravenous, Q6H PRN, Mata Borges DO    piperacillin-tazobactam (ZOSYN) 3.375 g in dextrose 5 % 100 mL IVPB extended infusion (mini-bag), 3.375 g, Intravenous, Q8H, Elisa Smith MD, Last Rate: 25 mL/hr at 02/21/20 0647, 3.375 g at 02/21/20 0647    heparin (porcine) injection 5,000 Units, 5,000 Units, Subcutaneous, Q8H, Jean-Paul Corey MD, 5,000 Units at 02/21/20 0630    Objective:    BP (!) 129/97   Pulse (S) 149   Temp 97.9 °F (36.6 °C) (Axillary)   Resp (!) 31   Ht 5' 6\" (1.676 m)   Wt 129 lb 1.6 oz (58.6 kg)   SpO2 95%   BMI 20.84 kg/m²     Heart:  irreg tachy                          Lungs:  CTA bilaterally, no wheeze, rales or rhonchi  Abd: bowel sounds present, nondistended  Extrem:  No clubbing, cyanosis, or edema    CBC with Differential:    Lab Results   Component Value Date    WBC 8.3 02/21/2020    RBC 2.59 02/21/2020    HGB 7.8 02/21/2020    HCT 25.0 02/21/2020     02/21/2020    MCV 96.5 02/21/2020    MCH 30.1 02/21/2020    MCHC 31.2 02/21/2020    RDW 14.0 02/21/2020    NRBC 0.9 02/19/2020    METASPCT 0.9 02/21/2020    LYMPHOPCT 4.4 02/21/2020    MONOPCT 5.3 02/21/2020    MYELOPCT 1.7 02/20/2020    EOSPCT 3 10/07/2010    BASOPCT 0.5 02/21/2020    MONOSABS 0.42 02/21/2020    LYMPHSABS 0.33 02/21/2020    EOSABS 0.07 02/21/2020    BASOSABS 0.00 02/21/2020     CMP:    Lab Results   Component Value Date     02/21/2020    K 3.7 02/21/2020    K 4.0 02/20/2020     02/21/2020    CO2 18 02/21/2020    BUN 47 02/21/2020    CREATININE 1.2 02/21/2020    GFRAA 53 02/21/2020    LABGLOM 44 02/21/2020    GLUCOSE 112 02/21/2020    GLUCOSE 102 10/07/2010 PROT 5.5 02/21/2020    LABALBU 2.8 02/21/2020    LABALBU 4.5 10/07/2010    CALCIUM 8.4 02/21/2020    BILITOT 0.7 02/21/2020    ALKPHOS 77 02/21/2020     02/21/2020    ALT 1,162 02/21/2020     Warfarin PT/INR:    Lab Results   Component Value Date    INR 1.0 02/17/2020    INR 1.0 01/20/2020    INR 1.0 01/07/2020    PROTIME 11.8 02/17/2020    PROTIME 11.2 01/20/2020    PROTIME 11.0 01/07/2020       Assessment:    Principal Problem:    SBO (small bowel obstruction) (HCC)  Active Problems:    Essential hypertension    Depression    PAD (peripheral artery disease) (HCC)    Acquired hypothyroidism    Cecal volvulus (HCC)    ROSELYN (acute kidney injury) ([facility] [address]    Shock liver    Paroxysmal atrial fibrillation (HCC)  Resolved Problems:    * No resolved hospital problems.  *      Plan:  Consider bblocker start anticoagulation if ok with surgery replace k await further input from cardiology        Cadence Weber  7:54 AM  2/21/2020

## 2020-02-21 NOTE — PROGRESS NOTES
Nutrition Assessment    Type and Reason for Visit: Initial    Nutrition Recommendations: Continue NPO  Advance diet as medically appropriate, would recommend ONS with diet advancement. Nutrition Assessment: Pt admit w/ SBO s/p ex lap w/ R susy. Noted shock liver, continued elevated LFTs. Continued NPO, pending possible initiation of clears. Malnutrition Assessment:  · Malnutrition Status: Meets the criteria for severe malnutrition  · Context: Chronic illness  · Findings of the 6 clinical characteristics of malnutrition (Minimum of 2 out of 6 clinical characteristics is required to make the diagnosis of moderate or severe Protein Calorie Malnutrition based on AND/ASPEN Guidelines):  1. Energy Intake-Less than or equal to 75% of estimated energy requirement, Greater than or equal to 3 months    2. Weight Loss-No significant weight loss, in 6 months  3. Fat Loss-Moderate subcutaneous fat loss,    4. Muscle Loss-Severe muscle mass loss,    5. Fluid Accumulation-No significant fluid accumulation,    6.   Strength-Not measured    Nutrition Risk Level: High    Nutrient Needs:  · Estimated Daily Total Kcal: (MSJ 1041 x1.3= 1353)  · Estimated Daily Protein (g): 75-90(1.5-1.8gm/kg admit wt)    Nutrition Diagnosis:   · Problem: Severe malnutrition, In context of chronic illness  · Etiology: related to Alteration in GI function(2/2 chronic abd pain)     Signs and symptoms:  as evidenced by Severe muscle loss, Moderate loss of subcutaneous fat, Diet history of poor intake    Objective Information:  · Nutrition-Focused Physical Findings: hypoactive BS, abd distention, AMS, no noted edema, + I/Os   · Wound Type: Surgical Wound  · Current Nutrition Therapies:  · Oral Diet Orders: NPO   · Anthropometric Measures:  · Ht: 5' 6\" (167.6 cm)   · Current Body Wt: 129 lb (58.5 kg)(2/21 actual)  · Admission Body Wt: 111 lb (50.3 kg)(2/17 actual)  · Usual Body Wt: 110 lb (49.9 kg)(08/2019 per EMR, actual)  · % Weight

## 2020-02-21 NOTE — CONSULTS
accessory muscles. No tactile vocal fremitus. No rhonchi, crackles or rales. Heart:  S1 > S2. Regular rhythm. No gallop or murmur. No rub, palpable thrill or heave noted. PMI 5th intercostal space midclavicular line. Abdomen: Abdomen soft, mildly protuberant, non-tender. BS normal. No masses, organomegaly. No hernia noted. Extremities: Extremities normal. No deformities, edema, or skin discoloration. No cyanosis or clubbing noted to the nails. Peripheral pulses present 2+ upper extremities and present 1+  lower extremities. Musculoskeletal: Spine ROM normal. Muscular strength intact. Neuro: Cranial nerves intact. Motor: Strength 5/5 in all extremities. Reflexes 2+ in all extremities. No focal weakness. Sensory: grossly normal to touch. Coordination intact. Pertinent Labs:  CBC:   Recent Labs     02/18/20  0430 02/19/20  0430 02/19/20  0955 02/20/20  0455   WBC 6.1 7.8  --  9.5   HGB 8.3* 5.9* 8.4* 8.7*    256  --  297     BMP:  Recent Labs     02/18/20  0430 02/19/20  0430 02/20/20  0715   * 138 138   K 4.3 3.8 4.0   CL 99 108* 109*   CO2 13* 12* 19*   BUN 57* 77* 65*   CREATININE 1.3* 2.1* 1.7*   GLUCOSE 177* 120* 94   LABGLOM 40 23 30     ABGs:   Lab Results   Component Value Date    PH 7.343 02/18/2020    PO2 135.6 02/18/2020    PCO2 30.9 02/18/2020     INR: No results for input(s): INR in the last 72 hours.   PRO-BNP: No results found for: PROBNP   Cardiac Injury Profile:   Recent Labs     02/19/20  1600   TROPONINI 0.02      Lipid Profile:   Lab Results   Component Value Date    TRIG 85 01/30/2019    HDL 76 01/30/2019    LDLCALC 76 01/30/2019    CHOL 169 01/30/2019      Thyroid:   Lab Results   Component Value Date    TSH 4.050 02/20/2020      Hemoglobin A1C: No components found for: HGBA1C   ECG:  See report    Radiology:  Ct Abdomen Pelvis W Iv Contrast Additional Contrast? None    Addendum Date: 2/17/2020    Note is made of a vague lucency in the left iliac bone with some areas of view Indication: Acute abdominal pain. SBO on CT scan 2020, 1557 hours. Comparison: 2008 FINDINGS: Heart and pulmonary vascularity normal. Lungs clear. Costophrenic angles sharp. Normal aorta. No acute cardiopulmonary findings. Xr Abdomen For Ng/og/ne Tube Placement    Result Date: 2020  Patient MRN: [id number] : 1950 Age:  68 years Gender: Female Order Date: 2020 6:45 PM Exam: XR ABDOMEN FOR NG/OG/NE TUBE PLACEMENT Number of Images: 1 views Indication:   Confirmation of course of NG/OG/NE tube and location of tip of tube Confirmation of course of NG/OG/NE tube and location of tip of tube Portable? ->Yes Comparison: None. Findings: Study demonstrates chest and upper abdomen the nasogastric tube is in satisfactory position with the tip in the stomach. The lung fields are clear there is small right-sided pleural effusion. The abdomen demonstrate colonic distention. Nasogastric tube with the tip in the stomach in satisfactory position. Assessment:    Principal Problem:    SBO (small bowel obstruction) ([facility])  Active Problems:    Essential hypertension    Depression    PAD (peripheral artery disease) (HCC)    Acquired hypothyroidism    Cecal volvulus (HCC)    ROSELYN (acute kidney injury) ([facility] [address]    Shock liver  Resolved Problems:    * No resolved hospital problems. *      Plan: At this point I would not initiate any medical therapy other than to utilize nitroglycerin paste to control blood pressure until she can take oral medications. I suspect that much of her hypertension is related to Her postoperative pain. I have spent more than 30 critical-careminutes face to face with Apoorva Infante reviewing notes and laboratory data with greater than 50% of this time instructing and counseling the patient  regarding my findings and recommendations and I have answered all questions as posed to me by Ms. Elizabeth.  Thank you, Kalyani Chen MD for allowing me to

## 2020-02-21 NOTE — FLOWSHEET NOTE
Dr. Adriana Ortega notified of new onset Afib RVR       02/21/20 0640   Vitals   Pulse 149   Heart Rate Source Monitor   Resp (!) 31   BP (!) 129/97   MAP (mmHg) 104   Level of Consciousness 0   Cardiac Rhythm Atrial fibrillation;RVR   Oxygen Therapy   SpO2 95 %   Pulse Oximeter Device Mode Continuous   Pulse Oximeter Device Location Finger   O2 Device Nasal cannula   O2 Flow Rate (L/min) 3 L/min

## 2020-02-21 NOTE — PLAN OF CARE
Problem: Falls - Risk of:  Goal: Will remain free from falls  Description  Will remain free from falls  2/21/2020 1031 by Nestor Gonzalez RN  Outcome: Met This Shift  2/21/2020 0232 by Geneva Francois RN  Outcome: Met This Shift  Goal: Absence of physical injury  Description  Absence of physical injury  2/21/2020 1031 by Nestor Gonzalez RN  Outcome: Met This Shift  2/21/2020 0232 by Geneva Francois RN  Outcome: Met This Shift     Problem: Musculor/Skeletal Functional Status  Goal: Highest potential functional level  Outcome: Met This Shift  Goal: Absence of falls  Outcome: Met This Shift

## 2020-02-21 NOTE — PROGRESS NOTES
Patient was noted to be hypoglycemic. A half an amp of D50 was given and fluids were switched to D5 half-normal saline. No acute change mental status. Continue to monitor glucose.    Electronically signed by Jamar Stovall MD on 2/20/20 at 11:57 PM

## 2020-02-22 ENCOUNTER — APPOINTMENT (OUTPATIENT)
Dept: GENERAL RADIOLOGY | Age: 70
DRG: 329 | End: 2020-02-22
Payer: MEDICARE

## 2020-02-22 LAB
ALBUMIN SERPL-MCNC: 2.9 G/DL (ref 3.5–5.2)
ALP BLD-CCNC: 76 U/L (ref 35–104)
ALT SERPL-CCNC: 1080 U/L (ref 0–32)
ANION GAP SERPL CALCULATED.3IONS-SCNC: 14 MMOL/L (ref 7–16)
ANISOCYTOSIS: ABNORMAL
AST SERPL-CCNC: 643 U/L (ref 0–31)
BASOPHILS ABSOLUTE: 0 E9/L (ref 0–0.2)
BASOPHILS RELATIVE PERCENT: 0.3 % (ref 0–2)
BILIRUB SERPL-MCNC: 1.2 MG/DL (ref 0–1.2)
BUN BLDV-MCNC: 36 MG/DL (ref 8–23)
CALCIUM IONIZED: 1.21 MMOL/L (ref 1.15–1.33)
CALCIUM SERPL-MCNC: 7.9 MG/DL (ref 8.6–10.2)
CHLORIDE BLD-SCNC: 106 MMOL/L (ref 98–107)
CO2: 19 MMOL/L (ref 22–29)
CREAT SERPL-MCNC: 0.9 MG/DL (ref 0.5–1)
EKG ATRIAL RATE: 141 BPM
EKG ATRIAL RATE: 144 BPM
EKG Q-T INTERVAL: 236 MS
EKG Q-T INTERVAL: 336 MS
EKG QRS DURATION: 74 MS
EKG QRS DURATION: 76 MS
EKG QTC CALCULATION (BAZETT): 375 MS
EKG QTC CALCULATION (BAZETT): 523 MS
EKG R AXIS: 72 DEGREES
EKG R AXIS: 86 DEGREES
EKG T AXIS: -85 DEGREES
EKG T AXIS: 83 DEGREES
EKG VENTRICULAR RATE: 146 BPM
EKG VENTRICULAR RATE: 152 BPM
EOSINOPHILS ABSOLUTE: 0 E9/L (ref 0.05–0.5)
EOSINOPHILS RELATIVE PERCENT: 0 % (ref 0–6)
GFR AFRICAN AMERICAN: >60
GFR NON-AFRICAN AMERICAN: >60 ML/MIN/1.73
GLUCOSE BLD-MCNC: 158 MG/DL (ref 74–99)
HCT VFR BLD CALC: 25.4 % (ref 34–48)
HEMOGLOBIN: 8.3 G/DL (ref 11.5–15.5)
HYPOCHROMIA: ABNORMAL
LYMPHOCYTES ABSOLUTE: 0.46 E9/L (ref 1.5–4)
LYMPHOCYTES RELATIVE PERCENT: 3.5 % (ref 20–42)
MAGNESIUM: 1.8 MG/DL (ref 1.6–2.6)
MCH RBC QN AUTO: 31.7 PG (ref 26–35)
MCHC RBC AUTO-ENTMCNC: 32.7 % (ref 32–34.5)
MCV RBC AUTO: 96.9 FL (ref 80–99.9)
METER GLUCOSE: 131 MG/DL (ref 74–99)
METER GLUCOSE: 132 MG/DL (ref 74–99)
METER GLUCOSE: 136 MG/DL (ref 74–99)
METER GLUCOSE: 146 MG/DL (ref 74–99)
METER GLUCOSE: 148 MG/DL (ref 74–99)
METER GLUCOSE: 65 MG/DL (ref 74–99)
METER GLUCOSE: 93 MG/DL (ref 74–99)
MONOCYTES ABSOLUTE: 0.46 E9/L (ref 0.1–0.95)
MONOCYTES RELATIVE PERCENT: 3.5 % (ref 2–12)
MYELOCYTE PERCENT: 0.9 % (ref 0–0)
NEUTROPHILS ABSOLUTE: 10.7 E9/L (ref 1.8–7.3)
NEUTROPHILS RELATIVE PERCENT: 92.2 % (ref 43–80)
OVALOCYTES: ABNORMAL
PDW BLD-RTO: 14 FL (ref 11.5–15)
PHOSPHORUS: 3.2 MG/DL (ref 2.5–4.5)
PLATELET # BLD: 297 E9/L (ref 130–450)
PMV BLD AUTO: 9.9 FL (ref 7–12)
POIKILOCYTES: ABNORMAL
POLYCHROMASIA: ABNORMAL
POTASSIUM SERPL-SCNC: 3.4 MMOL/L (ref 3.5–5)
RBC # BLD: 2.62 E12/L (ref 3.5–5.5)
SODIUM BLD-SCNC: 139 MMOL/L (ref 132–146)
TARGET CELLS: ABNORMAL
TOTAL PROTEIN: 5.5 G/DL (ref 6.4–8.3)
WBC # BLD: 11.5 E9/L (ref 4.5–11.5)

## 2020-02-22 PROCEDURE — 2500000003 HC RX 250 WO HCPCS: Performed by: STUDENT IN AN ORGANIZED HEALTH CARE EDUCATION/TRAINING PROGRAM

## 2020-02-22 PROCEDURE — 94640 AIRWAY INHALATION TREATMENT: CPT

## 2020-02-22 PROCEDURE — 6370000000 HC RX 637 (ALT 250 FOR IP): Performed by: STUDENT IN AN ORGANIZED HEALTH CARE EDUCATION/TRAINING PROGRAM

## 2020-02-22 PROCEDURE — 36415 COLL VENOUS BLD VENIPUNCTURE: CPT

## 2020-02-22 PROCEDURE — 99291 CRITICAL CARE FIRST HOUR: CPT | Performed by: SURGERY

## 2020-02-22 PROCEDURE — 6370000000 HC RX 637 (ALT 250 FOR IP): Performed by: INTERNAL MEDICINE

## 2020-02-22 PROCEDURE — 82330 ASSAY OF CALCIUM: CPT

## 2020-02-22 PROCEDURE — 2000000000 HC ICU R&B

## 2020-02-22 PROCEDURE — 2580000003 HC RX 258: Performed by: STUDENT IN AN ORGANIZED HEALTH CARE EDUCATION/TRAINING PROGRAM

## 2020-02-22 PROCEDURE — 2580000003 HC RX 258: Performed by: INTERNAL MEDICINE

## 2020-02-22 PROCEDURE — 6360000002 HC RX W HCPCS: Performed by: STUDENT IN AN ORGANIZED HEALTH CARE EDUCATION/TRAINING PROGRAM

## 2020-02-22 PROCEDURE — 2580000003 HC RX 258: Performed by: SURGERY

## 2020-02-22 PROCEDURE — 80053 COMPREHEN METABOLIC PANEL: CPT

## 2020-02-22 PROCEDURE — 2500000003 HC RX 250 WO HCPCS: Performed by: SURGERY

## 2020-02-22 PROCEDURE — 84100 ASSAY OF PHOSPHORUS: CPT

## 2020-02-22 PROCEDURE — 71045 X-RAY EXAM CHEST 1 VIEW: CPT

## 2020-02-22 PROCEDURE — 85025 COMPLETE CBC W/AUTO DIFF WBC: CPT

## 2020-02-22 PROCEDURE — 82962 GLUCOSE BLOOD TEST: CPT

## 2020-02-22 PROCEDURE — 6360000002 HC RX W HCPCS: Performed by: SURGERY

## 2020-02-22 PROCEDURE — 83735 ASSAY OF MAGNESIUM: CPT

## 2020-02-22 PROCEDURE — 94664 DEMO&/EVAL PT USE INHALER: CPT

## 2020-02-22 PROCEDURE — 2700000000 HC OXYGEN THERAPY PER DAY

## 2020-02-22 PROCEDURE — 6370000000 HC RX 637 (ALT 250 FOR IP): Performed by: SURGERY

## 2020-02-22 RX ORDER — IPRATROPIUM BROMIDE AND ALBUTEROL SULFATE 2.5; .5 MG/3ML; MG/3ML
1 SOLUTION RESPIRATORY (INHALATION)
Status: DISCONTINUED | OUTPATIENT
Start: 2020-02-22 | End: 2020-03-04

## 2020-02-22 RX ORDER — DEXTROSE MONOHYDRATE 25 G/50ML
12.5 INJECTION, SOLUTION INTRAVENOUS PRN
Status: DISCONTINUED | OUTPATIENT
Start: 2020-02-22 | End: 2020-03-06 | Stop reason: HOSPADM

## 2020-02-22 RX ORDER — FUROSEMIDE 10 MG/ML
20 INJECTION INTRAMUSCULAR; INTRAVENOUS ONCE
Status: COMPLETED | OUTPATIENT
Start: 2020-02-22 | End: 2020-02-22

## 2020-02-22 RX ORDER — FUROSEMIDE 10 MG/ML
20 INJECTION INTRAMUSCULAR; INTRAVENOUS EVERY 8 HOURS
Status: COMPLETED | OUTPATIENT
Start: 2020-02-22 | End: 2020-02-23

## 2020-02-22 RX ORDER — LANOLIN ALCOHOL/MO/W.PET/CERES
3 CREAM (GRAM) TOPICAL ONCE
Status: COMPLETED | OUTPATIENT
Start: 2020-02-22 | End: 2020-02-22

## 2020-02-22 RX ORDER — DEXTROSE MONOHYDRATE 25 G/50ML
25 INJECTION, SOLUTION INTRAVENOUS ONCE
Status: DISCONTINUED | OUTPATIENT
Start: 2020-02-22 | End: 2020-02-22

## 2020-02-22 RX ADMIN — IPRATROPIUM BROMIDE AND ALBUTEROL SULFATE 1 AMPULE: 2.5; .5 SOLUTION RESPIRATORY (INHALATION) at 20:33

## 2020-02-22 RX ADMIN — DOCUSATE SODIUM 100 MG: 100 CAPSULE, LIQUID FILLED ORAL at 21:26

## 2020-02-22 RX ADMIN — INSULIN LISPRO 1 UNITS: 100 INJECTION, SOLUTION INTRAVENOUS; SUBCUTANEOUS at 04:39

## 2020-02-22 RX ADMIN — DEXMEDETOMIDINE HYDROCHLORIDE 0.4 MCG/KG/HR: 100 INJECTION, SOLUTION INTRAVENOUS at 02:19

## 2020-02-22 RX ADMIN — SENNOSIDES 8.6 MG: 8.6 TABLET, FILM COATED ORAL at 21:26

## 2020-02-22 RX ADMIN — FUROSEMIDE 20 MG: 10 INJECTION, SOLUTION INTRAMUSCULAR; INTRAVENOUS at 07:40

## 2020-02-22 RX ADMIN — SOTALOL HYDROCHLORIDE 80 MG: 80 TABLET ORAL at 07:41

## 2020-02-22 RX ADMIN — IPRATROPIUM BROMIDE AND ALBUTEROL SULFATE 1 AMPULE: 2.5; .5 SOLUTION RESPIRATORY (INHALATION) at 16:54

## 2020-02-22 RX ADMIN — Medication 10 ML: at 07:42

## 2020-02-22 RX ADMIN — FUROSEMIDE 20 MG: 10 INJECTION, SOLUTION INTRAMUSCULAR; INTRAVENOUS at 16:05

## 2020-02-22 RX ADMIN — LEVOTHYROXINE SODIUM 100 MCG: 0.1 TABLET ORAL at 07:41

## 2020-02-22 RX ADMIN — LABETALOL HYDROCHLORIDE 10 MG: 5 INJECTION INTRAVENOUS at 01:03

## 2020-02-22 RX ADMIN — MELATONIN 3 MG ORAL TABLET 3 MG: 3 TABLET ORAL at 00:20

## 2020-02-22 RX ADMIN — IPRATROPIUM BROMIDE AND ALBUTEROL SULFATE 1 AMPULE: 2.5; .5 SOLUTION RESPIRATORY (INHALATION) at 12:32

## 2020-02-22 RX ADMIN — DEXTROSE AND SODIUM CHLORIDE: 5; 450 INJECTION, SOLUTION INTRAVENOUS at 12:34

## 2020-02-22 RX ADMIN — BISACODYL 10 MG: 10 SUPPOSITORY RECTAL at 09:40

## 2020-02-22 RX ADMIN — OXYCODONE 5 MG: 5 TABLET ORAL at 10:16

## 2020-02-22 RX ADMIN — Medication 10 ML: at 21:25

## 2020-02-22 RX ADMIN — PIPERACILLIN AND TAZOBACTAM 3.38 G: 3; .375 INJECTION, POWDER, LYOPHILIZED, FOR SOLUTION INTRAVENOUS at 06:56

## 2020-02-22 RX ADMIN — ENOXAPARIN SODIUM 60 MG: 60 INJECTION SUBCUTANEOUS at 09:42

## 2020-02-22 RX ADMIN — LABETALOL HYDROCHLORIDE 10 MG: 5 INJECTION INTRAVENOUS at 10:48

## 2020-02-22 RX ADMIN — DOCUSATE SODIUM 100 MG: 100 CAPSULE, LIQUID FILLED ORAL at 07:41

## 2020-02-22 RX ADMIN — DEXTROSE 50 % IN WATER (D50W) INTRAVENOUS SYRINGE 12.5 G: at 16:15

## 2020-02-22 RX ADMIN — ENOXAPARIN SODIUM 60 MG: 60 INJECTION SUBCUTANEOUS at 21:27

## 2020-02-22 RX ADMIN — LABETALOL HYDROCHLORIDE 10 MG: 5 INJECTION INTRAVENOUS at 07:38

## 2020-02-22 RX ADMIN — SOTALOL HYDROCHLORIDE 80 MG: 80 TABLET ORAL at 21:25

## 2020-02-22 ASSESSMENT — PAIN DESCRIPTION - PAIN TYPE: TYPE: SURGICAL PAIN

## 2020-02-22 ASSESSMENT — PAIN SCALES - GENERAL
PAINLEVEL_OUTOF10: 0
PAINLEVEL_OUTOF10: 0
PAINLEVEL_OUTOF10: 10

## 2020-02-22 ASSESSMENT — PAIN DESCRIPTION - FREQUENCY: FREQUENCY: INTERMITTENT

## 2020-02-22 ASSESSMENT — PAIN DESCRIPTION - DESCRIPTORS: DESCRIPTORS: ACHING;DISCOMFORT

## 2020-02-22 ASSESSMENT — PAIN DESCRIPTION - ORIENTATION: ORIENTATION: MID

## 2020-02-22 ASSESSMENT — PAIN DESCRIPTION - ONSET: ONSET: GRADUAL

## 2020-02-22 ASSESSMENT — PAIN DESCRIPTION - LOCATION: LOCATION: ABDOMEN

## 2020-02-22 NOTE — PROGRESS NOTES
Hospital Medicine    Subjective:    Patient agitated on my evaluation  Does answer questions but very slowly      Current Facility-Administered Medications:     furosemide (LASIX) injection 20 mg, 20 mg, Intravenous, Q8H, Dwayne SANTORO Capal, DO, 20 mg at 02/22/20 1605    ipratropium-albuterol (DUONEB) nebulizer solution 1 ampule, 1 ampule, Inhalation, Q4H WA, Dwayne SANTORO Capal, DO, 1 ampule at 02/22/20 1654    dextrose 5 % and 0.45 % sodium chloride infusion, , Intravenous, Continuous, Ivis Main MD, Last Rate: 50 mL/hr at 02/22/20 1234    sotalol (BETAPACE) tablet 80 mg, 80 mg, Oral, BID, Kacie Fell, DO, 80 mg at 02/22/20 0741    bisacodyl (DULCOLAX) suppository 10 mg, 10 mg, Rectal, Daily, Melquiades Vyas MD, 10 mg at 02/22/20 0940    enoxaparin (LOVENOX) injection 60 mg, 1 mg/kg, Subcutaneous, BID, Melquiades Vyas MD, 60 mg at 02/22/20 6206    melatonin tablet 3 mg, 3 mg, Oral, Nightly PRN, Ya Martinez MD    hydrALAZINE (APRESOLINE) injection 10 mg, 10 mg, Intravenous, Q30 Min PRN, Melquiades Vyas MD, 10 mg at 02/21/20 0400    labetalol (NORMODYNE;TRANDATE) injection 10 mg, 10 mg, Intravenous, Q30 Min PRN, Melquiades Vyas MD, 10 mg at 02/22/20 1048    senna (SENOKOT) tablet 8.6 mg, 1 tablet, Oral, Nightly, Melquiades Vyas MD, 8.6 mg at 02/21/20 2029    docusate sodium (COLACE) capsule 100 mg, 100 mg, Oral, BID, Melquiades Vyas MD, 100 mg at 02/22/20 0741    oxyCODONE (ROXICODONE) immediate release tablet 5 mg, 5 mg, Oral, Q4H PRN, Melquiades Vyas MD, 5 mg at 02/22/20 1016    perflutren lipid microspheres (DEFINITY) injection 1.65 mg, 1.5 mL, Intravenous, ONCE PRN, Melquiades Vyas MD    glucose (GLUTOSE) 40 % oral gel 15 g, 15 g, Oral, PRN, Melquiades Vyas MD    dextrose 50 % IV solution, 12.5 g, Intravenous, PRN, Melquiades Vyas MD, 12.5 g at 02/22/20 1615    glucagon (rDNA) injection 1 mg, 1 mg, Intramuscular, PRN, Melquiades Vyas MD    dextrose 5 % solution, 100 mL/hr, Intravenous, PRN, Tru Vinson Melanie Can MD    insulin lispro (HUMALOG) injection vial 0-6 Units, 0-6 Units, Subcutaneous, Q4H, Mireya Gonzalez MD, 1 Units at 02/22/20 0439    levothyroxine (SYNTHROID) tablet 100 mcg, 100 mcg, Oral, Daily, Mireya Gonzalez MD, 100 mcg at 02/22/20 0741    morphine (PF) injection 1 mg, 1 mg, Intravenous, Q3H PRN, 1 mg at 02/21/20 2044 **OR** [DISCONTINUED] morphine (PF) injection 2 mg, 2 mg, Intravenous, Q3H PRN, Mireya Gonzalez MD    sodium chloride flush 0.9 % injection 10 mL, 10 mL, Intravenous, 2 times per day, Jena Borges DO, 10 mL at 02/22/20 0742    sodium chloride flush 0.9 % injection 10 mL, 10 mL, Intravenous, PRN, Jena Borges DO, 10 mL at 02/18/20 2025    ondansetron (ZOFRAN) injection 4 mg, 4 mg, Intravenous, Q6H PRN, Jena Borges DO    Objective:    BP (!) 198/101   Pulse 80   Temp 98.2 °F (36.8 °C) (Axillary)   Resp 25   Ht 5' 6\" (1.676 m)   Wt 129 lb 1.6 oz (58.6 kg)   SpO2 100%   BMI 20.84 kg/m²     Heart:  Reg  Lungs:  CTA bilaterally, no wheeze, rales or rhonchi  Abd: bowel sounds present, nondistended  Extrem:  No clubbing, cyanosis, or edema    CBC with Differential:    Lab Results   Component Value Date    WBC 11.5 02/22/2020    RBC 2.62 02/22/2020    HGB 8.3 02/22/2020    HCT 25.4 02/22/2020     02/22/2020    MCV 96.9 02/22/2020    MCH 31.7 02/22/2020    MCHC 32.7 02/22/2020    RDW 14.0 02/22/2020    NRBC 0.9 02/19/2020    METASPCT 0.9 02/21/2020    LYMPHOPCT 3.5 02/22/2020    MONOPCT 3.5 02/22/2020    MYELOPCT 0.9 02/22/2020    EOSPCT 3 10/07/2010    BASOPCT 0.3 02/22/2020    MONOSABS 0.46 02/22/2020    LYMPHSABS 0.46 02/22/2020    EOSABS 0.00 02/22/2020    BASOSABS 0.00 02/22/2020     CMP:    Lab Results   Component Value Date     02/22/2020    K 3.4 02/22/2020    K 4.0 02/20/2020     02/22/2020    CO2 19 02/22/2020    BUN 36 02/22/2020    CREATININE 0.9 02/22/2020    GFRAA >60 02/22/2020    LABGLOM >60 02/22/2020    GLUCOSE 158 02/22/2020 GLUCOSE 102 10/07/2010    PROT 5.5 02/22/2020    LABALBU 2.9 02/22/2020    LABALBU 4.5 10/07/2010    CALCIUM 7.9 02/22/2020    BILITOT 1.2 02/22/2020    ALKPHOS 76 02/22/2020     02/22/2020    ALT 1,080 02/22/2020     Warfarin PT/INR:    Lab Results   Component Value Date    INR 1.0 02/17/2020    INR 1.0 01/20/2020    INR 1.0 01/07/2020    PROTIME 11.8 02/17/2020    PROTIME 11.2 01/20/2020    PROTIME 11.0 01/07/2020       Assessment:    Principal Problem:    SBO (small bowel obstruction) (HCC)  Active Problems:    Essential hypertension    Depression    PAD (peripheral artery disease) (HCC)    Acquired hypothyroidism    Cecal volvulus (HCC)    ROSELYN (acute kidney injury) ([facility] [address]    Shock liver    Paroxysmal atrial fibrillation (HCC)    Severe protein-calorie malnutrition (HCC)  Resolved Problems:    * No resolved hospital problems.  *  parox at fib with rvr    Plan:  Await echo  bp control-rate control due to A. fib RVR-cardiology following  Discontinue  ivf due to volume overload  Diuresis to be continued as ordered by general surgery-caution with overdiuresis  Cozaar discontinued due to [facility]  serial labs daily       pt/ot eval   increase activity DC planning        Lazara Grimm  6:04 PM  2/22/2020

## 2020-02-22 NOTE — PROGRESS NOTES
[facility]  SURGICAL INTENSIVE CARE UNIT (SICU)  ATTENDING PHYSICIAN CRITICAL CARE PROGRESS NOTE     I have examined the patient, reviewed the record, and discussed the case with the APN/ resident. Please refer to the APN/ resident's note. I agree with the assessment and plan. I have reviewed all relevant labs and imaging data. The following summarizes my clinical findings and independent assessment. CC:  Critical care management after right susy for cecal volvulus    Hospital Course/Overnight Events:  2/17 Bren Elizabeth is a 68 y. o. female with extensive PVD on plavix and pletal, who presents for evaluation of a high grade bowel obstruction. She had been tachycardic and hypotensive; decision was made for exploratory laparotomy where she was found to have a perforated cecal volvulus for which she underwent a right hemicolectomy with side to side ileocolonic anastomosis 2/17. 2/18 Extubated   2/19 1 unit PRBC for acute blood loss anemia   2/20 Patient had a short run A fib RVR yesterday, self resolved and resolved Faster than we could obtain an EKG , She states she went into a fib > 1 week ago and has possibly seen Dr. Capo Bojorquez  ( can not find any notes)   2/21--A.fib with RVR again this AM--given lopressor and then started on Cardizem  2/22--agitated overnight--pulled all IVs out    Pt denies pain or SOB currently.     Awake and alert  Follows commands  Hrt:  Irregular  Lungs:  Scattered wheezes bilaterally  Abd:  Soft; expected post-op tenderness; incision C/D/I; BS active; no rebound; no guarding  Skin:  Warm/dry  Ext:  No edema; distal pulses readily detectable    Patient Active Problem List    Diagnosis Date Noted    Paroxysmal atrial fibrillation ([address] 02/21/2020    Severe protein-calorie malnutrition ([address] 02/21/2020    Shock liver 02/19/2020    Cecal volvulus ([address] 02/18/2020    ROSELYN (acute kidney injury) ([address]     SBO (small bowel obstruction) ([address] 02/17/2020    Left groin

## 2020-02-22 NOTE — PROGRESS NOTES
BP (!) 142/114   Pulse 77   Temp 99 °F (37.2 °C) (Axillary)   Resp 24   Ht 5' 6\" (1.676 m)   Wt 129 lb 1.6 oz (58.6 kg)   SpO2 100%   BMI 20.84 kg/m²   Patient Vitals for the past 96 hrs (Last 3 readings):   Weight   02/21/20 0400 129 lb 1.6 oz (58.6 kg)   02/20/20 0600 127 lb 1.6 oz (57.7 kg)   02/19/20 0600 118 lb 3.2 oz (53.6 kg)     OBJECTIVE:    HEENT: PERRL, EOM  Intact; sclera non-icteric, conjunctiva pink. Carotids are brisk in upstroke with normal contour. No carotid bruits. Normal jugular venous pulsation at 45°. No palpable cervical nor supraclavicular nodes. Thyroid not palpable. Trachea midline. Chest: Even excursion  Lungs: CTA B, no expiratory wheezes or rhonchi, no decreased tactile fremitus without inspiratory rales. Heart: Regular  rhythm; S1 > S2, no gallop or murmur. No clicks, rub, palpable thrills   or heaves. PMI nondisplaced, 5th intercostal space MCL. Abdomen: Soft, nontender, nondistended,  mildly protuberant, no masses or organomegaly. Bowel sounds active. Extremities: Without clubbing, cyanosis or edema. Pulses present 3+ upper extermities bilaterally; present 1+ DP and present 1+ PT bilaterally.      Data:   Scheduled Meds: Reviewed  Continuous Infusions:    dextrose 5 % and 0.45 % NaCl 100 mL/hr at 02/21/20 0626    dextrose         Intake/Output Summary (Last 24 hours) at 2/21/2020 2346  Last data filed at 2/21/2020 1900  Gross per 24 hour   Intake 2368 ml   Output 2050 ml   Net 318 ml     CBC:   Recent Labs     02/19/20  0430 02/19/20  0955 02/20/20  0455 02/21/20  0415   WBC 7.8  --  9.5 8.3   HGB 5.9* 8.4* 8.7* 7.8*   HCT 18.3* 25.9* 27.7* 25.0*     --  297 267     BMP:  Recent Labs     02/19/20  0430 02/20/20  0715 02/21/20  0415    138 140   K 3.8 4.0 3.7   * 109* 109*   CO2 12* 19* 18*   BUN 77* 65* 47*   CREATININE 2.1* 1.7* 1.2*   LABGLOM 23 30 44     ABGs:   Lab Results   Component Value Date    PH 7.343 2020    PO2 135.6 2020    PCO2 30.9 2020     INR: No results for input(s): INR in the last 72 hours. PRO-BNP: No results found for: PROBNP   TSH:   Lab Results   Component Value Date    TSH 4.050 2020      Cardiac Injury Profile:   Recent Labs     20  1600   TROPONINI 0.02      Lipid Profile:   Lab Results   Component Value Date    TRIG 85 2019    HDL 76 2019    LDLCALC 76 2019    CHOL 169 2019      Hemoglobin A1C: No components found for: HGBA1C     RAD:   Ct Abdomen Pelvis W Iv Contrast Additional Contrast? None    Addendum Date: 2020    Note is made of a vague lucency in the left iliac bone with some areas of sclerosis and periosteal thickening concerning for a healing fracture. Malignancy with a pathologic fracture is a consideration. Clinical assessment is recommended. ALERT:  THIS IS AN ABNORMAL REPORT    Result Date: 2020  Patient MRN:  [id number] : 1950 Age: 68 years Gender: Female Order Date:  2020 1:45 PM EXAM: CT ABDOMEN PELVIS W IV CONTRAST number of images 319 Contrast. Isovue-370, 110 mL IV Technique: Low-dose CT  acquisition technique included one of following options; 1 . Automated exposure control, 2. Adjustment of MA and or KV according to patient's size or 3. Use of iterative reconstruction. INDICATION:  ab pain ab pain COMPARISON: Previous CTA 2020 FINDINGS: The lung bases demonstrate COPD with minimal atelectasis and pleural effusions in the right lower lobe. Small hiatal hernia is present with thickening of the GE junction and distended stomach. Liver is of normal architecture. Gallbladder is partially distended. Spleen, pancreas, the adrenals and the kidneys are normal. There is severe vascular calcification stenosis of the aorta and iliac arteries with occlusion of the right SFA.  There is diffusely dilated small bowel loops with  one segment of the small bowel loops in the left hemiabdomen

## 2020-02-22 NOTE — FLOWSHEET NOTE
Pt is in 2 point restraints due to she will pull at her pulse ox, iv tubing and all of her monitor leads. She must be in 2 point soft restraints for safety and rn will continue to monitor for need.

## 2020-02-22 NOTE — PROCEDURES
Sachi Lewis is a 68 y.o. female patient. 1. ROSELYN (acute kidney injury) ([address]    2. SBO (small bowel obstruction) ([facility])    3. Dehydration      Past Medical History:   Diagnosis Date    Aortoiliac occlusive disease ([address] 1/16/2019    Atherosclerosis of native arteries of extremity with rest pain ([address] 5/3/2018    Atherosclerosis of native artery of extremity with ulceration ([address] 1/16/2019    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 with rest pain ([address] 1/16/2019    Bilateral carotid artery stenosis 5/22/2018    Bruit of right carotid artery 5/3/2018    CAD (coronary artery disease)     Cancer ([facility])     SKIN CA/Thigh    COPD (chronic obstructive pulmonary disease) ([facility])     Depression     Femoro-popliteal artery disease ([address] 1/16/2019    History of tobacco use 5/3/2018    Hydrocephalus ([facility])     Hyperlipidemia     Hypertension     Pain in both feet 5/3/2018    PVD (peripheral vascular disease) ([address]     PVD (peripheral vascular disease) with claudication ([facility]) 5/3/2018    Rheumatoid arthritis ([facility])     Thyroid disease     Venous stasis ulcer of left calf with fat layer exposed without varicose veins ([facility]) 2/27/2019     Blood pressure (!) 142/114, pulse 77, temperature 99 °F (37.2 °C), temperature source Axillary, resp. rate 24, height 5' 6\" (1.676 m), weight 129 lb 1.6 oz (58.6 kg), SpO2 100 %. Central Line  Date/Time: 2/21/2020 11:47 PM  Performed by: Ya Martinez MD  Authorized by: Ya Martinez MD   Consent: Verbal consent obtained. Written consent obtained.   Consent given by: guardian  Patient identity confirmed: arm band and provided demographic data  Indications: vascular access  Anesthesia: local infiltration    Anesthesia:  Local Anesthetic: lidocaine 1% without epinephrine  Preparation: skin prepped with alcohol  Skin prep agent dried: skin prep agent completely dried prior to procedure  Sterile barriers: all five maximum sterile barriers used - cap, mask, sterile gown, sterile gloves, and large sterile sheet  Hand hygiene: hand hygiene performed prior to central venous catheter insertion  Location details: left femoral  Patient position: flat  Catheter type: triple lumen  Catheter size: 7 Fr  Ultrasound guidance: yes  Sterile ultrasound techniques: sterile gel and sterile probe covers were used  Number of attempts: 3 (attempted left IJ but patient would not lay still.  Line place in left femorall vein.)  Successful placement: yes  Post-procedure: line sutured and dressing applied  Assessment: blood return through all ports and free fluid flow          Carmelo Gomez MD  2/21/2020

## 2020-02-22 NOTE — PLAN OF CARE
Problem: Risk for Impaired Skin Integrity  Goal: Tissue integrity - skin and mucous membranes  Description  Structural intactness and normal physiological function of skin and  mucous membranes.   Outcome: Met This Shift     Problem: Falls - Risk of:  Goal: Will remain free from falls  Description  Will remain free from falls  2/22/2020 0018 by Georges Cardoso RN  Outcome: Met This Shift  2/21/2020 1031 by Félix Pena RN  Outcome: Met This Shift  Goal: Absence of physical injury  Description  Absence of physical injury  2/21/2020 1031 by Félix Pena RN  Outcome: Met This Shift     Problem: Musculor/Skeletal Functional Status  Goal: Highest potential functional level  2/21/2020 1031 by Félix Pena RN  Outcome: Met This Shift  Goal: Absence of falls  2/21/2020 1031 by Félix Pena RN  Outcome: Met This Shift     Problem: Restraint Use - Nonviolent/Non-Self-Destructive Behavior:  Goal: Absence of restraint-related injury  Description  Absence of restraint-related injury  Outcome: Met This Shift

## 2020-02-23 ENCOUNTER — APPOINTMENT (OUTPATIENT)
Dept: GENERAL RADIOLOGY | Age: 70
DRG: 329 | End: 2020-02-23
Payer: MEDICARE

## 2020-02-23 LAB
ALBUMIN SERPL-MCNC: 2.5 G/DL (ref 3.5–5.2)
ALP BLD-CCNC: 68 U/L (ref 35–104)
ALT SERPL-CCNC: 744 U/L (ref 0–32)
ANION GAP SERPL CALCULATED.3IONS-SCNC: 11 MMOL/L (ref 7–16)
ANION GAP SERPL CALCULATED.3IONS-SCNC: 14 MMOL/L (ref 7–16)
ANISOCYTOSIS: ABNORMAL
AST SERPL-CCNC: 262 U/L (ref 0–31)
B.E.: 2.9 MMOL/L (ref -3–3)
BASOPHILS ABSOLUTE: 0 E9/L (ref 0–0.2)
BASOPHILS RELATIVE PERCENT: 0.1 % (ref 0–2)
BILIRUB SERPL-MCNC: 0.9 MG/DL (ref 0–1.2)
BUN BLDV-MCNC: 25 MG/DL (ref 8–23)
BUN BLDV-MCNC: 29 MG/DL (ref 8–23)
CALCIUM IONIZED: 1.07 MMOL/L (ref 1.15–1.33)
CALCIUM SERPL-MCNC: 7.3 MG/DL (ref 8.6–10.2)
CALCIUM SERPL-MCNC: 7.8 MG/DL (ref 8.6–10.2)
CHLORIDE BLD-SCNC: 107 MMOL/L (ref 98–107)
CHLORIDE BLD-SCNC: 107 MMOL/L (ref 98–107)
CO2: 22 MMOL/L (ref 22–29)
CO2: 25 MMOL/L (ref 22–29)
COHB: 0.3 % (ref 0–1.5)
CREAT SERPL-MCNC: 0.9 MG/DL (ref 0.5–1)
CREAT SERPL-MCNC: 1 MG/DL (ref 0.5–1)
CRITICAL: ABNORMAL
DATE ANALYZED: ABNORMAL
DATE OF COLLECTION: ABNORMAL
EOSINOPHILS ABSOLUTE: 0.09 E9/L (ref 0.05–0.5)
EOSINOPHILS RELATIVE PERCENT: 0.9 % (ref 0–6)
GFR AFRICAN AMERICAN: >60
GFR AFRICAN AMERICAN: >60
GFR NON-AFRICAN AMERICAN: 55 ML/MIN/1.73
GFR NON-AFRICAN AMERICAN: >60 ML/MIN/1.73
GLUCOSE BLD-MCNC: 159 MG/DL (ref 74–99)
GLUCOSE BLD-MCNC: 171 MG/DL (ref 74–99)
HCO3: 26.1 MMOL/L (ref 22–26)
HCT VFR BLD CALC: 27.1 % (ref 34–48)
HCT VFR BLD CALC: 28.1 % (ref 34–48)
HEMOGLOBIN: 8.7 G/DL (ref 11.5–15.5)
HEMOGLOBIN: 9 G/DL (ref 11.5–15.5)
HHB: 2.2 % (ref 0–5)
HYPOCHROMIA: ABNORMAL
LAB: ABNORMAL
LYMPHOCYTES ABSOLUTE: 0.1 E9/L (ref 1.5–4)
LYMPHOCYTES RELATIVE PERCENT: 0.9 % (ref 20–42)
Lab: ABNORMAL
MAGNESIUM: 1.4 MG/DL (ref 1.6–2.6)
MAGNESIUM: 2.8 MG/DL (ref 1.6–2.6)
MCH RBC QN AUTO: 30.6 PG (ref 26–35)
MCH RBC QN AUTO: 30.6 PG (ref 26–35)
MCHC RBC AUTO-ENTMCNC: 32 % (ref 32–34.5)
MCHC RBC AUTO-ENTMCNC: 32.1 % (ref 32–34.5)
MCV RBC AUTO: 95.4 FL (ref 80–99.9)
MCV RBC AUTO: 95.6 FL (ref 80–99.9)
METER GLUCOSE: 159 MG/DL (ref 74–99)
METER GLUCOSE: 77 MG/DL (ref 74–99)
METER GLUCOSE: 83 MG/DL (ref 74–99)
METER GLUCOSE: 97 MG/DL (ref 74–99)
METHB: 0.7 % (ref 0–1.5)
MODE: ABNORMAL
MONOCYTES ABSOLUTE: 0.2 E9/L (ref 0.1–0.95)
MONOCYTES RELATIVE PERCENT: 1.7 % (ref 2–12)
NEUTROPHILS ABSOLUTE: 9.6 E9/L (ref 1.8–7.3)
NEUTROPHILS RELATIVE PERCENT: 96.5 % (ref 43–80)
NUCLEATED RED BLOOD CELLS: 1.7 /100 WBC
O2 CONTENT: 13.1 ML/DL
O2 SATURATION: 97.8 % (ref 92–98.5)
O2HB: 96.8 % (ref 94–97)
OPERATOR ID: ABNORMAL
OVALOCYTES: ABNORMAL
PATIENT TEMP: 37 C
PCO2: 34.3 MMHG (ref 35–45)
PDW BLD-RTO: 13.7 FL (ref 11.5–15)
PDW BLD-RTO: 14 FL (ref 11.5–15)
PH BLOOD GAS: 7.5 (ref 7.35–7.45)
PHOSPHORUS: 2.6 MG/DL (ref 2.5–4.5)
PLATELET # BLD: 322 E9/L (ref 130–450)
PLATELET # BLD: 371 E9/L (ref 130–450)
PMV BLD AUTO: 10.1 FL (ref 7–12)
PMV BLD AUTO: 9.4 FL (ref 7–12)
PO2: 135 MMHG (ref 75–100)
POIKILOCYTES: ABNORMAL
POLYCHROMASIA: ABNORMAL
POTASSIUM SERPL-SCNC: 2.2 MMOL/L (ref 3.5–5)
POTASSIUM SERPL-SCNC: 3.6 MMOL/L (ref 3.5–5)
RBC # BLD: 2.84 E12/L (ref 3.5–5.5)
RBC # BLD: 2.94 E12/L (ref 3.5–5.5)
SODIUM BLD-SCNC: 143 MMOL/L (ref 132–146)
SODIUM BLD-SCNC: 143 MMOL/L (ref 132–146)
SOURCE, BLOOD GAS: ABNORMAL
TARGET CELLS: ABNORMAL
THB: 9.4 G/DL (ref 11.5–16.5)
TIME ANALYZED: 415
TOTAL PROTEIN: 5.1 G/DL (ref 6.4–8.3)
WBC # BLD: 9.6 E9/L (ref 4.5–11.5)
WBC # BLD: 9.9 E9/L (ref 4.5–11.5)

## 2020-02-23 PROCEDURE — 85025 COMPLETE CBC W/AUTO DIFF WBC: CPT

## 2020-02-23 PROCEDURE — 36591 DRAW BLOOD OFF VENOUS DEVICE: CPT

## 2020-02-23 PROCEDURE — 82805 BLOOD GASES W/O2 SATURATION: CPT

## 2020-02-23 PROCEDURE — 6370000000 HC RX 637 (ALT 250 FOR IP): Performed by: STUDENT IN AN ORGANIZED HEALTH CARE EDUCATION/TRAINING PROGRAM

## 2020-02-23 PROCEDURE — 2580000003 HC RX 258: Performed by: STUDENT IN AN ORGANIZED HEALTH CARE EDUCATION/TRAINING PROGRAM

## 2020-02-23 PROCEDURE — 80053 COMPREHEN METABOLIC PANEL: CPT

## 2020-02-23 PROCEDURE — 2000000000 HC ICU R&B

## 2020-02-23 PROCEDURE — 6360000002 HC RX W HCPCS: Performed by: STUDENT IN AN ORGANIZED HEALTH CARE EDUCATION/TRAINING PROGRAM

## 2020-02-23 PROCEDURE — 74018 RADEX ABDOMEN 1 VIEW: CPT

## 2020-02-23 PROCEDURE — 36592 COLLECT BLOOD FROM PICC: CPT

## 2020-02-23 PROCEDURE — 80048 BASIC METABOLIC PNL TOTAL CA: CPT

## 2020-02-23 PROCEDURE — 84100 ASSAY OF PHOSPHORUS: CPT

## 2020-02-23 PROCEDURE — 82962 GLUCOSE BLOOD TEST: CPT

## 2020-02-23 PROCEDURE — 94640 AIRWAY INHALATION TREATMENT: CPT

## 2020-02-23 PROCEDURE — 6370000000 HC RX 637 (ALT 250 FOR IP): Performed by: SURGERY

## 2020-02-23 PROCEDURE — 99233 SBSQ HOSP IP/OBS HIGH 50: CPT | Performed by: SURGERY

## 2020-02-23 PROCEDURE — 83735 ASSAY OF MAGNESIUM: CPT

## 2020-02-23 PROCEDURE — 85027 COMPLETE CBC AUTOMATED: CPT

## 2020-02-23 PROCEDURE — 82330 ASSAY OF CALCIUM: CPT

## 2020-02-23 PROCEDURE — 2700000000 HC OXYGEN THERAPY PER DAY

## 2020-02-23 PROCEDURE — 2500000003 HC RX 250 WO HCPCS: Performed by: STUDENT IN AN ORGANIZED HEALTH CARE EDUCATION/TRAINING PROGRAM

## 2020-02-23 PROCEDURE — 36415 COLL VENOUS BLD VENIPUNCTURE: CPT

## 2020-02-23 PROCEDURE — 2580000003 HC RX 258: Performed by: INTERNAL MEDICINE

## 2020-02-23 PROCEDURE — 6370000000 HC RX 637 (ALT 250 FOR IP): Performed by: INTERNAL MEDICINE

## 2020-02-23 RX ORDER — DILTIAZEM HYDROCHLORIDE 5 MG/ML
20 INJECTION INTRAVENOUS ONCE
Status: DISCONTINUED | OUTPATIENT
Start: 2020-02-23 | End: 2020-02-23

## 2020-02-23 RX ORDER — DEXTROSE, SODIUM CHLORIDE, AND POTASSIUM CHLORIDE 5; .45; .15 G/100ML; G/100ML; G/100ML
INJECTION INTRAVENOUS CONTINUOUS
Status: DISCONTINUED | OUTPATIENT
Start: 2020-02-23 | End: 2020-02-27

## 2020-02-23 RX ORDER — POTASSIUM CHLORIDE 20 MEQ/1
40 TABLET, EXTENDED RELEASE ORAL EVERY 6 HOURS
Status: COMPLETED | OUTPATIENT
Start: 2020-02-23 | End: 2020-02-23

## 2020-02-23 RX ORDER — POTASSIUM CHLORIDE 29.8 MG/ML
20 INJECTION INTRAVENOUS
Status: COMPLETED | OUTPATIENT
Start: 2020-02-23 | End: 2020-02-23

## 2020-02-23 RX ORDER — POTASSIUM CHLORIDE 20 MEQ/1
40 TABLET, EXTENDED RELEASE ORAL
Status: DISCONTINUED | OUTPATIENT
Start: 2020-02-23 | End: 2020-02-23

## 2020-02-23 RX ORDER — LANOLIN ALCOHOL/MO/W.PET/CERES
6 CREAM (GRAM) TOPICAL NIGHTLY
Status: DISCONTINUED | OUTPATIENT
Start: 2020-02-23 | End: 2020-03-06 | Stop reason: HOSPADM

## 2020-02-23 RX ORDER — MORPHINE SULFATE 2 MG/ML
1 INJECTION, SOLUTION INTRAMUSCULAR; INTRAVENOUS EVERY 4 HOURS PRN
Status: DISCONTINUED | OUTPATIENT
Start: 2020-02-23 | End: 2020-02-24

## 2020-02-23 RX ORDER — DILTIAZEM HYDROCHLORIDE 5 MG/ML
20 INJECTION INTRAVENOUS ONCE
Status: COMPLETED | OUTPATIENT
Start: 2020-02-23 | End: 2020-02-23

## 2020-02-23 RX ORDER — FAMOTIDINE 20 MG/1
40 TABLET, FILM COATED ORAL ONCE
Status: COMPLETED | OUTPATIENT
Start: 2020-02-23 | End: 2020-02-23

## 2020-02-23 RX ORDER — POTASSIUM CHLORIDE 20 MEQ/1
40 TABLET, EXTENDED RELEASE ORAL ONCE
Status: COMPLETED | OUTPATIENT
Start: 2020-02-23 | End: 2020-02-23

## 2020-02-23 RX ORDER — AMLODIPINE BESYLATE 5 MG/1
5 TABLET ORAL DAILY
Status: DISCONTINUED | OUTPATIENT
Start: 2020-02-23 | End: 2020-03-06 | Stop reason: HOSPADM

## 2020-02-23 RX ADMIN — LEVOTHYROXINE SODIUM 100 MCG: 0.1 TABLET ORAL at 06:03

## 2020-02-23 RX ADMIN — SODIUM CHLORIDE, PRESERVATIVE FREE 10 ML: 5 INJECTION INTRAVENOUS at 13:00

## 2020-02-23 RX ADMIN — DOCUSATE SODIUM 100 MG: 100 CAPSULE, LIQUID FILLED ORAL at 20:04

## 2020-02-23 RX ADMIN — SODIUM CHLORIDE, PRESERVATIVE FREE 10 ML: 5 INJECTION INTRAVENOUS at 13:10

## 2020-02-23 RX ADMIN — POTASSIUM CHLORIDE 20 MEQ: 400 INJECTION, SOLUTION INTRAVENOUS at 06:47

## 2020-02-23 RX ADMIN — LABETALOL HYDROCHLORIDE 10 MG: 5 INJECTION INTRAVENOUS at 12:06

## 2020-02-23 RX ADMIN — SOTALOL HYDROCHLORIDE 80 MG: 80 TABLET ORAL at 07:41

## 2020-02-23 RX ADMIN — POTASSIUM CHLORIDE 20 MEQ: 400 INJECTION, SOLUTION INTRAVENOUS at 07:49

## 2020-02-23 RX ADMIN — MORPHINE SULFATE 1 MG: 2 INJECTION, SOLUTION INTRAMUSCULAR; INTRAVENOUS at 00:59

## 2020-02-23 RX ADMIN — INSULIN LISPRO 1 UNITS: 100 INJECTION, SOLUTION INTRAVENOUS; SUBCUTANEOUS at 04:09

## 2020-02-23 RX ADMIN — ENOXAPARIN SODIUM 60 MG: 60 INJECTION SUBCUTANEOUS at 20:30

## 2020-02-23 RX ADMIN — MAGNESIUM SULFATE HEPTAHYDRATE 6 G: 500 INJECTION, SOLUTION INTRAMUSCULAR; INTRAVENOUS at 06:55

## 2020-02-23 RX ADMIN — AMLODIPINE BESYLATE 5 MG: 5 TABLET ORAL at 18:15

## 2020-02-23 RX ADMIN — POTASSIUM CHLORIDE 40 MEQ: 1500 TABLET, EXTENDED RELEASE ORAL at 14:00

## 2020-02-23 RX ADMIN — IPRATROPIUM BROMIDE AND ALBUTEROL SULFATE 1 AMPULE: 2.5; .5 SOLUTION RESPIRATORY (INHALATION) at 21:15

## 2020-02-23 RX ADMIN — OXYCODONE 5 MG: 5 TABLET ORAL at 15:06

## 2020-02-23 RX ADMIN — LABETALOL HYDROCHLORIDE 10 MG: 5 INJECTION INTRAVENOUS at 01:08

## 2020-02-23 RX ADMIN — IPRATROPIUM BROMIDE AND ALBUTEROL SULFATE 1 AMPULE: 2.5; .5 SOLUTION RESPIRATORY (INHALATION) at 12:20

## 2020-02-23 RX ADMIN — SENNOSIDES 8.6 MG: 8.6 TABLET, FILM COATED ORAL at 20:03

## 2020-02-23 RX ADMIN — OXYCODONE 5 MG: 5 TABLET ORAL at 20:05

## 2020-02-23 RX ADMIN — POTASSIUM PHOSPHATE, MONOBASIC AND POTASSIUM PHOSPHATE, DIBASIC 20 MMOL: 224; 236 INJECTION, SOLUTION, CONCENTRATE INTRAVENOUS at 09:28

## 2020-02-23 RX ADMIN — FUROSEMIDE 20 MG: 10 INJECTION, SOLUTION INTRAMUSCULAR; INTRAVENOUS at 01:00

## 2020-02-23 RX ADMIN — POTASSIUM CHLORIDE 40 MEQ: 1500 TABLET, EXTENDED RELEASE ORAL at 07:54

## 2020-02-23 RX ADMIN — Medication 10 ML: at 07:42

## 2020-02-23 RX ADMIN — ENOXAPARIN SODIUM 60 MG: 60 INJECTION SUBCUTANEOUS at 07:41

## 2020-02-23 RX ADMIN — POTASSIUM CHLORIDE 20 MEQ: 400 INJECTION, SOLUTION INTRAVENOUS at 06:02

## 2020-02-23 RX ADMIN — OXYCODONE 5 MG: 5 TABLET ORAL at 07:37

## 2020-02-23 RX ADMIN — Medication 10 ML: at 20:04

## 2020-02-23 RX ADMIN — POTASSIUM CHLORIDE, DEXTROSE MONOHYDRATE AND SODIUM CHLORIDE: 150; 5; 450 INJECTION, SOLUTION INTRAVENOUS at 07:53

## 2020-02-23 RX ADMIN — LABETALOL HYDROCHLORIDE 10 MG: 5 INJECTION INTRAVENOUS at 16:03

## 2020-02-23 RX ADMIN — SOTALOL HYDROCHLORIDE 80 MG: 80 TABLET ORAL at 20:04

## 2020-02-23 RX ADMIN — MELATONIN 3 MG ORAL TABLET 3 MG: 3 TABLET ORAL at 00:58

## 2020-02-23 RX ADMIN — DOCUSATE SODIUM 100 MG: 100 CAPSULE, LIQUID FILLED ORAL at 07:41

## 2020-02-23 RX ADMIN — IPRATROPIUM BROMIDE AND ALBUTEROL SULFATE 1 AMPULE: 2.5; .5 SOLUTION RESPIRATORY (INHALATION) at 16:48

## 2020-02-23 RX ADMIN — Medication 1 MG: at 16:17

## 2020-02-23 RX ADMIN — SODIUM CHLORIDE, PRESERVATIVE FREE 10 ML: 5 INJECTION INTRAVENOUS at 13:05

## 2020-02-23 RX ADMIN — Medication 1 MG: at 23:40

## 2020-02-23 RX ADMIN — CALCIUM GLUCONATE 2 G: 98 INJECTION, SOLUTION INTRAVENOUS at 09:27

## 2020-02-23 RX ADMIN — IPRATROPIUM BROMIDE AND ALBUTEROL SULFATE 1 AMPULE: 2.5; .5 SOLUTION RESPIRATORY (INHALATION) at 08:16

## 2020-02-23 RX ADMIN — POTASSIUM CHLORIDE 40 MEQ: 1500 TABLET, EXTENDED RELEASE ORAL at 16:11

## 2020-02-23 RX ADMIN — MELATONIN 3 MG ORAL TABLET 6 MG: 3 TABLET ORAL at 20:03

## 2020-02-23 RX ADMIN — LABETALOL HYDROCHLORIDE 10 MG: 5 INJECTION INTRAVENOUS at 02:02

## 2020-02-23 RX ADMIN — Medication 1 MG: at 20:17

## 2020-02-23 RX ADMIN — MORPHINE SULFATE 1 MG: 2 INJECTION, SOLUTION INTRAMUSCULAR; INTRAVENOUS at 10:40

## 2020-02-23 RX ADMIN — FAMOTIDINE 40 MG: 20 TABLET, FILM COATED ORAL at 18:14

## 2020-02-23 RX ADMIN — DILTIAZEM HYDROCHLORIDE 20 MG: 5 INJECTION INTRAVENOUS at 02:37

## 2020-02-23 ASSESSMENT — PAIN DESCRIPTION - ONSET
ONSET: ON-GOING
ONSET: ON-GOING
ONSET: AWAKENED FROM SLEEP
ONSET: GRADUAL

## 2020-02-23 ASSESSMENT — PAIN SCALES - GENERAL
PAINLEVEL_OUTOF10: 3
PAINLEVEL_OUTOF10: 10
PAINLEVEL_OUTOF10: 10
PAINLEVEL_OUTOF10: 8
PAINLEVEL_OUTOF10: 7
PAINLEVEL_OUTOF10: 2
PAINLEVEL_OUTOF10: 10
PAINLEVEL_OUTOF10: 0
PAINLEVEL_OUTOF10: 10
PAINLEVEL_OUTOF10: 10
PAINLEVEL_OUTOF10: 0
PAINLEVEL_OUTOF10: 7
PAINLEVEL_OUTOF10: 10

## 2020-02-23 ASSESSMENT — PAIN DESCRIPTION - FREQUENCY
FREQUENCY: CONTINUOUS
FREQUENCY: INTERMITTENT
FREQUENCY: INTERMITTENT
FREQUENCY: CONTINUOUS
FREQUENCY: CONTINUOUS

## 2020-02-23 ASSESSMENT — PAIN - FUNCTIONAL ASSESSMENT: PAIN_FUNCTIONAL_ASSESSMENT: PREVENTS OR INTERFERES WITH ALL ACTIVE AND SOME PASSIVE ACTIVITIES

## 2020-02-23 ASSESSMENT — PAIN DESCRIPTION - LOCATION
LOCATION: ABDOMEN

## 2020-02-23 ASSESSMENT — PAIN DESCRIPTION - ORIENTATION
ORIENTATION: MID
ORIENTATION: MID;LOWER
ORIENTATION: LOWER;MID

## 2020-02-23 ASSESSMENT — PAIN DESCRIPTION - PAIN TYPE
TYPE: ACUTE PAIN;SURGICAL PAIN
TYPE: SURGICAL PAIN
TYPE: ACUTE PAIN;SURGICAL PAIN

## 2020-02-23 ASSESSMENT — PAIN DESCRIPTION - DESCRIPTORS
DESCRIPTORS: SHARP
DESCRIPTORS: ACHING;CRAMPING;STABBING
DESCRIPTORS: STABBING;ACHING
DESCRIPTORS: ACHING;DISCOMFORT
DESCRIPTORS: SHARP;SHOOTING
DESCRIPTORS: STABBING;ACHING;THROBBING

## 2020-02-23 ASSESSMENT — PAIN DESCRIPTION - PROGRESSION
CLINICAL_PROGRESSION: GRADUALLY WORSENING

## 2020-02-23 NOTE — PROGRESS NOTES
BP (!) 183/82   Pulse 72   Temp 98.6 °F (37 °C) (Axillary)   Resp 21   Ht 5' 6\" (1.676 m)   Wt 119 lb 11.4 oz (54.3 kg)   SpO2 100%   BMI 19.32 kg/m²   Patient Vitals for the past 96 hrs (Last 3 readings):   Weight   02/23/20 0600 119 lb 11.4 oz (54.3 kg)   02/21/20 0400 129 lb 1.6 oz (58.6 kg)   02/20/20 0600 127 lb 1.6 oz (57.7 kg)     OBJECTIVE:    HEENT: PERRL, EOM  Intact; sclera non-icteric, conjunctiva pink. Carotids are brisk in upstroke with normal contour. No carotid bruits. Normal jugular venous pulsation at 45°. No palpable cervical nor supraclavicular nodes. Thyroid not palpable. Trachea midline. Chest: Even excursion  Lungs: Decreased bases but otherwise grossly clear to auscultation bilaterally, no expiratory wheezes or rhonchi, no decreased tactile fremitus without inspiratory rales. Heart: Regular  rhythm; S1 > S2, no gallop or murmur. No clicks, rub, palpable thrills   or heaves. PMI nondisplaced, 5th intercostal space MCL. Abdomen: Soft, nontender, nondistended,  mildly protuberant, no masses or organomegaly. Bowel sounds active. Extremities: Without clubbing, cyanosis or edema. Pulses present 3+ upper extermities bilaterally; present 1+ DP and present 1+ PT bilaterally.      Data:   Scheduled Meds: Reviewed  Continuous Infusions:    dextrose 5% and 0.45% NaCl with KCl 20 mEq 50 mL/hr at 02/23/20 0753    dextrose         Intake/Output Summary (Last 24 hours) at 2/23/2020 1806  Last data filed at 2/23/2020 1648  Gross per 24 hour   Intake 1871 ml   Output 2860 ml   Net -989 ml     CBC:   Recent Labs     02/21/20 0415 02/22/20  0430 02/23/20 0425   WBC 8.3 11.5 9.9   HGB 7.8* 8.3* 8.7*   HCT 25.0* 25.4* 27.1*    297 322     BMP:  Recent Labs     02/21/20 0415 02/22/20  0430 02/23/20  0425 02/23/20  1345    139 143 143   K 3.7 3.4* 2.2* 3.6   * 106 107 107   CO2 18* 19* 22 25   BUN 47* 36* 29* 25*   CREATININE 1.2* 0.9 1.0 0.9   LABGLOM 44 >60 55 >60     ABGs:   Lab Results   Component Value Date    PH 7.499 2020    PO2 135.0 2020    PCO2 34.3 2020     INR: No results for input(s): INR in the last 72 hours. PRO-BNP: No results found for: PROBNP   TSH:   Lab Results   Component Value Date    TSH 4.050 2020      Cardiac Injury Profile:   No results for input(s): CKTOTAL, CKMB, TROPONINI in the last 72 hours. Lipid Profile:   Lab Results   Component Value Date    TRIG 85 2019    HDL 76 2019    LDLCALC 76 2019    CHOL 169 2019      Hemoglobin A1C: No components found for: HGBA1C     RAD:   Ct Abdomen Pelvis W Iv Contrast Additional Contrast? None    Addendum Date: 2020    Note is made of a vague lucency in the left iliac bone with some areas of sclerosis and periosteal thickening concerning for a healing fracture. Malignancy with a pathologic fracture is a consideration. Clinical assessment is recommended. ALERT:  THIS IS AN ABNORMAL REPORT    Result Date: 2020  Patient MRN:  [id number] : 1950 Age: 68 years Gender: Female Order Date:  2020 1:45 PM EXAM: CT ABDOMEN PELVIS W IV CONTRAST number of images 319 Contrast. Isovue-370, 110 mL IV Technique: Low-dose CT  acquisition technique included one of following options; 1 . Automated exposure control, 2. Adjustment of MA and or KV according to patient's size or 3. Use of iterative reconstruction. INDICATION:  ab pain ab pain COMPARISON: Previous CTA 2020 FINDINGS: The lung bases demonstrate COPD with minimal atelectasis and pleural effusions in the right lower lobe. Small hiatal hernia is present with thickening of the GE junction and distended stomach. Liver is of normal architecture. Gallbladder is partially distended.  Spleen, pancreas, the adrenals and the kidneys are normal. There is severe vascular calcification stenosis of the aorta and (small bowel obstruction) (HCC)  Active Problems:    Essential hypertension    Depression    PAD (peripheral artery disease) (HCC)    Acquired hypothyroidism    Cecal volvulus (HCC)    ROSELYN (acute kidney injury) ([facility] [address]    Shock liver    Paroxysmal atrial fibrillation (HCC)    Severe protein-calorie malnutrition (HCC)  Resolved Problems:    * No resolved hospital problems. *      RECOMMENDATIONS:  Continue sotalol with the hope of suppression of atrial fibrillation and maintain appropriate anticoagulation as well. Monitor QTc interval carefully in the presence of renal dysfunction. Adjust dosage of cardiac medicines in accordance with renal function. I am also concerned that there may be an ongoing intra-abdominal process is resulting in her significant uncomfortable state. I have spent more than 30 critical-care minutes face to face with Salima Aleman and reviewing notes and laboratory data, with greater than 50% of this time instructing and counseling the patient  face to face regarding my findings and recommendations and I have answered all questions as posed to me by Ms. Elizabeth. Larry Raymundo, DO FACP,FACC,FSCAI      NOTE:  This report was transcribed using voice recognition software.   Every effort was made to ensure accuracy; however, inadvertent computerized transcription errors may be present

## 2020-02-23 NOTE — PROGRESS NOTES
[facility]  SURGICAL INTENSIVE CARE UNIT (SICU)  ATTENDING PHYSICIAN CRITICAL CARE PROGRESS NOTE     I have examined the patient, reviewed the record, and discussed the case with the APN/ resident. Please refer to the APN/ resident's note. I agree with the assessment and plan. I have reviewed all relevant labs and imaging data. The following summarizes my clinical findings and independent assessment. CC:  Critical care management after right susy for cecal volvulus    Hospital Course/Overnight Events:  2/17 Valerio Elizabeth is a 68 y. o. female with extensive PVD on plavix and pletal, who presents for evaluation of a high grade bowel obstruction. She had been tachycardic and hypotensive; decision was made for exploratory laparotomy where she was found to have a perforated cecal volvulus for which she underwent a right hemicolectomy with side to side ileocolonic anastomosis 2/17. 2/18 Extubated   2/19 1 unit PRBC for acute blood loss anemia   2/20 Patient had a short run A fib RVR yesterday, self resolved and resolved Faster than we could obtain an EKG , She states she went into a fib > 1 week ago and has possibly seen Dr. Marquise Lima  ( can not find any notes)   2/21--A.fib with RVR again this AM--given lopressor and then started on Cardizem  2/22--agitated overnight--pulled all IVs out  2/23--diuresed well yesterday    Pt states pain is reasonably controlled with pain meds.     Awake and alert  Follows commands  Hrt:  Irregular  Lungs:  Fairly clear bilaterally; diminished in bases  Abd:  Soft; minimal expected post-op tenderness; incision C/D/I; BS active; no rebound; no guarding  Skin:  Warm/dry  Ext:  No edema; distal pulses readily detectable    Patient Active Problem List    Diagnosis Date Noted    Paroxysmal atrial fibrillation ([facility] 02/21/2020    Severe protein-calorie malnutrition ([facility] [address] 02/21/2020    Shock liver 02/19/2020    Cecal volvulus ([facility] [address] 02/18/2020    ROSELYN (acute kidney injury) ([facility] [address]     SBO (small bowel obstruction) ([facility] [address] 02/17/2020    Left groin pain     Hyperlipidemia LDL goal <100 01/21/2020    Acquired hypothyroidism 01/21/2020    PAD (peripheral artery disease) ([facility] [address] 01/29/2019    Asymptomatic bilateral carotid artery stenosis 05/22/2018    PVD (peripheral vascular disease) with claudication ([facility] [address] 05/03/2018    History of tobacco use 05/03/2018    Essential hypertension 05/30/2014    Depression 05/30/2014       S/p right susy for perforated cecal volvulus--pain control  A.fib with RVR--on sotalol  Acute resp insuff--chest physiotherapy; duonebs  Elevated LFTs/shock liver--monitor labs  Acute kidney injury--monitor BUN/Cr/UO  Hypoalbuminemia/moderate protein calorie malnutrition--advance diet as tolerated  Acute blood loss anemia--monitor H/H  Electrolyte imbalance (hypokalemia/hypomagnesemia)--correct as able  DVT risk--PCDS/subQ heparin      Daisy Esposito MD, FACS  2/23/2020  9:08 AM

## 2020-02-23 NOTE — PROGRESS NOTES
Seen/examined  Confused/delirium persists  HD stable, NSR  Good diuresis  Abdomen soft  Incision without erythema, minimal non-purulent drainage  Ok to advance to fulls  Replace k+  ?transfer to intermediate to help with mental status if ok with ICU team; appreciate their help  Dio

## 2020-02-24 ENCOUNTER — APPOINTMENT (OUTPATIENT)
Dept: GENERAL RADIOLOGY | Age: 70
DRG: 329 | End: 2020-02-24
Payer: MEDICARE

## 2020-02-24 LAB
ALBUMIN SERPL-MCNC: 2.4 G/DL (ref 3.5–5.2)
ALP BLD-CCNC: 66 U/L (ref 35–104)
ALT SERPL-CCNC: 453 U/L (ref 0–32)
ANION GAP SERPL CALCULATED.3IONS-SCNC: 13 MMOL/L (ref 7–16)
ANION GAP SERPL CALCULATED.3IONS-SCNC: 6 MMOL/L (ref 7–16)
AST SERPL-CCNC: 70 U/L (ref 0–31)
BASOPHILS ABSOLUTE: 0.05 E9/L (ref 0–0.2)
BASOPHILS RELATIVE PERCENT: 0.4 % (ref 0–2)
BILIRUB SERPL-MCNC: 0.9 MG/DL (ref 0–1.2)
BUN BLDV-MCNC: 18 MG/DL (ref 8–23)
BUN BLDV-MCNC: 22 MG/DL (ref 8–23)
CALCIUM IONIZED: 1.12 MMOL/L (ref 1.15–1.33)
CALCIUM IONIZED: 1.18 MMOL/L (ref 1.15–1.33)
CALCIUM SERPL-MCNC: 7.5 MG/DL (ref 8.6–10.2)
CALCIUM SERPL-MCNC: 8.2 MG/DL (ref 8.6–10.2)
CHLORIDE BLD-SCNC: 108 MMOL/L (ref 98–107)
CHLORIDE BLD-SCNC: 112 MMOL/L (ref 98–107)
CO2: 23 MMOL/L (ref 22–29)
CO2: 28 MMOL/L (ref 22–29)
CREAT SERPL-MCNC: 0.8 MG/DL (ref 0.5–1)
CREAT SERPL-MCNC: 0.8 MG/DL (ref 0.5–1)
EOSINOPHILS ABSOLUTE: 0.01 E9/L (ref 0.05–0.5)
EOSINOPHILS RELATIVE PERCENT: 0.1 % (ref 0–6)
GFR AFRICAN AMERICAN: >60
GFR AFRICAN AMERICAN: >60
GFR NON-AFRICAN AMERICAN: >60 ML/MIN/1.73
GFR NON-AFRICAN AMERICAN: >60 ML/MIN/1.73
GLUCOSE BLD-MCNC: 111 MG/DL (ref 74–99)
GLUCOSE BLD-MCNC: 168 MG/DL (ref 74–99)
HCT VFR BLD CALC: 25.9 % (ref 34–48)
HEMOGLOBIN: 8.3 G/DL (ref 11.5–15.5)
HYPOCHROMIA: ABNORMAL
IMMATURE GRANULOCYTES #: 0.13 E9/L
IMMATURE GRANULOCYTES %: 1 % (ref 0–5)
LYMPHOCYTES ABSOLUTE: 0.43 E9/L (ref 1.5–4)
LYMPHOCYTES RELATIVE PERCENT: 3.3 % (ref 20–42)
MAGNESIUM: 2.1 MG/DL (ref 1.6–2.6)
MAGNESIUM: 2.2 MG/DL (ref 1.6–2.6)
MCH RBC QN AUTO: 31.2 PG (ref 26–35)
MCHC RBC AUTO-ENTMCNC: 32 % (ref 32–34.5)
MCV RBC AUTO: 97.4 FL (ref 80–99.9)
MONOCYTES ABSOLUTE: 0.56 E9/L (ref 0.1–0.95)
MONOCYTES RELATIVE PERCENT: 4.3 % (ref 2–12)
NEUTROPHILS ABSOLUTE: 11.9 E9/L (ref 1.8–7.3)
NEUTROPHILS RELATIVE PERCENT: 90.9 % (ref 43–80)
PDW BLD-RTO: 14.4 FL (ref 11.5–15)
PHOSPHORUS: 2 MG/DL (ref 2.5–4.5)
PHOSPHORUS: 2.3 MG/DL (ref 2.5–4.5)
PLATELET # BLD: 359 E9/L (ref 130–450)
PMV BLD AUTO: 9.8 FL (ref 7–12)
POLYCHROMASIA: ABNORMAL
POTASSIUM SERPL-SCNC: 3.7 MMOL/L (ref 3.5–5)
POTASSIUM SERPL-SCNC: 4.2 MMOL/L (ref 3.5–5)
RBC # BLD: 2.66 E12/L (ref 3.5–5.5)
SODIUM BLD-SCNC: 142 MMOL/L (ref 132–146)
SODIUM BLD-SCNC: 148 MMOL/L (ref 132–146)
TOTAL PROTEIN: 5.1 G/DL (ref 6.4–8.3)
WBC # BLD: 13.1 E9/L (ref 4.5–11.5)

## 2020-02-24 PROCEDURE — 74018 RADEX ABDOMEN 1 VIEW: CPT

## 2020-02-24 PROCEDURE — 97530 THERAPEUTIC ACTIVITIES: CPT

## 2020-02-24 PROCEDURE — 36415 COLL VENOUS BLD VENIPUNCTURE: CPT

## 2020-02-24 PROCEDURE — 6370000000 HC RX 637 (ALT 250 FOR IP): Performed by: STUDENT IN AN ORGANIZED HEALTH CARE EDUCATION/TRAINING PROGRAM

## 2020-02-24 PROCEDURE — 6370000000 HC RX 637 (ALT 250 FOR IP): Performed by: SURGERY

## 2020-02-24 PROCEDURE — 2000000000 HC ICU R&B

## 2020-02-24 PROCEDURE — 97535 SELF CARE MNGMENT TRAINING: CPT

## 2020-02-24 PROCEDURE — 80048 BASIC METABOLIC PNL TOTAL CA: CPT

## 2020-02-24 PROCEDURE — 2500000003 HC RX 250 WO HCPCS

## 2020-02-24 PROCEDURE — 83735 ASSAY OF MAGNESIUM: CPT

## 2020-02-24 PROCEDURE — 94640 AIRWAY INHALATION TREATMENT: CPT

## 2020-02-24 PROCEDURE — 2580000003 HC RX 258: Performed by: STUDENT IN AN ORGANIZED HEALTH CARE EDUCATION/TRAINING PROGRAM

## 2020-02-24 PROCEDURE — 2580000003 HC RX 258: Performed by: INTERNAL MEDICINE

## 2020-02-24 PROCEDURE — 6360000002 HC RX W HCPCS: Performed by: STUDENT IN AN ORGANIZED HEALTH CARE EDUCATION/TRAINING PROGRAM

## 2020-02-24 PROCEDURE — 85025 COMPLETE CBC W/AUTO DIFF WBC: CPT

## 2020-02-24 PROCEDURE — 84100 ASSAY OF PHOSPHORUS: CPT

## 2020-02-24 PROCEDURE — 2700000000 HC OXYGEN THERAPY PER DAY

## 2020-02-24 PROCEDURE — 82330 ASSAY OF CALCIUM: CPT

## 2020-02-24 PROCEDURE — 2500000003 HC RX 250 WO HCPCS: Performed by: INTERNAL MEDICINE

## 2020-02-24 PROCEDURE — 99291 CRITICAL CARE FIRST HOUR: CPT | Performed by: SURGERY

## 2020-02-24 PROCEDURE — 80053 COMPREHEN METABOLIC PANEL: CPT

## 2020-02-24 PROCEDURE — 2500000003 HC RX 250 WO HCPCS: Performed by: STUDENT IN AN ORGANIZED HEALTH CARE EDUCATION/TRAINING PROGRAM

## 2020-02-24 PROCEDURE — 6370000000 HC RX 637 (ALT 250 FOR IP): Performed by: INTERNAL MEDICINE

## 2020-02-24 RX ORDER — BUPROPION HYDROCHLORIDE 100 MG/1
100 TABLET, EXTENDED RELEASE ORAL DAILY
Status: DISCONTINUED | OUTPATIENT
Start: 2020-02-24 | End: 2020-03-06 | Stop reason: HOSPADM

## 2020-02-24 RX ORDER — POTASSIUM CHLORIDE 29.8 MG/ML
20 INJECTION INTRAVENOUS ONCE
Status: COMPLETED | OUTPATIENT
Start: 2020-02-24 | End: 2020-02-24

## 2020-02-24 RX ORDER — DOCUSATE SODIUM 100 MG/1
100 CAPSULE, LIQUID FILLED ORAL DAILY
Status: DISCONTINUED | OUTPATIENT
Start: 2020-02-24 | End: 2020-02-24

## 2020-02-24 RX ORDER — DOCUSATE SODIUM 50 MG/5ML
100 LIQUID ORAL 2 TIMES DAILY
Status: DISCONTINUED | OUTPATIENT
Start: 2020-02-24 | End: 2020-03-03

## 2020-02-24 RX ORDER — DILTIAZEM HYDROCHLORIDE 5 MG/ML
INJECTION INTRAVENOUS
Status: COMPLETED
Start: 2020-02-24 | End: 2020-02-24

## 2020-02-24 RX ORDER — DILTIAZEM HYDROCHLORIDE 5 MG/ML
10 INJECTION INTRAVENOUS ONCE
Status: COMPLETED | OUTPATIENT
Start: 2020-02-24 | End: 2020-02-24

## 2020-02-24 RX ORDER — MORPHINE SULFATE 2 MG/ML
2 INJECTION, SOLUTION INTRAMUSCULAR; INTRAVENOUS
Status: DISCONTINUED | OUTPATIENT
Start: 2020-02-24 | End: 2020-02-25

## 2020-02-24 RX ORDER — MORPHINE SULFATE 2 MG/ML
1 INJECTION, SOLUTION INTRAMUSCULAR; INTRAVENOUS
Status: DISCONTINUED | OUTPATIENT
Start: 2020-02-24 | End: 2020-02-25

## 2020-02-24 RX ADMIN — ENOXAPARIN SODIUM 60 MG: 60 INJECTION SUBCUTANEOUS at 10:50

## 2020-02-24 RX ADMIN — DILTIAZEM HYDROCHLORIDE 10 MG: 5 INJECTION INTRAVENOUS at 20:11

## 2020-02-24 RX ADMIN — CALCIUM GLUCONATE 2 G: 98 INJECTION, SOLUTION INTRAVENOUS at 08:36

## 2020-02-24 RX ADMIN — SODIUM CHLORIDE, PRESERVATIVE FREE 10 ML: 5 INJECTION INTRAVENOUS at 22:18

## 2020-02-24 RX ADMIN — Medication 10 ML: at 20:29

## 2020-02-24 RX ADMIN — MELATONIN 3 MG ORAL TABLET 6 MG: 3 TABLET ORAL at 20:20

## 2020-02-24 RX ADMIN — OXYCODONE 5 MG: 5 TABLET ORAL at 08:57

## 2020-02-24 RX ADMIN — Medication 1 MG: at 10:43

## 2020-02-24 RX ADMIN — DOCUSATE SODIUM 100 MG: 50 LIQUID ORAL at 20:29

## 2020-02-24 RX ADMIN — Medication 10 ML: at 08:47

## 2020-02-24 RX ADMIN — MORPHINE SULFATE 2 MG: 2 INJECTION, SOLUTION INTRAMUSCULAR; INTRAVENOUS at 15:35

## 2020-02-24 RX ADMIN — POTASSIUM CHLORIDE 20 MEQ: 400 INJECTION, SOLUTION INTRAVENOUS at 07:53

## 2020-02-24 RX ADMIN — SOTALOL HYDROCHLORIDE 80 MG: 80 TABLET ORAL at 08:46

## 2020-02-24 RX ADMIN — IPRATROPIUM BROMIDE AND ALBUTEROL SULFATE 1 AMPULE: 2.5; .5 SOLUTION RESPIRATORY (INHALATION) at 15:52

## 2020-02-24 RX ADMIN — POTASSIUM CHLORIDE, DEXTROSE MONOHYDRATE AND SODIUM CHLORIDE: 150; 5; 450 INJECTION, SOLUTION INTRAVENOUS at 07:56

## 2020-02-24 RX ADMIN — Medication 1 MG: at 05:46

## 2020-02-24 RX ADMIN — AMLODIPINE BESYLATE 5 MG: 5 TABLET ORAL at 08:36

## 2020-02-24 RX ADMIN — POTASSIUM PHOSPHATE, MONOBASIC AND POTASSIUM PHOSPHATE, DIBASIC 30 MMOL: 224; 236 INJECTION, SOLUTION, CONCENTRATE INTRAVENOUS at 08:36

## 2020-02-24 RX ADMIN — SOTALOL HYDROCHLORIDE 80 MG: 80 TABLET ORAL at 20:20

## 2020-02-24 RX ADMIN — BISACODYL 10 MG: 10 SUPPOSITORY RECTAL at 08:36

## 2020-02-24 RX ADMIN — IPRATROPIUM BROMIDE AND ALBUTEROL SULFATE 1 AMPULE: 2.5; .5 SOLUTION RESPIRATORY (INHALATION) at 11:28

## 2020-02-24 RX ADMIN — MORPHINE SULFATE 2 MG: 2 INJECTION, SOLUTION INTRAMUSCULAR; INTRAVENOUS at 18:38

## 2020-02-24 RX ADMIN — OXYCODONE 5 MG: 5 TABLET ORAL at 02:49

## 2020-02-24 RX ADMIN — DOCUSATE SODIUM 100 MG: 100 CAPSULE, LIQUID FILLED ORAL at 08:47

## 2020-02-24 RX ADMIN — IPRATROPIUM BROMIDE AND ALBUTEROL SULFATE 1 AMPULE: 2.5; .5 SOLUTION RESPIRATORY (INHALATION) at 06:29

## 2020-02-24 RX ADMIN — DEXTROSE MONOHYDRATE 5 MG/HR: 50 INJECTION, SOLUTION INTRAVENOUS at 20:11

## 2020-02-24 RX ADMIN — MORPHINE SULFATE 2 MG: 2 INJECTION, SOLUTION INTRAMUSCULAR; INTRAVENOUS at 22:17

## 2020-02-24 RX ADMIN — BUPROPION HYDROCHLORIDE 100 MG: 100 TABLET, EXTENDED RELEASE ORAL at 10:50

## 2020-02-24 RX ADMIN — SENNOSIDES 8.6 MG: 8.6 TABLET, FILM COATED ORAL at 20:20

## 2020-02-24 RX ADMIN — LEVOTHYROXINE SODIUM 100 MCG: 0.1 TABLET ORAL at 06:05

## 2020-02-24 RX ADMIN — POTASSIUM CHLORIDE, DEXTROSE MONOHYDRATE AND SODIUM CHLORIDE: 150; 5; 450 INJECTION, SOLUTION INTRAVENOUS at 10:26

## 2020-02-24 RX ADMIN — ENOXAPARIN SODIUM 60 MG: 60 INJECTION SUBCUTANEOUS at 20:20

## 2020-02-24 ASSESSMENT — PAIN SCALES - GENERAL
PAINLEVEL_OUTOF10: 10
PAINLEVEL_OUTOF10: 0
PAINLEVEL_OUTOF10: 0
PAINLEVEL_OUTOF10: 7
PAINLEVEL_OUTOF10: 0
PAINLEVEL_OUTOF10: 3
PAINLEVEL_OUTOF10: 7
PAINLEVEL_OUTOF10: 8
PAINLEVEL_OUTOF10: 6
PAINLEVEL_OUTOF10: 7
PAINLEVEL_OUTOF10: 7
PAINLEVEL_OUTOF10: 0
PAINLEVEL_OUTOF10: 6

## 2020-02-24 ASSESSMENT — PAIN DESCRIPTION - FREQUENCY: FREQUENCY: CONTINUOUS

## 2020-02-24 ASSESSMENT — PAIN DESCRIPTION - PAIN TYPE: TYPE: ACUTE PAIN;SURGICAL PAIN

## 2020-02-24 ASSESSMENT — PAIN DESCRIPTION - DESCRIPTORS: DESCRIPTORS: ACHING;SORE

## 2020-02-24 ASSESSMENT — PAIN DESCRIPTION - LOCATION: LOCATION: ABDOMEN

## 2020-02-24 ASSESSMENT — PAIN DESCRIPTION - ONSET: ONSET: GRADUAL

## 2020-02-24 ASSESSMENT — PAIN DESCRIPTION - ORIENTATION: ORIENTATION: LOWER;MID

## 2020-02-24 NOTE — PROGRESS NOTES
Surgical Intensive Care Unit   Daily Progress Note     Patient's name:  Alissa Dance  Age/Gender: 68 y.o. female  Date of Admission: 2/17/2020  1:30 PM  Length of Stay: 7    Reason for ICU: post op monitoring after right hemicolectomy for cecal volvulus    Hospital Course:   2/17 Ada Elizabeth is a 68 y. o. female with extensive PVD on plavix and pletal, who presents for evaluation of a high grade bowel obstruction. She had been tachycardic and hypotensive; decision was made for exploratory laparotomy where she was found to have a perforated cecal volvulus for which she underwent a right hemicolectomy with side to side ileocolonic anastomosis 2/17. 2/18 Extubated   2/19 1 unit PRBC for acute blood loss anemia   2/20 Patient had a short run A fib RVR yesterday, self resolved and resolved Faster than we could obtain an EKG , She states she went into a fib > 1 week ago and has possibly seen Dr. Sadaf Morgan  ( can not find any notes)   2/21--A.fib with RVR again this AM--given lopressor and then started on Cardizem  2/22--agitated overnight--pulled all IVs out  2/23--diuresed well yesterday  1/24-- Nausea, ileus on KUB. NGT replaced. 500cc out.  No BM overnight.     Problem List:   Patient Active Problem List   Diagnosis    Essential hypertension    Depression    PVD (peripheral vascular disease) with claudication ([address]    History of tobacco use    Asymptomatic bilateral carotid artery stenosis    PAD (peripheral artery disease) ([address]    Hyperlipidemia LDL goal <100    Acquired hypothyroidism    Left groin pain    SBO (small bowel obstruction) ([address]    Cecal volvulus ([address]    ROSELYN (acute kidney injury) ([address]    Shock liver    Paroxysmal atrial fibrillation ([address]    Severe protein-calorie malnutrition ([address]       Surgical/Interventional Procedures:   2/17-- Right hemicolectomy    Vent Settings: Additional Respiratory  Assessments  Pulse: 77  Resp: 16  SpO2: 100 %  Humidification Source: Heated overnight  · Pulm: Respiratory insufficieny improving  · Continue chest physiotherapy/duonebs PRN  · GI: Ileus  · NPO  · NGT replaced   · Monitor output/BMs  · Repeat KUB  · Renal: Acute kidney injury  · Monitor BUN/Cr  · ID: leukocytosis- continue to monitor  · Off abx  · Endocrine: Hypothyroidism, shock liver. · Synthroid  · LFTs improving. · MSK: no acute issues   · Heme: Anemia  · Continue to monitor  · T lovenox-per cards due to A-fib w/ RVR      Pain/Analgesia: Antonella, morphine  Bowel regimen: senna, colace, dulcolax  Diet: DIET FULL LIQUID;  DVT proph: T lovenox  GI proph: Pepcid  Seizure proph: none  Glucose protocol: PRN  Mouth/eye care: PRN  Barry: monitor UOP   Ancillary consults: Admitted to IM, Critical care, gen surgery, cards,  Family Update: as needed  CODE Status: Full Code    Dispo: continue SICU care.        Electronically signed by Cherelle Carlson MD 2/24/2020  4:54 AM

## 2020-02-24 NOTE — PROGRESS NOTES
GENERAL SURGERY  DAILY PROGRESS NOTE  2/24/2020    Subjective:  Abdominal distension, nausea, vomiting overnight. NGT placed w/ 600 output. Pain controlled  NPO  -F/-BM    Objective:  BP (!) 171/78   Pulse 77   Temp 98 °F (36.7 °C) (Axillary)   Resp 22   Ht 5' 6\" (1.676 m)   Wt 120 lb 2.4 oz (54.5 kg)   SpO2 94%   BMI 19.39 kg/m²     General: NAD, awake and alert. Head: Normocephalic, atraumatic  Eyes: PERRLA, EOMI. Lungs: No increased work of breathing. Cardiovascular: RRR. Abdomen: Soft, mild distension, non-tender. Incision c/d/i. No rebound, guarding or rigidity. Extremities: Atraumatic, full range of motion  Skin: Warm, dry and intact    Assessment/Plan:  68 y.o. female w/ cecal perforation s/p ex lap w/ right hemicolectomy 2/17    Pain and nausea control PRN  NPO. NGT decompression. Maintenance IVF's. Ileus. May need further CT A/P. If continued NPO status, may need PICC w/ TPN. DVT ppx.     Electronically signed by Ayla Barreto MD on 2/24/2020 at 4:46 PM

## 2020-02-24 NOTE — PROGRESS NOTES
Physical Therapy  Physical Therapy Treatment Note     Name: Phil Galdamez  : 1950  MRN: [id number]    Referring Provider:  Naga White DO    Date of Service: 2020    Evaluating PT:  Jason Galarza PT, DPT [id number]    Room #:  8337/5775-B  Diagnosis:  SBO  Precautions: Falls, NG tube,, O2, , Bed alarm  Procedure/Surgery:   Exploratory laparotomy, enterolysis, right hemicolectomy with side-to-side, functional end-to-end anastomosis  PMHx/PSHx:  CA, CAD, COPD, HLD, HTN, PVD, RA, Thyroid disease   Equipment Needs:  TBD    SUBJECTIVE:    Pt lives with daughter in a 2 story home with 3 stairs to enter and 2 rail. Full flight of steps and 1 rail to bedroom. Pt ambulated with [employer] and required some assistance for ADLs PTA. OBJECTIVE:   Initial Evaluation  Date: 20 Treatment  20 Short Term/ Long Term   Goals   AM-PAC 6 Clicks     Was pt agreeable to Eval/treatment? Yes Yes    Does pt have pain? 5/10 abdominal pain Pt reported 2/10 abdominal pain     Bed Mobility  Rolling: NT  Supine to sit: MaxA x 2  Sit to supine: MaxA x 2  Scooting: MaxA Rolling: NT  Supine to sit: MaxA  Sit to supine: ModA x 2  Scooting: MaxA Hany   Transfers Sit to stand: ModA x 2  Stand to sit: ModA x 2  Stand pivot: NT Sit to stand: NT  Stand to sit: NT  Stand pivot: NT Hany with [employer]   Ambulation   3 feet with MaxA x 2 with  [employer] NT >75 feet with Hany with [employer]   Stair negotiation: ascended and descended NT NT >2 steps with 1 rail Hany   ROM BUE:  Defer to OT note  BLE:  WNL     Strength BUE:  Defer to OT note  BLE:  3+ to 4-/5  Increase by 1/3 MMT grade   Balance Sitting EOB:  MaxA  Dynamic Standing:  MaxA x 2 with [employer] Sitting: ModA  Standing: NT Sitting EOB:  SBA  Dynamic Standing:  Hany with [employer]     Pt is A & O x 1-2 (self and time with choices)  CAM-ICU: Positive  RASS: -1  Sensation:  WNL  Edema:   WNL    Vitals:  Heart Rate at rest 91 bpm Heart Rate post session 78 bpm   SpO2 at rest 99% SpO2 post session 98% Blood Pressure at rest 188/90 mmHg Blood Pressure post session 163/74 mmHg     Functional Status Score-Intensive Care Unit (FSS-ICU)   Rolling 2/7   Supine to sit transfer 2/7   Unsupported sitting  3/7   Sit to stand transfers -/7   Ambulation -/7   Total  7/35       Therapeutic Exercises:  AAROM BLE knee ext x 10 reps, B ankle DF x 10 reps    Patient education  Pt educated on safety    Patient response to education:   Pt verbalized understanding Pt demonstrated skill Pt requires further education in this area   x partial x     ASSESSMENT:    Comments:  Pt was supine in bed upon arrival, agreeable to treatment session. Pt was more confused this session with decreased participation. Unable to maintain sitting balance at EOB without assistance. Pt was returned to bed with all needs met and call light in reach. Treatment:  Patient practiced and was instructed in the following treatment:     Bed mobility training - pt given verbal and tactile cues to facilitate proper sequencing and safety during supine>sit as well as provided with physical assistance to complete task    Sitting EOB for >10 minutes minutes for upright tolerance, postural awareness and BLE ROM   Skilled positioning - Pt placed in the chair position with pillows utilized to facilitate upright posture, joint and skin integrity, and interaction with environment.  Non-pharmacological treatment and prevention of ICU delirium - Pt oriented to date, time, time of day, place, and situation as well as provided with visual and auditory stimuli in order to improve cognition and combat effects of ICU delirium. PLAN:    Patient is making limited progress towards established goals. Will continue with current POC.       Time in  0900  Time out  0925    Total Treatment Time  25 minutes     CPT codes:  [] Gait training 91205 - minutes  [] Manual therapy 33920 - minutes  [x] Therapeutic activities 90425 25 minutes  [] Therapeutic exercises 83119 -

## 2020-02-24 NOTE — PLAN OF CARE
Problem: Pain:  Goal: Pain level will decrease  Description  Pain level will decrease  Outcome: Not Met This Shift  Goal: Control of acute pain  Description  Control of acute pain  Outcome: Not Met This Shift  Goal: Control of chronic pain  Description  Control of chronic pain  Outcome: Not Met This Shift

## 2020-02-24 NOTE — CARE COORDINATION
Ng re-inserted over weekend d/t ileus. Plan remains for [facility]. They will need to resubmit ins precert when stable.

## 2020-02-24 NOTE — PROGRESS NOTES
Patient seen and examined noted be complaining of diffuse abdominal pain and nausea. KUB and CBC ordered. KUB noted that showed diffuse ileus. NG tube placed-pulled back 8 cm post placement  500 cc of fluid removed upon placement of NG tube.     Electronically signed by Charles Harvey MD on 2/23/20 at 7:38 PM

## 2020-02-25 ENCOUNTER — APPOINTMENT (OUTPATIENT)
Dept: GENERAL RADIOLOGY | Age: 70
DRG: 329 | End: 2020-02-25
Payer: MEDICARE

## 2020-02-25 ENCOUNTER — APPOINTMENT (OUTPATIENT)
Dept: CT IMAGING | Age: 70
DRG: 329 | End: 2020-02-25
Payer: MEDICARE

## 2020-02-25 PROBLEM — I65.23 BILATERAL CAROTID ARTERY STENOSIS: Chronic | Status: ACTIVE | Noted: 2018-05-22

## 2020-02-25 PROBLEM — I70.0 ATHEROSCLEROSIS OF AORTIC BIFURCATION AND COMMON ILIAC ARTERIES (HCC): Chronic | Status: ACTIVE | Noted: 2020-02-23

## 2020-02-25 PROBLEM — I25.10 CAD (CORONARY ARTERY DISEASE): Chronic | Status: ACTIVE | Noted: 2020-02-23

## 2020-02-25 PROBLEM — I48.0 PAROXYSMAL ATRIAL FIBRILLATION WITH RAPID VENTRICULAR RESPONSE (HCC): Status: ACTIVE | Noted: 2020-02-25

## 2020-02-25 PROBLEM — I70.8 ATHEROSCLEROSIS OF AORTIC BIFURCATION AND COMMON ILIAC ARTERIES (HCC): Chronic | Status: ACTIVE | Noted: 2020-02-23

## 2020-02-25 LAB
ALBUMIN SERPL-MCNC: 2.2 G/DL (ref 3.5–5.2)
ALP BLD-CCNC: 68 U/L (ref 35–104)
ALT SERPL-CCNC: 291 U/L (ref 0–32)
ANION GAP SERPL CALCULATED.3IONS-SCNC: 7 MMOL/L (ref 7–16)
ANISOCYTOSIS: ABNORMAL
AST SERPL-CCNC: 40 U/L (ref 0–31)
BASOPHILS ABSOLUTE: 0.03 E9/L (ref 0–0.2)
BASOPHILS RELATIVE PERCENT: 0.2 % (ref 0–2)
BILIRUB SERPL-MCNC: 0.9 MG/DL (ref 0–1.2)
BUN BLDV-MCNC: 16 MG/DL (ref 8–23)
CALCIUM IONIZED: 1.16 MMOL/L (ref 1.15–1.33)
CALCIUM SERPL-MCNC: 7.6 MG/DL (ref 8.6–10.2)
CHLORIDE BLD-SCNC: 105 MMOL/L (ref 98–107)
CO2: 29 MMOL/L (ref 22–29)
CREAT SERPL-MCNC: 0.8 MG/DL (ref 0.5–1)
EOSINOPHILS ABSOLUTE: 0.04 E9/L (ref 0.05–0.5)
EOSINOPHILS RELATIVE PERCENT: 0.3 % (ref 0–6)
GFR AFRICAN AMERICAN: >60
GFR NON-AFRICAN AMERICAN: >60 ML/MIN/1.73
GLUCOSE BLD-MCNC: 144 MG/DL (ref 74–99)
HCT VFR BLD CALC: 24.3 % (ref 34–48)
HEMOGLOBIN: 7.4 G/DL (ref 11.5–15.5)
HYPOCHROMIA: ABNORMAL
IMMATURE GRANULOCYTES #: 0.18 E9/L
IMMATURE GRANULOCYTES %: 1.3 % (ref 0–5)
LYMPHOCYTES ABSOLUTE: 0.61 E9/L (ref 1.5–4)
LYMPHOCYTES RELATIVE PERCENT: 4.5 % (ref 20–42)
MAGNESIUM: 2 MG/DL (ref 1.6–2.6)
MCH RBC QN AUTO: 30.1 PG (ref 26–35)
MCHC RBC AUTO-ENTMCNC: 30.5 % (ref 32–34.5)
MCV RBC AUTO: 98.8 FL (ref 80–99.9)
MONOCYTES ABSOLUTE: 0.97 E9/L (ref 0.1–0.95)
MONOCYTES RELATIVE PERCENT: 7.1 % (ref 2–12)
NEUTROPHILS ABSOLUTE: 11.78 E9/L (ref 1.8–7.3)
NEUTROPHILS RELATIVE PERCENT: 86.6 % (ref 43–80)
OVALOCYTES: ABNORMAL
PDW BLD-RTO: 15 FL (ref 11.5–15)
PHOSPHORUS: 3.1 MG/DL (ref 2.5–4.5)
PLATELET # BLD: 348 E9/L (ref 130–450)
PMV BLD AUTO: 10.4 FL (ref 7–12)
POIKILOCYTES: ABNORMAL
POLYCHROMASIA: ABNORMAL
POTASSIUM SERPL-SCNC: 3.9 MMOL/L (ref 3.5–5)
PROCALCITONIN: 1.11 NG/ML (ref 0–0.08)
RBC # BLD: 2.46 E12/L (ref 3.5–5.5)
SCHISTOCYTES: ABNORMAL
SODIUM BLD-SCNC: 141 MMOL/L (ref 132–146)
TOTAL PROTEIN: 4.7 G/DL (ref 6.4–8.3)
WBC # BLD: 13.6 E9/L (ref 4.5–11.5)

## 2020-02-25 PROCEDURE — 2580000003 HC RX 258: Performed by: SURGERY

## 2020-02-25 PROCEDURE — 36415 COLL VENOUS BLD VENIPUNCTURE: CPT

## 2020-02-25 PROCEDURE — 82330 ASSAY OF CALCIUM: CPT

## 2020-02-25 PROCEDURE — 6370000000 HC RX 637 (ALT 250 FOR IP): Performed by: STUDENT IN AN ORGANIZED HEALTH CARE EDUCATION/TRAINING PROGRAM

## 2020-02-25 PROCEDURE — 97110 THERAPEUTIC EXERCISES: CPT

## 2020-02-25 PROCEDURE — 6370000000 HC RX 637 (ALT 250 FOR IP): Performed by: SURGERY

## 2020-02-25 PROCEDURE — 2000000000 HC ICU R&B

## 2020-02-25 PROCEDURE — 85025 COMPLETE CBC W/AUTO DIFF WBC: CPT

## 2020-02-25 PROCEDURE — 74177 CT ABD & PELVIS W/CONTRAST: CPT

## 2020-02-25 PROCEDURE — 71045 X-RAY EXAM CHEST 1 VIEW: CPT

## 2020-02-25 PROCEDURE — 80053 COMPREHEN METABOLIC PANEL: CPT

## 2020-02-25 PROCEDURE — 2580000003 HC RX 258: Performed by: INTERNAL MEDICINE

## 2020-02-25 PROCEDURE — 6360000002 HC RX W HCPCS: Performed by: SURGERY

## 2020-02-25 PROCEDURE — 94640 AIRWAY INHALATION TREATMENT: CPT

## 2020-02-25 PROCEDURE — 84100 ASSAY OF PHOSPHORUS: CPT

## 2020-02-25 PROCEDURE — 2700000000 HC OXYGEN THERAPY PER DAY

## 2020-02-25 PROCEDURE — 2500000003 HC RX 250 WO HCPCS: Performed by: STUDENT IN AN ORGANIZED HEALTH CARE EDUCATION/TRAINING PROGRAM

## 2020-02-25 PROCEDURE — 93005 ELECTROCARDIOGRAM TRACING: CPT | Performed by: INTERNAL MEDICINE

## 2020-02-25 PROCEDURE — 97530 THERAPEUTIC ACTIVITIES: CPT

## 2020-02-25 PROCEDURE — 6360000004 HC RX CONTRAST MEDICATION: Performed by: RADIOLOGY

## 2020-02-25 PROCEDURE — 99291 CRITICAL CARE FIRST HOUR: CPT | Performed by: SURGERY

## 2020-02-25 PROCEDURE — 83735 ASSAY OF MAGNESIUM: CPT

## 2020-02-25 PROCEDURE — 84145 PROCALCITONIN (PCT): CPT

## 2020-02-25 PROCEDURE — 6370000000 HC RX 637 (ALT 250 FOR IP): Performed by: INTERNAL MEDICINE

## 2020-02-25 PROCEDURE — 6360000002 HC RX W HCPCS: Performed by: STUDENT IN AN ORGANIZED HEALTH CARE EDUCATION/TRAINING PROGRAM

## 2020-02-25 PROCEDURE — 2500000003 HC RX 250 WO HCPCS: Performed by: SURGERY

## 2020-02-25 RX ORDER — SOTALOL HYDROCHLORIDE 120 MG/1
120 TABLET ORAL 2 TIMES DAILY
Status: DISCONTINUED | OUTPATIENT
Start: 2020-02-25 | End: 2020-03-06 | Stop reason: HOSPADM

## 2020-02-25 RX ORDER — MORPHINE SULFATE 4 MG/ML
4 INJECTION, SOLUTION INTRAMUSCULAR; INTRAVENOUS
Status: DISCONTINUED | OUTPATIENT
Start: 2020-02-25 | End: 2020-02-25

## 2020-02-25 RX ORDER — MORPHINE SULFATE 2 MG/ML
2 INJECTION, SOLUTION INTRAMUSCULAR; INTRAVENOUS
Status: DISCONTINUED | OUTPATIENT
Start: 2020-02-25 | End: 2020-03-03

## 2020-02-25 RX ORDER — FENTANYL CITRATE 50 UG/ML
50 INJECTION, SOLUTION INTRAMUSCULAR; INTRAVENOUS ONCE
Status: COMPLETED | OUTPATIENT
Start: 2020-02-25 | End: 2020-02-25

## 2020-02-25 RX ORDER — SOTALOL HYDROCHLORIDE 80 MG/1
40 TABLET ORAL ONCE
Status: COMPLETED | OUTPATIENT
Start: 2020-02-25 | End: 2020-02-25

## 2020-02-25 RX ORDER — MORPHINE SULFATE 4 MG/ML
3 INJECTION, SOLUTION INTRAMUSCULAR; INTRAVENOUS
Status: DISCONTINUED | OUTPATIENT
Start: 2020-02-25 | End: 2020-03-03

## 2020-02-25 RX ORDER — MORPHINE SULFATE 2 MG/ML
2 INJECTION, SOLUTION INTRAMUSCULAR; INTRAVENOUS
Status: DISCONTINUED | OUTPATIENT
Start: 2020-02-25 | End: 2020-02-25

## 2020-02-25 RX ORDER — POTASSIUM CHLORIDE 29.8 MG/ML
20 INJECTION INTRAVENOUS ONCE
Status: COMPLETED | OUTPATIENT
Start: 2020-02-25 | End: 2020-02-25

## 2020-02-25 RX ADMIN — LEVOTHYROXINE SODIUM 100 MCG: 0.1 TABLET ORAL at 05:06

## 2020-02-25 RX ADMIN — MORPHINE SULFATE 3 MG: 4 INJECTION, SOLUTION INTRAMUSCULAR; INTRAVENOUS at 12:45

## 2020-02-25 RX ADMIN — MORPHINE SULFATE 3 MG: 4 INJECTION, SOLUTION INTRAMUSCULAR; INTRAVENOUS at 21:59

## 2020-02-25 RX ADMIN — MORPHINE SULFATE 4 MG: 4 INJECTION, SOLUTION INTRAMUSCULAR; INTRAVENOUS at 09:48

## 2020-02-25 RX ADMIN — IPRATROPIUM BROMIDE AND ALBUTEROL SULFATE 1 AMPULE: 2.5; .5 SOLUTION RESPIRATORY (INHALATION) at 20:17

## 2020-02-25 RX ADMIN — IOPAMIDOL 110 ML: 755 INJECTION, SOLUTION INTRAVENOUS at 00:39

## 2020-02-25 RX ADMIN — LABETALOL HYDROCHLORIDE 10 MG: 5 INJECTION INTRAVENOUS at 18:33

## 2020-02-25 RX ADMIN — MELATONIN 3 MG ORAL TABLET 6 MG: 3 TABLET ORAL at 20:54

## 2020-02-25 RX ADMIN — BISACODYL 10 MG: 10 SUPPOSITORY RECTAL at 08:07

## 2020-02-25 RX ADMIN — DOCUSATE SODIUM 100 MG: 50 LIQUID ORAL at 08:07

## 2020-02-25 RX ADMIN — AMLODIPINE BESYLATE 5 MG: 5 TABLET ORAL at 08:07

## 2020-02-25 RX ADMIN — MORPHINE SULFATE 3 MG: 4 INJECTION, SOLUTION INTRAMUSCULAR; INTRAVENOUS at 15:57

## 2020-02-25 RX ADMIN — POTASSIUM CHLORIDE 20 MEQ: 400 INJECTION, SOLUTION INTRAVENOUS at 07:42

## 2020-02-25 RX ADMIN — BUPROPION HYDROCHLORIDE 100 MG: 100 TABLET, EXTENDED RELEASE ORAL at 08:07

## 2020-02-25 RX ADMIN — HYDRALAZINE HYDROCHLORIDE 10 MG: 20 INJECTION INTRAMUSCULAR; INTRAVENOUS at 19:48

## 2020-02-25 RX ADMIN — IPRATROPIUM BROMIDE AND ALBUTEROL SULFATE 1 AMPULE: 2.5; .5 SOLUTION RESPIRATORY (INHALATION) at 17:11

## 2020-02-25 RX ADMIN — DOCUSATE SODIUM 100 MG: 50 LIQUID ORAL at 20:57

## 2020-02-25 RX ADMIN — MORPHINE SULFATE 2 MG: 2 INJECTION, SOLUTION INTRAMUSCULAR; INTRAVENOUS at 06:40

## 2020-02-25 RX ADMIN — Medication 10 ML: at 08:08

## 2020-02-25 RX ADMIN — IPRATROPIUM BROMIDE AND ALBUTEROL SULFATE 1 AMPULE: 2.5; .5 SOLUTION RESPIRATORY (INHALATION) at 11:49

## 2020-02-25 RX ADMIN — POTASSIUM CHLORIDE, DEXTROSE MONOHYDRATE AND SODIUM CHLORIDE: 150; 5; 450 INJECTION, SOLUTION INTRAVENOUS at 04:59

## 2020-02-25 RX ADMIN — Medication 10 ML: at 20:56

## 2020-02-25 RX ADMIN — SOTALOL HYDROCHLORIDE 40 MG: 80 TABLET ORAL at 14:58

## 2020-02-25 RX ADMIN — SOTALOL HYDROCHLORIDE 80 MG: 80 TABLET ORAL at 08:07

## 2020-02-25 RX ADMIN — FENTANYL CITRATE 50 MCG: 50 INJECTION, SOLUTION INTRAMUSCULAR; INTRAVENOUS at 00:40

## 2020-02-25 RX ADMIN — SOTALOL HYDROCHLORIDE 120 MG: 120 TABLET ORAL at 20:54

## 2020-02-25 RX ADMIN — SENNOSIDES 5 ML: 8.8 LIQUID ORAL at 19:51

## 2020-02-25 RX ADMIN — POTASSIUM PHOSPHATE, MONOBASIC AND POTASSIUM PHOSPHATE, DIBASIC 10 MMOL: 224; 236 INJECTION, SOLUTION, CONCENTRATE INTRAVENOUS at 00:23

## 2020-02-25 RX ADMIN — MORPHINE SULFATE 2 MG: 2 INJECTION, SOLUTION INTRAMUSCULAR; INTRAVENOUS at 03:40

## 2020-02-25 RX ADMIN — MORPHINE SULFATE 3 MG: 4 INJECTION, SOLUTION INTRAMUSCULAR; INTRAVENOUS at 18:59

## 2020-02-25 RX ADMIN — IPRATROPIUM BROMIDE AND ALBUTEROL SULFATE 1 AMPULE: 2.5; .5 SOLUTION RESPIRATORY (INHALATION) at 08:58

## 2020-02-25 ASSESSMENT — PAIN DESCRIPTION - PAIN TYPE
TYPE: ACUTE PAIN;SURGICAL PAIN
TYPE: ACUTE PAIN
TYPE: ACUTE PAIN

## 2020-02-25 ASSESSMENT — PAIN - FUNCTIONAL ASSESSMENT: PAIN_FUNCTIONAL_ASSESSMENT: PREVENTS OR INTERFERES WITH MANY ACTIVE NOT PASSIVE ACTIVITIES

## 2020-02-25 ASSESSMENT — PAIN DESCRIPTION - LOCATION
LOCATION: ABDOMEN

## 2020-02-25 ASSESSMENT — PAIN DESCRIPTION - DESCRIPTORS
DESCRIPTORS: SHARP
DESCRIPTORS: SHARP
DESCRIPTORS: SHARP;SHOOTING

## 2020-02-25 ASSESSMENT — PAIN SCALES - GENERAL
PAINLEVEL_OUTOF10: 0
PAINLEVEL_OUTOF10: 0
PAINLEVEL_OUTOF10: 10
PAINLEVEL_OUTOF10: 8
PAINLEVEL_OUTOF10: 8
PAINLEVEL_OUTOF10: 0
PAINLEVEL_OUTOF10: 0
PAINLEVEL_OUTOF10: 8
PAINLEVEL_OUTOF10: 7
PAINLEVEL_OUTOF10: 8
PAINLEVEL_OUTOF10: 6
PAINLEVEL_OUTOF10: 8
PAINLEVEL_OUTOF10: 0
PAINLEVEL_OUTOF10: 8
PAINLEVEL_OUTOF10: 10
PAINLEVEL_OUTOF10: 8

## 2020-02-25 ASSESSMENT — PAIN DESCRIPTION - ONSET: ONSET: SUDDEN

## 2020-02-25 ASSESSMENT — PAIN DESCRIPTION - FREQUENCY: FREQUENCY: INTERMITTENT

## 2020-02-25 ASSESSMENT — PAIN DESCRIPTION - PROGRESSION: CLINICAL_PROGRESSION: GRADUALLY WORSENING

## 2020-02-25 ASSESSMENT — PAIN DESCRIPTION - ORIENTATION: ORIENTATION: MID;LOWER

## 2020-02-25 NOTE — PROGRESS NOTES
LABALBU 2.4 02/24/2020    LABALBU 4.5 10/07/2010    CALCIUM 7.5 02/24/2020    BILITOT 0.9 02/24/2020    ALKPHOS 66 02/24/2020    AST 70 02/24/2020     02/24/2020     Warfarin PT/INR:    Lab Results   Component Value Date    INR 1.0 02/17/2020    INR 1.0 01/20/2020    INR 1.0 01/07/2020    PROTIME 11.8 02/17/2020    PROTIME 11.2 01/20/2020    PROTIME 11.0 01/07/2020       Assessment:    Principal Problem:    SBO (small bowel obstruction) (HCC)  Active Problems:    Essential hypertension    Depression    PAD (peripheral artery disease) (HCC)    Acquired hypothyroidism    Cecal volvulus (HCC)    ROSELYN (acute kidney injury) ([facility]    Shock liver    Paroxysmal atrial fibrillation (HCC)    Severe protein-calorie malnutrition (HCC)  Resolved Problems:    * No resolved hospital problems.  *  ileus    Plan:  If pt remains npo will need to switch to iv meds / cont as per surgery        Vernon Clay  7:09 PM  2/24/2020

## 2020-02-25 NOTE — PROGRESS NOTES
Therapies:  · Oral Diet Orders: NPO   · Tube Feeding (TF) Orders:   · Feeding Route: Nasogastric(to LIS)  · Anthropometric Measures:  · Ht: 5' 6\" (167.6 cm)   · Current Body Wt: 118 lb (53.5 kg)(2/25 actual)  · Admission Body Wt: 111 lb (50.3 kg)(2/17 actual)  · Usual Body Wt: 110 lb (49.9 kg)(08/2019 per EMR, actual)  · Weight Change: noted wt gain since admit, current fluids + at this time    · Ideal Body Wt: 130 lb (59 kg), % Ideal Body 85%(using admit wt )  · BMI Classification: BMI <18.5 Underweight(admit wt BMI 17.9)    Nutrition Interventions:   Continued Inpatient Monitoring, Education not appropriate at this time, Coordination of Care    Nutrition Evaluation:   · Evaluation: Progressing toward goals   · Goals: ADAT, possible PN    · Monitoring: Nutrition Progression, Skin Integrity, Wound Healing, I&O, Monitor Hemodynamic Status, Monitor Bowel Function, Mental Status/Confusion, Weight, Pertinent Labs      Electronically signed by Hannah Mcghee MS, RD, LD on 2/25/20 at 9:56 AM  Contact Number: [phone]

## 2020-02-25 NOTE — PROGRESS NOTES
All new orders for parenteral nutrition will be reviewed by clinical dietitian and clinical pharmacist to determine if the order meets the indication guidelines as follows:      Check box if present Approved indication/criteria for total parenteral nutrition   []  patient   [x] Malnourished adult patient without nutrition for 3 - 5 days without ability to take enteral nutrition (examples below table)   [] Previously well-nourished adult without nutrition for 7 days without ability to take enteral nutrition (examples below in table)     Example Disease states Requiring Parenteral Nutrition    Disease Category Examples   Impaired absorption Short bowel syndrome, NEC, meconium ileus, trauma   Mechanical bowel obstruction Intrinsic or extrinsic blockage of intestinal lumen (stenosis, strictures, inflammatory disease)   Need to restrict PO or enteral intake Ischemic bowel, severe pancreatitis, chylous fistula, preoperative status   Motility disorders Prolonged ileus, pseudo-obstruction, visceral organ myopathy   Inability to achieve or maintain enteral access Varies with clinical circumstances       Misti Edgar 2020. 9:57 AM

## 2020-02-25 NOTE — PROGRESS NOTES
Hospital Medicine    Subjective:  Pt seen this am alert responsive      Current Facility-Administered Medications:     morphine (PF) injection 2 mg, 2 mg, Intravenous, Q3H PRN **OR** morphine sulfate (PF) injection 3 mg, 3 mg, Intravenous, Q3H PRN, Ghada Marquez MD, 3 mg at 02/25/20 1557    sotalol (BETAPACE) tablet 120 mg, 120 mg, Oral, BID, Renita Muller DO    buPROPion [facility]) extended release tablet 100 mg, 100 mg, Oral, Daily, Ghada Marquez MD, 100 mg at 02/25/20 0807    sennosides (SENOKOT) 8.8 MG/5ML syrup 5 mL, 5 mL, Oral, Nightly, Mynor Eisenberg MD    docusate sodium (COLACE) 150 MG/15ML liquid 100 mg, 100 mg, Oral, BID, Mynor Eisenberg MD, 100 mg at 02/25/20 0807    dextrose 5 % and 0.45 % NaCl with KCl 20 mEq infusion, , Intravenous, Continuous, Ghada Marquez MD, Last Rate: 50 mL/hr at 02/25/20 0459    melatonin tablet 6 mg, 6 mg, Oral, Nightly, Ghada Marquez MD, 6 mg at 02/24/20 2020    amLODIPine (NORVASC) tablet 5 mg, 5 mg, Oral, Daily, Renita Muller DO, 5 mg at 02/25/20 0807    butamben-tetracaine-benzocaine (CETACAINE) spray 1 spray, 1 spray, Topical, Once, Mynor Eisenberg MD    ipratropium-albuterol (DUONEB) nebulizer solution 1 ampule, 1 ampule, Inhalation, Q4H WA, Dwayne Winter DO, 1 ampule at 02/25/20 1149    dextrose 50 % IV solution, 12.5 g, Intravenous, PRN, Jo Ann Houser DO    bisacodyl (DULCOLAX) suppository 10 mg, 10 mg, Rectal, Daily, Ghada Marquez MD, 10 mg at 02/25/20 0807    [Held by provider] enoxaparin (LOVENOX) injection 60 mg, 1 mg/kg, Subcutaneous, BID, Ghada Marquez MD, Stopped at 02/25/20 0934    hydrALAZINE (APRESOLINE) injection 10 mg, 10 mg, Intravenous, Q30 Min PRN, Ghada Marquez MD, 10 mg at 02/21/20 0400    labetalol (NORMODYNE;TRANDATE) injection 10 mg, 10 mg, Intravenous, Q30 Min PRN, Ghada Marquez MD, 10 mg at 02/23/20 1603    perflutren lipid microspheres (DEFINITY) injection 1.65 mg, 1.5 mL, Intravenous, ONCE PRN, Nettie Dodge Prieto Braxton MD    glucose (GLUTOSE) 40 % oral gel 15 g, 15 g, Oral, PRN, Fay Reyes MD    glucagon (rDNA) injection 1 mg, 1 mg, Intramuscular, PRN, Fay Reyes MD    dextrose 5 % solution, 100 mL/hr, Intravenous, PRN, Fay Reyes MD    levothyroxine (SYNTHROID) tablet 100 mcg, 100 mcg, Oral, Daily, Fay Reyes MD, 100 mcg at 02/25/20 0506    sodium chloride flush 0.9 % injection 10 mL, 10 mL, Intravenous, 2 times per day, Sotero Borges DO, 10 mL at 02/25/20 0808    sodium chloride flush 0.9 % injection 10 mL, 10 mL, Intravenous, PRN, Sotero Borges DO, 10 mL at 02/24/20 2218    Objective:    BP (!) 162/70   Pulse 80   Temp 98.8 °F (37.1 °C) (Axillary)   Resp 18   Ht 5' 6\" (1.676 m)   Wt 118 lb 6.2 oz (53.7 kg)   SpO2 100%   BMI 19.11 kg/m²     Heart:  Reg  Lungs:  CTA bilaterally, no wheeze, rales or rhonchi                      Abd: bowel sounds present, distended                      Extrem:  No clubbing, cyanosis, or edema    CBC with Differential:    Lab Results   Component Value Date    WBC 13.6 02/25/2020    RBC 2.46 02/25/2020    HGB 7.4 02/25/2020    HCT 24.3 02/25/2020     02/25/2020    MCV 98.8 02/25/2020    MCH 30.1 02/25/2020    MCHC 30.5 02/25/2020    RDW 15.0 02/25/2020    NRBC 1.7 02/23/2020    METASPCT 0.9 02/21/2020    LYMPHOPCT 4.5 02/25/2020    MONOPCT 7.1 02/25/2020    MYELOPCT 0.9 02/22/2020    EOSPCT 3 10/07/2010    BASOPCT 0.2 02/25/2020    MONOSABS 0.97 02/25/2020    LYMPHSABS 0.61 02/25/2020    EOSABS 0.04 02/25/2020    BASOSABS 0.03 02/25/2020     CMP:    Lab Results   Component Value Date     02/25/2020    K 3.9 02/25/2020    K 4.0 02/20/2020     02/25/2020    CO2 29 02/25/2020    BUN 16 02/25/2020    CREATININE 0.8 02/25/2020    GFRAA >60 02/25/2020    LABGLOM >60 02/25/2020    GLUCOSE 144 02/25/2020    GLUCOSE 102 10/07/2010    PROT 4.7 02/25/2020    LABALBU 2.2 02/25/2020    LABALBU 4.5 10/07/2010    CALCIUM 7.6 02/25/2020    BILITOT 0.9 02/25/2020    ALKPHOS 68 02/25/2020    AST 40 02/25/2020     02/25/2020     Warfarin PT/INR:    Lab Results   Component Value Date    INR 1.0 02/17/2020    INR 1.0 01/20/2020    INR 1.0 01/07/2020    PROTIME 11.8 02/17/2020    PROTIME 11.2 01/20/2020    PROTIME 11.0 01/07/2020       Assessment:    Principal Problem:    SBO (small bowel obstruction) (HCC)  Active Problems:    Essential hypertension    Depression    PAD (peripheral artery disease) (HCC)    Acquired hypothyroidism    Cecal volvulus (HCC)    ROSELYN (acute kidney injury) ([facility] [address]    Shock liver    Severe protein-calorie malnutrition (HCC)    Atherosclerosis of aortic bifurcation and common iliac arteries (HCC)    CAD (coronary artery disease)    History of bilateral carotid artery stenosis    Paroxysmal atrial fibrillation with rapid ventricular response ([facility] [facility]  Resolved Problems:    * No resolved hospital problems.  *      Plan:  Await further plans from surgery if picc and tpn planned will explore ltac placement check cxr for eval of pl effusion on ct jeni Pierre  4:49 PM  2/25/2020

## 2020-02-25 NOTE — PROGRESS NOTES
Surgical Intensive Care Unit   Daily Progress Note     Patient's name:  Marivel Webb  Age/Gender: 68 y.o. female  Date of Admission: 2/17/2020  1:30 PM  Length of Stay: 8    Reason for ICU: post op monitoring after right hemicolectomy for cecal volvulus    Hospital Course:   2/17 Juhi Elizabeth is a 68 y. o. female with extensive PVD on plavix and pletal, who presents for evaluation of a high grade bowel obstruction. She had been tachycardic and hypotensive; decision was made for exploratory laparotomy where she was found to have a perforated cecal volvulus for which she underwent a right hemicolectomy with side to side ileocolonic anastomosis 2/17. 2/18 Extubated   2/19 1 unit PRBC for acute blood loss anemia   2/20 Patient had a short run A fib RVR yesterday, self resolved and resolved Faster than we could obtain an EKG , She states she went into a fib > 1 week ago and has possibly seen Dr. Jose Juan Reid  ( can not find any notes)   2/21--A.fib with RVR again this AM--given lopressor and then started on Cardizem  2/22--agitated overnight--pulled all IVs out  2/23--diuresed well yesterday  1/24-- Nausea, ileus on KUB. NGT replaced. 500cc out. No BM overnight.   1/25-- A-fib w/ rvr overnight. Repeat CT abdomen shows concern for hematoma vs leak.      Problem List:   Patient Active Problem List   Diagnosis    Essential hypertension    Depression    PVD (peripheral vascular disease) with claudication ([address]    History of tobacco use    Asymptomatic bilateral carotid artery stenosis    PAD (peripheral artery disease) ([address]    Hyperlipidemia LDL goal <100    Acquired hypothyroidism    Left groin pain    SBO (small bowel obstruction) (HCC)    Cecal volvulus ([address]    ROSELYN (acute kidney injury) ([address]    Shock liver    Paroxysmal atrial fibrillation ([address]    Severe protein-calorie malnutrition ([address]       Surgical/Interventional Procedures:   2/17-- Right hemicolectomy    Vent Settings: Additional Respiratory  Assessments  Pulse: 78  Resp: 18  SpO2: 100 %  Humidification Source: Heated wire  Humidification Temp: 37  Oral Care: Teeth brushed, Mouth swabbed, Mouth moisturizer, Mouth suctioned  Airway Type: ET  Airway Size: 8  Measured From: Lips  ABG:   Recent Labs     20  0415   PH 7.499*   PCO2 34.3*   PO2 135.0*   HCO3 26.1*   BE 2.9   O2SAT 97.8       I/O:  I/O last 3 completed shifts: In: 953.3 [P.O.:100; I.V.:603.3; IV Piggyback:250]  Out: 0663 [DMPP; Emesis/NG output:650]  No intake/output data recorded. Urethral Catheter Non-latex 16 fr-Output (mL): 125 mL  NG/OG/NJ/NE Tube Nasogastric Left nostril-Output (mL): 325 ml  [REMOVED] NG/OG/NJ/NE Tube Nasogastric Right nostril-Output (mL): 200 ml  Stool (measured) : 0 mL    Lines:   Left femoral TLC    Drains:   Weber catheter. Drips:   diltiazem (CARDIZEM) 100 mg in dextrose 5% 100 mL (ADD-[place]) 2.5 mg/hr (20 0115)    dextrose 5% and 0.45% NaCl with KCl 20 mEq 50 mL/hr at 20 1026    dextrose         Physical Exam:   /60   Pulse 78   Temp 98.2 °F (36.8 °C) (Oral)   Resp 18   Ht 5' 6\" (1.676 m)   Wt 120 lb 2.4 oz (54.5 kg)   SpO2 100%   BMI 19.39 kg/m²     Average, Min, and Max for last 24 hours Vitals:  Temp:  Temp  Av.6 °F (37 °C)  Min: 97.8 °F (36.6 °C)  Max: 100.1 °F (37.8 °C)  RR: Resp  Av.4  Min: 12  Max: 28  HR: Pulse  Av.5  Min: 58  Max: 827  BP:  Systolic (32ELJ), YZI:249 , Min:111 , AAF:444   ; Diastolic (54TFM), CJV:49, Min:54, Max:107    SpO2: SpO2  Av.6 %  Min: 69 %  Max: 100 %          CONSTITUTIONAL: no acute distress, lying in hospital bed,   NEUROLOGIC: PERRL, intermittent confusion, short memory. CARDIOVASCULAR: RRR  PULMONARY: clear to ascultation b/l of O2  RENAL: weber to gravity, clear yellow urine  ABDOMEN: Distended. Diffuse tenderness.  Midline incision c/d/i  MUSCULOSKELETAL: moves all extremities purposefully, 5/5 strength   SKIN/EXTREMITIES: left buttocks ecchymosis

## 2020-02-25 NOTE — PROGRESS NOTES
Physical Therapy  Physical Therapy Treatment Note     Name: Aniceto Guardado  : 1950  MRN: [id number]    Referring Provider:  Meaghan Sanchez DO    Date of Service: 2020    Evaluating PT:  Uche Griffith, PT, DPT [id number]    Room #:  4468/8870-X  Diagnosis:  SBO  Precautions: Falls, NG tube,, O2, Ice chips only, Bed alarm  Procedure/Surgery:   Exploratory laparotomy, enterolysis, right hemicolectomy with side-to-side, functional end-to-end anastomosis  PMHx/PSHx:  CA, CAD, COPD, HLD, HTN, PVD, RA, Thyroid disease   Equipment Needs:  TBD    SUBJECTIVE:    Pt lives with daughter in a 2 story home with 3 stairs to enter and 2 rail. Full flight of steps and 1 rail to bedroom. Pt ambulated with [employer] and required some assistance for ADLs PTA. OBJECTIVE:   Initial Evaluation  Date: 20 Treatment  20 Short Term/ Long Term   Goals   AM-PAC 6 Clicks     Was pt agreeable to Eval/treatment? Yes Yes    Does pt have pain? 5/10 abdominal pain Pt reported 4/10 abdominal pain     Bed Mobility  Rolling: NT  Supine to sit: MaxA x 2  Sit to supine: MaxA x 2  Scooting: MaxA Rolling: NT  Supine to sit: MaxA  Sit to supine: MaxA  Scooting: MaxA Hany   Transfers Sit to stand: ModA x 2  Stand to sit: ModA x 2  Stand pivot: NT Sit to stand: NT  Stand to sit: NT  Stand pivot: NT Hany with [employer]   Ambulation   3 feet with MaxA x 2 with  [employer] NT >75 feet with Hany with [employer]   Stair negotiation: ascended and descended NT NT >2 steps with 1 rail Hany   ROM BUE:  Defer to OT note  BLE:  WNL     Strength BUE:  Defer to OT note  BLE:  3+ to 4-/5  Increase by 1/3 MMT grade   Balance Sitting EOB:  MaxA  Dynamic Standing:  MaxA x 2 with [employer] Sitting: ModA  Standing: NT Sitting EOB:  SBA  Dynamic Standing:  Hany with [employer]     Pt is A & O x 4  CAM-ICU: Positive  RASS: -1  Sensation:  WNL  Edema:   WNL    Vitals:  Heart Rate at rest 78 bpm Heart Rate post session 81 bpm   SpO2 at rest 100% SpO2 post session 100%   Blood Pressure at rest 138/61 mmHg Blood Pressure post session 162/75 mmHg     Functional Status Score-Intensive Care Unit (FSS-ICU)   Rolling 2/7   Supine to sit transfer 2/7   Unsupported sitting  3/7   Sit to stand transfers -/7   Ambulation -/7   Total  7/35       Therapeutic Exercises:  AAROM BLE knee ext x 10 reps, B ankle DF x 10 reps, B hip flexion x 10 reps    Patient education  Pt educated on safety    Patient response to education:   Pt verbalized understanding Pt demonstrated skill Pt requires further education in this area   x partial x     ASSESSMENT:    Comments:  Pt was supine in bed upon arrival, agreeable to treatment session. Pt was more alert initially this session but fatigued with activity. R lateral lean observed in sitting. Occasional participation in therapeutic exercises. Pt was returned to bed with all needs met and call light in reach. All lines remained intact. Pt perseverated on wanting water throughout session. Treatment:  Patient practiced and was instructed in the following treatment:     Bed mobility training - pt given verbal and tactile cues to facilitate proper sequencing and safety during supine>sit as well as provided with physical assistance to complete task    Sitting EOB for >8 minutes minutes for upright tolerance, postural awareness and BLE ROM   Skilled positioning - Pt placed in the chair position with pillows utilized to facilitate upright posture, joint and skin integrity, and interaction with environment.  Non-pharmacological treatment and prevention of ICU delirium - Pt oriented to date, time, time of day, place, and situation as well as provided with visual and auditory stimuli in order to improve cognition and combat effects of ICU delirium. PLAN:    Patient is making limited progress towards established goals. Will continue with current POC.       Time in  1345  Time out  1410    Total Treatment Time  25 minutes     CPT codes:  [] Gait training [id number] -

## 2020-02-25 NOTE — PROGRESS NOTES
PROGRESS NOTE       PATIENT PROBLEM LIST:  Principal Problem:    SBO (small bowel obstruction) (HCC)  Active Problems:    Essential hypertension    Depression    PAD (peripheral artery disease) (HCC)    Acquired hypothyroidism    Cecal volvulus (HCC)    ROSELYN (acute kidney injury) ([address]    Shock liver    Severe protein-calorie malnutrition (HCC)    Atherosclerosis of aortic bifurcation and common iliac arteries (HCC)    CAD (coronary artery disease)    History of bilateral carotid artery stenosis    Paroxysmal atrial fibrillation with rapid ventricular response ([address]  Resolved Problems:    * No resolved hospital problems. *      SUBJECTIVE:  Salima Aleman continues to i variance abdominal discomfort despite an NG tube low suction. Denies nausea, chest discomfort nor palpitations. REVIEW OF SYSTEMS:  General ROS: positive for - fatigue, malaise, and weight loss  Psychological ROS: positive for - anxiety, depression  Ophthalmic ROS: negative for - decreased vision or visual distortion. ENT ROS: negative  Allergy and Immunology ROS: negative  Hematological and Lymphatic ROS: negative  Endocrine: no heat or cold intolerance and no polyphagia, polydipsia, or polyuria  Respiratory ROS: negative for - shortness of breath  Cardiovascular ROS: positive for - irregular heartbeat and rapid heart rate. Gastrointestinal ROS: + Abdominal pain, change in bowel habits, no black or bloody stools  Genito-Urinary ROS: no nocturia, dysuria, trouble voiding, frequency or hematuria  Musculoskeletal ROS: negative for- myalgias, arthralgias, or claudication  Neurological ROS: no TIA or stroke symptoms otherwise no significant change in symptoms or problems since yesterday as documented in previous progress notes.     SCHEDULED MEDICATIONS:   sotalol  120 mg Oral BID    buPROPion  100 mg Oral Daily    sennosides  5 mL Oral Nightly    docusate sodium  100 mg Oral BID    melatonin  6 mg Oral Nightly    amLODIPine  5 mg Oral Daily    butamben-tetracaine-benzocaine  1 spray Topical Once    ipratropium-albuterol  1 ampule Inhalation Q4H WA    bisacodyl  10 mg Rectal Daily    [Held by provider] enoxaparin  1 mg/kg Subcutaneous BID    levothyroxine  100 mcg Oral Daily    sodium chloride flush  10 mL Intravenous 2 times per day       VITAL SIGNS:                                                                                                                          /70   Pulse 71   Temp 98.5 °F (36.9 °C) (Axillary)   Resp 19   Ht 5' 6\" (1.676 m)   Wt 118 lb 6.2 oz (53.7 kg)   SpO2 100%   BMI 19.11 kg/m²   Patient Vitals for the past 96 hrs (Last 3 readings):   Weight   02/25/20 0506 118 lb 6.2 oz (53.7 kg)   02/24/20 0600 120 lb 2.4 oz (54.5 kg)   02/23/20 0600 119 lb 11.4 oz (54.3 kg)     OBJECTIVE:    HEENT: PERRL, EOM  Intact; sclera non-icteric, conjunctiva pink. Carotids are brisk in upstroke with normal contour. No carotid bruits. Normal jugular venous pulsation at 45°. No palpable cervical nor supraclavicular nodes. Thyroid not palpable. Trachea midline. Chest: Even excursion  Lungs: Decreased bases but otherwise grossly clear to auscultation bilaterally, no expiratory wheezes or rhonchi, no decreased tactile fremitus without inspiratory rales. Heart: Regular  rhythm; S1 > S2, no gallop or murmur. No clicks, rub, palpable thrills   or heaves. PMI nondisplaced, 5th intercostal space MCL. Abdomen: Soft, nontender, nondistended,  mildly protuberant, no masses or organomegaly. Bowel sounds active. Extremities: Without clubbing, cyanosis or edema. Pulses present 3+ upper extermities bilaterally; present 1+ DP and present 1+ PT bilaterally.      Data:   Scheduled Meds: Reviewed  Continuous Infusions:    dextrose 5% and 0.45% NaCl with KCl 20 mEq 50 mL/hr at 02/25/20 0459    dextrose         Intake/Output Summary (Last 24 hours) at 2/25/2020 1552  Last data filed at 2/25/2020 1300  Gross per 24 hour   Intake 1433.3 ml   Output 2200 ml   Net -766.7 ml     CBC:   Recent Labs     20  0425 20  1835 20  0450 20  0450   WBC 9.9 9.6 13.1* 13.6*   HGB 8.7* 9.0* 8.3* 7.4*   HCT 27.1* 28.1* 25.9* 24.3*    371 359 348     BMP:  Recent Labs     20  0425 20  1345 20  0450 20  0450    143 148* 142 141   K 2.2* 3.6 3.7 4.2 3.9    107 112* 108* 105   CO2 22 25 23 28 29   BUN 29* 25* 22 18 16   CREATININE 1.0 0.9 0.8 0.8 0.8   LABGLOM 55 >60 >60 >60 >60     ABGs:   Lab Results   Component Value Date    PH 7.499 2020    PO2 135.0 2020    PCO2 34.3 2020     INR: No results for input(s): INR in the last 72 hours. PRO-BNP: No results found for: PROBNP   TSH:   Lab Results   Component Value Date    TSH 4.050 2020      Cardiac Injury Profile:   No results for input(s): CKTOTAL, CKMB, TROPONINI in the last 72 hours. Lipid Profile:   Lab Results   Component Value Date    TRIG 85 2019    HDL 76 2019    LDLCALC 76 2019    CHOL 169 2019      Hemoglobin A1C: No components found for: HGBA1C     RAD:   Ct Abdomen Pelvis W Iv Contrast Additional Contrast? None    Addendum Date: 2020    Note is made of a vague lucency in the left iliac bone with some areas of sclerosis and periosteal thickening concerning for a healing fracture. Malignancy with a pathologic fracture is a consideration. Clinical assessment is recommended. ALERT:  THIS IS AN ABNORMAL REPORT    Result Date: 2020  Patient MRN:  [id number] : 1950 Age: 68 years Gender: Female Order Date:  2020 1:45 PM EXAM: CT ABDOMEN PELVIS W IV CONTRAST number of images 319 Contrast. Isovue-370, 110 mL IV Technique: Low-dose CT  acquisition technique included one of following options; 1 . Automated exposure control, 2. Adjustment of MA and or KV according to patient's size or 3. Use of iterative reconstruction.  INDICATION:  ab pain ab pain COMPARISON: Previous CTA 2020 FINDINGS: The lung bases demonstrate COPD with minimal atelectasis and pleural effusions in the right lower lobe. Small hiatal hernia is present with thickening of the GE junction and distended stomach. Liver is of normal architecture. Gallbladder is partially distended. Spleen, pancreas, the adrenals and the kidneys are normal. There is severe vascular calcification stenosis of the aorta and iliac arteries with occlusion of the right SFA. There is diffusely dilated small bowel loops with  one segment of the small bowel loops in the left hemiabdomen measuring nearly 7 cm. Colon is collapsed. Pelvis. Bladder is distended. Colon is collapsed with  poor delineation of the anatomy. High-grade small bowel obstruction with a significantly dilated fluid-filled small bowel loops and stomach and collapsed colon. Surgical intervention is recommended. Infiltrates and pleural effusion in the right lower lobe concerning for aspiration pneumonia. Severe vascular calcification of aorta and iliac arteries with occlusion of right SFA. ALERT:  THIS IS AN ABNORMAL REPORT    Xr Chest Portable    Result Date: 2020  Patient MRN: [id number] : 1950 Age:  68 years Gender: Female Order Date: 2020 5:30 PM Exam: XR CHEST PORTABLE Number of Images: 1 view Indication: Acute abdominal pain. SBO on CT scan 2020, 1557 hours. Comparison: 2008 FINDINGS: Heart and pulmonary vascularity normal. Lungs clear. Costophrenic angles sharp. Normal aorta. No acute cardiopulmonary findings. Xr Abdomen For Ng/og/ne Tube Placement    Result Date: 2020  Patient MRN: [id number] : 1950 Age:  68 years Gender: Female Order Date: 2020 6:45 PM Exam: XR ABDOMEN FOR NG/OG/NE TUBE PLACEMENT Number of Images: 1 views Indication:   Confirmation of course of NG/OG/NE tube and location of tip of tube Confirmation of course of NG/OG/NE tube and location of tip of tube Portable? ->Yes Comparison: None. Findings: Study demonstrates chest and upper abdomen the nasogastric tube is in satisfactory position with the tip in the stomach. The lung fields are clear there is small right-sided pleural effusion. The abdomen demonstrate colonic distention. Nasogastric tube with the tip in the stomach in satisfactory position. EKG: See Report  Echo: See Report      IMPRESSIONS:  Principal Problem:    SBO (small bowel obstruction) (HCC)  Active Problems:    Essential hypertension    Depression    PAD (peripheral artery disease) (HCC)    Acquired hypothyroidism    Cecal volvulus (HCC)    ROSELYN (acute kidney injury) ([address]    Shock liver    Severe protein-calorie malnutrition (HCC)    Atherosclerosis of aortic bifurcation and common iliac arteries (HCC)    CAD (coronary artery disease)    History of bilateral carotid artery stenosis    Paroxysmal atrial fibrillation with rapid ventricular response ([address]  Resolved Problems:    * No resolved hospital problems. *      RECOMMENDATIONS:  Will increase dosage of sotalol to 120 mg twice daily with hope of suppressing atrial fibrillation. Will need to go back on anticoagulation as soon as thoracentesis has been completed due to her increased risk of potential embolic stroke. I have spent more than 30 critical-care minutes face to face with Cooper Cortés and reviewing notes and laboratory data, with greater than 50% of this time instructing and counseling the patient  face to face regarding my findings and recommendations and I have answered all questions as posed to me by Ms. Elizabeth. Rocky Mcmahan, DO FACP,FACC,[facility]      NOTE:  This report was transcribed using voice recognition software.   Every effort was made to ensure accuracy; however, inadvertent computerized transcription errors may be present

## 2020-02-25 NOTE — PROGRESS NOTES
GENERAL SURGERY  DAILY PROGRESS NOTE  2/25/2020    Subjective:  Complaining of pain, denies passing gas or having BMs. CT abd/pelv ordered overnight secondary to abdominal pain, ileus. Objective:  /73   Pulse 65   Temp 98.7 °F (37.1 °C) (Oral)   Resp 17   Ht 5' 6\" (1.676 m)   Wt 118 lb 6.2 oz (53.7 kg)   SpO2 100%   BMI 19.11 kg/m²     General: NAD, awake and alert. Head: Normocephalic, atraumatic  Eyes: PERRLA, EOMI. Lungs: No increased work of breathing. Cardiovascular: RRR. Abdomen: Soft, mild distension, non-tender. Incision c/d/i. No rebound, guarding or rigidity. Extremities: Atraumatic, full range of motion  Skin: Warm, dry and intact    Assessment/Plan:  68 y.o. female w/ cecal perforation s/p ex lap w/ right hemicolectomy 2/17 now with ileus. Pain and nausea control PRN  NPO. NGT decompression. Maintenance IVF's. Will likely need PICC w/ TPN. CT abd/pelv with fluid collection in mesentery, likely secondary to recent surgery  Afib RVR, appreciate critical care and cardiology recs  DVT ppx.     Electronically signed by Vanessa Langston MD on 2/25/2020 at 6:36 AM     Seen/examined  Agree with above  [email or web address]

## 2020-02-25 NOTE — PROGRESS NOTES
over hand assist to wash face. Stand by Assist    UB Dressing Moderate Assist   NT today   Stand by Assist    LB Dressing Dependent  NT today Moderate Assist    Bathing Maximal Assist   Dep Minimal Assist    Toileting Dependent   Dep Moderate Assist    Bed Mobility  Supine to sit: Maximal Assist x2  Sit to supine: Maximal Assist x2 Supine to sit: Max A with HOB elevated    Sit to supine: Max A+2  Supine to sit: Moderate Assist   Sit to supine: Moderate Assist    Functional Transfers Moderate Assist x2     (sit to stand; posterior lean) Sit to stand: NT    Stand to sit: NT Minimal Assist    Functional Mobility Maximal Assist x2     (side steps with ww) NT  Pt presenting with fair- activity tolerance sitting EOB with encouragement; demo poor tolerance for transfers requesting to return to bed. Moderate Assist    Balance Sitting:     Static:  Max A (posterior lean)    Dynamic:Max A  Standing: Max A Sitting:  Mod A -posterior LOB; improves with cues    Standing: NT      Activity Tolerance Fair   fair- with light activity; pt sat EOB >8 min with encouragement while engaged in UE ROM ex's Fair +   Visual/  Perceptual WFL                           Hand dominance: Right        Strength ROM Additional Info:    RUE  3+/5  WFL fair  and wfl FMC/dexterity noted during ADL tasks         LUE 3+/5  WFL fair  and wfl FMC/dexterity noted during ADL tasks            Hearing: WFL   Sensation:  Pt denies numbness or tingling   Tone: WFL   Edema: none noted     Vitals:  Heart Rate at rest 78 bpm Heart Rate post session 81 bpm   SpO2 at rest 100% SpO2 post session 100%   Blood Pressure at rest 138/61 mmHg Blood Pressure post session 162/75 mmHg         Treatment: OT services provided include: cognitive ex's to improve mentation and orientation to situation; instruction on safe bed mobility requiring min cues to sequence steps; sitting balance/re-training ex's to improve stability for participation in light ADLS; B UE ROM/coordination ex's to facilitate functional reach for self care tasks; light ADLs EOB to improve initiation and follow through of familiar tasks to improve independence. Comments: OK from RN to see patient. Upon arrival, patient supine in bed; agreeable to session with min encouragement. Pt more alert today but required max cues to participate in ex's. Pt demo fair- tolerance with activities while seated EOB. Pt demo decreased understanding of education/techniques requiring mod cues for follow through. At end of session,, patient returned to bed with bed placed in chair position to increase activity tolerance. Pillows placed under B UE/LE for edema control and jt protection. Call light within reach, all lines and tubes intact. Skilled monitoring of HR, O2 saturation, blood pressure and patient's response to activity performed throughout session. · Pt has made limited progress towards set goals.    · Continue with current plan of care       Treatment Time In:1345          Treatment Time Out:1410     ]  Treatment Charges: Mins Units   Ther Ex  [id number] 10 1   Manual Therapy Jodi Saunders [id number] 15 1   ADL/Home Mgt [id number]     Neuro Re-ed [id number]     Orthotic manage/training  [id number]     Non-Billable Time     Total Timed Treatment 25 2         Linda Townsend, OTR/L [id number]

## 2020-02-25 NOTE — PROGRESS NOTES
[facility]  PROGRESS NOTE  ATTENDING NOTE    CRITICAL CARE    Patient Active Problem List   Diagnosis    Essential hypertension    Depression    PVD (peripheral vascular disease) with claudication ([address]    History of tobacco use    Asymptomatic bilateral carotid artery stenosis    PAD (peripheral artery disease) (HCC)    Hyperlipidemia LDL goal <100    Acquired hypothyroidism    Left groin pain    SBO (small bowel obstruction) (HCC)    Cecal volvulus ([address]    ROSELYN (acute kidney injury) ([address]    Shock liver    Severe protein-calorie malnutrition (HCC)    Atherosclerosis of aortic bifurcation and common iliac arteries ([address]    CAD (coronary artery disease)    History of bilateral carotid artery stenosis    Paroxysmal atrial fibrillation with rapid ventricular response ([address]         OVERNIGHT EVENTS:  cardizem gtt briefly; much more awake today; Tm100.1, inc WBC. procal 1.11; +flatus    HOSPITAL COURSE:  2/17 Russ Romberg Czerwinski is a 68 y. o. female with extensive PVD on plavix and pletal, who presents for evaluation of a high grade bowel obstruction. She had been tachycardic and hypotensive; decision was made for exploratory laparotomy where she was found to have a perforated cecal volvulus for which she underwent a right hemicolectomy with side to side ileocolonic anastomosis 2/17.   2/18 Extubated   2/19 1 unit PRBC for acute blood loss anemia   2/20 Patient had a short run A fib RVR yesterday, self resolved and resolved Faster than we could obtain an EKG , She states she went into a fib > 1 week ago and has possibly seen Dr. Luís Valverde  ( can not find any notes)   2/21--A.fib with RVR again this AM--given lopressor and then started on Cardizem  2/22--agitated overnight--pulled all IVs out  2/23--diuresed well yesterday  2/24--NGT reinserted for ileus, Afib RVR--cardizem gtt per cardiology  2/25--sotalol increased per cards; CT A/P done yesterday--hematoma;     /70   Pulse 71

## 2020-02-26 ENCOUNTER — APPOINTMENT (OUTPATIENT)
Dept: GENERAL RADIOLOGY | Age: 70
DRG: 329 | End: 2020-02-26
Payer: MEDICARE

## 2020-02-26 ENCOUNTER — APPOINTMENT (OUTPATIENT)
Dept: INTERVENTIONAL RADIOLOGY/VASCULAR | Age: 70
DRG: 329 | End: 2020-02-26
Payer: MEDICARE

## 2020-02-26 LAB
ABO/RH: NORMAL
ALBUMIN SERPL-MCNC: 2.4 G/DL (ref 3.5–5.2)
ALP BLD-CCNC: 72 U/L (ref 35–104)
ALT SERPL-CCNC: 243 U/L (ref 0–32)
ANION GAP SERPL CALCULATED.3IONS-SCNC: 11 MMOL/L (ref 7–16)
ANISOCYTOSIS: ABNORMAL
ANTIBODY SCREEN: NORMAL
AST SERPL-CCNC: 37 U/L (ref 0–31)
BASOPHILS ABSOLUTE: 0.01 E9/L (ref 0–0.2)
BASOPHILS RELATIVE PERCENT: 0.2 % (ref 0–2)
BILIRUB SERPL-MCNC: 1.3 MG/DL (ref 0–1.2)
BUN BLDV-MCNC: 15 MG/DL (ref 8–23)
CALCIUM IONIZED: 1.22 MMOL/L (ref 1.15–1.33)
CALCIUM SERPL-MCNC: 7.8 MG/DL (ref 8.6–10.2)
CHLORIDE BLD-SCNC: 104 MMOL/L (ref 98–107)
CO2: 28 MMOL/L (ref 22–29)
CREAT SERPL-MCNC: 0.7 MG/DL (ref 0.5–1)
EKG ATRIAL RATE: 79 BPM
EKG P AXIS: 73 DEGREES
EKG P-R INTERVAL: 120 MS
EKG Q-T INTERVAL: 354 MS
EKG QRS DURATION: 72 MS
EKG QTC CALCULATION (BAZETT): 405 MS
EKG R AXIS: 76 DEGREES
EKG T AXIS: 66 DEGREES
EKG VENTRICULAR RATE: 79 BPM
EOSINOPHILS ABSOLUTE: 0.03 E9/L (ref 0.05–0.5)
EOSINOPHILS RELATIVE PERCENT: 0.7 % (ref 0–6)
GFR AFRICAN AMERICAN: >60
GFR NON-AFRICAN AMERICAN: >60 ML/MIN/1.73
GLUCOSE BLD-MCNC: 102 MG/DL (ref 74–99)
HCT VFR BLD CALC: 27.8 % (ref 34–48)
HEMOGLOBIN: 8.5 G/DL (ref 11.5–15.5)
HYPOCHROMIA: ABNORMAL
IMMATURE GRANULOCYTES #: 0.07 E9/L
IMMATURE GRANULOCYTES %: 1.6 % (ref 0–5)
LYMPHOCYTES ABSOLUTE: 0.48 E9/L (ref 1.5–4)
LYMPHOCYTES RELATIVE PERCENT: 10.9 % (ref 20–42)
MAGNESIUM: 2 MG/DL (ref 1.6–2.6)
MCH RBC QN AUTO: 30.1 PG (ref 26–35)
MCHC RBC AUTO-ENTMCNC: 30.6 % (ref 32–34.5)
MCV RBC AUTO: 98.6 FL (ref 80–99.9)
MONOCYTES ABSOLUTE: 0.86 E9/L (ref 0.1–0.95)
MONOCYTES RELATIVE PERCENT: 19.5 % (ref 2–12)
NEUTROPHILS ABSOLUTE: 2.96 E9/L (ref 1.8–7.3)
NEUTROPHILS RELATIVE PERCENT: 67.1 % (ref 43–80)
PDW BLD-RTO: 15.2 FL (ref 11.5–15)
PHOSPHORUS: 2.5 MG/DL (ref 2.5–4.5)
PLATELET # BLD: 425 E9/L (ref 130–450)
PMV BLD AUTO: 10.1 FL (ref 7–12)
POLYCHROMASIA: ABNORMAL
POTASSIUM SERPL-SCNC: 3.5 MMOL/L (ref 3.5–5)
RBC # BLD: 2.82 E12/L (ref 3.5–5.5)
SODIUM BLD-SCNC: 143 MMOL/L (ref 132–146)
TOTAL PROTEIN: 5.4 G/DL (ref 6.4–8.3)
WBC # BLD: 4.4 E9/L (ref 4.5–11.5)

## 2020-02-26 PROCEDURE — 97530 THERAPEUTIC ACTIVITIES: CPT

## 2020-02-26 PROCEDURE — 6360000002 HC RX W HCPCS

## 2020-02-26 PROCEDURE — 2580000003 HC RX 258: Performed by: STUDENT IN AN ORGANIZED HEALTH CARE EDUCATION/TRAINING PROGRAM

## 2020-02-26 PROCEDURE — 94640 AIRWAY INHALATION TREATMENT: CPT

## 2020-02-26 PROCEDURE — 2000000000 HC ICU R&B

## 2020-02-26 PROCEDURE — 2700000000 HC OXYGEN THERAPY PER DAY

## 2020-02-26 PROCEDURE — 36569 INSJ PICC 5 YR+ W/O IMAGING: CPT

## 2020-02-26 PROCEDURE — 6370000000 HC RX 637 (ALT 250 FOR IP): Performed by: STUDENT IN AN ORGANIZED HEALTH CARE EDUCATION/TRAINING PROGRAM

## 2020-02-26 PROCEDURE — 6370000000 HC RX 637 (ALT 250 FOR IP): Performed by: INTERNAL MEDICINE

## 2020-02-26 PROCEDURE — C1729 CATH, DRAINAGE: HCPCS

## 2020-02-26 PROCEDURE — 71045 X-RAY EXAM CHEST 1 VIEW: CPT

## 2020-02-26 PROCEDURE — 2580000003 HC RX 258: Performed by: INTERNAL MEDICINE

## 2020-02-26 PROCEDURE — 2500000003 HC RX 250 WO HCPCS: Performed by: PHYSICIAN ASSISTANT

## 2020-02-26 PROCEDURE — 86900 BLOOD TYPING SEROLOGIC ABO: CPT

## 2020-02-26 PROCEDURE — 76937 US GUIDE VASCULAR ACCESS: CPT

## 2020-02-26 PROCEDURE — 2500000003 HC RX 250 WO HCPCS: Performed by: STUDENT IN AN ORGANIZED HEALTH CARE EDUCATION/TRAINING PROGRAM

## 2020-02-26 PROCEDURE — 36415 COLL VENOUS BLD VENIPUNCTURE: CPT

## 2020-02-26 PROCEDURE — 0W993ZZ DRAINAGE OF RIGHT PLEURAL CAVITY, PERCUTANEOUS APPROACH: ICD-10-PCS | Performed by: PHYSICIAN ASSISTANT

## 2020-02-26 PROCEDURE — 6370000000 HC RX 637 (ALT 250 FOR IP): Performed by: SURGERY

## 2020-02-26 PROCEDURE — 6370000000 HC RX 637 (ALT 250 FOR IP)

## 2020-02-26 PROCEDURE — 6360000002 HC RX W HCPCS: Performed by: STUDENT IN AN ORGANIZED HEALTH CARE EDUCATION/TRAINING PROGRAM

## 2020-02-26 PROCEDURE — 82330 ASSAY OF CALCIUM: CPT

## 2020-02-26 PROCEDURE — 93005 ELECTROCARDIOGRAM TRACING: CPT | Performed by: INTERNAL MEDICINE

## 2020-02-26 PROCEDURE — 87070 CULTURE OTHR SPECIMN AEROBIC: CPT

## 2020-02-26 PROCEDURE — 02HV33Z INSERTION OF INFUSION DEVICE INTO SUPERIOR VENA CAVA, PERCUTANEOUS APPROACH: ICD-10-PCS | Performed by: INTERNAL MEDICINE

## 2020-02-26 PROCEDURE — 85025 COMPLETE CBC W/AUTO DIFF WBC: CPT

## 2020-02-26 PROCEDURE — 83735 ASSAY OF MAGNESIUM: CPT

## 2020-02-26 PROCEDURE — 84100 ASSAY OF PHOSPHORUS: CPT

## 2020-02-26 PROCEDURE — 99291 CRITICAL CARE FIRST HOUR: CPT | Performed by: SURGERY

## 2020-02-26 PROCEDURE — 86901 BLOOD TYPING SEROLOGIC RH(D): CPT

## 2020-02-26 PROCEDURE — 86850 RBC ANTIBODY SCREEN: CPT

## 2020-02-26 PROCEDURE — C1751 CATH, INF, PER/CENT/MIDLINE: HCPCS

## 2020-02-26 PROCEDURE — 87205 SMEAR GRAM STAIN: CPT

## 2020-02-26 PROCEDURE — 80053 COMPREHEN METABOLIC PANEL: CPT

## 2020-02-26 PROCEDURE — 97535 SELF CARE MNGMENT TRAINING: CPT

## 2020-02-26 RX ORDER — DIMETHICONE, OXYBENZONE, AND PADIMATE O 2; 2.5; 6.6 G/100G; G/100G; G/100G
STICK TOPICAL
Status: COMPLETED
Start: 2020-02-26 | End: 2020-02-26

## 2020-02-26 RX ORDER — LIDOCAINE HYDROCHLORIDE 10 MG/ML
5 INJECTION, SOLUTION EPIDURAL; INFILTRATION; INTRACAUDAL; PERINEURAL ONCE
Status: DISCONTINUED | OUTPATIENT
Start: 2020-02-26 | End: 2020-02-27

## 2020-02-26 RX ORDER — POTASSIUM CHLORIDE 29.8 MG/ML
20 INJECTION INTRAVENOUS ONCE
Status: COMPLETED | OUTPATIENT
Start: 2020-02-26 | End: 2020-02-26

## 2020-02-26 RX ORDER — LIDOCAINE HYDROCHLORIDE 20 MG/ML
5 INJECTION, SOLUTION INFILTRATION; PERINEURAL ONCE
Status: COMPLETED | OUTPATIENT
Start: 2020-02-26 | End: 2020-02-26

## 2020-02-26 RX ORDER — HEPARIN SODIUM (PORCINE) LOCK FLUSH IV SOLN 100 UNIT/ML 100 UNIT/ML
3 SOLUTION INTRAVENOUS PRN
Status: DISCONTINUED | OUTPATIENT
Start: 2020-02-26 | End: 2020-03-06 | Stop reason: HOSPADM

## 2020-02-26 RX ORDER — HEPARIN SODIUM (PORCINE) LOCK FLUSH IV SOLN 100 UNIT/ML 100 UNIT/ML
3 SOLUTION INTRAVENOUS EVERY 12 HOURS SCHEDULED
Status: DISCONTINUED | OUTPATIENT
Start: 2020-02-26 | End: 2020-03-06 | Stop reason: HOSPADM

## 2020-02-26 RX ORDER — SODIUM CHLORIDE 0.9 % (FLUSH) 0.9 %
10 SYRINGE (ML) INJECTION PRN
Status: DISCONTINUED | OUTPATIENT
Start: 2020-02-26 | End: 2020-03-06 | Stop reason: HOSPADM

## 2020-02-26 RX ORDER — DIMETHICONE, OXYBENZONE, AND PADIMATE O 2; 2.5; 6.6 G/100G; G/100G; G/100G
STICK TOPICAL PRN
Status: DISCONTINUED | OUTPATIENT
Start: 2020-02-26 | End: 2020-03-06 | Stop reason: HOSPADM

## 2020-02-26 RX ADMIN — POTASSIUM CHLORIDE 20 MEQ: 29.8 INJECTION, SOLUTION INTRAVENOUS at 09:41

## 2020-02-26 RX ADMIN — CALCIUM GLUCONATE: 98 INJECTION, SOLUTION INTRAVENOUS at 17:22

## 2020-02-26 RX ADMIN — MORPHINE SULFATE 3 MG: 4 INJECTION, SOLUTION INTRAMUSCULAR; INTRAVENOUS at 22:03

## 2020-02-26 RX ADMIN — HYDRALAZINE HYDROCHLORIDE 10 MG: 20 INJECTION INTRAMUSCULAR; INTRAVENOUS at 04:05

## 2020-02-26 RX ADMIN — IPRATROPIUM BROMIDE AND ALBUTEROL SULFATE 1 AMPULE: 2.5; .5 SOLUTION RESPIRATORY (INHALATION) at 12:25

## 2020-02-26 RX ADMIN — Medication 10 ML: at 21:01

## 2020-02-26 RX ADMIN — POTASSIUM BICARBONATE 20 MEQ: 782 TABLET, EFFERVESCENT ORAL at 08:29

## 2020-02-26 RX ADMIN — IPRATROPIUM BROMIDE AND ALBUTEROL SULFATE 1 AMPULE: 2.5; .5 SOLUTION RESPIRATORY (INHALATION) at 20:38

## 2020-02-26 RX ADMIN — MORPHINE SULFATE 3 MG: 4 INJECTION, SOLUTION INTRAMUSCULAR; INTRAVENOUS at 07:05

## 2020-02-26 RX ADMIN — Medication: at 06:48

## 2020-02-26 RX ADMIN — ENOXAPARIN SODIUM 60 MG: 60 INJECTION SUBCUTANEOUS at 21:25

## 2020-02-26 RX ADMIN — POTASSIUM PHOSPHATE, MONOBASIC AND POTASSIUM PHOSPHATE, DIBASIC 30 MMOL: 224; 236 INJECTION, SOLUTION, CONCENTRATE INTRAVENOUS at 09:41

## 2020-02-26 RX ADMIN — HYDRALAZINE HYDROCHLORIDE 10 MG: 20 INJECTION INTRAMUSCULAR; INTRAVENOUS at 02:10

## 2020-02-26 RX ADMIN — IPRATROPIUM BROMIDE AND ALBUTEROL SULFATE 1 AMPULE: 2.5; .5 SOLUTION RESPIRATORY (INHALATION) at 17:54

## 2020-02-26 RX ADMIN — LIDOCAINE HYDROCHLORIDE 5 ML: 20 INJECTION, SOLUTION INFILTRATION; PERINEURAL at 09:05

## 2020-02-26 RX ADMIN — MORPHINE SULFATE 2 MG: 2 INJECTION, SOLUTION INTRAMUSCULAR; INTRAVENOUS at 15:05

## 2020-02-26 RX ADMIN — Medication: at 05:51

## 2020-02-26 RX ADMIN — BISACODYL 10 MG: 10 SUPPOSITORY RECTAL at 08:29

## 2020-02-26 RX ADMIN — Medication 10 ML: at 08:29

## 2020-02-26 RX ADMIN — SENNOSIDES 5 ML: 8.8 LIQUID ORAL at 21:01

## 2020-02-26 RX ADMIN — TRIMETHOBENZAMIDE HYDROCHLORIDE 200 MG: 100 INJECTION INTRAMUSCULAR at 02:42

## 2020-02-26 RX ADMIN — BUPROPION HYDROCHLORIDE 100 MG: 100 TABLET, EXTENDED RELEASE ORAL at 08:29

## 2020-02-26 RX ADMIN — DOCUSATE SODIUM 100 MG: 50 LIQUID ORAL at 21:00

## 2020-02-26 RX ADMIN — MELATONIN 3 MG ORAL TABLET 6 MG: 3 TABLET ORAL at 21:00

## 2020-02-26 RX ADMIN — SOTALOL HYDROCHLORIDE 120 MG: 120 TABLET ORAL at 21:00

## 2020-02-26 RX ADMIN — DOCUSATE SODIUM 100 MG: 50 LIQUID ORAL at 08:29

## 2020-02-26 RX ADMIN — SOTALOL HYDROCHLORIDE 120 MG: 120 TABLET ORAL at 08:29

## 2020-02-26 RX ADMIN — POTASSIUM CHLORIDE, DEXTROSE MONOHYDRATE AND SODIUM CHLORIDE: 150; 5; 450 INJECTION, SOLUTION INTRAVENOUS at 00:23

## 2020-02-26 RX ADMIN — AMLODIPINE BESYLATE 5 MG: 5 TABLET ORAL at 08:29

## 2020-02-26 RX ADMIN — MORPHINE SULFATE 3 MG: 4 INJECTION, SOLUTION INTRAMUSCULAR; INTRAVENOUS at 04:00

## 2020-02-26 RX ADMIN — MORPHINE SULFATE 2 MG: 2 INJECTION, SOLUTION INTRAMUSCULAR; INTRAVENOUS at 19:03

## 2020-02-26 RX ADMIN — LEVOTHYROXINE SODIUM 100 MCG: 0.1 TABLET ORAL at 05:09

## 2020-02-26 ASSESSMENT — PAIN SCALES - GENERAL
PAINLEVEL_OUTOF10: 0
PAINLEVEL_OUTOF10: 10
PAINLEVEL_OUTOF10: 8
PAINLEVEL_OUTOF10: 8
PAINLEVEL_OUTOF10: 10
PAINLEVEL_OUTOF10: 0
PAINLEVEL_OUTOF10: 10
PAINLEVEL_OUTOF10: 6
PAINLEVEL_OUTOF10: 0
PAINLEVEL_OUTOF10: 0
PAINLEVEL_OUTOF10: 7

## 2020-02-26 ASSESSMENT — PAIN DESCRIPTION - ORIENTATION
ORIENTATION: LOWER;MID
ORIENTATION: MID

## 2020-02-26 ASSESSMENT — PAIN DESCRIPTION - DESCRIPTORS
DESCRIPTORS: SHARP
DESCRIPTORS: ACHING;CONSTANT

## 2020-02-26 ASSESSMENT — PAIN DESCRIPTION - PAIN TYPE
TYPE: SURGICAL PAIN;ACUTE PAIN
TYPE: ACUTE PAIN

## 2020-02-26 ASSESSMENT — PAIN DESCRIPTION - LOCATION
LOCATION: ABDOMEN
LOCATION: ABDOMEN

## 2020-02-26 ASSESSMENT — PAIN DESCRIPTION - FREQUENCY: FREQUENCY: INTERMITTENT

## 2020-02-26 ASSESSMENT — PAIN DESCRIPTION - ONSET: ONSET: ON-GOING

## 2020-02-26 NOTE — PROGRESS NOTES
Surgical Intensive Care Unit   Daily Progress Note     Patient's name:  Riya Meraz  Age/Gender: 68 y.o. female  Date of Admission: 2/17/2020  1:30 PM  Length of Stay: 9    Reason for ICU: post op monitoring after right hemicolectomy for cecal volvulus    Hospital Course:   2/17 Jose Elizabeth is a 68 y. o. female with extensive PVD on plavix and pletal, who presents for evaluation of a high grade bowel obstruction. She had been tachycardic and hypotensive; decision was made for exploratory laparotomy where she was found to have a perforated cecal volvulus for which she underwent a right hemicolectomy with side to side ileocolonic anastomosis 2/17. 2/18 Extubated   2/19 1 unit PRBC for acute blood loss anemia   2/20 Patient had a short run A fib RVR yesterday, self resolved and resolved Faster than we could obtain an EKG , She states she went into a fib > 1 week ago and has possibly seen Dr. Dipesh Ascencio  ( can not find any notes)   2/21--A.fib with RVR again this AM--given lopressor and then started on Cardizem  2/22--agitated overnight--pulled all IVs out  2/23--diuresed well yesterday  2/24-- Nausea, ileus on KUB. NGT replaced. 500cc out. No BM overnight.   2/25-- A-fib w/ rvr overnight. Repeat CT abdomen shows concern for hematoma vs leak. sotolol increased per cards  2/26-- +flatus, leukocytosis resolved. Await bowel function.     Problem List:   Patient Active Problem List   Diagnosis    Essential hypertension    Depression    PVD (peripheral vascular disease) with claudication ([address]    History of tobacco use    Asymptomatic bilateral carotid artery stenosis    PAD (peripheral artery disease) ([address]    Hyperlipidemia LDL goal <100    Acquired hypothyroidism    Left groin pain    SBO (small bowel obstruction) (HCC)    Cecal volvulus ([address]    ROSELYN (acute kidney injury) ([address]    Shock liver    Severe protein-calorie malnutrition ([address]    Atherosclerosis of aortic bifurcation and common iliac tenderness. Midline incision c/d/i  MUSCULOSKELETAL: moves all extremities purposefully, 5/5 strength   SKIN/EXTREMITIES: left buttocks ecchymosis       ASSESSMENT / PLAN:   · Neuro:  Confused/delerious. After discussion with family likely some baseline dementia    · Redirect as able. · CV: multiple episodes of  a-fib w/RVR. · Cardiac meds per-cards  · Episode of A-fib w/ RVR overnight. · sotolol per cards-monitoring EKG  · Pulm: Respiratory insufficieny-w/ b/l plural effusions R>L  · Continue chest physiotherapy/duonebs PRN  · therapeutic Lovenox held for thoracentesis. · GI: Ileus, CT w/ concern for hematoma  · NPO  · NGT in placed   · Monitor output/BMs  · Will discuss PICC and TPN today. · PRN tigan  · Renal: Acute kidney injury  · Monitor BUN/Cr  · ID: leukocytosis-resolved  · Off abx   · Endocrine: Hypothyroidism, shock liver-improving. · Synthroid  · LFTs improving. · MSK: no acute issues   · Heme: Anemia  · Continue to monitor  · T lovenox-per cards due to A-fib w/ RVR (held for thoracentesis)      Pain/Analgesia: Antonella, morphine  Bowel regimen: senna, colace, dulcolax  Diet: Diet NPO Effective Now Exceptions are: Sips with Meds  DVT proph: held  GI proph: Pepcid  Seizure proph: none  Glucose protocol: PRN  Mouth/eye care: PRN  Barry: monitor UOP   Ancillary consults: Admitted to IM, Critical care, gen surgery, cards,  Family Update: as needed  CODE Status: Full Code    Dispo: continue SICU care.        Electronically signed by Jean-Paul Corey MD 2/26/2020  5:48 AM

## 2020-02-26 NOTE — CARE COORDINATION
S/p R thoracentesis today, 600 cc serous fluid removed. Ng clamped, starting cl liq today. Planning short period of Tpn. Not currently on iv abx. Still requires cardiac monitoring d/t med adjustment for periods of afib with rvr. Noted order for ltac. Per pt's ins [facility] medicare) it doesn't appear that pt will meet ltac criteria, but will ask both ltacs to review. Plan at this time is for [facility] when medically stable.

## 2020-02-26 NOTE — PROGRESS NOTES
Vascular Access Procedure Note    Procedure Date:     2/26/2020    Pre-procedure Verification/Time-Out:  The proposed risks versus benefits of this procedure were discussed in detail by the physician with family. consent was obtained from the patient and the patient's  and daughter. Relevant documentation was reviewed prior to procedure including signed consent form. All necessary equipment for procedure is available at time of procedure yes. An audible time out was done at 11am  AM by armando paz rn, correctly identifying patients name, medical record number, correct side, correct site, and correct procedure to be performed with registered nurse members of the procedure team all in agreement.         Indication for Procedure:   Reason for Insertion: intravenous access    ASA Assessment (Required for Moderate & Deep Sedation):  ASA 2 - Patient with mild systemic disease with no functional limitations    Procedure:   Reason for Consultation: power PICC line    Clinician Performing Procedure:   armando paz rn      Assistant:  none    Sedation:   Analgesia Used: lidocaine 1%    Procedure Details/Findings:  Catheter Kyrgyz Size: 5fdl  Lot Number: [id number]  Product #: [id number]  Expiration Date: 2020-02-29  Maximum Barrier Precautions: cap, full body drape, gloves, gown and handwashing  Skin Prep: chlorhexidine  Technique: modified seldinger  Attempts: 1  Exposed (cm): 2cm  Total (cm): 36cm  Placement Site: right brachial  Vessel Size: .06cm  Dressing: securement device, transparent dressing and biopatch  Blood Return: Yes   Ultrasound Guidance: Yes   Arm Circumference Mid-Bicep (cm): 26cm  Chest X-Ray Ordered: VasoNova VPS  End Placement: 04QV    Complications:   none     Post-operative Condition:  stable  Patient Tolerated Procedure: well     Comments/Post-operative Education:   Post Procedure Interventions: pt sedated  Inserted by armando paz rn  Bulls eye, picc in lower 1/3svc/huyen Martínez Sa

## 2020-02-26 NOTE — PROGRESS NOTES
[facility]  PROGRESS NOTE  ATTENDING NOTE    CRITICAL CARE    Patient Active Problem List   Diagnosis    Essential hypertension    Depression    PVD (peripheral vascular disease) with claudication ([address]    History of tobacco use    Asymptomatic bilateral carotid artery stenosis    PAD (peripheral artery disease) (HCC)    Hyperlipidemia LDL goal <100    Acquired hypothyroidism    Left groin pain    SBO (small bowel obstruction) (HCC)    Cecal volvulus ([address]    ROSELYN (acute kidney injury) ([address]    Shock liver    Severe protein-calorie malnutrition (HCC)    Atherosclerosis of aortic bifurcation and common iliac arteries ([facility]    CAD (coronary artery disease)    History of bilateral carotid artery stenosis    Paroxysmal atrial fibrillation with rapid ventricular response (HCC)         OVERNIGHT EVENTS:  NGT 500cc/24h; 2L NC, + flatus; WBC 13-->4  HOSPITAL COURSE:  2/17 Seth Elizabeth is a 68 y. o. female with extensive PVD on plavix and pletal, who presents for evaluation of a high grade bowel obstruction. She had been tachycardic and hypotensive; decision was made for exploratory laparotomy where she was found to have a perforated cecal volvulus for which she underwent a right hemicolectomy with side to side ileocolonic anastomosis 2/17.   2/18 Extubated   2/19 1 unit PRBC for acute blood loss anemia   2/20 Patient had a short run A fib RVR yesterday, self resolved and resolved Faster than we could obtain an EKG , She states she went into a fib > 1 week ago and has possibly seen Dr. Colleen Florian  ( can not find any notes)   2/21--A.fib with RVR again this AM--given lopressor and then started on Cardizem  2/22--agitated overnight--pulled all IVs out  2/23--diuresed well yesterday  2/24--NGT reinserted for ileus, Afib RVR--cardizem gtt per cardiology  2/25--sotalol increased per cards; CT A/P done yesterday--hematoma;   2/26--PICC and TPN, clamp NGT, clears    BP (!) 158/75   Pulse 76 Temp 98.1 °F (36.7 °C) (Axillary)   Resp 18   Ht 5' 6\" (1.676 m)   Wt 120 lb 2.4 oz (54.5 kg)   SpO2 100%   BMI 19.39 kg/m²   Physical Exam  Constitutional:       General: She is not in acute distress. Appearance: She is not ill-appearing. Comments: Much more awake today and participitory   HENT:      Head: Normocephalic and atraumatic. Nose: Nose normal.      Mouth/Throat:      Mouth: Mucous membranes are moist.      Pharynx: Oropharynx is clear. Eyes:      Extraocular Movements: Extraocular movements intact. Pupils: Pupils are equal, round, and reactive to light. Neck:      Musculoskeletal: Normal range of motion and neck supple. Cardiovascular:      Rate and Rhythm: Normal rate. Rhythm irregular. Pulses: Normal pulses. Pulmonary:      Effort: Pulmonary effort is normal.      Breath sounds: Normal breath sounds. Abdominal:      General: There is distension. Palpations: Abdomen is soft. Tenderness: There is abdominal tenderness (moderate right abdominal TTP). Comments: Incision--c/d/i with staples   Musculoskeletal: Normal range of motion. General: No swelling or tenderness. Neurological:      General: No focal deficit present. Mental Status: Mental status is at baseline. Comments: Has dementia       Lines: Weber:  yes - Continue weber catheter for managing strict I and Os in this critically ill patient. Central line:  yes - left femoral  PICC:  no    CAM-ICU:  negative  RASS:  0-+1    ASSESSMENT/PLAN:  1. Cecal volvulus--s/p right hemicolectomy  --clear liquids  2. Ileus--NGT clamp  3. Paroxysmal Afib  --cardiology following  --c/w sotalol  --monitor electrolytes  --therapeutic lovenox--held for thoracentesis  4. Dementia  --monitor  5. Depression  --restart wellbutrin at lower dose  6. Leukocytosis ?source  --need to change CVC from groin--place PICC 2/26  --CT A/P--completed, ileus  7. ROSELYN--improved  --monitor UOP  8.   Shock

## 2020-02-26 NOTE — PROGRESS NOTES
GENERAL SURGERY  DAILY PROGRESS NOTE  2/26/2020    Subjective:  No acute events overnight, passing gas, had a smear of stool. Denies nausea, vomiting, complaining of abdominal pain. Objective:  BP (!) 128/107   Pulse 86   Temp 98.4 °F (36.9 °C) (Axillary)   Resp 25   Ht 5' 6\" (1.676 m)   Wt 120 lb 2.4 oz (54.5 kg)   SpO2 99%   BMI 19.39 kg/m²     General: NAD, awake, confused. Head: Normocephalic, atraumatic  Eyes: PERRLA, EOMI. Lungs: No increased work of breathing. Cardiovascular: RRR. Abdomen: Soft, mild distension, non-tender. Incision c/d/i. No rebound, guarding or rigidity. Extremities: Atraumatic, full range of motion  Skin: Warm, dry and intact    Assessment/Plan:  68 y.o. female w/ cecal perforation s/p ex lap w/ right hemicolectomy 2/17 now with ileus. IR consulted for right thoracentesis   Pain and nausea control PRN  NPO. NGT decompression. Maintenance IVF's. PICC, TPN. CT abd/pelv with fluid collection in mesentery, likely secondary to recent surgery  Afib RVR, appreciate critical care and cardiology recs  DVT ppx.     Electronically signed by Daniel Cunningham MD on 2/26/2020 at 6:16 AM

## 2020-02-26 NOTE — PROGRESS NOTES
OT BEDSIDE TREATMENT NOTE      Date:2020  Patient Name: Salima Aleman  MRN: [id number]  : 1950  Room: [address] [place] [address]-A      Evaluating OT: Kaushik Fernando OTR/L #[id number]      AM-PAC Daily Activity Raw Score:      Recommended Adaptive Equipment: TBD      Diagnosis:  SBO  Patient presented to ED for abdominal pain      Surgery: 20  LAPAROTOMY EXPLORATORY, POSSIBLE BOWEL RESECTION, POSSIBLE OSTOMY, POSSIBLE WOUND VAC  20  LAPAROTOMY EXPLORATORY, RIGHT HEMICOLECTOMY  Pertinent Medical History: cancer, CAD, COPD, HTN, PVD      Precautions:  Falls & bed/chair alarm, O2, NG tube , abdominal precautions; Pt cleared for clear liquid diet. Home Living: Pt lives in 2 story home with daughter; 3 MATT with B handrails   Bathroom setup: walk in shower    Equipment owned: ww     Prior Level of Function: Mod I with ADLs , Mod I with IADLs; ambulated with ww  Driving: not reported  Occupation: not reported      Pain Level: 3/10 abdominal pain     Cognition: A&O: 2/4 (self, place given choices); Follows 1 step directions with mod cues. Pt lethargic & confused today with latent responses. Pt demonstrating moments of clarity throughout session. Requires max cues to focus on task.               Memory:  P; poor recall of yesterday's therapy session              Comprehension: P+              Sequencing:  P+              Problem solving:  P+              Judgement/safety:  P+    RASS: 0  CAM ICU: (+) Delirium                Functional Assessment:    Initial Eval Status  Date: 20 Treatment Status  Date: Short Term Goals = Long Term Goals  Treatment frequency: 1-4x/wk; PRN   Feeding NG tube to suction     (min A self feeding with spoon for ice chips; per doctor)  Min A  to hold onto cup while seated up in chair due to inattention Independent    Grooming Moderate Assist     (semi-supine; impaired sequencing) Max A  after set up; max cues/encouragement to initiate and follow through with tasks while seated up in

## 2020-02-27 ENCOUNTER — APPOINTMENT (OUTPATIENT)
Dept: GENERAL RADIOLOGY | Age: 70
DRG: 329 | End: 2020-02-27
Payer: MEDICARE

## 2020-02-27 LAB
ALBUMIN SERPL-MCNC: 2.2 G/DL (ref 3.5–5.2)
ALP BLD-CCNC: 68 U/L (ref 35–104)
ALT SERPL-CCNC: 176 U/L (ref 0–32)
ANION GAP SERPL CALCULATED.3IONS-SCNC: 8 MMOL/L (ref 7–16)
ANISOCYTOSIS: ABNORMAL
AST SERPL-CCNC: 30 U/L (ref 0–31)
BASOPHILS ABSOLUTE: 0 E9/L (ref 0–0.2)
BASOPHILS RELATIVE PERCENT: 0.1 % (ref 0–2)
BILIRUB SERPL-MCNC: 1.2 MG/DL (ref 0–1.2)
BUN BLDV-MCNC: 16 MG/DL (ref 8–23)
CALCIUM IONIZED: 1.16 MMOL/L (ref 1.15–1.33)
CALCIUM SERPL-MCNC: 7.6 MG/DL (ref 8.6–10.2)
CHLORIDE BLD-SCNC: 106 MMOL/L (ref 98–107)
CO2: 27 MMOL/L (ref 22–29)
CREAT SERPL-MCNC: 0.7 MG/DL (ref 0.5–1)
EKG ATRIAL RATE: 147 BPM
EKG ATRIAL RATE: 74 BPM
EKG ATRIAL RATE: 80 BPM
EKG ATRIAL RATE: 83 BPM
EKG P AXIS: 66 DEGREES
EKG P AXIS: 73 DEGREES
EKG P AXIS: 95 DEGREES
EKG P-R INTERVAL: 114 MS
EKG P-R INTERVAL: 118 MS
EKG P-R INTERVAL: 118 MS
EKG Q-T INTERVAL: 260 MS
EKG Q-T INTERVAL: 372 MS
EKG Q-T INTERVAL: 376 MS
EKG Q-T INTERVAL: 384 MS
EKG QRS DURATION: 70 MS
EKG QRS DURATION: 72 MS
EKG QRS DURATION: 74 MS
EKG QRS DURATION: 82 MS
EKG QTC CALCULATION (BAZETT): 409 MS
EKG QTC CALCULATION (BAZETT): 426 MS
EKG QTC CALCULATION (BAZETT): 433 MS
EKG QTC CALCULATION (BAZETT): 437 MS
EKG R AXIS: 115 DEGREES
EKG R AXIS: 68 DEGREES
EKG R AXIS: 76 DEGREES
EKG R AXIS: 81 DEGREES
EKG T AXIS: -108 DEGREES
EKG T AXIS: 109 DEGREES
EKG T AXIS: 64 DEGREES
EKG T AXIS: 69 DEGREES
EKG VENTRICULAR RATE: 149 BPM
EKG VENTRICULAR RATE: 74 BPM
EKG VENTRICULAR RATE: 80 BPM
EKG VENTRICULAR RATE: 83 BPM
EOSINOPHILS ABSOLUTE: 0 E9/L (ref 0.05–0.5)
EOSINOPHILS RELATIVE PERCENT: 0.5 % (ref 0–6)
GFR AFRICAN AMERICAN: >60
GFR NON-AFRICAN AMERICAN: >60 ML/MIN/1.73
GLUCOSE BLD-MCNC: 152 MG/DL (ref 74–99)
GRAM STAIN ORDERABLE: NORMAL
HCT VFR BLD CALC: 26.6 % (ref 34–48)
HEMOGLOBIN: 8.2 G/DL (ref 11.5–15.5)
HYPOCHROMIA: ABNORMAL
LYMPHOCYTES ABSOLUTE: 0.66 E9/L (ref 1.5–4)
LYMPHOCYTES RELATIVE PERCENT: 8.7 % (ref 20–42)
MAGNESIUM: 2 MG/DL (ref 1.6–2.6)
MCH RBC QN AUTO: 30.6 PG (ref 26–35)
MCHC RBC AUTO-ENTMCNC: 30.8 % (ref 32–34.5)
MCV RBC AUTO: 99.3 FL (ref 80–99.9)
MONOCYTES ABSOLUTE: 0.66 E9/L (ref 0.1–0.95)
MONOCYTES RELATIVE PERCENT: 8.7 % (ref 2–12)
NEUTROPHILS ABSOLUTE: 6.06 E9/L (ref 1.8–7.3)
NEUTROPHILS RELATIVE PERCENT: 82.6 % (ref 43–80)
PDW BLD-RTO: 15.2 FL (ref 11.5–15)
PHOSPHORUS: 2.7 MG/DL (ref 2.5–4.5)
PLATELET # BLD: 423 E9/L (ref 130–450)
PMV BLD AUTO: 10 FL (ref 7–12)
POIKILOCYTES: ABNORMAL
POLYCHROMASIA: ABNORMAL
POTASSIUM SERPL-SCNC: 4.1 MMOL/L (ref 3.5–5)
RBC # BLD: 2.68 E12/L (ref 3.5–5.5)
SODIUM BLD-SCNC: 141 MMOL/L (ref 132–146)
TARGET CELLS: ABNORMAL
TOTAL PROTEIN: 5.2 G/DL (ref 6.4–8.3)
TRIGL SERPL-MCNC: 110 MG/DL (ref 0–149)
WBC # BLD: 7.3 E9/L (ref 4.5–11.5)

## 2020-02-27 PROCEDURE — 85025 COMPLETE CBC W/AUTO DIFF WBC: CPT

## 2020-02-27 PROCEDURE — 6370000000 HC RX 637 (ALT 250 FOR IP): Performed by: STUDENT IN AN ORGANIZED HEALTH CARE EDUCATION/TRAINING PROGRAM

## 2020-02-27 PROCEDURE — 2500000003 HC RX 250 WO HCPCS: Performed by: SURGERY

## 2020-02-27 PROCEDURE — 99233 SBSQ HOSP IP/OBS HIGH 50: CPT | Performed by: SURGERY

## 2020-02-27 PROCEDURE — 2580000003 HC RX 258: Performed by: STUDENT IN AN ORGANIZED HEALTH CARE EDUCATION/TRAINING PROGRAM

## 2020-02-27 PROCEDURE — 84100 ASSAY OF PHOSPHORUS: CPT

## 2020-02-27 PROCEDURE — 94640 AIRWAY INHALATION TREATMENT: CPT

## 2020-02-27 PROCEDURE — 93005 ELECTROCARDIOGRAM TRACING: CPT | Performed by: INTERNAL MEDICINE

## 2020-02-27 PROCEDURE — 74018 RADEX ABDOMEN 1 VIEW: CPT

## 2020-02-27 PROCEDURE — 84478 ASSAY OF TRIGLYCERIDES: CPT

## 2020-02-27 PROCEDURE — 6360000002 HC RX W HCPCS: Performed by: STUDENT IN AN ORGANIZED HEALTH CARE EDUCATION/TRAINING PROGRAM

## 2020-02-27 PROCEDURE — 83735 ASSAY OF MAGNESIUM: CPT

## 2020-02-27 PROCEDURE — 36415 COLL VENOUS BLD VENIPUNCTURE: CPT

## 2020-02-27 PROCEDURE — 2060000000 HC ICU INTERMEDIATE R&B

## 2020-02-27 PROCEDURE — 6370000000 HC RX 637 (ALT 250 FOR IP): Performed by: SURGERY

## 2020-02-27 PROCEDURE — 6370000000 HC RX 637 (ALT 250 FOR IP): Performed by: INTERNAL MEDICINE

## 2020-02-27 PROCEDURE — 97530 THERAPEUTIC ACTIVITIES: CPT

## 2020-02-27 PROCEDURE — 97535 SELF CARE MNGMENT TRAINING: CPT

## 2020-02-27 PROCEDURE — 2700000000 HC OXYGEN THERAPY PER DAY

## 2020-02-27 PROCEDURE — 82330 ASSAY OF CALCIUM: CPT

## 2020-02-27 PROCEDURE — 80053 COMPREHEN METABOLIC PANEL: CPT

## 2020-02-27 RX ORDER — LABETALOL HYDROCHLORIDE 5 MG/ML
10 INJECTION, SOLUTION INTRAVENOUS EVERY 4 HOURS PRN
Status: DISCONTINUED | OUTPATIENT
Start: 2020-02-27 | End: 2020-03-04

## 2020-02-27 RX ORDER — HYDRALAZINE HYDROCHLORIDE 20 MG/ML
10 INJECTION INTRAMUSCULAR; INTRAVENOUS EVERY 4 HOURS PRN
Status: DISCONTINUED | OUTPATIENT
Start: 2020-02-27 | End: 2020-03-04

## 2020-02-27 RX ADMIN — ENOXAPARIN SODIUM 60 MG: 60 INJECTION SUBCUTANEOUS at 20:46

## 2020-02-27 RX ADMIN — IPRATROPIUM BROMIDE AND ALBUTEROL SULFATE 1 AMPULE: 2.5; .5 SOLUTION RESPIRATORY (INHALATION) at 17:16

## 2020-02-27 RX ADMIN — DOCUSATE SODIUM 100 MG: 50 LIQUID ORAL at 09:36

## 2020-02-27 RX ADMIN — AMLODIPINE BESYLATE 5 MG: 5 TABLET ORAL at 09:35

## 2020-02-27 RX ADMIN — SENNOSIDES 5 ML: 8.8 LIQUID ORAL at 20:46

## 2020-02-27 RX ADMIN — MORPHINE SULFATE 3 MG: 4 INJECTION, SOLUTION INTRAMUSCULAR; INTRAVENOUS at 03:46

## 2020-02-27 RX ADMIN — SODIUM CHLORIDE, PRESERVATIVE FREE 300 UNITS: 5 INJECTION INTRAVENOUS at 20:47

## 2020-02-27 RX ADMIN — MORPHINE SULFATE 2 MG: 2 INJECTION, SOLUTION INTRAMUSCULAR; INTRAVENOUS at 09:12

## 2020-02-27 RX ADMIN — SODIUM CHLORIDE, PRESERVATIVE FREE 10 ML: 5 INJECTION INTRAVENOUS at 13:03

## 2020-02-27 RX ADMIN — BUPROPION HYDROCHLORIDE 100 MG: 100 TABLET, EXTENDED RELEASE ORAL at 09:35

## 2020-02-27 RX ADMIN — BISACODYL 10 MG: 10 SUPPOSITORY RECTAL at 09:38

## 2020-02-27 RX ADMIN — SODIUM CHLORIDE, PRESERVATIVE FREE 10 ML: 5 INJECTION INTRAVENOUS at 22:13

## 2020-02-27 RX ADMIN — MELATONIN 3 MG ORAL TABLET 6 MG: 3 TABLET ORAL at 20:45

## 2020-02-27 RX ADMIN — SODIUM CHLORIDE, PRESERVATIVE FREE 10 ML: 5 INJECTION INTRAVENOUS at 10:51

## 2020-02-27 RX ADMIN — MORPHINE SULFATE 3 MG: 4 INJECTION, SOLUTION INTRAMUSCULAR; INTRAVENOUS at 22:12

## 2020-02-27 RX ADMIN — ENOXAPARIN SODIUM 60 MG: 60 INJECTION SUBCUTANEOUS at 09:53

## 2020-02-27 RX ADMIN — SODIUM CHLORIDE, PRESERVATIVE FREE 10 ML: 5 INJECTION INTRAVENOUS at 16:41

## 2020-02-27 RX ADMIN — MORPHINE SULFATE 2 MG: 2 INJECTION, SOLUTION INTRAMUSCULAR; INTRAVENOUS at 19:10

## 2020-02-27 RX ADMIN — Medication 10 ML: at 20:47

## 2020-02-27 RX ADMIN — CALCIUM GLUCONATE: 98 INJECTION, SOLUTION INTRAVENOUS at 18:05

## 2020-02-27 RX ADMIN — SOTALOL HYDROCHLORIDE 120 MG: 120 TABLET ORAL at 09:35

## 2020-02-27 RX ADMIN — MORPHINE SULFATE 3 MG: 4 INJECTION, SOLUTION INTRAMUSCULAR; INTRAVENOUS at 13:00

## 2020-02-27 RX ADMIN — DOCUSATE SODIUM 100 MG: 50 LIQUID ORAL at 20:45

## 2020-02-27 RX ADMIN — SOTALOL HYDROCHLORIDE 120 MG: 120 TABLET ORAL at 20:46

## 2020-02-27 RX ADMIN — MORPHINE SULFATE 3 MG: 4 INJECTION, SOLUTION INTRAMUSCULAR; INTRAVENOUS at 16:41

## 2020-02-27 RX ADMIN — IPRATROPIUM BROMIDE AND ALBUTEROL SULFATE 1 AMPULE: 2.5; .5 SOLUTION RESPIRATORY (INHALATION) at 08:31

## 2020-02-27 RX ADMIN — LEVOTHYROXINE SODIUM 100 MCG: 0.1 TABLET ORAL at 09:35

## 2020-02-27 RX ADMIN — IPRATROPIUM BROMIDE AND ALBUTEROL SULFATE 1 AMPULE: 2.5; .5 SOLUTION RESPIRATORY (INHALATION) at 22:26

## 2020-02-27 RX ADMIN — IPRATROPIUM BROMIDE AND ALBUTEROL SULFATE 1 AMPULE: 2.5; .5 SOLUTION RESPIRATORY (INHALATION) at 12:06

## 2020-02-27 ASSESSMENT — PAIN SCALES - GENERAL
PAINLEVEL_OUTOF10: 8
PAINLEVEL_OUTOF10: 6
PAINLEVEL_OUTOF10: 8
PAINLEVEL_OUTOF10: 5
PAINLEVEL_OUTOF10: 5
PAINLEVEL_OUTOF10: 7

## 2020-02-27 ASSESSMENT — PAIN DESCRIPTION - PAIN TYPE
TYPE: ACUTE PAIN;SURGICAL PAIN
TYPE: SURGICAL PAIN;ACUTE PAIN
TYPE: ACUTE PAIN;SURGICAL PAIN
TYPE: ACUTE PAIN;SURGICAL PAIN

## 2020-02-27 ASSESSMENT — PAIN DESCRIPTION - FREQUENCY
FREQUENCY: INTERMITTENT
FREQUENCY: CONTINUOUS

## 2020-02-27 ASSESSMENT — PAIN DESCRIPTION - DESCRIPTORS
DESCRIPTORS: ACHING;CONSTANT;DISCOMFORT
DESCRIPTORS: ACHING;DISCOMFORT
DESCRIPTORS: CONSTANT;ACHING;DISCOMFORT
DESCRIPTORS: DISCOMFORT;CONSTANT;ACHING

## 2020-02-27 ASSESSMENT — PAIN DESCRIPTION - LOCATION
LOCATION: ABDOMEN

## 2020-02-27 ASSESSMENT — PAIN DESCRIPTION - ORIENTATION
ORIENTATION: MID

## 2020-02-27 ASSESSMENT — PAIN DESCRIPTION - PROGRESSION
CLINICAL_PROGRESSION: GRADUALLY WORSENING
CLINICAL_PROGRESSION: GRADUALLY WORSENING

## 2020-02-27 ASSESSMENT — PAIN - FUNCTIONAL ASSESSMENT: PAIN_FUNCTIONAL_ASSESSMENT: PREVENTS OR INTERFERES SOME ACTIVE ACTIVITIES AND ADLS

## 2020-02-27 ASSESSMENT — PAIN DESCRIPTION - ONSET
ONSET: ON-GOING
ONSET: GRADUAL

## 2020-02-27 NOTE — PLAN OF CARE
Problem: Risk for Impaired Skin Integrity  Goal: Tissue integrity - skin and mucous membranes  Description  Structural intactness and normal physiological function of skin and  mucous membranes.   2/27/2020 1715 by Marilu Loya RN  Outcome: Met This Shift  2/27/2020 1537 by Ronny Patel  Outcome: Met This Shift     Problem: Falls - Risk of:  Goal: Will remain free from falls  Description  Will remain free from falls  2/27/2020 1715 by Marilu Loya RN  Outcome: Met This Shift  2/27/2020 1537 by Ronny Patel  Outcome: Met This Shift

## 2020-02-27 NOTE — PROGRESS NOTES
Physical Therapy  Physical Therapy Treatment Note     Name: Emelina Fernando  : 1950  MRN: [id number]    Referring Provider:  Ana Chauhan DO    Date of Service: 2020    Evaluating PT:  Stacey Antonio, PT, [id number]    Room #:  5802/7147-E  Diagnosis:  SBO  Precautions: Falls, NG tube,, O2, Ice chips only, Bed alarm  Procedure/Surgery:   Exploratory laparotomy, enterolysis, right hemicolectomy with side-to-side, functional end-to-end anastomosis  PMHx/PSHx:  CA, CAD, COPD, HLD, HTN, PVD, RA, Thyroid disease   Equipment Needs:  TBD    SUBJECTIVE:    Pt lives with daughter in a 2 story home with 3 stairs to enter and 2 rail. Full flight of steps and 1 rail to bedroom. Pt ambulated with [employer] and required some assistance for ADLs PTA. OBJECTIVE:   Initial Evaluation  Date: 20 Treatment  20 Short Term/ Long Term   Goals   AM-PAC 6 Clicks  78/17    Was pt agreeable to Eval/treatment? Yes Yes    Does pt have pain? 5/10 abdominal pain Pt reported 4/10 abdominal pain     Bed Mobility  Rolling: NT  Supine to sit: MaxA x 2  Sit to supine: MaxA x 2  Scooting: MaxA Rolling: ModA  Supine to sit: ModA x 2  Sit to supine: NT  Scooting: MaxA Hany   Transfers Sit to stand: ModA x 2  Stand to sit: ModA x 2  Stand pivot: NT Sit to stand: MaxA  Stand to sit: MaxA  Stand pivot: MaxA with [employer] Hany with [employer]   Ambulation   3 feet with MaxA x 2 with  [employer] 3 feet with MaxA with [employer] >75 feet with Hany with [employer]   Stair negotiation: ascended and descended NT NT >2 steps with 1 rail Hany   ROM BUE:  Defer to OT note  BLE:  WNL     Strength BUE:  Defer to OT note  BLE:  3+ to 4-/5  Increase by 1/3 MMT grade   Balance Sitting EOB:  MaxA  Dynamic Standing:  MaxA x 2 with [employer] Sitting: ModA  Standing: MaxA with [employer] Sitting EOB:  SBA  Dynamic Standing:  Hany with [employer]     Pt is A & O x 4  CAM-ICU: Positive  RASS: -1  Sensation:  WNL  Edema:   WNL    Vitals:  Heart Rate at rest 81 bpm Heart Rate post session 83 bpm   SpO2 at rest 100% SpO2 post session 94%   Blood Pressure at rest 134/91 mmHg Blood Pressure post session 127/98 mmHg     Functional Status Score-Intensive Care Unit (FSS-ICU)   Rolling 3/7   Supine to sit transfer 2/7   Unsupported sitting  3/7   Sit to stand transfers 2/7   Ambulation 1/7   Total  11/35       Therapeutic Exercises:  AAROM BLE knee ext x 5 reps, B hip flexion x 5 reps    Patient education  Pt educated on safety    Patient response to education:   Pt verbalized understanding Pt demonstrated skill Pt requires further education in this area   x partial x     ASSESSMENT:    Comments:  RN reported pt was stable for session. Pt was supine in bed upon arrival, agreeable to treatment session. Pt continues to exhibited cognitive deficits that limit activity. Pt stood with flexed posture and a posterior lean - unable to assist.  Attempted to cue pt for BLE advancement and weight shifting but unable. Pt was assisted to chair. All needs met and call light in reach. Treatment:  Patient practiced and was instructed in the following treatment:     Bed mobility training - pt given verbal and tactile cues to facilitate proper sequencing and safety during supine>sit as well as provided with physical assistance to complete task    Sitting EOB for >8 minutes minutes for upright tolerance, postural awareness and BLE ROM   Transfer training - pt was given verbal and tactile cues to facilitate proper hand placement, technique and safety during sit to stand and stand to sit as well as provided with physical assistance to complete task.  Gait training- pt was given verbal and tactile cues to facilitate upright posture, weight shifting, anterior lean and safety as well as provided with physical assistance to complete task. PLAN:    Patient is making limited progress towards established goals. Will continue with current POC.       Time in  1310  Time out  1340    Total Treatment Time 30 minutes     CPT codes:  []

## 2020-02-27 NOTE — CARE COORDINATION
Both ltacs verified that pt does not meet criteria per her health ins co. Plan at this time is for [facility] when medically stable and off TPN.

## 2020-02-27 NOTE — PLAN OF CARE
Problem: Risk for Impaired Skin Integrity  Goal: Tissue integrity - skin and mucous membranes  Description  Structural intactness and normal physiological function of skin and  mucous membranes.   Outcome: Met This Shift     Problem: Falls - Risk of:  Goal: Will remain free from falls  Description  Will remain free from falls  Outcome: Met This Shift  Goal: Absence of physical injury  Description  Absence of physical injury  Outcome: Met This Shift     Problem: Musculor/Skeletal Functional Status  Goal: Absence of falls  Outcome: Met This Shift     Problem: Confusion - Acute:  Goal: Absence of continued neurological deterioration signs and symptoms  Description  Absence of continued neurological deterioration signs and symptoms  Outcome: Met This Shift     Problem: Injury - Risk of, Physical Injury:  Goal: Will remain free from falls  Description  Will remain free from falls  Outcome: Met This Shift  Goal: Absence of physical injury  Description  Absence of physical injury  Outcome: Met This Shift     Problem: Mood - Altered:  Goal: Mood stable  Description  Mood stable  Outcome: Met This Shift  Goal: Absence of abusive behavior  Description  Absence of abusive behavior  Outcome: Met This Shift  Goal: Verbalizations of feeling emotionally comfortable while being cared for will increase  Description  Verbalizations of feeling emotionally comfortable while being cared for will increase  Outcome: Met This Shift     Problem: Psychomotor Activity - Altered:  Goal: Absence of psychomotor disturbance signs and symptoms  Description  Absence of psychomotor disturbance signs and symptoms  Outcome: Met This Shift     Problem: Sensory Perception - Impaired:  Goal: Demonstrations of improved sensory functioning will increase  Description  Demonstrations of improved sensory functioning will increase  Outcome: Met This Shift  Goal: Decrease in sensory misperception frequency  Description  Decrease in sensory misperception frequency  Outcome: Met This Shift  Goal: Able to refrain from responding to false sensory perceptions  Description  Able to refrain from responding to false sensory perceptions  Outcome: Met This Shift  Goal: Demonstrates accurate environmental perceptions  Description  Demonstrates accurate environmental perceptions  Outcome: Met This Shift  Goal: Able to distinguish between reality-based and nonreality-based thinking  Description  Able to distinguish between reality-based and nonreality-based thinking  Outcome: Met This Shift  Goal: Able to interrupt nonreality-based thinking  Description  Able to interrupt nonreality-based thinking  Outcome: Met This Shift     Problem: Sleep Pattern Disturbance:  Goal: Appears well-rested  Description  Appears well-rested  Outcome: Met This Shift     Problem: Musculor/Skeletal Functional Status  Goal: Highest potential functional level  Outcome: Not Met This Shift     Problem: Pain:  Goal: Pain level will decrease  Description  Pain level will decrease  Outcome: Not Met This Shift  Goal: Control of acute pain  Description  Control of acute pain  Outcome: Not Met This Shift  Goal: Control of chronic pain  Description  Control of chronic pain  Outcome: Not Met This Shift     Problem: Confusion - Acute:  Goal: Mental status will be restored to baseline  Description  Mental status will be restored to baseline  Outcome: Not Met This Shift     Problem: Discharge Planning:  Goal: Ability to perform activities of daily living will improve  Description  Ability to perform activities of daily living will improve  Outcome: Not Met This Shift  Goal: Participates in care planning  Description  Participates in care planning  Outcome: Not Met This Shift

## 2020-02-27 NOTE — PROGRESS NOTES
safety; verbal and tactile cues for hand placement and sequencing) and functional mobility (few steps with ww; flexed posture, posterior lean; max verbal and tactile cues for posture, ww management and sequencing; addressing endurance, body mechanics, posture, postioning and safety). Therapist facilitated ADL retraining tasks (LB dressing (socks), UB dressing (gown), self feeding x2 and unsupported seated grooming task EOB; addressing sequencing, activity tolerance, body mechanics, postioning and safety). At end of session seated in bedside chair all lines and tubes intact, call light within reach. · Pt has made fair progress towards set goals.    · Continue with current plan of care      Treatment Time In: 1320            Treatment Time Out: 2689         Treatment Charges: Mins Units   Ther Ex  [id number]     Manual Therapy Jodi Saunders [id number] 15 1   ADL/Home Mgt [id number] 12 1   Neuro Re-ed [id number]     Group Therapy      Orthotic manage/training  98730     Non-Billable Time     Total Timed Treatment 27 2       [facility] OTR/L #[id number]

## 2020-02-27 NOTE — PROGRESS NOTES
[facility]    Subjective:  Pt seen this am on cl liquid diet      Current Facility-Administered Medications:     PN-Adult  3-in-1 Central Line (Custom), , Intravenous, Continuous TPN, Manuelito Lunsford MD    trimethobenzamide Raydell Cordon) injection 200 mg, 200 mg, Intramuscular, Q6H PRN, Gracie Cheatham MD, 200 mg at 02/26/20 0242    medicated lip balm (BLISTEX/CARMEX) stick, , Topical, PRN, Gracie Cheatham MD    sodium chloride flush 0.9 % injection 10 mL, 10 mL, Intravenous, PRN, Clara Su MD, 10 mL at 02/27/20 1303    heparin flush 100 UNIT/ML injection 300 Units, 3 mL, Intravenous, 2 times per day, Clara Su MD    heparin flush 100 UNIT/ML injection 300 Units, 3 mL, Intracatheter, PRN, Clara Su MD    PN-Adult  3-in-1 Central Line (Custom), , Intravenous, Continuous TPN, Clara Su MD, Last Rate: 31.3 mL/hr at 02/26/20 1722    morphine (PF) injection 2 mg, 2 mg, Intravenous, Q3H PRN, 2 mg at 02/27/20 0912 **OR** morphine sulfate (PF) injection 3 mg, 3 mg, Intravenous, Q3H PRN, Clara Su MD, 3 mg at 02/27/20 1300    sotalol (BETAPACE) tablet 120 mg, 120 mg, Oral, BID, Rocky Mcmahan DO, 120 mg at 02/27/20 0935    buPROPion [facility]) extended release tablet 100 mg, 100 mg, Oral, Daily, Clara Su MD, 100 mg at 02/27/20 0935    sennosides (SENOKOT) 8.8 MG/5ML syrup 5 mL, 5 mL, Oral, Nightly, Gracie Cheatham MD, 5 mL at 02/26/20 2101    docusate sodium (COLACE) 150 MG/15ML liquid 100 mg, 100 mg, Oral, BID, Gracie Cheatham MD, 100 mg at 02/27/20 6056    melatonin tablet 6 mg, 6 mg, Oral, Nightly, Clara Su MD, 6 mg at 02/26/20 2100    amLODIPine (NORVASC) tablet 5 mg, 5 mg, Oral, Daily, Rocky Mcmahan DO, 5 mg at 02/27/20 0935    ipratropium-albuterol (DUONEB) nebulizer solution 1 ampule, 1 ampule, Inhalation, Q4H WA, Dwayne Winter DO, 1 ampule at 02/27/20 1206    dextrose 50 % IV solution, 12.5 g, Intravenous, PRN, Elvia Harry DO    bisacodyl (DULCOLAX) suppository 10 mg, 10 mg, Rectal, Daily, Hector Corbin MD, 10 mg at 02/27/20 0938    enoxaparin (LOVENOX) injection 60 mg, 1 mg/kg, Subcutaneous, BID, Hector Corbin MD, 60 mg at 02/27/20 0953    hydrALAZINE (APRESOLINE) injection 10 mg, 10 mg, Intravenous, Q30 Min PRN, Hector Corbin MD, 10 mg at 02/26/20 0405    labetalol (NORMODYNE;TRANDATE) injection 10 mg, 10 mg, Intravenous, Q30 Min PRN, Hector Corbin MD, 10 mg at 02/25/20 1833    glucose (GLUTOSE) 40 % oral gel 15 g, 15 g, Oral, PRN, Hector Corbin MD    glucagon (rDNA) injection 1 mg, 1 mg, Intramuscular, PRN, Hector Corbin MD    dextrose 5 % solution, 100 mL/hr, Intravenous, PRN, Hector Corbin MD    levothyroxine (SYNTHROID) tablet 100 mcg, 100 mcg, Oral, Daily, Hector Corbin MD, 100 mcg at 02/27/20 0935    sodium chloride flush 0.9 % injection 10 mL, 10 mL, Intravenous, 2 times per day, Jojo Borges DO, 10 mL at 02/26/20 2101    Objective:    BP (!) 168/77   Pulse 81   Temp 98 °F (36.7 °C) (Tympanic)   Resp 20   Ht 5' 6\" (1.676 m)   Wt 122 lb 12.7 oz (55.7 kg)   SpO2 100%   BMI 19.82 kg/m²     Heart:  Reg  Lungs:  CTA bilaterally, no wheeze, rales or rhonchi  Abd: bowel sounds present, tender to palp, min distended  Extrem:  No clubbing, cyanosis, or edema    CBC with Differential:    Lab Results   Component Value Date    WBC 7.3 02/27/2020    RBC 2.68 02/27/2020    HGB 8.2 02/27/2020    HCT 26.6 02/27/2020     02/27/2020    MCV 99.3 02/27/2020    MCH 30.6 02/27/2020    MCHC 30.8 02/27/2020    RDW 15.2 02/27/2020    NRBC 1.7 02/23/2020    METASPCT 0.9 02/21/2020    LYMPHOPCT 8.7 02/27/2020    MONOPCT 8.7 02/27/2020    MYELOPCT 0.9 02/22/2020    EOSPCT 3 10/07/2010    BASOPCT 0.1 02/27/2020    MONOSABS 0.66 02/27/2020    LYMPHSABS 0.66 02/27/2020    EOSABS 0.00 02/27/2020    BASOSABS 0.00 02/27/2020     CMP:    Lab Results   Component Value Date     02/27/2020    K 4.1 02/27/2020    K 4.0 02/20/2020     02/27/2020    CO2 27 02/27/2020    BUN 16 02/27/2020    CREATININE 0.7 02/27/2020    GFRAA >60 02/27/2020    LABGLOM >60 02/27/2020    GLUCOSE 152 02/27/2020    GLUCOSE 102 10/07/2010    PROT 5.2 02/27/2020    LABALBU 2.2 02/27/2020    LABALBU 4.5 10/07/2010    CALCIUM 7.6 02/27/2020    BILITOT 1.2 02/27/2020    ALKPHOS 68 02/27/2020    AST 30 02/27/2020     02/27/2020     Warfarin PT/INR:    Lab Results   Component Value Date    INR 1.0 02/17/2020    INR 1.0 01/20/2020    INR 1.0 01/07/2020    PROTIME 11.8 02/17/2020    PROTIME 11.2 01/20/2020    PROTIME 11.0 01/07/2020       Assessment:    Principal Problem:    SBO (small bowel obstruction) (HCC)  Active Problems:    Essential hypertension    Depression    PAD (peripheral artery disease) (HCC)    Acquired hypothyroidism    Cecal volvulus (HCC)    ROSELYN (acute kidney injury) ([address]    Shock liver    Severe protein-calorie malnutrition (HCC)    Atherosclerosis of aortic bifurcation and common iliac arteries (HCC)    CAD (coronary artery disease)    History of bilateral carotid artery stenosis    Paroxysmal atrial fibrillation with rapid ventricular response ([address]  Resolved Problems:    * No resolved hospital problems.  *      Plan:  Cont diet and ng tube and tpn per surgery / transfer out of icu / increase activity / dc planning to [facility] once medically/surgically stable        Ting Gastelum  2:12 PM  2/27/2020

## 2020-02-28 LAB
ALBUMIN SERPL-MCNC: 2.1 G/DL (ref 3.5–5.2)
ALP BLD-CCNC: 64 U/L (ref 35–104)
ALT SERPL-CCNC: 141 U/L (ref 0–32)
ANION GAP SERPL CALCULATED.3IONS-SCNC: 10 MMOL/L (ref 7–16)
AST SERPL-CCNC: 28 U/L (ref 0–31)
BASOPHILS ABSOLUTE: 0.05 E9/L (ref 0–0.2)
BASOPHILS RELATIVE PERCENT: 0.9 % (ref 0–2)
BILIRUB SERPL-MCNC: 0.8 MG/DL (ref 0–1.2)
BODY FLUID CULTURE, STERILE: NORMAL
BUN BLDV-MCNC: 23 MG/DL (ref 8–23)
CALCIUM IONIZED: 1.17 MMOL/L (ref 1.15–1.33)
CALCIUM SERPL-MCNC: 7.8 MG/DL (ref 8.6–10.2)
CHLORIDE BLD-SCNC: 104 MMOL/L (ref 98–107)
CO2: 27 MMOL/L (ref 22–29)
CREAT SERPL-MCNC: 0.7 MG/DL (ref 0.5–1)
EKG ATRIAL RATE: 71 BPM
EKG P AXIS: 94 DEGREES
EKG P-R INTERVAL: 118 MS
EKG Q-T INTERVAL: 408 MS
EKG QRS DURATION: 82 MS
EKG QTC CALCULATION (BAZETT): 443 MS
EKG R AXIS: 84 DEGREES
EKG T AXIS: 83 DEGREES
EKG VENTRICULAR RATE: 71 BPM
EOSINOPHILS ABSOLUTE: 0.05 E9/L (ref 0.05–0.5)
EOSINOPHILS RELATIVE PERCENT: 0.9 % (ref 0–6)
GFR AFRICAN AMERICAN: >60
GFR NON-AFRICAN AMERICAN: >60 ML/MIN/1.73
GLUCOSE BLD-MCNC: 131 MG/DL (ref 74–99)
GRAM STAIN RESULT: NORMAL
HCT VFR BLD CALC: 26.3 % (ref 34–48)
HEMOGLOBIN: 8 G/DL (ref 11.5–15.5)
HYPOCHROMIA: ABNORMAL
LYMPHOCYTES ABSOLUTE: 0.33 E9/L (ref 1.5–4)
LYMPHOCYTES RELATIVE PERCENT: 6.1 % (ref 20–42)
MAGNESIUM: 2.1 MG/DL (ref 1.6–2.6)
MCH RBC QN AUTO: 30 PG (ref 26–35)
MCHC RBC AUTO-ENTMCNC: 30.4 % (ref 32–34.5)
MCV RBC AUTO: 98.5 FL (ref 80–99.9)
METER GLUCOSE: 124 MG/DL (ref 74–99)
METER GLUCOSE: 134 MG/DL (ref 74–99)
MONOCYTES ABSOLUTE: 0.44 E9/L (ref 0.1–0.95)
MONOCYTES RELATIVE PERCENT: 7.9 % (ref 2–12)
NEUTROPHILS ABSOLUTE: 4.62 E9/L (ref 1.8–7.3)
NEUTROPHILS RELATIVE PERCENT: 84.2 % (ref 43–80)
PDW BLD-RTO: 15.1 FL (ref 11.5–15)
PHOSPHORUS: 2.9 MG/DL (ref 2.5–4.5)
PLATELET # BLD: 421 E9/L (ref 130–450)
PMV BLD AUTO: 9.6 FL (ref 7–12)
POLYCHROMASIA: ABNORMAL
POTASSIUM SERPL-SCNC: 4 MMOL/L (ref 3.5–5)
RBC # BLD: 2.67 E12/L (ref 3.5–5.5)
SODIUM BLD-SCNC: 141 MMOL/L (ref 132–146)
TOTAL PROTEIN: 5.2 G/DL (ref 6.4–8.3)
WBC # BLD: 5.5 E9/L (ref 4.5–11.5)

## 2020-02-28 PROCEDURE — 2500000003 HC RX 250 WO HCPCS: Performed by: STUDENT IN AN ORGANIZED HEALTH CARE EDUCATION/TRAINING PROGRAM

## 2020-02-28 PROCEDURE — 82962 GLUCOSE BLOOD TEST: CPT

## 2020-02-28 PROCEDURE — 6370000000 HC RX 637 (ALT 250 FOR IP): Performed by: STUDENT IN AN ORGANIZED HEALTH CARE EDUCATION/TRAINING PROGRAM

## 2020-02-28 PROCEDURE — 94640 AIRWAY INHALATION TREATMENT: CPT

## 2020-02-28 PROCEDURE — 2580000003 HC RX 258: Performed by: STUDENT IN AN ORGANIZED HEALTH CARE EDUCATION/TRAINING PROGRAM

## 2020-02-28 PROCEDURE — 84100 ASSAY OF PHOSPHORUS: CPT

## 2020-02-28 PROCEDURE — 94760 N-INVAS EAR/PLS OXIMETRY 1: CPT

## 2020-02-28 PROCEDURE — 36415 COLL VENOUS BLD VENIPUNCTURE: CPT

## 2020-02-28 PROCEDURE — 2060000000 HC ICU INTERMEDIATE R&B

## 2020-02-28 PROCEDURE — 6360000002 HC RX W HCPCS: Performed by: STUDENT IN AN ORGANIZED HEALTH CARE EDUCATION/TRAINING PROGRAM

## 2020-02-28 PROCEDURE — 82330 ASSAY OF CALCIUM: CPT

## 2020-02-28 PROCEDURE — 85025 COMPLETE CBC W/AUTO DIFF WBC: CPT

## 2020-02-28 PROCEDURE — 80053 COMPREHEN METABOLIC PANEL: CPT

## 2020-02-28 PROCEDURE — 2700000000 HC OXYGEN THERAPY PER DAY

## 2020-02-28 PROCEDURE — 97530 THERAPEUTIC ACTIVITIES: CPT

## 2020-02-28 PROCEDURE — 83735 ASSAY OF MAGNESIUM: CPT

## 2020-02-28 RX ADMIN — SODIUM CHLORIDE, PRESERVATIVE FREE 300 UNITS: 5 INJECTION INTRAVENOUS at 20:58

## 2020-02-28 RX ADMIN — SODIUM CHLORIDE, PRESERVATIVE FREE 300 UNITS: 5 INJECTION INTRAVENOUS at 08:35

## 2020-02-28 RX ADMIN — LABETALOL HYDROCHLORIDE 10 MG: 5 INJECTION INTRAVENOUS at 19:09

## 2020-02-28 RX ADMIN — MORPHINE SULFATE 2 MG: 2 INJECTION, SOLUTION INTRAMUSCULAR; INTRAVENOUS at 11:30

## 2020-02-28 RX ADMIN — CALCIUM GLUCONATE: 98 INJECTION, SOLUTION INTRAVENOUS at 19:06

## 2020-02-28 RX ADMIN — MORPHINE SULFATE 3 MG: 4 INJECTION, SOLUTION INTRAMUSCULAR; INTRAVENOUS at 06:39

## 2020-02-28 RX ADMIN — MORPHINE SULFATE 3 MG: 4 INJECTION, SOLUTION INTRAMUSCULAR; INTRAVENOUS at 02:47

## 2020-02-28 RX ADMIN — MORPHINE SULFATE 3 MG: 4 INJECTION, SOLUTION INTRAMUSCULAR; INTRAVENOUS at 16:25

## 2020-02-28 RX ADMIN — IPRATROPIUM BROMIDE AND ALBUTEROL SULFATE 1 AMPULE: 2.5; .5 SOLUTION RESPIRATORY (INHALATION) at 21:29

## 2020-02-28 RX ADMIN — BUPROPION HYDROCHLORIDE 100 MG: 100 TABLET, EXTENDED RELEASE ORAL at 08:30

## 2020-02-28 RX ADMIN — IPRATROPIUM BROMIDE AND ALBUTEROL SULFATE 1 AMPULE: 2.5; .5 SOLUTION RESPIRATORY (INHALATION) at 07:31

## 2020-02-28 RX ADMIN — MELATONIN 3 MG ORAL TABLET 6 MG: 3 TABLET ORAL at 20:58

## 2020-02-28 RX ADMIN — SOTALOL HYDROCHLORIDE 120 MG: 120 TABLET ORAL at 08:30

## 2020-02-28 RX ADMIN — Medication 10 ML: at 19:10

## 2020-02-28 RX ADMIN — ENOXAPARIN SODIUM 60 MG: 60 INJECTION SUBCUTANEOUS at 08:24

## 2020-02-28 RX ADMIN — BISACODYL 10 MG: 10 SUPPOSITORY RECTAL at 08:35

## 2020-02-28 RX ADMIN — SODIUM CHLORIDE, PRESERVATIVE FREE 10 ML: 5 INJECTION INTRAVENOUS at 02:48

## 2020-02-28 RX ADMIN — LEVOTHYROXINE SODIUM 100 MCG: 0.1 TABLET ORAL at 06:39

## 2020-02-28 RX ADMIN — ENOXAPARIN SODIUM 60 MG: 60 INJECTION SUBCUTANEOUS at 20:57

## 2020-02-28 RX ADMIN — SOTALOL HYDROCHLORIDE 120 MG: 120 TABLET ORAL at 20:58

## 2020-02-28 RX ADMIN — MORPHINE SULFATE 2 MG: 2 INJECTION, SOLUTION INTRAMUSCULAR; INTRAVENOUS at 20:00

## 2020-02-28 RX ADMIN — AMLODIPINE BESYLATE 5 MG: 5 TABLET ORAL at 08:30

## 2020-02-28 RX ADMIN — IPRATROPIUM BROMIDE AND ALBUTEROL SULFATE 1 AMPULE: 2.5; .5 SOLUTION RESPIRATORY (INHALATION) at 11:06

## 2020-02-28 RX ADMIN — SODIUM CHLORIDE, PRESERVATIVE FREE 10 ML: 5 INJECTION INTRAVENOUS at 16:26

## 2020-02-28 RX ADMIN — DOCUSATE SODIUM 100 MG: 50 LIQUID ORAL at 08:39

## 2020-02-28 RX ADMIN — DOCUSATE SODIUM 100 MG: 50 LIQUID ORAL at 20:58

## 2020-02-28 ASSESSMENT — PAIN DESCRIPTION - PAIN TYPE
TYPE: ACUTE PAIN;SURGICAL PAIN
TYPE: SURGICAL PAIN;ACUTE PAIN

## 2020-02-28 ASSESSMENT — PAIN SCALES - GENERAL
PAINLEVEL_OUTOF10: 6
PAINLEVEL_OUTOF10: 0
PAINLEVEL_OUTOF10: 4
PAINLEVEL_OUTOF10: 0
PAINLEVEL_OUTOF10: 9
PAINLEVEL_OUTOF10: 7
PAINLEVEL_OUTOF10: 0
PAINLEVEL_OUTOF10: 8

## 2020-02-28 ASSESSMENT — PAIN DESCRIPTION - ORIENTATION
ORIENTATION: MID
ORIENTATION: MID

## 2020-02-28 ASSESSMENT — PAIN DESCRIPTION - LOCATION
LOCATION: ABDOMEN
LOCATION: ABDOMEN

## 2020-02-28 ASSESSMENT — PAIN DESCRIPTION - PROGRESSION: CLINICAL_PROGRESSION: NOT CHANGED

## 2020-02-28 ASSESSMENT — PAIN DESCRIPTION - DESCRIPTORS
DESCRIPTORS: DISCOMFORT
DESCRIPTORS: SORE;DULL;DISCOMFORT

## 2020-02-28 ASSESSMENT — PAIN SCALES - WONG BAKER
WONGBAKER_NUMERICALRESPONSE: 0
WONGBAKER_NUMERICALRESPONSE: 0

## 2020-02-28 ASSESSMENT — PAIN DESCRIPTION - FREQUENCY: FREQUENCY: CONTINUOUS

## 2020-02-28 ASSESSMENT — PAIN - FUNCTIONAL ASSESSMENT: PAIN_FUNCTIONAL_ASSESSMENT: PREVENTS OR INTERFERES WITH MANY ACTIVE NOT PASSIVE ACTIVITIES

## 2020-02-28 ASSESSMENT — PAIN DESCRIPTION - ONSET: ONSET: ON-GOING

## 2020-02-28 NOTE — PLAN OF CARE
Problem: Falls - Risk of:  Goal: Will remain free from falls  Description  Will remain free from falls  2/28/2020 1359 by Ruby Mittal RN  Outcome: Met This Shift  2/28/2020 0529 by Kd Anderson RN  Outcome: Met This Shift  Goal: Absence of physical injury  Description  Absence of physical injury  Outcome: Met This Shift     Problem: Musculor/Skeletal Functional Status  Goal: Absence of falls  2/28/2020 1359 by Ruby Mittal RN  Outcome: Met This Shift  2/28/2020 0529 by Kd Anderson RN  Outcome: Met This Shift     Problem: Pain:  Goal: Control of acute pain  Description  Control of acute pain  2/28/2020 1359 by Ruby Mittal RN  Outcome: Met This Shift  2/28/2020 0529 by Kd Anderson RN  Outcome: Met This Shift     Problem: Malnutrition  (NI-5.2)  Goal: Food and/or Nutrient Delivery  Description  Individualized approach for food/nutrient provision.   2/28/2020 1151 by Talita Luis RD, LD  Outcome: Met This Shift     Problem: Confusion - Acute:  Goal: Absence of continued neurological deterioration signs and symptoms  Description  Absence of continued neurological deterioration signs and symptoms  2/28/2020 0529 by Kd Anderson RN  Outcome: Met This Shift     Problem: Injury - Risk of, Physical Injury:  Goal: Will remain free from falls  Description  Will remain free from falls  2/28/2020 1359 by Ruby Mittal RN  Outcome: Met This Shift  2/28/2020 0529 by Kd Anderson RN  Outcome: Met This Shift  Goal: Absence of physical injury  Description  Absence of physical injury  Outcome: Met This Shift     Problem: Mood - Altered:  Goal: Mood stable  Description  Mood stable  Outcome: Met This Shift  Goal: Absence of abusive behavior  Description  Absence of abusive behavior  Outcome: Met This Shift  Goal: Verbalizations of feeling emotionally comfortable while being cared for will increase  Description  Verbalizations of feeling emotionally comfortable while being cared for will increase  Outcome: Met This Shift     Problem: Psychomotor Activity - Altered:  Goal: Absence of psychomotor disturbance signs and symptoms  Description  Absence of psychomotor disturbance signs and symptoms  Outcome: Met This Shift     Problem: Sensory Perception - Impaired:  Goal: Demonstrations of improved sensory functioning will increase  Description  Demonstrations of improved sensory functioning will increase  Outcome: Met This Shift  Goal: Decrease in sensory misperception frequency  Description  Decrease in sensory misperception frequency  Outcome: Met This Shift  Goal: Able to refrain from responding to false sensory perceptions  Description  Able to refrain from responding to false sensory perceptions  Outcome: Met This Shift  Goal: Demonstrates accurate environmental perceptions  Description  Demonstrates accurate environmental perceptions  Outcome: Met This Shift  Goal: Able to distinguish between reality-based and nonreality-based thinking  Description  Able to distinguish between reality-based and nonreality-based thinking  Outcome: Met This Shift  Goal: Able to interrupt nonreality-based thinking  Description  Able to interrupt nonreality-based thinking  Outcome: Met This Shift

## 2020-02-28 NOTE — PROGRESS NOTES
GENERAL SURGERY  DAILY PROGRESS NOTE  2/28/2020    Subjective:  Begging for water this AM. Denies nausea, vomiting. Denies BM or flatus    Objective:  BP (!) 151/65   Pulse 79   Temp 98.2 °F (36.8 °C) (Oral)   Resp 21   Ht 5' 6\" (1.676 m)   Wt 114 lb 4.8 oz (51.8 kg)   SpO2 98%   BMI 18.45 kg/m²     General: NAD, awake, confused. Lungs: No increased work of breathing. Cardiovascular: RRR. Abdomen: Soft, moderately distension, minimally tender. Incision c/d/i. No rebound, guarding or rigidity. NGT in place with 650 cc dark bilious output  Extremities: Atraumatic, full range of motion  Skin: Warm, dry and intact    Assessment/Plan:  68 y.o. female w/ cecal perforation s/p ex lap w/ right hemicolectomy 2/17 now with ileus. 2/26 R thoracentesis 600 ml serous fluid removed. Pain and nausea control PRN  NPO. NGT decompression, will consider clamping today pending progress  Maintenance IVF's. PICC, TPN. CT abd/pelv with fluid collection in mesentery, likely secondary to recent surgery  PT/OT  DVT ppx. Awaiting bowel function.     Electronically signed by Margret Rodriguez MD on 2/28/2020 at 8:06 AM   Seen/examined  Continue TPN  Remove NG once bowel function returns  Dio

## 2020-02-28 NOTE — PLAN OF CARE
Beba Hodge  Outcome: Not Met This Shift     Problem: Confusion - Acute:  Goal: Absence of continued neurological deterioration signs and symptoms  Description  Absence of continued neurological deterioration signs and symptoms  2/28/2020 0529 by Pia Alvarado RN  Outcome: Met This Shift  2/27/2020 2229 by Jw Serrano RN  Outcome: Not Met This Shift  2/27/2020 1537 by Surya Eugene  Outcome: Met This Shift     Problem: Injury - Risk of, Physical Injury:  Goal: Will remain free from falls  Description  Will remain free from falls  2/28/2020 0529 by Pia Alvarado RN  Outcome: Met This Shift  2/27/2020 2229 by Jw Serrano RN  Outcome: Met This Shift  2/27/2020 1715 by Hai Canales RN  Outcome: Met This Shift  2/27/2020 1537 by Surya Eugene  Outcome: Met This Shift  Goal: Absence of physical injury  Description  Absence of physical injury  2/27/2020 2229 by Jw Serrano RN  Outcome: Met This Shift  2/27/2020 1715 by Hai Canales RN  Outcome: Met This Shift  2/27/2020 1537 by Surya Eugene  Outcome: Met This Shift     Problem: Mood - Altered:  Goal: Absence of abusive behavior  Description  Absence of abusive behavior  2/27/2020 2229 by Jw Serrano RN  Outcome: Met This Shift  2/27/2020 1537 by Surya Eugene  Outcome: Met This Shift  Goal: Verbalizations of feeling emotionally comfortable while being cared for will increase  Description  Verbalizations of feeling emotionally comfortable while being cared for will increase  2/27/2020 1537 by Surya Eugene  Outcome: Met This Shift     Problem: Psychomotor Activity - Altered:  Goal: Absence of psychomotor disturbance signs and symptoms  Description  Absence of psychomotor disturbance signs and symptoms  2/27/2020 1537 by Surya Eugene  Outcome: Met This Shift     Problem: Sensory Perception - Impaired:  Goal: Demonstrations of improved sensory functioning will

## 2020-02-28 NOTE — PROGRESS NOTES
Physical Therapy  Physical Therapy Treatment Note     Name: Adeel Roberts  : 1950  MRN: [id number]    Referring Provider:  Inderjit Flor DO    Date of Service: 2020    Evaluating PT:  Emmanuelle Dukes, PT, DPT [id number]    Room #:  4501/4501-B  Diagnosis:  SBO  Precautions: Falls, NG tube to suction,, O2, NPO, Bed alarm, Incontinence, Abdominal Precautions  Procedure/Surgery:   Exploratory laparotomy, enterolysis, right hemicolectomy with side-to-side, functional end-to-end anastomosis   Thoracentesis  PMHx/PSHx:  CA, CAD, COPD, HLD, HTN, PVD, RA, Thyroid disease, L SFA Angioplasty 19    Equipment Needs:  TBD    SUBJECTIVE:    Pt lives with daughter in a 2 story home with 3 stairs to enter and 2 rail. Full flight of steps and 1 rail to bedroom. Pt ambulated with [facility] and required some assistance for ADLs PTA. OBJECTIVE:   Initial Evaluation  Date: 20 Treatment  20 Short Term/ Long Term   Goals   AM-PAC 6 Clicks  89/04    Was pt agreeable to Eval/treatment? Yes Yes    Does pt have pain? 5/10 abdominal pain Pt reported 8/10 abdominal pain     Bed Mobility  Rolling: NT  Supine to sit: MaxA x 2  Sit to supine: MaxA x 2  Scooting: MaxA Rolling: Mod A  Supine to sit: Max A   Sit to supine: Max A  Scooting: Max A Hany   Transfers Sit to stand: ModA x 2  Stand to sit: ModA x 2  Stand pivot: NT Sit to stand: Max A  Stand to sit: Max A  Stand pivot: NT Hany with [facility]   Ambulation   3 feet with MaxA x 2 with  [facility] 3 feet side stepping to [facility] without AD and Max A  >75 feet with Hany with [facility]   Stair negotiation: ascended and descended NT NT >2 steps with 1 rail Hany   ROM BUE:  Defer to OT note  BLE:  WNL WNL    Strength BUE:  Defer to OT note  BLE:  3+ to 4-/5 BLE: 4-/5 ankle, 3+/5 knee, 3-/5 hip Increase by 1/3 MMT grade   Balance Sitting EOB:  MaxA  Dynamic Standing:  MaxA x 2 with [facility] Sitting: Mod A  Standing:  Max A  Sitting EOB:  SBA  Dynamic Standing:  Hany with [facility]     Pt is A & O x was instructed in the following treatment:     Bed mobility training - pt given verbal and tactile cues to facilitate proper sequencing and safety during supine<>sit as well as provided with physical assistance to complete task    Sitting EOB for >15 minutes minutes for upright tolerance, postural awareness, balance retraining and TE.    Transfer training - pt was given verbal and tactile cues to facilitate proper hand placement, technique and safety during sit to stand and stand to sit as well as provided with physical assistance to complete task.  Standing balance retraining- pt educated on upright posture, correcting posterior lean and performed weight shifts to R/L with total assist to complete.  Gait training- pt was given verbal and tactile cues to facilitate upright posture, assist required for weight shifting, BLE advancement and balance. PLAN:    Patient is making limited progress towards established goals. Will continue with current POC.       Time in  0910  Time out  0940    Total Treatment Time 30 minutes     CPT codes:  [] Gait training 56210 - minutes  [] Manual therapy 25003 - minutes  [x] Therapeutic activities 61889 30 minutes  [] Therapeutic exercises 61548 - minutes  [] Neuromuscular reeducation 87263 - minutes      Alisson Cabral, PT, DPT  License [id number]

## 2020-02-28 NOTE — PLAN OF CARE
Problem: Risk for Impaired Skin Integrity  Goal: Tissue integrity - skin and mucous membranes  Description  Structural intactness and normal physiological function of skin and  mucous membranes.   2/27/2020 2229 by Alex Munoz RN  Outcome: Met This Shift  2/27/2020 1715 by Natalia Valenzuela RN  Outcome: Met This Shift  2/27/2020 1537 by Jonah Milton  Outcome: Met This Shift     Problem: Falls - Risk of:  Goal: Will remain free from falls  Description  Will remain free from falls  2/27/2020 2229 by Alex Munoz RN  Outcome: Met This Shift  2/27/2020 1715 by Natalia Valenzuela RN  Outcome: Met This Shift  2/27/2020 1537 by Jonah Milton  Outcome: Met This Shift  Goal: Absence of physical injury  Description  Absence of physical injury  2/27/2020 2229 by Alex Munoz RN  Outcome: Met This Shift  2/27/2020 1715 by Natalia Valenzuela RN  Outcome: Met This Shift  2/27/2020 1537 by Tanhalie Milton  Outcome: Met This Shift     Problem: Musculor/Skeletal Functional Status  Goal: Absence of falls  2/27/2020 2229 by Alex Munoz RN  Outcome: Met This Shift  2/27/2020 1537 by Tanhalie Milton  Outcome: Met This Shift     Problem: Injury - Risk of, Physical Injury:  Goal: Will remain free from falls  Description  Will remain free from falls  2/27/2020 2229 by Alex Munoz RN  Outcome: Met This Shift  2/27/2020 1715 by Natalia Valenzuela RN  Outcome: Met This Shift  2/27/2020 1537 by Jonah Milton  Outcome: Met This Shift  Goal: Absence of physical injury  Description  Absence of physical injury  2/27/2020 2229 by Alex Munoz RN  Outcome: Met This Shift  2/27/2020 1715 by Natalia Valenzuela RN  Outcome: Met This Shift  2/27/2020 1537 by Jonah Milton  Outcome: Met This Shift     Problem: Mood - Altered:  Goal: Absence of abusive behavior  Description  Absence of abusive behavior  2/27/2020 2229 by Alex Munoz RN  Outcome: Met This Shift  2/27/2020 1537 by Kal Holder Problem: Musculor/Skeletal Functional Status  Goal: Highest potential functional level  2/27/2020 2229 by Joao Nichols RN  Outcome: Not Met This Shift  2/27/2020 1537 by Bonifacio Perry  Outcome: Not Met This Shift     Problem: Pain:  Goal: Pain level will decrease  Description  Pain level will decrease  2/27/2020 2229 by Joao Nichols RN  Outcome: Not Met This Shift  2/27/2020 1537 by Bonifacio Perry  Outcome: Not Met This Shift  Goal: Control of acute pain  Description  Control of acute pain  2/27/2020 2229 by Joao Nichols RN  Outcome: Not Met This Shift  2/27/2020 1537 by Bonifacio Perry  Outcome: Not Met This Shift  Goal: Control of chronic pain  Description  Control of chronic pain  2/27/2020 2229 by Joao Nichols RN  Outcome: Not Met This Shift  2/27/2020 1537 by Bonifacio Perry  Outcome: Not Met This Shift     Problem: Confusion - Acute:  Goal: Absence of continued neurological deterioration signs and symptoms  Description  Absence of continued neurological deterioration signs and symptoms  2/27/2020 2229 by Joao Nichols RN  Outcome: Not Met This Shift  2/27/2020 1537 by Bonifacio Perry  Outcome: Met This Shift  Goal: Mental status will be restored to baseline  Description  Mental status will be restored to baseline  2/27/2020 2229 by Joao Nichols RN  Outcome: Not Met This Shift  2/27/2020 1537 by Bonifacio Perry  Outcome: Not Met This Shift     Problem: Discharge Planning:  Goal: Ability to perform activities of daily living will improve  Description  Ability to perform activities of daily living will improve  2/27/2020 2229 by Joao Nichols RN  Outcome: Not Met This Shift  2/27/2020 1537 by Bonifacio Perry  Outcome: Not Met This Shift  Goal: Participates in care planning  Description  Participates in care planning  2/27/2020 2229 by Joao Nichols RN  Outcome: Not Met This Shift  2/27/2020 1537 by Bonifacio Perry  Outcome: Not Met This Shift     Problem:

## 2020-02-28 NOTE — PROGRESS NOTES
PROGRESS NOTE       PATIENT PROBLEM LIST:  Principal Problem:    SBO (small bowel obstruction) (HCC)  Active Problems:    Essential hypertension    Depression    PAD (peripheral artery disease) (HCC)    Acquired hypothyroidism    Cecal volvulus (HCC)    ROSELYN (acute kidney injury) ([address]    Shock liver    Severe protein-calorie malnutrition (HCC)    Atherosclerosis of aortic bifurcation and common iliac arteries (HCC)    CAD (coronary artery disease)    History of bilateral carotid artery stenosis    Paroxysmal atrial fibrillation with rapid ventricular response ([address]  Resolved Problems:    * No resolved hospital problems. *      SUBJECTIVE:  Mariel Guzman continues to i variance abdominal discomfort despite an NG tube low suction. Denies nausea, chest discomfort nor palpitations. REVIEW OF SYSTEMS:  General ROS: positive for - fatigue, malaise, and weight loss  Psychological ROS: positive for - anxiety, depression  Ophthalmic ROS: negative for - decreased vision or visual distortion. ENT ROS: negative  Allergy and Immunology ROS: negative  Hematological and Lymphatic ROS: negative  Endocrine: no heat or cold intolerance and no polyphagia, polydipsia, or polyuria  Respiratory ROS: negative for - shortness of breath  Cardiovascular ROS: positive for - irregular heartbeat and rapid heart rate. Gastrointestinal ROS: + Abdominal pain, change in bowel habits, no black or bloody stools  Genito-Urinary ROS: no nocturia, dysuria, trouble voiding, frequency or hematuria  Musculoskeletal ROS: negative for- myalgias, arthralgias, or claudication  Neurological ROS: no TIA or stroke symptoms otherwise no significant change in symptoms or problems since yesterday as documented in previous progress notes.     SCHEDULED MEDICATIONS:   heparin flush  3 mL Intravenous 2 times per day    sotalol  120 mg Oral BID    buPROPion  100 mg Oral Daily    sennosides  5 mL Oral Nightly    docusate sodium  100 mg Oral BID    1430 ml     CBC:   Recent Labs     20  0450 20  0501 20  0515   WBC 13.6* 4.4* 7.3   HGB 7.4* 8.5* 8.2*   HCT 24.3* 27.8* 26.6*    425 423     BMP:  Recent Labs     20  0450 20  0501 20  0515    143 141   K 3.9 3.5 4.1    104 106   CO2 29 28 27   BUN 16 15 16   CREATININE 0.8 0.7 0.7   LABGLOM >60 >60 >60     ABGs:   Lab Results   Component Value Date    PH 7.499 2020    PO2 135.0 2020    PCO2 34.3 2020     INR: No results for input(s): INR in the last 72 hours. PRO-BNP: No results found for: PROBNP   TSH:   Lab Results   Component Value Date    TSH 4.050 2020      Cardiac Injury Profile:   No results for input(s): CKTOTAL, CKMB, TROPONINI in the last 72 hours. Lipid Profile:   Lab Results   Component Value Date    TRIG 110 2020    HDL 76 2019    LDLCALC 76 2019    CHOL 169 2019      Hemoglobin A1C: No components found for: HGBA1C     RAD:   Ct Abdomen Pelvis W Iv Contrast Additional Contrast? None    Addendum Date: 2020    Note is made of a vague lucency in the left iliac bone with some areas of sclerosis and periosteal thickening concerning for a healing fracture. Malignancy with a pathologic fracture is a consideration. Clinical assessment is recommended. ALERT:  THIS IS AN ABNORMAL REPORT    Result Date: 2020  Patient MRN:  [id number] : 1950 Age: 68 years Gender: Female Order Date:  2020 1:45 PM EXAM: CT ABDOMEN PELVIS W IV CONTRAST number of images 319 Contrast. Isovue-370, 110 mL IV Technique: Low-dose CT  acquisition technique included one of following options; 1 . Automated exposure control, 2. Adjustment of MA and or KV according to patient's size or 3. Use of iterative reconstruction. INDICATION:  ab pain ab pain COMPARISON: Previous CTA 2020 FINDINGS: The lung bases demonstrate COPD with minimal atelectasis and pleural effusions in the right lower lobe.  Small hiatal hernia is present with thickening of the GE junction and distended stomach. Liver is of normal architecture. Gallbladder is partially distended. Spleen, pancreas, the adrenals and the kidneys are normal. There is severe vascular calcification stenosis of the aorta and iliac arteries with occlusion of the right SFA. There is diffusely dilated small bowel loops with  one segment of the small bowel loops in the left hemiabdomen measuring nearly 7 cm. Colon is collapsed. Pelvis. Bladder is distended. Colon is collapsed with  poor delineation of the anatomy. High-grade small bowel obstruction with a significantly dilated fluid-filled small bowel loops and stomach and collapsed colon. Surgical intervention is recommended. Infiltrates and pleural effusion in the right lower lobe concerning for aspiration pneumonia. Severe vascular calcification of aorta and iliac arteries with occlusion of right SFA. ALERT:  THIS IS AN ABNORMAL REPORT    Xr Chest Portable    Result Date: 2020  Patient MRN: [id number] : 1950 Age:  68 years Gender: Female Order Date: 2020 5:30 PM Exam: XR CHEST PORTABLE Number of Images: 1 view Indication: Acute abdominal pain. SBO on CT scan 2020, 1557 hours. Comparison: 2008 FINDINGS: Heart and pulmonary vascularity normal. Lungs clear. Costophrenic angles sharp. Normal aorta. No acute cardiopulmonary findings. Xr Abdomen For Ng/og/ne Tube Placement    Result Date: 2020  Patient MRN: [id number] : 1950 Age:  68 years Gender: Female Order Date: 2020 6:45 PM Exam: XR ABDOMEN FOR NG/OG/NE TUBE PLACEMENT Number of Images: 1 views Indication:   Confirmation of course of NG/OG/NE tube and location of tip of tube Confirmation of course of NG/OG/NE tube and location of tip of tube Portable? ->Yes Comparison: None. Findings: Study demonstrates chest and upper abdomen the nasogastric tube is in satisfactory position with the tip in the stomach.  The lung fields are clear there is small right-sided pleural effusion. The abdomen demonstrate colonic distention. Nasogastric tube with the tip in the stomach in satisfactory position. EKG: See Report  Echo: See Report      IMPRESSIONS:  Principal Problem:    SBO (small bowel obstruction) (HCC)  Active Problems:    Essential hypertension    Depression    PAD (peripheral artery disease) (HCC)    Acquired hypothyroidism    Cecal volvulus (HCC)    ROSELYN (acute kidney injury) ([address]    Shock liver    Severe protein-calorie malnutrition (HCC)    Atherosclerosis of aortic bifurcation and common iliac arteries (HCC)    CAD (coronary artery disease)    History of bilateral carotid artery stenosis    Paroxysmal atrial fibrillation with rapid ventricular response ([address]  Resolved Problems:    * No resolved hospital problems. *      RECOMMENDATIONS:  Will increase dosage of sotalol to 120 mg twice daily with hope of suppressing atrial fibrillation. Will need to go back on anticoagulation as soon as thoracentesis has been completed due to her increased risk of potential embolic stroke. I have spent more than 30 critical-care minutes face to face with Sachi Lewis and reviewing notes and laboratory data, with greater than 50% of this time instructing and counseling the patient  face to face regarding my findings and recommendations and I have answered all questions as posed to me by Ms. Elizabeth. Kacie Souza, DO FACP,FACC,FSCAI      NOTE:  This report was transcribed using voice recognition software.   Every effort was made to ensure accuracy; however, inadvertent computerized transcription errors may be present

## 2020-02-28 NOTE — PROGRESS NOTES
02/28/2020    GLUCOSE 131 02/28/2020    GLUCOSE 102 10/07/2010    PROT 5.2 02/28/2020    LABALBU 2.1 02/28/2020    LABALBU 4.5 10/07/2010    CALCIUM 7.8 02/28/2020    BILITOT 0.8 02/28/2020    ALKPHOS 64 02/28/2020    AST 28 02/28/2020     02/28/2020     Warfarin PT/INR:    Lab Results   Component Value Date    INR 1.0 02/17/2020    INR 1.0 01/20/2020    INR 1.0 01/07/2020    PROTIME 11.8 02/17/2020    PROTIME 11.2 01/20/2020    PROTIME 11.0 01/07/2020       Assessment:    Principal Problem:    SBO (small bowel obstruction) (HCC)  Active Problems:    Essential hypertension    Depression    PAD (peripheral artery disease) (HCC)    Acquired hypothyroidism    Cecal volvulus (HCC)    ROSELYN (acute kidney injury) ([facility]    Shock liver    Severe protein-calorie malnutrition (HCC)    Atherosclerosis of aortic bifurcation and common iliac arteries (HCC)    CAD (coronary artery disease)    History of bilateral carotid artery stenosis    Paroxysmal atrial fibrillation with rapid ventricular response ([address]  Resolved Problems:    * No resolved hospital problems.  *      Plan:  Await further rec from surgery re plan ? sm bowel series vs reexploration cont supportive care        Robert Carpio  7:17 AM  2/28/2020

## 2020-02-28 NOTE — PROGRESS NOTES
Nutrition Assessment (Parenteral Nutrition)    Type and Reason for Visit: Reassess    Nutrition Recommendations: Continue NPO, Continue Parenteral Nutrition  Electrolytes WNL at this time w/ noted previous re-feeding risk. Nutrition Assessment: Pt status remains unchanged w/ ongoing NPO status now w/ TPN initiated s/p ex lap R susy 2/2 SBO w/ post-op ileus. Pt further compromised w/ noted severe malnutrition. Continue PN & await bowel function. Malnutrition Assessment:  · Malnutrition Status: Meets the criteria for severe malnutrition  · Context: Chronic illness  · Findings of the 6 clinical characteristics of malnutrition (Minimum of 2 out of 6 clinical characteristics is required to make the diagnosis of moderate or severe Protein Calorie Malnutrition based on AND/ASPEN Guidelines):  1. Energy Intake-Less than or equal to 75% of estimated energy requirement, Greater than or equal to 3 months    2. Weight Loss-Unable to assess(d/t fluid)   3. Fat Loss-Severe subcutaneous fat loss, Orbital, Triceps  4. Muscle Loss-Severe muscle mass loss, Temples (temporalis muscle), Clavicles (pectoralis and deltoids), Thigh (quadriceps), Calf (gastrocnemius)  5. Fluid Accumulation-No significant fluid accumulation  6.   Strength-Not measured    Nutrition Risk Level: High    Nutrient Needs:  · Estimated Daily Total Kcal: 8949-6902 (MSJ REE 1041 xx 1.3 SF)  · Estimated Daily Protein (g): 75-90(1.5-1.8 g/kg )  · Estimated Daily Total Fluid (ml/day): 6576-4029  (1 ml/kcal)    Nutrition Diagnosis:   · Problem: Severe malnutrition, In context of chronic illness  · Etiology: related to Alteration in GI function(2/2 chronic abd pain)     Signs and symptoms:  as evidenced by Severe muscle loss, Moderate loss of subcutaneous fat, Diet history of poor intake    Objective Information:  · Nutrition-Focused Physical Findings: Pt alert, poor historian/ intermittent confusion noted, appears weak/cachectic, +I/O's, +2/+3 edema, s/p

## 2020-02-29 LAB
ALBUMIN SERPL-MCNC: 2.1 G/DL (ref 3.5–5.2)
ALP BLD-CCNC: 73 U/L (ref 35–104)
ALT SERPL-CCNC: 97 U/L (ref 0–32)
ANION GAP SERPL CALCULATED.3IONS-SCNC: 8 MMOL/L (ref 7–16)
ANISOCYTOSIS: ABNORMAL
AST SERPL-CCNC: 23 U/L (ref 0–31)
BASOPHILS ABSOLUTE: 0.02 E9/L (ref 0–0.2)
BASOPHILS RELATIVE PERCENT: 0.2 % (ref 0–2)
BILIRUB SERPL-MCNC: 0.9 MG/DL (ref 0–1.2)
BUN BLDV-MCNC: 29 MG/DL (ref 8–23)
CALCIUM IONIZED: 1.15 MMOL/L (ref 1.15–1.33)
CALCIUM SERPL-MCNC: 7.9 MG/DL (ref 8.6–10.2)
CHLORIDE BLD-SCNC: 102 MMOL/L (ref 98–107)
CO2: 33 MMOL/L (ref 22–29)
CREAT SERPL-MCNC: 0.7 MG/DL (ref 0.5–1)
EOSINOPHILS ABSOLUTE: 0.02 E9/L (ref 0.05–0.5)
EOSINOPHILS RELATIVE PERCENT: 0.2 % (ref 0–6)
GFR AFRICAN AMERICAN: >60
GFR NON-AFRICAN AMERICAN: >60 ML/MIN/1.73
GLUCOSE BLD-MCNC: 134 MG/DL (ref 74–99)
HCT VFR BLD CALC: 24.7 % (ref 34–48)
HEMOGLOBIN: 7.6 G/DL (ref 11.5–15.5)
HYPOCHROMIA: ABNORMAL
IMMATURE GRANULOCYTES #: 0.06 E9/L
IMMATURE GRANULOCYTES %: 0.6 % (ref 0–5)
LYMPHOCYTES ABSOLUTE: 0.68 E9/L (ref 1.5–4)
LYMPHOCYTES RELATIVE PERCENT: 6.5 % (ref 20–42)
MAGNESIUM: 2.1 MG/DL (ref 1.6–2.6)
MCH RBC QN AUTO: 30.3 PG (ref 26–35)
MCHC RBC AUTO-ENTMCNC: 30.8 % (ref 32–34.5)
MCV RBC AUTO: 98.4 FL (ref 80–99.9)
METER GLUCOSE: 114 MG/DL (ref 74–99)
METER GLUCOSE: 127 MG/DL (ref 74–99)
MONOCYTES ABSOLUTE: 0.93 E9/L (ref 0.1–0.95)
MONOCYTES RELATIVE PERCENT: 8.9 % (ref 2–12)
NEUTROPHILS ABSOLUTE: 8.71 E9/L (ref 1.8–7.3)
NEUTROPHILS RELATIVE PERCENT: 83.6 % (ref 43–80)
OVALOCYTES: ABNORMAL
PDW BLD-RTO: 15 FL (ref 11.5–15)
PHOSPHORUS: 3.6 MG/DL (ref 2.5–4.5)
PLATELET # BLD: 425 E9/L (ref 130–450)
PMV BLD AUTO: 10.1 FL (ref 7–12)
POIKILOCYTES: ABNORMAL
POLYCHROMASIA: ABNORMAL
POTASSIUM SERPL-SCNC: 3.7 MMOL/L (ref 3.5–5)
RBC # BLD: 2.51 E12/L (ref 3.5–5.5)
SODIUM BLD-SCNC: 143 MMOL/L (ref 132–146)
TOTAL PROTEIN: 5.3 G/DL (ref 6.4–8.3)
WBC # BLD: 10.4 E9/L (ref 4.5–11.5)

## 2020-02-29 PROCEDURE — 6360000002 HC RX W HCPCS: Performed by: STUDENT IN AN ORGANIZED HEALTH CARE EDUCATION/TRAINING PROGRAM

## 2020-02-29 PROCEDURE — 2580000003 HC RX 258: Performed by: STUDENT IN AN ORGANIZED HEALTH CARE EDUCATION/TRAINING PROGRAM

## 2020-02-29 PROCEDURE — 84100 ASSAY OF PHOSPHORUS: CPT

## 2020-02-29 PROCEDURE — 80053 COMPREHEN METABOLIC PANEL: CPT

## 2020-02-29 PROCEDURE — 2500000003 HC RX 250 WO HCPCS: Performed by: STUDENT IN AN ORGANIZED HEALTH CARE EDUCATION/TRAINING PROGRAM

## 2020-02-29 PROCEDURE — 6370000000 HC RX 637 (ALT 250 FOR IP): Performed by: STUDENT IN AN ORGANIZED HEALTH CARE EDUCATION/TRAINING PROGRAM

## 2020-02-29 PROCEDURE — 82962 GLUCOSE BLOOD TEST: CPT

## 2020-02-29 PROCEDURE — 82330 ASSAY OF CALCIUM: CPT

## 2020-02-29 PROCEDURE — 83735 ASSAY OF MAGNESIUM: CPT

## 2020-02-29 PROCEDURE — 36415 COLL VENOUS BLD VENIPUNCTURE: CPT

## 2020-02-29 PROCEDURE — 85025 COMPLETE CBC W/AUTO DIFF WBC: CPT

## 2020-02-29 PROCEDURE — 2700000000 HC OXYGEN THERAPY PER DAY

## 2020-02-29 PROCEDURE — 2060000000 HC ICU INTERMEDIATE R&B

## 2020-02-29 PROCEDURE — 94640 AIRWAY INHALATION TREATMENT: CPT

## 2020-02-29 RX ORDER — POTASSIUM CHLORIDE 29.8 MG/ML
20 INJECTION INTRAVENOUS
Status: COMPLETED | OUTPATIENT
Start: 2020-02-29 | End: 2020-02-29

## 2020-02-29 RX ADMIN — ENOXAPARIN SODIUM 60 MG: 60 INJECTION SUBCUTANEOUS at 21:06

## 2020-02-29 RX ADMIN — SODIUM CHLORIDE, PRESERVATIVE FREE 300 UNITS: 5 INJECTION INTRAVENOUS at 08:14

## 2020-02-29 RX ADMIN — SODIUM CHLORIDE, PRESERVATIVE FREE 10 ML: 5 INJECTION INTRAVENOUS at 10:38

## 2020-02-29 RX ADMIN — DOCUSATE SODIUM 100 MG: 50 LIQUID ORAL at 21:05

## 2020-02-29 RX ADMIN — SOTALOL HYDROCHLORIDE 120 MG: 120 TABLET ORAL at 08:15

## 2020-02-29 RX ADMIN — DOCUSATE SODIUM 100 MG: 50 LIQUID ORAL at 08:14

## 2020-02-29 RX ADMIN — SODIUM CHLORIDE, PRESERVATIVE FREE 300 UNITS: 5 INJECTION INTRAVENOUS at 21:07

## 2020-02-29 RX ADMIN — MORPHINE SULFATE 2 MG: 2 INJECTION, SOLUTION INTRAMUSCULAR; INTRAVENOUS at 10:26

## 2020-02-29 RX ADMIN — SOTALOL HYDROCHLORIDE 120 MG: 120 TABLET ORAL at 21:05

## 2020-02-29 RX ADMIN — MORPHINE SULFATE 3 MG: 4 INJECTION, SOLUTION INTRAMUSCULAR; INTRAVENOUS at 21:05

## 2020-02-29 RX ADMIN — BISACODYL 10 MG: 10 SUPPOSITORY RECTAL at 08:15

## 2020-02-29 RX ADMIN — IPRATROPIUM BROMIDE AND ALBUTEROL SULFATE 1 AMPULE: 2.5; .5 SOLUTION RESPIRATORY (INHALATION) at 11:12

## 2020-02-29 RX ADMIN — POTASSIUM CHLORIDE: 2 INJECTION, SOLUTION, CONCENTRATE INTRAVENOUS at 17:58

## 2020-02-29 RX ADMIN — IPRATROPIUM BROMIDE AND ALBUTEROL SULFATE 1 AMPULE: 2.5; .5 SOLUTION RESPIRATORY (INHALATION) at 15:10

## 2020-02-29 RX ADMIN — MELATONIN 3 MG ORAL TABLET 6 MG: 3 TABLET ORAL at 21:05

## 2020-02-29 RX ADMIN — SODIUM CHLORIDE, PRESERVATIVE FREE 10 ML: 5 INJECTION INTRAVENOUS at 10:26

## 2020-02-29 RX ADMIN — ENOXAPARIN SODIUM 60 MG: 60 INJECTION SUBCUTANEOUS at 08:14

## 2020-02-29 RX ADMIN — MORPHINE SULFATE 3 MG: 4 INJECTION, SOLUTION INTRAMUSCULAR; INTRAVENOUS at 13:49

## 2020-02-29 RX ADMIN — POTASSIUM CHLORIDE 20 MEQ: 400 INJECTION, SOLUTION INTRAVENOUS at 11:40

## 2020-02-29 RX ADMIN — MORPHINE SULFATE 2 MG: 2 INJECTION, SOLUTION INTRAMUSCULAR; INTRAVENOUS at 03:03

## 2020-02-29 RX ADMIN — SENNOSIDES 5 ML: 8.8 LIQUID ORAL at 21:05

## 2020-02-29 RX ADMIN — SODIUM CHLORIDE, PRESERVATIVE FREE 10 ML: 5 INJECTION INTRAVENOUS at 13:49

## 2020-02-29 RX ADMIN — BUPROPION HYDROCHLORIDE 100 MG: 100 TABLET, EXTENDED RELEASE ORAL at 08:15

## 2020-02-29 RX ADMIN — Medication 10 ML: at 21:06

## 2020-02-29 RX ADMIN — MORPHINE SULFATE 2 MG: 2 INJECTION, SOLUTION INTRAMUSCULAR; INTRAVENOUS at 06:03

## 2020-02-29 RX ADMIN — POTASSIUM CHLORIDE 20 MEQ: 400 INJECTION, SOLUTION INTRAVENOUS at 10:38

## 2020-02-29 RX ADMIN — MORPHINE SULFATE 2 MG: 2 INJECTION, SOLUTION INTRAMUSCULAR; INTRAVENOUS at 17:58

## 2020-02-29 RX ADMIN — IPRATROPIUM BROMIDE AND ALBUTEROL SULFATE 1 AMPULE: 2.5; .5 SOLUTION RESPIRATORY (INHALATION) at 07:35

## 2020-02-29 RX ADMIN — SODIUM CHLORIDE, PRESERVATIVE FREE 10 ML: 5 INJECTION INTRAVENOUS at 17:58

## 2020-02-29 RX ADMIN — AMLODIPINE BESYLATE 5 MG: 5 TABLET ORAL at 08:15

## 2020-02-29 RX ADMIN — Medication 10 ML: at 08:14

## 2020-02-29 RX ADMIN — LEVOTHYROXINE SODIUM 100 MCG: 0.1 TABLET ORAL at 06:03

## 2020-02-29 ASSESSMENT — PAIN DESCRIPTION - FREQUENCY
FREQUENCY: CONTINUOUS

## 2020-02-29 ASSESSMENT — PAIN - FUNCTIONAL ASSESSMENT
PAIN_FUNCTIONAL_ASSESSMENT: PREVENTS OR INTERFERES SOME ACTIVE ACTIVITIES AND ADLS

## 2020-02-29 ASSESSMENT — PAIN SCALES - GENERAL
PAINLEVEL_OUTOF10: 9
PAINLEVEL_OUTOF10: 10
PAINLEVEL_OUTOF10: 10
PAINLEVEL_OUTOF10: 6
PAINLEVEL_OUTOF10: 0
PAINLEVEL_OUTOF10: 7
PAINLEVEL_OUTOF10: 8
PAINLEVEL_OUTOF10: 0
PAINLEVEL_OUTOF10: 8

## 2020-02-29 ASSESSMENT — PAIN DESCRIPTION - ONSET
ONSET: ON-GOING

## 2020-02-29 ASSESSMENT — PAIN DESCRIPTION - PROGRESSION
CLINICAL_PROGRESSION: NOT CHANGED

## 2020-02-29 ASSESSMENT — PAIN DESCRIPTION - DESCRIPTORS
DESCRIPTORS: DISCOMFORT
DESCRIPTORS: DULL;DISCOMFORT
DESCRIPTORS: DULL;DISCOMFORT

## 2020-02-29 ASSESSMENT — PAIN DESCRIPTION - PAIN TYPE
TYPE: ACUTE PAIN;SURGICAL PAIN
TYPE: SURGICAL PAIN;ACUTE PAIN
TYPE: SURGICAL PAIN

## 2020-02-29 ASSESSMENT — PAIN DESCRIPTION - ORIENTATION
ORIENTATION: MID
ORIENTATION: MID

## 2020-02-29 ASSESSMENT — PAIN DESCRIPTION - LOCATION
LOCATION: ABDOMEN

## 2020-02-29 NOTE — PROGRESS NOTES
GENERAL SURGERY  DAILY PROGRESS NOTE  2/29/2020    Subjective:  Confused, bowel movements recorded however patient denies. Objective:  /66   Pulse 72   Temp 98.3 °F (36.8 °C) (Axillary)   Resp 16   Ht 5' 6\" (1.676 m)   Wt 114 lb 4.8 oz (51.8 kg)   SpO2 96%   BMI 18.45 kg/m²     General: NAD, awake, confused. Lungs: No increased work of breathing. Cardiovascular: RRR. Abdomen: Soft, moderately distension, minimally tender. Incision c/d/i. No rebound, guarding or rigidity. NGT  Extremities: Atraumatic, full range of motion  Skin: Warm, dry and intact    Assessment/Plan:  68 y.o. female w/ cecal perforation s/p ex lap w/ right hemicolectomy 2/17 now with ileus. 2/26 R thoracentesis 600 ml serous fluid removed. Pain and nausea control PRN  NPO. NGT - will clamp today and reassess  Maintenance IVF's. PICC, TPN. CT abd/pelv with fluid collection in mesentery, likely secondary to recent surgery  PT/OT  DVT ppx. Awaiting bowel function.     Electronically signed by Denice Atkins MD on 2/29/2020 at 11:26 AM     Seen/examined  Agree with above  [email or web address]

## 2020-02-29 NOTE — PROGRESS NOTES
Patient c/o abd pain, prn given. Having difficulty following some commands, assisted with turn, cleaned and changed of incontinence. Refused to attempt to sit up in chair. Unable to ambulate at this time.

## 2020-02-29 NOTE — PROGRESS NOTES
PROGRESS NOTE       PATIENT PROBLEM LIST:  Principal Problem:    SBO (small bowel obstruction) (HCC)  Active Problems:    Essential hypertension    Depression    PAD (peripheral artery disease) (HCC)    Acquired hypothyroidism    Cecal volvulus (HCC)    ROSELYN (acute kidney injury) ([address]    Shock liver    Severe protein-calorie malnutrition (HCC)    Atherosclerosis of aortic bifurcation and common iliac arteries (HCC)    CAD (coronary artery disease)    History of bilateral carotid artery stenosis    Paroxysmal atrial fibrillation with rapid ventricular response ([address]  Resolved Problems:    * No resolved hospital problems. *      SUBJECTIVE:  [place] continues to i variance abdominal discomfort despite an NG tube low suction. Denies nausea, chest discomfort nor palpitations. REVIEW OF SYSTEMS:  General ROS: positive for - fatigue, malaise, and weight loss  Psychological ROS: positive for - anxiety, depression  Ophthalmic ROS: negative for - decreased vision or visual distortion. ENT ROS: negative  Allergy and Immunology ROS: negative  Hematological and Lymphatic ROS: negative  Endocrine: no heat or cold intolerance and no polyphagia, polydipsia, or polyuria  Respiratory ROS: negative for - shortness of breath  Cardiovascular ROS: positive for - irregular heartbeat and rapid heart rate. Gastrointestinal ROS: + Abdominal pain, change in bowel habits, no black or bloody stools  Genito-Urinary ROS: no nocturia, dysuria, trouble voiding, frequency or hematuria  Musculoskeletal ROS: negative for- myalgias, arthralgias, or claudication  Neurological ROS: no TIA or stroke symptoms otherwise no significant change in symptoms or problems since yesterday as documented in previous progress notes.     SCHEDULED MEDICATIONS:   heparin flush  3 mL Intravenous 2 times per day    sotalol  120 mg Oral BID    buPROPion  100 mg Oral Daily    sennosides  5 mL Oral Nightly    docusate sodium  100 mg Oral BID    Net 1198 ml     CBC:   Recent Labs     20  0501 20  0515 20  0410   WBC 4.4* 7.3 5.5   HGB 8.5* 8.2* 8.0*   HCT 27.8* 26.6* 26.3*    423 421     BMP:  Recent Labs     20  0501 20  0515 20  0410    141 141   K 3.5 4.1 4.0    106 104   CO2  27   BUN 15 16 23   CREATININE 0.7 0.7 0.7   LABGLOM >60 >60 >60     ABGs:   Lab Results   Component Value Date    PH 7.499 2020    PO2 135.0 2020    PCO2 34.3 2020     INR: No results for input(s): INR in the last 72 hours. PRO-BNP: No results found for: PROBNP   TSH:   Lab Results   Component Value Date    TSH 4.050 2020      Cardiac Injury Profile:   No results for input(s): CKTOTAL, CKMB, TROPONINI in the last 72 hours. Lipid Profile:   Lab Results   Component Value Date    TRIG 110 2020    HDL 76 2019    LDLCALC 76 2019    CHOL 169 2019      Hemoglobin A1C: No components found for: HGBA1C     RAD:   Ct Abdomen Pelvis W Iv Contrast Additional Contrast? None    Addendum Date: 2020    Note is made of a vague lucency in the left iliac bone with some areas of sclerosis and periosteal thickening concerning for a healing fracture. Malignancy with a pathologic fracture is a consideration. Clinical assessment is recommended. ALERT:  THIS IS AN ABNORMAL REPORT    Result Date: 2020  Patient MRN:  [id number] : 1950 Age: 68 years Gender: Female Order Date:  2020 1:45 PM EXAM: CT ABDOMEN PELVIS W IV CONTRAST number of images 319 Contrast. Isovue-370, 110 mL IV Technique: Low-dose CT  acquisition technique included one of following options; 1 . Automated exposure control, 2. Adjustment of MA and or KV according to patient's size or 3. Use of iterative reconstruction. INDICATION:  ab pain ab pain COMPARISON: Previous CTA 2020 FINDINGS: The lung bases demonstrate COPD with minimal atelectasis and pleural effusions in the right lower lobe.  Small hiatal hernia is present with thickening of the GE junction and distended stomach. Liver is of normal architecture. Gallbladder is partially distended. Spleen, pancreas, the adrenals and the kidneys are normal. There is severe vascular calcification stenosis of the aorta and iliac arteries with occlusion of the right SFA. There is diffusely dilated small bowel loops with  one segment of the small bowel loops in the left hemiabdomen measuring nearly 7 cm. Colon is collapsed. Pelvis. Bladder is distended. Colon is collapsed with  poor delineation of the anatomy. High-grade small bowel obstruction with a significantly dilated fluid-filled small bowel loops and stomach and collapsed colon. Surgical intervention is recommended. Infiltrates and pleural effusion in the right lower lobe concerning for aspiration pneumonia. Severe vascular calcification of aorta and iliac arteries with occlusion of right SFA. ALERT:  THIS IS AN ABNORMAL REPORT    Xr Chest Portable    Result Date: 2020  Patient MRN: [id number] : 1950 Age:  68 years Gender: Female Order Date: 2020 5:30 PM Exam: XR CHEST PORTABLE Number of Images: 1 view Indication: Acute abdominal pain. SBO on CT scan 2020, 1557 hours. Comparison: 2008 FINDINGS: Heart and pulmonary vascularity normal. Lungs clear. Costophrenic angles sharp. Normal aorta. No acute cardiopulmonary findings. Xr Abdomen For Ng/og/ne Tube Placement    Result Date: 2020  Patient MRN: [id number] : 1950 Age:  68 years Gender: Female Order Date: 2020 6:45 PM Exam: XR ABDOMEN FOR NG/OG/NE TUBE PLACEMENT Number of Images: 1 views Indication:   Confirmation of course of NG/OG/NE tube and location of tip of tube Confirmation of course of NG/OG/NE tube and location of tip of tube Portable? ->Yes Comparison: None. Findings: Study demonstrates chest and upper abdomen the nasogastric tube is in satisfactory position with the tip in the stomach.  The lung fields are clear there is small right-sided pleural effusion. The abdomen demonstrate colonic distention. Nasogastric tube with the tip in the stomach in satisfactory position. EKG: See Report  Echo: See Report      IMPRESSIONS:  Principal Problem:    SBO (small bowel obstruction) (HCC)  Active Problems:    Essential hypertension    Depression    PAD (peripheral artery disease) (HCC)    Acquired hypothyroidism    Cecal volvulus (HCC)    ROSELYN (acute kidney injury) ([address]    Shock liver    Severe protein-calorie malnutrition (HCC)    Atherosclerosis of aortic bifurcation and common iliac arteries (HCC)    CAD (coronary artery disease)    History of bilateral carotid artery stenosis    Paroxysmal atrial fibrillation with rapid ventricular response ([address]  Resolved Problems:    * No resolved hospital problems. *      RECOMMENDATIONS:  Continue present cardiac drug regimen as patient remains in sinus rhythm presently. Would also recommend initiation of physical therapy and eventually of acute rehab. Certainly bowels remain a significant problem hopefully function will recover. I have spent more than 25 minutes face to face with India London and reviewing notes and laboratory data, with greater than 50% of this time instructing and counseling the patient and her daughter face to face regarding my findings and recommendations and I have answered all questions as posed to me by Ms. Elizabeth and her daughter who is present at her bedside. Dianna Maher, DO FACP,FACC,FSCAI      NOTE:  This report was transcribed using voice recognition software.   Every effort was made to ensure accuracy; however, inadvertent computerized transcription errors may be present

## 2020-02-29 NOTE — PROGRESS NOTES
Patient's friend visited, patient now oriented to person place and time, when at beginning of the shift just to person. Patient more alert, c/o pain, prn given. Sat in chair position in bed for some time, unable to ambulate at this time.

## 2020-03-01 LAB
ALBUMIN SERPL-MCNC: 1.9 G/DL (ref 3.5–5.2)
ALP BLD-CCNC: 73 U/L (ref 35–104)
ALT SERPL-CCNC: 70 U/L (ref 0–32)
ANION GAP SERPL CALCULATED.3IONS-SCNC: 9 MMOL/L (ref 7–16)
ANISOCYTOSIS: ABNORMAL
AST SERPL-CCNC: 19 U/L (ref 0–31)
BASOPHILIC STIPPLING: ABNORMAL
BASOPHILS ABSOLUTE: 0 E9/L (ref 0–0.2)
BASOPHILS RELATIVE PERCENT: 0.1 % (ref 0–2)
BILIRUB SERPL-MCNC: 1 MG/DL (ref 0–1.2)
BUN BLDV-MCNC: 29 MG/DL (ref 8–23)
CALCIUM IONIZED: 1.2 MMOL/L (ref 1.15–1.33)
CALCIUM SERPL-MCNC: 7.9 MG/DL (ref 8.6–10.2)
CHLORIDE BLD-SCNC: 106 MMOL/L (ref 98–107)
CO2: 31 MMOL/L (ref 22–29)
CREAT SERPL-MCNC: 0.7 MG/DL (ref 0.5–1)
EOSINOPHILS ABSOLUTE: 0 E9/L (ref 0.05–0.5)
EOSINOPHILS RELATIVE PERCENT: 0.1 % (ref 0–6)
GFR AFRICAN AMERICAN: >60
GFR NON-AFRICAN AMERICAN: >60 ML/MIN/1.73
GLUCOSE BLD-MCNC: 148 MG/DL (ref 74–99)
HCT VFR BLD CALC: 26.9 % (ref 34–48)
HEMOGLOBIN: 8.2 G/DL (ref 11.5–15.5)
HYPOCHROMIA: ABNORMAL
LYMPHOCYTES ABSOLUTE: 0.15 E9/L (ref 1.5–4)
LYMPHOCYTES RELATIVE PERCENT: 0.9 % (ref 20–42)
MAGNESIUM: 2 MG/DL (ref 1.6–2.6)
MCH RBC QN AUTO: 29.9 PG (ref 26–35)
MCHC RBC AUTO-ENTMCNC: 30.5 % (ref 32–34.5)
MCV RBC AUTO: 98.2 FL (ref 80–99.9)
METAMYELOCYTES RELATIVE PERCENT: 0.9 % (ref 0–1)
METER GLUCOSE: 132 MG/DL (ref 74–99)
MONOCYTES ABSOLUTE: 0.46 E9/L (ref 0.1–0.95)
MONOCYTES RELATIVE PERCENT: 2.6 % (ref 2–12)
NEUTROPHILS ABSOLUTE: 14.94 E9/L (ref 1.8–7.3)
NEUTROPHILS RELATIVE PERCENT: 95.7 % (ref 43–80)
OVALOCYTES: ABNORMAL
PDW BLD-RTO: 15.2 FL (ref 11.5–15)
PHOSPHORUS: 3.9 MG/DL (ref 2.5–4.5)
PLATELET # BLD: 470 E9/L (ref 130–450)
PMV BLD AUTO: 9.8 FL (ref 7–12)
POIKILOCYTES: ABNORMAL
POLYCHROMASIA: ABNORMAL
POTASSIUM SERPL-SCNC: 3.8 MMOL/L (ref 3.5–5)
RBC # BLD: 2.74 E12/L (ref 3.5–5.5)
SODIUM BLD-SCNC: 146 MMOL/L (ref 132–146)
TOTAL PROTEIN: 4.9 G/DL (ref 6.4–8.3)
WBC # BLD: 15.4 E9/L (ref 4.5–11.5)

## 2020-03-01 PROCEDURE — 2500000003 HC RX 250 WO HCPCS: Performed by: STUDENT IN AN ORGANIZED HEALTH CARE EDUCATION/TRAINING PROGRAM

## 2020-03-01 PROCEDURE — 6370000000 HC RX 637 (ALT 250 FOR IP): Performed by: STUDENT IN AN ORGANIZED HEALTH CARE EDUCATION/TRAINING PROGRAM

## 2020-03-01 PROCEDURE — 80053 COMPREHEN METABOLIC PANEL: CPT

## 2020-03-01 PROCEDURE — 6360000002 HC RX W HCPCS: Performed by: STUDENT IN AN ORGANIZED HEALTH CARE EDUCATION/TRAINING PROGRAM

## 2020-03-01 PROCEDURE — 2700000000 HC OXYGEN THERAPY PER DAY

## 2020-03-01 PROCEDURE — 2060000000 HC ICU INTERMEDIATE R&B

## 2020-03-01 PROCEDURE — 2580000003 HC RX 258: Performed by: STUDENT IN AN ORGANIZED HEALTH CARE EDUCATION/TRAINING PROGRAM

## 2020-03-01 PROCEDURE — 82330 ASSAY OF CALCIUM: CPT

## 2020-03-01 PROCEDURE — 36415 COLL VENOUS BLD VENIPUNCTURE: CPT

## 2020-03-01 PROCEDURE — 94640 AIRWAY INHALATION TREATMENT: CPT

## 2020-03-01 PROCEDURE — 84100 ASSAY OF PHOSPHORUS: CPT

## 2020-03-01 PROCEDURE — 82962 GLUCOSE BLOOD TEST: CPT

## 2020-03-01 PROCEDURE — 85025 COMPLETE CBC W/AUTO DIFF WBC: CPT

## 2020-03-01 PROCEDURE — 83735 ASSAY OF MAGNESIUM: CPT

## 2020-03-01 RX ADMIN — SODIUM CHLORIDE, PRESERVATIVE FREE 10 ML: 5 INJECTION INTRAVENOUS at 05:06

## 2020-03-01 RX ADMIN — SODIUM CHLORIDE, PRESERVATIVE FREE 10 ML: 5 INJECTION INTRAVENOUS at 17:54

## 2020-03-01 RX ADMIN — MORPHINE SULFATE 2 MG: 2 INJECTION, SOLUTION INTRAMUSCULAR; INTRAVENOUS at 05:05

## 2020-03-01 RX ADMIN — DOCUSATE SODIUM 100 MG: 50 LIQUID ORAL at 08:52

## 2020-03-01 RX ADMIN — SODIUM CHLORIDE, PRESERVATIVE FREE 10 ML: 5 INJECTION INTRAVENOUS at 05:05

## 2020-03-01 RX ADMIN — IPRATROPIUM BROMIDE AND ALBUTEROL SULFATE 1 AMPULE: 2.5; .5 SOLUTION RESPIRATORY (INHALATION) at 12:08

## 2020-03-01 RX ADMIN — SENNOSIDES 5 ML: 8.8 LIQUID ORAL at 20:19

## 2020-03-01 RX ADMIN — MORPHINE SULFATE 2 MG: 2 INJECTION, SOLUTION INTRAMUSCULAR; INTRAVENOUS at 17:26

## 2020-03-01 RX ADMIN — SOTALOL HYDROCHLORIDE 120 MG: 120 TABLET ORAL at 20:19

## 2020-03-01 RX ADMIN — BUPROPION HYDROCHLORIDE 100 MG: 100 TABLET, EXTENDED RELEASE ORAL at 08:53

## 2020-03-01 RX ADMIN — SODIUM CHLORIDE, PRESERVATIVE FREE 300 UNITS: 5 INJECTION INTRAVENOUS at 20:20

## 2020-03-01 RX ADMIN — SODIUM CHLORIDE, PRESERVATIVE FREE 10 ML: 5 INJECTION INTRAVENOUS at 12:06

## 2020-03-01 RX ADMIN — MORPHINE SULFATE 2 MG: 2 INJECTION, SOLUTION INTRAMUSCULAR; INTRAVENOUS at 12:06

## 2020-03-01 RX ADMIN — POTASSIUM CHLORIDE: 2 INJECTION, SOLUTION, CONCENTRATE INTRAVENOUS at 17:54

## 2020-03-01 RX ADMIN — BISACODYL 10 MG: 10 SUPPOSITORY RECTAL at 08:53

## 2020-03-01 RX ADMIN — DOCUSATE SODIUM 100 MG: 50 LIQUID ORAL at 20:20

## 2020-03-01 RX ADMIN — SODIUM CHLORIDE, PRESERVATIVE FREE 10 ML: 5 INJECTION INTRAVENOUS at 17:26

## 2020-03-01 RX ADMIN — IPRATROPIUM BROMIDE AND ALBUTEROL SULFATE 1 AMPULE: 2.5; .5 SOLUTION RESPIRATORY (INHALATION) at 16:53

## 2020-03-01 RX ADMIN — IPRATROPIUM BROMIDE AND ALBUTEROL SULFATE 1 AMPULE: 2.5; .5 SOLUTION RESPIRATORY (INHALATION) at 21:21

## 2020-03-01 RX ADMIN — Medication 10 ML: at 20:20

## 2020-03-01 RX ADMIN — SODIUM CHLORIDE, PRESERVATIVE FREE 300 UNITS: 5 INJECTION INTRAVENOUS at 08:53

## 2020-03-01 RX ADMIN — MORPHINE SULFATE 3 MG: 4 INJECTION, SOLUTION INTRAMUSCULAR; INTRAVENOUS at 20:19

## 2020-03-01 RX ADMIN — LEVOTHYROXINE SODIUM 100 MCG: 0.1 TABLET ORAL at 05:06

## 2020-03-01 RX ADMIN — ENOXAPARIN SODIUM 60 MG: 60 INJECTION SUBCUTANEOUS at 08:52

## 2020-03-01 RX ADMIN — SOTALOL HYDROCHLORIDE 120 MG: 120 TABLET ORAL at 08:53

## 2020-03-01 RX ADMIN — ENOXAPARIN SODIUM 60 MG: 60 INJECTION SUBCUTANEOUS at 20:19

## 2020-03-01 RX ADMIN — AMLODIPINE BESYLATE 5 MG: 5 TABLET ORAL at 08:53

## 2020-03-01 RX ADMIN — Medication 10 ML: at 08:52

## 2020-03-01 RX ADMIN — IPRATROPIUM BROMIDE AND ALBUTEROL SULFATE 1 AMPULE: 2.5; .5 SOLUTION RESPIRATORY (INHALATION) at 07:46

## 2020-03-01 RX ADMIN — MELATONIN 3 MG ORAL TABLET 6 MG: 3 TABLET ORAL at 20:19

## 2020-03-01 ASSESSMENT — PAIN SCALES - GENERAL
PAINLEVEL_OUTOF10: 6
PAINLEVEL_OUTOF10: 6
PAINLEVEL_OUTOF10: 7
PAINLEVEL_OUTOF10: 0
PAINLEVEL_OUTOF10: 6

## 2020-03-01 ASSESSMENT — PAIN DESCRIPTION - FREQUENCY
FREQUENCY: INTERMITTENT
FREQUENCY: INTERMITTENT

## 2020-03-01 ASSESSMENT — PAIN DESCRIPTION - PROGRESSION
CLINICAL_PROGRESSION: NOT CHANGED
CLINICAL_PROGRESSION: NOT CHANGED

## 2020-03-01 ASSESSMENT — PAIN DESCRIPTION - PAIN TYPE
TYPE: ACUTE PAIN;SURGICAL PAIN
TYPE: ACUTE PAIN;SURGICAL PAIN

## 2020-03-01 ASSESSMENT — PAIN DESCRIPTION - DESCRIPTORS
DESCRIPTORS: DULL
DESCRIPTORS: DISCOMFORT

## 2020-03-01 ASSESSMENT — PAIN DESCRIPTION - LOCATION
LOCATION: ABDOMEN
LOCATION: ABDOMEN

## 2020-03-01 ASSESSMENT — PAIN DESCRIPTION - ONSET
ONSET: ON-GOING
ONSET: ON-GOING

## 2020-03-01 ASSESSMENT — PAIN DESCRIPTION - ORIENTATION: ORIENTATION: MID

## 2020-03-01 NOTE — PROGRESS NOTES
GENERAL SURGERY  DAILY PROGRESS NOTE  3/1/2020    Subjective:  Patient had a bowel movement overnight. States she is doing OK, denies nausea, vomiting. No acute events overnight    Objective:  BP (!) 153/70   Pulse 80   Temp 98.8 °F (37.1 °C) (Temporal)   Resp 16   Ht 5' 6\" (1.676 m)   Wt 107 lb 12.8 oz (48.9 kg)   SpO2 97%   BMI 17.40 kg/m²     General: NAD, awake, confused. Lungs: No increased work of breathing. Cardiovascular: RRR. Abdomen: Soft, moderately distension, minimally tender. Incision c/d/i. No rebound, guarding or rigidity. NGT  Extremities: Atraumatic, full range of motion  Skin: Warm, dry and intact    Assessment/Plan:  68 y.o. female w/ cecal perforation s/p ex lap w/ right hemicolectomy 2/17 now with ileus. 2/26 R thoracentesis 600 ml serous fluid removed. Pain and nausea control PRN  Continue NPO. NGT - will remove today  Maintenance IVF's. TPN. CT abd/pelv with fluid collection in mesentery, likely secondary to recent surgery  PT/OT  DVT ppx.     Electronically signed by Che Duron MD on 3/1/2020 at 10:56 AM     Seen/examined  Agree with above  Dio

## 2020-03-01 NOTE — PROGRESS NOTES
Glucose 148High  mg/dL    BUN 29High  mg/dL    CREATININE 0.7 mg/dL    GFR Non-African American >60 mL/min/1.73    Comment: Chronic Kidney Disease: less than 60 ml/min/1.73 sq. m.         Kidney Failure: less than 15 ml/min/1.73 sq.m. Results valid for patients 18 years and older. GFR  >60    Calcium 7.9Low  mg/dL    Total Protein 4.9Low  g/dL    Alb 1.9Low  g/dL    Total Bilirubin 1.0 mg/dL    Alkaline Phosphatase 73 U/L    ALT 70High  U/L    AST 19 U/L   Magnesium [[id number]]    Collected: 03/01/20 0430    Updated: 03/01/20 0508    Specimen Source: Blood     Magnesium 2.0 mg/dL   Phosphorus [[id number]]    Collected: 03/01/20 0430    Updated: 03/01/20 0508    Specimen Source: Blood     Phosphorus 3.9 mg/dL   POCT Glucose [[id number]] (Abnormal)    Collected: 02/29/20 1652    Updated: 02/29/20 1655     Meter Glucose 114High  mg/dL         XR ABDOMEN (KUB) (SINGLE AP VIEW)   Final Result   A pelvic central venous catheter has been removed since the study 2   days earlier. The prominent small bowel gas and stomach gas is still prominent   despite the presence of a nasogastric tube. Radiographic appearance is   still suggestive of an ileus versus distal small bowel obstruction. IR GUIDED THORACENTESIS PLEURAL   Final Result   Uncomplicated right thoracentesis. This examination was performed and dictated by Ethel Barney PA-C with   indirect supervision by Kath Chahal MD, who has reviewed the provided   imaging and has revised the report as necessary. XR CHEST PORTABLE   Final Result   Interval decrease in right pleural effusion. Small residual right   pleural effusion. No pneumothorax. Right infrahilar density, compatible with airspace consolidation or   atelectasis. Persistent elevation of the right hemidiaphragm. This study was dictated by Ethel Barney PA-C and reviewed by Brett Murillo MD, who has modified the report as necessary.          XR CHEST PORTABLE   Final Result      Nasogastric tubing appears to be appropriately configured. Small bowel obstruction. Large right and small left pleural effusions with contiguous   atelectasis. CT ABDOMEN PELVIS W IV CONTRAST Additional Contrast? None   Final Result   1. Moderate right, small left pleural effusion. Adjacent area of consolidation    within the right lower lobe. 2. Mild fluid within the abdomen and pelvis. 3. Persistent small bowel obstruction, indeterminate transition point. 4. Hazy masslike area within the central mesentery may be sequela of recent    surgery possible area of edema or hyperemia of the mesentery. Hematoma is    considered less likely. Follow-up recommended. 5. Wall thickening of the transverse colon at the level of the sutures. This    may be related to a degree of colitis, postoperative change or potentially    neoplastic process. Correlation with post oral contrast exam is recommended. This report has been electronically signed by Orestes Parker MD.      XR ABDOMEN (KUB) (SINGLE AP VIEW)   Final Result   No interim change         XR ABDOMEN FOR NG/OG/NE TUBE PLACEMENT   Final Result   Nasogastric tube appears to be in satisfactory position. XR ABDOMEN (KUB) (SINGLE AP VIEW)   Final Result      Dynamic ileus with recent postop      Left femoral line appears to be in satisfactory position. .          XR CHEST PORTABLE   Final Result   CHF superimposed on COPD. There is no pneumothorax. XR ABDOMEN FOR NG/OG/NE TUBE PLACEMENT   Final Result   Nasogastric tube with the tip in the stomach in   satisfactory position. XR CHEST PORTABLE   Final Result   No acute cardiopulmonary findings.             CT ABDOMEN PELVIS W IV CONTRAST Additional Contrast? None   Final Result   Addendum 1 of 1   Note is made of a vague lucency in the left iliac bone with some areas   of sclerosis and periosteal thickening concerning for a healing Topical PRN Juancarlos Isaac MD        sodium chloride flush 0.9 % injection 10 mL  10 mL Intravenous PRN Juancarlos Isaac MD   10 mL at 03/01/20 1206    heparin flush 100 UNIT/ML injection 300 Units  3 mL Intravenous 2 times per day Juancarlos Isaac MD   300 Units at 03/01/20 0853    heparin flush 100 UNIT/ML injection 300 Units  3 mL Intracatheter PRN Juancarlos Isaac MD        morphine sulfate (PF) injection 3 mg  3 mg Intravenous Q3H PRN Juancarlos Isaac MD   3 mg at 02/29/20 2105    Or    morphine (PF) injection 2 mg  2 mg Intravenous Q3H PRN Juancarlos Isaac MD   2 mg at 03/01/20 1206    sotalol (BETAPACE) tablet 120 mg  120 mg Oral BID Juancarlos Isaac MD   120 mg at 03/01/20 0853    buPROPion [facility]) extended release tablet 100 mg  100 mg Oral Daily Juancarlos Isaac MD   100 mg at 03/01/20 0853    sennosides (SENOKOT) 8.8 MG/5ML syrup 5 mL  5 mL Oral Nightly Juancarlos Isaac MD   5 mL at 02/29/20 2105    docusate sodium (COLACE) 150 MG/15ML liquid 100 mg  100 mg Oral BID Juancarlos Isaac MD   100 mg at 03/01/20 6171    melatonin tablet 6 mg  6 mg Oral Nightly Juancarlos Isaac MD   6 mg at 02/29/20 2105    amLODIPine (NORVASC) tablet 5 mg  5 mg Oral Daily Juancarlos Isaac MD   5 mg at 03/01/20 0853    ipratropium-albuterol (DUONEB) nebulizer solution 1 ampule  1 ampule Inhalation Q4H WA Juancarlos Isaac MD   1 ampule at 03/01/20 1208    dextrose 50 % IV solution  12.5 g Intravenous PRN Juancarlos Isaac MD        bisacodyl (DULCOLAX) suppository 10 mg  10 mg Rectal Daily Juancarlos Isaac MD   10 mg at 03/01/20 0853    enoxaparin (LOVENOX) injection 60 mg  1 mg/kg Subcutaneous BID Juancarlos Isaac MD   60 mg at 03/01/20 0852    glucose (GLUTOSE) 40 % oral gel 15 g  15 g Oral PRN Juancarlos Isaac MD        glucagon (rDNA) injection 1 mg  1 mg Intramuscular PRN Juancarlos Isaac MD        dextrose 5 % solution  100 mL/hr Intravenous PRN Juancarlos Isaac MD        levothyroxine (SYNTHROID) tablet 100 mcg  100 mcg Oral Daily

## 2020-03-01 NOTE — PROGRESS NOTES
Subjective:    Awake and alert. No problems overnight. Denies chest pain or dyspnea. Complaining of abdominal pain. Objective:    /60   Pulse 79   Temp 98.7 °F (37.1 °C) (Temporal)   Resp 16   Ht 5' 6\" (1.676 m)   Wt 114 lb 4.8 oz (51.8 kg)   SpO2 96%   BMI 18.45 kg/m²   Skin: Warm and dry  Neck: Supple, no JVD  Heart:  RRR, no murmurs, gallops, or rubs. Lungs:  CTA bilaterally, no wheeze, rales or rhonchi  Abd: bowel sounds hypoactive, tender, distended  Extrem:  No clubbing, cyanosis, or edema, pulses intact    I/O last 3 completed shifts:   In: 1803 [P.O.:25; NG/GT:180; IV Piggyback:100]  Out: 300 [Emesis/NG output:300]    Laboratory:     CBC with Differential:    Lab Results   Component Value Date    WBC 10.4 02/29/2020    RBC 2.51 02/29/2020    HGB 7.6 02/29/2020    HCT 24.7 02/29/2020     02/29/2020    MCV 98.4 02/29/2020    MCH 30.3 02/29/2020    MCHC 30.8 02/29/2020    RDW 15.0 02/29/2020    NRBC 1.7 02/23/2020    METASPCT 0.9 02/21/2020    LYMPHOPCT 6.5 02/29/2020    MONOPCT 8.9 02/29/2020    MYELOPCT 0.9 02/22/2020    EOSPCT 3 10/07/2010    BASOPCT 0.2 02/29/2020    MONOSABS 0.93 02/29/2020    LYMPHSABS 0.68 02/29/2020    EOSABS 0.02 02/29/2020    BASOSABS 0.02 02/29/2020     CMP:    Lab Results   Component Value Date     02/29/2020    K 3.7 02/29/2020    K 4.0 02/20/2020     02/29/2020    CO2 33 02/29/2020    BUN 29 02/29/2020    CREATININE 0.7 02/29/2020    GFRAA >60 02/29/2020    LABGLOM >60 02/29/2020    GLUCOSE 134 02/29/2020    GLUCOSE 102 10/07/2010    PROT 5.3 02/29/2020    LABALBU 2.1 02/29/2020    LABALBU 4.5 10/07/2010    CALCIUM 7.9 02/29/2020    BILITOT 0.9 02/29/2020    ALKPHOS 73 02/29/2020    AST 23 02/29/2020    ALT 97 02/29/2020     Hepatic Function Panel:    Lab Results   Component Value Date    ALKPHOS 73 02/29/2020    ALT 97 02/29/2020    AST 23 02/29/2020    PROT 5.3 02/29/2020    BILITOT 0.9 02/29/2020    LABALBU 2.1 02/29/2020 LABALBU 4.5 10/07/2010     Magnesium:    Lab Results   Component Value Date    MG 2.1 02/29/2020     Phosphorus:    Lab Results   Component Value Date    PHOS 3.6 02/29/2020        XR ABDOMEN (KUB) (SINGLE AP VIEW)   Final Result   A pelvic central venous catheter has been removed since the study 2   days earlier. The prominent small bowel gas and stomach gas is still prominent   despite the presence of a nasogastric tube. Radiographic appearance is   still suggestive of an ileus versus distal small bowel obstruction. IR GUIDED THORACENTESIS PLEURAL   Final Result   Uncomplicated right thoracentesis. This examination was performed and dictated by Dereje Moreno PA-C with   indirect supervision by Bernadette Mckay MD, who has reviewed the provided   imaging and has revised the report as necessary. XR CHEST PORTABLE   Final Result   Interval decrease in right pleural effusion. Small residual right   pleural effusion. No pneumothorax. Right infrahilar density, compatible with airspace consolidation or   atelectasis. Persistent elevation of the right hemidiaphragm. This study was dictated by Dereje Moreno PA-C and reviewed by Richard Edouard. Steven Sheets MD, who has modified the report as necessary. XR CHEST PORTABLE   Final Result      Nasogastric tubing appears to be appropriately configured. Small bowel obstruction. Large right and small left pleural effusions with contiguous   atelectasis. CT ABDOMEN PELVIS W IV CONTRAST Additional Contrast? None   Final Result   1. Moderate right, small left pleural effusion. Adjacent area of consolidation    within the right lower lobe. 2. Mild fluid within the abdomen and pelvis. 3. Persistent small bowel obstruction, indeterminate transition point. 4. Hazy masslike area within the central mesentery may be sequela of recent    surgery possible area of edema or hyperemia of the mesentery. Hematoma is    considered less likely. Follow-up recommended. 5. Wall thickening of the transverse colon at the level of the sutures. This    may be related to a degree of colitis, postoperative change or potentially    neoplastic process. Correlation with post oral contrast exam is recommended. This report has been electronically signed by Fausto Starks MD.      XR ABDOMEN (KUB) (SINGLE AP VIEW)   Final Result   No interim change         XR ABDOMEN FOR NG/OG/NE TUBE PLACEMENT   Final Result   Nasogastric tube appears to be in satisfactory position. XR ABDOMEN (KUB) (SINGLE AP VIEW)   Final Result      Dynamic ileus with recent postop      Left femoral line appears to be in satisfactory position. .          XR CHEST PORTABLE   Final Result   CHF superimposed on COPD. There is no pneumothorax. XR ABDOMEN FOR NG/OG/NE TUBE PLACEMENT   Final Result   Nasogastric tube with the tip in the stomach in   satisfactory position. XR CHEST PORTABLE   Final Result   No acute cardiopulmonary findings. CT ABDOMEN PELVIS W IV CONTRAST Additional Contrast? None   Final Result   Addendum 1 of 1   Note is made of a vague lucency in the left iliac bone with some areas   of sclerosis and periosteal thickening concerning for a healing   fracture. Malignancy with a pathologic fracture is a consideration. Clinical assessment is recommended. ALERT:  THIS IS AN ABNORMAL REPORT      Final          Prior to Admission medications    Medication Sig Start Date End Date Taking? Authorizing Provider   venlafaxine (EFFEXOR XR) 75 MG extended release capsule Take 75 mg by mouth daily   Yes Historical Provider, MD   traMADol (ULTRAM) 50 MG tablet Take 50 mg by mouth every 6 hours as needed for Pain.    Yes Historical Provider, MD   buPROPion (WELLBUTRIN XL) 150 MG extended release tablet Take 150 mg by mouth daily As directed   Yes Historical Provider, MD   cilostazol (PLETAL) 50 MG tablet Take 2 tablets by mouth 2 times daily 1/7/20  Yes 0815    sennosides (SENOKOT) 8.8 MG/5ML syrup 5 mL  5 mL Oral Nightly Toby Quintero MD   5 mL at 02/29/20 2105    docusate sodium (COLACE) 150 MG/15ML liquid 100 mg  100 mg Oral BID Toby Quintero MD   100 mg at 02/29/20 2105    melatonin tablet 6 mg  6 mg Oral Nightly Toby Quintero MD   6 mg at 02/29/20 2105    amLODIPine (NORVASC) tablet 5 mg  5 mg Oral Daily Toby Quintero MD   5 mg at 02/29/20 0815    ipratropium-albuterol (Katherene Rodriguez) nebulizer solution 1 ampule  1 ampule Inhalation Q4H WA Toby Quintero MD   1 ampule at 02/29/20 1510    dextrose 50 % IV solution  12.5 g Intravenous PRN Toby Quintero MD        bisacodyl (DULCOLAX) suppository 10 mg  10 mg Rectal Daily Toby Quintero MD   10 mg at 02/29/20 0815    enoxaparin (LOVENOX) injection 60 mg  1 mg/kg Subcutaneous BID Toby Quintero MD   60 mg at 02/29/20 2106    glucose (GLUTOSE) 40 % oral gel 15 g  15 g Oral PRN Toby Quintero MD        glucagon (rDNA) injection 1 mg  1 mg Intramuscular PRN Toby Quintero MD        dextrose 5 % solution  100 mL/hr Intravenous PRN Toby Quintero MD        levothyroxine (SYNTHROID) tablet 100 mcg  100 mcg Oral Daily Toby Quintero MD   100 mcg at 02/29/20 5137    sodium chloride flush 0.9 % injection 10 mL  10 mL Intravenous 2 times per day Toby Quintero MD   10 mL at 02/29/20 2106           Problem list:    Principal Problem:    SBO (small bowel obstruction) ([facility] [address]  Active Problems:    Essential hypertension    Depression    PAD (peripheral artery disease) ([facility] [address]    Acquired hypothyroidism    Cecal volvulus ([facility] [address]    ROSELYN (acute kidney injury) ([facility] [address]    Shock liver    Severe protein-calorie malnutrition ([facility] [address]    Atherosclerosis of aortic bifurcation and common iliac arteries (HCC)    CAD (coronary artery disease)    History of bilateral carotid artery stenosis    Paroxysmal atrial fibrillation with rapid ventricular response ([address]  Resolved Problems:    * No resolved hospital problems. *      Assessment:    Principal Problem:    SBO (small bowel obstruction) (HCC)  Active Problems:    Essential hypertension    Depression    PAD (peripheral artery disease) (HCC)    Acquired hypothyroidism    Cecal volvulus (HCC)    ROSELYN (acute kidney injury) ([address]    Shock liver    Severe protein-calorie malnutrition (HCC)    Atherosclerosis of aortic bifurcation and common iliac arteries (HCC)    CAD (coronary artery disease)    History of bilateral carotid artery stenosis    Paroxysmal atrial fibrillation with rapid ventricular response ([address]  Resolved Problems:    * No resolved hospital problems.  *       Plan:    Continue TPN    Continue NG    Slow progress      Laurinda Coria D.O., Tim Goldberg  9:23 PM  2/29/2020

## 2020-03-01 NOTE — PROGRESS NOTES
PROGRESS NOTE       PATIENT PROBLEM LIST:  Principal Problem:    SBO (small bowel obstruction) (HCC)  Active Problems:    Essential hypertension    Depression    PAD (peripheral artery disease) (HCC)    Acquired hypothyroidism    Cecal volvulus (HCC)    ROSELYN (acute kidney injury) ([address]    Shock liver    Severe protein-calorie malnutrition (HCC)    Atherosclerosis of aortic bifurcation and common iliac arteries (HCC)    CAD (coronary artery disease)    History of bilateral carotid artery stenosis    Paroxysmal atrial fibrillation with rapid ventricular response ([address]  Resolved Problems:    * No resolved hospital problems. *      SUBJECTIVE:  Kelsi Stone continues to i variance abdominal discomfort despite an NG tube low suction. Denies nausea, chest discomfort nor palpitations. REVIEW OF SYSTEMS:  General ROS: positive for - fatigue, malaise, and weight loss  Psychological ROS: positive for - anxiety, depression  Ophthalmic ROS: negative for - decreased vision or visual distortion. ENT ROS: negative  Allergy and Immunology ROS: negative  Hematological and Lymphatic ROS: negative  Endocrine: no heat or cold intolerance and no polyphagia, polydipsia, or polyuria  Respiratory ROS: negative for - shortness of breath  Cardiovascular ROS: positive for - irregular heartbeat and rapid heart rate. Gastrointestinal ROS: + Abdominal pain, change in bowel habits, no black or bloody stools  Genito-Urinary ROS: no nocturia, dysuria, trouble voiding, frequency or hematuria  Musculoskeletal ROS: negative for- myalgias, arthralgias, or claudication  Neurological ROS: no TIA or stroke symptoms otherwise no significant change in symptoms or problems since yesterday as documented in previous progress notes.     SCHEDULED MEDICATIONS:   heparin flush  3 mL Intravenous 2 times per day    sotalol  120 mg Oral BID    buPROPion  100 mg Oral Daily    sennosides  5 mL Oral Nightly    docusate sodium  100 mg Oral BID    1198 ml     CBC:   Recent Labs     20  0515 20  0410 20  0600   WBC 7.3 5.5 10.4   HGB 8.2* 8.0* 7.6*   HCT 26.6* 26.3* 24.7*    421 425     BMP:  Recent Labs     20  0515 20  0410 20  0600    141 143   K 4.1 4.0 3.7    104 102   CO2 27 27 33*   BUN 16 23 29*   CREATININE 0.7 0.7 0.7   LABGLOM >60 >60 >60     ABGs:   Lab Results   Component Value Date    PH 7.499 2020    PO2 135.0 2020    PCO2 34.3 2020     INR: No results for input(s): INR in the last 72 hours. PRO-BNP: No results found for: PROBNP   TSH:   Lab Results   Component Value Date    TSH 4.050 2020      Cardiac Injury Profile:   No results for input(s): CKTOTAL, CKMB, TROPONINI in the last 72 hours. Lipid Profile:   Lab Results   Component Value Date    TRIG 110 2020    HDL 76 2019    LDLCALC 76 2019    CHOL 169 2019      Hemoglobin A1C: No components found for: HGBA1C     RAD:   Ct Abdomen Pelvis W Iv Contrast Additional Contrast? None    Addendum Date: 2020    Note is made of a vague lucency in the left iliac bone with some areas of sclerosis and periosteal thickening concerning for a healing fracture. Malignancy with a pathologic fracture is a consideration. Clinical assessment is recommended. ALERT:  THIS IS AN ABNORMAL REPORT    Result Date: 2020  Patient MRN:  [id number] : 1950 Age: 68 years Gender: Female Order Date:  2020 1:45 PM EXAM: CT ABDOMEN PELVIS W IV CONTRAST number of images 319 Contrast. Isovue-370, 110 mL IV Technique: Low-dose CT  acquisition technique included one of following options; 1 . Automated exposure control, 2. Adjustment of MA and or KV according to patient's size or 3. Use of iterative reconstruction. INDICATION:  ab pain ab pain COMPARISON: Previous CTA 2020 FINDINGS: The lung bases demonstrate COPD with minimal atelectasis and pleural effusions in the right lower lobe.  Small hiatal hernia is present with thickening of the GE junction and distended stomach. Liver is of normal architecture. Gallbladder is partially distended. Spleen, pancreas, the adrenals and the kidneys are normal. There is severe vascular calcification stenosis of the aorta and iliac arteries with occlusion of the right SFA. There is diffusely dilated small bowel loops with  one segment of the small bowel loops in the left hemiabdomen measuring nearly 7 cm. Colon is collapsed. Pelvis. Bladder is distended. Colon is collapsed with  poor delineation of the anatomy. High-grade small bowel obstruction with a significantly dilated fluid-filled small bowel loops and stomach and collapsed colon. Surgical intervention is recommended. Infiltrates and pleural effusion in the right lower lobe concerning for aspiration pneumonia. Severe vascular calcification of aorta and iliac arteries with occlusion of right SFA. ALERT:  THIS IS AN ABNORMAL REPORT    Xr Chest Portable    Result Date: 2020  Patient MRN: [id number] : 1950 Age:  68 years Gender: Female Order Date: 2020 5:30 PM Exam: XR CHEST PORTABLE Number of Images: 1 view Indication: Acute abdominal pain. SBO on CT scan 2020, 1557 hours. Comparison: 2008 FINDINGS: Heart and pulmonary vascularity normal. Lungs clear. Costophrenic angles sharp. Normal aorta. No acute cardiopulmonary findings. Xr Abdomen For Ng/og/ne Tube Placement    Result Date: 2020  Patient MRN: [id number] : 1950 Age:  68 years Gender: Female Order Date: 2020 6:45 PM Exam: XR ABDOMEN FOR NG/OG/NE TUBE PLACEMENT Number of Images: 1 views Indication:   Confirmation of course of NG/OG/NE tube and location of tip of tube Confirmation of course of NG/OG/NE tube and location of tip of tube Portable? ->Yes Comparison: None. Findings: Study demonstrates chest and upper abdomen the nasogastric tube is in satisfactory position with the tip in the stomach.  The lung fields

## 2020-03-02 LAB
ABO/RH: NORMAL
ALBUMIN SERPL-MCNC: 2 G/DL (ref 3.5–5.2)
ALP BLD-CCNC: 76 U/L (ref 35–104)
ALT SERPL-CCNC: 57 U/L (ref 0–32)
ANION GAP SERPL CALCULATED.3IONS-SCNC: 9 MMOL/L (ref 7–16)
ANISOCYTOSIS: ABNORMAL
ANTIBODY SCREEN: NORMAL
AST SERPL-CCNC: 19 U/L (ref 0–31)
BASOPHILS ABSOLUTE: 0 E9/L (ref 0–0.2)
BASOPHILS RELATIVE PERCENT: 0.1 % (ref 0–2)
BILIRUB SERPL-MCNC: 0.8 MG/DL (ref 0–1.2)
BLOOD BANK DISPENSE STATUS: NORMAL
BLOOD BANK PRODUCT CODE: NORMAL
BPU ID: NORMAL
BUN BLDV-MCNC: 29 MG/DL (ref 8–23)
CALCIUM IONIZED: 1.19 MMOL/L (ref 1.15–1.33)
CALCIUM SERPL-MCNC: 8 MG/DL (ref 8.6–10.2)
CHLORIDE BLD-SCNC: 107 MMOL/L (ref 98–107)
CO2: 31 MMOL/L (ref 22–29)
CREAT SERPL-MCNC: 0.6 MG/DL (ref 0.5–1)
DESCRIPTION BLOOD BANK: NORMAL
EOSINOPHILS ABSOLUTE: 0 E9/L (ref 0.05–0.5)
EOSINOPHILS RELATIVE PERCENT: 0.4 % (ref 0–6)
GFR AFRICAN AMERICAN: >60
GFR NON-AFRICAN AMERICAN: >60 ML/MIN/1.73
GLUCOSE BLD-MCNC: 129 MG/DL (ref 74–99)
HCT VFR BLD CALC: 22.8 % (ref 34–48)
HEMOGLOBIN: 6.9 G/DL (ref 11.5–15.5)
HYPOCHROMIA: ABNORMAL
LYMPHOCYTES ABSOLUTE: 0.61 E9/L (ref 1.5–4)
LYMPHOCYTES RELATIVE PERCENT: 5.2 % (ref 20–42)
MAGNESIUM: 2 MG/DL (ref 1.6–2.6)
MCH RBC QN AUTO: 29.9 PG (ref 26–35)
MCHC RBC AUTO-ENTMCNC: 30.3 % (ref 32–34.5)
MCV RBC AUTO: 98.7 FL (ref 80–99.9)
MONOCYTES ABSOLUTE: 0.48 E9/L (ref 0.1–0.95)
MONOCYTES RELATIVE PERCENT: 3.5 % (ref 2–12)
NEUTROPHILS ABSOLUTE: 11.01 E9/L (ref 1.8–7.3)
NEUTROPHILS RELATIVE PERCENT: 91.3 % (ref 43–80)
PDW BLD-RTO: 15.1 FL (ref 11.5–15)
PHOSPHORUS: 3.4 MG/DL (ref 2.5–4.5)
PLATELET # BLD: 405 E9/L (ref 130–450)
PMV BLD AUTO: 10.1 FL (ref 7–12)
POIKILOCYTES: ABNORMAL
POLYCHROMASIA: ABNORMAL
POTASSIUM SERPL-SCNC: 3.5 MMOL/L (ref 3.5–5)
RBC # BLD: 2.31 E12/L (ref 3.5–5.5)
SODIUM BLD-SCNC: 147 MMOL/L (ref 132–146)
TARGET CELLS: ABNORMAL
TOTAL PROTEIN: 5.2 G/DL (ref 6.4–8.3)
WBC # BLD: 12.1 E9/L (ref 4.5–11.5)

## 2020-03-02 PROCEDURE — 94640 AIRWAY INHALATION TREATMENT: CPT

## 2020-03-02 PROCEDURE — 2580000003 HC RX 258: Performed by: STUDENT IN AN ORGANIZED HEALTH CARE EDUCATION/TRAINING PROGRAM

## 2020-03-02 PROCEDURE — 97530 THERAPEUTIC ACTIVITIES: CPT

## 2020-03-02 PROCEDURE — 86850 RBC ANTIBODY SCREEN: CPT

## 2020-03-02 PROCEDURE — 2500000003 HC RX 250 WO HCPCS: Performed by: STUDENT IN AN ORGANIZED HEALTH CARE EDUCATION/TRAINING PROGRAM

## 2020-03-02 PROCEDURE — P9040 RBC LEUKOREDUCED IRRADIATED: HCPCS

## 2020-03-02 PROCEDURE — 6370000000 HC RX 637 (ALT 250 FOR IP): Performed by: STUDENT IN AN ORGANIZED HEALTH CARE EDUCATION/TRAINING PROGRAM

## 2020-03-02 PROCEDURE — 36415 COLL VENOUS BLD VENIPUNCTURE: CPT

## 2020-03-02 PROCEDURE — 86923 COMPATIBILITY TEST ELECTRIC: CPT

## 2020-03-02 PROCEDURE — 83735 ASSAY OF MAGNESIUM: CPT

## 2020-03-02 PROCEDURE — 80053 COMPREHEN METABOLIC PANEL: CPT

## 2020-03-02 PROCEDURE — 2700000000 HC OXYGEN THERAPY PER DAY

## 2020-03-02 PROCEDURE — 86900 BLOOD TYPING SEROLOGIC ABO: CPT

## 2020-03-02 PROCEDURE — 36592 COLLECT BLOOD FROM PICC: CPT

## 2020-03-02 PROCEDURE — 84100 ASSAY OF PHOSPHORUS: CPT

## 2020-03-02 PROCEDURE — 6360000002 HC RX W HCPCS: Performed by: STUDENT IN AN ORGANIZED HEALTH CARE EDUCATION/TRAINING PROGRAM

## 2020-03-02 PROCEDURE — 2060000000 HC ICU INTERMEDIATE R&B

## 2020-03-02 PROCEDURE — 85025 COMPLETE CBC W/AUTO DIFF WBC: CPT

## 2020-03-02 PROCEDURE — 86901 BLOOD TYPING SEROLOGIC RH(D): CPT

## 2020-03-02 PROCEDURE — 82330 ASSAY OF CALCIUM: CPT

## 2020-03-02 PROCEDURE — 97535 SELF CARE MNGMENT TRAINING: CPT

## 2020-03-02 PROCEDURE — 36430 TRANSFUSION BLD/BLD COMPNT: CPT

## 2020-03-02 RX ORDER — SODIUM CHLORIDE 9 MG/ML
250 INJECTION, SOLUTION INTRAVENOUS ONCE
Status: DISCONTINUED | OUTPATIENT
Start: 2020-03-02 | End: 2020-03-04

## 2020-03-02 RX ADMIN — DOCUSATE SODIUM 100 MG: 50 LIQUID ORAL at 20:27

## 2020-03-02 RX ADMIN — SOTALOL HYDROCHLORIDE 120 MG: 120 TABLET ORAL at 20:26

## 2020-03-02 RX ADMIN — Medication 10 ML: at 09:30

## 2020-03-02 RX ADMIN — IPRATROPIUM BROMIDE AND ALBUTEROL SULFATE 1 AMPULE: 2.5; .5 SOLUTION RESPIRATORY (INHALATION) at 13:12

## 2020-03-02 RX ADMIN — SENNOSIDES 5 ML: 8.8 LIQUID ORAL at 20:27

## 2020-03-02 RX ADMIN — SODIUM CHLORIDE, PRESERVATIVE FREE 10 ML: 5 INJECTION INTRAVENOUS at 02:50

## 2020-03-02 RX ADMIN — MELATONIN 3 MG ORAL TABLET 6 MG: 3 TABLET ORAL at 20:26

## 2020-03-02 RX ADMIN — BUPROPION HYDROCHLORIDE 100 MG: 100 TABLET, EXTENDED RELEASE ORAL at 09:29

## 2020-03-02 RX ADMIN — ENOXAPARIN SODIUM 60 MG: 60 INJECTION SUBCUTANEOUS at 09:29

## 2020-03-02 RX ADMIN — SODIUM CHLORIDE, PRESERVATIVE FREE 300 UNITS: 5 INJECTION INTRAVENOUS at 09:30

## 2020-03-02 RX ADMIN — SODIUM CHLORIDE, PRESERVATIVE FREE 300 UNITS: 5 INJECTION INTRAVENOUS at 21:27

## 2020-03-02 RX ADMIN — IPRATROPIUM BROMIDE AND ALBUTEROL SULFATE 1 AMPULE: 2.5; .5 SOLUTION RESPIRATORY (INHALATION) at 08:39

## 2020-03-02 RX ADMIN — IPRATROPIUM BROMIDE AND ALBUTEROL SULFATE 1 AMPULE: 2.5; .5 SOLUTION RESPIRATORY (INHALATION) at 15:49

## 2020-03-02 RX ADMIN — SODIUM CHLORIDE, PRESERVATIVE FREE 10 ML: 5 INJECTION INTRAVENOUS at 04:19

## 2020-03-02 RX ADMIN — BISACODYL 10 MG: 10 SUPPOSITORY RECTAL at 09:30

## 2020-03-02 RX ADMIN — LEVOTHYROXINE SODIUM 100 MCG: 0.1 TABLET ORAL at 05:02

## 2020-03-02 RX ADMIN — MORPHINE SULFATE 2 MG: 2 INJECTION, SOLUTION INTRAMUSCULAR; INTRAVENOUS at 15:28

## 2020-03-02 RX ADMIN — Medication 10 ML: at 20:27

## 2020-03-02 RX ADMIN — MORPHINE SULFATE 2 MG: 2 INJECTION, SOLUTION INTRAMUSCULAR; INTRAVENOUS at 09:28

## 2020-03-02 RX ADMIN — ENOXAPARIN SODIUM 60 MG: 60 INJECTION SUBCUTANEOUS at 20:26

## 2020-03-02 RX ADMIN — IPRATROPIUM BROMIDE AND ALBUTEROL SULFATE 1 AMPULE: 2.5; .5 SOLUTION RESPIRATORY (INHALATION) at 19:27

## 2020-03-02 RX ADMIN — AMLODIPINE BESYLATE 5 MG: 5 TABLET ORAL at 09:29

## 2020-03-02 RX ADMIN — DOCUSATE SODIUM 100 MG: 50 LIQUID ORAL at 09:29

## 2020-03-02 RX ADMIN — SODIUM CHLORIDE, PRESERVATIVE FREE 10 ML: 5 INJECTION INTRAVENOUS at 04:21

## 2020-03-02 RX ADMIN — MORPHINE SULFATE 2 MG: 2 INJECTION, SOLUTION INTRAMUSCULAR; INTRAVENOUS at 02:49

## 2020-03-02 RX ADMIN — MORPHINE SULFATE 2 MG: 2 INJECTION, SOLUTION INTRAMUSCULAR; INTRAVENOUS at 20:23

## 2020-03-02 RX ADMIN — CALCIUM GLUCONATE: 98 INJECTION, SOLUTION INTRAVENOUS at 17:55

## 2020-03-02 RX ADMIN — SOTALOL HYDROCHLORIDE 120 MG: 120 TABLET ORAL at 09:29

## 2020-03-02 RX ADMIN — SODIUM CHLORIDE, PRESERVATIVE FREE 10 ML: 5 INJECTION INTRAVENOUS at 04:20

## 2020-03-02 ASSESSMENT — PAIN DESCRIPTION - DESCRIPTORS
DESCRIPTORS: DISCOMFORT;CONSTANT;ACHING
DESCRIPTORS: DISCOMFORT;ACHING;SORE

## 2020-03-02 ASSESSMENT — PAIN SCALES - GENERAL
PAINLEVEL_OUTOF10: 6
PAINLEVEL_OUTOF10: 0
PAINLEVEL_OUTOF10: 6
PAINLEVEL_OUTOF10: 3
PAINLEVEL_OUTOF10: 6
PAINLEVEL_OUTOF10: 9

## 2020-03-02 ASSESSMENT — PAIN DESCRIPTION - PAIN TYPE
TYPE: ACUTE PAIN;SURGICAL PAIN
TYPE: ACUTE PAIN;SURGICAL PAIN

## 2020-03-02 ASSESSMENT — PAIN DESCRIPTION - LOCATION
LOCATION: ABDOMEN
LOCATION: ABDOMEN

## 2020-03-02 ASSESSMENT — PAIN DESCRIPTION - ORIENTATION: ORIENTATION: MID

## 2020-03-02 NOTE — PROGRESS NOTES
[facility]    Subjective:  Pt seen this am alert conversive sitting up in chair now on cl liquid diet per surgery      Current Facility-Administered Medications:     0.9 % sodium chloride infusion, 250 mL, Intravenous, Once, Dinora Penta, DO    PN-Adult  3-in-1 [facility] (Custom), , Intravenous, Continuous TPN, Dinora Penta, DO    PN-Adult  3-in-1 [facility] (Custom), , Intravenous, Continuous TPN, Christian Crocker MD, Last Rate: 62.5 mL/hr at 03/01/20 1754    hydrALAZINE (APRESOLINE) injection 10 mg, 10 mg, Intravenous, Q4H PRN, Juliet Bhakta MD    labetalol (NORMODYNE;TRANDATE) injection 10 mg, 10 mg, Intravenous, Q4H PRN, Juliet Bhakta MD, 10 mg at 02/28/20 1909    trimethobenzamide (TIGAN) injection 200 mg, 200 mg, Intramuscular, Q6H PRN, Juliet Bhakta MD, 200 mg at 02/26/20 0242    medicated lip balm (BLISTEX/CARMEX) stick, , Topical, PRN, Juliet Bhakta MD    sodium chloride flush 0.9 % injection 10 mL, 10 mL, Intravenous, PRN, Juliet Bhakta MD, 10 mL at 03/02/20 0421    heparin flush 100 UNIT/ML injection 300 Units, 3 mL, Intravenous, 2 times per day, Juliet Bhakta MD, 300 Units at 03/02/20 0930    heparin flush 100 UNIT/ML injection 300 Units, 3 mL, Intracatheter, PRN, Juliet Bhakta MD    morphine (PF) injection 2 mg, 2 mg, Intravenous, Q3H PRN, 2 mg at 03/02/20 1528 **OR** morphine sulfate (PF) injection 3 mg, 3 mg, Intravenous, Q3H PRN, Juliet Bhakta MD, 3 mg at 03/01/20 2019    sotalol (BETAPACE) tablet 120 mg, 120 mg, Oral, BID, Juliet Bhakta MD, 120 mg at 03/02/20 0929    buPROPion [facility]) extended release tablet 100 mg, 100 mg, Oral, Daily, Juliet Bhakta MD, 100 mg at 03/02/20 0929    sennosides (SENOKOT) 8.8 MG/5ML syrup 5 mL, 5 mL, Oral, Nightly, Juliet Bhakta MD, 5 mL at 03/01/20 2019    docusate sodium (COLACE) 150 MG/15ML liquid 100 mg, 100 mg, Oral, BID, Juliet Bhakta MD, 100 mg at 03/02/20 0929    melatonin tablet 6 mg, 6 mg, Oral, Nightly, Devon Young MD, 6 mg at 03/01/20 2019    amLODIPine (NORVASC) tablet 5 mg, 5 mg, Oral, Daily, Devon Young MD, 5 mg at 03/02/20 0929    ipratropium-albuterol (DUONEB) nebulizer solution 1 ampule, 1 ampule, Inhalation, Q4H WA, Devon Young MD, 1 ampule at 03/02/20 1312    dextrose 50 % IV solution, 12.5 g, Intravenous, PRN, Devon Young MD    bisacodyl (DULCOLAX) suppository 10 mg, 10 mg, Rectal, Daily, Devon Young MD, 10 mg at 03/02/20 0930    enoxaparin (LOVENOX) injection 60 mg, 1 mg/kg, Subcutaneous, BID, Devon Young MD, 60 mg at 03/02/20 0929    glucose (GLUTOSE) 40 % oral gel 15 g, 15 g, Oral, PRN, Devon Young MD    glucagon (rDNA) injection 1 mg, 1 mg, Intramuscular, PRN, Devon Young MD    dextrose 5 % solution, 100 mL/hr, Intravenous, PRN, Devon Young MD    levothyroxine (SYNTHROID) tablet 100 mcg, 100 mcg, Oral, Daily, Devon Young MD, 100 mcg at 03/02/20 0502    sodium chloride flush 0.9 % injection 10 mL, 10 mL, Intravenous, 2 times per day, Devon Young MD, 10 mL at 03/02/20 0930    Objective:    BP (!) 148/65   Pulse 79   Temp 98.3 °F (36.8 °C) (Temporal)   Resp 18   Ht 5' 6\" (1.676 m)   Wt 109 lb 3.2 oz (49.5 kg)   SpO2 94%   BMI 17.63 kg/m²     Heart:  Reg  Lungs:  CTA bilaterally, no wheeze, rales or rhonchi  Abd: bowel sounds present, distended  Extrem:  No clubbing, cyanosis, or edema    CBC with Differential:    Lab Results   Component Value Date    WBC 12.1 03/02/2020    RBC 2.31 03/02/2020    HGB 6.9 03/02/2020    HCT 22.8 03/02/2020     03/02/2020    MCV 98.7 03/02/2020    MCH 29.9 03/02/2020    MCHC 30.3 03/02/2020    RDW 15.1 03/02/2020    NRBC 1.7 02/23/2020    METASPCT 0.9 03/01/2020    LYMPHOPCT 5.2 03/02/2020    MONOPCT 3.5 03/02/2020    MYELOPCT 0.9 02/22/2020    EOSPCT 3 10/07/2010    BASOPCT 0.1 03/02/2020    MONOSABS 0.48 03/02/2020    LYMPHSABS 0.61 03/02/2020    EOSABS 0.00 03/02/2020    BASOSABS 0.00 03/02/2020

## 2020-03-02 NOTE — PROGRESS NOTES
hair.    Stand by Assist    UB Dressing Moderate Assist   Moderate assist;    Due to confusion of lines and gown.       Stand by Assist    LB Dressing Dependent  Max Assist    Due to stomach pain pt required Max A to jarret/doff socks. Moderate Assist    Bathing Maximal Assist  Max Assist   Minimal Assist    Toileting Dependent   Dep; Pt incontinent when arriving to room. Moderate Assist    Bed Mobility  Supine to sit: Maximal Assist x2  Sit to supine: Maximal Assist x2 Rolling: Mod A  Supine to sit: Mod A   Sit to supine:NT Supine to sit: Moderate Assist   Sit to supine: Moderate Assist    Functional Transfers Moderate Assist x2     (sit to stand; posterior lean) Sit to stand: Mod A   (from EOB with max cues for hand placement/posture)     SPT bed to chair: Mod A  (max cues for sequencing and ww management)   Minimal Assist    Functional Mobility Maximal Assist x2     (side steps with ww) Mod Assist     Mod verbal and Mod A to advance LEs and increase safety while taking 5-6 steps away from bed. Chair brought up behind her due to fatigue. Moderate Assist    Balance Sitting:     Static:  Max A (posterior lean)    Dynamic:Max A  Standing: Max A Sitting: SBA     Standing: Mod A with w/w      Activity Tolerance Fair   fair- with light activity     Fair +   Visual/  Perceptual WFL     WFL                    Comments: Upon arrival pt semi-supine in bed and agreeable to OT session with PTA collaboration.  Therapist facilitated bed mobility (rolling, supine to sit; addressing sequencing, body mechanics, postioning and safety; verbal, tactile and visual cues for body mechanics and postioning), sitting balance (addressing posture, postioning, endurance and safety; incorporated light functional reaching), functional transfers from various surfaces (addressing sequencing, postioning, body mechanics and safety; verbal and tactile cues for hand placement and sequencing) and functional mobility (few steps with ww; flexed posture, posterior lean; max verbal and tactile cues for posture, ww management and sequencing; addressing endurance, body mechanics, posture, postioning and safety). Therapist facilitated ADL retraining tasks addressing sequencing, activity tolerance, body mechanics, postioning and safety. At end of session seated in bedside chair all lines and tubes intact, call light within reach. · Pt has made fair progress towards set goals.    · Continue with current plan of care      Treatment Time In: 0740           Treatment Time Out: 3651  Treatment Charges: Mins Units   Ther Ex  18925     Manual Therapy [id number] [facility]     Thera Activities [id number] 13 1   ADL/Home Mgt [id number] 17 1   Neuro Re-ed [id number]     Group Therapy      Orthotic manage/training  [id number]     Non-Billable Time     Total Timed Treatment 30 [id number] [facility] [address] [place], [address] [place] [address] Rd

## 2020-03-02 NOTE — PROGRESS NOTES
Physical Therapy  Physical Therapy Treatment Note     Name: Mari Miller  : 1950  MRN: [id number]    Referring Provider:  Kamila Dee DO    Date of Service: 3/2/2020    Evaluating PT:  Lmaberto Rayo, PT, DPT [id number]    Room #:  4501/4501-B  Diagnosis:  SBO  Precautions: Falls, NG tube to suction,, O2, NPO, Bed alarm, Incontinence, Abdominal Precautions  Procedure/Surgery:   Exploratory laparotomy, enterolysis, right hemicolectomy with side-to-side, functional end-to-end anastomosis   Thoracentesis  PMHx/PSHx:  CA, CAD, COPD, HLD, HTN, PVD, RA, Thyroid disease, L SFA Angioplasty 19    Equipment Needs:  TBD    SUBJECTIVE:    Pt lives with daughter in a 2 story home with 3 stairs to enter and 2 rail. Full flight of steps and 1 rail to bedroom. Pt ambulated with [employer] and required some assistance for ADLs PTA. OBJECTIVE:   Initial Evaluation  Date: 20 Treatment  3/2/20 Short Term/ Long Term   Goals   AM-PAC 6 Clicks     Was pt agreeable to Eval/treatment? Yes Yes    Does pt have pain? 5/10 abdominal pain Pt reported 10/10 abdominal pain     Bed Mobility  Rolling: NT  Supine to sit: MaxA x 2  Sit to supine: MaxA x 2  Scooting: MaxA Rolling: Mod A  Supine to sit: Mod A   Sit to supine: NT  Scooting: Mod A Hany   Transfers Sit to stand: ModA x 2  Stand to sit: ModA x 2  Stand pivot: NT Sit to stand: Max A  Stand to sit: Max A  Stand pivot: Mod A Hany with [employer]   Ambulation   3 feet with MaxA x 2 with  [employer] 6 feet with ww Mod A >75 feet with Hany with [employer]   Stair negotiation: ascended and descended NT NT >2 steps with 1 rail Hany   ROM BUE:  Defer to OT note  BLE:  WNL WNL    Strength BUE:  Defer to OT note  BLE:  3+ to 4-/5 BLE: 4-/5 ankle, 3+/5 knee, 3-/5 hip Increase by 1/3 MMT grade   Balance Sitting EOB:  MaxA  Dynamic Standing:  MaxA x 2 with [employer] Sitting: Min/Mod A  Standing:  Mod A  Sitting EOB:  SBA  Dynamic Standing:  Hany with [employer]         Therapeutic Exercises:    Seated APs and LAQ 2 x 10 reps. Sit to stand x 2 reps. Patient education  Pt educated on bed mobility technique, transfers and importance of OOB activity. Patient response to education:   Pt verbalized understanding Pt demonstrated skill Pt requires further education in this area   x Requires assist x     ASSESSMENT:    Comments:  Pt cleared by RN. Supine on arrival and agreeable to treatment. Pt required encouragement. Once eob pt cued for maintaining seated balance. Educated on abdominal splinting. Pt consistently reporting pain medication, however pt was not do at this time. Once eob pt sat for several minutes and was instructed in seated balance and to maintain midline. Pt with decreased tolerance to activity at this time and required seated rest after appox 6 feet. Posterior lean in standing with increased trunk flexion. Delayed processing at times. Treatment:  Patient practiced and was instructed in the following treatment:     Bed mobility training - pt given verbal and tactile cues to facilitate proper sequencing and safety    Transfer and gait training- pt was given verbal and tactile cues to facilitate proper hand placement, technique and safety during sit to stand and stand to sit as well as provided with physical assistance to complete task. Educated on improved posture and ww approximation. Pt required tactile cues to advance B LE's    PLAN:    Patient is making limited progress towards established goals. Will continue with current POC.       Time in  0740  Time out  0810    Total Treatment Time 30 minutes     CPT codes:  [] Gait training 40386 - minutes  [] Manual therapy 61581 - minutes  [x] Therapeutic activities 88796 30 minutes  [] Therapeutic exercises 15348 - minutes  [] Neuromuscular reeducation 72320 - minutes    Kenny Higgins PTA [id number]

## 2020-03-02 NOTE — PROGRESS NOTES
PROGRESS NOTE       PATIENT PROBLEM LIST:  Principal Problem:    SBO (small bowel obstruction) (HCC)  Active Problems:    Essential hypertension    Depression    PAD (peripheral artery disease) (HCC)    Acquired hypothyroidism    Cecal volvulus (HCC)    ROSELYN (acute kidney injury) ([address]    Shock liver    Severe protein-calorie malnutrition (HCC)    Atherosclerosis of aortic bifurcation and common iliac arteries (HCC)    CAD (coronary artery disease)    History of bilateral carotid artery stenosis    Paroxysmal atrial fibrillation with rapid ventricular response ([address]  Resolved Problems:    * No resolved hospital problems. *      SUBJECTIVE:  Abhilash Arzola is presently resting comfortably and voices no complaints of chest discomfort palpitations, shortness of breath or lightheadedness. REVIEW OF SYSTEMS:  General ROS: positive for - fatigue, malaise, and weight loss  Psychological ROS: positive for - anxiety, depression  Ophthalmic ROS: negative for - decreased vision or visual distortion. ENT ROS: negative  Allergy and Immunology ROS: negative  Hematological and Lymphatic ROS: negative  Endocrine: no heat or cold intolerance and no polyphagia, polydipsia, or polyuria  Respiratory ROS: negative for - shortness of breath  Cardiovascular ROS: positive for - irregular heartbeat and rapid heart rate. Gastrointestinal ROS: + persistent abdominal discomfort, change in bowel habits, no black or bloody stools  Genito-Urinary ROS: no nocturia, dysuria, trouble voiding, frequency or hematuria  Musculoskeletal ROS: negative for- myalgias, arthralgias, or claudication  Neurological ROS: no TIA or stroke symptoms otherwise no significant change in symptoms or problems since yesterday as documented in previous progress notes.     SCHEDULED MEDICATIONS:   heparin flush  3 mL Intravenous 2 times per day    sotalol  120 mg Oral BID    buPROPion  100 mg Oral Daily    sennosides  5 mL Oral Nightly    docusate sodium  100 mg Oral BID    melatonin  6 mg Oral Nightly    amLODIPine  5 mg Oral Daily    ipratropium-albuterol  1 ampule Inhalation Q4H WA    bisacodyl  10 mg Rectal Daily    enoxaparin  1 mg/kg Subcutaneous BID    levothyroxine  100 mcg Oral Daily    sodium chloride flush  10 mL Intravenous 2 times per day       VITAL SIGNS:                                                                                                                          /60   Pulse 74   Temp 98.7 °F (37.1 °C) (Temporal)   Resp 16   Ht 5' 6\" (1.676 m)   Wt 107 lb 12.8 oz (48.9 kg)   SpO2 95%   BMI 17.40 kg/m²   Patient Vitals for the past 96 hrs (Last 3 readings):   Weight   03/01/20 0548 107 lb 12.8 oz (48.9 kg)   02/28/20 0557 114 lb 4.8 oz (51.8 kg)   02/27/20 0600 122 lb 12.7 oz (55.7 kg)     OBJECTIVE:    HEENT: PERRL, EOM  Intact; sclera non-icteric, conjunctiva pink. Carotids are brisk in upstroke with normal contour. No carotid bruits. Normal jugular venous pulsation at 45°. No palpable cervical nor supraclavicular nodes. Thyroid not palpable. Trachea midline. Chest: Even excursion  Lungs: Decreased bases but otherwise grossly clear to auscultation bilaterally, no expiratory wheezes or rhonchi, no decreased tactile fremitus without inspiratory rales. Heart: Regular  rhythm; S1 > S2, no gallop or murmur. No clicks, rub, palpable thrills   or heaves. PMI nondisplaced, 5th intercostal space MCL. Abdomen: Soft, nontender, nondistended,  mildly protuberant, no masses or organomegaly. Bowel sounds active. Extremities: Without clubbing, cyanosis or edema. Pulses present 3+ upper extermities bilaterally; present 1+ DP and present 1+ PT bilaterally.      Data:   Scheduled Meds: Reviewed  Continuous Infusions:    PN-Adult  3 IN 1 Central Line (Custom) 62.5 mL/hr at 02/28/20 16 06    dextrose         Intake/Output Summary (Last 24 hours) at 2/28/2020 16 05  Last data filed at 2/28/2020 15 24  Gross per 24 hour   Intake 1623 ml Output 425 ml   Net 1198 ml     CBC:   Recent Labs     20  0410 20  0600 20  0430   WBC 5.5 10.4 15.4*   HGB 8.0* 7.6* 8.2*   HCT 26.3* 24.7* 26.9*    425 470*     BMP:  Recent Labs     20  0410 20  0600 20  0430    143 146   K 4.0 3.7 3.8    102 106   CO2 27 33* 31*   BUN 23 29* 29*   CREATININE 0.7 0.7 0.7   LABGLOM >60 >60 >60     ABGs:   Lab Results   Component Value Date    PH 7.499 2020    PO2 135.0 2020    PCO2 34.3 2020     INR: No results for input(s): INR in the last 72 hours. PRO-BNP: No results found for: PROBNP   TSH:   Lab Results   Component Value Date    TSH 4.050 2020      Cardiac Injury Profile:   No results for input(s): CKTOTAL, CKMB, TROPONINI in the last 72 hours. Lipid Profile:   Lab Results   Component Value Date    TRIG 110 2020    HDL 76 2019    LDLCALC 76 2019    CHOL 169 2019      Hemoglobin A1C: No components found for: HGBA1C     RAD:   Ct Abdomen Pelvis W Iv Contrast Additional Contrast? None    Addendum Date: 2020    Note is made of a vague lucency in the left iliac bone with some areas of sclerosis and periosteal thickening concerning for a healing fracture. Malignancy with a pathologic fracture is a consideration. Clinical assessment is recommended. ALERT:  THIS IS AN ABNORMAL REPORT    Result Date: 2020  Patient MRN:  [id number] : 1950 Age: 68 years Gender: Female Order Date:  2020 1:45 PM EXAM: CT ABDOMEN PELVIS W IV CONTRAST number of images 319 Contrast. Isovue-370, 110 mL IV Technique: Low-dose CT  acquisition technique included one of following options; 1 . Automated exposure control, 2. Adjustment of MA and or KV according to patient's size or 3. Use of iterative reconstruction.  INDICATION:  ab pain ab pain COMPARISON: Previous CTA 2020 FINDINGS: The lung bases demonstrate COPD with minimal atelectasis and pleural effusions in the right lower lobe. Small hiatal hernia is present with thickening of the GE junction and distended stomach. Liver is of normal architecture. Gallbladder is partially distended. Spleen, pancreas, the adrenals and the kidneys are normal. There is severe vascular calcification stenosis of the aorta and iliac arteries with occlusion of the right SFA. There is diffusely dilated small bowel loops with  one segment of the small bowel loops in the left hemiabdomen measuring nearly 7 cm. Colon is collapsed. Pelvis. Bladder is distended. Colon is collapsed with  poor delineation of the anatomy. High-grade small bowel obstruction with a significantly dilated fluid-filled small bowel loops and stomach and collapsed colon. Surgical intervention is recommended. Infiltrates and pleural effusion in the right lower lobe concerning for aspiration pneumonia. Severe vascular calcification of aorta and iliac arteries with occlusion of right SFA. ALERT:  THIS IS AN ABNORMAL REPORT    Xr Chest Portable    Result Date: 2020  Patient MRN: [id number] : 1950 Age:  68 years Gender: Female Order Date: 2020 5:30 PM Exam: XR CHEST PORTABLE Number of Images: 1 view Indication: Acute abdominal pain. SBO on CT scan 2020, 1557 hours. Comparison: 2008 FINDINGS: Heart and pulmonary vascularity normal. Lungs clear. Costophrenic angles sharp. Normal aorta. No acute cardiopulmonary findings. Xr Abdomen For Ng/og/ne Tube Placement    Result Date: 2020  Patient MRN: [id number] : 1950 Age:  68 years Gender: Female Order Date: 2020 6:45 PM Exam: XR ABDOMEN FOR NG/OG/NE TUBE PLACEMENT Number of Images: 1 views Indication:   Confirmation of course of NG/OG/NE tube and location of tip of tube Confirmation of course of NG/OG/NE tube and location of tip of tube Portable? ->Yes Comparison: None.  Findings: Study demonstrates chest and upper abdomen the nasogastric tube is in satisfactory position with the tip in the stomach. The lung fields are clear there is small right-sided pleural effusion. The abdomen demonstrate colonic distention. Nasogastric tube with the tip in the stomach in satisfactory position. EKG: See Report  Echo: See Report      IMPRESSIONS:  Principal Problem:    SBO (small bowel obstruction) (HCC)  Active Problems:    Essential hypertension    Depression    PAD (peripheral artery disease) (HCC)    Acquired hypothyroidism    Cecal volvulus (HCC)    ROSELYN (acute kidney injury) ([address]    Shock liver    Severe protein-calorie malnutrition (HCC)    Atherosclerosis of aortic bifurcation and common iliac arteries (HCC)    CAD (coronary artery disease)    History of bilateral carotid artery stenosis    Paroxysmal atrial fibrillation with rapid ventricular response ([address]  Resolved Problems:    * No resolved hospital problems. *      RECOMMENDATIONS:  I am increasingly concerned with Mrs. Elizabeth's Increasing white blood cell count and significant anemia. Fortunately for the most part she has remained in sinus rhythm. I will lower her dose of sotalol to 80 mg twice a day and continue to observe. I have spent more than 25 minutes face to face with Calixto Kaplan and reviewing notes and laboratory data, with greater than 50% of this time instructing and counseling the patient and her daughter face to face regarding my findings and recommendations and I have answered all questions as posed to me by Ms. Elizabeth and her daughter who is present at her bedside. Emmy Chua, DO FACP,FACC,FSCAI      NOTE:  This report was transcribed using voice recognition software.   Every effort was made to ensure accuracy; however, inadvertent computerized transcription errors may be present

## 2020-03-02 NOTE — PROGRESS NOTES
GENERAL SURGERY  DAILY PROGRESS NOTE    Date:3/2/2020       Room:[facility]  Patient Eleazar Caamra     YOB: 1950     Age:69 y.o. Subjective:  Passed flatus and stool. No longer has NG tube    Objective:  /61   Pulse 70   Temp 98 °F (36.7 °C) (Oral)   Resp 14   Ht 5' 6\" (1.676 m)   Wt 109 lb 3.2 oz (49.5 kg)   SpO2 96%   BMI 17.63 kg/m²   Temp (24hrs), Av.2 °F (36.8 °C), Min:97.8 °F (36.6 °C), Max:98.7 °F (37.1 °C)      I/O (24Hr): Intake/Output Summary (Last 24 hours) at 3/2/2020 0802  Last data filed at 3/2/2020 0503  Gross per 24 hour   Intake 1538 ml   Output 0 ml   Net 1538 ml       GENERAL:  No acute distress. Alert and interactive. LUNGS:  No cough. Nonlabored breathing 2LNC   CARDIOVASC:  Normal rate, no cyanosis. ABDOMEN:  Softly-distended, non-tender. No guarding / rigidity / rebound. EXTREMITIES:  No edema, no deformities. Assessment:  68 y.o. female with cecal perforation s/p ex lap w/ right hemicolectomy  and postop ileus resolved.  R thoracentesis 600 ml serous fluid removed. Plan:  - transfuse 1u PRBC (hgb 6.9)  - advance to CLD but continue TPN  - PTOT    Please see attending addendum for corrections/updates to the plan.     Electronically signed by Yousuf Chicas MD on 3/2/2020 at 8:02 AM     Seen/examined  Agree with above  [email or web address]

## 2020-03-02 NOTE — CARE COORDINATION
Patient on clears, sitting up in chair today. Andrew following. No precert needed for discharge. HENS and ambulette on soft chart.

## 2020-03-03 LAB
ALBUMIN SERPL-MCNC: 2 G/DL (ref 3.5–5.2)
ALP BLD-CCNC: 85 U/L (ref 35–104)
ALT SERPL-CCNC: 55 U/L (ref 0–32)
ANION GAP SERPL CALCULATED.3IONS-SCNC: 10 MMOL/L (ref 7–16)
AST SERPL-CCNC: 26 U/L (ref 0–31)
BASOPHILS ABSOLUTE: 0.02 E9/L (ref 0–0.2)
BASOPHILS RELATIVE PERCENT: 0.2 % (ref 0–2)
BILIRUB SERPL-MCNC: 1.3 MG/DL (ref 0–1.2)
BUN BLDV-MCNC: 34 MG/DL (ref 8–23)
CALCIUM IONIZED: 1.16 MMOL/L (ref 1.15–1.33)
CALCIUM SERPL-MCNC: 8.1 MG/DL (ref 8.6–10.2)
CHLORIDE BLD-SCNC: 102 MMOL/L (ref 98–107)
CO2: 29 MMOL/L (ref 22–29)
CREAT SERPL-MCNC: 0.6 MG/DL (ref 0.5–1)
EOSINOPHILS ABSOLUTE: 0.02 E9/L (ref 0.05–0.5)
EOSINOPHILS RELATIVE PERCENT: 0.2 % (ref 0–6)
GFR AFRICAN AMERICAN: >60
GFR NON-AFRICAN AMERICAN: >60 ML/MIN/1.73
GLUCOSE BLD-MCNC: 132 MG/DL (ref 74–99)
HCT VFR BLD CALC: 31 % (ref 34–48)
HEMOGLOBIN: 10 G/DL (ref 11.5–15.5)
IMMATURE GRANULOCYTES #: 0.11 E9/L
IMMATURE GRANULOCYTES %: 1 % (ref 0–5)
LYMPHOCYTES ABSOLUTE: 0.69 E9/L (ref 1.5–4)
LYMPHOCYTES RELATIVE PERCENT: 6 % (ref 20–42)
MAGNESIUM: 1.9 MG/DL (ref 1.6–2.6)
MCH RBC QN AUTO: 30.1 PG (ref 26–35)
MCHC RBC AUTO-ENTMCNC: 32.3 % (ref 32–34.5)
MCV RBC AUTO: 93.4 FL (ref 80–99.9)
METER GLUCOSE: 108 MG/DL (ref 74–99)
MONOCYTES ABSOLUTE: 1.02 E9/L (ref 0.1–0.95)
MONOCYTES RELATIVE PERCENT: 8.8 % (ref 2–12)
NEUTROPHILS ABSOLUTE: 9.7 E9/L (ref 1.8–7.3)
NEUTROPHILS RELATIVE PERCENT: 83.8 % (ref 43–80)
PDW BLD-RTO: 16.3 FL (ref 11.5–15)
PHOSPHORUS: 4 MG/DL (ref 2.5–4.5)
PLATELET # BLD: 409 E9/L (ref 130–450)
PMV BLD AUTO: 9.7 FL (ref 7–12)
POTASSIUM SERPL-SCNC: 3.8 MMOL/L (ref 3.5–5)
RBC # BLD: 3.32 E12/L (ref 3.5–5.5)
SODIUM BLD-SCNC: 141 MMOL/L (ref 132–146)
TOTAL PROTEIN: 5.7 G/DL (ref 6.4–8.3)
WBC # BLD: 11.6 E9/L (ref 4.5–11.5)

## 2020-03-03 PROCEDURE — 6370000000 HC RX 637 (ALT 250 FOR IP): Performed by: STUDENT IN AN ORGANIZED HEALTH CARE EDUCATION/TRAINING PROGRAM

## 2020-03-03 PROCEDURE — 84100 ASSAY OF PHOSPHORUS: CPT

## 2020-03-03 PROCEDURE — 83735 ASSAY OF MAGNESIUM: CPT

## 2020-03-03 PROCEDURE — 97530 THERAPEUTIC ACTIVITIES: CPT

## 2020-03-03 PROCEDURE — 36415 COLL VENOUS BLD VENIPUNCTURE: CPT

## 2020-03-03 PROCEDURE — 2500000003 HC RX 250 WO HCPCS: Performed by: STUDENT IN AN ORGANIZED HEALTH CARE EDUCATION/TRAINING PROGRAM

## 2020-03-03 PROCEDURE — 97535 SELF CARE MNGMENT TRAINING: CPT

## 2020-03-03 PROCEDURE — 2060000000 HC ICU INTERMEDIATE R&B

## 2020-03-03 PROCEDURE — 97110 THERAPEUTIC EXERCISES: CPT

## 2020-03-03 PROCEDURE — 2580000003 HC RX 258: Performed by: STUDENT IN AN ORGANIZED HEALTH CARE EDUCATION/TRAINING PROGRAM

## 2020-03-03 PROCEDURE — 82962 GLUCOSE BLOOD TEST: CPT

## 2020-03-03 PROCEDURE — 2700000000 HC OXYGEN THERAPY PER DAY

## 2020-03-03 PROCEDURE — 80053 COMPREHEN METABOLIC PANEL: CPT

## 2020-03-03 PROCEDURE — 6360000002 HC RX W HCPCS: Performed by: STUDENT IN AN ORGANIZED HEALTH CARE EDUCATION/TRAINING PROGRAM

## 2020-03-03 PROCEDURE — 85025 COMPLETE CBC W/AUTO DIFF WBC: CPT

## 2020-03-03 PROCEDURE — 82330 ASSAY OF CALCIUM: CPT

## 2020-03-03 PROCEDURE — 94640 AIRWAY INHALATION TREATMENT: CPT

## 2020-03-03 RX ORDER — HYDROCODONE BITARTRATE AND ACETAMINOPHEN 5; 325 MG/1; MG/1
1 TABLET ORAL EVERY 6 HOURS PRN
Status: DISCONTINUED | OUTPATIENT
Start: 2020-03-03 | End: 2020-03-06 | Stop reason: HOSPADM

## 2020-03-03 RX ORDER — DOCUSATE SODIUM 100 MG/1
100 CAPSULE, LIQUID FILLED ORAL 2 TIMES DAILY
Status: DISCONTINUED | OUTPATIENT
Start: 2020-03-03 | End: 2020-03-06 | Stop reason: HOSPADM

## 2020-03-03 RX ADMIN — AMLODIPINE BESYLATE 5 MG: 5 TABLET ORAL at 09:10

## 2020-03-03 RX ADMIN — ENOXAPARIN SODIUM 60 MG: 60 INJECTION SUBCUTANEOUS at 20:10

## 2020-03-03 RX ADMIN — BUPROPION HYDROCHLORIDE 100 MG: 100 TABLET, EXTENDED RELEASE ORAL at 09:10

## 2020-03-03 RX ADMIN — Medication 10 ML: at 09:12

## 2020-03-03 RX ADMIN — DOCUSATE SODIUM 100 MG: 100 CAPSULE, LIQUID FILLED ORAL at 20:11

## 2020-03-03 RX ADMIN — SENNOSIDES 5 ML: 8.8 LIQUID ORAL at 20:10

## 2020-03-03 RX ADMIN — IPRATROPIUM BROMIDE AND ALBUTEROL SULFATE 1 AMPULE: 2.5; .5 SOLUTION RESPIRATORY (INHALATION) at 12:46

## 2020-03-03 RX ADMIN — LEVOTHYROXINE SODIUM 100 MCG: 0.1 TABLET ORAL at 05:14

## 2020-03-03 RX ADMIN — SOTALOL HYDROCHLORIDE 120 MG: 120 TABLET ORAL at 09:10

## 2020-03-03 RX ADMIN — MELATONIN 3 MG ORAL TABLET 6 MG: 3 TABLET ORAL at 20:11

## 2020-03-03 RX ADMIN — ENOXAPARIN SODIUM 60 MG: 60 INJECTION SUBCUTANEOUS at 09:10

## 2020-03-03 RX ADMIN — SOTALOL HYDROCHLORIDE 120 MG: 120 TABLET ORAL at 20:11

## 2020-03-03 RX ADMIN — SODIUM CHLORIDE, PRESERVATIVE FREE 300 UNITS: 5 INJECTION INTRAVENOUS at 09:11

## 2020-03-03 RX ADMIN — IPRATROPIUM BROMIDE AND ALBUTEROL SULFATE 1 AMPULE: 2.5; .5 SOLUTION RESPIRATORY (INHALATION) at 07:58

## 2020-03-03 RX ADMIN — CALCIUM GLUCONATE: 98 INJECTION, SOLUTION INTRAVENOUS at 18:15

## 2020-03-03 ASSESSMENT — PAIN DESCRIPTION - PAIN TYPE: TYPE: ACUTE PAIN;SURGICAL PAIN

## 2020-03-03 ASSESSMENT — PAIN DESCRIPTION - DESCRIPTORS: DESCRIPTORS: DISCOMFORT;CONSTANT;ACHING

## 2020-03-03 ASSESSMENT — PAIN DESCRIPTION - ORIENTATION: ORIENTATION: MID

## 2020-03-03 ASSESSMENT — PAIN SCALES - GENERAL
PAINLEVEL_OUTOF10: 6
PAINLEVEL_OUTOF10: 0

## 2020-03-03 ASSESSMENT — PAIN DESCRIPTION - LOCATION: LOCATION: ABDOMEN

## 2020-03-03 ASSESSMENT — PAIN DESCRIPTION - ONSET: ONSET: ON-GOING

## 2020-03-03 NOTE — PROGRESS NOTES
GENERAL SURGERY  DAILY PROGRESS NOTE  3/3/2020    Subjective:  Patient had a bowel movement overnight. Pain improving. Tolerating clears. States she is doing OK, denies nausea, vomiting. No acute events overnight    Objective:  /68   Pulse 75   Temp 98.2 °F (36.8 °C) (Oral)   Resp 18   Ht 5' 6\" (1.676 m)   Wt 109 lb 3.2 oz (49.5 kg)   SpO2 94%   BMI 17.63 kg/m²     General: NAD, awake, confused. Lungs: No increased work of breathing. Cardiovascular: RR.   Abdomen: Soft, improving distention, minimally tender. Incision c/d/i. Skin: Warm, dry and intact    Assessment/Plan:  68 y.o. female w/ cecal perforation s/p ex lap w/ right hemicolectomy 2/17 now with resolving ileus. 2/26 R thoracentesis 600 ml serous fluid removed.      Pain and nausea control PRN - transition to PO  Adv to Low fiber  1/2 TPN  OK to transition to PO anticoagulation  PT/OT 12/24 - DC planning    Electronically signed by Rob Marie DO on 3/3/2020 at 8:29 AM     Seen/examined  Agree with above  JSGadyMD

## 2020-03-03 NOTE — PROGRESS NOTES
Patient found on the floor lying on her R side stated she slid from bed, upon inspection it was found that patient had slid on her own BM, assisted up with 3 assist, able to bear weight on upper and lower extremities, AROM to all extremities noted , denies any pain at this time, bed alarm was on at that time patient denies hitting her head, Vs stable at 98.2, P 75, R 18, B/P 136/68 Spo2 93%  Dr Youngblood Both notified via perfect serve of fall. Daughter Kayla Jay notified via phone , clinical nurse supervisor also notified , patient resting in bed at this time denies any complaints .

## 2020-03-03 NOTE — PROGRESS NOTES
MD, 6 mg at 03/02/20 2026    amLODIPine (NORVASC) tablet 5 mg, 5 mg, Oral, Daily, Caryn Cooper MD, 5 mg at 03/02/20 0929    ipratropium-albuterol (DUONEB) nebulizer solution 1 ampule, 1 ampule, Inhalation, Q4H WA, Caryn Cooper MD, 1 ampule at 03/03/20 0758    dextrose 50 % IV solution, 12.5 g, Intravenous, PRN, Caryn Cooper MD    bisacodyl (DULCOLAX) suppository 10 mg, 10 mg, Rectal, Daily, Caryn Cooper MD, 10 mg at 03/02/20 0930    enoxaparin (LOVENOX) injection 60 mg, 1 mg/kg, Subcutaneous, BID, Caryn Cooper MD, 60 mg at 03/02/20 2026    glucose (GLUTOSE) 40 % oral gel 15 g, 15 g, Oral, PRN, Caryn Cooper MD    glucagon (rDNA) injection 1 mg, 1 mg, Intramuscular, PRN, Caryn Cooper MD    dextrose 5 % solution, 100 mL/hr, Intravenous, PRN, Caryn Cooper MD    levothyroxine (SYNTHROID) tablet 100 mcg, 100 mcg, Oral, Daily, Caryn Cooper MD, 100 mcg at 03/03/20 0514    sodium chloride flush 0.9 % injection 10 mL, 10 mL, Intravenous, 2 times per day, Caryn Cooper MD, 10 mL at 03/02/20 2027    Objective:    /68   Pulse 75   Temp 98.2 °F (36.8 °C) (Oral)   Resp 18   Ht 5' 6\" (1.676 m)   Wt 109 lb 3.2 oz (49.5 kg)   SpO2 94%   BMI 17.63 kg/m²     Heart:  Reg  Lungs:  CTA bilaterally, no wheeze, rales or rhonchi  Abd: bowel sounds present, less distended  Extrem:  No clubbing, cyanosis, or edema    CBC with Differential:    Lab Results   Component Value Date    WBC 11.6 03/03/2020    RBC 3.32 03/03/2020    HGB 10.0 03/03/2020    HCT 31.0 03/03/2020     03/03/2020    MCV 93.4 03/03/2020    MCH 30.1 03/03/2020    MCHC 32.3 03/03/2020    RDW 16.3 03/03/2020    NRBC 1.7 02/23/2020    METASPCT 0.9 03/01/2020    LYMPHOPCT 6.0 03/03/2020    MONOPCT 8.8 03/03/2020    MYELOPCT 0.9 02/22/2020    EOSPCT 3 10/07/2010    BASOPCT 0.2 03/03/2020    MONOSABS 1.02 03/03/2020    LYMPHSABS 0.69 03/03/2020    EOSABS 0.02 03/03/2020    BASOSABS 0.02 03/03/2020     CMP:    Lab Results   Component Value Date     03/03/2020    K 3.8 03/03/2020    K 4.0 02/20/2020     03/03/2020    CO2 29 03/03/2020    BUN 34 03/03/2020    CREATININE 0.6 03/03/2020    GFRAA >60 03/03/2020    LABGLOM >60 03/03/2020    GLUCOSE 132 03/03/2020    GLUCOSE 102 10/07/2010    PROT 5.7 03/03/2020    LABALBU 2.0 03/03/2020    LABALBU 4.5 10/07/2010    CALCIUM 8.1 03/03/2020    BILITOT 1.3 03/03/2020    ALKPHOS 85 03/03/2020    AST 26 03/03/2020    ALT 55 03/03/2020     Warfarin PT/INR:    Lab Results   Component Value Date    INR 1.0 02/17/2020    INR 1.0 01/20/2020    INR 1.0 01/07/2020    PROTIME 11.8 02/17/2020    PROTIME 11.2 01/20/2020    PROTIME 11.0 01/07/2020       Assessment:    Principal Problem:    SBO (small bowel obstruction) (HCC)  Active Problems:    Essential hypertension    Depression    PAD (peripheral artery disease) (HCC)    Acquired hypothyroidism    Cecal volvulus (HCC)    ROSELYN (acute kidney injury) ([facility]    Shock liver    Severe protein-calorie malnutrition (HCC)    Atherosclerosis of aortic bifurcation and common iliac arteries (HCC)    CAD (coronary artery disease)    History of bilateral carotid artery stenosis    Paroxysmal atrial fibrillation with rapid ventricular response ([address]  Resolved Problems:    * No resolved hospital problems.  *      Plan:  Advance diet per surgery increase activity dc planning check pulsox on  air        Abdi Sims  8:20 AM  3/3/2020

## 2020-03-03 NOTE — CARE COORDINATION
Patient advancing to low fiber diet today. Adrian Myles following, plans to accept when stable for discharge. HENS and ambulette on soft chart.

## 2020-03-03 NOTE — PROGRESS NOTES
Physical Therapy  Physical Therapy Treatment Note     Name: Nura Angela  : 1950  MRN: [id number]    Referring Provider:  Elvi Núñez DO    Date of Service: 3/3/2020    Evaluating PT:  Daren Jacobs PT, DPT [id number]    Room #:  4501/4501-B  Diagnosis:  SBO  Precautions: Falls, , O2, Bed alarm, Incontinence, Abdominal Precautions, Clear liquid diet  Procedure/Surgery:   Exploratory laparotomy, enterolysis, right hemicolectomy with side-to-side, functional end-to-end anastomosis   Thoracentesis  PMHx/PSHx:  CA, CAD, COPD, HLD, HTN, PVD, RA, Thyroid disease, L SFA Angioplasty 19    Equipment Needs:  TBD    SUBJECTIVE:    Pt lives with daughter in a 2 story home with 3 stairs to enter and 2 rail. Full flight of steps and 1 rail to bedroom. Pt ambulated with [employer] and required some assistance for ADLs PTA. OBJECTIVE:   Initial Evaluation  Date: 20 Treatment  3/3/20 Short Term/ Long Term   Goals   AM-PAC 6 Clicks  54/28    Was pt agreeable to Eval/treatment? Yes Yes    Does pt have pain? 5/10 abdominal pain No c/o pain    Bed Mobility  Rolling: NT  Supine to sit: MaxA x 2  Sit to supine: MaxA x 2  Scooting: MaxA Rolling: Mod A  Supine to sit: Max A   Sit to supine: Max A  Scooting: Mod A Hany   Transfers Sit to stand: ModA x 2  Stand to sit: ModA x 2  Stand pivot: NT Sit to stand: Max A  Stand to sit: Max A  Stand pivot: Max A Hany with [employer]   Ambulation   3 feet with MaxA x 2 with  [employer] 5 feet forward/backward using [employer] with Max A >75 feet with Hany with [employer]   Stair negotiation: ascended and descended NT NT >2 steps with 1 rail Hany   ROM BUE:  Defer to OT note  BLE:  WNL WNL    Strength BUE:  Defer to OT note  BLE:  3+ to 4-/5 BLE: 4-/5 ankle, 3+/5 knee, 3-/5 hip Increase by 1/3 MMT grade   Balance Sitting EOB:  MaxA  Dynamic Standing:  MaxA x 2 with [employer] Sitting: Min<>Mod A  Standing:  Max A  Sitting EOB:  SBA  Dynamic Standing:  Hany with [employer]     Pt is A & O x 2; place and

## 2020-03-03 NOTE — PROGRESS NOTES
lean; max verbal and tactile cues for posture, ww management and sequencing; addressing endurance, body mechanics, posture, postioning and safety). Therapist facilitated ADL retraining tasks addressing sequencing, activity tolerance, body mechanics, postioning and safety. At end of session seated in bedside chair all lines and tubes intact, call light within reach. · Pt has made limited progress towards set goals.    · Continue with current plan of care      Treatment Time In: 4052           Treatment Time Out: 1015  Treatment Charges: Mins Units   Ther Ex  62887     Manual Therapy 95448     Thera Activities 19363 10 1   ADL/Home Mgt 54366 15 1   Neuro Re-ed 15409     Group Therapy      Orthotic manage/training  98155     Non-Billable Time     Total Timed Treatment 25 2       Nehal Das 46, 50 [facility]

## 2020-03-04 ENCOUNTER — APPOINTMENT (OUTPATIENT)
Dept: GENERAL RADIOLOGY | Age: 70
DRG: 329 | End: 2020-03-04
Payer: MEDICARE

## 2020-03-04 LAB
ALBUMIN SERPL-MCNC: 2 G/DL (ref 3.5–5.2)
ALP BLD-CCNC: 130 U/L (ref 35–104)
ALT SERPL-CCNC: 60 U/L (ref 0–32)
ANION GAP SERPL CALCULATED.3IONS-SCNC: 11 MMOL/L (ref 7–16)
ANISOCYTOSIS: ABNORMAL
AST SERPL-CCNC: 48 U/L (ref 0–31)
BASOPHILS ABSOLUTE: 0 E9/L (ref 0–0.2)
BASOPHILS RELATIVE PERCENT: 0.1 % (ref 0–2)
BILIRUB SERPL-MCNC: 0.6 MG/DL (ref 0–1.2)
BUN BLDV-MCNC: 29 MG/DL (ref 8–23)
CALCIUM IONIZED: 1.2 MMOL/L (ref 1.15–1.33)
CALCIUM SERPL-MCNC: 7.6 MG/DL (ref 8.6–10.2)
CHLORIDE BLD-SCNC: 101 MMOL/L (ref 98–107)
CO2: 27 MMOL/L (ref 22–29)
CREAT SERPL-MCNC: 0.5 MG/DL (ref 0.5–1)
EOSINOPHILS ABSOLUTE: 0.09 E9/L (ref 0.05–0.5)
EOSINOPHILS RELATIVE PERCENT: 0.9 % (ref 0–6)
GFR AFRICAN AMERICAN: >60
GFR NON-AFRICAN AMERICAN: >60 ML/MIN/1.73
GLUCOSE BLD-MCNC: 109 MG/DL (ref 74–99)
HCT VFR BLD CALC: 30 % (ref 34–48)
HEMOGLOBIN: 9.7 G/DL (ref 11.5–15.5)
LYMPHOCYTES ABSOLUTE: 0.69 E9/L (ref 1.5–4)
LYMPHOCYTES RELATIVE PERCENT: 7 % (ref 20–42)
MAGNESIUM: 2 MG/DL (ref 1.6–2.6)
MCH RBC QN AUTO: 30.3 PG (ref 26–35)
MCHC RBC AUTO-ENTMCNC: 32.3 % (ref 32–34.5)
MCV RBC AUTO: 93.8 FL (ref 80–99.9)
MONOCYTES ABSOLUTE: 0.69 E9/L (ref 0.1–0.95)
MONOCYTES RELATIVE PERCENT: 7 % (ref 2–12)
NEUTROPHILS ABSOLUTE: 8.33 E9/L (ref 1.8–7.3)
NEUTROPHILS RELATIVE PERCENT: 85.2 % (ref 43–80)
PDW BLD-RTO: 15.7 FL (ref 11.5–15)
PHOSPHORUS: 3.7 MG/DL (ref 2.5–4.5)
PLATELET # BLD: 397 E9/L (ref 130–450)
PMV BLD AUTO: 10.1 FL (ref 7–12)
POLYCHROMASIA: ABNORMAL
POTASSIUM SERPL-SCNC: 3.3 MMOL/L (ref 3.5–5)
RBC # BLD: 3.2 E12/L (ref 3.5–5.5)
SODIUM BLD-SCNC: 139 MMOL/L (ref 132–146)
TOTAL PROTEIN: 5.3 G/DL (ref 6.4–8.3)
WBC # BLD: 9.8 E9/L (ref 4.5–11.5)

## 2020-03-04 PROCEDURE — 6370000000 HC RX 637 (ALT 250 FOR IP): Performed by: STUDENT IN AN ORGANIZED HEALTH CARE EDUCATION/TRAINING PROGRAM

## 2020-03-04 PROCEDURE — 6370000000 HC RX 637 (ALT 250 FOR IP): Performed by: INTERNAL MEDICINE

## 2020-03-04 PROCEDURE — 71045 X-RAY EXAM CHEST 1 VIEW: CPT

## 2020-03-04 PROCEDURE — 80053 COMPREHEN METABOLIC PANEL: CPT

## 2020-03-04 PROCEDURE — 83735 ASSAY OF MAGNESIUM: CPT

## 2020-03-04 PROCEDURE — 2060000000 HC ICU INTERMEDIATE R&B

## 2020-03-04 PROCEDURE — 85025 COMPLETE CBC W/AUTO DIFF WBC: CPT

## 2020-03-04 PROCEDURE — 2700000000 HC OXYGEN THERAPY PER DAY

## 2020-03-04 PROCEDURE — 94640 AIRWAY INHALATION TREATMENT: CPT

## 2020-03-04 PROCEDURE — 84100 ASSAY OF PHOSPHORUS: CPT

## 2020-03-04 PROCEDURE — 82330 ASSAY OF CALCIUM: CPT

## 2020-03-04 PROCEDURE — 2580000003 HC RX 258: Performed by: STUDENT IN AN ORGANIZED HEALTH CARE EDUCATION/TRAINING PROGRAM

## 2020-03-04 PROCEDURE — 6360000002 HC RX W HCPCS: Performed by: STUDENT IN AN ORGANIZED HEALTH CARE EDUCATION/TRAINING PROGRAM

## 2020-03-04 RX ORDER — POTASSIUM CHLORIDE 20 MEQ/1
40 TABLET, EXTENDED RELEASE ORAL ONCE
Status: COMPLETED | OUTPATIENT
Start: 2020-03-04 | End: 2020-03-04

## 2020-03-04 RX ORDER — IPRATROPIUM BROMIDE AND ALBUTEROL SULFATE 2.5; .5 MG/3ML; MG/3ML
1 SOLUTION RESPIRATORY (INHALATION) EVERY 4 HOURS PRN
Status: DISCONTINUED | OUTPATIENT
Start: 2020-03-04 | End: 2020-03-06 | Stop reason: HOSPADM

## 2020-03-04 RX ADMIN — MELATONIN 3 MG ORAL TABLET 6 MG: 3 TABLET ORAL at 20:43

## 2020-03-04 RX ADMIN — BUPROPION HYDROCHLORIDE 100 MG: 100 TABLET, EXTENDED RELEASE ORAL at 09:22

## 2020-03-04 RX ADMIN — SODIUM CHLORIDE, PRESERVATIVE FREE 300 UNITS: 5 INJECTION INTRAVENOUS at 09:23

## 2020-03-04 RX ADMIN — SOTALOL HYDROCHLORIDE 120 MG: 120 TABLET ORAL at 20:43

## 2020-03-04 RX ADMIN — SODIUM CHLORIDE, PRESERVATIVE FREE 300 UNITS: 5 INJECTION INTRAVENOUS at 20:43

## 2020-03-04 RX ADMIN — APIXABAN 5 MG: 5 TABLET, FILM COATED ORAL at 20:43

## 2020-03-04 RX ADMIN — POTASSIUM CHLORIDE 40 MEQ: 1500 TABLET, EXTENDED RELEASE ORAL at 09:22

## 2020-03-04 RX ADMIN — AMLODIPINE BESYLATE 5 MG: 5 TABLET ORAL at 09:22

## 2020-03-04 RX ADMIN — HYDROCODONE BITARTRATE AND ACETAMINOPHEN 1 TABLET: 5; 325 TABLET ORAL at 12:27

## 2020-03-04 RX ADMIN — APIXABAN 5 MG: 5 TABLET, FILM COATED ORAL at 09:22

## 2020-03-04 RX ADMIN — DOCUSATE SODIUM 100 MG: 100 CAPSULE, LIQUID FILLED ORAL at 09:22

## 2020-03-04 RX ADMIN — IPRATROPIUM BROMIDE AND ALBUTEROL SULFATE 1 AMPULE: 2.5; .5 SOLUTION RESPIRATORY (INHALATION) at 12:38

## 2020-03-04 RX ADMIN — SOTALOL HYDROCHLORIDE 120 MG: 120 TABLET ORAL at 09:22

## 2020-03-04 RX ADMIN — Medication 10 ML: at 20:44

## 2020-03-04 RX ADMIN — IPRATROPIUM BROMIDE AND ALBUTEROL SULFATE 1 AMPULE: 2.5; .5 SOLUTION RESPIRATORY (INHALATION) at 08:18

## 2020-03-04 RX ADMIN — Medication 10 ML: at 09:23

## 2020-03-04 RX ADMIN — LEVOTHYROXINE SODIUM 100 MCG: 0.1 TABLET ORAL at 05:36

## 2020-03-04 ASSESSMENT — PAIN SCALES - GENERAL
PAINLEVEL_OUTOF10: 0
PAINLEVEL_OUTOF10: 5
PAINLEVEL_OUTOF10: 0

## 2020-03-04 NOTE — PROGRESS NOTES
1/2 TPN, post op ileus resolving   · Wound Type: Surgical Wound  · Current Nutrition Therapies:  · Oral Diet Orders: Low Fiber   · Oral Diet intake: 1-25%  · Oral Nutrition Supplement (ONS) Orders: None  · Parenteral Nutrition Orders:  · Type and Formula: 3-in-1 Custom   · Rate/Volume: 41.6ml/hr= 1000ml TV  · Duration: Continuous  · Current PN Order Provides: 84gm AA, 200gm D, 20g lipid, 1216 kcals   · Goal PN Orders Provides: 90 gm AA, 214 gm dextrose, 31 gm lipid, 1400 total kcals   · Anthropometric Measures:  · Ht: 5' 6\" (167.6 cm)   · Current Body Wt: 108 lb (49 kg)(3/4 actual bedscale )  · Admission Body Wt: 111 lb (50.3 kg)(2/17 actual)  · Usual Body Wt: 110 lb (49.9 kg)(08/2019 per EMR, actual)  · % Weight Change:  ,  wt trending back to UBW w/ wt changes 2/2 fluids   · Ideal Body Wt: 130 lb (59 kg), % Ideal Body 85%(using admit wt )  · BMI Classification: BMI <18.5 Underweight    Nutrition Interventions:   Continue current diet, Start ONS, Continue Parenteral Nutrition  Continued Inpatient Monitoring, Education Initiated, Coordination of Care(d/w pt ONS options )    Nutrition Evaluation:   · Evaluation: Goals set   · Goals: Consume 50% or more of most meals/ONS; tolerate TPN wean    · Monitoring: Meal Intake, Supplement Intake, PN Intake, PN Tolerance, Wound Healing, I&O, Skin Integrity, Mental Status/Confusion, Weight, Pertinent Labs, Monitor Bowel Function      Electronically signed by Elba Key RD, LD on 3/4/20 at 2:29 PM    Contact Number: [phone]

## 2020-03-04 NOTE — PROGRESS NOTES
Hospital Medicine    Subjective:  Pt alert conversive tiffani diet      Current Facility-Administered Medications:     apixaban (ELIQUIS) tablet 5 mg, 5 mg, Oral, BID, Jonnathan Borges DO    ipratropium-albuterol (DUONEB) nebulizer solution 1 ampule, 1 ampule, Inhalation, Q4H PRN, Jonnathan Borges DO    PN-Adult  3-in-1 [employer] (Custom), , Intravenous, Continuous TPN, Romayne Fanny, DO, Last Rate: 41.7 mL/hr at 03/03/20 1815    HYDROcodone-acetaminophen (NORCO) 5-325 MG per tablet 1 tablet, 1 tablet, Oral, Q6H PRN, Romayne Fanny, DO    docusate sodium (COLACE) capsule 100 mg, 100 mg, Oral, BID, Romayne Fanny, DO, 100 mg at 03/03/20 2011    trimethobenzamide (TIGAN) injection 200 mg, 200 mg, Intramuscular, Q6H PRN, Laury Hook MD, 200 mg at 02/26/20 0242    medicated lip balm (BLISTEX/CARMEX) stick, , Topical, PRN, Laury Hook MD    sodium chloride flush 0.9 % injection 10 mL, 10 mL, Intravenous, PRN, Laury Hook MD, 10 mL at 03/02/20 0421    heparin flush 100 UNIT/ML injection 300 Units, 3 mL, Intravenous, 2 times per day, Laury Hook MD, 300 Units at 03/03/20 0911    heparin flush 100 UNIT/ML injection 300 Units, 3 mL, Intracatheter, PRN, Laury Hook MD    sotalol (BETAPACE) tablet 120 mg, 120 mg, Oral, BID, Laury Hook MD, 120 mg at 03/03/20 2011    buPROPion [facility]) extended release tablet 100 mg, 100 mg, Oral, Daily, Laury Hook MD, 100 mg at 03/03/20 0910    sennosides (SENOKOT) 8.8 MG/5ML syrup 5 mL, 5 mL, Oral, Nightly, Laury Hook MD, 5 mL at 03/03/20 2010    melatonin tablet 6 mg, 6 mg, Oral, Nightly, Laury Hook MD, 6 mg at 03/03/20 2011    amLODIPine (NORVASC) tablet 5 mg, 5 mg, Oral, Daily, Laury Hook MD, 5 mg at 03/03/20 0910    dextrose 50 % IV solution, 12.5 g, Intravenous, PRN, Laury Hook MD    bisacodyl (DULCOLAX) suppository 10 mg, 10 mg, Rectal, Daily, Laury Hook MD, Stopped at 03/04/20 9607   levothyroxine (SYNTHROID) tablet 100 mcg, 100 mcg, Oral, Daily, Toby Quintero MD, 100 mcg at 03/04/20 0536    sodium chloride flush 0.9 % injection 10 mL, 10 mL, Intravenous, 2 times per day, Toby Quintero MD, 10 mL at 03/03/20 0912    Objective:    BP (!) 153/71   Pulse 73   Temp 97.1 °F (36.2 °C) (Oral)   Resp 16   Ht 5' 6\" (1.676 m)   Wt 108 lb 3.2 oz (49.1 kg)   SpO2 95%   BMI 17.46 kg/m²     Heart:  Reg  Lungs:  Min rhonchi  Abd: bowel sounds present, tender to palp, distended  Extrem:  No clubbing, cyanosis, or edema    CBC with Differential:    Lab Results   Component Value Date    WBC 9.8 03/04/2020    RBC 3.20 03/04/2020    HGB 9.7 03/04/2020    HCT 30.0 03/04/2020     03/04/2020    MCV 93.8 03/04/2020    MCH 30.3 03/04/2020    MCHC 32.3 03/04/2020    RDW 15.7 03/04/2020    NRBC 1.7 02/23/2020    METASPCT 0.9 03/01/2020    LYMPHOPCT 7.0 03/04/2020    MONOPCT 7.0 03/04/2020    MYELOPCT 0.9 02/22/2020    EOSPCT 3 10/07/2010    BASOPCT 0.1 03/04/2020    MONOSABS 0.69 03/04/2020    LYMPHSABS 0.69 03/04/2020    EOSABS 0.09 03/04/2020    BASOSABS 0.00 03/04/2020     CMP:    Lab Results   Component Value Date     03/04/2020    K 3.3 03/04/2020    K 4.0 02/20/2020     03/04/2020    CO2 27 03/04/2020    BUN 29 03/04/2020    CREATININE 0.5 03/04/2020    GFRAA >60 03/04/2020    LABGLOM >60 03/04/2020    GLUCOSE 109 03/04/2020    GLUCOSE 102 10/07/2010    PROT 5.3 03/04/2020    LABALBU 2.0 03/04/2020    LABALBU 4.5 10/07/2010    CALCIUM 7.6 03/04/2020    BILITOT 0.6 03/04/2020    ALKPHOS 130 03/04/2020    AST 48 03/04/2020    ALT 60 03/04/2020     Warfarin PT/INR:    Lab Results   Component Value Date    INR 1.0 02/17/2020    INR 1.0 01/20/2020    INR 1.0 01/07/2020    PROTIME 11.8 02/17/2020    PROTIME 11.2 01/20/2020    PROTIME 11.0 01/07/2020       Assessment:    Principal Problem:    SBO (small bowel obstruction) (HCC)  Active Problems:    Essential hypertension    Depression    PAD (peripheral artery disease) (HCC)    Acquired hypothyroidism    Cecal volvulus (HCC)    ROSELYN (acute kidney injury) ([address]    Shock liver    Severe protein-calorie malnutrition (HCC)    Atherosclerosis of aortic bifurcation and common iliac arteries (HCC)    CAD (coronary artery disease)    History of bilateral carotid artery stenosis    Paroxysmal atrial fibrillation with rapid ventricular response ([address]  Resolved Problems:    * No resolved hospital problems.  *      Plan:  Replace k change to po eliquis dc planning dietary to see for nutritional supplements        Magnolia Haas  8:55 AM  3/4/2020

## 2020-03-04 NOTE — PROGRESS NOTES
PROGRESS NOTE       PATIENT PROBLEM LIST:  Principal Problem:    SBO (small bowel obstruction) (HCC)  Active Problems:    Essential hypertension    Depression    PAD (peripheral artery disease) (HCC)    Acquired hypothyroidism    Cecal volvulus (HCC)    ROSELYN (acute kidney injury) ([address]    Shock liver    Severe protein-calorie malnutrition (HCC)    Atherosclerosis of aortic bifurcation and common iliac arteries (HCC)    CAD (coronary artery disease)    History of bilateral carotid artery stenosis    Paroxysmal atrial fibrillation with rapid ventricular response ([address]  Resolved Problems:    * No resolved hospital problems. *      SUBJECTIVE:  Mari Miller is presently resting comfortably in bed and voices no complaints of chest discomfort palpitations, shortness of breath or lightheadedness. In fact she feels somewhat better and states that she is eating somewhat. She notes that her abdomen no longer painful. REVIEW OF SYSTEMS:  General ROS: positive for - fatigue, malaise, and weight loss  Psychological ROS: positive for - anxiety, depression  Ophthalmic ROS: negative for - decreased vision or visual distortion. ENT ROS: negative  Allergy and Immunology ROS: negative  Hematological and Lymphatic ROS: negative  Endocrine: no heat or cold intolerance and no polyphagia, polydipsia, or polyuria  Respiratory ROS: negative for - shortness of breath  Cardiovascular ROS: positive for - irregular heartbeat and rapid heart rate. Gastrointestinal ROS: + persistent abdominal discomfort-improving, change in bowel habits, no black or bloody stools  Genito-Urinary ROS: no nocturia, dysuria, trouble voiding, frequency or hematuria  Musculoskeletal ROS: negative for- myalgias, arthralgias, or claudication  Neurological ROS: no TIA or stroke symptoms otherwise no significant change in symptoms or problems since yesterday as documented in previous progress notes.     SCHEDULED MEDICATIONS:   docusate sodium  100 mg Oral BID  heparin flush  3 mL Intravenous 2 times per day    sotalol  120 mg Oral BID    buPROPion  100 mg Oral Daily    sennosides  5 mL Oral Nightly    melatonin  6 mg Oral Nightly    amLODIPine  5 mg Oral Daily    ipratropium-albuterol  1 ampule Inhalation Q4H WA    bisacodyl  10 mg Rectal Daily    enoxaparin  1 mg/kg Subcutaneous BID    levothyroxine  100 mcg Oral Daily    sodium chloride flush  10 mL Intravenous 2 times per day       VITAL SIGNS:                                                                                                                          /68   Pulse 71   Temp 97.2 °F (36.2 °C) (Oral)   Resp 18   Ht 5' 6\" (1.676 m)   Wt 109 lb 3.2 oz (49.5 kg)   SpO2 95%   BMI 17.63 kg/m²   Patient Vitals for the past 96 hrs (Last 3 readings):   Weight   03/02/20 0603 109 lb 3.2 oz (49.5 kg)   03/01/20 0548 107 lb 12.8 oz (48.9 kg)     OBJECTIVE:    HEENT: PERRL, EOM  Intact; sclera non-icteric, conjunctiva pink. Carotids are brisk in upstroke with normal contour. No carotid bruits. Normal jugular venous pulsation at 45°. No palpable cervical nor supraclavicular nodes. Thyroid not palpable. Trachea midline. Chest: Even excursion  Lungs: Decreased bases but otherwise grossly clear to auscultation bilaterally, no expiratory wheezes or rhonchi, no decreased tactile fremitus without inspiratory rales. Heart: Regular  rhythm; S1 > S2, no gallop or murmur. No clicks, rub, palpable thrills   or heaves. PMI nondisplaced, 5th intercostal space MCL. Abdomen: Soft, nontender, nondistended,  mildly protuberant, no masses or organomegaly. Bowel sounds active. Extremities: Without clubbing, cyanosis or edema. Pulses present 3+ upper extermities bilaterally; present 1+ DP and present 1+ PT bilaterally.      Data:   Scheduled Meds: Reviewed  Continuous Infusions:    PN-Adult  3 IN 1 [place] (Custom) 62.5 mL/hr at 02/28/20 16 06    dextrose         Intake/Output Summary (Last 24 hours) at

## 2020-03-04 NOTE — PROGRESS NOTES
GENERAL SURGERY  DAILY PROGRESS NOTE  3/4/2020    Subjective:  Patient had multiple bowel movements overnight. Pain improving. Tolerating diet     Objective:  BP (!) 153/71   Pulse 73   Temp 97.9 °F (36.6 °C) (Temporal)   Resp 16   Ht 5' 6\" (1.676 m)   Wt 108 lb 3.2 oz (49.1 kg)   SpO2 95%   BMI 17.46 kg/m²     General: NAD, arouseable, confused. Lungs: No increased work of breathing. Cardiovascular: RR.   Abdomen: Soft, improving distention, minimally tender. Incision c/d/i. Skin: Warm, dry and intact    Assessment/Plan:  68 y.o. female w/ cecal perforation s/p ex lap w/ right hemicolectomy 2/17 now with resolving ileus. 2/26 R thoracentesis 600 ml serous fluid removed.      Pain and nausea control PRN - transition to PO  Low fiber  Stop TPN  Staples out  OK to transition to PO anticoagulation  PT/OT 12/24 - DC planning    Electronically signed by Padmini Gleason DO on 3/4/2020 at 10:24 AM     Seen/examined  Agree with above  Dio

## 2020-03-04 NOTE — CARE COORDINATION
Patient's insurance changed today to [facility] [employer]. Notified roberta, they verified insurance and are initiating precert today. They are unsure if they will be able to get precert today. HENS and ambulette on soft chart.

## 2020-03-05 LAB
ALBUMIN SERPL-MCNC: 2 G/DL (ref 3.5–5.2)
ALP BLD-CCNC: 120 U/L (ref 35–104)
ALT SERPL-CCNC: 52 U/L (ref 0–32)
ANION GAP SERPL CALCULATED.3IONS-SCNC: 9 MMOL/L (ref 7–16)
ANISOCYTOSIS: ABNORMAL
AST SERPL-CCNC: 37 U/L (ref 0–31)
BASOPHILS ABSOLUTE: 0 E9/L (ref 0–0.2)
BASOPHILS RELATIVE PERCENT: 0.2 % (ref 0–2)
BILIRUB SERPL-MCNC: 0.6 MG/DL (ref 0–1.2)
BUN BLDV-MCNC: 27 MG/DL (ref 8–23)
CALCIUM IONIZED: 1.15 MMOL/L (ref 1.15–1.33)
CALCIUM SERPL-MCNC: 7.6 MG/DL (ref 8.6–10.2)
CHLORIDE BLD-SCNC: 101 MMOL/L (ref 98–107)
CO2: 26 MMOL/L (ref 22–29)
CREAT SERPL-MCNC: 0.6 MG/DL (ref 0.5–1)
EOSINOPHILS ABSOLUTE: 0 E9/L (ref 0.05–0.5)
EOSINOPHILS RELATIVE PERCENT: 0.4 % (ref 0–6)
GFR AFRICAN AMERICAN: >60
GFR NON-AFRICAN AMERICAN: >60 ML/MIN/1.73
GLUCOSE BLD-MCNC: 79 MG/DL (ref 74–99)
HCT VFR BLD CALC: 30.3 % (ref 34–48)
HEMOGLOBIN: 9.5 G/DL (ref 11.5–15.5)
HYPOCHROMIA: ABNORMAL
LYMPHOCYTES ABSOLUTE: 0.28 E9/L (ref 1.5–4)
LYMPHOCYTES RELATIVE PERCENT: 2.6 % (ref 20–42)
MAGNESIUM: 1.8 MG/DL (ref 1.6–2.6)
MCH RBC QN AUTO: 29.5 PG (ref 26–35)
MCHC RBC AUTO-ENTMCNC: 31.4 % (ref 32–34.5)
MCV RBC AUTO: 94.1 FL (ref 80–99.9)
METAMYELOCYTES RELATIVE PERCENT: 1.7 % (ref 0–1)
MONOCYTES ABSOLUTE: 0.28 E9/L (ref 0.1–0.95)
MONOCYTES RELATIVE PERCENT: 2.6 % (ref 2–12)
NEUTROPHILS ABSOLUTE: 8.74 E9/L (ref 1.8–7.3)
NEUTROPHILS RELATIVE PERCENT: 93 % (ref 43–80)
PDW BLD-RTO: 14.9 FL (ref 11.5–15)
PHOSPHORUS: 3 MG/DL (ref 2.5–4.5)
PLATELET # BLD: 393 E9/L (ref 130–450)
PMV BLD AUTO: 10 FL (ref 7–12)
POIKILOCYTES: ABNORMAL
POLYCHROMASIA: ABNORMAL
POTASSIUM SERPL-SCNC: 3.9 MMOL/L (ref 3.5–5)
RBC # BLD: 3.22 E12/L (ref 3.5–5.5)
SODIUM BLD-SCNC: 136 MMOL/L (ref 132–146)
TARGET CELLS: ABNORMAL
TOTAL PROTEIN: 5.6 G/DL (ref 6.4–8.3)
WBC # BLD: 9.2 E9/L (ref 4.5–11.5)

## 2020-03-05 PROCEDURE — 6370000000 HC RX 637 (ALT 250 FOR IP): Performed by: INTERNAL MEDICINE

## 2020-03-05 PROCEDURE — 94640 AIRWAY INHALATION TREATMENT: CPT

## 2020-03-05 PROCEDURE — 6360000002 HC RX W HCPCS: Performed by: STUDENT IN AN ORGANIZED HEALTH CARE EDUCATION/TRAINING PROGRAM

## 2020-03-05 PROCEDURE — 36415 COLL VENOUS BLD VENIPUNCTURE: CPT

## 2020-03-05 PROCEDURE — 6370000000 HC RX 637 (ALT 250 FOR IP): Performed by: STUDENT IN AN ORGANIZED HEALTH CARE EDUCATION/TRAINING PROGRAM

## 2020-03-05 PROCEDURE — 97535 SELF CARE MNGMENT TRAINING: CPT

## 2020-03-05 PROCEDURE — 84100 ASSAY OF PHOSPHORUS: CPT

## 2020-03-05 PROCEDURE — 83735 ASSAY OF MAGNESIUM: CPT

## 2020-03-05 PROCEDURE — 97530 THERAPEUTIC ACTIVITIES: CPT

## 2020-03-05 PROCEDURE — 80053 COMPREHEN METABOLIC PANEL: CPT

## 2020-03-05 PROCEDURE — 2060000000 HC ICU INTERMEDIATE R&B

## 2020-03-05 PROCEDURE — 2580000003 HC RX 258: Performed by: STUDENT IN AN ORGANIZED HEALTH CARE EDUCATION/TRAINING PROGRAM

## 2020-03-05 PROCEDURE — 85025 COMPLETE CBC W/AUTO DIFF WBC: CPT

## 2020-03-05 PROCEDURE — 2700000000 HC OXYGEN THERAPY PER DAY

## 2020-03-05 PROCEDURE — 82330 ASSAY OF CALCIUM: CPT

## 2020-03-05 RX ORDER — PETROLATUM 42 G/100G
OINTMENT TOPICAL 2 TIMES DAILY PRN
Status: DISCONTINUED | OUTPATIENT
Start: 2020-03-05 | End: 2020-03-06 | Stop reason: HOSPADM

## 2020-03-05 RX ORDER — IPRATROPIUM BROMIDE AND ALBUTEROL SULFATE 2.5; .5 MG/3ML; MG/3ML
1 SOLUTION RESPIRATORY (INHALATION)
Status: DISCONTINUED | OUTPATIENT
Start: 2020-03-05 | End: 2020-03-06 | Stop reason: HOSPADM

## 2020-03-05 RX ADMIN — HYDROCODONE BITARTRATE AND ACETAMINOPHEN 1 TABLET: 5; 325 TABLET ORAL at 09:49

## 2020-03-05 RX ADMIN — SODIUM CHLORIDE, PRESERVATIVE FREE 300 UNITS: 5 INJECTION INTRAVENOUS at 10:04

## 2020-03-05 RX ADMIN — Medication 10 ML: at 10:03

## 2020-03-05 RX ADMIN — SOTALOL HYDROCHLORIDE 120 MG: 120 TABLET ORAL at 21:30

## 2020-03-05 RX ADMIN — AMLODIPINE BESYLATE 5 MG: 5 TABLET ORAL at 09:50

## 2020-03-05 RX ADMIN — SOTALOL HYDROCHLORIDE 120 MG: 120 TABLET ORAL at 09:49

## 2020-03-05 RX ADMIN — IPRATROPIUM BROMIDE AND ALBUTEROL SULFATE 1 AMPULE: 2.5; .5 SOLUTION RESPIRATORY (INHALATION) at 17:05

## 2020-03-05 RX ADMIN — HYDROCODONE BITARTRATE AND ACETAMINOPHEN 1 TABLET: 5; 325 TABLET ORAL at 16:06

## 2020-03-05 RX ADMIN — HYDROCODONE BITARTRATE AND ACETAMINOPHEN 1 TABLET: 5; 325 TABLET ORAL at 22:10

## 2020-03-05 RX ADMIN — MELATONIN 3 MG ORAL TABLET 6 MG: 3 TABLET ORAL at 21:30

## 2020-03-05 RX ADMIN — SODIUM CHLORIDE, PRESERVATIVE FREE 300 UNITS: 5 INJECTION INTRAVENOUS at 21:30

## 2020-03-05 RX ADMIN — BUPROPION HYDROCHLORIDE 100 MG: 100 TABLET, EXTENDED RELEASE ORAL at 09:50

## 2020-03-05 RX ADMIN — Medication 10 ML: at 21:30

## 2020-03-05 RX ADMIN — LEVOTHYROXINE SODIUM 100 MCG: 0.1 TABLET ORAL at 05:59

## 2020-03-05 RX ADMIN — IPRATROPIUM BROMIDE AND ALBUTEROL SULFATE 1 AMPULE: 2.5; .5 SOLUTION RESPIRATORY (INHALATION) at 19:44

## 2020-03-05 ASSESSMENT — PAIN DESCRIPTION - DESCRIPTORS
DESCRIPTORS: ACHING;DISCOMFORT;THROBBING
DESCRIPTORS: ACHING;SORE;THROBBING

## 2020-03-05 ASSESSMENT — PAIN DESCRIPTION - PROGRESSION
CLINICAL_PROGRESSION: GRADUALLY WORSENING
CLINICAL_PROGRESSION: NOT CHANGED
CLINICAL_PROGRESSION: NOT CHANGED

## 2020-03-05 ASSESSMENT — PAIN DESCRIPTION - FREQUENCY
FREQUENCY: CONTINUOUS
FREQUENCY: CONTINUOUS

## 2020-03-05 ASSESSMENT — PAIN DESCRIPTION - PAIN TYPE
TYPE: ACUTE PAIN
TYPE: SURGICAL PAIN
TYPE: ACUTE PAIN

## 2020-03-05 ASSESSMENT — PAIN SCALES - GENERAL
PAINLEVEL_OUTOF10: 5
PAINLEVEL_OUTOF10: 8
PAINLEVEL_OUTOF10: 8
PAINLEVEL_OUTOF10: 0

## 2020-03-05 ASSESSMENT — PAIN DESCRIPTION - ORIENTATION: ORIENTATION: MID

## 2020-03-05 ASSESSMENT — PAIN DESCRIPTION - ONSET
ONSET: ON-GOING
ONSET: ON-GOING

## 2020-03-05 ASSESSMENT — PAIN DESCRIPTION - LOCATION
LOCATION: ABDOMEN

## 2020-03-05 NOTE — CONSULTS
PULMONARY CONSULTATION    Reason for Consultation: recurrent R pleural effusion  Referring Physician: Gerardo Avery DO    Communication with the referring physician will be sent via the electronic medical record. HPI:    The patient is a 68year old female with a prolonged hospital stay stemming from a cecal perforation s/p ex lap with right hemicolectomy on 4/21/48 complicated by ileus requiring TPN. She did have a right pleural effusion and underwent IR drainage with removal of 600c fluid on 2/26/20. No pleural fluid studies were sent. I am asked to see her for a recurrent R effusion. The patient is overall a poor historian but states that she does not feel very well from a respiratory standpoint. She does have some SOB with activity and has a moist cough. She is a former smoker with a reported h/o COPD although no PFTs on file and she does not follow with pulmonary. She remains on eliquis which was not administered today.       Past Medical History:   Diagnosis Date    Aortoiliac occlusive disease ([address] 1/16/2019    Atherosclerosis of native arteries of extremity with rest pain ([address] 5/3/2018    Atherosclerosis of native artery of extremity with ulceration ([address] 1/16/2019    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 with rest pain ([address] 1/16/2019    Bilateral carotid artery stenosis 5/22/2018    Bruit of right carotid artery 5/3/2018    CAD (coronary artery disease)     Cancer (HCC)     SKIN CA/Thigh    COPD (chronic obstructive pulmonary disease) ([facility])     Depression     Femoro-popliteal artery disease ([address]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/16/2019    History of tobacco use 5/3/2018    Hydrocephalus ([facility])     Hyperlipidemia     Hypertension     Pain in both feet 5/3/2018    PVD (peripheral vascular disease) ([address]     PVD (peripheral vascular disease) with claudication ([facility]) 5/3/2018    Rheumatoid arthritis (HCC)     Thyroid disease     Venous stasis ulcer of left calf with fat layer exposed Valarie Menjivar MD   Stopped at 03/04/20 1312    levothyroxine (SYNTHROID) tablet 100 mcg  100 mcg Oral Daily Clara Su MD   100 mcg at 03/05/20 0559    sodium chloride flush 0.9 % injection 10 mL  10 mL Intravenous 2 times per day Clara Su MD   10 mL at 03/05/20 1003       Allergies   Allergen Reactions    Flagyl [Metronidazole]     Hornet Venom     Wasp Venom        Review of Systems:  14 point ROS obtained and neg aide from outlined in the HPI      Physical Exam:  BP (!) 140/65   Pulse 78   Temp 98 °F (36.7 °C) (Temporal)   Resp 18   Ht 5' 6\" (1.676 m)   Wt 108 lb 3.2 oz (49.1 kg)   SpO2 96%   BMI 17.46 kg/m²    General: Lying in bed comfortably, no distress, breathing is not labored  HEENT: PERRL, EOMI, MMM, no oral lesions  Neck: supple, no adenopathy  CV: RRR without murmur  Lungs: few scattered rhonchi, decreased BS at the right lung base   Abd: soft, NT, ND, incision is clean and intact   Ext: warm, no edema, no clubbing  Skin: no rashes  Neuro: CN II-XII grossly intact, no focal deficits    Oximetry: 96% on 5L    Imaging personally reviewed:  CXR         Moderate right-sided pleural effusion which is worsening as compared   to prior study       COPD       Right-sided PIC catheter with tip in superior vena cava. Echo   Summary   Left ventricular internal dimensions were normal in diastole and systole. No regional wall motion abnormalities seen. Normal left ventricular ejection fraction. Mild mitral regurgitation is present. The aortic valve appears mildly sclerotic. Mild tricuspid regurgitation.     Labs:  Lab Results   Component Value Date    WBC 9.2 03/05/2020    HGB 9.5 03/05/2020    HCT 30.3 03/05/2020    MCV 94.1 03/05/2020    MCH 29.5 03/05/2020    MCHC 31.4 03/05/2020    RDW 14.9 03/05/2020     03/05/2020    MPV 10.0 03/05/2020     Lab Results   Component Value Date     03/05/2020    K 3.9 03/05/2020    K 4.0 02/20/2020     03/05/2020    CO2 26 03/05/2020

## 2020-03-05 NOTE — PROGRESS NOTES
artery disease) ([facility]    Acquired hypothyroidism    Cecal volvulus (HCC)    ROSELYN (acute kidney injury) ([address]    Shock liver    Severe protein-calorie malnutrition (HCC)    Atherosclerosis of aortic bifurcation and common iliac arteries (HCC)    CAD (coronary artery disease)    History of bilateral carotid artery stenosis    Paroxysmal atrial fibrillation with rapid ventricular response ([facility]  Resolved Problems:    * No resolved hospital problems.  *      Plan:  pulm to see re recurrent pl effusion and rec re need for thoracentesis dc planning to rehab        Wendie Mass  7:58 AM  3/5/2020

## 2020-03-05 NOTE — PROGRESS NOTES
Occupational Therapy  OT BEDSIDE TREATMENT NOTE      Date:3/5/2020  Patient Name: Phil Galdamez  MRN: [id number]  : 1950  Room: [address] [place] [address]     Evaluating OT: [address] [place] [address] OTR/L #[id number]      AM-PAC Daily Activity Raw Score:      Recommended Adaptive Equipment: TBD      Diagnosis:  SBO  Patient presented to ED for abdominal pain      Surgery: 20  LAPAROTOMY EXPLORATORY, POSSIBLE BOWEL RESECTION, POSSIBLE OSTOMY, POSSIBLE WOUND VAC  20  LAPAROTOMY EXPLORATORY, RIGHT HEMICOLECTOMY  Pertinent Medical History: cancer, CAD, COPD, HTN, PVD      Precautions:  Falls & bed/chair alarm, O2,  abdominal precautions; Pt cleared for clear liquid diet.     Home Living: Pt lives in 2 story home with daughter; 3 MATT with B handrails   Bathroom setup: walk in shower    Equipment owned: ww     Prior Level of Function: Mod I with ADLs , Mod I with IADLs; ambulated with ww  Driving: not reported  Occupation: not reported      Pain Level: 10/10 abdominal pain; nursing notified and patient is not due for pain medication.      Cognition: A&O: 3/4 to self and place; Follows 1 step directions with mod / max cues. Pt has slow processing skills. Requires max cues to focus on task.               Memory:  P+              Comprehension: P+              Sequencing:  P+              Problem solving:  P+              Judgement/safety:  P+     Functional Assessment:    Initial Eval Status  Date: 20 Treatment Status  Date:3/5/20 Short Term Goals = Long Term Goals  Treatment frequency: 1-4x/wk; PRN   Feeding NG tube to suction     (min A self feeding with spoon for ice chips; per doctor)  Setup; Pt able to complete self feeding with setup while sitting up in the chair with tray table in front of her. Independent    Grooming Moderate Assist     (semi-supine; impaired sequencing) Mod A;    Mod cues/encouragement to initiate and follow through with tasks while in chair. Pt able to wash face and comb hair with SBA.    Stand ww management and sequencing; addressing endurance, body mechanics, posture, postioning and safety). Therapist facilitated ADL retraining tasks addressing sequencing, activity tolerance, body mechanics, postioning and safety. At end of session seated in bedside chair all lines and tubes intact, call light within reach. · Pt has made limited progress towards set goals.    · Continue with current plan of care      Treatment Time In: 8312         Treatment Time Out: 3559  Treatment Charges: Mins Units   Ther Ex  39233     Manual Therapy 00570     Thera Activities [id number] 9 1   ADL/Home Mgt [id number] 16 1   Neuro Re-ed [id number]     Group Therapy      Orthotic manage/training  12213     Non-Billable Time     Total Timed Treatment 25 2       Adrien Das 46, 50 [facility]

## 2020-03-05 NOTE — PROGRESS NOTES
120 mg Oral BID    buPROPion  100 mg Oral Daily    sennosides  5 mL Oral Nightly    melatonin  6 mg Oral Nightly    amLODIPine  5 mg Oral Daily    bisacodyl  10 mg Rectal Daily    levothyroxine  100 mcg Oral Daily    sodium chloride flush  10 mL Intravenous 2 times per day       VITAL SIGNS:                                                                                                                          BP (!) 114/56   Pulse 73   Temp 98.1 °F (36.7 °C)   Resp 24   Ht 5' 6\" (1.676 m)   Wt 108 lb 3.2 oz (49.1 kg)   SpO2 100%   BMI 17.46 kg/m²   Patient Vitals for the past 96 hrs (Last 3 readings):   Weight   03/04/20 0521 108 lb 3.2 oz (49.1 kg)   03/02/20 0603 109 lb 3.2 oz (49.5 kg)     OBJECTIVE:    HEENT: PERRL, EOM  Intact; sclera non-icteric, conjunctiva pink. Carotids are brisk in upstroke with normal contour. No carotid bruits. Normal jugular venous pulsation at 45°. No palpable cervical nor supraclavicular nodes. Thyroid not palpable. Trachea midline. Chest: Even excursion  Lungs: CTA B, no expiratory wheezes or rhonchi, no decreased tactile fremitus without inspiratory rales. Heart: Regular  rhythm; S1 > S2, no gallop or murmur. No clicks, rub, palpable thrills   or heaves. PMI nondisplaced, 5th intercostal space MCL. Abdomen: Soft, nontender, nondistended,  mildly protuberant, no masses or organomegaly. Bowel sounds active. Extremities: Without clubbing, cyanosis or edema. Pulses present 3+ upper extermities bilaterally; barely palpable DP and absent PT bilaterally.      Data:   Scheduled Meds: Reviewed  Continuous Infusions:     Intake/Output Summary (Last 24 hours) at 3/5/2020 1515  Last data filed at 3/5/2020 1413  Gross per 24 hour   Intake 440 ml   Output 0 ml   Net 440 ml     CBC:   Recent Labs     03/03/20  0230 03/04/20  0330 03/05/20  0512   WBC 11.6* 9.8 9.2   HGB 10.0* 9.7* 9.5*   HCT 31.0* 30.0* 30.3*    397 393     BMP:  Recent Labs     03/03/20  0230 20  0330 20  0512    139 136   K 3.8 3.3* 3.9    101 101   CO2 29 27 26   BUN 34* 29* 27*   CREATININE 0.6 0.5 0.6   LABGLOM >60 >60 >60     ABGs:   Lab Results   Component Value Date    PH 7.499 2020    PO2 135.0 2020    PCO2 34.3 2020     INR: No results for input(s): INR in the last 72 hours. PRO-BNP: No results found for: PROBNP   TSH:   Lab Results   Component Value Date    TSH 4.050 2020      Cardiac Injury Profile: No results for input(s): CKTOTAL, CKMB, TROPONINI in the last 72 hours. Lipid Profile:   Lab Results   Component Value Date    TRIG 110 2020    HDL 76 2019    LDLCALC 76 2019    CHOL 169 2019      Hemoglobin A1C: No components found for: HGBA1C     RAD:   Xr Abdomen (kub) (single Ap View)    Result Date: 2020  Patient MRN:  [id number] : 1950 Age: 68 years Gender: Female Order Date:  2020 12:45 PM EXAM: XR ABDOMEN (KUB) (SINGLE AP VIEW) NUMBER OF IMAGES:  1 view-2 images INDICATION: Reevaluate abdominal bowel gas distribution COMPARISON: Previous abdominal radiograph 0043 hours FINDINGS: The stomach and proximal small bowel remain distended with gas. The mid abdominal gas appears slightly greater than on the prior examination. There is no evidence of free intraperitoneal air on this exam. Flank margins are well-defined, but there is hazy density obscuring the psoas margins which could indicate ascites. . Psoas margins are indistinct. No pathologic calcifications are noted, and there is no evidence of organomegaly. Included portions of the lower chest show no evidence of acute pathology. Skeletal structures are unremarkable for acute findings. The left femoral central venous catheter has been removed since 3 days earlier. The nasogastric tube extends to the gastroduodenal junction and is unchanged. Overlying EKG leads are present. Lower abdominal midline skin staples are present.  Degenerative changes of the spine INFORMATION: Exam: CT Abdomen And Pelvis With Contrast Exam date and time: 2/25/2020 12:15 AM Age: 68years old Clinical indication: Abdominal pain; Generalized; Additional info: R/O abscess TECHNIQUE: Imaging protocol: Computed tomography of the abdomen and pelvis with intravenous contrast. Radiation optimization: All CT scans at this facility use at least one of these dose optimization techniques: automated exposure control; mA and/or kV adjustment per patient size (includes targeted exams where dose is matched to clinical indication); or iterative reconstruction. Contrast material: ISOVUE 370; Contrast volume: 110 ml; Contrast route: IV;  COMPARISON: CT ABDOMEN PELVIS W CONTRAST 2/17/2020 3:50 PM FINDINGS: Tubes, catheters and devices: Enteric feeding tube tip is within the gastric fundus distal portion. Lungs: There is consolidation within portion of the right lower lobe. There is small area atelectasis peripherally left lower lobe. Pleural space: There are pleural effusions. Liver: Normal. No mass. Gallbladder and bile ducts: There is moderate gallbladder distention. There is no wall thickening or pericholecystic fluid to suggest cholecystitis. Pancreas: Normal. No ductal dilation. Spleen: Normal. No splenomegaly. Adrenals: Normal. No mass. Kidneys and ureters: Normal. No hydronephrosis. Stomach and bowel: There is small bowel dilation consistent in appearance with obstruction. Maximum small bowel diameter is 4.4 cm. There are multiple air-fluid levels. No clear transition point appreciated. There is diverticulosis of the colon without evidence of diverticulitis. Status post right hemicolectomy. There is wall thickening of the transverse colon at the level of the sutures. Appendix: There has been an appendectomy. Intraperitoneal space: There is fluid within the abdomen and pelvis. Hazy masslike density noted within the central mesentery measures approximately 8.2 x 4.4 cm. Vasculature:  There are coronary arterial calcifications. Lymph nodes: Unremarkable. No enlarged lymph nodes. Bladder: Unremarkable as visualized. Reproductive: Unremarkable as visualized. Bones/joints: There is chronic mild T11 compression fracture. There is chronic mild T9 compression fracture. Soft tissues: Unremarkable. Other findings: Exam is somewhat limited by motion. 1. Moderate right, small left pleural effusion. Adjacent area of consolidation within the right lower lobe. 2. Mild fluid within the abdomen and pelvis. 3. Persistent small bowel obstruction, indeterminate transition point. 4. Hazy masslike area within the central mesentery may be sequela of recent surgery possible area of edema or hyperemia of the mesentery. Hematoma is considered less likely. Follow-up recommended. 5. Wall thickening of the transverse colon at the level of the sutures. This may be related to a degree of colitis, postoperative change or potentially neoplastic process. Correlation with post oral contrast exam is recommended. This report has been electronically signed by Brennon Tang MD.    Ct Abdomen Pelvis W Iv Contrast Additional Contrast? None    Addendum Date: 2020    Note is made of a vague lucency in the left iliac bone with some areas of sclerosis and periosteal thickening concerning for a healing fracture. Malignancy with a pathologic fracture is a consideration. Clinical assessment is recommended. ALERT:  THIS IS AN ABNORMAL REPORT    Result Date: 2020  Patient MRN:  [id number] : 1950 Age: 68 years Gender: Female Order Date:  2020 1:45 PM EXAM: CT ABDOMEN PELVIS W IV CONTRAST number of images 319 Contrast. Isovue-370, 110 mL IV Technique: Low-dose CT  acquisition technique included one of following options; 1 . Automated exposure control, 2. Adjustment of MA and or KV according to patient's size or 3. Use of iterative reconstruction.  INDICATION:  ab pain ab pain COMPARISON: Previous CTA 2020 FINDINGS: The lung bases demonstrate COPD with minimal atelectasis and pleural effusions in the right lower lobe. Small hiatal hernia is present with thickening of the GE junction and distended stomach. Liver is of normal architecture. Gallbladder is partially distended. Spleen, pancreas, the adrenals and the kidneys are normal. There is severe vascular calcification stenosis of the aorta and iliac arteries with occlusion of the right SFA. There is diffusely dilated small bowel loops with  one segment of the small bowel loops in the left hemiabdomen measuring nearly 7 cm. Colon is collapsed. Pelvis. Bladder is distended. Colon is collapsed with  poor delineation of the anatomy. High-grade small bowel obstruction with a significantly dilated fluid-filled small bowel loops and stomach and collapsed colon. Surgical intervention is recommended. Infiltrates and pleural effusion in the right lower lobe concerning for aspiration pneumonia. Severe vascular calcification of aorta and iliac arteries with occlusion of right SFA. ALERT:  THIS IS AN ABNORMAL REPORT    Xr Chest Portable    Result Date: 3/4/2020  Patient MRN: [id number] : 1950 Age:  68 years Gender: Female Order Date: 3/4/2020 1:30 PM Exam: XR CHEST PORTABLE Number of Images: 1 view Indication:   short of breath short of breath Comparison: Prior chest radiograph from 2020 is available Findings: The cardiac silhouette is within the limits of normal. There is worsening right-sided pleural effusion as compared to prior radiograph. The remaining lung fields are clear appears to be hyperaerated. There is a right-sided PIC catheter with tip in superior vena cava. The bony thorax demonstrate mild osteopenia. Moderate right-sided pleural effusion which is worsening as compared to prior study COPD Right-sided PIC catheter with tip in superior vena cava.      Xr Chest Portable    Result Date: 2020  Patient MRN: [id number] : 1950 Age:  68 years Gender: Female Order Date: 2020 9:15 AM Exam: XR CHEST PORTABLE Number of Images: 1 view Indication:  J95.811 Postprocedural pneumothorax Right thora Postprocedural pneumothorax Comparison: Chest radiograph dated 2020. Abdominal CT dated 2020. Findings: There is a nasogastric tube coursing below the level of the diaphragm and out of the imaging field of view. The cardiomediastinal silhouette is unremarkable. No pneumothorax is seen. The previously seen right pleural effusion is significantly decreased. There is a small residual right pleural effusion. The right hemidiaphragm remains elevated. There is abnormal density in the right infrahilar region. The left lung is hyperexpanded. Interval decrease in right pleural effusion. Small residual right pleural effusion. No pneumothorax. Right infrahilar density, compatible with airspace consolidation or atelectasis. Persistent elevation of the right hemidiaphragm. This study was dictated by Aroldo Canseco PA-C and reviewed by Jose Gaytan Ilianatanvi Loza MD, who has modified the report as necessary. Xr Chest Portable    Result Date: 2020  Clinical indications: Pleural effusion. TECHNIQUE: Single frontal projection of the chest (1 view). COMPARISON: Thyroid 2020. FINDINGS: Nasogastric tubing follows the expected contours of the esophagus into stomach with distal sideholes overlying the upper abdomen. Dilated air-filled loops of small bowel. Large right and small left pleural effusions with contiguous atelectasis. Acromioclavicular arthropathy is present. There is diffuse bone demineralization. The heart, lungs, mediastinum and regional skeleton are otherwise unremarkable. Nasogastric tubing appears to be appropriately configured. Small bowel obstruction. Large right and small left pleural effusions with contiguous atelectasis.      Xr Chest Portable    Result Date: 2020  Patient MRN:  [id number] : 1950 Age: 68 years Gender: Female Order Date:  2020 12:15 AM EXAM: XR protein-calorie malnutrition ([facility] [address]    Atherosclerosis of aortic bifurcation and common iliac arteries (HCC)    CAD (coronary artery disease)    History of bilateral carotid artery stenosis    Paroxysmal atrial fibrillation with rapid ventricular response ([address]  Resolved Problems:    * No resolved hospital problems. *      RECOMMENDATIONS:  In a sense,  Has remarkably improved but remains extremely weak. Thus would strongly urge active physical therapy and at least transferred to a rehabilitation center. Maintain present cardiac medications. I have spent more than25 minutes face to face with Alissa Dance and reviewing notes and laboratory data, with greater than 50% of this time instructing and counseling the patient face to face regarding my findings and recommendations and I have answered all questions as posed to me by Ms. Elizabeth. Robert Alvarez,  FACP,FACC,[facility]      NOTE:  This report was transcribed using voice recognition software.   Every effort was made to ensure accuracy; however, inadvertent computerized transcription errors may be present

## 2020-03-05 NOTE — PROGRESS NOTES
Blue sheet obtained from  completed and signed. Sent to lab 12-.   ankle pumps, glute sets    Patient education  Pt educated on importance of OOB activity    Patient response to education:   Pt verbalized understanding Pt demonstrated skill Pt requires further education in this area   x x x     ASSESSMENT:    Comments:  Pt supine upon entering room. Pt left in chair at end of session with daughter present. Treatment:  Patient practiced and was instructed in the following treatment:     Therapeutic Activity: Pt cont to require max a for bed mobility d/t weakness. Pt required encouragement to cont to participate. Pt sat EOB focusing on sitting balance dynamic/static d/t intermittent posterior lean. Pt performed stand pivot to chair and was left with all needs met.  Therapeutic Exercise: As noted above to increase strength and ROM. PLAN:    Patient is making limited progress towards established goals. Will continue with current POC.       Time in  0810  Time out  0835    Total Treatment Time  25 minutes     CPT codes:  [] Gait training 09207  minutes  [] Manual therapy 82992 minutes  [x] Therapeutic activities 28634 25 minutes  [] Therapeutic exercises 13511 0 minutes  [] Neuromuscular reeducation 70930 minutes    Perfecto Grumbling PTA 3306

## 2020-03-06 ENCOUNTER — APPOINTMENT (OUTPATIENT)
Dept: ULTRASOUND IMAGING | Age: 70
DRG: 329 | End: 2020-03-06
Payer: MEDICARE

## 2020-03-06 ENCOUNTER — APPOINTMENT (OUTPATIENT)
Dept: GENERAL RADIOLOGY | Age: 70
DRG: 329 | End: 2020-03-06
Payer: MEDICARE

## 2020-03-06 VITALS
TEMPERATURE: 98.7 F | WEIGHT: 108.2 LBS | DIASTOLIC BLOOD PRESSURE: 59 MMHG | HEIGHT: 66 IN | HEART RATE: 64 BPM | OXYGEN SATURATION: 100 % | SYSTOLIC BLOOD PRESSURE: 126 MMHG | RESPIRATION RATE: 18 BRPM | BODY MASS INDEX: 17.39 KG/M2

## 2020-03-06 LAB
ALBUMIN SERPL-MCNC: 1.8 G/DL (ref 3.5–5.2)
ALP BLD-CCNC: 98 U/L (ref 35–104)
ALT SERPL-CCNC: 44 U/L (ref 0–32)
AMYLASE FLUID: 40 U/L
ANION GAP SERPL CALCULATED.3IONS-SCNC: 11 MMOL/L (ref 7–16)
APPEARANCE FLUID: NORMAL
AST SERPL-CCNC: 31 U/L (ref 0–31)
BASOPHILS ABSOLUTE: 0.02 E9/L (ref 0–0.2)
BASOPHILS RELATIVE PERCENT: 0.2 % (ref 0–2)
BILIRUB SERPL-MCNC: 0.5 MG/DL (ref 0–1.2)
BUN BLDV-MCNC: 20 MG/DL (ref 8–23)
CALCIUM IONIZED: 1.14 MMOL/L (ref 1.15–1.33)
CALCIUM SERPL-MCNC: 7.4 MG/DL (ref 8.6–10.2)
CELL COUNT FLUID TYPE: NORMAL
CHLORIDE BLD-SCNC: 100 MMOL/L (ref 98–107)
CHOLESTEROL FLUID: 33 MG/DL
CO2: 25 MMOL/L (ref 22–29)
COLOR FLUID: YELLOW
CREAT SERPL-MCNC: 0.6 MG/DL (ref 0.5–1)
CRITICAL: NORMAL
DATE ANALYZED: NORMAL
DATE OF COLLECTION: NORMAL
EOSINOPHILS ABSOLUTE: 0.06 E9/L (ref 0.05–0.5)
EOSINOPHILS RELATIVE PERCENT: 0.7 % (ref 0–6)
FLUID TYPE: NORMAL
FLUID TYPE: NORMAL
GFR AFRICAN AMERICAN: >60
GFR NON-AFRICAN AMERICAN: >60 ML/MIN/1.73
GLUCOSE BLD-MCNC: 77 MG/DL (ref 74–99)
HCT VFR BLD CALC: 29.6 % (ref 34–48)
HEMOGLOBIN: 9.2 G/DL (ref 11.5–15.5)
IMMATURE GRANULOCYTES #: 0.1 E9/L
IMMATURE GRANULOCYTES %: 1.2 % (ref 0–5)
LAB: NORMAL
LD, FLUID: 207 U/L
LYMPHOCYTES ABSOLUTE: 1.03 E9/L (ref 1.5–4)
LYMPHOCYTES RELATIVE PERCENT: 12 % (ref 20–42)
Lab: NORMAL
MAGNESIUM: 1.7 MG/DL (ref 1.6–2.6)
MCH RBC QN AUTO: 29.8 PG (ref 26–35)
MCHC RBC AUTO-ENTMCNC: 31.1 % (ref 32–34.5)
MCV RBC AUTO: 95.8 FL (ref 80–99.9)
MONOCYTE, FLUID: 77 %
MONOCYTES ABSOLUTE: 0.79 E9/L (ref 0.1–0.95)
MONOCYTES RELATIVE PERCENT: 9.2 % (ref 2–12)
NEUTROPHIL, FLUID: 23 %
NEUTROPHILS ABSOLUTE: 6.59 E9/L (ref 1.8–7.3)
NEUTROPHILS RELATIVE PERCENT: 76.7 % (ref 43–80)
NUCLEATED CELLS FLUID: 171 /UL
OPERATOR ID: 1057
PDW BLD-RTO: 14.6 FL (ref 11.5–15)
PH FLUID: 7.41
PH, BODY FLUID: 7.59
PHOSPHORUS: 2.6 MG/DL (ref 2.5–4.5)
PLATELET # BLD: 385 E9/L (ref 130–450)
PMV BLD AUTO: 9.9 FL (ref 7–12)
POTASSIUM SERPL-SCNC: 3.8 MMOL/L (ref 3.5–5)
PROTEIN FLUID: 2.1 G/DL
RBC # BLD: 3.09 E12/L (ref 3.5–5.5)
RBC FLUID: 8000 /UL
SODIUM BLD-SCNC: 136 MMOL/L (ref 132–146)
SOURCE, BLOOD GAS: NORMAL
TIME ANALYZED: 1037
TOTAL PROTEIN: 5.3 G/DL (ref 6.4–8.3)
TRIGLYCERIDES FLUID: 17 MG/DL
WBC # BLD: 8.6 E9/L (ref 4.5–11.5)

## 2020-03-06 PROCEDURE — 6370000000 HC RX 637 (ALT 250 FOR IP): Performed by: STUDENT IN AN ORGANIZED HEALTH CARE EDUCATION/TRAINING PROGRAM

## 2020-03-06 PROCEDURE — 88305 TISSUE EXAM BY PATHOLOGIST: CPT

## 2020-03-06 PROCEDURE — 87205 SMEAR GRAM STAIN: CPT

## 2020-03-06 PROCEDURE — 83735 ASSAY OF MAGNESIUM: CPT

## 2020-03-06 PROCEDURE — 84157 ASSAY OF PROTEIN OTHER: CPT

## 2020-03-06 PROCEDURE — 88112 CYTOPATH CELL ENHANCE TECH: CPT

## 2020-03-06 PROCEDURE — 84478 ASSAY OF TRIGLYCERIDES: CPT

## 2020-03-06 PROCEDURE — 83615 LACTATE (LD) (LDH) ENZYME: CPT

## 2020-03-06 PROCEDURE — 89051 BODY FLUID CELL COUNT: CPT

## 2020-03-06 PROCEDURE — 6370000000 HC RX 637 (ALT 250 FOR IP): Performed by: INTERNAL MEDICINE

## 2020-03-06 PROCEDURE — 36415 COLL VENOUS BLD VENIPUNCTURE: CPT

## 2020-03-06 PROCEDURE — 71045 X-RAY EXAM CHEST 1 VIEW: CPT

## 2020-03-06 PROCEDURE — 80053 COMPREHEN METABOLIC PANEL: CPT

## 2020-03-06 PROCEDURE — 85025 COMPLETE CBC W/AUTO DIFF WBC: CPT

## 2020-03-06 PROCEDURE — 94640 AIRWAY INHALATION TREATMENT: CPT

## 2020-03-06 PROCEDURE — 84100 ASSAY OF PHOSPHORUS: CPT

## 2020-03-06 PROCEDURE — 84999 UNLISTED CHEMISTRY PROCEDURE: CPT

## 2020-03-06 PROCEDURE — 2580000003 HC RX 258: Performed by: STUDENT IN AN ORGANIZED HEALTH CARE EDUCATION/TRAINING PROGRAM

## 2020-03-06 PROCEDURE — 82330 ASSAY OF CALCIUM: CPT

## 2020-03-06 PROCEDURE — 87070 CULTURE OTHR SPECIMN AEROBIC: CPT

## 2020-03-06 PROCEDURE — 83986 ASSAY PH BODY FLUID NOS: CPT

## 2020-03-06 PROCEDURE — 0W993ZZ DRAINAGE OF RIGHT PLEURAL CAVITY, PERCUTANEOUS APPROACH: ICD-10-PCS | Performed by: INTERNAL MEDICINE

## 2020-03-06 PROCEDURE — 82150 ASSAY OF AMYLASE: CPT

## 2020-03-06 PROCEDURE — 6360000002 HC RX W HCPCS: Performed by: STUDENT IN AN ORGANIZED HEALTH CARE EDUCATION/TRAINING PROGRAM

## 2020-03-06 PROCEDURE — C1729 CATH, DRAINAGE: HCPCS

## 2020-03-06 RX ORDER — BISACODYL 10 MG
10 SUPPOSITORY, RECTAL RECTAL DAILY
Qty: 30 SUPPOSITORY | Refills: 0
Start: 2020-03-07 | End: 2020-04-06

## 2020-03-06 RX ORDER — PSEUDOEPHEDRINE HCL 30 MG
100 TABLET ORAL 2 TIMES DAILY
Qty: 30 CAPSULE | Refills: 0 | Status: ON HOLD
Start: 2020-03-06 | End: 2020-09-15

## 2020-03-06 RX ORDER — BUPROPION HYDROCHLORIDE 100 MG/1
100 TABLET, EXTENDED RELEASE ORAL DAILY
Qty: 60 TABLET | Refills: 3
Start: 2020-03-07

## 2020-03-06 RX ORDER — AMLODIPINE BESYLATE 5 MG/1
5 TABLET ORAL DAILY
Qty: 30 TABLET | Refills: 3
Start: 2020-03-07

## 2020-03-06 RX ORDER — IPRATROPIUM BROMIDE AND ALBUTEROL SULFATE 2.5; .5 MG/3ML; MG/3ML
3 SOLUTION RESPIRATORY (INHALATION)
Qty: 360 ML | Refills: 0
Start: 2020-03-06

## 2020-03-06 RX ORDER — SOTALOL HYDROCHLORIDE 120 MG/1
120 TABLET ORAL 2 TIMES DAILY
Qty: 60 TABLET | Refills: 3
Start: 2020-03-06

## 2020-03-06 RX ADMIN — IPRATROPIUM BROMIDE AND ALBUTEROL SULFATE 1 AMPULE: 2.5; .5 SOLUTION RESPIRATORY (INHALATION) at 13:00

## 2020-03-06 RX ADMIN — SODIUM CHLORIDE, PRESERVATIVE FREE 300 UNITS: 5 INJECTION INTRAVENOUS at 08:33

## 2020-03-06 RX ADMIN — IPRATROPIUM BROMIDE AND ALBUTEROL SULFATE 1 AMPULE: 2.5; .5 SOLUTION RESPIRATORY (INHALATION) at 16:05

## 2020-03-06 RX ADMIN — IPRATROPIUM BROMIDE AND ALBUTEROL SULFATE 1 AMPULE: 2.5; .5 SOLUTION RESPIRATORY (INHALATION) at 09:23

## 2020-03-06 RX ADMIN — DOCUSATE SODIUM 100 MG: 100 CAPSULE, LIQUID FILLED ORAL at 08:25

## 2020-03-06 RX ADMIN — BUPROPION HYDROCHLORIDE 100 MG: 100 TABLET, EXTENDED RELEASE ORAL at 08:25

## 2020-03-06 RX ADMIN — AMLODIPINE BESYLATE 5 MG: 5 TABLET ORAL at 08:26

## 2020-03-06 RX ADMIN — LEVOTHYROXINE SODIUM 100 MCG: 0.1 TABLET ORAL at 06:24

## 2020-03-06 RX ADMIN — HYDROCODONE BITARTRATE AND ACETAMINOPHEN 1 TABLET: 5; 325 TABLET ORAL at 14:21

## 2020-03-06 RX ADMIN — Medication 10 ML: at 08:32

## 2020-03-06 RX ADMIN — HYDROCODONE BITARTRATE AND ACETAMINOPHEN 1 TABLET: 5; 325 TABLET ORAL at 07:56

## 2020-03-06 RX ADMIN — SOTALOL HYDROCHLORIDE 120 MG: 120 TABLET ORAL at 08:25

## 2020-03-06 ASSESSMENT — PAIN SCALES - GENERAL
PAINLEVEL_OUTOF10: 0
PAINLEVEL_OUTOF10: 5
PAINLEVEL_OUTOF10: 6

## 2020-03-06 NOTE — PROGRESS NOTES
GENERAL SURGERY  DAILY PROGRESS NOTE    Date:3/6/2020       Room:[address] [place] [address]  Patient Kianna Clifton     YOB: 1950     Age:69 y.o. Subjective:  No acute issues or complaints     Objective:  BP (!) 143/66   Pulse 60   Temp 97.2 °F (36.2 °C) (Temporal)   Resp 18   Ht 5' 6\" (1.676 m)   Wt 108 lb 3.2 oz (49.1 kg)   SpO2 98%   BMI 17.46 kg/m²   Temp (24hrs), Av.6 °F (36.4 °C), Min:97.1 °F (36.2 °C), Max:98.1 °F (36.7 °C)      I/O (24Hr): Intake/Output Summary (Last 24 hours) at 3/6/2020 0809  Last data filed at 3/6/2020 9515  Gross per 24 hour   Intake 595 ml   Output 0 ml   Net 595 ml       GENERAL:  No acute distress. Alert and interactive. LUNGS:  No cough. Nonlabored breathing on 5LNC  CARDIOVASC:  Normal rate, no cyanosis. ABDOMEN:  Soft, non-distended, appropriately tender. Incision c/d/i. No guarding / rigidity / rebound. EXTREMITIES:  No edema, no deformities. Assessment:  68 y.o. 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     Plan:  - ok for PO anticoagulation  - ok for discharge from surgery standpoint    Electronically signed by Panfilo Haider MD on 3/6/2020 at 8:09 AM

## 2020-03-06 NOTE — PROGRESS NOTES
Pulmonary Subsequent Hospital F/U note    Patient is being followed for: recurrent R effusion    Interval HPI:  Lying in bed 5L  No change in symptoms  For thoracentesis today    ROS:  Denies fever, night sweats  +cough, no sputum, no hemoptysis, +dyspnea   Denies chest pain, edema, palpitations  Denies nausea    Exam:  BP (!) 143/66   Pulse 60   Temp 97.2 °F (36.2 °C) (Temporal)   Resp 18   Ht 5' 6\" (1.676 m)   Wt 108 lb 3.2 oz (49.1 kg)   SpO2 99%   BMI 17.46 kg/m²    General: Lying in bed comfortably, no distress, breathing is not labored  HEENT: PERRL, EOMI, MMM, no oral lesions  Neck: supple, no adenopathy  CV: RRR without murmur  Lungs: few scattered rhonchi, decreased BS at the right lung base   Abd: soft, NT, ND, incision is clean and intact   Ext: warm, no edema, no clubbing  Skin: no rashes  Neuro: CN II-XII grossly intact, no focal deficits    Data:    Oximetry:  SpO2 Readings from Last 1 Encounters:   03/06/20 99%       Imaging personally reviewed by myself:  CXR          Moderate right-sided pleural effusion which is worsening as compared   to prior study       COPD       Right-sided PIC catheter with tip in superior vena cava.      Echo   Summary   Left ventricular internal dimensions were normal in diastole and systole.   No regional wall motion abnormalities seen.   Normal left ventricular ejection fraction.   Mild mitral regurgitation is present.   The aortic valve appears mildly sclerotic.   Mild tricuspid regurgitation.       Pertinent labs reviewed and noted:  Lab Results   Component Value Date    WBC 8.6 03/06/2020    HGB 9.2 03/06/2020    HCT 29.6 03/06/2020    MCV 95.8 03/06/2020    MCH 29.8 03/06/2020    MCHC 31.1 03/06/2020    RDW 14.6 03/06/2020     03/06/2020    MPV 9.9 03/06/2020     Lab Results   Component Value Date     03/06/2020    K 3.8 03/06/2020    K 4.0 02/20/2020     03/06/2020    CO2 25 03/06/2020    BUN 20 03/06/2020    CREATININE 0.6 03/06/2020 LABALBU 1.8 03/06/2020    LABALBU 4.5 10/07/2010    CALCIUM 7.4 03/06/2020    GFRAA >60 03/06/2020    LABGLOM >60 03/06/2020     Lab Results   Component Value Date    PROTIME 11.8 02/17/2020    PROTIME 11.1 04/13/2011    INR 1.0 02/17/2020       Assessment:  1. Acute hypoxic respiratory failure   2. Recurrent R pleural effusion s/p IR drainage 2/26/20 with 600cc fluid - no pleural analysis  3. Reported COPD, former smoking  4. 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 Ileus requiring TPN  6. Malnutrition     Plan:  1. Please wean oxygen for saturation 90% and above  2. S/p R thoracentesis today with removal of 400cc fluid  3. Pleural fluid analysis ordered  4. CXR ordered and pending  5. She also had ascites on US, therefore this fluid may recur  6.  Likely okay for d/c to [facility] later this afternoon    Discussed with Dr. Jhon Guan      Electronically signed by John Flanagan MD on 3/6/2020 at 10:41 AM

## 2020-03-06 NOTE — PLAN OF CARE
Problem: Risk for Impaired Skin Integrity  Goal: Tissue integrity - skin and mucous membranes  Description  Structural intactness and normal physiological function of skin and  mucous membranes.   Outcome: Completed     Problem: Falls - Risk of:  Goal: Will remain free from falls  Description  Will remain free from falls  Outcome: Completed  Goal: Absence of physical injury  Description  Absence of physical injury  Outcome: Completed     Problem: Musculor/Skeletal Functional Status  Goal: Highest potential functional level  Outcome: Completed  Goal: Absence of falls  Outcome: Completed     Problem: Pain:  Goal: Pain level will decrease  Description  Pain level will decrease  Outcome: Completed  Goal: Control of acute pain  Description  Control of acute pain  Outcome: Completed  Goal: Control of chronic pain  Description  Control of chronic pain  Outcome: Completed     Problem: Confusion - Acute:  Goal: Absence of continued neurological deterioration signs and symptoms  Description  Absence of continued neurological deterioration signs and symptoms  Outcome: Completed  Goal: Mental status will be restored to baseline  Description  Mental status will be restored to baseline  Outcome: Completed     Problem: Discharge Planning:  Goal: Ability to perform activities of daily living will improve  Description  Ability to perform activities of daily living will improve  Outcome: Completed  Goal: Participates in care planning  Description  Participates in care planning  Outcome: Completed     Problem: Injury - Risk of, Physical Injury:  Goal: Will remain free from falls  Description  Will remain free from falls  Outcome: Completed  Goal: Absence of physical injury  Description  Absence of physical injury  Outcome: Completed     Problem: Mood - Altered:  Goal: Mood stable  Description  Mood stable  Outcome: Completed  Goal: Absence of abusive behavior  Description  Absence of abusive behavior  Outcome: Completed  Goal: Verbalizations of feeling emotionally comfortable while being cared for will increase  Description  Verbalizations of feeling emotionally comfortable while being cared for will increase  Outcome: Completed     Problem: Psychomotor Activity - Altered:  Goal: Absence of psychomotor disturbance signs and symptoms  Description  Absence of psychomotor disturbance signs and symptoms  Outcome: Completed     Problem: Sensory Perception - Impaired:  Goal: Demonstrations of improved sensory functioning will increase  Description  Demonstrations of improved sensory functioning will increase  Outcome: Completed  Goal: Decrease in sensory misperception frequency  Description  Decrease in sensory misperception frequency  Outcome: Completed  Goal: Able to refrain from responding to false sensory perceptions  Description  Able to refrain from responding to false sensory perceptions  Outcome: Completed  Goal: Demonstrates accurate environmental perceptions  Description  Demonstrates accurate environmental perceptions  Outcome: Completed  Goal: Able to distinguish between reality-based and nonreality-based thinking  Description  Able to distinguish between reality-based and nonreality-based thinking  Outcome: Completed  Goal: Able to interrupt nonreality-based thinking  Description  Able to interrupt nonreality-based thinking  Outcome: Completed     Problem: Sleep Pattern Disturbance:  Goal: Appears well-rested  Description  Appears well-rested  Outcome: Completed

## 2020-03-06 NOTE — PROGRESS NOTES
Hospital Medicine    Subjective:  Pt alert conversive daughter at bedside / for thoracentesis today      Current Facility-Administered Medications:     ipratropium-albuterol (DUONEB) nebulizer solution 1 ampule, 1 ampule, Inhalation, Q4H WA, Malcolm Sawant MD, 1 ampule at 03/05/20 1944    mineral oil-hydrophilic petrolatum (HYDROPHOR) ointment, , Topical, BID PRN, Jena Borges DO    [Held by provider] apixaban Dasia Breath) tablet 5 mg, 5 mg, Oral, BID, Jena Borges, DO, 5 mg at 03/04/20 2043    ipratropium-albuterol (DUONEB) nebulizer solution 1 ampule, 1 ampule, Inhalation, Q4H PRN, Jena Borges DO, 1 ampule at 03/04/20 1238    HYDROcodone-acetaminophen (NORCO) 5-325 MG per tablet 1 tablet, 1 tablet, Oral, Q6H PRN, Channing Alford DO, 1 tablet at 03/06/20 0756    docusate sodium (COLACE) capsule 100 mg, 100 mg, Oral, BID, Mertha Cardinal Verdone, DO, 100 mg at 03/06/20 0825    trimethobenzamide (TIGAN) injection 200 mg, 200 mg, Intramuscular, Q6H PRN, Mireya Gonzalez MD, 200 mg at 02/26/20 0242    medicated lip balm (BLISTEX/CARMEX) stick, , Topical, PRN, Mireya Gonzalez MD    sodium chloride flush 0.9 % injection 10 mL, 10 mL, Intravenous, PRN, Mireya Gonzalez MD, 10 mL at 03/02/20 0421    heparin flush 100 UNIT/ML injection 300 Units, 3 mL, Intravenous, 2 times per day, Mireya Gonzalez MD, 300 Units at 03/06/20 1138    heparin flush 100 UNIT/ML injection 300 Units, 3 mL, Intracatheter, PRN, Mireya Gonzalez MD    sotalol (BETAPACE) tablet 120 mg, 120 mg, Oral, BID, Mireya Gonzalez MD, 120 mg at 03/06/20 0825    buPROPion [facility] SR) extended release tablet 100 mg, 100 mg, Oral, Daily, Mireya Gonzalez MD, 100 mg at 03/06/20 0825    sennosides (SENOKOT) 8.8 MG/5ML syrup 5 mL, 5 mL, Oral, Nightly, Mireya Gonzalez MD, 5 mL at 03/03/20 2010    melatonin tablet 6 mg, 6 mg, Oral, Nightly, Mireya Gonzalez MD, 6 mg at 03/05/20 2130    amLODIPine (NORVASC) tablet 5 mg, 5 mg, Oral, Daily, Sharon Cross Edilia Hickey MD, 5 mg at 03/06/20 0826    dextrose 50 % IV solution, 12.5 g, Intravenous, PRN, Caryn Cooper MD    bisacodyl (DULCOLAX) suppository 10 mg, 10 mg, Rectal, Daily, Caryn Cooper MD, Stopped at 03/04/20 9454    levothyroxine (SYNTHROID) tablet 100 mcg, 100 mcg, Oral, Daily, Caryn Cooper MD, 100 mcg at 03/06/20 4879    sodium chloride flush 0.9 % injection 10 mL, 10 mL, Intravenous, 2 times per day, Caryn Cooper MD, 10 mL at 03/06/20 6363    Objective:    BP (!) 143/66   Pulse 60   Temp 97.2 °F (36.2 °C) (Temporal)   Resp 18   Ht 5' 6\" (1.676 m)   Wt 108 lb 3.2 oz (49.1 kg)   SpO2 98%   BMI 17.46 kg/m²     Heart:  Reg  Lungs:  rhonchi  Abd: bowel sounds present, nontender, nondistended, no masses  Extrem:  No clubbing, cyanosis, or edema    CBC with Differential:    Lab Results   Component Value Date    WBC 8.6 03/06/2020    RBC 3.09 03/06/2020    HGB 9.2 03/06/2020    HCT 29.6 03/06/2020     03/06/2020    MCV 95.8 03/06/2020    MCH 29.8 03/06/2020    MCHC 31.1 03/06/2020    RDW 14.6 03/06/2020    NRBC 1.7 02/23/2020    METASPCT 1.7 03/05/2020    LYMPHOPCT 12.0 03/06/2020    MONOPCT 9.2 03/06/2020    MYELOPCT 0.9 02/22/2020    EOSPCT 3 10/07/2010    BASOPCT 0.2 03/06/2020    MONOSABS 0.79 03/06/2020    LYMPHSABS 1.03 03/06/2020    EOSABS 0.06 03/06/2020    BASOSABS 0.02 03/06/2020     CMP:    Lab Results   Component Value Date     03/06/2020    K 3.8 03/06/2020    K 4.0 02/20/2020     03/06/2020    CO2 25 03/06/2020    BUN 20 03/06/2020    CREATININE 0.6 03/06/2020    GFRAA >60 03/06/2020    LABGLOM >60 03/06/2020    GLUCOSE 77 03/06/2020    GLUCOSE 102 10/07/2010    PROT 5.3 03/06/2020    LABALBU 1.8 03/06/2020    LABALBU 4.5 10/07/2010    CALCIUM 7.4 03/06/2020    BILITOT 0.5 03/06/2020    ALKPHOS 98 03/06/2020    AST 31 03/06/2020    ALT 44 03/06/2020     Warfarin PT/INR:    Lab Results   Component Value Date    INR 1.0 02/17/2020    INR 1.0 01/20/2020    INR 1.0

## 2020-03-08 LAB
BODY FLUID CULTURE, STERILE: NORMAL
GRAM STAIN RESULT: NORMAL

## 2020-06-12 ENCOUNTER — HOSPITAL ENCOUNTER (EMERGENCY)
Age: 70
Discharge: HOME | End: 2020-06-12
Attending: EMERGENCY MEDICINE
Payer: MEDICARE

## 2020-06-12 VITALS
HEART RATE: 80 BPM | BODY MASS INDEX: 16.07 KG/M2 | SYSTOLIC BLOOD PRESSURE: 160 MMHG | OXYGEN SATURATION: 98 % | RESPIRATION RATE: 17 BRPM | TEMPERATURE: 98 F | HEIGHT: 66 IN | DIASTOLIC BLOOD PRESSURE: 82 MMHG | WEIGHT: 100 LBS

## 2020-06-12 LAB
ALBUMIN SERPL-MCNC: 3.8 G/DL (ref 3.5–5.2)
ALP BLD-CCNC: 96 U/L (ref 35–104)
ALT SERPL-CCNC: 14 U/L (ref 0–32)
ANION GAP SERPL CALCULATED.3IONS-SCNC: 10 MMOL/L (ref 7–16)
AST SERPL-CCNC: 17 U/L (ref 0–31)
BACTERIA: ABNORMAL /HPF
BASOPHILS ABSOLUTE: 0.05 E9/L (ref 0–0.2)
BASOPHILS RELATIVE PERCENT: 0.7 % (ref 0–2)
BILIRUB SERPL-MCNC: 0.4 MG/DL (ref 0–1.2)
BILIRUBIN URINE: NEGATIVE
BLOOD, URINE: ABNORMAL
BUN BLDV-MCNC: 21 MG/DL (ref 8–23)
CALCIUM SERPL-MCNC: 9.1 MG/DL (ref 8.6–10.2)
CHLORIDE BLD-SCNC: 107 MMOL/L (ref 98–107)
CLARITY: ABNORMAL
CO2: 27 MMOL/L (ref 22–29)
COLOR: YELLOW
CREAT SERPL-MCNC: 0.7 MG/DL (ref 0.5–1)
EOSINOPHILS ABSOLUTE: 0.21 E9/L (ref 0.05–0.5)
EOSINOPHILS RELATIVE PERCENT: 2.8 % (ref 0–6)
EPITHELIAL CELLS, UA: ABNORMAL /HPF
GFR AFRICAN AMERICAN: >60
GFR NON-AFRICAN AMERICAN: >60 ML/MIN/1.73
GLUCOSE BLD-MCNC: 103 MG/DL (ref 74–99)
GLUCOSE URINE: NEGATIVE MG/DL
HCT VFR BLD CALC: 45.3 % (ref 34–48)
HEMOGLOBIN: 13.9 G/DL (ref 11.5–15.5)
IMMATURE GRANULOCYTES #: 0.02 E9/L
IMMATURE GRANULOCYTES %: 0.3 % (ref 0–5)
KETONES, URINE: NEGATIVE MG/DL
LEUKOCYTE ESTERASE, URINE: ABNORMAL
LIPASE: 21 U/L (ref 13–60)
LYMPHOCYTES ABSOLUTE: 1.63 E9/L (ref 1.5–4)
LYMPHOCYTES RELATIVE PERCENT: 21.8 % (ref 20–42)
MAGNESIUM: 2.3 MG/DL (ref 1.6–2.6)
MCH RBC QN AUTO: 30.2 PG (ref 26–35)
MCHC RBC AUTO-ENTMCNC: 30.7 % (ref 32–34.5)
MCV RBC AUTO: 98.3 FL (ref 80–99.9)
MONOCYTES ABSOLUTE: 0.57 E9/L (ref 0.1–0.95)
MONOCYTES RELATIVE PERCENT: 7.6 % (ref 2–12)
NEUTROPHILS ABSOLUTE: 5.01 E9/L (ref 1.8–7.3)
NEUTROPHILS RELATIVE PERCENT: 66.8 % (ref 43–80)
NITRITE, URINE: POSITIVE
PDW BLD-RTO: 14.7 FL (ref 11.5–15)
PH UA: 5.5 (ref 5–9)
PLATELET # BLD: 354 E9/L (ref 130–450)
PMV BLD AUTO: 10.6 FL (ref 7–12)
POTASSIUM SERPL-SCNC: 4.1 MMOL/L (ref 3.5–5)
PROTEIN UA: ABNORMAL MG/DL
RBC # BLD: 4.61 E12/L (ref 3.5–5.5)
RBC UA: ABNORMAL /HPF (ref 0–2)
REASON FOR REJECTION: NORMAL
REJECTED TEST: NORMAL
SODIUM BLD-SCNC: 144 MMOL/L (ref 132–146)
SPECIFIC GRAVITY UA: >=1.03 (ref 1–1.03)
TOTAL CK: 57 U/L (ref 20–180)
TOTAL PROTEIN: 7.2 G/DL (ref 6.4–8.3)
TSH SERPL DL<=0.05 MIU/L-ACNC: 5.31 UIU/ML (ref 0.27–4.2)
UROBILINOGEN, URINE: 0.2 E.U./DL
WBC # BLD: 7.5 E9/L (ref 4.5–11.5)
WBC UA: >20 /HPF (ref 0–5)
YEAST: PRESENT /HPF

## 2020-06-12 PROCEDURE — 83735 ASSAY OF MAGNESIUM: CPT

## 2020-06-12 PROCEDURE — 83690 ASSAY OF LIPASE: CPT

## 2020-06-12 PROCEDURE — 82550 ASSAY OF CK (CPK): CPT

## 2020-06-12 PROCEDURE — 2580000003 HC RX 258: Performed by: EMERGENCY MEDICINE

## 2020-06-12 PROCEDURE — 96361 HYDRATE IV INFUSION ADD-ON: CPT

## 2020-06-12 PROCEDURE — 6360000002 HC RX W HCPCS: Performed by: EMERGENCY MEDICINE

## 2020-06-12 PROCEDURE — 36415 COLL VENOUS BLD VENIPUNCTURE: CPT

## 2020-06-12 PROCEDURE — 99283 EMERGENCY DEPT VISIT LOW MDM: CPT

## 2020-06-12 PROCEDURE — 81001 URINALYSIS AUTO W/SCOPE: CPT

## 2020-06-12 PROCEDURE — 80053 COMPREHEN METABOLIC PANEL: CPT

## 2020-06-12 PROCEDURE — 96365 THER/PROPH/DIAG IV INF INIT: CPT

## 2020-06-12 PROCEDURE — 84443 ASSAY THYROID STIM HORMONE: CPT

## 2020-06-12 PROCEDURE — 85025 COMPLETE CBC W/AUTO DIFF WBC: CPT

## 2020-06-12 RX ORDER — CEFDINIR 300 MG/1
300 CAPSULE ORAL 2 TIMES DAILY
Qty: 10 CAPSULE | Refills: 0 | Status: SHIPPED | OUTPATIENT
Start: 2020-06-13 | End: 2020-06-18

## 2020-06-12 RX ORDER — 0.9 % SODIUM CHLORIDE 0.9 %
1000 INTRAVENOUS SOLUTION INTRAVENOUS ONCE
Status: COMPLETED | OUTPATIENT
Start: 2020-06-12 | End: 2020-06-12

## 2020-06-12 RX ADMIN — SODIUM CHLORIDE 1000 ML: 9 INJECTION, SOLUTION INTRAVENOUS at 10:53

## 2020-06-12 RX ADMIN — WATER 1 G: 1 INJECTION INTRAMUSCULAR; INTRAVENOUS; SUBCUTANEOUS at 11:51

## 2020-06-12 ASSESSMENT — ENCOUNTER SYMPTOMS
CONSTIPATION: 0
ABDOMINAL PAIN: 0
RECENT COUGH: 0
COUGH: 0
EYE REDNESS: 0
COLOR CHANGE: 0
TROUBLE SWALLOWING: 0
SHORTNESS OF BREATH: 0
CHEST TIGHTNESS: 0
RECTAL PAIN: 0
DIARRHEA: 1
VOMITING: 0
BLOOD IN STOOL: 0
EYE PAIN: 0
NAUSEA: 0
ABDOMINAL DISTENTION: 0

## 2020-06-12 NOTE — ED PROVIDER NOTES
supple. Thyroid: No thyromegaly. Cardiovascular:      Rate and Rhythm: Normal rate and regular rhythm. Pulses: Normal pulses. Heart sounds: Normal heart sounds. No murmur. No friction rub. No gallop. Pulmonary:      Effort: Pulmonary effort is normal. No respiratory distress. Breath sounds: Normal breath sounds. No wheezing or rales. Abdominal:      General: Abdomen is flat. Bowel sounds are normal. There is no distension or abdominal bruit. Palpations: Abdomen is soft. There is no mass. Tenderness: There is no abdominal tenderness. There is no guarding or rebound. Hernia: No hernia is present. Comments: Old infraumbilical vertical scar; abdomen is flat soft nontender bowel sounds heard throughout no rigidity or guarding no distention   Musculoskeletal: Normal range of motion. Lymphadenopathy:      Cervical: No cervical adenopathy. Skin:     General: Skin is warm and dry. Capillary Refill: Capillary refill takes less than 2 seconds. Coloration: Skin is not pale. Findings: No erythema or rash. Neurological:      Mental Status: She is alert and oriented to person, place, and time. Procedures     MDM     ED Course as of Jun 12 1320 Fri Jun 12, 2020   1143 Nitrite, Urine(!): POSITIVE [JL]   1143 Leukocyte Esterase, Urine(!): MODERATE [JL]   1143 WBC, UA(!): >20 [JL]   1143 Bacteria, UA(!): MANY [JL]   1143 Evidence of UTI present on urinalysis which we will treat with empirically. Will send urine culture. Metabolic panel rejected by lab and need to be redrawn which will delay patient's care and disposition. [JL]   3491 Patient is resting comfortably. No new complaints. She is received a liter of IV fluids and ceftriaxone for empiric treatment of UTI and is tolerating it well. No signs of significant electrolyte deficiencies, renal failure or rhabdomyolysis secondary to dehydration or sitting on the floor at home.   No diarrhea or reasons for immediate return here for re evaluation. They will followup with their primary care physician and general surgeon by calling their office on Monday.      --------------------------------- ADDITIONAL PROVIDER NOTES ---------------------------------  At this time the patient is without objective evidence of an acute process requiring hospitalization or inpatient management. They have remained hemodynamically stable throughout their entire ED visit and are stable for discharge with outpatient follow-up. The plan has been discussed in detail and they are aware of the specific conditions for emergent return, as well as the importance of follow-up. Discharge Medication List as of 6/12/2020 12:40 PM      START taking these medications    Details   cefdinir (OMNICEF) 300 MG capsule Take 1 capsule by mouth 2 times daily for 5 days, Disp-10 capsule, R-0Print             Diagnosis:  1. Dehydration    2. Diarrhea, unspecified type    3. Urinary tract infection without hematuria, site unspecified    4. Hypothyroidism, unspecified type        Disposition:  Patient's disposition: Discharge to home  Patient's condition is stable.           Monie Clay DO  Resident  06/12/20 4170

## 2020-06-13 ENCOUNTER — CARE COORDINATION (OUTPATIENT)
Dept: CARE COORDINATION | Age: 70
End: 2020-06-13

## 2020-07-06 ENCOUNTER — HOSPITAL ENCOUNTER (OUTPATIENT)
Age: 70
Setting detail: SPECIMEN
Discharge: HOME | End: 2020-07-06
Payer: MEDICARE

## 2020-07-06 PROCEDURE — 83520 IMMUNOASSAY QUANT NOS NONAB: CPT

## 2020-07-06 PROCEDURE — 87045 FECES CULTURE AEROBIC BACT: CPT

## 2020-07-06 PROCEDURE — G0328 FECAL BLOOD SCRN IMMUNOASSAY: HCPCS

## 2020-07-06 PROCEDURE — 87088 URINE BACTERIA CULTURE: CPT

## 2020-07-06 PROCEDURE — 82705 FATS/LIPIDS FECES QUAL: CPT

## 2020-07-06 PROCEDURE — 87329 GIARDIA AG IA: CPT

## 2020-07-06 PROCEDURE — 89055 LEUKOCYTE ASSESSMENT FECAL: CPT

## 2020-07-07 LAB
GIARDIA ANTIGEN STOOL: NORMAL
URINE CULTURE, ROUTINE: NORMAL
WHITE BLOOD CELLS (WBC), STOOL: NORMAL

## 2020-07-08 LAB
CULTURE, STOOL: NORMAL
OCCULT BLOOD SCREENING: NORMAL

## 2020-07-09 LAB
FECAL NEUTRAL FAT: NORMAL
FECAL SPLIT FATS: NORMAL

## 2020-07-10 LAB — PANCREATIC ELASTASE, FECAL: 168 UG/G

## 2020-09-14 ENCOUNTER — APPOINTMENT (OUTPATIENT)
Dept: CT IMAGING | Age: 70
End: 2020-09-14
Payer: MEDICARE

## 2020-09-14 ENCOUNTER — HOSPITAL ENCOUNTER (EMERGENCY)
Age: 70
Discharge: ANOTHER HOSPITAL | End: 2020-09-15
Attending: EMERGENCY MEDICINE
Payer: MEDICARE

## 2020-09-14 LAB
ANION GAP SERPL CALCULATED.3IONS-SCNC: 12 MMOL/L (ref 7–16)
BUN BLDV-MCNC: 36 MG/DL (ref 8–23)
CALCIUM SERPL-MCNC: 9.1 MG/DL (ref 8.6–10.2)
CHLORIDE BLD-SCNC: 103 MMOL/L (ref 98–107)
CO2: 24 MMOL/L (ref 22–29)
CREAT SERPL-MCNC: 0.9 MG/DL (ref 0.5–1)
GFR AFRICAN AMERICAN: >60
GFR NON-AFRICAN AMERICAN: >60 ML/MIN/1.73
GLUCOSE BLD-MCNC: 119 MG/DL (ref 74–99)
HCT VFR BLD CALC: 37.2 % (ref 34–48)
HEMOGLOBIN: 12.1 G/DL (ref 11.5–15.5)
MCH RBC QN AUTO: 31.6 PG (ref 26–35)
MCHC RBC AUTO-ENTMCNC: 32.5 % (ref 32–34.5)
MCV RBC AUTO: 97.1 FL (ref 80–99.9)
PDW BLD-RTO: 14.6 FL (ref 11.5–15)
PLATELET # BLD: 348 E9/L (ref 130–450)
PMV BLD AUTO: 9.8 FL (ref 7–12)
POTASSIUM SERPL-SCNC: 4.1 MMOL/L (ref 3.5–5)
RBC # BLD: 3.83 E12/L (ref 3.5–5.5)
SODIUM BLD-SCNC: 139 MMOL/L (ref 132–146)
WBC # BLD: 7.6 E9/L (ref 4.5–11.5)

## 2020-09-14 PROCEDURE — 80048 BASIC METABOLIC PNL TOTAL CA: CPT

## 2020-09-14 PROCEDURE — 12001 RPR S/N/AX/GEN/TRNK 2.5CM/<: CPT

## 2020-09-14 PROCEDURE — 99285 EMERGENCY DEPT VISIT HI MDM: CPT

## 2020-09-14 PROCEDURE — 70450 CT HEAD/BRAIN W/O DYE: CPT

## 2020-09-14 PROCEDURE — 93005 ELECTROCARDIOGRAM TRACING: CPT | Performed by: EMERGENCY MEDICINE

## 2020-09-14 PROCEDURE — 85027 COMPLETE CBC AUTOMATED: CPT

## 2020-09-14 PROCEDURE — 72125 CT NECK SPINE W/O DYE: CPT

## 2020-09-14 PROCEDURE — 96374 THER/PROPH/DIAG INJ IV PUSH: CPT

## 2020-09-15 ENCOUNTER — APPOINTMENT (OUTPATIENT)
Dept: GENERAL RADIOLOGY | Age: 70
DRG: 031 | End: 2020-09-15
Attending: INTERNAL MEDICINE
Payer: MEDICARE

## 2020-09-15 ENCOUNTER — HOSPITAL ENCOUNTER (INPATIENT)
Age: 70
LOS: 10 days | Discharge: HOME HEALTH | DRG: 031 | End: 2020-09-25
Attending: INTERNAL MEDICINE | Admitting: INTERNAL MEDICINE
Payer: MEDICARE

## 2020-09-15 VITALS
HEART RATE: 92 BPM | BODY MASS INDEX: 19.13 KG/M2 | TEMPERATURE: 98 F | DIASTOLIC BLOOD PRESSURE: 75 MMHG | HEIGHT: 66 IN | SYSTOLIC BLOOD PRESSURE: 125 MMHG | OXYGEN SATURATION: 98 % | RESPIRATION RATE: 18 BRPM | WEIGHT: 119 LBS

## 2020-09-15 PROBLEM — G91.9 HYDROCEPHALUS (HCC): Status: ACTIVE | Noted: 2020-09-15

## 2020-09-15 LAB
EKG ATRIAL RATE: 75 BPM
EKG P AXIS: 86 DEGREES
EKG P-R INTERVAL: 136 MS
EKG Q-T INTERVAL: 400 MS
EKG QRS DURATION: 72 MS
EKG QTC CALCULATION (BAZETT): 446 MS
EKG R AXIS: 85 DEGREES
EKG T AXIS: 57 DEGREES
EKG VENTRICULAR RATE: 75 BPM

## 2020-09-15 PROCEDURE — 6360000002 HC RX W HCPCS: Performed by: EMERGENCY MEDICINE

## 2020-09-15 PROCEDURE — 6370000000 HC RX 637 (ALT 250 FOR IP): Performed by: EMERGENCY MEDICINE

## 2020-09-15 PROCEDURE — 2580000003 HC RX 258: Performed by: INTERNAL MEDICINE

## 2020-09-15 PROCEDURE — 71045 X-RAY EXAM CHEST 1 VIEW: CPT

## 2020-09-15 PROCEDURE — 1200000000 HC SEMI PRIVATE

## 2020-09-15 PROCEDURE — 6370000000 HC RX 637 (ALT 250 FOR IP): Performed by: INTERNAL MEDICINE

## 2020-09-15 PROCEDURE — 6370000000 HC RX 637 (ALT 250 FOR IP): Performed by: STUDENT IN AN ORGANIZED HEALTH CARE EDUCATION/TRAINING PROGRAM

## 2020-09-15 RX ORDER — SODIUM CHLORIDE 0.9 % (FLUSH) 0.9 %
10 SYRINGE (ML) INJECTION PRN
Status: DISCONTINUED | OUTPATIENT
Start: 2020-09-15 | End: 2020-09-25 | Stop reason: HOSPADM

## 2020-09-15 RX ORDER — IPRATROPIUM BROMIDE AND ALBUTEROL SULFATE 2.5; .5 MG/3ML; MG/3ML
3 SOLUTION RESPIRATORY (INHALATION)
Status: DISCONTINUED | OUTPATIENT
Start: 2020-09-15 | End: 2020-09-16

## 2020-09-15 RX ORDER — LEVOTHYROXINE SODIUM 0.1 MG/1
100 TABLET ORAL DAILY
Status: DISCONTINUED | OUTPATIENT
Start: 2020-09-16 | End: 2020-09-25 | Stop reason: HOSPADM

## 2020-09-15 RX ORDER — ACETAMINOPHEN 325 MG/1
650 TABLET ORAL EVERY 6 HOURS PRN
Status: DISCONTINUED | OUTPATIENT
Start: 2020-09-15 | End: 2020-09-18

## 2020-09-15 RX ORDER — SOTALOL HYDROCHLORIDE 120 MG/1
120 TABLET ORAL 2 TIMES DAILY
Status: DISCONTINUED | OUTPATIENT
Start: 2020-09-15 | End: 2020-09-25 | Stop reason: HOSPADM

## 2020-09-15 RX ORDER — ACETAMINOPHEN 500 MG
1000 TABLET ORAL ONCE
Status: COMPLETED | OUTPATIENT
Start: 2020-09-15 | End: 2020-09-15

## 2020-09-15 RX ORDER — ACETAMINOPHEN 650 MG/1
650 SUPPOSITORY RECTAL EVERY 6 HOURS PRN
Status: DISCONTINUED | OUTPATIENT
Start: 2020-09-15 | End: 2020-09-18

## 2020-09-15 RX ORDER — BUPROPION HYDROCHLORIDE 100 MG/1
100 TABLET, EXTENDED RELEASE ORAL DAILY
Status: DISCONTINUED | OUTPATIENT
Start: 2020-09-15 | End: 2020-09-25 | Stop reason: HOSPADM

## 2020-09-15 RX ORDER — HYDRALAZINE HYDROCHLORIDE 20 MG/ML
10 INJECTION INTRAMUSCULAR; INTRAVENOUS ONCE
Status: COMPLETED | OUTPATIENT
Start: 2020-09-15 | End: 2020-09-15

## 2020-09-15 RX ORDER — SODIUM CHLORIDE 9 MG/ML
INJECTION, SOLUTION INTRAVENOUS CONTINUOUS
Status: DISCONTINUED | OUTPATIENT
Start: 2020-09-15 | End: 2020-09-19

## 2020-09-15 RX ORDER — SODIUM CHLORIDE 0.9 % (FLUSH) 0.9 %
10 SYRINGE (ML) INJECTION EVERY 12 HOURS SCHEDULED
Status: DISCONTINUED | OUTPATIENT
Start: 2020-09-15 | End: 2020-09-25 | Stop reason: HOSPADM

## 2020-09-15 RX ORDER — DOCUSATE SODIUM 100 MG/1
100 CAPSULE, LIQUID FILLED ORAL 2 TIMES DAILY
Status: DISCONTINUED | OUTPATIENT
Start: 2020-09-15 | End: 2020-09-21

## 2020-09-15 RX ORDER — LOPERAMIDE HYDROCHLORIDE 2 MG/1
2 CAPSULE ORAL ONCE
Status: COMPLETED | OUTPATIENT
Start: 2020-09-15 | End: 2020-09-15

## 2020-09-15 RX ORDER — AMLODIPINE BESYLATE 5 MG/1
5 TABLET ORAL DAILY
Status: DISCONTINUED | OUTPATIENT
Start: 2020-09-15 | End: 2020-09-25 | Stop reason: HOSPADM

## 2020-09-15 RX ADMIN — BUPROPION HYDROCHLORIDE 100 MG: 100 TABLET, EXTENDED RELEASE ORAL at 18:34

## 2020-09-15 RX ADMIN — SOTALOL HYDROCHLORIDE 120 MG: 120 TABLET ORAL at 21:08

## 2020-09-15 RX ADMIN — APIXABAN 5 MG: 5 TABLET, FILM COATED ORAL at 21:08

## 2020-09-15 RX ADMIN — SODIUM CHLORIDE: 9 INJECTION, SOLUTION INTRAVENOUS at 17:43

## 2020-09-15 RX ADMIN — AMLODIPINE BESYLATE 5 MG: 5 TABLET ORAL at 18:34

## 2020-09-15 RX ADMIN — IPRATROPIUM BROMIDE AND ALBUTEROL SULFATE 3 ML: .5; 3 SOLUTION RESPIRATORY (INHALATION) at 20:47

## 2020-09-15 RX ADMIN — ACETAMINOPHEN 1000 MG: 500 TABLET ORAL at 05:07

## 2020-09-15 RX ADMIN — HYDRALAZINE HYDROCHLORIDE 10 MG: 20 INJECTION, SOLUTION INTRAMUSCULAR; INTRAVENOUS at 01:21

## 2020-09-15 RX ADMIN — LOPERAMIDE HYDROCHLORIDE 2 MG: 2 CAPSULE ORAL at 12:33

## 2020-09-15 ASSESSMENT — PAIN SCALES - GENERAL
PAINLEVEL_OUTOF10: 6
PAINLEVEL_OUTOF10: 0
PAINLEVEL_OUTOF10: 0

## 2020-09-15 NOTE — ED NOTES
[place] [facility] [place]  Alert and talkative  NOC's     Clyde Desir RN  09/15/20 9889

## 2020-09-15 NOTE — ED NOTES
Patient up to the bathroom with assist and walker. Patient able to wash up and brush her teeth.       Grayce Merlin, RN  09/15/20 0124

## 2020-09-15 NOTE — ED NOTES
Patients daughter called and spoke with her. Updated on disposition. Breakfast tray ordered.       Melinda Oliva RN  09/15/20 8120

## 2020-09-15 NOTE — PROGRESS NOTES
The [facility] called @ [phone], still waiting on a bed for patient, will keep us updated. Informed charge nurse.

## 2020-09-15 NOTE — ED NOTES
Nurse to nurse called to . This RN spoke with accepting RN Luis Manuel De Los Santos) for room 8423U. Updated RN on pt condition. Will continue to closely monitor pt.       Ervin Patel RN  09/15/20 9281

## 2020-09-15 NOTE — ED NOTES
Patient on bedpan. Hair and face being cleaned by LPN. Will monitor.      Manuel Schuster RN  09/15/20 0234

## 2020-09-15 NOTE — ED NOTES
Handoff given to PAS. Pt transported to accepting facility.       Noa Velazquez, ДМИТРИЙ  09/15/20 1745

## 2020-09-15 NOTE — ED PROVIDER NOTES
HPI:  9/14/20,   Time: 10:07 PM EDT         Tena Phoenix is a 65 y.o. female presenting to the ED for BHI fall X 2 today, beginning several hours ago. The complaint has been persistent, mild in severity, and worsened by nothing. Patient has history of recent falls family states she had 2 falls today 1 several hours ago 1 in the shower striking her head on the wall prior to arrival.  Daughter states that she was dizzy and nearly fell getting out of the shower she had to help her to the floor. Patient did strike the right side of her head against the wall. She did suffer a laceration currently on [employer] for A. fib. She denies any other injuries there is no loss of consciousness. She also suffers from coronary artery disease COPD peripheral vascular disease as well as hydrocephalus. She does complain of mild right-sided headache. No nausea visual changes. She has no neurological complaints. Denies melena hematochezia or diarrhea.     ROS:   Pertinent positives and negatives are stated within HPI, all other systems reviewed and are negative.  --------------------------------------------- PAST HISTORY ---------------------------------------------  Past Medical History:  has a past medical history of Aortoiliac occlusive disease ([address] Atherosclerosis of native arteries of extremity with rest pain ([address] Atherosclerosis of native artery of extremity with ulceration ([address] 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 with rest pain ([address] Bilateral carotid artery stenosis, Bruit of right carotid artery, CAD (coronary artery disease), Cancer ([address] COPD (chronic obstructive pulmonary disease) ([address] Depression, Femoro-popliteal artery disease ([address] History of tobacco use, Hydrocephalus ([address] Hyperlipidemia, Hypertension, Pain in both feet, PVD (peripheral vascular disease) ([address] PVD (peripheral vascular disease) with claudication ([address] Rheumatoid arthritis ([address] Thyroid disease, and Venous stasis ulcer of left calf with fat layer exposed without varicose veins ([address] Past Surgical History:  has a past surgical history that includes  section; Colonoscopy; Tubal ligation; Dilation and curettage of uterus; PERIPHERAL PERCUTANEOUS ARTERIAL INTERVENTION (2019); and laparotomy (N/A, 2020). Social History:  reports that she quit smoking about 3 years ago. Her smoking use included cigarettes. She smoked 0.25 packs per day. She has never used smokeless tobacco. She reports current alcohol use. She reports that she does not use drugs. Family History: family history includes Cancer in her father; Coronary Art Dis in her mother; High Blood Pressure in her mother; Kidney Disease in her mother; Other in her father. The patients home medications have been reviewed. Allergies: Flagyl [metronidazole];  Hornet venom; and Wasp venom    -------------------------------------------------- RESULTS -------------------------------------------------  All laboratory and radiology results have been personally reviewed by myself   LABS:  Results for orders placed or performed during the hospital encounter of 20   CBC   Result Value Ref Range    WBC 7.6 4.5 - 11.5 E9/L    RBC 3.83 3.50 - 5.50 E12/L    Hemoglobin 12.1 11.5 - 15.5 g/dL    Hematocrit 37.2 34.0 - 48.0 %    MCV 97.1 80.0 - 99.9 fL    MCH 31.6 26.0 - 35.0 pg    MCHC 32.5 32.0 - 34.5 %    RDW 14.6 11.5 - 15.0 fL    Platelets 854 910 - 139 E9/L    MPV 9.8 7.0 - 12.0 fL   Basic Metabolic Panel   Result Value Ref Range    Sodium 139 132 - 146 mmol/L    Potassium 4.1 3.5 - 5.0 mmol/L    Chloride 103 98 - 107 mmol/L    CO2 24 22 - 29 mmol/L    Anion Gap 12 7 - 16 mmol/L    Glucose 119 (H) 74 - 99 mg/dL    BUN 36 (H) 8 - 23 mg/dL    CREATININE 0.9 0.5 - 1.0 mg/dL    GFR Non-African American >60 >=60 mL/min/1.73    GFR African American >60     Calcium 9.1 8.6 - 10.2 mg/dL   EKG 12 Lead   Result Value Ref Range    Ventricular Rate 75 BPM Atrial Rate 75 BPM    P-R Interval 136 ms    QRS Duration 72 ms    Q-T Interval 400 ms    QTc Calculation (Bazett) 446 ms    P Axis 86 degrees    R Axis 85 degrees    T Axis 57 degrees       RADIOLOGY:  Interpreted by Radiologist.  CT HEAD WO CONTRAST   Final Result   No indication for an acute intracranial process. Prominent ventricular system as above discussed. The differential   diagnosis can include communicating type of hydrocephalus. Can further   evaluate with MRI study on routine basis. CT CERVICAL SPINE WO CONTRAST   Final Result   Degenerative changes in the cervical spine at multiple   levels as above discussed. The no conspicuous acute fractures are seen   in the cervical spine.                ------------------------- NURSING NOTES AND VITALS REVIEWED ---------------------------   The nursing notes within the ED encounter and vital signs as below have been reviewed. BP (!) 212/105   Pulse 98   Temp 97.8 °F (36.6 °C) (Oral)   Resp 14   Ht 5' 6\" (1.676 m)   Wt 119 lb (54 kg)   SpO2 97%   BMI 19.21 kg/m²   Oxygen Saturation Interpretation: Normal      ---------------------------------------------------PHYSICAL EXAM--------------------------------------      Constitutional/General: Alert and oriented x3, well appearing, non toxic in NAD, Thin appearance. Head: NC/ 0.5 cm puncture wound RT temporal scalp with small hematoma. Eyes: PERRL, EOMI  Mouth: Oropharynx clear, handling secretions, no trismus  Neck: Supple, full ROM, no meningeal signs  Pulmonary: Lungs clear to auscultation bilaterally, no wheezes, rales, or rhonchi. Not in respiratory distress  Cardiovascular:  Regular rate and rhythm, no murmurs, gallops, or rubs. 2+ distal pulses  Abdomen: Soft, non tender, non distended,   Extremities: Moves all extremities x 4. Warm and well perfused  Skin: warm and dry without rash  Neurologic: GCS 15, 5 out of 5 strength upper and lower extremities bilaterally.   Psych: Normal Affect      ------------------------------ ED COURSE/MEDICAL DECISION MAKING----------------------  Medications   hydrALAZINE (APRESOLINE) injection 10 mg (10 mg Intravenous Given 9/15/20 0121)         Medical Decision Making:    CT without contrast of the head neck. Scalp laceration was repaired. Patient's tetanus status up-to-date. EKG CBC chemistry. Patient was given IV hydralazine for persistent hypertension. CT of the brain without contrast demonstrates no acute bleed or injury, except:  large ventricular hydrocephalus bilaterally. Discussed with patient and family concern for worsening hydrocephalus as a cause of her confusion dizziness and frequent falls. PROCEDURE NOTE  9/14/20       Time: 2200    LACERATION REPAIR  Risks, benefits and alternatives (for applicable procedures below) described. Performed By: Cortes Bell DO. Laceration #: 1. Location: Scalp  Length: 0.5 cm. The wound area was irrigated with sterile saline, wiped with isopropyl alcohol and draped in a sterile fashion. Local Anesthesia:  not required. The wound was explored with the following results:  No foreign bodies found. Debridement: None. Undermining: None. Wound Margins Revised: None. Flaps Aligned: no. The wound was closed with staples. Dressing:  gauze was placed. Total number suture and staples:  1. There were no additional lacerations requiring repair. ED Course as of Sep 15 0156   Tue Sep 15, 2020   0140 Discussed consult with neurosurgery. Ok to transfer and will be seen in consult. [JN]   0155 Discussed admission with Naveen Oliveros to transfer and admit. [JN]      ED Course User Index  [JN] Cortes Bell DO     ED Course as of Sep 15 0156   Tue Sep 15, 2020   0140 Discussed consult with neurosurgery. Ok to transfer and will be seen in consult. [JN]   0155 Discussed admission with Naveen Oliveros to transfer and admit.      [JN]      ED Course User Index  [JN] Cortes Bell DO

## 2020-09-15 NOTE — PROGRESS NOTES
7559 called access center at this time for patient transfer to [facility] for Hydrocephalus and frequent falls  0138 Dr. Inis Goodell called and spoke with Dr. Yeimy Alexis at this time  [phone] Dr. Marv Rizvi spoke with Dr. Yeimy Alexis at this time and he has accepted the patient to [facility]  [address] [facility] Access center called at this time to let us know there are no beds available at this time they are discharge dependent. [facility] access center, no available beds at this time, they are discharge dependent.

## 2020-09-15 NOTE — PROGRESS NOTES
The [facility] called @ [phone], patient will be going to [facility] [address] [facility], nurse to nurse report [phone]. Transferred call to nurse for transport needs.

## 2020-09-15 NOTE — PROGRESS NOTES
Called the [address] [place], [place] [address]antoinette De Nitin for update on bed assignment. Spoke with Jorge L Kruger, place on hold to call to Main. There aren't any beds right, should be some later.  Informed charge nurse and nurse Johnson Wolf.

## 2020-09-16 PROBLEM — K56.2 CECAL VOLVULUS (HCC): Status: RESOLVED | Noted: 2020-02-18 | Resolved: 2020-09-16

## 2020-09-16 PROBLEM — I48.0 PAROXYSMAL ATRIAL FIBRILLATION (HCC): Chronic | Status: ACTIVE | Noted: 2020-02-25

## 2020-09-16 PROBLEM — K56.609 SBO (SMALL BOWEL OBSTRUCTION) (HCC): Status: RESOLVED | Noted: 2020-02-17 | Resolved: 2020-09-16

## 2020-09-16 PROBLEM — R19.7 DIARRHEA: Status: ACTIVE | Noted: 2020-09-16

## 2020-09-16 PROBLEM — S01.01XA LACERATION OF SCALP: Status: ACTIVE | Noted: 2020-09-16

## 2020-09-16 PROBLEM — E86.0 DEHYDRATION: Status: ACTIVE | Noted: 2020-09-16

## 2020-09-16 PROBLEM — I48.0 PAROXYSMAL ATRIAL FIBRILLATION WITH RAPID VENTRICULAR RESPONSE (HCC): Status: RESOLVED | Noted: 2020-02-25 | Resolved: 2020-09-16

## 2020-09-16 LAB
ANION GAP SERPL CALCULATED.3IONS-SCNC: 7 MMOL/L (ref 7–16)
BACTERIA: ABNORMAL /HPF
BILIRUBIN URINE: NEGATIVE
BLOOD, URINE: NORMAL
BUN BLDV-MCNC: 17 MG/DL (ref 8–23)
CALCIUM SERPL-MCNC: 8.9 MG/DL (ref 8.6–10.2)
CHLORIDE BLD-SCNC: 106 MMOL/L (ref 98–107)
CLARITY: CLEAR
CO2: 29 MMOL/L (ref 22–29)
COLOR: YELLOW
CREAT SERPL-MCNC: 0.6 MG/DL (ref 0.5–1)
CRYSTALS, UA: ABNORMAL /HPF
EPITHELIAL CELLS, UA: ABNORMAL /HPF
GFR AFRICAN AMERICAN: >60
GFR NON-AFRICAN AMERICAN: >60 ML/MIN/1.73
GLUCOSE BLD-MCNC: 86 MG/DL (ref 74–99)
GLUCOSE URINE: NEGATIVE MG/DL
KETONES, URINE: NEGATIVE MG/DL
LEUKOCYTE ESTERASE, URINE: NEGATIVE
NITRITE, URINE: NEGATIVE
PH UA: 5.5 (ref 5–9)
POTASSIUM SERPL-SCNC: 4 MMOL/L (ref 3.5–5)
PROTEIN UA: NEGATIVE MG/DL
RBC UA: ABNORMAL /HPF (ref 0–2)
SODIUM BLD-SCNC: 142 MMOL/L (ref 132–146)
SPECIFIC GRAVITY UA: >=1.03 (ref 1–1.03)
UROBILINOGEN, URINE: 0.2 E.U./DL
WBC UA: ABNORMAL /HPF (ref 0–5)

## 2020-09-16 PROCEDURE — 1200000000 HC SEMI PRIVATE

## 2020-09-16 PROCEDURE — 6370000000 HC RX 637 (ALT 250 FOR IP): Performed by: INTERNAL MEDICINE

## 2020-09-16 PROCEDURE — 36415 COLL VENOUS BLD VENIPUNCTURE: CPT

## 2020-09-16 PROCEDURE — 80048 BASIC METABOLIC PNL TOTAL CA: CPT

## 2020-09-16 PROCEDURE — 81001 URINALYSIS AUTO W/SCOPE: CPT

## 2020-09-16 PROCEDURE — 2580000003 HC RX 258: Performed by: INTERNAL MEDICINE

## 2020-09-16 RX ORDER — LORAZEPAM 2 MG/ML
1 INJECTION INTRAMUSCULAR ONCE
Status: COMPLETED | OUTPATIENT
Start: 2020-09-16 | End: 2020-09-17

## 2020-09-16 RX ORDER — LORAZEPAM 2 MG/ML
0.5 INJECTION INTRAMUSCULAR
Status: ACTIVE | OUTPATIENT
Start: 2020-09-16 | End: 2020-09-16

## 2020-09-16 RX ADMIN — ACETAMINOPHEN 650 MG: 325 TABLET, FILM COATED ORAL at 00:36

## 2020-09-16 RX ADMIN — SOTALOL HYDROCHLORIDE 120 MG: 120 TABLET ORAL at 20:57

## 2020-09-16 RX ADMIN — AMLODIPINE BESYLATE 5 MG: 5 TABLET ORAL at 09:34

## 2020-09-16 RX ADMIN — SODIUM CHLORIDE: 9 INJECTION, SOLUTION INTRAVENOUS at 06:52

## 2020-09-16 RX ADMIN — BUPROPION HYDROCHLORIDE 100 MG: 100 TABLET, EXTENDED RELEASE ORAL at 09:33

## 2020-09-16 RX ADMIN — LEVOTHYROXINE SODIUM 100 MCG: 0.1 TABLET ORAL at 06:19

## 2020-09-16 RX ADMIN — SOTALOL HYDROCHLORIDE 120 MG: 120 TABLET ORAL at 09:33

## 2020-09-16 RX ADMIN — APIXABAN 5 MG: 5 TABLET, FILM COATED ORAL at 09:34

## 2020-09-16 ASSESSMENT — PAIN SCALES - GENERAL
PAINLEVEL_OUTOF10: 0
PAINLEVEL_OUTOF10: 5

## 2020-09-16 NOTE — CONSULTS
Comprehensive Nutrition Assessment    Type and Reason for Visit:  Initial, Positive Nutrition Screen, Consult    Nutrition Recommendations/Plan: Continue current diet, Start Ensure TID    Nutrition Assessment:  Pt severely malnourished w/ reported poor PO intake PTA w/ ongoing wt loss over last several months. Pt adm w/ hydrocephalus s/p fall, s/p laceration repair. Noted prior need for TPN 2/2 post op ileus 02/2020. Will provide ONS and monitor    Malnutrition Assessment:  Malnutrition Status:  Severe malnutrition    Context:  Chronic Illness     Findings of the 6 clinical characteristics of malnutrition:  Energy Intake:  7 - 75% or less estimated energy requirements for 1 month or longer  Weight Loss:  1 - Mild weight loss (specify amount and time period)(8.3% wt loss x7 months)     Body Fat Loss:  7 - Severe body fat loss(per RD assessment 02/2020) Orbital, Triceps   Muscle Mass Loss:  7 - Severe muscle mass loss(per RD assessment 02/2020) Temples (temporalis), Clavicles (pectoralis & deltoids), Thigh (quadraceps), Calf (gastrocnemius), Hand (interosseous)  Fluid Accumulation:  No significant fluid accumulation     Strength:  Not Performed    Estimated Daily Nutrient Needs:  Energy (kcal):  ; Weight Used for Energy Requirements:  Current     Protein (g):  70-80; Weight Used for Protein Requirements:  Current(1.5-1.8)        Fluid (ml/day):  ; Weight Used for Fluid Requirements:  Current      Nutrition Related Findings:  pt alert, flat abd, hyperactive BS, diarrhea pending C-diff rule out, no noted edema, no fluids noted at this time      Wounds:  Open Wounds(abrasion, sutured laceration)       Current Nutrition Therapies:    DIET GENERAL;     Anthropometric Measures:  · Height: 5' 6\" (167.6 cm)  · Current Body Weight: 99 lb (44.9 kg)(bed scale 9/15)   · Usual Body Weight: 108 lb (49 kg)(actual per EMR 02/2020)     · Ideal Body Weight: 130 lbs; % Ideal Body Weight 76.2 %   · BMI: 16  · BMI Categories: Underweight (BMI less than 22) age over 72       Nutrition Diagnosis:   · Severe malnutrition, In context of chronic illness related to inadequate protein-energy intake(2/2 poor appetite) as evidenced by poor intake prior to admission, weight loss, severe loss of subcutaneous fat, severe muscle loss      Nutrition Interventions:   Food and/or Nutrient Delivery:  Continue Current Diet, Start Oral Nutrition Supplement(Ensure TID)  Nutrition Education/Counseling:  Education not indicated   Coordination of Nutrition Care:  Continued Inpatient Monitoring    Goals:  Consume >75% meals/ONS       Nutrition Monitoring and Evaluation:   Food/Nutrient Intake Outcomes:  Food and Nutrient Intake, Supplement Intake  Physical Signs/Symptoms Outcomes:  Biochemical Data, Diarrhea, GI Status, Fluid Status or Edema, Nutrition Focused Physical Findings, Skin, Weight     Discharge Planning:     Too soon to determine     Electronically signed by Maryuri Alfonso, MS, RD, LD on 9/16/20 at 11:34 AM EDT    Contact: [phone]

## 2020-09-16 NOTE — H&P
[address]                  [address] [facility],  [facility]                              HISTORY AND PHYSICAL    PATIENT NAME: Bacilio Nicole                 :        1950  MED REC NO:   [id number]                            ROOM:       8409  ACCOUNT NO:   [de-identified]                           ADMIT DATE: 09/15/2020  PROVIDER:     Karen Valles DO    CHIEF COMPLAINT:  Status post fall, hydrocephalus. HISTORY OF PRESENT ILLNESS:  The patient is a 77-year-old   female who presents to the emergency room at [facility]  after a fall in the shower. She lacerated her scalp. She has a staple  placed. She is on chronic Eliquis for history of atrial fibrillation. She had a CAT scan of the head which revealed prominent ventricular  system consistent with hydrocephalus. The patient has a history of  known hydrocephalus, has been seen at the [facility], a shunt was  recommend in the past.  The patient was transferred to [facility] for neurosurgical evaluation of findings on CT scan of the  brain. PAST MEDICAL HISTORY:  Peripheral artery disease, cerebrovascular  disease, coronary artery disease, skin cancer, COPD, depression,  hyperlipidemia, hypertension, hypothyroidism. PAST SURGICAL HISTORY:  , D&C, lower extremity angioplasty,  tubal ligation, hemicolectomy. MEDICATIONS:  Prior to admission are Synthroid, DuoNeb nebulizer,  Eliquis, Wellbutrin, sotalol, Norvasc. SOCIAL HISTORY:  The patient quit tobacco, occasional alcohol. REVIEW OF SYSTEMS:  Remarkable for above-stated chief complaint plus  chronic diarrhea. ALLERGIES:  FLAGYL, HORNET VENOM, and WASP VENOM. PRIMARY CARE PROVIDER:  Orly Mccarty MD    PHYSICAL EXAMINATION:  GENERAL APPEARANCE:  Reveals a 77-year-old  female who is  alert, cooperative and a fair historian.   VITAL SIGNS:  On admission, temperature 98.7, pulse 81, respirations 19,  blood pressure 137/65. HEENT:  Head:  Normocephalic. Right parietal scalp with staple intact  with laceration, no active bleeding. Eyes:  Pupils equal and reactive  to light. Extraocular muscles intact. Fundi not well visualized. Nose, no obstruction, polyp or discharge noted. Mouth mucosa without  lesion. Pharynx, noninjected without exudate. NECK:  Supple. No JVD. No thyromegaly. No carotid bruits. HEART:  Regular rate and rhythm without murmur. LUNGS:  Clear to auscultation bilaterally. ABDOMEN:  Positive bowel sounds. Soft, nontender. No rebound or  guarding, no hepatosplenomegaly. No masses. BACK:  With minimal increased thoracic kyphosis. EXTREMITIES:  Without edema. LYMPH NODES:  No adenopathy noted. SKIN:  With laceration, with staple intact right parietal scalp. IMPRESSION:  Status post fall, laceration in scalp, hydrocephalus,  chronic Eliquis therapy, paroxysmal atrial fibrillation, chronic sotalol  therapy, cerebrovascular disease, peripheral artery disease, coronary  artery disease, COPD, depression, hypothyroidism, hyperlipidemia,  dehydration, chronic diarrhea, hypertension. PLAN:  Admit. Neurosurgery to see. IV fluids. PT, OT eval.  Social  service discharge planning. Await further recommendations from  Neurosurgery regarding hydrocephalus. Discharge plan is home when  stable.         Mauri Ward DO    D: 09/16/2020 9:03:18       T: 09/16/2020 9:10:48     [email or web address]  Job#: [id number]     Doc#: [id number]    CC:

## 2020-09-16 NOTE — CONSULTS
[address]                  [address] [facility]  [facility]                                  CONSULTATION    PATIENT NAME: Александр Guy                 :        1950  MED REC NO:   [id number]                            ROOM:       8409  ACCOUNT NO:   [de-identified]                           ADMIT DATE: 09/15/2020  PROVIDER:     Isatu Dietz MD    CONSULT DATE:  2020    CHIEF COMPLAINT:  History of falling. HISTORY OF PRESENT ILLNESS:  This 75-year-old female presented to the  emergency room because of multiple falls. The patient has been falling  a few times a day recently, last time she did strike her head, not very  sure whether she lost consciousness. She did feel dizzy prior to the  fall. She sustained a laceration. She is on Eliquis for atrial  fibrillation. CT scan of the brain raised the possibility of normal  pressure hydrocephalus. On questioning, the patient does admit to  having headache mainly on the right side. She has been walking with a  walker for the past several months because of difficulty with her  balance. She states her memory is not that good and also she had  incontinence of urination from time to time. The CT scan of the brain  was reviewed by me. PAST MEDICAL HISTORY:  Atherosclerosis of the extremity arteries and  also upper and lower extremities, bilateral carotid artery stenosis,  carotid artery bruit, coronary artery disease, cancer, chronic  obstructive pulmonary disease, depression, femoral-popliteal artery  disease, history of tobacco use, hydrocephalus, hyperlipidemia,  hypertension, pain in both feet, peripheral vascular disease ,rheumatoid arthritis, thyroid disease, venous stasis ulcer of the left  calf. PAST SURGICAL HISTORY:   section, colonoscopy, tubal ligation, D  and C of the uterus, peripheral percutaneous arterial intervention,  laparotomy.     SOCIAL HISTORY:  She quit smoking three years ago. She did not use  smokeless tobacco in the past and current alcohol abuse, but denies use  of street drugs. FAMILY HISTORY:  Cancer in her father and coronary artery disease in her  mother, high blood pressure in her mother and kidney disease in her  mother. ALLERGIES:  To FLAGYL, HORNET VENOM, and WASP VENOM. PHYSICAL EXAMINATION:  GENERAL:  She is somewhat malnourished female in no acute distress. NEUROLOGIC:  She is alert, awake, confused about the time. Speech is  fair. Memory is poor. Cranial nerves II through XII are grossly  intact. Pupils are equal and reactive. Extraocular movements are full. Vision is full to confrontation. Handgrip is equal bilaterally. No  focal motor deficit noted in the upper extremities. No focal motor or  sensory deficit noted in the lower extremities. No tenderness present  over the spine. STUDIES:  I reviewed the various studies. IMPRESSION:  Normal pressure hydrocephalus on the CAT scan, head trauma  and history of fall. The patient does have a triad of normal pressure hydrocephalus,  dementia, ataxia and incontinence. The CT scan findings are typical of  normal pressure hydrocephalus. RECOMMENDATION:  Recommend MRI scan of the brain to rule out any other  etiology. We will discuss with the patient and family regarding the  possibility of doing a high-volume tap to confirm the diagnosis of  normal pressure hydrocephalus. We will proceed after talking with the  family and after reviewing the MRI scan of the brain. We will follow  along.         Dorita Fair MD    D: 09/16/2020 12:17:56       T: 09/16/2020 12:25:14     [email or web address]  Job#: [id number]     Doc#: [id number]    CC:

## 2020-09-16 NOTE — PROGRESS NOTES
Physical Therapy    Facility/Department: [facility]      NAME: Dede Cabrera  : 1950  MRN: [id number]      Chart reviewed and PT eval attempted this date. Will hold PT eval at this time. Awaiting neurosurgery recommendation. Will continue to follow.      Jazzy Mathews, [phone]

## 2020-09-17 ENCOUNTER — APPOINTMENT (OUTPATIENT)
Dept: MRI IMAGING | Age: 70
DRG: 031 | End: 2020-09-17
Attending: INTERNAL MEDICINE
Payer: MEDICARE

## 2020-09-17 ENCOUNTER — ANESTHESIA EVENT (OUTPATIENT)
Dept: OPERATING ROOM | Age: 70
DRG: 031 | End: 2020-09-17
Payer: MEDICARE

## 2020-09-17 LAB
REASON FOR REJECTION: NORMAL
REJECTED TEST: NORMAL

## 2020-09-17 PROCEDURE — 97530 THERAPEUTIC ACTIVITIES: CPT

## 2020-09-17 PROCEDURE — 6360000002 HC RX W HCPCS: Performed by: NEUROLOGICAL SURGERY

## 2020-09-17 PROCEDURE — 97166 OT EVAL MOD COMPLEX 45 MIN: CPT

## 2020-09-17 PROCEDURE — 2580000003 HC RX 258: Performed by: INTERNAL MEDICINE

## 2020-09-17 PROCEDURE — 1200000000 HC SEMI PRIVATE

## 2020-09-17 PROCEDURE — 70551 MRI BRAIN STEM W/O DYE: CPT

## 2020-09-17 PROCEDURE — 97162 PT EVAL MOD COMPLEX 30 MIN: CPT

## 2020-09-17 PROCEDURE — 6370000000 HC RX 637 (ALT 250 FOR IP): Performed by: INTERNAL MEDICINE

## 2020-09-17 RX ADMIN — ACETAMINOPHEN 650 MG: 325 TABLET, FILM COATED ORAL at 03:36

## 2020-09-17 RX ADMIN — BUPROPION HYDROCHLORIDE 100 MG: 100 TABLET, EXTENDED RELEASE ORAL at 10:30

## 2020-09-17 RX ADMIN — SOTALOL HYDROCHLORIDE 120 MG: 120 TABLET ORAL at 20:29

## 2020-09-17 RX ADMIN — SODIUM CHLORIDE: 9 INJECTION, SOLUTION INTRAVENOUS at 19:29

## 2020-09-17 RX ADMIN — LEVOTHYROXINE SODIUM 100 MCG: 0.1 TABLET ORAL at 06:41

## 2020-09-17 RX ADMIN — SOTALOL HYDROCHLORIDE 120 MG: 120 TABLET ORAL at 10:30

## 2020-09-17 RX ADMIN — LORAZEPAM 1 MG: 2 INJECTION INTRAMUSCULAR; INTRAVENOUS at 07:52

## 2020-09-17 RX ADMIN — Medication 10 ML: at 10:31

## 2020-09-17 RX ADMIN — AMLODIPINE BESYLATE 5 MG: 5 TABLET ORAL at 10:30

## 2020-09-17 ASSESSMENT — ENCOUNTER SYMPTOMS
DYSPNEA ACTIVITY LEVEL: AFTER AMBULATING 1 FLIGHT OF STAIRS
SHORTNESS OF BREATH: 1

## 2020-09-17 ASSESSMENT — PAIN SCALES - GENERAL
PAINLEVEL_OUTOF10: 0
PAINLEVEL_OUTOF10: 4
PAINLEVEL_OUTOF10: 0

## 2020-09-17 ASSESSMENT — COPD QUESTIONNAIRES: CAT_SEVERITY: MILD

## 2020-09-17 NOTE — PROGRESS NOTES
Physical Therapy  Physical Therapy Initial Assessment     Name: Joshua Escamilla  : 1950  MRN: [id number]    Referring Provider: Marika Wren DO    Date of Service: 2020    Evaluating PT: Matt Staley PT, MARY, [id number]    Room #: 4790/3244-K  Diagnosis: Hydrocephalus  PMHx/PSHx: Thyroid disease, HTN, hydrocephalus, PVD, RA, bilateral carotid artery stenosis, CA, CAD, COPD, HLD, depression  Precautions: Fall risk, alarm    SUBJECTIVE:    Pt was very lethargic and unable to provide social hx/PLOF. OBJECTIVE:   Initial Evaluation  Date: 20 Treatment Date: Short Term/ Long Term   Goals   AM-PAC 6 Clicks      Was pt agreeable to Eval/treatment? Yes     Does pt have pain? No current complaints of pain     Bed Mobility  Rolling: Max A  Supine to sit: Max A  Sit to supine: Max A  Scooting: Max A to EOB  Rolling: Independent   Supine to sit: Independent   Sit to supine: Independent   Scooting: Independent    Transfers Sit to stand: NT due to lethargy  Stand to sit: NT  Stand pivot: NT  Sit to stand: Supervision  Stand to sit: Supervision  Stand pivot: Supervision with AAD   Ambulation   NT  >50 feet with AAD with Supervision   Stair negotiation: ascended and descended NT  4 step(s) with 2 rail(s) with SBA   ROM BUE: NT  BLE: NT     Strength BUE: NT  BLE: NT     Balance Sitting EOB: Max A  Dynamic Standing: NT  Sitting EOB: Independent   Dynamic Standing: Supervision with AAD     Pt is A & O x: 1 to person. Pt was very lethargic throughout session but could follow simple, one step commands at times. Sensation: Unable to accurately assess due to cognitive status/lethargy. Edema: Unremarkable. Patient education  NA due to cognitive status/lethargy. Patient response to education:   Pt verbalized understanding Pt demonstrated skill Pt requires further education in this area   NA NA NA     ASSESSMENT:    Comments:   Pt was supine in bed upon room entry.  RN reports pt was recently administered Ativan and was very lethargic. Pt was cleared for OOB activity. Pt was aroused with moderate verbal and tactile cueing. Pt only able to keep eyes open with continuous cueing. Pt was able to assist with trunk mobility during supine to sit transfer. Pt had strong posterior lean when sitting EOB and was unable to correct when cued. Pt indicated she wanted to return to supine. Pt was safely returned to supine and scooted to [facility]. Pt was positioned comfortably in bed and alarm was activated. Pt was left supine in bed with all needs met at conclusion of session. Treatment:  Patient practiced and was instructed in the following treatment:     Therapeutic activities: Pt completed all therapeutic activities noted above. Pt was cued to correct posterior lean when sitting at EOB for 5+ minutes. Pt was skillfully positioned in bed with pillows to protect skin integrity. Pt's/family goals:   1. None stated. Patient and or family understand(s) diagnosis, prognosis, and plan of care. No.    PLAN:    Current Treatment Recommendations   [x] Strengthening     [] ROM   [x] Balance Training   [x] Endurance Training   [x] Transfer Training   [x] Gait Training   [x] Stair Training   [] Positioning   [x] Safety and Education Training   [x] Patient/Caregiver Education   [] HEP  [] Other     PT care will be provided in accordance with the objectives noted above. Exercises and functional mobility practice will be used as well as appropriate assistive devices or modalities to obtain goals. Patient and family education will also be administered as needed. Frequency of treatments: 2-5x/week x 3-5 days.     Time in: 1115  Time out: 1130    Total Treatment Time 10 minutes     Evaluation Time includes thorough review of current medical information, gathering information on past medical history/social history and prior level of function, completion of standardized testing/informal observation of tasks, assessment of data and education on plan of care and goals.     CPT codes:  [] Low Complexity PT evaluation 59945  [x] Moderate Complexity PT evaluation 21173  [] High Complexity PT evaluation 65066  [] PT Re-evaluation 75691  [] Gait training 60845 0 minutes  [] Manual therapy 75418 0 minutes  [x] Therapeutic activities 50605 10 minutes  [] Therapeutic exercises 55451 0 minutes  [] Neuromuscular reeducation 62548 0 minutes     Amna Francois, PT, DPT  [id number]

## 2020-09-17 NOTE — PLAN OF CARE
Problem: Falls - Risk of:  Goal: Will remain free from falls  Description: Will remain free from falls  9/17/2020 1700 by Aleksandra Pickering RN  Outcome: Met This Shift     Problem: Falls - Risk of:  Goal: Absence of physical injury  Description: Absence of physical injury  9/17/2020 1700 by Aleksandra Pickering RN  Outcome: Met This Shift

## 2020-09-17 NOTE — CARE COORDINATION
Per neurosurgery note from yesterday, Normal pressure hydrocephalus on the CAT scan, head trauma  and history of fall. The patient does have a triad of normal pressure hydrocephalus, dementia, ataxia and incontinence. The CT scan findings are typical of normal pressure hydrocephalus. RECOMMENDATION: Recommend MRI scan of the brain to rule out any other etiology. We will discuss with the patient and family regarding the possibility of doing a high-volume tap to confirm the diagnosis of normal pressure hydrocephalus after reviewing the MRI scan of the brain. MRI is pending, patient requiring sedation. PT/OT will see after cleared from neurosurgery to assist with transition of care determination. Patient has a hx at [facility].    Nina De Guzman RN CM

## 2020-09-17 NOTE — ANESTHESIA PRE PROCEDURE
Department of Anesthesiology  Preprocedure Note       Name:  Kavon Kang   Age:  65 y.o.  :  1950                                          MRN:  [id number]         Date:  2020      Surgeon: Kayla Friday):  Wiley Sol MD    Procedure: Procedure(s):  PLACEMENT OF INTRATHECAL CATHETER FOR LUMBAR DRAIN -- WANTS TO MOVE UP    Medications prior to admission:   Prior to Admission medications    Medication Sig Start Date End Date Taking?  Authorizing Provider   levothyroxine (SYNTHROID) 100 MCG tablet Take 100 mcg by mouth Daily    Yes Historical Provider, MD   ipratropium-albuterol (Madai Simms) 0.5-2.5 (3) MG/3ML SOLN nebulizer solution Inhale 3 mLs into the lungs every 4 hours (while awake) 3/6/20   Starjuve Borges, DO   apixaban (ELIQUIS) 5 MG TABS tablet Take 1 tablet by mouth 2 times daily 3/6/20   [facility], DO   buPROPion [facility]) 100 MG extended release tablet Take 1 tablet by mouth daily 3/7/20   Starlett Sangokul Borges, DO   sotalol (BETAPACE) 120 MG tablet Take 1 tablet by mouth 2 times daily 3/6/20   Starjuve Jonesmer, DO   amLODIPine (NORVASC) 5 MG tablet Take 1 tablet by mouth daily 3/7/20   [facility], DO       Current medications:    Current Facility-Administered Medications   Medication Dose Route Frequency Provider Last Rate Last Dose    amLODIPine (NORVASC) tablet 5 mg  5 mg Oral Daily Starlett Sane Malmer, DO   5 mg at 20 1030    apixaban (ELIQUIS) tablet 5 mg  5 mg Oral BID Starlett Sangokul Jonesmer, DO   Stopped at 20 0900    [facility]ion [facility] extended release tablet 100 mg  100 mg Oral Daily Starlett Sane Malmer, DO   100 mg at 20 1030    docusate sodium (COLACE) capsule 100 mg  100 mg Oral BID Starlett Sane Malmer, DO        levothyroxine (SYNTHROID) tablet 100 mcg  100 mcg Oral Daily Starlett Sane Malmer, DO   100 mcg at 20 0641    sennosides (SENOKOT) 8.8 MG/5ML syrup 5 mL  5 mL Oral Nightly Ezequiel Borges DO        sotalol (BETAPACE) tablet 120 mg  120 2019    Bilateral carotid artery stenosis 2018    Bruit of right carotid artery 5/3/2018    CAD (coronary artery disease)     Cancer (HCC)     SKIN CA/Thigh    COPD (chronic obstructive pulmonary disease) ([facility])     Depression     Femoro-popliteal artery disease ([facility] [address] 2019    History of tobacco use 5/3/2018    Hydrocephalus (HCC)     Hyperlipidemia     Hypertension     Pain in both feet 5/3/2018    PVD (peripheral vascular disease) ([facility] [address]     PVD (peripheral vascular disease) with claudication (HCC) 5/3/2018    Rheumatoid arthritis (HCC)     Thyroid disease     Venous stasis ulcer of left calf with fat layer exposed without varicose veins ([facility] [address] 2019       Past Surgical History:        Procedure Laterality Date     SECTION      COLONOSCOPY      DILATION AND CURETTAGE OF UTERUS      LAPAROTOMY N/A 2020    LAPAROTOMY EXPLORATORY, RIGHT HEMICOLECTOMY performed by Rosa Luna MD at [place] [address]  2019    L SFA angioplasty    TUBAL LIGATION         Social History:    Social History     Tobacco Use    Smoking status: Former Smoker     Packs/day: 0.25     Types: Cigarettes     Last attempt to quit: 5/3/2017     Years since quitting: 3.3    Smokeless tobacco: Never Used   Substance Use Topics    Alcohol use: Yes     Comment: beer/Weekly                                 Counseling given: Not Answered      Vital Signs (Current):   Vitals:    20 1127 20 1530 20 0813 20 1615   BP:  129/64 137/85 (!) 119/56   Pulse:  70 70 60   Resp:  19 18 18   Temp:  37.1 °C (98.8 °F) 36.9 °C (98.5 °F) 36.7 °C (98 °F)   TempSrc:  Temporal Temporal Temporal   SpO2:  98% 93% 93%   Weight:       Height: 5' 6\" (1.676 m)                                                 BP Readings from Last 3 Encounters:   20 (!) 119/56   09/15/20 125/75   20 (!) 160/82       NPO Status:  Pt educated to remain NPO after 0000 9/18/2020                                                                                  BMI:   Wt Readings from Last 3 Encounters:   09/15/20 99 lb 8 oz (45.1 kg)   09/14/20 119 lb (54 kg)   06/12/20 100 lb (45.4 kg)     Body mass index is 16.06 kg/m². CBC:   Lab Results   Component Value Date    WBC 7.6 09/14/2020    RBC 3.83 09/14/2020    HGB 12.1 09/14/2020    HCT 37.2 09/14/2020    MCV 97.1 09/14/2020    RDW 14.6 09/14/2020     09/14/2020       CMP:   Lab Results   Component Value Date     09/16/2020    K 4.0 09/16/2020    K 4.0 02/20/2020     09/16/2020    CO2 29 09/16/2020    BUN 17 09/16/2020    CREATININE 0.6 09/16/2020    GFRAA >60 09/16/2020    LABGLOM >60 09/16/2020    GLUCOSE 86 09/16/2020    GLUCOSE 102 10/07/2010    PROT 7.2 06/12/2020    CALCIUM 8.9 09/16/2020    BILITOT 0.4 06/12/2020    ALKPHOS 96 06/12/2020    AST 17 06/12/2020    ALT 14 06/12/2020       POC Tests: No results for input(s): POCGLU, POCNA, POCK, POCCL, POCBUN, POCHEMO, POCHCT in the last 72 hours. Coags:   Lab Results   Component Value Date    PROTIME 11.8 02/17/2020    PROTIME 11.1 04/13/2011    INR 1.0 02/17/2020    APTT 31.3 01/20/2020       HCG (If Applicable): No results found for: PREGTESTUR, PREGSERUM, HCG, HCGQUANT     ABGs: No results found for: PHART, PO2ART, TGH6XSW, BHP6GJM, BEART, R1JDFGDG     Type & Screen (If Applicable):  No results found for: LABABO, LABRH    Drug/Infectious Status (If Applicable):  No results found for: HIV, HEPCAB    COVID-19 Screening (If Applicable): No results found for: COVID19    12 Lead EKG 9/14/2020 HR 75  Narrative & Impression     Normal sinus rhythm  Normal ECG  No previous ECGs available  Confirmed by Jesse Landry ([id number]) on 9/15/2020 7:34:03 AM     Echo 2/20/2020   Findings      Left Ventricle   Left ventricular internal dimensions were normal in diastole and systole. No regional wall motion abnormalities seen. Normal left ventricular ejection fraction. Ejection fraction is visually estimated at 65%. Normal left ventricular diastolic filling pattern. Right Ventricle   Normal right ventricular size and function. Left Atrium   Normal sized left atrium. Interatrial septum appears intact. Right Atrium   Normal right atrium size. Mitral Valve   Structurally normal mitral valve. Mild mitral regurgitation is present. Tricuspid Valve   The tricuspid valve appears structurally normal.   Mild tricuspid regurgitation. RVSP is 35 mmHg. Aortic Valve   The aortic valve leaflets were not well visualized. The aortic valve appears mildly sclerotic. Pulmonic Valve   The pulmonic valve was not well visualized. Pericardial Effusion   No evidence of pericardial effusion. Aorta   Aortic root dimension within normal limits. Conclusions      Summary   Left ventricular internal dimensions were normal in diastole and systole. No regional wall motion abnormalities seen. Normal left ventricular ejection fraction. Mild mitral regurgitation is present. The aortic valve appears mildly sclerotic. Mild tricuspid regurgitation. Signature      Carotid US 5/15/2018  Impression    1. Carotid Doppler ultrasound demonstrating a hemodynamically    significant stenosis (50-69%) in the mid segment of the left internal    carotid artery based on elevated peak systolic velocity and    end-diastolic velocity, as above. 2. Hemodynamically significant stenosis (50-69%) in the right external    carotid artery based on elevated peak systolic velocity, as above. 3. No evidence of hemodynamically significant stenosis involving the    remaining carotid or vertebral arteries bilaterally, with peak    systolic and end-diastolic velocities as above. CXR 9/15/2020  FINDINGS:         Diffuse bone demineralization. There is calcification within thoracic    aorta. Acromioclavicular arthropathy.  14 mm round density overlying    the left lung base is probably nipple shadow however more pronounced    than on the prior studies.         The heart, lungs, mediastinum and regional skeleton are otherwise    unremarkable.              Impression         14 mm round density overlying the left lung base is probably nipple    shadow however more pronounced than on the prior studies. Consider    follow-up CT of the chest.           CT Head 9/15/2020  FINDINGS: There is a prominent ventricular system in relation to the    peripheral CSF space. These could be indication for a communicating    type of hydrocephalus. This findings can be additionally evaluate on    more routine basis with clinical correlation, MRI study also can be    helpful.         There is no focal mass effect or midline shift. There is no evidence    for a sizable area of an acute or recent insult in progression to the    brain parenchyma.         Images with bone window settings demonstrate mucosal retention cyst in    the right maxillary sinus.              Impression    No indication for an acute intracranial process.         Prominent ventricular system as above discussed. The differential    diagnosis can include communicating type of hydrocephalus. Can further    evaluate with MRI study on routine basis.               Anesthesia Evaluation  Patient summary reviewed and Nursing notes reviewed no history of anesthetic complications:   Airway: Mallampati: III  TM distance: >3 FB   Neck ROM: full  Mouth opening: > = 3 FB Dental:      Comment: Pt denies loose,  Chipped, or missing teeth    Pulmonary:normal exam  breath sounds clear to auscultation  (+) COPD: mild,  shortness of breath: recurrent,                            ROS comment: Ex-smoker    Cardiovascular:    (+) hypertension:, CAD:, dysrhythmias (PAF episode in [place]): atrial fibrillation, PONCE: after ambulating 1 flight of stairs, hyperlipidemia      ECG reviewed  Rhythm: regular  Rate: normal  Echocardiogram reviewed         Beta Blocker:  Not on Beta Blocker         Neuro/Psych:   (+) depression/anxiety              ROS comment: Hydrocephalus - for placement of intrathecal catheter 9/18/2020 GI/Hepatic/Renal:        Liver disease: shock liver. Endo/Other:    (+) hypothyroidism, blood dyscrasia (Eliquis, last dose 2 days ago): anticoagulation therapy, arthritis (L hand and L shoulder): rheumatoid. , malignancy/cancer (skin cancer on thigh). Pt had no PAT visit       Abdominal:           Vascular:   + PVD, aortic or cerebral, . ROS comment: Bilateral carotid artery stenosis   Aortoiliac occlusive disease      Femoro-popliteal artery disease . Anesthesia Plan      MAC     ASA 3     (#20 G R AC)  Induction: intravenous. Anesthetic plan and risks discussed with patient. Plan discussed with CRNA and attending.               Rojelio Quiñonez RN, [place]   9/17/2020

## 2020-09-17 NOTE — PROGRESS NOTES
Occupational Therapy  OCCUPATIONAL THERAPY INITIAL EVALUATION      Date:2020  Patient Name: Desiree Deluca  MRN: [id number]  : 1950  Room: [address] [place], [place] [address]    Evaluating OT: Pb Rodríguez, OTR/L #[id number]    Referring physician: Charu Garcia DO   AM-PAC Daily Activity Raw Score:   Recommended Adaptive Equipment: TBD     Diagnosis: Hydrocephalus   Pertinent Medical History/Surgery: Thyroid disease, HTN, hydrocephalus, PVD, RA, bilateral carotid artery stenosis, CA, CAD, COPD, HLD, depression, s/p laparotomy exploratory, R hemicolectomy (2020)  Precautions:  Falls, tele, bed alarm, contact precautions     Home Living: Patient questionable historian reporting she lives with her mother in a 2 story home with bedroom/bathroom located on upper level. Per previous OT evaluation 2020, patient lives with her   daughter in a 2 story home with 3 steps to enter with B railing. Bathroom setup: Per chart, walk-in shower   Equipment owned: Per chart, fww  Prior Level of Function:   Per chart, I/mod I with ADLs ,  I/mod I with IADLs; using fww for functional mobility. Driving: Per patient, yes. Patient is questionable historian. Pain Level: Patient reported no pain  Cognition: A&O: self:yes, place:yes, time: no, situation:no  Follows ~75% of 1 step directions with increased processing time and cues   Memory:  poor   Sequencing:  poor   Problem solving:  poor   Judgement/safety:  poor     Functional Assessment:   Initial Eval Status  Date: 20 Treatment Status  Date: Short Term Goals=LTG  Treatment frequency: PRN 1-3 x/week   Feeding Mod A   Mod I   Grooming Mod A  Washed face while semi-supine in bed using B UE to complete with hand over hand assistance required intially progressing to supervision with cues for continuation and thoroughness.    Mod I    UB Dressing Max A  Min A   LB Dressing Depdendent  Min A    Bathing Dependent  Min A    Toileting Dependent  Min A   Bed Mobility  Supine to sit: Mod A   Sit to supine: Mod A  Verbal/ tactile cues for initiation, sequencing, and technique. Patient required increased time to initate  Supine to sit: Mod I   Sit to supine: Mod I   Functional Transfers Sit to stand: NT  Stand to sit:  NT    Mod I using LRD   Functional Mobility NT  Supervision using LRD, functional household distane   Balance Sitting:     Static:  Min A  Patient sat edge of bed less than 1 minute with B UE support before requesting to return to supine      Patient will sit edge of bed 10 minutes or more with SBA while competing ADLs/ functional activity of patient's choice   Activity Tolerance Fair-  Good   Visual/  Perceptual Glasses:yes (per patient), not present during evaluation         Safety       Poor        Good     Upper Extremities:    Treatment Status  Date: Short Term Goals=LTG   B UE UE ROM:   RUE:  WFL  LUE:  WFL    Strength: Unable to follow commands for testing     Strength: B: 4-/5           Coordination Fine/gross Motor Coordination: Unable to follow commands for testing                        Hand dominance: R handed    Hearing: WFL  Sensation:  No c/o numbness or tingling  Tone:  WFL  Edema: none observed B UE                            Comments: Upon arrival, patient semi-supine in bed and agreeable to session. OT evaluation performed with education provided regarding the purpose and benefits of OT session, along with mobility and I/ADL completion. Patient confused throughout and lethargic at times limiting ability to participate. Patient required continuous cues to attend to task and participate. At end of session, patient semi-supine in bed with call light and phone within reach, along with all lines and tubes intact. Bed alarm on. Patient would  benefit from continued skilled OT  to maximize functional outcomes as they relate to I/ADLs and functional mobility. Eval Complexity: Mod  · Evaluation Complexity: Mod Complexity  ?  History: Expanded review of medical records and additional review of physical, cognitive, or psychosocial history related to current functional performance  ? Exam: 5+ performance deficits  ? Assistance/Modification: mod/max assistance or modifications required to perform tasks. May have comorbidities that affect occupational performance.       Assessment of current deficits   Functional mobility [x]  ADLs [x] Strength [x]  Cognition []  Functional transfers  [x] IADLs [x] Safety Awareness [x]  Endurance/Activity tolerance [x]  Fine Motor Coordination [] Balance [x] Vision/perception [] Sensation []   Gross Motor Coordination [] ROM [] Delirium []                  Motor Control []    Plan of Care:  OT 1-3x/week for 5-7 days PRN   [x] ADL retraining/AE recommendations to maximize patient safety and performance with self-care   [x] Energy Conservation Techniques/Strategies      [] Neuromuscular Re-Education     [x] Functional Transfer Training to maximize safety utilizing LRD          [x] Functional Mobility Training to maximize safety utilizing LRD       [x] Cognitive Re-Training to improve safety with ADLs and functional mobility         [] Splinting/Positioning Needs           [x] Therapeutic Activity to improve activity tolerance for functional activities and ADLs    [x]Therapeutic Exercise to improve UE strength for functional transfers and ADLs   [] Visual/Perceptual   [x] Delirium Prevention/Treatment to maximize functional outcomes  [x] Positioning to Improve Functional Hydes, Safety, and Skin Integrity   [x] Patient and/or Family Education to Increase Safety and Functional Hydes   [] Other:     Rehab Potential: Good for established goals    Patient / Family Goal: To return home     Evaluation time includes thorough review of current medical information, gathering information on past medical & social history & PLOF, completion of standardized testing, informal observation of tasks, consultation with other medical professions/disciplines, assessment of data & development of POC/goals. Patient and/or family were instructed diagnosis, prognosis/goals and plan of care. yes Demonstrated poor understanding. Time In:13:55  Time Out: 14:14  Total Treatment Time: 19 minutes   Min Units   OT Eval Low 70858     OT Eval Medium 00245     OT Eval High 21953     OT Re-Eval [id number]     Therapeutic Ex [phone]     Therapeutic Activities 04237     ADL/Self Care 73072     Orthotic Management 39409     Neuro Re-Ed 84607     Non-Billable Time     TOTAL TIMED TREATMENT       [] Malnutrition indicators have been identified and nursing has been notified to ensure a dietitian consult is ordered.      Mod OT Evaluation   Jessica Rice OTR/L #[id number]

## 2020-09-17 NOTE — PROGRESS NOTES
Reviewed  MRI scan of brain.   Will plan Insertion of Lumbar Intrathecal Catheter for High Volume Tap

## 2020-09-17 NOTE — PROGRESS NOTES
Hospital Medicine    Subjective:  Pt alert conversive      Current Facility-Administered Medications:     amLODIPine (NORVASC) tablet 5 mg, 5 mg, Oral, Daily, Betty Borges, DO, 5 mg at 09/16/20 6643    apixaban (ELIQUIS) tablet 5 mg, 5 mg, Oral, BID, Deanne Cabrera DO, Stopped at 09/17/20 0900    buPROPion [facility]) extended release tablet 100 mg, 100 mg, Oral, Daily, Betty Jonesmer, DO, 100 mg at 09/16/20 0819    docusate sodium (COLACE) capsule 100 mg, 100 mg, Oral, BID, Betty Jonesmer, DO    levothyroxine (SYNTHROID) tablet 100 mcg, 100 mcg, Oral, Daily, Betty Norman Malmer, DO, 100 mcg at 09/17/20 0641    sennosides (SENOKOT) 8.8 MG/5ML syrup 5 mL, 5 mL, Oral, Nightly, Betty Jonesmer, DO    sotalol (BETAPACE) tablet 120 mg, 120 mg, Oral, BID, Betty Norman Malmer, DO, 120 mg at 09/16/20 2057    sodium chloride flush 0.9 % injection 10 mL, 10 mL, Intravenous, 2 times per day, Deanne Cabrera,     sodium chloride flush 0.9 % injection 10 mL, 10 mL, Intravenous, PRN, Carmel Orris Malmer, DO    acetaminophen (TYLENOL) tablet 650 mg, 650 mg, Oral, Q6H PRN, 650 mg at 09/17/20 0336 **OR** acetaminophen (TYLENOL) suppository 650 mg, 650 mg, Rectal, Q6H PRN, Betty Norman Malmer, DO    0.9 % sodium chloride infusion, , Intravenous, Continuous, Betty Borges, DO, Last Rate: 75 mL/hr at 09/16/20 9891    Objective:    /85   Pulse 70   Temp 98.5 °F (36.9 °C) (Temporal)   Resp 18   Ht 5' 6\" (1.676 m)   Wt 99 lb 8 oz (45.1 kg)   SpO2 93%   BMI 16.06 kg/m²     Heart:  Reg  Lungs:  CTA bilaterally, no wheeze, rales or rhonchi  Abd: bowel sounds present, nontender, nondistended, no masses  Extrem:  No clubbing, cyanosis, or edema    CBC with Differential:    Lab Results   Component Value Date    WBC 7.6 09/14/2020    RBC 3.83 09/14/2020    HGB 12.1 09/14/2020    HCT 37.2 09/14/2020     09/14/2020    MCV 97.1 09/14/2020    MCH 31.6 09/14/2020    MCHC 32.5 09/14/2020    RDW 14.6 09/14/2020    NRBC 1.7 02/23/2020    METASPCT 1.7 03/05/2020    LYMPHOPCT 21.8 06/12/2020    MONOPCT 7.6 06/12/2020    MYELOPCT 0.9 02/22/2020    EOSPCT 3 10/07/2010    BASOPCT 0.7 06/12/2020    MONOSABS 0.57 06/12/2020    LYMPHSABS 1.63 06/12/2020    EOSABS 0.21 06/12/2020    BASOSABS 0.05 06/12/2020     CMP:    Lab Results   Component Value Date     09/16/2020    K 4.0 09/16/2020    K 4.0 02/20/2020     09/16/2020    CO2 29 09/16/2020    BUN 17 09/16/2020    CREATININE 0.6 09/16/2020    GFRAA >60 09/16/2020    LABGLOM >60 09/16/2020    GLUCOSE 86 09/16/2020    GLUCOSE 102 10/07/2010    PROT 7.2 06/12/2020    LABALBU 3.8 06/12/2020    LABALBU 4.5 10/07/2010    CALCIUM 8.9 09/16/2020    BILITOT 0.4 06/12/2020    ALKPHOS 96 06/12/2020    AST 17 06/12/2020    ALT 14 06/12/2020     Warfarin PT/INR:    Lab Results   Component Value Date    INR 1.0 02/17/2020    INR 1.0 01/20/2020    INR 1.0 01/07/2020    PROTIME 11.8 02/17/2020    PROTIME 11.2 01/20/2020    PROTIME 11.0 01/07/2020       Assessment:    Active Problems:    Essential hypertension    Depression    Asymptomatic bilateral carotid artery stenosis    PAD (peripheral artery disease) (HCC)    Hyperlipidemia LDL goal <100    Acquired hypothyroidism    Severe protein-calorie malnutrition (HCC)    CAD (coronary artery disease)    Paroxysmal atrial fibrillation (HCC)    Hydrocephalus (HCC)    Dehydration    Diarrhea    Laceration of scalp  Resolved Problems:    * No resolved hospital problems.  *      Plan:  As per neurosurgery mri brain / possible high volume csf tap        Ender Gudino  8:53 AM  9/17/2020

## 2020-09-18 ENCOUNTER — ANESTHESIA (OUTPATIENT)
Dept: OPERATING ROOM | Age: 70
DRG: 031 | End: 2020-09-18
Payer: MEDICARE

## 2020-09-18 ENCOUNTER — APPOINTMENT (OUTPATIENT)
Dept: GENERAL RADIOLOGY | Age: 70
DRG: 031 | End: 2020-09-18
Attending: INTERNAL MEDICINE
Payer: MEDICARE

## 2020-09-18 VITALS — OXYGEN SATURATION: 99 % | SYSTOLIC BLOOD PRESSURE: 100 MMHG | DIASTOLIC BLOOD PRESSURE: 61 MMHG

## 2020-09-18 PROCEDURE — 6370000000 HC RX 637 (ALT 250 FOR IP): Performed by: INTERNAL MEDICINE

## 2020-09-18 PROCEDURE — 97535 SELF CARE MNGMENT TRAINING: CPT

## 2020-09-18 PROCEDURE — 2580000003 HC RX 258: Performed by: INTERNAL MEDICINE

## 2020-09-18 PROCEDURE — 2500000003 HC RX 250 WO HCPCS

## 2020-09-18 PROCEDURE — 2000000000 HC ICU R&B

## 2020-09-18 PROCEDURE — 7100000001 HC PACU RECOVERY - ADDTL 15 MIN

## 2020-09-18 PROCEDURE — 97110 THERAPEUTIC EXERCISES: CPT

## 2020-09-18 PROCEDURE — 3209999900 FLUORO FOR SURGICAL PROCEDURES

## 2020-09-18 PROCEDURE — 3700000001 HC ADD 15 MINUTES (ANESTHESIA): Performed by: NEUROLOGICAL SURGERY

## 2020-09-18 PROCEDURE — 2500000003 HC RX 250 WO HCPCS: Performed by: NEUROLOGICAL SURGERY

## 2020-09-18 PROCEDURE — 7100000000 HC PACU RECOVERY - FIRST 15 MIN

## 2020-09-18 PROCEDURE — 6360000002 HC RX W HCPCS: Performed by: NEUROLOGICAL SURGERY

## 2020-09-18 PROCEDURE — 2580000003 HC RX 258

## 2020-09-18 PROCEDURE — 3600000002 HC SURGERY LEVEL 2 BASE: Performed by: NEUROLOGICAL SURGERY

## 2020-09-18 PROCEDURE — 3700000000 HC ANESTHESIA ATTENDED CARE: Performed by: NEUROLOGICAL SURGERY

## 2020-09-18 PROCEDURE — 2709999900 HC NON-CHARGEABLE SUPPLY: Performed by: NEUROLOGICAL SURGERY

## 2020-09-18 PROCEDURE — 6360000002 HC RX W HCPCS

## 2020-09-18 PROCEDURE — 009X30Z DRAINAGE OF THORACIC SPINAL CORD WITH DRAINAGE DEVICE, PERCUTANEOUS APPROACH: ICD-10-PCS | Performed by: NEUROLOGICAL SURGERY

## 2020-09-18 PROCEDURE — 3600000012 HC SURGERY LEVEL 2 ADDTL 15MIN: Performed by: NEUROLOGICAL SURGERY

## 2020-09-18 PROCEDURE — 009Y3ZZ DRAINAGE OF LUMBAR SPINAL CORD, PERCUTANEOUS APPROACH: ICD-10-PCS | Performed by: NEUROLOGICAL SURGERY

## 2020-09-18 RX ORDER — ACETAMINOPHEN 500 MG
500 TABLET ORAL EVERY 4 HOURS PRN
Status: DISCONTINUED | OUTPATIENT
Start: 2020-09-19 | End: 2020-09-25 | Stop reason: HOSPADM

## 2020-09-18 RX ORDER — ACETAMINOPHEN 325 MG/1
650 TABLET ORAL EVERY 4 HOURS PRN
Status: DISCONTINUED | OUTPATIENT
Start: 2020-09-18 | End: 2020-09-18 | Stop reason: SDUPTHER

## 2020-09-18 RX ORDER — ACETAMINOPHEN 650 MG/1
325 SUPPOSITORY RECTAL EVERY 4 HOURS PRN
Status: DISCONTINUED | OUTPATIENT
Start: 2020-09-19 | End: 2020-09-25 | Stop reason: HOSPADM

## 2020-09-18 RX ORDER — LABETALOL HYDROCHLORIDE 5 MG/ML
INJECTION, SOLUTION INTRAVENOUS
Status: COMPLETED
Start: 2020-09-18 | End: 2020-09-18

## 2020-09-18 RX ORDER — SODIUM CHLORIDE 9 MG/ML
INJECTION, SOLUTION INTRAVENOUS CONTINUOUS PRN
Status: DISCONTINUED | OUTPATIENT
Start: 2020-09-18 | End: 2020-09-18 | Stop reason: SDUPTHER

## 2020-09-18 RX ORDER — LABETALOL HYDROCHLORIDE 5 MG/ML
20 INJECTION, SOLUTION INTRAVENOUS ONCE
Status: COMPLETED | OUTPATIENT
Start: 2020-09-18 | End: 2020-09-18

## 2020-09-18 RX ORDER — PROPOFOL 10 MG/ML
INJECTION, EMULSION INTRAVENOUS CONTINUOUS PRN
Status: DISCONTINUED | OUTPATIENT
Start: 2020-09-18 | End: 2020-09-18 | Stop reason: SDUPTHER

## 2020-09-18 RX ORDER — LIDOCAINE HYDROCHLORIDE AND EPINEPHRINE 10; 10 MG/ML; UG/ML
INJECTION, SOLUTION INFILTRATION; PERINEURAL PRN
Status: DISCONTINUED | OUTPATIENT
Start: 2020-09-18 | End: 2020-09-18 | Stop reason: ALTCHOICE

## 2020-09-18 RX ORDER — MIDAZOLAM HYDROCHLORIDE 1 MG/ML
INJECTION INTRAMUSCULAR; INTRAVENOUS PRN
Status: DISCONTINUED | OUTPATIENT
Start: 2020-09-18 | End: 2020-09-18 | Stop reason: SDUPTHER

## 2020-09-18 RX ORDER — FENTANYL CITRATE 50 UG/ML
INJECTION, SOLUTION INTRAMUSCULAR; INTRAVENOUS PRN
Status: DISCONTINUED | OUTPATIENT
Start: 2020-09-18 | End: 2020-09-18 | Stop reason: SDUPTHER

## 2020-09-18 RX ADMIN — ACETAMINOPHEN 650 MG: 325 TABLET, FILM COATED ORAL at 20:06

## 2020-09-18 RX ADMIN — SOTALOL HYDROCHLORIDE 120 MG: 120 TABLET ORAL at 21:45

## 2020-09-18 RX ADMIN — Medication 2 G: at 16:30

## 2020-09-18 RX ADMIN — FENTANYL CITRATE 25 MCG: 50 INJECTION, SOLUTION INTRAMUSCULAR; INTRAVENOUS at 16:20

## 2020-09-18 RX ADMIN — LEVOTHYROXINE SODIUM 100 MCG: 0.1 TABLET ORAL at 06:12

## 2020-09-18 RX ADMIN — PROPOFOL 50 MCG/KG/MIN: 10 INJECTION, EMULSION INTRAVENOUS at 16:20

## 2020-09-18 RX ADMIN — SODIUM CHLORIDE: 9 INJECTION, SOLUTION INTRAVENOUS at 16:12

## 2020-09-18 RX ADMIN — SODIUM CHLORIDE: 9 INJECTION, SOLUTION INTRAVENOUS at 09:28

## 2020-09-18 RX ADMIN — Medication 10 ML: at 20:07

## 2020-09-18 RX ADMIN — AMLODIPINE BESYLATE 5 MG: 5 TABLET ORAL at 18:29

## 2020-09-18 RX ADMIN — SODIUM CHLORIDE: 9 INJECTION, SOLUTION INTRAVENOUS at 17:17

## 2020-09-18 RX ADMIN — DOCUSATE SODIUM 100 MG: 100 CAPSULE ORAL at 20:06

## 2020-09-18 RX ADMIN — FENTANYL CITRATE 25 MCG: 50 INJECTION, SOLUTION INTRAMUSCULAR; INTRAVENOUS at 16:35

## 2020-09-18 RX ADMIN — FENTANYL CITRATE 25 MCG: 50 INJECTION, SOLUTION INTRAMUSCULAR; INTRAVENOUS at 16:30

## 2020-09-18 RX ADMIN — LABETALOL HYDROCHLORIDE 10 MG: 5 INJECTION, SOLUTION INTRAVENOUS at 19:05

## 2020-09-18 RX ADMIN — MIDAZOLAM 1 MG: 1 INJECTION INTRAMUSCULAR; INTRAVENOUS at 16:12

## 2020-09-18 RX ADMIN — BUPROPION HYDROCHLORIDE 100 MG: 100 TABLET, EXTENDED RELEASE ORAL at 18:33

## 2020-09-18 ASSESSMENT — PULMONARY FUNCTION TESTS
PIF_VALUE: 0
PIF_VALUE: 1
PIF_VALUE: 0

## 2020-09-18 ASSESSMENT — PAIN SCALES - GENERAL
PAINLEVEL_OUTOF10: 0
PAINLEVEL_OUTOF10: 1
PAINLEVEL_OUTOF10: 0
PAINLEVEL_OUTOF10: 3
PAINLEVEL_OUTOF10: 0

## 2020-09-18 ASSESSMENT — PAIN DESCRIPTION - LOCATION: LOCATION: BACK

## 2020-09-18 ASSESSMENT — PAIN DESCRIPTION - PAIN TYPE: TYPE: ACUTE PAIN;SURGICAL PAIN

## 2020-09-18 NOTE — PROGRESS NOTES
Pt wanting to speak with [facility] regarding more questions on procedure, does not want to sign consent at this time, will sign in AM after she is able to ask more questions.

## 2020-09-18 NOTE — PROGRESS NOTES
Hospital Medicine    Subjective:  Pt alert conversive wants to speak to neurosurgery prior to procedure      Current Facility-Administered Medications:     ceFAZolin (ANCEF) 2 g in sterile water 20 mL IV syringe, 2 g, Intravenous, See Admin Instructions, Urszula Corona MD    amLODIPine (NORVASC) tablet 5 mg, 5 mg, Oral, Daily, Justin Kilts Malmer, DO, 5 mg at 09/17/20 1030    apixaban (ELIQUIS) tablet 5 mg, 5 mg, Oral, BID, Justni Kilts Malmer, DO, Stopped at 09/17/20 0900    buPROPion [facility]) extended release tablet 100 mg, 100 mg, Oral, Daily, Justin Kilts Malmer, DO, 100 mg at 09/17/20 1030    docusate sodium (COLACE) capsule 100 mg, 100 mg, Oral, BID, Justin Kilts Malmer, DO    levothyroxine (SYNTHROID) tablet 100 mcg, 100 mcg, Oral, Daily, Justin Kilts Malmer, DO, 100 mcg at 09/18/20 0612    sennosides (SENOKOT) 8.8 MG/5ML syrup 5 mL, 5 mL, Oral, Nightly, Justin Kilts Malmer, DO    sotalol (BETAPACE) tablet 120 mg, 120 mg, Oral, BID, Justin Kilts Malmer, DO, 120 mg at 09/17/20 2029    sodium chloride flush 0.9 % injection 10 mL, 10 mL, Intravenous, 2 times per day, Taylor Purchase, DO, 10 mL at 09/17/20 1031    sodium chloride flush 0.9 % injection 10 mL, 10 mL, Intravenous, PRN, Justin Kilts Malmer, DO    acetaminophen (TYLENOL) tablet 650 mg, 650 mg, Oral, Q6H PRN, 650 mg at 09/17/20 0336 **OR** acetaminophen (TYLENOL) suppository 650 mg, 650 mg, Rectal, Q6H PRN, Justin Kilts Malmer, DO    0.9 % sodium chloride infusion, , Intravenous, Continuous, Justin Kilts Malmer, DO, Last Rate: 75 mL/hr at 09/17/20 1929    Objective:    BP (!) 152/67   Pulse 69   Temp 98.2 °F (36.8 °C) (Oral)   Resp 20   Ht 5' 6\" (1.676 m)   Wt 99 lb 8 oz (45.1 kg)   SpO2 91%   BMI 16.06 kg/m²     Heart:  Reg  Lungs:  CTA bilaterally, no wheeze, rales or rhonchi  Abd: bowel sounds present, nontender, nondistended, no masses  Extrem:  No clubbing, cyanosis, or edema    CBC with Differential:    Lab Results   Component Value Date    WBC 7.6 09/14/2020    RBC 3.83 09/14/2020    HGB 12.1 09/14/2020    HCT 37.2 09/14/2020     09/14/2020    MCV 97.1 09/14/2020    MCH 31.6 09/14/2020    MCHC 32.5 09/14/2020    RDW 14.6 09/14/2020    NRBC 1.7 02/23/2020    METASPCT 1.7 03/05/2020    LYMPHOPCT 21.8 06/12/2020    MONOPCT 7.6 06/12/2020    MYELOPCT 0.9 02/22/2020    EOSPCT 3 10/07/2010    BASOPCT 0.7 06/12/2020    MONOSABS 0.57 06/12/2020    LYMPHSABS 1.63 06/12/2020    EOSABS 0.21 06/12/2020    BASOSABS 0.05 06/12/2020     CMP:    Lab Results   Component Value Date     09/16/2020    K 4.0 09/16/2020    K 4.0 02/20/2020     09/16/2020    CO2 29 09/16/2020    BUN 17 09/16/2020    CREATININE 0.6 09/16/2020    GFRAA >60 09/16/2020    LABGLOM >60 09/16/2020    GLUCOSE 86 09/16/2020    GLUCOSE 102 10/07/2010    PROT 7.2 06/12/2020    LABALBU 3.8 06/12/2020    LABALBU 4.5 10/07/2010    CALCIUM 8.9 09/16/2020    BILITOT 0.4 06/12/2020    ALKPHOS 96 06/12/2020    AST 17 06/12/2020    ALT 14 06/12/2020     Warfarin PT/INR:    Lab Results   Component Value Date    INR 1.0 02/17/2020    INR 1.0 01/20/2020    INR 1.0 01/07/2020    PROTIME 11.8 02/17/2020    PROTIME 11.2 01/20/2020    PROTIME 11.0 01/07/2020       Assessment:    Active Problems:    Essential hypertension    Depression    Asymptomatic bilateral carotid artery stenosis    PAD (peripheral artery disease) (HCC)    Hyperlipidemia LDL goal <100    Acquired hypothyroidism    Severe protein-calorie malnutrition (HCC)    CAD (coronary artery disease)    Paroxysmal atrial fibrillation (HCC)    Hydrocephalus (HCC)    Dehydration    Diarrhea    Laceration of scalp  Resolved Problems:    * No resolved hospital problems. *      Plan:   For high volume tap per neurosurgery if pt agrees        Deanne Cabrera  8:14 AM  9/18/2020

## 2020-09-18 NOTE — CARE COORDINATION
Plan per neurosurgery is for an Insertion of Lumbar Intrathecal Catheter for High Volume Tap later today. I met with patient in room to explain role and discuss transition of care. I offered home health care for physical/ occupational therapy and patient declined. She said that she may change her mind if the doctor recommends it after surgery. Patient said that her daughter will transport her home at time of discharge. Cm/Sw will continue to follow for any discharge needs that may arise.   Bo Cha RN CM

## 2020-09-18 NOTE — BRIEF OP NOTE
Brief Postoperative Note      Patient: Orville Carmen  YOB: 1950  MRN: [id number]    Date of Procedure: 9/18/2020    Pre-Op Diagnosis: . NPH  Post-Op Diagnosis: Same       Procedure(s):  PLACEMENT OF INTRATHECAL CATHETER FOR LUMBAR DRAIN    Surgeon(s):  Иван Huber MD    Assistant:  * No surgical staff found *    Anesthesia: Monitor Anesthesia Care    Estimated Blood Loss (mL): None    Complications: None    Specimens:   * No specimens in log *    Implants:  * No implants in log *      Drains:   Lumbar Drain (Active)       Findings: As expected    Electronically signed by Иван Huber MD on 9/18/2020 at 4:58 PM

## 2020-09-18 NOTE — PROGRESS NOTES
Occupational Therapy  OCCUPATIONAL THERAPY Treatment Note    Date:2020  Patient Name: Wilmer Galvez  MRN: [id number]  : 1950  Room: [address] [place] [address]    Evaluating OT: Benigno Rodriguez, OTR/L #[id number]    Referring physician: Margy Neri DO   AM-BHASKAR Daily Activity Raw Score:   Recommended Adaptive Equipment: shower chair    Diagnosis: Hydrocephalus   Pertinent Medical History/Surgery: Thyroid disease, HTN, hydrocephalus, PVD, RA, bilateral carotid artery stenosis, CA, CAD, COPD, HLD, depression, s/p laparotomy exploratory, R hemicolectomy (2020)  Precautions:  Falls, tele, bed alarm,     Home Living: Patient questionable historian reporting she lives with her mother in a 2 story home with bedroom/bathroom located on upper level. Per previous OT evaluation 2020, patient lives with her   daughter in a 2 story home with 3 steps to enter with B railing. Bathroom setup: Per chart, walk-in shower   Equipment owned: Per chart, fww  Prior Level of Function:   Per chart, I/mod I with ADLs ,  I/mod I with IADLs; using fww for functional mobility. Driving: Per patient, yes. Patient is questionable historian. Pain Level: 0/10  Cognition: A&O: self:yes, place:yes, time: no, situation:no  Follows ~75% of 1 step directions with increased processing time and cues   Memory:  poor   Sequencing:  poor   Problem solving:  poor   Judgement/safety:  poor     Functional Assessment:   Initial Eval Status  Date: 20 Treatment Status  Date:20 Short Term Goals=LTG  Treatment frequency: PRN 1-3 x/week   Feeding Mod A  SBA Mod I   Grooming Mod A  Washed face while semi-supine in bed using B UE to complete with hand over hand assistance required intially progressing to supervision with cues for continuation and thoroughness.   SBA (seated for facial washing and oral hygiene) Mod I    UB Dressing Max A Min A Min A   LB Dressing Depdendent Min A (pt able to doff/jarret brief) Min A Bathing Dependent Min A (simulated) Min A    Toileting Dependent Max A (pt continent but within brief, pt able to complete santos hygiene and pants management) Min A   Bed Mobility  Supine to sit: Mod A   Sit to supine: Mod A  Verbal/ tactile cues for initiation, sequencing, and technique. Patient required increased time to initate Supine to sit: SUP  Sit to supine: SUP Supine to sit: Mod I   Sit to supine: Mod I   Functional Transfers Sit to stand: NT  Stand to sit:  NT   Sit to stand: CGA  Stand to sit:  CGA  Commode: CGA Mod I using LRD   Functional Mobility NT CGA (using ww, to/from bathroom) Supervision using LRD, functional household distane   Balance Sitting:     Static:  Min A  Patient sat edge of bed less than 1 minute with B UE support before requesting to return to supine     Sitting: SUP  Standing: CGA Patient will sit edge of bed 10 minutes or more with SBA while competing ADLs/ functional activity of patient's choice   Activity Tolerance Fair- Fair+ Good   Visual/  Perceptual Glasses:yes (per patient), not present during evaluation         Safety       Poor        Good     Upper Extremities:    Treatment Status  Date: Short Term Goals=LTG   B UE UE ROM:   RUE:  WFL  LUE:  WFL    Strength: Unable to follow commands for testing     Strength: B: 4-/5           Coordination Fine/gross Motor Coordination: Unable to follow commands for testing                        Hand dominance: R handed    Hearing: WFL  Sensation:  No c/o numbness or tingling  Tone:  WFL  Edema: none observed B UE                            Comments: Cleared by RN to see pt. Upon arrival, patient semi-supine in bed and agreeable to session. At end of session, patient semi-supine in bed with call light and phone within reach, along with all lines and tubes intact. Bed alarm on.      Treatment: Pt required vc's for proper technique/safety with hand placement/body mechanics/posture for bed mobility/ADLs/functional tranfers/mobility/ww management. Pt able to  sit EOB ~10 mins to increase core strength/balance/activity tolerance for ease with ADLs. Pt required increased time for toileting task. Pt required rest breaks during session and educated on pursed lip breathing. Pt given resistance putty and educated on exercises to increase BUE strength:  3x15 reps (rolling, pinching, gas pump squeezes). Pt appeared to have tolerated session well and appears motivated/cooperative/pleasant . Pt instructed on use of call light for assistance and fall prevention. Pt demo'ing fair understanding of education provided.  Continue to educate.     -pt has made good progress toward goals  -continue current POC    Time In:1100  Time Out: 1125  Total Treatment Time: 25     Min Units   OT Eval Low 62120     OT Eval Medium 61394     OT Eval High 33324     OT Re-Eval 29930     Therapeutic Ex 48101 10 1   Therapeutic Activities 18235     ADL/Self Care 86747 15 1   Orthotic Management 42995     Neuro Re-Ed 58317     Non-Billable Time     TOTAL TIMED TREATMENT [address] [place], [place], OTR/L [id number]

## 2020-09-18 NOTE — ANESTHESIA POSTPROCEDURE EVALUATION
Department of Anesthesiology  Postprocedure Note    Patient: Alexx Dahl  MRN: [id number]  YOB: 1950  Date of evaluation: 9/18/2020  Time:  5:29 PM     Procedure Summary     Date:  09/18/20 Room / Location:  [address] [place] [address] 20 / [facility]    Anesthesia Start:  8797 Anesthesia Stop:  3852    Procedure:  [address] (N/A Back) Diagnosis:  (.)    Surgeon:  Kaylan Brown MD Responsible Provider:  Bakari Crocker MD    Anesthesia Type:  MAC ASA Status:  3          Anesthesia Type: MAC    Obie Phase I: Obie Score: 10    Obie Phase II:      Last vitals: Reviewed and per EMR flowsheets.        Anesthesia Post Evaluation    Patient location during evaluation: PACU  Patient participation: complete - patient participated  Level of consciousness: awake and alert  Airway patency: patent  Nausea & Vomiting: no nausea and no vomiting  Complications: no  Cardiovascular status: hemodynamically stable and blood pressure returned to baseline  Respiratory status: acceptable  Hydration status: euvolemic

## 2020-09-18 NOTE — PLAN OF CARE
Problem: Falls - Risk of:  Goal: Will remain free from falls  Description: Will remain free from falls  9/18/2020 0035 by James Collazo RN  Outcome: Met This Shift     Problem: Falls - Risk of:  Goal: Absence of physical injury  Description: Absence of physical injury  9/18/2020 0035 by James Collazo RN  Outcome: Met This Shift

## 2020-09-19 PROCEDURE — 6370000000 HC RX 637 (ALT 250 FOR IP): Performed by: INTERNAL MEDICINE

## 2020-09-19 PROCEDURE — 2000000000 HC ICU R&B

## 2020-09-19 PROCEDURE — 6370000000 HC RX 637 (ALT 250 FOR IP): Performed by: STUDENT IN AN ORGANIZED HEALTH CARE EDUCATION/TRAINING PROGRAM

## 2020-09-19 PROCEDURE — 2580000003 HC RX 258: Performed by: INTERNAL MEDICINE

## 2020-09-19 PROCEDURE — APPSS60 APP SPLIT SHARED TIME 46-60 MINUTES: Performed by: NURSE PRACTITIONER

## 2020-09-19 RX ORDER — OXYCODONE HYDROCHLORIDE 5 MG/1
2.5 TABLET ORAL EVERY 6 HOURS PRN
Status: DISCONTINUED | OUTPATIENT
Start: 2020-09-19 | End: 2020-09-25 | Stop reason: HOSPADM

## 2020-09-19 RX ORDER — OXYCODONE HYDROCHLORIDE 5 MG/1
5 TABLET ORAL EVERY 6 HOURS PRN
Status: DISCONTINUED | OUTPATIENT
Start: 2020-09-19 | End: 2020-09-25 | Stop reason: HOSPADM

## 2020-09-19 RX ADMIN — ACETAMINOPHEN 500 MG: 500 TABLET ORAL at 08:00

## 2020-09-19 RX ADMIN — OXYCODONE HYDROCHLORIDE 5 MG: 5 TABLET ORAL at 21:58

## 2020-09-19 RX ADMIN — AMLODIPINE BESYLATE 5 MG: 5 TABLET ORAL at 08:23

## 2020-09-19 RX ADMIN — BUPROPION HYDROCHLORIDE 100 MG: 100 TABLET, EXTENDED RELEASE ORAL at 08:23

## 2020-09-19 RX ADMIN — ACETAMINOPHEN 500 MG: 500 TABLET ORAL at 00:13

## 2020-09-19 RX ADMIN — SOTALOL HYDROCHLORIDE 120 MG: 120 TABLET ORAL at 08:23

## 2020-09-19 RX ADMIN — SOTALOL HYDROCHLORIDE 120 MG: 120 TABLET ORAL at 20:14

## 2020-09-19 RX ADMIN — Medication 10 ML: at 20:11

## 2020-09-19 RX ADMIN — OXYCODONE 2.5 MG: 5 TABLET ORAL at 01:31

## 2020-09-19 RX ADMIN — ACETAMINOPHEN 500 MG: 500 TABLET ORAL at 20:21

## 2020-09-19 RX ADMIN — OXYCODONE HYDROCHLORIDE 5 MG: 5 TABLET ORAL at 08:01

## 2020-09-19 RX ADMIN — SODIUM CHLORIDE: 9 INJECTION, SOLUTION INTRAVENOUS at 06:20

## 2020-09-19 RX ADMIN — LEVOTHYROXINE SODIUM 100 MCG: 0.1 TABLET ORAL at 05:26

## 2020-09-19 RX ADMIN — ACETAMINOPHEN 500 MG: 500 TABLET ORAL at 04:16

## 2020-09-19 ASSESSMENT — PAIN DESCRIPTION - PAIN TYPE
TYPE: ACUTE PAIN
TYPE: ACUTE PAIN;SURGICAL PAIN
TYPE: SURGICAL PAIN
TYPE: ACUTE PAIN

## 2020-09-19 ASSESSMENT — PAIN SCALES - GENERAL
PAINLEVEL_OUTOF10: 0
PAINLEVEL_OUTOF10: 4
PAINLEVEL_OUTOF10: 5
PAINLEVEL_OUTOF10: 3
PAINLEVEL_OUTOF10: 4
PAINLEVEL_OUTOF10: 5
PAINLEVEL_OUTOF10: 0
PAINLEVEL_OUTOF10: 0
PAINLEVEL_OUTOF10: 5
PAINLEVEL_OUTOF10: 3
PAINLEVEL_OUTOF10: 0
PAINLEVEL_OUTOF10: 5
PAINLEVEL_OUTOF10: 6

## 2020-09-19 ASSESSMENT — PAIN DESCRIPTION - FREQUENCY
FREQUENCY: INTERMITTENT
FREQUENCY: INTERMITTENT

## 2020-09-19 ASSESSMENT — PAIN DESCRIPTION - PROGRESSION: CLINICAL_PROGRESSION: GRADUALLY WORSENING

## 2020-09-19 ASSESSMENT — PAIN DESCRIPTION - DESCRIPTORS
DESCRIPTORS: ACHING
DESCRIPTORS: DISCOMFORT
DESCRIPTORS: ACHING

## 2020-09-19 ASSESSMENT — PAIN DESCRIPTION - LOCATION
LOCATION: HEAD
LOCATION: HEAD
LOCATION: BACK;GROIN
LOCATION: BACK

## 2020-09-19 NOTE — PROGRESS NOTES
drain. Wellbutrin  CV: Hx.  PVD. HTN. HLD. CAD. Bilateral carotid stenosis. BP goal <160mm Hg. Amlodipine. Eliquis. Sotalol  Pulm: No acute issues. Hx COPD. Monitor pulmonary status. GI: No acute issues. Tolerating diet. Monitor bowel function  Renal: No acute issues. ID: Afebrile. Endocrine:  Hx Thyroid disease. Synthroid    MSK:. Deconditioned. PT/OT   Heme: No acute issues. Bowel regime: Colace. Senna. Pain control/Sedation:  Acetaminophen. Oxycodone. DVT prophylaxis: SCDs. GI prophylaxis: Diet. Mouth/Eye care: As needed. Consults:  Neurosurgery. Medicine. Patient/Family update: Will update as family available  Code status:  Fu;;      Disposition:  NSU. TOLU Guillaume-CNP  9/19/2020  4:23 PM

## 2020-09-19 NOTE — PROGRESS NOTES
Hospital Medicine  Patient is being seen in coverage for hospital service:  Initial encounter:  Patient presents to the emergency room after a fall in shower  CT scan demonstrated prominent ventricles  Subsequently evaluated by neurosurgery  I have reviewed all of her documentation including [facility]  She has had follow-up since 2012    Subjective:    This morning the patient is awake alert and interactive  She denies any specific problems      Current Facility-Administered Medications:     oxyCODONE (ROXICODONE) immediate release tablet 2.5 mg, 2.5 mg, Oral, Q6H PRN, 2.5 mg at 09/19/20 0131 **OR** oxyCODONE (ROXICODONE) immediate release tablet 5 mg, 5 mg, Oral, Q6H PRN, Prieto Karimi MD    acetaminophen (TYLENOL) tablet 500 mg, 500 mg, Oral, Q4H PRN, 500 mg at 09/19/20 0416 **OR** acetaminophen (TYLENOL) suppository 325 mg, 325 mg, Rectal, Q4H PRN, Prieto Karimi MD    amLODIPine (NORVASC) tablet 5 mg, 5 mg, Oral, Daily, Vik Borges, DO, 5 mg at 09/18/20 1829    apixaban (ELIQUIS) tablet 5 mg, 5 mg, Oral, BID, Vik Borges DO, Stopped at 09/17/20 0900    buPROPion [facility]) extended release tablet 100 mg, 100 mg, Oral, Daily, Vik Borges, DO, 100 mg at 09/18/20 1833    docusate sodium (COLACE) capsule 100 mg, 100 mg, Oral, BID, Vik Borges, DO, 100 mg at 09/18/20 2006    levothyroxine (SYNTHROID) tablet 100 mcg, 100 mcg, Oral, Daily, Vik Borges, DO, 100 mcg at 09/19/20 0526    sennosides (SENOKOT) 8.8 MG/5ML syrup 5 mL, 5 mL, Oral, Nightly, Vik Borges DO    sotalol (BETAPACE) tablet 120 mg, 120 mg, Oral, BID, Vik Borges, DO, 120 mg at 09/18/20 2145    sodium chloride flush 0.9 % injection 10 mL, 10 mL, Intravenous, 2 times per day, Oriana Rodriguez DO, 10 mL at 09/18/20 2007    sodium chloride flush 0.9 % injection 10 mL, 10 mL, Intravenous, PRN, Vik Borges DO    0.9 % sodium chloride infusion, , Intravenous, Continuous, Fito Izquierdo DO, Last Rate: 75 mL/hr at 09/19/20 0620    Objective:    BP (!) 160/90   Pulse 62   Temp 98 °F (36.7 °C) (Oral)   Resp 22   Ht 5' 6\" (1.676 m)   Wt 110 lb 1.6 oz (49.9 kg)   SpO2 93%   BMI 17.77 kg/m²   Head is normocephalic  Eyes pupils are equal and reactive to light with a normal range of motion of external ocular muscles  Mouth and tongue appear symmetric  Neck trachea is midline no masses are noted  Heart:  Reg  Lungs:  CTA bilaterally, no wheeze, rales or rhonchi  Abd: bowel sounds present, nontender, nondistended, no masses  Extrem:  No clubbing, cyanosis, or edema detailed strength testing was not performed  Neurologically she demonstrates no focal neurologic deficit at this time      CBC with Differential:    Lab Results   Component Value Date    WBC 7.6 09/14/2020    RBC 3.83 09/14/2020    HGB 12.1 09/14/2020    HCT 37.2 09/14/2020     09/14/2020    MCV 97.1 09/14/2020    MCH 31.6 09/14/2020    MCHC 32.5 09/14/2020    RDW 14.6 09/14/2020    NRBC 1.7 02/23/2020    METASPCT 1.7 03/05/2020    LYMPHOPCT 21.8 06/12/2020    MONOPCT 7.6 06/12/2020    MYELOPCT 0.9 02/22/2020    EOSPCT 3 10/07/2010    BASOPCT 0.7 06/12/2020    MONOSABS 0.57 06/12/2020    LYMPHSABS 1.63 06/12/2020    EOSABS 0.21 06/12/2020    BASOSABS 0.05 06/12/2020     CMP:    Lab Results   Component Value Date     09/16/2020    K 4.0 09/16/2020    K 4.0 02/20/2020     09/16/2020    CO2 29 09/16/2020    BUN 17 09/16/2020    CREATININE 0.6 09/16/2020    GFRAA >60 09/16/2020    LABGLOM >60 09/16/2020    GLUCOSE 86 09/16/2020    GLUCOSE 102 10/07/2010    PROT 7.2 06/12/2020    LABALBU 3.8 06/12/2020    LABALBU 4.5 10/07/2010    CALCIUM 8.9 09/16/2020    BILITOT 0.4 06/12/2020    ALKPHOS 96 06/12/2020    AST 17 06/12/2020    ALT 14 06/12/2020     Warfarin PT/INR:    Lab Results   Component Value Date    INR 1.0 02/17/2020    INR 1.0 01/20/2020    INR 1.0 01/07/2020    PROTIME 11.8 02/17/2020    PROTIME 11.2 01/20/2020    PROTIME 11.0 01/07/2020       Assessment:  Cerebral atrophy  Microangiopathic disease-chronic  Active Problems:    Essential hypertension    Depression    Asymptomatic bilateral carotid artery stenosis    PAD (peripheral artery disease) (HCC)    Hyperlipidemia LDL goal <100    Acquired hypothyroidism    Severe protein-calorie malnutrition (HCC)    CAD (coronary artery disease)    Paroxysmal atrial fibrillation (HCC)    Hydrocephalus (HCC)    Dehydration    Diarrhea    Laceration of scalp  Resolved Problems:    * No resolved hospital problems.  *      Plan: Patient is undergoing diagnostic drainage currently          Daylene Given  7:27 AM  9/19/2020

## 2020-09-19 NOTE — OP NOTE
[address]                  [address] [facility] [place],  [facility]                                OPERATIVE REPORT    PATIENT NAME: Erik Rutherford                 :        1950  MED REC NO:   [id number]                            ROOM:       [facility]  ACCOUNT NO:   [de-identified]                           ADMIT DATE: 09/15/2020  PROVIDER:     Haylee Schmidt MD    DATE OF PROCEDURE:  2020    SURGEON:  Haylee Schmidt MD    PREOPERATIVE DIAGNOSIS:  Normal pressure hydrocephalus. POSTOPERATIVE DIAGNOSIS:  Normal pressure hydrocephalus. PROCEDURE:  Insert lumbar intrathecal drain for high-volume tap. TECHNIQUE:  The patient was placed on the operating room table in supine  position and after satisfactory monitoring had been instituted by the  anesthesia department and the patient was sedated to some extent, the  patient was turned into prone position on the operating room table. The  lower back was prepared with Betadine scrub and solution and routinely  draped. A point which was 2.5 cm from the midline on the left side was  infiltrated with 1% lidocaine solution. Through this, the Tuohy needle  from the intrathecal catheter tray was inserted under fluoroscopic  guidance into the intrathecal space. Because of spondylotic changes,  different spot was then infiltrated with 1% lidocaine solution and  through this, Tuohy needle under fluoroscopic guidance was inserted into  the intrathecal space at the level of L3-L4. Once the CSF was obtained,  the stylet was removed. Intrathecal catheter was inserted and advanced  into the intrathecal space to the level of T10. Then, the stylet of the  catheter was removed and the hub was connected to the catheter and the  CSF started to come through the hub of the catheter. The hub was then  connected through the tubing to the drainage bag.   The tubing was  anchored to the lower back with OpSite and the patient was sent to the  recovery room in satisfactory state. CSF was coming well through the  tubing into the drainage bag. There was no blood loss and there was no  specimen.         Shlomo Bruner MD    D: 09/18/2020 16:53:12       T: 09/18/2020 20:37:27     [email or web address]  Job#: [id number]     Doc#: [id number]    CC:

## 2020-09-20 LAB
ANION GAP SERPL CALCULATED.3IONS-SCNC: 11 MMOL/L (ref 7–16)
BUN BLDV-MCNC: 17 MG/DL (ref 8–23)
CALCIUM SERPL-MCNC: 8.6 MG/DL (ref 8.6–10.2)
CHLORIDE BLD-SCNC: 106 MMOL/L (ref 98–107)
CO2: 25 MMOL/L (ref 22–29)
CREAT SERPL-MCNC: 0.5 MG/DL (ref 0.5–1)
GFR AFRICAN AMERICAN: >60
GFR NON-AFRICAN AMERICAN: >60 ML/MIN/1.73
GLUCOSE BLD-MCNC: 94 MG/DL (ref 74–99)
HCT VFR BLD CALC: 37 % (ref 34–48)
HEMOGLOBIN: 11.9 G/DL (ref 11.5–15.5)
MAGNESIUM: 2 MG/DL (ref 1.6–2.6)
MCH RBC QN AUTO: 31 PG (ref 26–35)
MCHC RBC AUTO-ENTMCNC: 32.2 % (ref 32–34.5)
MCV RBC AUTO: 96.4 FL (ref 80–99.9)
PDW BLD-RTO: 14.7 FL (ref 11.5–15)
PHOSPHORUS: 3.3 MG/DL (ref 2.5–4.5)
PLATELET # BLD: 355 E9/L (ref 130–450)
PMV BLD AUTO: 10.2 FL (ref 7–12)
POTASSIUM SERPL-SCNC: 3.8 MMOL/L (ref 3.5–5)
RBC # BLD: 3.84 E12/L (ref 3.5–5.5)
SODIUM BLD-SCNC: 142 MMOL/L (ref 132–146)
WBC # BLD: 6.2 E9/L (ref 4.5–11.5)

## 2020-09-20 PROCEDURE — 84100 ASSAY OF PHOSPHORUS: CPT

## 2020-09-20 PROCEDURE — 6370000000 HC RX 637 (ALT 250 FOR IP): Performed by: STUDENT IN AN ORGANIZED HEALTH CARE EDUCATION/TRAINING PROGRAM

## 2020-09-20 PROCEDURE — 85027 COMPLETE CBC AUTOMATED: CPT

## 2020-09-20 PROCEDURE — 6370000000 HC RX 637 (ALT 250 FOR IP): Performed by: INTERNAL MEDICINE

## 2020-09-20 PROCEDURE — 6360000002 HC RX W HCPCS: Performed by: NURSE PRACTITIONER

## 2020-09-20 PROCEDURE — APPSS60 APP SPLIT SHARED TIME 46-60 MINUTES: Performed by: NURSE PRACTITIONER

## 2020-09-20 PROCEDURE — 36415 COLL VENOUS BLD VENIPUNCTURE: CPT

## 2020-09-20 PROCEDURE — 2580000003 HC RX 258: Performed by: INTERNAL MEDICINE

## 2020-09-20 PROCEDURE — 2000000000 HC ICU R&B

## 2020-09-20 PROCEDURE — 99233 SBSQ HOSP IP/OBS HIGH 50: CPT | Performed by: SURGERY

## 2020-09-20 PROCEDURE — 80048 BASIC METABOLIC PNL TOTAL CA: CPT

## 2020-09-20 PROCEDURE — 83735 ASSAY OF MAGNESIUM: CPT

## 2020-09-20 RX ORDER — ONDANSETRON 2 MG/ML
4 INJECTION INTRAMUSCULAR; INTRAVENOUS ONCE
Status: COMPLETED | OUTPATIENT
Start: 2020-09-20 | End: 2020-09-20

## 2020-09-20 RX ADMIN — SOTALOL HYDROCHLORIDE 120 MG: 120 TABLET ORAL at 08:57

## 2020-09-20 RX ADMIN — AMLODIPINE BESYLATE 5 MG: 5 TABLET ORAL at 08:57

## 2020-09-20 RX ADMIN — ACETAMINOPHEN 500 MG: 500 TABLET ORAL at 14:01

## 2020-09-20 RX ADMIN — ONDANSETRON 4 MG: 2 INJECTION INTRAMUSCULAR; INTRAVENOUS at 16:56

## 2020-09-20 RX ADMIN — Medication 10 ML: at 08:57

## 2020-09-20 RX ADMIN — OXYCODONE HYDROCHLORIDE 5 MG: 5 TABLET ORAL at 06:11

## 2020-09-20 RX ADMIN — LEVOTHYROXINE SODIUM 100 MCG: 0.1 TABLET ORAL at 06:11

## 2020-09-20 RX ADMIN — OXYCODONE 2.5 MG: 5 TABLET ORAL at 20:26

## 2020-09-20 RX ADMIN — SOTALOL HYDROCHLORIDE 120 MG: 120 TABLET ORAL at 20:55

## 2020-09-20 RX ADMIN — BUPROPION HYDROCHLORIDE 100 MG: 100 TABLET, EXTENDED RELEASE ORAL at 08:57

## 2020-09-20 RX ADMIN — Medication 10 ML: at 20:55

## 2020-09-20 ASSESSMENT — PAIN SCALES - GENERAL
PAINLEVEL_OUTOF10: 0
PAINLEVEL_OUTOF10: 4
PAINLEVEL_OUTOF10: 7
PAINLEVEL_OUTOF10: 3
PAINLEVEL_OUTOF10: 0
PAINLEVEL_OUTOF10: 4

## 2020-09-20 ASSESSMENT — PAIN DESCRIPTION - LOCATION: LOCATION: BACK

## 2020-09-20 ASSESSMENT — PAIN DESCRIPTION - PAIN TYPE: TYPE: ACUTE PAIN

## 2020-09-20 ASSESSMENT — PAIN DESCRIPTION - DESCRIPTORS: DESCRIPTORS: ACHING

## 2020-09-20 ASSESSMENT — PAIN DESCRIPTION - FREQUENCY: FREQUENCY: INTERMITTENT

## 2020-09-20 NOTE — PLAN OF CARE
Problem: Falls - Risk of:  Goal: Will remain free from falls  Description: Will remain free from falls  9/20/2020 1157 by Jesus Dixon RN  Outcome: Met This Shift  9/20/2020 0054 by Tony Lazo RN  Outcome: Met This Shift  Goal: Absence of physical injury  Description: Absence of physical injury  9/20/2020 1157 by Jesus Dixon RN  Outcome: Met This Shift  9/20/2020 0054 by Tony Lazo RN  Outcome: Met This Shift     Problem: Pain:  Goal: Pain level will decrease  Description: Pain level will decrease  9/20/2020 1157 by Jesus Dixon RN  Outcome: Met This Shift  9/20/2020 0054 by Tony Lazo RN  Outcome: Met This Shift  Goal: Control of acute pain  Description: Control of acute pain  9/20/2020 1157 by Jesus Dixon RN  Outcome: Met This Shift  9/20/2020 0054 by Tony Lazo RN  Outcome: Met This Shift  Goal: Control of chronic pain  Description: Control of chronic pain  9/20/2020 1157 by Jesus Dixon RN  Outcome: Met This Shift  9/20/2020 0054 by Tony Lazo RN  Outcome: Met This Shift     Problem: Skin Integrity:  Goal: Will show no infection signs and symptoms  Description: Will show no infection signs and symptoms  9/20/2020 1157 by Jesus Dixon RN  Outcome: Met This Shift  9/20/2020 0054 by Tony Lazo RN  Outcome: Met This Shift  Goal: Absence of new skin breakdown  Description: Absence of new skin breakdown  9/20/2020 0054 by Tony Lazo RN  Outcome: Met This Shift     Problem: Infection - Surgical Site:  Goal: Will show no infection signs and symptoms  Description: Will show no infection signs and symptoms  9/20/2020 1157 by Jesus Dixon RN  Outcome: Met This Shift  9/20/2020 0054 by Tony Lazo RN  Outcome: Met This Shift

## 2020-09-20 NOTE — PROGRESS NOTES
Patient unable to tolerate walking past her bed when trying to ambulate, tried multiple times with a walker but too unsteady. Still incontinent of urine.

## 2020-09-20 NOTE — PROGRESS NOTES
[facility]  Critical Care  Daily Progress Note 9/20/2020    Date of Admission: 9/15    CC:  S/p insertion of lumbar drain      HOSPITAL EVENTS  9/18 insertion of lumbar drain. Admitted to ICU post op  9/19 No significant issues. T Max 98.1  9/20 No significant issues. T Max 98.4    PHYSICAL EXAM:    /73   Pulse 66   Temp 98.3 °F (36.8 °C) (Axillary)   Resp 23   Ht 5' 6\" (1.676 m)   Wt 110 lb 1.6 oz (49.9 kg)   SpO2 94%   BMI 17.77 kg/m²     Intake/Output Summary (Last 24 hours) at 9/20/2020 0935  Last data filed at 9/20/2020 0853  Gross per 24 hour   Intake 1610 ml   Output 185 ml   Net 1425 ml         General appearance:  Comfortable. NEUROLOGIC:        GCS:  15       Pupil size:  Left 3 mm  Right 3 mm  Pupil reaction: Yes   PERRLA  Wiggles fingers: Left Yes Right Yes  Hand grasp:   Left present     Right present  Wiggles toes: Left Yes    Right Yes  Plantar flexion: Left present    Right present      Lumbar drain:  Clear. Previous 8 hour: 64 ml  Previous 24 hour: 182 ml      CONSTITUTIONAL: No acute distress  CARDIOVASCULAR: S1 S2, regular rate, regular rhythm,Monitor: SR  PULMONARY:  Respirations unlabored. No rhonchi/rales/wheezes. ABDOMEN: Soft, nontender, nondistended, nontympanic, normal bowel sounds. MUSCULOSKELETAL: AGUIRRE  SKIN/EXTREMITIES: No rashes/ecchymosis, no edema/clubbing, warm/dry, good capillary refill. IV ACCESS:  PIV       ASSESSMENT/PLAN:     Active Problems:    Essential hypertension    Depression    Asymptomatic bilateral carotid artery stenosis    PAD (peripheral artery disease) (HCC)    Hyperlipidemia LDL goal <100    Acquired hypothyroidism    Severe protein-calorie malnutrition (HCC)    CAD (coronary artery disease)    Paroxysmal atrial fibrillation (HCC)    Hydrocephalus (HCC)    Dehydration    Diarrhea    Laceration of scalp  Resolved Problems:    * No resolved hospital problems. *          Neuro:  S/p lumbar drain insertion.  Neurosurgery following. Monitor neuro status. Lumbar drain. Wellbutrin  CV: Hx.  PVD. HTN. HLD. CAD. Bilateral carotid stenosis. BP goal <160mm Hg. Amlodipine. Eliquis. Sotalol  Pulm: No acute issues. Hx COPD. Monitor pulmonary status. GI: No acute issues. Tolerating diet. Monitor bowel function  Renal: No acute issues. ID: Afebrile. Endocrine:  Hx Thyroid disease. Synthroid    MSK:. Deconditioned. PT/OT   Heme: No acute issues. Bowel regime: Colace. Senna. Pain control/Sedation:  Acetaminophen. Oxycodone. DVT prophylaxis: SCDs. GI prophylaxis: Diet. Mouth/Eye care: As needed. Consults:  Neurosurgery. Medicine. Patient/Family update: Will update as family available  Code status:  Full      Disposition:  NSICU. Matt CHERRI Parks. APRN-CNP  9/20/2020  9:35 AM     [facility]  SURGICAL GROUP   ATTENDING PHYSICIAN  PROGRESS NOTE     I have examined the patient, reviewed the record, and discussed the case with the APN/  Resident. I have reviewed all relevant labs and imaging data. Please refer to the  APN/ resident's note. I agree with the  assessment and plan with the following corrections/ additions. The following summarizes my clinical findings and independent assessment. 9/18 insertion of lumbar drain. Admitted to ICU post op  9/19 No significant issues. T Max 98.1  9/20 No significant issues. T Max 98.4    Patient resting comfortably Eating their breakfast. No acute issues. Feels better but scared to go forward with the shunt at this point     NAD   Resting in bed   Non labored breathing   RR   abdomen soft   5/5 strength   Lumbar drain in place     A: Lumbar drain     P:   Diet as tolerated   Monitor Exam   Amlodipine.  Sotalol Eliquis placed on Hold   Pain Control   Home Synthroid and  Wellbutrin   PT/OT   Follow NS Lorie Martin MD

## 2020-09-20 NOTE — PLAN OF CARE
Problem: Falls - Risk of:  Goal: Will remain free from falls  Description: Will remain free from falls  9/20/2020 0054 by Solo Molina RN  Outcome: Met This Shift     Problem: Falls - Risk of:  Goal: Absence of physical injury  Description: Absence of physical injury  9/20/2020 0054 by Solo Molina RN  Outcome: Met This Shift     Problem: Pain:  Goal: Pain level will decrease  Description: Pain level will decrease  9/20/2020 0054 by Solo Molina RN  Outcome: Met This Shift     Problem: Pain:  Goal: Control of acute pain  Description: Control of acute pain  9/20/2020 0054 by Solo Molina RN  Outcome: Met This Shift     Problem: Pain:  Goal: Control of chronic pain  Description: Control of chronic pain  Outcome: Met This Shift     Problem: Skin Integrity:  Goal: Will show no infection signs and symptoms  Description: Will show no infection signs and symptoms  9/20/2020 0054 by Solo Molina RN  Outcome: Met This Shift     Problem: Skin Integrity:  Goal: Absence of new skin breakdown  Description: Absence of new skin breakdown  9/20/2020 0054 by Solo Molina RN  Outcome: Met This Shift     Problem: Infection - Surgical Site:  Goal: Will show no infection signs and symptoms  Description: Will show no infection signs and symptoms  9/20/2020 0054 by Solo Molina RN  Outcome: Met This Shift

## 2020-09-20 NOTE — PROGRESS NOTES
Hospital Medicine  Patient is being seen in coverage for [facility] service:  Initial encounter:    Patient status reviewed with patient and with staff:  Detailed history reviewed with the patient  Chart reviewed back to 2012:   Patient presented to the emergency room after falling in the shower and striking her head as well as incurring a laceration  Patient states that she has not had any increase in her falls other than her current presentation  Patient states that she has been incontinent of urine since she had her colon surgery and that this has not worsened  She states she has had intermittent MRIs with her PCP but these have remained stable  Currently she feels about the same  She is alert oriented and interactive      Subjective:    This morning the patient is awake alert and interactive  She denies any specific problems      Current Facility-Administered Medications:     oxyCODONE (ROXICODONE) immediate release tablet 2.5 mg, 2.5 mg, Oral, Q6H PRN, 2.5 mg at 09/19/20 0131 **OR** oxyCODONE (ROXICODONE) immediate release tablet 5 mg, 5 mg, Oral, Q6H PRN, Babita Proctor MD, 5 mg at 09/20/20 3976    acetaminophen (TYLENOL) tablet 500 mg, 500 mg, Oral, Q4H PRN, 500 mg at 09/19/20 2021 **OR** acetaminophen (TYLENOL) suppository 325 mg, 325 mg, Rectal, Q4H PRN, Babita Proctor MD    amLODIPine (NORVASC) tablet 5 mg, 5 mg, Oral, Daily, Andry Borges, DO, 5 mg at 09/20/20 6636    apixaban (ELIQUIS) tablet 5 mg, 5 mg, Oral, BID, Andry Borges DO, Stopped at 09/17/20 0900    buPROPion [facility]) extended release tablet 100 mg, 100 mg, Oral, Daily, Andry Borges, DO, 100 mg at 09/20/20 6917    docusate sodium (COLACE) capsule 100 mg, 100 mg, Oral, BID, Andry Borges, DO, 100 mg at 09/18/20 2006    levothyroxine (SYNTHROID) tablet 100 mcg, 100 mcg, Oral, Daily, Andry Borges DO, 100 mcg at 09/20/20 1237    sennosides (SENOKOT) 8.8 MG/5ML syrup 5 mL, 5 mL, Oral, Nightly, Francisco Orn TAYLOR Borges DO    sotalol (BETAPACE) tablet 120 mg, 120 mg, Oral, BID, Eryn Borges , 120 mg at 09/20/20 0857    sodium chloride flush 0.9 % injection 10 mL, 10 mL, Intravenous, 2 times per day, Eryn Borges , 10 mL at 09/20/20 0857    sodium chloride flush 0.9 % injection 10 mL, 10 mL, Intravenous, PRN, Eryn BorgesDO    Objective:    /73   Pulse 66   Temp 98.3 °F (36.8 °C) (Axillary)   Resp 23   Ht 5' 6\" (1.676 m)   Wt 110 lb 1.6 oz (49.9 kg)   SpO2 94%   BMI 17.77 kg/m²   Head is normocephalic  Eyes pupils are equal and reactive to light with a normal range of motion of external ocular muscles  Mouth and tongue appear symmetric  Neck trachea is midline no masses are noted  Heart:  Reg  Lungs:  CTA bilaterally, no wheeze, rales or rhonchi  Abd: bowel sounds present, nontender, nondistended, no masses  Extrem:  No clubbing, cyanosis, or edema detailed strength testing was not performed  Neurologically she demonstrates no focal neurologic deficit at this time      CBC with Differential:    Lab Results   Component Value Date    WBC 6.2 09/20/2020    RBC 3.84 09/20/2020    HGB 11.9 09/20/2020    HCT 37.0 09/20/2020     09/20/2020    MCV 96.4 09/20/2020    MCH 31.0 09/20/2020    MCHC 32.2 09/20/2020    RDW 14.7 09/20/2020    NRBC 1.7 02/23/2020    METASPCT 1.7 03/05/2020    LYMPHOPCT 21.8 06/12/2020    MONOPCT 7.6 06/12/2020    MYELOPCT 0.9 02/22/2020    EOSPCT 3 10/07/2010    BASOPCT 0.7 06/12/2020    MONOSABS 0.57 06/12/2020    LYMPHSABS 1.63 06/12/2020    EOSABS 0.21 06/12/2020    BASOSABS 0.05 06/12/2020     CMP:    Lab Results   Component Value Date     09/20/2020    K 3.8 09/20/2020    K 4.0 02/20/2020     09/20/2020    CO2 25 09/20/2020    BUN 17 09/20/2020    CREATININE 0.5 09/20/2020    GFRAA >60 09/20/2020    LABGLOM >60 09/20/2020    GLUCOSE 94 09/20/2020    GLUCOSE 102 10/07/2010    PROT 7.2 06/12/2020    LABALBU 3.8 06/12/2020    LABALBU 4.5 10/07/2010 CALCIUM 8.6 09/20/2020    BILITOT 0.4 06/12/2020    ALKPHOS 96 06/12/2020    AST 17 06/12/2020    ALT 14 06/12/2020     Warfarin PT/INR:    Lab Results   Component Value Date    INR 1.0 02/17/2020    INR 1.0 01/20/2020    INR 1.0 01/07/2020    PROTIME 11.8 02/17/2020    PROTIME 11.2 01/20/2020    PROTIME 11.0 01/07/2020       Assessment:  Cerebral atrophy  Microangiopathic disease-chronic  Active Problems:    Essential hypertension    Depression    Asymptomatic bilateral carotid artery stenosis    PAD (peripheral artery disease) (HCC)    Hyperlipidemia LDL goal <100    Acquired hypothyroidism    Severe protein-calorie malnutrition (HCC)    CAD (coronary artery disease)    Paroxysmal atrial fibrillation (HCC)    Hydrocephalus (HCC)    Dehydration    Diarrhea    Laceration of scalp  Resolved Problems:    * No resolved hospital problems.  *      Plan:   Patient is currently undergoing intermittent drainage  She is not experiencing any change over her prehospitalization status  Patient will return to the care of her PCP tomorrow        Xiomara Stout  11:13 AM  9/20/2020

## 2020-09-21 LAB
ANION GAP SERPL CALCULATED.3IONS-SCNC: 10 MMOL/L (ref 7–16)
BUN BLDV-MCNC: 18 MG/DL (ref 8–23)
CALCIUM SERPL-MCNC: 8.8 MG/DL (ref 8.6–10.2)
CHLORIDE BLD-SCNC: 105 MMOL/L (ref 98–107)
CO2: 26 MMOL/L (ref 22–29)
CREAT SERPL-MCNC: 0.6 MG/DL (ref 0.5–1)
GFR AFRICAN AMERICAN: >60
GFR NON-AFRICAN AMERICAN: >60 ML/MIN/1.73
GLUCOSE BLD-MCNC: 92 MG/DL (ref 74–99)
HCT VFR BLD CALC: 35.6 % (ref 34–48)
HEMOGLOBIN: 11.4 G/DL (ref 11.5–15.5)
MAGNESIUM: 1.9 MG/DL (ref 1.6–2.6)
MCH RBC QN AUTO: 31 PG (ref 26–35)
MCHC RBC AUTO-ENTMCNC: 32 % (ref 32–34.5)
MCV RBC AUTO: 96.7 FL (ref 80–99.9)
PDW BLD-RTO: 14.7 FL (ref 11.5–15)
PHOSPHORUS: 3.7 MG/DL (ref 2.5–4.5)
PLATELET # BLD: 369 E9/L (ref 130–450)
PMV BLD AUTO: 9.9 FL (ref 7–12)
POTASSIUM REFLEX MAGNESIUM: 3.9 MMOL/L (ref 3.5–5)
RBC # BLD: 3.68 E12/L (ref 3.5–5.5)
REASON FOR REJECTION: NORMAL
REJECTED TEST: NORMAL
SODIUM BLD-SCNC: 141 MMOL/L (ref 132–146)
WBC # BLD: 5.8 E9/L (ref 4.5–11.5)

## 2020-09-21 PROCEDURE — 97164 PT RE-EVAL EST PLAN CARE: CPT

## 2020-09-21 PROCEDURE — 2580000003 HC RX 258: Performed by: INTERNAL MEDICINE

## 2020-09-21 PROCEDURE — 6360000002 HC RX W HCPCS: Performed by: NURSE PRACTITIONER

## 2020-09-21 PROCEDURE — 6370000000 HC RX 637 (ALT 250 FOR IP): Performed by: INTERNAL MEDICINE

## 2020-09-21 PROCEDURE — 6370000000 HC RX 637 (ALT 250 FOR IP): Performed by: NURSE PRACTITIONER

## 2020-09-21 PROCEDURE — 83735 ASSAY OF MAGNESIUM: CPT

## 2020-09-21 PROCEDURE — 2060000000 HC ICU INTERMEDIATE R&B

## 2020-09-21 PROCEDURE — 36415 COLL VENOUS BLD VENIPUNCTURE: CPT

## 2020-09-21 PROCEDURE — 85027 COMPLETE CBC AUTOMATED: CPT

## 2020-09-21 PROCEDURE — 97535 SELF CARE MNGMENT TRAINING: CPT

## 2020-09-21 PROCEDURE — 97168 OT RE-EVAL EST PLAN CARE: CPT

## 2020-09-21 PROCEDURE — 97530 THERAPEUTIC ACTIVITIES: CPT

## 2020-09-21 PROCEDURE — 80048 BASIC METABOLIC PNL TOTAL CA: CPT

## 2020-09-21 PROCEDURE — 6370000000 HC RX 637 (ALT 250 FOR IP): Performed by: STUDENT IN AN ORGANIZED HEALTH CARE EDUCATION/TRAINING PROGRAM

## 2020-09-21 PROCEDURE — 84100 ASSAY OF PHOSPHORUS: CPT

## 2020-09-21 RX ORDER — MAGNESIUM SULFATE IN WATER 40 MG/ML
2 INJECTION, SOLUTION INTRAVENOUS ONCE
Status: COMPLETED | OUTPATIENT
Start: 2020-09-21 | End: 2020-09-21

## 2020-09-21 RX ORDER — POTASSIUM CHLORIDE 20 MEQ/1
20 TABLET, EXTENDED RELEASE ORAL ONCE
Status: COMPLETED | OUTPATIENT
Start: 2020-09-21 | End: 2020-09-21

## 2020-09-21 RX ORDER — LOPERAMIDE HYDROCHLORIDE 2 MG/1
2 CAPSULE ORAL 4 TIMES DAILY PRN
Status: DISCONTINUED | OUTPATIENT
Start: 2020-09-21 | End: 2020-09-25 | Stop reason: HOSPADM

## 2020-09-21 RX ADMIN — OXYCODONE HYDROCHLORIDE 5 MG: 5 TABLET ORAL at 16:53

## 2020-09-21 RX ADMIN — ACETAMINOPHEN 500 MG: 500 TABLET ORAL at 21:34

## 2020-09-21 RX ADMIN — MAGNESIUM SULFATE 2 G: 2 INJECTION INTRAVENOUS at 07:13

## 2020-09-21 RX ADMIN — LEVOTHYROXINE SODIUM 100 MCG: 0.1 TABLET ORAL at 06:02

## 2020-09-21 RX ADMIN — POTASSIUM CHLORIDE 20 MEQ: 1500 TABLET, EXTENDED RELEASE ORAL at 07:13

## 2020-09-21 RX ADMIN — Medication 10 ML: at 20:15

## 2020-09-21 RX ADMIN — ACETAMINOPHEN 500 MG: 500 TABLET ORAL at 02:56

## 2020-09-21 RX ADMIN — SOTALOL HYDROCHLORIDE 120 MG: 120 TABLET ORAL at 08:55

## 2020-09-21 RX ADMIN — Medication 10 ML: at 08:57

## 2020-09-21 RX ADMIN — OXYCODONE HYDROCHLORIDE 5 MG: 5 TABLET ORAL at 06:02

## 2020-09-21 RX ADMIN — LOPERAMIDE HYDROCHLORIDE 2 MG: 2 CAPSULE ORAL at 16:53

## 2020-09-21 RX ADMIN — AMLODIPINE BESYLATE 5 MG: 5 TABLET ORAL at 08:55

## 2020-09-21 RX ADMIN — SOTALOL HYDROCHLORIDE 120 MG: 120 TABLET ORAL at 20:15

## 2020-09-21 RX ADMIN — BUPROPION HYDROCHLORIDE 100 MG: 100 TABLET, EXTENDED RELEASE ORAL at 08:55

## 2020-09-21 ASSESSMENT — PAIN SCALES - GENERAL
PAINLEVEL_OUTOF10: 7
PAINLEVEL_OUTOF10: 0
PAINLEVEL_OUTOF10: 4
PAINLEVEL_OUTOF10: 0
PAINLEVEL_OUTOF10: 0
PAINLEVEL_OUTOF10: 6
PAINLEVEL_OUTOF10: 0
PAINLEVEL_OUTOF10: 6
PAINLEVEL_OUTOF10: 0

## 2020-09-21 ASSESSMENT — PAIN DESCRIPTION - DESCRIPTORS
DESCRIPTORS: ACHING
DESCRIPTORS: ACHING

## 2020-09-21 ASSESSMENT — PAIN DESCRIPTION - ORIENTATION
ORIENTATION: LOWER

## 2020-09-21 ASSESSMENT — PAIN DESCRIPTION - LOCATION
LOCATION: BACK

## 2020-09-21 ASSESSMENT — PAIN DESCRIPTION - PAIN TYPE
TYPE: ACUTE PAIN

## 2020-09-21 ASSESSMENT — PAIN DESCRIPTION - PROGRESSION: CLINICAL_PROGRESSION: GRADUALLY WORSENING

## 2020-09-21 NOTE — PLAN OF CARE
Problem: Falls - Risk of:  Goal: Will remain free from falls  Description: Will remain free from falls  9/21/2020 0037 by Janis Koo RN  Outcome: Met This Shift     Problem: Falls - Risk of:  Goal: Absence of physical injury  Description: Absence of physical injury  9/21/2020 0037 by Janis Koo RN  Outcome: Met This Shift     Problem: Pain:  Goal: Pain level will decrease  Description: Pain level will decrease  9/21/2020 0037 by Janis Koo RN  Outcome: Met This Shift     Problem: Pain:  Goal: Control of acute pain  Description: Control of acute pain  9/21/2020 0037 by Janis Koo RN  Outcome: Met This Shift     Problem: Pain:  Goal: Control of chronic pain  Description: Control of chronic pain  9/21/2020 0037 by Janis Koo RN  Outcome: Met This Shift     Problem: Skin Integrity:  Goal: Will show no infection signs and symptoms  Description: Will show no infection signs and symptoms  9/21/2020 0037 by Janis Koo RN  Outcome: Met This Shift     Problem: Skin Integrity:  Goal: Absence of new skin breakdown  Description: Absence of new skin breakdown  9/21/2020 0037 by Janis Koo RN  Outcome: Met This Shift     Problem: Infection - Surgical Site:  Goal: Will show no infection signs and symptoms  Description: Will show no infection signs and symptoms  9/21/2020 0037 by Janis Koo RN  Outcome: Met This Shift

## 2020-09-21 NOTE — PROGRESS NOTES
OCCUPATIONAL THERAPY RE-EVALUATION      Date:2020  Patient Name: Wilmer Galvez  MRN: [id number]  : 1950  Room: [address] [place] [address]-A    Referring Provider:    Agustín Diop MD        Re-evaluation s/p surgery    Evaluating OT: Traci Abdi, OTR/L [id number]      AM-PAC Daily Activity Raw Score: 15    Recommended Adaptive Equipment: [facility], shower seat and rail, commode rail, LH sponge    Diagnosis: Hydrocephalus     Reason for admission: s/p multiple falls at home; hit head    Surgery/Procedures:  insertion of lumbar drain     Pertinent Medical History: Thyroid disease, HTN, hydrocephalus, PVD, RA, bilateral carotid artery stenosis, CA, CAD, COPD, HLD, depression, s/p laparotomy exploratory, R hemicolectomy (2020)    Precautions:  Falls, lumbar drain     Home Living: Pt lives with daughter (works nights)  in a 2 story home with 3 step(s) to enter and 2 rail(s); bed/bath on 2nd floor: flight with rail. Laundry in basement (daughter assists)  Bathroom setup: walk in shower with seat and rail; standard height commode  Equipment owned: shower chair, rail, rollator     Prior Level of Function: IND with ADLs; Assist with IADLs. Rollator for ambulation. Driving: yes (pt has not driven in a while)  Occupation: retired     Pain Level: pt c/o 8/10 \"stomach\" pain  this session      Cognition: A&O: 4/4    Follows 1-2 step commands with occasional re-direction due to decreased focus. Pt easily distracted during session. Pt pleasant and cooperative.     Memory: fair; 3/3 word recall (noted improvement from initial evaluation)   Comprehension fair   Problem solving: fair -   Judgement/safety: fair-               Communication skills: WFL           Vision: reports impaired vision               Glasses:reading                                                   Hearing: WFL     RASS: 0  CAM-ICU: (NT) Delirium    UE Assessment:  Hand Dominance: Right [x]  Left []     ROM Strength   RUE  WFL 4/5   LUE WFL 4/5 Sensation: numbness in B feet   Tone: WNL   Edema: [facility]     Functional Assessment   Initial Eval Status  Date: 9/21 Treatment Status  Date: STG=LTG  5-14  days    Feeding S; set up                        IND  while seated up in chair to increase activity tolerance        Grooming Min A  Seated                         Mod I   while standing sink level requiring WW for balance and demonstrating g tolerance      UB dressing/bathing Min A  After set up                        Mod I       LB dressing/bathing Mod A  Seated in chair; crossing LE's for safe reach; Min A [employer] level to stand and pull pants up over hips. Mod I   using AE as needed for safe reach/ energy conservation       Toileting NT                        Mod I     Bed Mobility  Supine to sit: SBA with HOB elevated    Sit to supine: NT                        Mod I  in prep of ADL tasks & transfers   Functional Transfers Sit to stand: Min A    Stand to sit: Min A  *min cues for safe hand placement and posture                        Mod I  sit<>stand/functional bathroom transfers using AD/DME as needed for balance and safety   Functional Mobility Min A [employer]  Min environmental cues- R side  *slow gait                       Mod I   functional/bathroom mobility using AD as needed & demonstrating G safety     Balance Sitting:     Static:  SBA    Dynamic:Min A  Standing: Min A WW  Mod I dynamic sitting balance; Mod I dynamic standing balance  during ADL tasks & transfers   Endurance/Activity Tolerance   F tolerance with moderate activity.    G   tolerance with moderate activity/self care routine   Visual/  Perceptual Impaired: environmental safety- R side                       Vitals:   HR at rest: 63 bpm HR at end of session: 66 bpm   Spo2 at rest:92% Spo2 at end of session 91%   BP at rest:130/60 mmHg BP at end of session 135/67 mmHg       Assessment of current deficits   Functional mobility [x]   ADLs [x] Strength [x]   Cognition from use of LH sponge in shower to assist with fall prevention and energy conservation. ADL retraining: Instruction on adapted techniques/AE to increase independence and safe reach during dressing/bathing activities. Pt demonstrated fair follow through; increased time to perform all tasks. Functional transfer training:  Instruction on postural cues, hand placement/sequencing, & walker safety  to assist with balance and fall prevention during self care routine/bathroom transfers. Pt required min cues for walker safety. Energy conservation: Education on breathing techniques, pacing, work simplification strategies & recommended bathroom DME for safety and energy conservation during self care tasks and activities of daily living. Line management and environmental modifications made prior to and end of session to ensure patient safety and to increase efficiency of session. Skilled monitoring of HR, O2 saturation, blood pressure and patient's response to activity performed throughout session. Comments: OK from RN to see patient. Upon arrival, patient supine in bed; agreeable to session. Daughter present. .  Pt demo fair tolerance with fair understanding of education/techniques. At end of session, patient left seated up in chair. Call light within reach, all lines and tubes intact. Pt instructed on use of call light for assistance and fall prevention. .    Patient presents with decreased ROM/strength,activity tolerance, dynamic balance, functional mobility limiting completion of ADLs and safety. Pt can benefit from continued skilled OT to increase safety and functional independence. Rehab Potential: Good for established goals    Patient / Family Goal: to return to PLOF    Patient and/or family were instructed/educated on diagnosis, prognosis/goals and plan of care. Pt demonstrated good understanding.       [] Malnutrition indicators have been identified and nursing has been notified to ensure a dietitian consult is ordered. Time In:0910             Time Out: 0950         Total Treatment time: 30   Min Units   OT Eval Low 97077     OT Eval Medium 50964     OT Eval High 81693     OT Re-Eval 30472 X    Therapeutic Ex 71512     Therapeutic Activities 97109 15 1   ADL/Self Care 08931 15 1   Orthotic Management 63826     Neuro Re-Ed 25361     Non-Billable Time     TOTAL TIMED TREATMENT 30 2      Evaluation time includes thorough review of current medical information, gathering information on past medical history/social history and prior level of function, completion of standardized testing/informal observation of tasks, assessment of data and development of POC/Goals.      Loretta Lambert, OTR/L 1141

## 2020-09-21 NOTE — PROGRESS NOTES
Comprehensive Nutrition Assessment    Type and Reason for Visit:  Reassess    Nutrition Recommendations/Plan: Continue current diet, Start Ensure BID      Nutrition Assessment:  Pt remains severely malnourished w/ noted significantly improved appetite since adm. Pt s/p lumbar drain placement 2/2 NPH. Will reorder ONS and monitor    Malnutrition Assessment:  Malnutrition Status:  Severe malnutrition    Context:  Chronic Illness     Findings of the 6 clinical characteristics of malnutrition:  Energy Intake:  7 - 75% or less estimated energy requirements for 1 month or longer  Weight Loss:  1 - Mild weight loss (specify amount and time period)(8.3% wt loss x7 months)     Body Fat Loss:  7 - Severe body fat loss(per RD assessment 02/2020) Orbital, Triceps   Muscle Mass Loss:  7 - Severe muscle mass loss(per RD assessment 02/2020) Temples (temporalis), Clavicles (pectoralis & deltoids), Thigh (quadraceps), Calf (gastrocnemius), Hand (interosseous)  Fluid Accumulation:  No significant fluid accumulation     Strength:  Not Performed    Estimated Daily Nutrient Needs:  Energy (kcal):  ; Weight Used for Energy Requirements:  Admission     Protein (g):  70-80; Weight Used for Protein Requirements:  Admission(1.5-1.8)        Fluid (ml/day):  per neuro management; Weight Used for Fluid Requirements:  Admission      Nutrition Related Findings:  pt alert, soft abd, active BS, lumbar drain, no noted edema, +I/Os (>7L)      Wounds:  Multiple, Open Wounds, Surgical Wound       Current Nutrition Therapies:    DIET GENERAL;     Anthropometric Measures:  · Height: 5' 6\" (167.6 cm)  · Current Body Weight: 99 lb (44.9 kg)(bed sale 9/15, noted bed wt 110# 9/19 w/ +fluid balance at this time)   · Admission Body Weight: 99 lb (44.9 kg)(bed scale 9/15)    · Usual Body Weight: 108 lb (49 kg)(actual per EMR 02/2020)     · Ideal Body Weight: 130 lbs; % Ideal Body Weight 76.2 %   · BMI: 16  · BMI Categories: Underweight (BMI less

## 2020-09-21 NOTE — PROGRESS NOTES
Neuro Science Intensive Care Unit  Critical Care Consult  Daily Progress Note 9/21/2020    Date of Admission: 9/15    EVENTS:   9/18 insertion of lumbar drain. Admitted to ICU post op  9/19 No significant issues. T Max 98.1  9/20 No significant issues. T Max 98.4  9/21 No acute issues, working with PT/OT    PHYSICAL EXAM:    /60   Pulse 62   Temp 98.2 °F (36.8 °C) (Oral)   Resp 26   Ht 5' 6\" (1.676 m)   Wt 110 lb 1.6 oz (49.9 kg)   SpO2 96%   BMI 17.77 kg/m²     General appearance:  Comfortable. Pain Description: none    GCS:    4 - Opens eyes on own   6 - Follows simple motor commands  5 - Alert and oriented    Pupil size:  Left 3 mm  Right 3 mm  Pupil reaction: Yes  Wiggles fingers: Left Yes Right Yes  Hand grasp:   Left normal     Right normal  Wiggles toes: Left Yes    Right Yes  Plantar flexion: Left normal    Right normal    CONSTITUTIONAL: no acute distress, lying in hospital bed,   NEUROLOGIC: PERRL, oriented x 4, drain intact draining clear CSF  CARDIOVASCULAR: S1 S2, regular rate, regular rhythm, no murmur/gallop/rub. Monitor: sinus  PULMONARY: no rhonchi/rales/wheezes, no use of accessory muscles  RENAL: voiding   ABDOMEN: soft, nontender, nondistended, nontympanic, normal bowel sounds   MUSCULOSKELETAL: moves all extremities purposefully, 5/5 strength   SKIN/EXTREMITIES: no rashes/ecchymosis, no edema/clubbing, warm/dry, good capillary refill       ASSESSMENT/PLAN:       · Neuro:  NPH s/p lumbar drain. Monitor neuro status, Neurosurgery following, Wellbutrin  · CV: No acute issues HX of A fib, CAD. Monitor hemodynamics. BP goal < 160. Norvasc, sotalol  · Pulm: No acute issues, Hx COPD. Monitor RR & SpO2. Pulmonary hygiene   · GI: No acute issues. Diet. Monitor bowel function. · Renal: No acute issues. Monitor BUN & Cr. Monitor electrolytes & replace as needed. Monitor urine output. · ID: No acute issues   · Endocrine: No acute issues, Hx hypothyroid.  Synthroid    · MSK: No acute issues. PT/OT  · Heme: No acute issues, Hx Elquis - hold hold due to drain. Monitor CBC. Pain control/Sedation:  oxycodone, Tylenol  DVT prophylaxis: SCDs. No Lovenox/heparin until ok with neurosurgery.    GI prophylaxis: Diet  Mouth/Eye care: as needed   Family update: Family updated at bedside   Code status:  full  Disposition:  ICU    Electronically signed by BRIAN Ramirez CNP on 9/21/2020 at 9:52 AM

## 2020-09-21 NOTE — PLAN OF CARE
Problem: Falls - Risk of:  Goal: Will remain free from falls  Description: Will remain free from falls  9/21/2020 0736 by Emilio Penny RN  Outcome: Met This Shift  9/21/2020 0037 by Willy Mendiola RN  Outcome: Met This Shift  9/20/2020 2213 by Willy Mendiola RN  Outcome: Met This Shift  Goal: Absence of physical injury  Description: Absence of physical injury  9/21/2020 0736 by Emilio Penny RN  Outcome: Met This Shift  9/21/2020 0037 by Willy Mendiola RN  Outcome: Met This Shift  9/20/2020 2213 by Willy Mendiola RN  Outcome: Met This Shift     Problem: Pain:  Goal: Pain level will decrease  Description: Pain level will decrease  9/21/2020 0736 by Emilio Penny RN  Outcome: Met This Shift  9/21/2020 0037 by Willy Mendiola RN  Outcome: Met This Shift  9/20/2020 2213 by Willy Mendiola RN  Outcome: Met This Shift  Goal: Control of acute pain  Description: Control of acute pain  9/21/2020 0736 by Emilio Penny RN  Outcome: Met This Shift  9/21/2020 0037 by Willy Mendiola RN  Outcome: Met This Shift  9/20/2020 2213 by Willy Mendiola RN  Outcome: Met This Shift  Goal: Control of chronic pain  Description: Control of chronic pain  9/21/2020 0736 by Emilio Penny RN  Outcome: Met This Shift  9/21/2020 0037 by Willy Mendiola RN  Outcome: Met This Shift     Problem: Skin Integrity:  Goal: Will show no infection signs and symptoms  Description: Will show no infection signs and symptoms  9/21/2020 0736 by Emilio Penny RN  Outcome: Met This Shift  9/21/2020 0037 by Willy Mendiola RN  Outcome: Met This Shift  9/20/2020 2213 by Willy Mendiola RN  Outcome: Met This Shift  Goal: Absence of new skin breakdown  Description: Absence of new skin breakdown  9/21/2020 0736 by Emilio Penny RN  Outcome: Met This Shift  9/21/2020 0037 by Willy Mendiola RN  Outcome: Met This Shift  9/20/2020 2213 by Willy Mendiola RN  Outcome: Met This Shift     Problem: Infection - Surgical Site:  Goal: Will show no infection signs and

## 2020-09-21 NOTE — PROGRESS NOTES
Hospital Medicine    Subjective:  Pt seen in icu this am alert conversive nurse at bedside      Current Facility-Administered Medications:     oxyCODONE (ROXICODONE) immediate release tablet 2.5 mg, 2.5 mg, Oral, Q6H PRN, 2.5 mg at 09/20/20 2026 **OR** oxyCODONE (ROXICODONE) immediate release tablet 5 mg, 5 mg, Oral, Q6H PRN, Lashonda Ruiz MD, 5 mg at 09/21/20 0602    acetaminophen (TYLENOL) tablet 500 mg, 500 mg, Oral, Q4H PRN, 500 mg at 09/21/20 0256 **OR** acetaminophen (TYLENOL) suppository 325 mg, 325 mg, Rectal, Q4H PRN, Lashonda Ruiz MD    amLODIPine (NORVASC) tablet 5 mg, 5 mg, Oral, Daily, Everlena Staple Malmer, DO, 5 mg at 09/21/20 2426    [Held by provider] apixaban (ELIQUIS) tablet 5 mg, 5 mg, Oral, BID, Everlena Staple Malmer, DO, Stopped at 09/17/20 0900    buPROPion [facility]) extended release tablet 100 mg, 100 mg, Oral, Daily, Everlena Staple Malmer, DO, 100 mg at 09/21/20 6261    levothyroxine (SYNTHROID) tablet 100 mcg, 100 mcg, Oral, Daily, Everlena Staple Malmer, DO, 100 mcg at 09/21/20 0602    sotalol (BETAPACE) tablet 120 mg, 120 mg, Oral, BID, Everlena Staple Malmer, DO, 120 mg at 09/21/20 0855    sodium chloride flush 0.9 % injection 10 mL, 10 mL, Intravenous, 2 times per day, Maciej Ji, DO, 10 mL at 09/21/20 0857    sodium chloride flush 0.9 % injection 10 mL, 10 mL, Intravenous, PRN, Everlena Staple Malmer, DO    Objective:    BP (!) 127/59   Pulse 59   Temp 98.9 °F (37.2 °C) (Axillary)   Resp 21   Ht 5' 6\" (1.676 m)   Wt 110 lb 1.6 oz (49.9 kg)   SpO2 91%   BMI 17.77 kg/m²     Heart:  Reg  Lungs:  CTA bilaterally, no wheeze, rales or rhonchi  Abd: bowel sounds present, nontender, nondistended, no masses  Extrem:  No clubbing, cyanosis, or edema    CBC with Differential:    Lab Results   Component Value Date    WBC 5.8 09/21/2020    RBC 3.68 09/21/2020    HGB 11.4 09/21/2020    HCT 35.6 09/21/2020     09/21/2020    MCV 96.7 09/21/2020    MCH 31.0 09/21/2020    MCHC 32.0 09/21/2020    RDW 14.7 09/21/2020    NRBC 1.7 02/23/2020    METASPCT 1.7 03/05/2020    LYMPHOPCT 21.8 06/12/2020    MONOPCT 7.6 06/12/2020    MYELOPCT 0.9 02/22/2020    EOSPCT 3 10/07/2010    BASOPCT 0.7 06/12/2020    MONOSABS 0.57 06/12/2020    LYMPHSABS 1.63 06/12/2020    EOSABS 0.21 06/12/2020    BASOSABS 0.05 06/12/2020     CMP:    Lab Results   Component Value Date     09/21/2020    K 3.9 09/21/2020     09/21/2020    CO2 26 09/21/2020    BUN 18 09/21/2020    CREATININE 0.6 09/21/2020    GFRAA >60 09/21/2020    LABGLOM >60 09/21/2020    GLUCOSE 92 09/21/2020    GLUCOSE 102 10/07/2010    PROT 7.2 06/12/2020    LABALBU 3.8 06/12/2020    LABALBU 4.5 10/07/2010    CALCIUM 8.8 09/21/2020    BILITOT 0.4 06/12/2020    ALKPHOS 96 06/12/2020    AST 17 06/12/2020    ALT 14 06/12/2020     Warfarin PT/INR:    Lab Results   Component Value Date    INR 1.0 02/17/2020    INR 1.0 01/20/2020    INR 1.0 01/07/2020    PROTIME 11.8 02/17/2020    PROTIME 11.2 01/20/2020    PROTIME 11.0 01/07/2020       Assessment:    Active Problems:    Essential hypertension    Depression    Asymptomatic bilateral carotid artery stenosis    PAD (peripheral artery disease) (HCC)    Hyperlipidemia LDL goal <100    Acquired hypothyroidism    Severe protein-calorie malnutrition (HCC)    CAD (coronary artery disease)    Paroxysmal atrial fibrillation (HCC)    Hydrocephalus (HCC)    Dehydration    Diarrhea    Laceration of scalp  Resolved Problems:    * No resolved hospital problems.  *  s/p placement of lumbar drain    Plan:  Cont lumbar drain as per neurosurgery        Yrn Hawthorne  12:00 PM  9/21/2020

## 2020-09-21 NOTE — PLAN OF CARE
Problem: Falls - Risk of:  Goal: Will remain free from falls  Description: Will remain free from falls  9/20/2020 2213 by Delano Chacon RN  Outcome: Met This Shift     Problem: Falls - Risk of:  Goal: Absence of physical injury  Description: Absence of physical injury  9/20/2020 2213 by Delano Chacon RN  Outcome: Met This Shift     Problem: Pain:  Goal: Pain level will decrease  Description: Pain level will decrease  9/20/2020 2213 by Delano Chacon RN  Outcome: Met This Shift     Problem: Pain:  Goal: Control of acute pain  Description: Control of acute pain  9/20/2020 2213 by Delano Chacon RN  Outcome: Met This Shift     Problem: Skin Integrity:  Goal: Will show no infection signs and symptoms  Description: Will show no infection signs and symptoms  9/20/2020 2213 by Delano Chacon RN  Outcome: Met This Shift     Problem: Skin Integrity:  Goal: Absence of new skin breakdown  Description: Absence of new skin breakdown  Outcome: Met This Shift     Problem: Infection - Surgical Site:  Goal: Will show no infection signs and symptoms  Description: Will show no infection signs and symptoms  9/20/2020 2213 by Delano Chacon RN  Outcome: Met This Shift

## 2020-09-21 NOTE — PROGRESS NOTES
Physical Therapy  Physical Therapy Re-Assessment     Name: Sumit Rodriguez  : 1950  MRN: [id number]    Referring Provider:  Olman Lawson MD     Date of Service: 2020    Evaluating PT:  Dyan Moss, PT, DPT [id number]    Room #:  0348/4054-T  Diagnosis:  Hydropcephalus  Precautions: Falls, Lumbar drain  Procedure/Surgery:   insertion of lumbar drain  PMHx/PSHx:  CA, CAD, COPD, HLD, HTN, PVD, RA  Equipment Needs:  TBD    SUBJECTIVE:    Pt lives with daughter in a 2 story home with 3 stairs to enter and 2 rail. Full flight of steps and 1 rail to bedroom. Pt ambulated with [employer] PTA. OBJECTIVE:   Re-Evaluation  Date: 20 Treatment Short Term/ Long Term   Goals   AM-PAC 6 Clicks      Was pt agreeable to Eval/treatment? Yes     Does pt have pain? No c/o pain     Bed Mobility  Rolling: NT  Supine to sit: SBA with HOB elevated  Sit to supine: NT  Scooting: SBA  Mod Independent   Transfers Sit to stand: Hany  Stand to sit: Hany  Stand pivot: Hany with Ww  Mod Independent with [employer]   Ambulation   80 feet with Hany with [employer]  >400 feet with Mod Independent with [employer]   Stair negotiation: ascended and descended NT  >10 steps with 1 rail Mod Independent   ROM BUE:  Defer to OT note  BLE:  WNL     Strength BUE:  Defer to OT note  BLE:  4/5  Increase by 1/3 MMT grade   Balance Sitting EOB:  Hany  Dynamic Standing:  Hany with [employer]  Sitting EOB:  Independent  Dynamic Standing:   Mod Independent with [employer]     Pt is A & O x 4  CAM-ICU: NT  RASS: 0  Sensation:  WNL  Edema:  None    Vitals:  Heart Rate at rest 64 bpm Heart Rate post session 66 bpm   SpO2 at rest 93% SpO2 post session 91%   Blood Pressure at rest 130/60 mmHg Blood Pressure post session 117/70 mmHg     Functional Status Score-Intensive Care Unit (FSS-ICU)   Rolling -/7   Supine to sit transfer 5/7   Unsupported sitting  4/7   Sit to stand transfers 4/7   Ambulation 2/7   Total  15/35       Therapeutic Exercises:  NA    Patient education  Pt educated on safety    Patient response to education:   Pt verbalized understanding Pt demonstrated skill Pt requires further education in this area   x x x     ASSESSMENT:    Comments:  RN reported pt was stable for session. Pt was in bed upon arrival, agreeable to re-evaluation. Pt required increased time for all mobility. Pt ambulated to chair to don pants. Cues for hand placement required. Pt ambulated with decreased gait speed into hallway with occasional downward gaze. Pt had difficulty avoiding objects in environment on R - requiring cues. Pt was left in chair with all needs met and call light in reach. All lines remained intact. Pt's daughter was present for session. Treatment:  Patient practiced and was instructed in the following treatment:     Bed mobility training - pt given verbal cues to facilitate proper sequencing and safety during supine>sit to complete task    Sitting EOB for >8 minutes for upright tolerance, postural awareness and BLE ROM   Transfer training - pt was given verbal and tactile cues to facilitate proper hand placement, technique and safety during sit to stand and stand to sit as well as provided with physical assistance to complete task.  Gait training- pt was given verbal and tactile cues to facilitate safety, balance and use of AD during ambulation as well as provided with physical assistance to complete task. Pt's/ family goals   1. Return home    Patient and or family understand(s) diagnosis, prognosis, and plan of care. Yes    PLAN OF CARE:    Current Treatment Recommendations     [x] Strengthening     [] ROM   [x] Balance Training   [x] Endurance Training   [x] Transfer Training   [x] Gait Training   [x] Stair Training   [] Positioning   [x] Safety and Education Training   [x] Patient/Caregiver Education   [] HEP  [] Other     Frequency of treatments: 2-5x/week x 1-2 weeks.     Time in  0900  Time out  0940    Total Treatment Time  35 minutes     Evaluation Time includes thorough review of current medical information, gathering information on past medical history/social history and prior level of function, completion of standardized testing/informal observation of tasks, assessment of data and education on plan of care and goals.     CPT codes:  [] Low Complexity PT evaluation 90832  [] Moderate Complexity PT evaluation 29699  [] High Complexity PT evaluation 71291  [x] PT Re-evaluation 83879  [] Gait training 96468 - minutes  [] Manual therapy 66419 - minutes  [x] Therapeutic activities 82419 35 minutes  [] Therapeutic exercises 08046 - minutes  [] Neuromuscular reeducation 22426 - minutes     Artur Gillis, PT, DPT  [id number]

## 2020-09-22 ENCOUNTER — ANESTHESIA EVENT (OUTPATIENT)
Dept: OPERATING ROOM | Age: 70
DRG: 031 | End: 2020-09-22
Payer: MEDICARE

## 2020-09-22 LAB
EKG ATRIAL RATE: 58 BPM
EKG P AXIS: 81 DEGREES
EKG P-R INTERVAL: 150 MS
EKG Q-T INTERVAL: 450 MS
EKG QRS DURATION: 74 MS
EKG QTC CALCULATION (BAZETT): 441 MS
EKG R AXIS: 80 DEGREES
EKG T AXIS: 74 DEGREES
EKG VENTRICULAR RATE: 58 BPM
MAGNESIUM: 2.2 MG/DL (ref 1.6–2.6)
PHOSPHORUS: 3.5 MG/DL (ref 2.5–4.5)

## 2020-09-22 PROCEDURE — 2060000000 HC ICU INTERMEDIATE R&B

## 2020-09-22 PROCEDURE — 83735 ASSAY OF MAGNESIUM: CPT

## 2020-09-22 PROCEDURE — 6360000002 HC RX W HCPCS: Performed by: INTERNAL MEDICINE

## 2020-09-22 PROCEDURE — 97530 THERAPEUTIC ACTIVITIES: CPT

## 2020-09-22 PROCEDURE — 6370000000 HC RX 637 (ALT 250 FOR IP): Performed by: NURSE PRACTITIONER

## 2020-09-22 PROCEDURE — 93005 ELECTROCARDIOGRAM TRACING: CPT | Performed by: INTERNAL MEDICINE

## 2020-09-22 PROCEDURE — 84100 ASSAY OF PHOSPHORUS: CPT

## 2020-09-22 PROCEDURE — 2580000003 HC RX 258: Performed by: NURSE PRACTITIONER

## 2020-09-22 PROCEDURE — 2580000003 HC RX 258: Performed by: INTERNAL MEDICINE

## 2020-09-22 PROCEDURE — 97535 SELF CARE MNGMENT TRAINING: CPT

## 2020-09-22 PROCEDURE — 6370000000 HC RX 637 (ALT 250 FOR IP): Performed by: STUDENT IN AN ORGANIZED HEALTH CARE EDUCATION/TRAINING PROGRAM

## 2020-09-22 PROCEDURE — 6370000000 HC RX 637 (ALT 250 FOR IP): Performed by: INTERNAL MEDICINE

## 2020-09-22 PROCEDURE — 36415 COLL VENOUS BLD VENIPUNCTURE: CPT

## 2020-09-22 RX ORDER — ONDANSETRON 2 MG/ML
4 INJECTION INTRAMUSCULAR; INTRAVENOUS EVERY 6 HOURS PRN
Status: DISCONTINUED | OUTPATIENT
Start: 2020-09-22 | End: 2020-09-25 | Stop reason: HOSPADM

## 2020-09-22 RX ADMIN — SOTALOL HYDROCHLORIDE 120 MG: 120 TABLET ORAL at 20:18

## 2020-09-22 RX ADMIN — BUPROPION HYDROCHLORIDE 100 MG: 100 TABLET, EXTENDED RELEASE ORAL at 08:46

## 2020-09-22 RX ADMIN — Medication 10 ML: at 08:46

## 2020-09-22 RX ADMIN — OXYCODONE HYDROCHLORIDE 5 MG: 5 TABLET ORAL at 08:46

## 2020-09-22 RX ADMIN — OXYCODONE 2.5 MG: 5 TABLET ORAL at 01:02

## 2020-09-22 RX ADMIN — LEVOTHYROXINE SODIUM 100 MCG: 0.1 TABLET ORAL at 08:46

## 2020-09-22 RX ADMIN — SOTALOL HYDROCHLORIDE 120 MG: 120 TABLET ORAL at 08:46

## 2020-09-22 RX ADMIN — AMLODIPINE BESYLATE 5 MG: 5 TABLET ORAL at 08:46

## 2020-09-22 RX ADMIN — OXYCODONE HYDROCHLORIDE 5 MG: 5 TABLET ORAL at 18:00

## 2020-09-22 RX ADMIN — Medication 10 ML: at 20:18

## 2020-09-22 RX ADMIN — ONDANSETRON 4 MG: 2 INJECTION INTRAMUSCULAR; INTRAVENOUS at 20:45

## 2020-09-22 ASSESSMENT — PAIN SCALES - GENERAL
PAINLEVEL_OUTOF10: 6
PAINLEVEL_OUTOF10: 0
PAINLEVEL_OUTOF10: 0
PAINLEVEL_OUTOF10: 6
PAINLEVEL_OUTOF10: 0
PAINLEVEL_OUTOF10: 7
PAINLEVEL_OUTOF10: 7
PAINLEVEL_OUTOF10: 0

## 2020-09-22 ASSESSMENT — PAIN DESCRIPTION - PAIN TYPE: TYPE: ACUTE PAIN

## 2020-09-22 ASSESSMENT — ENCOUNTER SYMPTOMS
DYSPNEA ACTIVITY LEVEL: AFTER AMBULATING 1 FLIGHT OF STAIRS
SHORTNESS OF BREATH: 1

## 2020-09-22 ASSESSMENT — PAIN DESCRIPTION - ORIENTATION: ORIENTATION: LOWER

## 2020-09-22 ASSESSMENT — COPD QUESTIONNAIRES: CAT_SEVERITY: MILD

## 2020-09-22 ASSESSMENT — PAIN DESCRIPTION - DESCRIPTORS: DESCRIPTORS: ACHING;DISCOMFORT

## 2020-09-22 ASSESSMENT — PAIN DESCRIPTION - LOCATION: LOCATION: BACK

## 2020-09-22 NOTE — PROGRESS NOTES
The patient has decided in favor of  shunt. Plan for tomorrow.   Answered questions re: surgery etc.

## 2020-09-22 NOTE — PROGRESS NOTES
Spoke with dr. Lj Hernandez on floor in regards to pt agreeable to  shunt, stated he will be back to see pt when he is done with surgery, new orders placed for pt

## 2020-09-22 NOTE — PROGRESS NOTES
Eval Status  Date: 9/21 Treatment Status  Date:9/22/20 STG=LTG  5-14  days    Feeding S; set up SUP                       IND  while seated up in chair to increase activity tolerance        Grooming Min A  Seated  SUP (seated at sink for hair washing)                       Mod I   while standing sink level requiring WW for balance and demonstrating g tolerance      UB dressing/bathing Min A  After set up UE dressing: Min A    UE bathing: SBA (sponge bath seated at sink)                       Mod I       LB dressing/bathing Mod A  Seated in chair; crossing LE's for safe reach; Min A [employer] level to stand and pull pants up over hips. LE dressing: Min A (pt able to jarret/doff brief)    LE bathing: Min A (assist with balance with santos areas)                       Mod I   using AE as needed for safe reach/ energy conservation       Toileting NT Max A (simulated and due to pt report of incontinence)                       Mod I     Bed Mobility  Supine to sit: SBA with HOB elevated    Sit to supine: NT Supine to sit: SBA (increased time)    Sit to supine: NT                       Mod I  in prep of ADL tasks & transfers   Functional Transfers Sit to stand: Min A    Stand to sit: Min A  *min cues for safe hand placement and posture Sit to stand: SBA  Stand to sit: SBA  Commode: SBA                         Mod I  sit<>stand/functional bathroom transfers using AD/DME as needed for balance and safety   Functional Mobility Min A [employer]  Min environmental cues- R side  *slow gait SBA (using ww,to/from bathroom)                      Mod I   functional/bathroom mobility using AD as needed & demonstrating G safety     Balance Sitting:     Static:  SBA    Dynamic:Min A  Standing: Min A [employer] Sitting: SBA  Standing: SBA Mod I dynamic sitting balance; Mod I dynamic standing balance  during ADL tasks & transfers   Endurance/Activity Tolerance   F tolerance with moderate activity.    G   tolerance with moderate activity/self care routine Visual/  Perceptual Impaired: environmental safety- R side                       Treatment: Pt required vc's for proper technique/safety with hand placement/body mechanics/posture for bed mobility/ADLs/functional tranfers/mobility/ww management. Pt required vc's for sequencing/initiation of ADLs/functional transfers. Pt able to stand at sink ~2x5 mins to increase standing strength/balance/activity tolerance for ease with ADLs. Pt required increased time to complete ADLs/functional transfers due to vc's to re-direct to task. management of multiple lines. Pt appeared to have tolerated session well and appears motivated/cooperative/pleasant . Pt instructed on use of call light for assistance and fall prevention. Pt demo'ing fair understanding of education provided. Continue to educate. Comments: Cleared by RN to see pt. Upon arrival, patient supine in bed and agreeable to session. At end of session, patient left seated up in chair. Call light within reach, all lines and tubes intact. Pt instructed on use of call light for assistance and fall prevention.  .    Time [id number]            Time Out: 1035         Total Treatment time: 40     Min Units   OT Eval Low [id number]     OT Eval Medium 20464     OT Eval High [id number]     OT Re-Eval [id number]     Therapeutic Ex [id number]     Therapeutic Activities 17648     ADL/Self Care [id number] 40 3   Orthotic Management 62349     Neuro Re-Ed 34187     Non-Billable Time     TOTAL TIMED TREATMENT 40 [address], [facility]L [id number]

## 2020-09-22 NOTE — PROGRESS NOTES
Hospital Medicine    Subjective:  Pt alert conversive agreeable to shunt if indicated      Current Facility-Administered Medications:     loperamide (IMODIUM) capsule 2 mg, 2 mg, Oral, 4x Daily PRN, BRIAN Sullivan - CNP, 2 mg at 09/21/20 1653    oxyCODONE (ROXICODONE) immediate release tablet 2.5 mg, 2.5 mg, Oral, Q6H PRN, 2.5 mg at 09/22/20 0102 **OR** oxyCODONE (ROXICODONE) immediate release tablet 5 mg, 5 mg, Oral, Q6H PRN, Carlotta Jones MD, 5 mg at 09/21/20 1653    acetaminophen (TYLENOL) tablet 500 mg, 500 mg, Oral, Q4H PRN, 500 mg at 09/21/20 2134 **OR** acetaminophen (TYLENOL) suppository 325 mg, 325 mg, Rectal, Q4H PRN, Carlotta Jones MD    amLODIPine (NORVASC) tablet 5 mg, 5 mg, Oral, Daily, Shandra Borges DO, 5 mg at 09/21/20 7962    [Held by provider] apixaban (ELIQUIS) tablet 5 mg, 5 mg, Oral, BID, Shandra Borges DO, Stopped at 09/17/20 0900    buPROPion [facility]) extended release tablet 100 mg, 100 mg, Oral, Daily, Shandra Borges DO, 100 mg at 09/21/20 2521    levothyroxine (SYNTHROID) tablet 100 mcg, 100 mcg, Oral, Daily, Shandra Borges DO, 100 mcg at 09/21/20 0602    sotalol (BETAPACE) tablet 120 mg, 120 mg, Oral, BID, Shandra Borges DO, 120 mg at 09/21/20 2015    sodium chloride flush 0.9 % injection 10 mL, 10 mL, Intravenous, 2 times per day, Reyna Mayers DO, 10 mL at 09/21/20 2015    sodium chloride flush 0.9 % injection 10 mL, 10 mL, Intravenous, PRN, Shandra Borges DO    Objective:    /64   Pulse 58   Temp 98.4 °F (36.9 °C) (Temporal)   Resp 18   Ht 5' 6\" (1.676 m)   Wt 110 lb 1.6 oz (49.9 kg)   SpO2 90%   BMI 17.77 kg/m²     Heart:  Reg  Lungs:  CTA bilaterally, no wheeze, rales or rhonchi  Abd: bowel sounds present, nontender, nondistended, no masses  Extrem:  No clubbing, cyanosis, or edema    CBC with Differential:    Lab Results   Component Value Date    WBC 5.8 09/21/2020    RBC 3.68 09/21/2020    HGB 11.4 09/21/2020 HCT 35.6 09/21/2020     09/21/2020    MCV 96.7 09/21/2020    MCH 31.0 09/21/2020    MCHC 32.0 09/21/2020    RDW 14.7 09/21/2020    NRBC 1.7 02/23/2020    METASPCT 1.7 03/05/2020    LYMPHOPCT 21.8 06/12/2020    MONOPCT 7.6 06/12/2020    MYELOPCT 0.9 02/22/2020    EOSPCT 3 10/07/2010    BASOPCT 0.7 06/12/2020    MONOSABS 0.57 06/12/2020    LYMPHSABS 1.63 06/12/2020    EOSABS 0.21 06/12/2020    BASOSABS 0.05 06/12/2020     CMP:    Lab Results   Component Value Date     09/21/2020    K 3.9 09/21/2020     09/21/2020    CO2 26 09/21/2020    BUN 18 09/21/2020    CREATININE 0.6 09/21/2020    GFRAA >60 09/21/2020    LABGLOM >60 09/21/2020    GLUCOSE 92 09/21/2020    GLUCOSE 102 10/07/2010    PROT 7.2 06/12/2020    LABALBU 3.8 06/12/2020    LABALBU 4.5 10/07/2010    CALCIUM 8.8 09/21/2020    BILITOT 0.4 06/12/2020    ALKPHOS 96 06/12/2020    AST 17 06/12/2020    ALT 14 06/12/2020     Warfarin PT/INR:    Lab Results   Component Value Date    INR 1.0 02/17/2020    INR 1.0 01/20/2020    INR 1.0 01/07/2020    PROTIME 11.8 02/17/2020    PROTIME 11.2 01/20/2020    PROTIME 11.0 01/07/2020       Assessment:    Active Problems:    Essential hypertension    Depression    Asymptomatic bilateral carotid artery stenosis    PAD (peripheral artery disease) (HCC)    Hyperlipidemia LDL goal <100    Acquired hypothyroidism    Severe protein-calorie malnutrition (HCC)    CAD (coronary artery disease)    Paroxysmal atrial fibrillation (HCC)    Hydrocephalus (HCC)    Dehydration    Diarrhea    Laceration of scalp  Resolved Problems:    * No resolved hospital problems.  *      Plan:  Pt agreeable to shunt if rec by neurosurgery if no OR planned dc home vs rehab        Phyllis Farrell  7:10 AM  9/22/2020

## 2020-09-23 ENCOUNTER — ANESTHESIA (OUTPATIENT)
Dept: OPERATING ROOM | Age: 70
DRG: 031 | End: 2020-09-23
Payer: MEDICARE

## 2020-09-23 VITALS
RESPIRATION RATE: 4 BRPM | TEMPERATURE: 95.2 F | DIASTOLIC BLOOD PRESSURE: 61 MMHG | OXYGEN SATURATION: 100 % | SYSTOLIC BLOOD PRESSURE: 127 MMHG

## 2020-09-23 LAB
MAGNESIUM: 2 MG/DL (ref 1.6–2.6)
PHOSPHORUS: 3.8 MG/DL (ref 2.5–4.5)

## 2020-09-23 PROCEDURE — 6360000002 HC RX W HCPCS: Performed by: NEUROLOGICAL SURGERY

## 2020-09-23 PROCEDURE — 6370000000 HC RX 637 (ALT 250 FOR IP): Performed by: NEUROLOGICAL SURGERY

## 2020-09-23 PROCEDURE — 2000000000 HC ICU R&B

## 2020-09-23 PROCEDURE — 2709999900 HC NON-CHARGEABLE SUPPLY: Performed by: NEUROLOGICAL SURGERY

## 2020-09-23 PROCEDURE — 3700000001 HC ADD 15 MINUTES (ANESTHESIA): Performed by: NEUROLOGICAL SURGERY

## 2020-09-23 PROCEDURE — 2580000003 HC RX 258: Performed by: NURSE PRACTITIONER

## 2020-09-23 PROCEDURE — 83735 ASSAY OF MAGNESIUM: CPT

## 2020-09-23 PROCEDURE — 2500000003 HC RX 250 WO HCPCS

## 2020-09-23 PROCEDURE — 2580000003 HC RX 258: Performed by: NEUROLOGICAL SURGERY

## 2020-09-23 PROCEDURE — 00164J6 BYPASS CEREBRAL VENTRICLE TO PERITONEAL CAVITY WITH SYNTHETIC SUBSTITUTE, PERCUTANEOUS ENDOSCOPIC APPROACH: ICD-10-PCS | Performed by: INTERNAL MEDICINE

## 2020-09-23 PROCEDURE — 6370000000 HC RX 637 (ALT 250 FOR IP): Performed by: NURSE PRACTITIONER

## 2020-09-23 PROCEDURE — 2780000010 HC IMPLANT OTHER: Performed by: NEUROLOGICAL SURGERY

## 2020-09-23 PROCEDURE — 62223 ESTABLISH BRAIN CAVITY SHUNT: CPT | Performed by: SURGERY

## 2020-09-23 PROCEDURE — 3600000002 HC SURGERY LEVEL 2 BASE: Performed by: NEUROLOGICAL SURGERY

## 2020-09-23 PROCEDURE — 7100000001 HC PACU RECOVERY - ADDTL 15 MIN: Performed by: NEUROLOGICAL SURGERY

## 2020-09-23 PROCEDURE — 2700000000 HC OXYGEN THERAPY PER DAY

## 2020-09-23 PROCEDURE — 6360000002 HC RX W HCPCS: Performed by: ANESTHESIOLOGY

## 2020-09-23 PROCEDURE — 7100000000 HC PACU RECOVERY - FIRST 15 MIN: Performed by: NEUROLOGICAL SURGERY

## 2020-09-23 PROCEDURE — 84100 ASSAY OF PHOSPHORUS: CPT

## 2020-09-23 PROCEDURE — 2500000003 HC RX 250 WO HCPCS: Performed by: NEUROLOGICAL SURGERY

## 2020-09-23 PROCEDURE — 6360000002 HC RX W HCPCS

## 2020-09-23 PROCEDURE — 2720000010 HC SURG SUPPLY STERILE: Performed by: NEUROLOGICAL SURGERY

## 2020-09-23 PROCEDURE — 3700000000 HC ANESTHESIA ATTENDED CARE: Performed by: NEUROLOGICAL SURGERY

## 2020-09-23 PROCEDURE — C1729 CATH, DRAINAGE: HCPCS | Performed by: NEUROLOGICAL SURGERY

## 2020-09-23 PROCEDURE — 2580000003 HC RX 258

## 2020-09-23 PROCEDURE — 3600000012 HC SURGERY LEVEL 2 ADDTL 15MIN: Performed by: NEUROLOGICAL SURGERY

## 2020-09-23 PROCEDURE — 36415 COLL VENOUS BLD VENIPUNCTURE: CPT

## 2020-09-23 DEVICE — VALVE 42836 STRATA II BURR HOLE
Type: IMPLANTABLE DEVICE | Site: BRAIN | Status: FUNCTIONAL
Brand: STRATA®

## 2020-09-23 RX ORDER — GLYCOPYRROLATE 1 MG/5 ML
SYRINGE (ML) INTRAVENOUS PRN
Status: DISCONTINUED | OUTPATIENT
Start: 2020-09-23 | End: 2020-09-23 | Stop reason: SDUPTHER

## 2020-09-23 RX ORDER — MORPHINE SULFATE 2 MG/ML
2 INJECTION, SOLUTION INTRAMUSCULAR; INTRAVENOUS EVERY 5 MIN PRN
Status: DISCONTINUED | OUTPATIENT
Start: 2020-09-23 | End: 2020-09-23

## 2020-09-23 RX ORDER — LIDOCAINE HYDROCHLORIDE AND EPINEPHRINE 10; 10 MG/ML; UG/ML
INJECTION, SOLUTION INFILTRATION; PERINEURAL PRN
Status: DISCONTINUED | OUTPATIENT
Start: 2020-09-23 | End: 2020-09-23 | Stop reason: ALTCHOICE

## 2020-09-23 RX ORDER — HYDROCODONE BITARTRATE AND ACETAMINOPHEN 5; 325 MG/1; MG/1
1 TABLET ORAL PRN
Status: DISCONTINUED | OUTPATIENT
Start: 2020-09-23 | End: 2020-09-23

## 2020-09-23 RX ORDER — LIDOCAINE HYDROCHLORIDE 20 MG/ML
INJECTION, SOLUTION INTRAVENOUS PRN
Status: DISCONTINUED | OUTPATIENT
Start: 2020-09-23 | End: 2020-09-23 | Stop reason: SDUPTHER

## 2020-09-23 RX ORDER — DIAPER,BRIEF,INFANT-TODD,DISP
EACH MISCELLANEOUS PRN
Status: DISCONTINUED | OUTPATIENT
Start: 2020-09-23 | End: 2020-09-23 | Stop reason: ALTCHOICE

## 2020-09-23 RX ORDER — HYDRALAZINE HYDROCHLORIDE 20 MG/ML
INJECTION INTRAMUSCULAR; INTRAVENOUS PRN
Status: DISCONTINUED | OUTPATIENT
Start: 2020-09-23 | End: 2020-09-23 | Stop reason: SDUPTHER

## 2020-09-23 RX ORDER — ROCURONIUM BROMIDE 10 MG/ML
INJECTION, SOLUTION INTRAVENOUS PRN
Status: DISCONTINUED | OUTPATIENT
Start: 2020-09-23 | End: 2020-09-23 | Stop reason: SDUPTHER

## 2020-09-23 RX ORDER — ONDANSETRON 2 MG/ML
INJECTION INTRAMUSCULAR; INTRAVENOUS PRN
Status: DISCONTINUED | OUTPATIENT
Start: 2020-09-23 | End: 2020-09-23 | Stop reason: SDUPTHER

## 2020-09-23 RX ORDER — FENTANYL CITRATE 50 UG/ML
INJECTION, SOLUTION INTRAMUSCULAR; INTRAVENOUS PRN
Status: DISCONTINUED | OUTPATIENT
Start: 2020-09-23 | End: 2020-09-23 | Stop reason: SDUPTHER

## 2020-09-23 RX ORDER — MIDAZOLAM HYDROCHLORIDE 1 MG/ML
INJECTION INTRAMUSCULAR; INTRAVENOUS PRN
Status: DISCONTINUED | OUTPATIENT
Start: 2020-09-23 | End: 2020-09-23 | Stop reason: SDUPTHER

## 2020-09-23 RX ORDER — NEOSTIGMINE METHYLSULFATE 1 MG/ML
INJECTION, SOLUTION INTRAVENOUS PRN
Status: DISCONTINUED | OUTPATIENT
Start: 2020-09-23 | End: 2020-09-23 | Stop reason: SDUPTHER

## 2020-09-23 RX ORDER — MORPHINE SULFATE 2 MG/ML
1 INJECTION, SOLUTION INTRAMUSCULAR; INTRAVENOUS EVERY 5 MIN PRN
Status: DISCONTINUED | OUTPATIENT
Start: 2020-09-23 | End: 2020-09-23

## 2020-09-23 RX ORDER — MEPERIDINE HYDROCHLORIDE 25 MG/ML
12.5 INJECTION INTRAMUSCULAR; INTRAVENOUS; SUBCUTANEOUS EVERY 5 MIN PRN
Status: DISCONTINUED | OUTPATIENT
Start: 2020-09-23 | End: 2020-09-23

## 2020-09-23 RX ORDER — FLUMAZENIL 0.1 MG/ML
INJECTION, SOLUTION INTRAVENOUS PRN
Status: DISCONTINUED | OUTPATIENT
Start: 2020-09-23 | End: 2020-09-23 | Stop reason: SDUPTHER

## 2020-09-23 RX ORDER — PROMETHAZINE HYDROCHLORIDE 25 MG/ML
6.25 INJECTION, SOLUTION INTRAMUSCULAR; INTRAVENOUS EVERY 10 MIN PRN
Status: DISCONTINUED | OUTPATIENT
Start: 2020-09-23 | End: 2020-09-23

## 2020-09-23 RX ORDER — HYDROCODONE BITARTRATE AND ACETAMINOPHEN 5; 325 MG/1; MG/1
2 TABLET ORAL PRN
Status: DISCONTINUED | OUTPATIENT
Start: 2020-09-23 | End: 2020-09-23

## 2020-09-23 RX ORDER — PROPOFOL 10 MG/ML
INJECTION, EMULSION INTRAVENOUS PRN
Status: DISCONTINUED | OUTPATIENT
Start: 2020-09-23 | End: 2020-09-23 | Stop reason: SDUPTHER

## 2020-09-23 RX ORDER — VANCOMYCIN HYDROCHLORIDE 500 MG/10ML
INJECTION, POWDER, LYOPHILIZED, FOR SOLUTION INTRAVENOUS PRN
Status: DISCONTINUED | OUTPATIENT
Start: 2020-09-23 | End: 2020-09-23 | Stop reason: ALTCHOICE

## 2020-09-23 RX ORDER — DEXAMETHASONE SODIUM PHOSPHATE 10 MG/ML
INJECTION, SOLUTION INTRAMUSCULAR; INTRAVENOUS PRN
Status: DISCONTINUED | OUTPATIENT
Start: 2020-09-23 | End: 2020-09-23 | Stop reason: SDUPTHER

## 2020-09-23 RX ORDER — SODIUM CHLORIDE 9 MG/ML
INJECTION, SOLUTION INTRAVENOUS CONTINUOUS PRN
Status: DISCONTINUED | OUTPATIENT
Start: 2020-09-23 | End: 2020-09-23 | Stop reason: SDUPTHER

## 2020-09-23 RX ORDER — EPHEDRINE SULFATE/0.9% NACL/PF 50 MG/5 ML
SYRINGE (ML) INTRAVENOUS PRN
Status: DISCONTINUED | OUTPATIENT
Start: 2020-09-23 | End: 2020-09-23 | Stop reason: SDUPTHER

## 2020-09-23 RX ADMIN — FLUMAZENIL 0.2 MG: 0.1 INJECTION, SOLUTION INTRAVENOUS at 10:20

## 2020-09-23 RX ADMIN — OXYCODONE HYDROCHLORIDE 5 MG: 5 TABLET ORAL at 23:27

## 2020-09-23 RX ADMIN — LIDOCAINE HYDROCHLORIDE 50 MG: 20 INJECTION, SOLUTION INTRAVENOUS at 09:10

## 2020-09-23 RX ADMIN — CEFAZOLIN 2 G: 1 INJECTION, POWDER, FOR SOLUTION INTRAMUSCULAR; INTRAVENOUS at 09:20

## 2020-09-23 RX ADMIN — Medication 10 ML: at 08:15

## 2020-09-23 RX ADMIN — HYDRALAZINE HYDROCHLORIDE 5 MG: 20 INJECTION INTRAMUSCULAR; INTRAVENOUS at 09:55

## 2020-09-23 RX ADMIN — SODIUM CHLORIDE: 9 INJECTION, SOLUTION INTRAVENOUS at 09:05

## 2020-09-23 RX ADMIN — Medication 0.6 MG: at 10:00

## 2020-09-23 RX ADMIN — MORPHINE SULFATE 2 MG: 2 INJECTION, SOLUTION INTRAMUSCULAR; INTRAVENOUS at 11:24

## 2020-09-23 RX ADMIN — ROCURONIUM BROMIDE 30 MG: 10 INJECTION, SOLUTION INTRAVENOUS at 09:10

## 2020-09-23 RX ADMIN — Medication 10 ML: at 21:17

## 2020-09-23 RX ADMIN — Medication 10 MG: at 09:21

## 2020-09-23 RX ADMIN — Medication 5 MG: at 09:27

## 2020-09-23 RX ADMIN — SOTALOL HYDROCHLORIDE 120 MG: 120 TABLET ORAL at 08:18

## 2020-09-23 RX ADMIN — MORPHINE SULFATE 2 MG: 2 INJECTION, SOLUTION INTRAMUSCULAR; INTRAVENOUS at 11:32

## 2020-09-23 RX ADMIN — Medication 3 MG: at 10:00

## 2020-09-23 RX ADMIN — Medication 10 MG: at 09:24

## 2020-09-23 RX ADMIN — AMLODIPINE BESYLATE 5 MG: 5 TABLET ORAL at 08:18

## 2020-09-23 RX ADMIN — PROPOFOL 70 MG: 10 INJECTION, EMULSION INTRAVENOUS at 09:10

## 2020-09-23 RX ADMIN — FENTANYL CITRATE 50 MCG: 50 INJECTION, SOLUTION INTRAMUSCULAR; INTRAVENOUS at 09:46

## 2020-09-23 RX ADMIN — MIDAZOLAM 2 MG: 1 INJECTION INTRAMUSCULAR; INTRAVENOUS at 09:05

## 2020-09-23 RX ADMIN — SOTALOL HYDROCHLORIDE 120 MG: 120 TABLET ORAL at 21:17

## 2020-09-23 RX ADMIN — BUPROPION HYDROCHLORIDE 100 MG: 100 TABLET, EXTENDED RELEASE ORAL at 08:18

## 2020-09-23 RX ADMIN — OXYCODONE HYDROCHLORIDE 5 MG: 5 TABLET ORAL at 16:51

## 2020-09-23 RX ADMIN — Medication 10 MG: at 09:19

## 2020-09-23 RX ADMIN — ONDANSETRON HYDROCHLORIDE 4 MG: 2 INJECTION, SOLUTION INTRAMUSCULAR; INTRAVENOUS at 09:56

## 2020-09-23 RX ADMIN — DEXAMETHASONE SODIUM PHOSPHATE 10 MG: 10 INJECTION, SOLUTION INTRAMUSCULAR; INTRAVENOUS at 09:15

## 2020-09-23 RX ADMIN — FENTANYL CITRATE 50 MCG: 50 INJECTION, SOLUTION INTRAMUSCULAR; INTRAVENOUS at 09:10

## 2020-09-23 ASSESSMENT — PAIN SCALES - GENERAL
PAINLEVEL_OUTOF10: 8
PAINLEVEL_OUTOF10: 7
PAINLEVEL_OUTOF10: 0
PAINLEVEL_OUTOF10: 8
PAINLEVEL_OUTOF10: 3
PAINLEVEL_OUTOF10: 8
PAINLEVEL_OUTOF10: 0

## 2020-09-23 ASSESSMENT — PULMONARY FUNCTION TESTS
PIF_VALUE: 8
PIF_VALUE: 21
PIF_VALUE: 17
PIF_VALUE: 14
PIF_VALUE: 20
PIF_VALUE: 1
PIF_VALUE: 20
PIF_VALUE: 0
PIF_VALUE: 22
PIF_VALUE: 5
PIF_VALUE: 23
PIF_VALUE: 21
PIF_VALUE: 0
PIF_VALUE: 22
PIF_VALUE: 0
PIF_VALUE: 0
PIF_VALUE: 21
PIF_VALUE: 1
PIF_VALUE: 31
PIF_VALUE: 0
PIF_VALUE: 16
PIF_VALUE: 20
PIF_VALUE: 1
PIF_VALUE: 2
PIF_VALUE: 20
PIF_VALUE: 16
PIF_VALUE: 15
PIF_VALUE: 27
PIF_VALUE: 22
PIF_VALUE: 2
PIF_VALUE: 1
PIF_VALUE: 1
PIF_VALUE: 0
PIF_VALUE: 15
PIF_VALUE: 13
PIF_VALUE: 3
PIF_VALUE: 16
PIF_VALUE: 0
PIF_VALUE: 1
PIF_VALUE: 15
PIF_VALUE: 31
PIF_VALUE: 20
PIF_VALUE: 1
PIF_VALUE: 1
PIF_VALUE: 22
PIF_VALUE: 20
PIF_VALUE: 10
PIF_VALUE: 0
PIF_VALUE: 1
PIF_VALUE: 1
PIF_VALUE: 19
PIF_VALUE: 1
PIF_VALUE: 1
PIF_VALUE: 0
PIF_VALUE: 20
PIF_VALUE: 16
PIF_VALUE: 1
PIF_VALUE: 34
PIF_VALUE: 15
PIF_VALUE: 24
PIF_VALUE: 17
PIF_VALUE: 20
PIF_VALUE: 17
PIF_VALUE: 20
PIF_VALUE: 3
PIF_VALUE: 13
PIF_VALUE: 16
PIF_VALUE: 20
PIF_VALUE: 20
PIF_VALUE: 1
PIF_VALUE: 1
PIF_VALUE: 33
PIF_VALUE: 24
PIF_VALUE: 21
PIF_VALUE: 16
PIF_VALUE: 24
PIF_VALUE: 17
PIF_VALUE: 24
PIF_VALUE: 23
PIF_VALUE: 1
PIF_VALUE: 23
PIF_VALUE: 20
PIF_VALUE: 13
PIF_VALUE: 19
PIF_VALUE: 24
PIF_VALUE: 1
PIF_VALUE: 24
PIF_VALUE: 17
PIF_VALUE: 0
PIF_VALUE: 0
PIF_VALUE: 22
PIF_VALUE: 24
PIF_VALUE: 0
PIF_VALUE: 16
PIF_VALUE: 25
PIF_VALUE: 1
PIF_VALUE: 0
PIF_VALUE: 2
PIF_VALUE: 1
PIF_VALUE: 9

## 2020-09-23 ASSESSMENT — PAIN DESCRIPTION - PROGRESSION: CLINICAL_PROGRESSION: GRADUALLY WORSENING

## 2020-09-23 NOTE — PROGRESS NOTES
Patient in line room and placed on appropriate monitors. Patient alert, respirations unlabored, skin warm and dry. Side rails up X2 and call light within reach. No complaints expressed, all needs met at this time. Will continue to monitor.     Floor telepack/leads remained on patient while in line room

## 2020-09-23 NOTE — OP NOTE
.GENERAL SURGERY OPERATIVE NOTE    Marcel Hutchinson   1950     DATE OF PROCEDURE: 2020        SURGEON:  DERRELL Mixon Crews: DERRELL Cheng M.D. PREOPERATIVE DIAGNOSIS: hydrocephalus      POSTOPERATIVE DIAGNOSIS: same      OPERATION:  Laparoscopic assist ventriculoperitoneal shunt placement. ANESTHESIA:  General endotracheal anesthesia. ESTIMATED BLOOD LOSS:  5 ml      COMPLICATIONS:  None. INDICATION FOR PROCEDURE IN BRIEF:  64 yo with hydrocephalus. DESCRIPTION OF PROCEDURE: The patient was brought into the operating room, placed on the operating room table. General anesthesia was induced. Antibiotics IV was given by Anesthesia. The area of the abdomen, head, scalp, neck and chest were prepped and draped in a standard sterile fashion. An Mc Corey was placed. The neurosurgeon will dictate his portion of the  shunt     With regard to my portion of the abdomen, using a #15-blade, I made an  umbilical incision of 5 mm in length  In the left upper quadrant. using the Veress needle I entered the abdomen. Next, I used saline and syringe to confirm needle placement into the peritoneal  cavity. Next, I connected the Veress needle to insufflation to 15 mmHg. Under direct visual guidance, I used a bladeless 5-mm port through this umbilical incision to enter the peritoneal cavity under direct visualization. I looked into the abdomen. I made a 5-mm incision on the patient's anterior abdomen in the right upper quadrant. Using the tunneler, I tunneled the sheath up to the scalp in the subcutaneous space. The tunneler was removed and the sheath was left in place. The catheter was pulled through the tunneler sheath and sheath removed.  Next, in order to gain   access to the abdominal cavity, where I had made the prior incision, I used   an 18-gauge needle to enter the abdomen, then placed a wire into the abdomen   all under direct visualization. Next, I placed the sheath and catheter into   the peritoneal cavity using the seldinger technique. CSF was flowing through the catheter and there were no kinks. This was all done under direct visual guidance and after confirming placement of the  shunt catheter, I desufflated the abdomen and then proceeded to close the skin incisions. The skin incisions were closed with 4-0 Monocryl in a running subcuticular fashion after   which Dermabond was applied. All sponge, needle and instrument counts were  correct. There were no complications for the procedure and I was present  during the entire case. The patient is extubated to the recovery room  in stable condition. Dr. Bethany Christensen was scrubbed and present during the case.     Dictated by: Valentino Zheng M.D.  9/23/2020  10:00 AM

## 2020-09-23 NOTE — ANESTHESIA POSTPROCEDURE EVALUATION
Department of Anesthesiology  Postprocedure Note    Patient: Marcel Hutchinson  MRN: [id number]  YOB: 1950  Date of evaluation: 9/23/2020  Time:  11:48 AM     Procedure Summary     Date:  09/23/20 Room / Location:  [address] [place] [address] 20 / [facility]    Anesthesia Start:   Anesthesia Stop:      Procedure:  VENTRICULAR PERITONEAL SHUNT PLACEMENT (N/A ) Diagnosis:  (.)    Surgeon:  Madi Vo MD Responsible Provider:      Anesthesia Type:  general ASA Status:  3          Anesthesia Type: general    Obie Phase I: Obie Score: 9    Obie Phase II:      Last vitals: Reviewed and per EMR flowsheets.        Anesthesia Post Evaluation    Patient location during evaluation: PACU  Patient participation: complete - patient participated  Level of consciousness: awake  Pain score: 3  Airway patency: patent  Nausea & Vomiting: no nausea and no vomiting  Complications: no  Cardiovascular status: blood pressure returned to baseline  Respiratory status: acceptable  Hydration status: euvolemic

## 2020-09-23 NOTE — ANESTHESIA PRE PROCEDURE
Department of Anesthesiology  Preprocedure Note       Name:  Becca Webb   Age:  65 y.o.  :  1950                                          MRN:  [id number]         Date:  2020      Surgeon: Yu Marcelino):  MD Gigi Holley DO    Procedure: Procedure(s):  VENTRICULAR PERITONEAL SHUNT PLACEMENT    Medications prior to admission:   Prior to Admission medications    Medication Sig Start Date End Date Taking? Authorizing Provider   ipratropium-albuterol (DUONEB) 0.5-2.5 (3) MG/3ML SOLN nebulizer solution Inhale 3 mLs into the lungs every 4 hours (while awake) 3/6/20   Lety Borges,    apixaban (ELIQUIS) 5 MG TABS tablet Take 1 tablet by mouth 2 times daily 3/6/20   Ender Gudino DO   buPROPion [facility] SR) 100 MG extended release tablet Take 1 tablet by mouth daily 3/7/20   Lety Borges DO   sotalol (BETAPACE) 120 MG tablet Take 1 tablet by mouth 2 times daily 3/6/20   Lety Borges DO   amLODIPine (NORVASC) 5 MG tablet Take 1 tablet by mouth daily 3/7/20   Lety Borges DO   levothyroxine (SYNTHROID) 100 MCG tablet Take 100 mcg by mouth Daily     Historical Provider, MD       Current medications:    No current facility-administered medications for this visit. No current outpatient medications on file.      Facility-Administered Medications Ordered in Other Visits   Medication Dose Route Frequency Provider Last Rate Last Dose    ceFAZolin (ANCEF) 1 g in sterile water 10 mL IV syringe  1 g Intravenous See Admin Instructions Tonya Roberson MD        ondansetron [facility]) injection 4 mg  4 mg Intravenous Q6H PRN Lety Borges DO        loperamide (IMODIUM) capsule 2 mg  2 mg Oral 4x Daily PRN Dallin Rizzo, APRN - CNP   2 mg at 20 1653    oxyCODONE (ROXICODONE) immediate release tablet 2.5 mg  2.5 mg Oral Q6H PRN Darlis Fitch, APRN - CNP   2.5 mg at 20 0102    Or    oxyCODONE (ROXICODONE) immediate release tablet 5 mg  5 mg Oral Q6H PRN Naeem Silverio , APRN - CNP   5 mg at 09/22/20 1800    acetaminophen (TYLENOL) tablet 500 mg  500 mg Oral Q4H PRN Saturnino Sánchze, APRN - CNP   500 mg at 09/21/20 2134    Or    acetaminophen (TYLENOL) suppository 325 mg  325 mg Rectal Q4H PRN Saturnino Gonzalo, APRN - CNP        amLODIPine (NORVASC) tablet 5 mg  5 mg Oral Daily Saturnino Sánchez, APRN - CNP   5 mg at 09/22/20 0846    [Held by provider] apixaban (ELIQUIS) tablet 5 mg  5 mg Oral BID Saturnino Sánchez, APRN - CNP   Stopped at 09/17/20 0900    buPROPion [facility]) extended release tablet 100 mg  100 mg Oral Daily Saturnino Gonzalo, APRN - CNP   100 mg at 09/22/20 0846    levothyroxine (SYNTHROID) tablet 100 mcg  100 mcg Oral Daily Saturnino Sánchez, APRN - CNP   100 mcg at 09/22/20 0846    sotalol (BETAPACE) tablet 120 mg  120 mg Oral BID Saturnino Sánchez, APRN - CNP   120 mg at 09/22/20 2018    sodium chloride flush 0.9 % injection 10 mL  10 mL Intravenous 2 times per day Saturnino Sánchez, APRN - CNP   10 mL at 09/22/20 2018    sodium chloride flush 0.9 % injection 10 mL  10 mL Intravenous PRN Saturnino Gonzalo, APRN - CNP           Allergies:     Allergies   Allergen Reactions    Flagyl [Metronidazole]     Hornet Venom     Wasp Venom        Problem List:    Patient Active Problem List   Diagnosis Code    Essential hypertension I10    Depression F32.9    PVD (peripheral vascular disease) with claudication ([address] I73.9    History of tobacco use Z87.891    Asymptomatic bilateral carotid artery stenosis I65.23    PAD (peripheral artery disease) (HCC) I73.9    Hyperlipidemia LDL goal <100 E78.5    Acquired hypothyroidism E03.9    Left groin pain R10.32    ROSELYN (acute kidney injury) ([address] N17.9    Shock liver K72.00    Severe protein-calorie malnutrition ([address] E43    Atherosclerosis of aortic bifurcation and common iliac arteries (HCC) I70.0, I70.8    CAD (coronary artery disease) I25.10    History of bilateral carotid artery stenosis I65.23    Paroxysmal atrial fibrillation (HCC) I48.0    Hydrocephalus (HCC) G91.9    Dehydration E86.0    Diarrhea R19.7    Laceration of scalp S01. Jennifer Riser       Past Medical History:        Diagnosis Date    Aortoiliac occlusive disease ([address] 2019    Atherosclerosis of native arteries of extremity with rest pain ([address] 5/3/2018    Atherosclerosis of native artery of extremity with ulceration ([address] 2019    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 with rest pain ([address] 2019    Bilateral carotid artery stenosis 2018    Bruit of right carotid artery 5/3/2018    CAD (coronary artery disease)     Cancer (HCC)     SKIN CA/Thigh    COPD (chronic obstructive pulmonary disease) ([facility])     Depression     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    History of tobacco use 5/3/2018    Hydrocephalus (HCC)     Hyperlipidemia     Hypertension     Pain in both feet 5/3/2018    PVD (peripheral vascular disease) ([address]     PVD (peripheral vascular disease) with claudication ([facility]) 5/3/2018    Rheumatoid arthritis ([facility])     Thyroid disease     Venous stasis ulcer of left calf with fat layer exposed without varicose veins ([address] 2019       Past Surgical History:        Procedure Laterality Date     SECTION      COLONOSCOPY      DILATION AND CURETTAGE OF UTERUS      LAPAROTOMY N/A 2020    LAPAROTOMY EXPLORATORY, RIGHT HEMICOLECTOMY performed by Rachel Vazquez MD at [address] [place] [address],[address] N/A 2020    PLACEMENT OF INTRATHECAL CATHETER FOR LUMBAR DRAIN performed by Yonis Acevedo MD at [place] [address]  2019    L SFA angioplasty    TUBAL LIGATION         Social History:    Social History     Tobacco Use    Smoking status: Former Smoker     Packs/day: 0.25     Types: Cigarettes     Last attempt to quit: 5/3/2017     Years since quitting: 3.3    Smokeless tobacco: Never Used   Substance Use Topics    Alcohol use: Yes     Comment: beer/Weekly Counseling given: Not Answered      Vital Signs (Current): There were no vitals filed for this visit. BP Readings from Last 3 Encounters:   09/22/20 (!) 171/80   09/18/20 100/61   09/15/20 125/75       NPO Status:  Pt educated to remain NPO after midnight                                                                             BMI:   Wt Readings from Last 3 Encounters:   09/19/20 110 lb 1.6 oz (49.9 kg)   09/14/20 119 lb (54 kg)   06/12/20 100 lb (45.4 kg)     There is no height or weight on file to calculate BMI.    CBC:   Lab Results   Component Value Date    WBC 5.8 09/21/2020    RBC 3.68 09/21/2020    HGB 11.4 09/21/2020    HCT 35.6 09/21/2020    MCV 96.7 09/21/2020    RDW 14.7 09/21/2020     09/21/2020       CMP:   Lab Results   Component Value Date     09/21/2020    K 3.9 09/21/2020     09/21/2020    CO2 26 09/21/2020    BUN 18 09/21/2020    CREATININE 0.6 09/21/2020    GFRAA >60 09/21/2020    LABGLOM >60 09/21/2020    GLUCOSE 92 09/21/2020    GLUCOSE 102 10/07/2010    PROT 7.2 06/12/2020    CALCIUM 8.8 09/21/2020    BILITOT 0.4 06/12/2020    ALKPHOS 96 06/12/2020    AST 17 06/12/2020    ALT 14 06/12/2020       POC Tests: No results for input(s): POCGLU, POCNA, POCK, POCCL, POCBUN, POCHEMO, POCHCT in the last 72 hours.     Coags:   Lab Results   Component Value Date    PROTIME 11.8 02/17/2020    PROTIME 11.1 04/13/2011    INR 1.0 02/17/2020    APTT 31.3 01/20/2020       HCG (If Applicable): No results found for: PREGTESTUR, PREGSERUM, HCG, HCGQUANT     ABGs: No results found for: PHART, PO2ART, JXY4WUX, OEO1UKN, BEART, O3NZENTX     Type & Screen (If Applicable):  No results found for: LABABO, LABRH    Drug/Infectious Status (If Applicable):  No results found for: HIV, HEPCAB    COVID-19 Screening (If Applicable): No results found for: COVID19    12 Lead EKG 9/14/2020 HR 75  Narrative & Impression     Normal sinus rhythm  Normal ECG  No previous ECGs available  Confirmed by Kaylen Norman ([id number]) on 9/15/2020 7:34:03 AM     Echo 2/20/2020   Findings      Left Ventricle   Left ventricular internal dimensions were normal in diastole and systole. No regional wall motion abnormalities seen. Normal left ventricular ejection fraction. Ejection fraction is visually estimated at 65%. Normal left ventricular diastolic filling pattern. Right Ventricle   Normal right ventricular size and function. Left Atrium   Normal sized left atrium. Interatrial septum appears intact. Right Atrium   Normal right atrium size. Mitral Valve   Structurally normal mitral valve. Mild mitral regurgitation is present. Tricuspid Valve   The tricuspid valve appears structurally normal.   Mild tricuspid regurgitation. RVSP is 35 mmHg. Aortic Valve   The aortic valve leaflets were not well visualized. The aortic valve appears mildly sclerotic. Pulmonic Valve   The pulmonic valve was not well visualized. Pericardial Effusion   No evidence of pericardial effusion. Aorta   Aortic root dimension within normal limits. Conclusions      Summary   Left ventricular internal dimensions were normal in diastole and systole. No regional wall motion abnormalities seen. Normal left ventricular ejection fraction. Mild mitral regurgitation is present. The aortic valve appears mildly sclerotic. Mild tricuspid regurgitation. Signature      Carotid US 5/15/2018  Impression    1. Carotid Doppler ultrasound demonstrating a hemodynamically    significant stenosis (50-69%) in the mid segment of the left internal    carotid artery based on elevated peak systolic velocity and    end-diastolic velocity, as above. 2. Hemodynamically significant stenosis (50-69%) in the right external    carotid artery based on elevated peak systolic velocity, as above.     3. No evidence of hemodynamically significant stenosis involving the    remaining carotid or vertebral arteries bilaterally, with peak    systolic and end-diastolic velocities as above. CXR 9/15/2020  FINDINGS:         Diffuse bone demineralization. There is calcification within thoracic    aorta. Acromioclavicular arthropathy. 14 mm round density overlying    the left lung base is probably nipple shadow however more pronounced    than on the prior studies.         The heart, lungs, mediastinum and regional skeleton are otherwise    unremarkable.              Impression         14 mm round density overlying the left lung base is probably nipple    shadow however more pronounced than on the prior studies. Consider    follow-up CT of the chest.           CT Head 9/15/2020  FINDINGS: There is a prominent ventricular system in relation to the    peripheral CSF space. These could be indication for a communicating    type of hydrocephalus. This findings can be additionally evaluate on    more routine basis with clinical correlation, MRI study also can be    helpful.         There is no focal mass effect or midline shift. There is no evidence    for a sizable area of an acute or recent insult in progression to the    brain parenchyma.         Images with bone window settings demonstrate mucosal retention cyst in    the right maxillary sinus.              Impression    No indication for an acute intracranial process.         Prominent ventricular system as above discussed. The differential    diagnosis can include communicating type of hydrocephalus. Can further    evaluate with MRI study on routine basis.       EKG 9/22/2020  6:01 PM - Gabriel, Mhy Incoming Ekg Results From [facility]     Component  Value  Ref Range & Units  Status  Collected  Lab    Ventricular Rate  58  BPM  Final  09/22/2020  2:56 PM  [facility]    Atrial Rate  58  BPM  Final  09/22/2020  2:56 PM  [facility]    P-R Interval  150  ms  Final  09/22/2020  2:56 PM  [facility]    QRS Duration  74  ms  Final  09/22/2020  2:56 PM  [facility]    Q-T Interval  450  ms  Final  09/22/2020  2:56 PM  [facility]    QTc Calculation (Bazett)  441  ms  Final  09/22/2020  2:56 PM  [facility]    P Axis  81  degrees  Final  09/22/2020  2:56 PM  [facility]    R Axis  80  degrees  Final  09/22/2020  2:56 PM  [facility]    T Axis  74  degrees  Final  09/22/2020  2:56 PM  [facility]    Testing Performed By     Lab - Abbreviation  Name  Director  Address  Valid Date Range    360-[facility]  [facility] MUSE  Unknown  Unknown  04/18/16 0721-Present    Narrative & Impression     Sinus bradycardia  Otherwise normal ECG           Anesthesia Evaluation  Patient summary reviewed and Nursing notes reviewed no history of anesthetic complications:   Airway: Mallampati: III  TM distance: >3 FB   Neck ROM: full  Mouth opening: > = 3 FB Dental:      Comment: Pt denies loose, chipped, or missing teeth    Pulmonary:normal exam  breath sounds clear to auscultation  (+) COPD: mild,  shortness of breath: recurrent,                            ROS comment: Former smoker    Cardiovascular:    (+) hypertension:, CAD:, dysrhythmias (PAF episode in [place]): atrial fibrillation, PONCE: after ambulating 1 flight of stairs, hyperlipidemia      ECG reviewed  Rhythm: regular  Rate: normal  Echocardiogram reviewed         Beta Blocker:  Not on Beta Blocker         Neuro/Psych:   (+) psychiatric history:depression/anxiety              ROS comment: Pt hit side of head in shower and started bleeding - cam e to ER because on anticoag could not stop bleeding  States she is having recent trouble with memory - ax0x3    Hydrocephalus  GI/Hepatic/Renal:        Liver disease: shock liver. ROS comment: Pt nauseated and had emesis x1 while I was in room - RN called to room pt given zofran - states she \"feels better\"    Urine incontinence  . Endo/Other:    (+) hypothyroidism, blood dyscrasia (Eliquis, last dose 2 days ago): anticoagulation therapy, arthritis (L hand and L shoulder): rheumatoid. ,

## 2020-09-23 NOTE — PROGRESS NOTES
Hospital Medicine    Subjective:  Pt seen this am / for OR today      Current Facility-Administered Medications:     morphine (PF) injection 1 mg, 1 mg, Intravenous, Q5 Min PRN, Tiarra Jimenez MD    morphine (PF) injection 2 mg, 2 mg, Intravenous, Q5 Min PRN, Tiarra Jimenez MD, 2 mg at 09/23/20 1132    HYDROmorphone (DILAUDID) injection 0.25 mg, 0.25 mg, Intravenous, Q5 Min PRN, Tiarra Jimenez MD    HYDROmorphone (DILAUDID) injection 0.5 mg, 0.5 mg, Intravenous, Q5 Min PRN, Tiarra Jimenez MD    HYDROcodone-acetaminophen (NORCO) 5-325 MG per tablet 1 tablet, 1 tablet, Oral, PRN **OR** HYDROcodone-acetaminophen (NORCO) 5-325 MG per tablet 2 tablet, 2 tablet, Oral, PRN, Tiarra Jimenez MD    promethazine (PHENERGAN) injection 6.25 mg, 6.25 mg, Intravenous, Q10 Min PRN, Tiarra Jimenez MD    meperidine (DEMEROL) injection 12.5 mg, 12.5 mg, Intravenous, Q5 Min PRN, Tiarra Jimenez MD    bacitracin zinc ointment, , , PRN, Deonna Schmitz MD, 1 Package at 09/23/20 1004    ceFAZolin (ANCEF) 1,000 mg in sodium chloride 0.9 % 500 mL, , , PRN, Deonna Schmitz MD, 500 mL at 09/23/20 1005    lidocaine-EPINEPHrine 1 percent-1:319203 injection, , , PRN, Deonna Schmitz MD, 5 mL at 09/23/20 1005    vancomycin (VANCOCIN) injection, , , PRN, Deonna Schmitz MD, 500 mg at 09/23/20 1005    ceFAZolin (ANCEF) 1 g in sterile water 10 mL IV syringe, 1 g, Intravenous, See Admin Instructions, Deonna Schmitz MD, 2 g at 09/23/20 0920    [facility]) injection 4 mg, 4 mg, Intravenous, Q6H PRN, Jaciel Borges DO, 4 mg at 09/22/20 2045    loperamide (IMODIUM) capsule 2 mg, 2 mg, Oral, 4x Daily PRN, Butch Hassan APRN - CNP, 2 mg at 09/21/20 1653    oxyCODONE (ROXICODONE) immediate release tablet 2.5 mg, 2.5 mg, Oral, Q6H PRN, 2.5 mg at 09/22/20 0102 **OR** oxyCODONE (ROXICODONE) immediate release tablet 5 mg, 5 mg, Oral, Q6H PRN, BRIAN Whitman - CNP, 5 mg at 09/22/20 1800   acetaminophen (TYLENOL) tablet 500 mg, 500 mg, Oral, Q4H PRN, 500 mg at 09/21/20 2134 **OR** acetaminophen (TYLENOL) suppository 325 mg, 325 mg, Rectal, Q4H PRN, Jory Spoon, APRN - CNP    amLODIPine (NORVASC) tablet 5 mg, 5 mg, Oral, Daily, Jory Spoon, APRN - CNP, 5 mg at 09/23/20 0818    [Held by provider] apixaban (ELIQUIS) tablet 5 mg, 5 mg, Oral, BID, Jory Spoon, APRN - CNP, Stopped at 09/17/20 0900    buPROPion [facility]) extended release tablet 100 mg, 100 mg, Oral, Daily, Jory Spoon, APRN - CNP, 100 mg at 09/23/20 0818    levothyroxine (SYNTHROID) tablet 100 mcg, 100 mcg, Oral, Daily, Jory Spoon, APRN - CNP, 100 mcg at 09/22/20 0846    sotalol (BETAPACE) tablet 120 mg, 120 mg, Oral, BID, Jory Spoon, APRN - CNP, 120 mg at 09/23/20 0818    sodium chloride flush 0.9 % injection 10 mL, 10 mL, Intravenous, 2 times per day, Jory Spoon, APRN - CNP, 10 mL at 09/23/20 0815    sodium chloride flush 0.9 % injection 10 mL, 10 mL, Intravenous, PRN, Jory Spoon, APRN - CNP    Objective:    BP (!) 118/57   Pulse 56   Temp 97 °F (36.1 °C) (Temporal)   Resp 15   Ht 5' 6\" (1.676 m)   Wt 110 lb 1.6 oz (49.9 kg)   SpO2 95%   BMI 17.77 kg/m²     Heart:  Reg  Lungs:  CTA bilaterally, no wheeze, rales or rhonchi  Abd: bowel sounds present, nontender, nondistended, no masses  Extrem:  No clubbing, cyanosis, or edema    CBC with Differential:    Lab Results   Component Value Date    WBC 5.8 09/21/2020    RBC 3.68 09/21/2020    HGB 11.4 09/21/2020    HCT 35.6 09/21/2020     09/21/2020    MCV 96.7 09/21/2020    MCH 31.0 09/21/2020    MCHC 32.0 09/21/2020    RDW 14.7 09/21/2020    NRBC 1.7 02/23/2020    METASPCT 1.7 03/05/2020    LYMPHOPCT 21.8 06/12/2020    MONOPCT 7.6 06/12/2020    MYELOPCT 0.9 02/22/2020    EOSPCT 3 10/07/2010    BASOPCT 0.7 06/12/2020    MONOSABS 0.57 06/12/2020    LYMPHSABS 1.63 06/12/2020    EOSABS 0.21 06/12/2020    BASOSABS 0.05 06/12/2020     CMP:    Lab Results   Component Value Date  09/21/2020    K 3.9 09/21/2020     09/21/2020    CO2 26 09/21/2020    BUN 18 09/21/2020    CREATININE 0.6 09/21/2020    GFRAA >60 09/21/2020    LABGLOM >60 09/21/2020    GLUCOSE 92 09/21/2020    GLUCOSE 102 10/07/2010    PROT 7.2 06/12/2020    LABALBU 3.8 06/12/2020    LABALBU 4.5 10/07/2010    CALCIUM 8.8 09/21/2020    BILITOT 0.4 06/12/2020    ALKPHOS 96 06/12/2020    AST 17 06/12/2020    ALT 14 06/12/2020     Warfarin PT/INR:    Lab Results   Component Value Date    INR 1.0 02/17/2020    INR 1.0 01/20/2020    INR 1.0 01/07/2020    PROTIME 11.8 02/17/2020    PROTIME 11.2 01/20/2020    PROTIME 11.0 01/07/2020       Assessment:    Active Problems:    Essential hypertension    Depression    Asymptomatic bilateral carotid artery stenosis    PAD (peripheral artery disease) (HCC)    Hyperlipidemia LDL goal <100    Acquired hypothyroidism    Severe protein-calorie malnutrition (HCC)    CAD (coronary artery disease)    Paroxysmal atrial fibrillation (HCC)    Hydrocephalus (HCC)    Dehydration    Diarrhea    Laceration of scalp  Resolved Problems:    * No resolved hospital problems.  *      Plan:  For OR  shunt today        Benjamin Davidson  11:47 AM  9/23/2020

## 2020-09-23 NOTE — PLAN OF CARE
Problem: Pain:  Goal: Pain level will decrease  Description: Pain level will decrease  9/23/2020 1758 by Smooth Clay  Outcome: Met This Shift

## 2020-09-24 ENCOUNTER — APPOINTMENT (OUTPATIENT)
Dept: CT IMAGING | Age: 70
DRG: 031 | End: 2020-09-24
Attending: INTERNAL MEDICINE
Payer: MEDICARE

## 2020-09-24 PROBLEM — E86.0 DEHYDRATION: Status: RESOLVED | Noted: 2020-09-16 | Resolved: 2020-09-24

## 2020-09-24 PROBLEM — R19.7 DIARRHEA: Status: RESOLVED | Noted: 2020-09-16 | Resolved: 2020-09-24

## 2020-09-24 LAB
MAGNESIUM: 1.9 MG/DL (ref 1.6–2.6)
PHOSPHORUS: 3.9 MG/DL (ref 2.5–4.5)

## 2020-09-24 PROCEDURE — 83735 ASSAY OF MAGNESIUM: CPT

## 2020-09-24 PROCEDURE — 6370000000 HC RX 637 (ALT 250 FOR IP): Performed by: NEUROLOGICAL SURGERY

## 2020-09-24 PROCEDURE — 97530 THERAPEUTIC ACTIVITIES: CPT

## 2020-09-24 PROCEDURE — 36415 COLL VENOUS BLD VENIPUNCTURE: CPT

## 2020-09-24 PROCEDURE — 97164 PT RE-EVAL EST PLAN CARE: CPT

## 2020-09-24 PROCEDURE — 97168 OT RE-EVAL EST PLAN CARE: CPT

## 2020-09-24 PROCEDURE — 2700000000 HC OXYGEN THERAPY PER DAY

## 2020-09-24 PROCEDURE — 84100 ASSAY OF PHOSPHORUS: CPT

## 2020-09-24 PROCEDURE — 2580000003 HC RX 258: Performed by: NEUROLOGICAL SURGERY

## 2020-09-24 PROCEDURE — 99024 POSTOP FOLLOW-UP VISIT: CPT | Performed by: NURSE PRACTITIONER

## 2020-09-24 PROCEDURE — 97535 SELF CARE MNGMENT TRAINING: CPT

## 2020-09-24 PROCEDURE — 70450 CT HEAD/BRAIN W/O DYE: CPT

## 2020-09-24 PROCEDURE — 1200000000 HC SEMI PRIVATE

## 2020-09-24 RX ADMIN — Medication 10 ML: at 09:02

## 2020-09-24 RX ADMIN — SOTALOL HYDROCHLORIDE 120 MG: 120 TABLET ORAL at 21:24

## 2020-09-24 RX ADMIN — ACETAMINOPHEN 500 MG: 500 TABLET ORAL at 21:25

## 2020-09-24 RX ADMIN — BUPROPION HYDROCHLORIDE 100 MG: 100 TABLET, EXTENDED RELEASE ORAL at 08:57

## 2020-09-24 RX ADMIN — OXYCODONE HYDROCHLORIDE 5 MG: 5 TABLET ORAL at 12:55

## 2020-09-24 RX ADMIN — Medication 10 ML: at 21:26

## 2020-09-24 RX ADMIN — AMLODIPINE BESYLATE 5 MG: 5 TABLET ORAL at 08:57

## 2020-09-24 RX ADMIN — OXYCODONE HYDROCHLORIDE 5 MG: 5 TABLET ORAL at 21:25

## 2020-09-24 RX ADMIN — ACETAMINOPHEN 500 MG: 500 TABLET ORAL at 00:32

## 2020-09-24 RX ADMIN — SOTALOL HYDROCHLORIDE 120 MG: 120 TABLET ORAL at 08:57

## 2020-09-24 RX ADMIN — LEVOTHYROXINE SODIUM 100 MCG: 0.1 TABLET ORAL at 05:08

## 2020-09-24 ASSESSMENT — PAIN DESCRIPTION - PAIN TYPE: TYPE: ACUTE PAIN

## 2020-09-24 ASSESSMENT — PAIN - FUNCTIONAL ASSESSMENT: PAIN_FUNCTIONAL_ASSESSMENT: ACTIVITIES ARE NOT PREVENTED

## 2020-09-24 ASSESSMENT — PAIN DESCRIPTION - FREQUENCY
FREQUENCY: INTERMITTENT
FREQUENCY: INTERMITTENT

## 2020-09-24 ASSESSMENT — PAIN DESCRIPTION - LOCATION
LOCATION: ABDOMEN
LOCATION: ABDOMEN

## 2020-09-24 ASSESSMENT — PAIN SCALES - GENERAL
PAINLEVEL_OUTOF10: 5
PAINLEVEL_OUTOF10: 0
PAINLEVEL_OUTOF10: 3
PAINLEVEL_OUTOF10: 3
PAINLEVEL_OUTOF10: 0
PAINLEVEL_OUTOF10: 9
PAINLEVEL_OUTOF10: 8
PAINLEVEL_OUTOF10: 3

## 2020-09-24 ASSESSMENT — PAIN DESCRIPTION - ORIENTATION: ORIENTATION: LOWER;RIGHT

## 2020-09-24 ASSESSMENT — PAIN DESCRIPTION - DESCRIPTORS
DESCRIPTORS: ACHING
DESCRIPTORS: ACHING

## 2020-09-24 NOTE — PLAN OF CARE
Problem: Falls - Risk of:  Goal: Will remain free from falls  Description: Will remain free from falls  Outcome: Met This Shift  Goal: Absence of physical injury  Description: Absence of physical injury  Outcome: Met This Shift     Problem: Pain:  Goal: Pain level will decrease  Description: Pain level will decrease  9/23/2020 1758 by Kristina Harris  Outcome: Met This Shift  Goal: Control of acute pain  Description: Control of acute pain  Outcome: Met This Shift     Problem: Skin Integrity:  Goal: Will show no infection signs and symptoms  Description: Will show no infection signs and symptoms  Outcome: Met This Shift  Goal: Absence of new skin breakdown  Description: Absence of new skin breakdown  Outcome: Met This Shift     Problem: Infection - Surgical Site:  Goal: Will show no infection signs and symptoms  Description: Will show no infection signs and symptoms  Outcome: Met This Shift

## 2020-09-24 NOTE — PROGRESS NOTES
[facility]  Critical Care  Daily Progress Note 9/24/2020    Date of Admission: ***    CC:        HOSPITAL EVENTS    OVERNIGHT EVENTS: T max  *** F.  Require additional doses of antihypertensive agents in the previous 24 hours. Require *** units of insulin in the previous 24 hours. PHYSICAL EXAM:    BP (!) 148/75   Pulse 54   Temp 97.5 °F (36.4 °C) (Oral)   Resp 24   Ht 5' 6\" (1.676 m)   Wt 110 lb 1.6 oz (49.9 kg)   SpO2 100%   BMI 17.77 kg/m²   @IO@    General appearance:  Comfortable. NEUROLOGIC:        GCS:    {Glascow coma scale eye:22656}   {Glascow coma scale motor:25475}  {Glascow coma scale verbal:71307}       Pupil size:  Left {NUMBERS 0-10:41051} mm  Right {NUMBERS 0-10:08301} mm  Pupil reaction: {YES / UN:70564}   PERRLA  Wiggles fingers: Left {YES / NO:19727} Right {YES / NO:19727}  Hand grasp:   Left {plantar flexion:20397}     Right {plantar flexion:75013}  Wiggles toes: Left {YES / NO:19727}    Right {YES / NO:19727}  Plantar flexion: Left {plantar flexion:60138}    Right {plantar flexion:77316}  Facial droop:   ***   Speech:  ***      ICP:  ***    CPP: ***  Crani incision:  CDI  Ventriculostomy drainage:  *** Previous 8 hour: *** ml  Previous 24 hour:  *** ml      CONSTITUTIONAL: No acute distress  CARDIOVASCULAR: S1 S2, regular rate, regular rhythm,Monitor: ***  PULMONARY:  Respirations unlabored. No rhonchi/rales/wheezes. RENAL:  Barry to gravity, clear yellow urine. ABDOMEN: Soft, nontender, nondistended, nontympanic, normal bowel sounds. ***   MUSCULOSKELETAL:   SKIN/EXTREMITIES: No rashes/ecchymosis, no edema/clubbing, warm/dry, good capillary refill. IV ACCESS:         ASSESSMENT/PLAN:     @hprob@      Neuro:    CV:   BP goal *** mm Hg. Pulm:   GI:   Renal:    ID:   Endocrine:      MSK:. Heme:        Bowel regime:   Pain control/Sedation:    DVT prophylaxis: SCDs. GI prophylaxis:   Glucose protocol:    Mouth/Eye care:    Barry: Keep in place for critical care monitoring of fluid balance. Consults:     Patient/Family update:   Code status:        Disposition:  NSICU. Clement Izaguirre DNP.  BRIAN-CNP  9/24/2020  7:14 AM

## 2020-09-24 NOTE — PROGRESS NOTES
Occupational Therapy  OCCUPATIONAL THERAPY RE-EVALUATION      Date:2020  Patient Name: Mi Soto  MRN: [id number]  : 1950  Room: [address] [place], [place] [address]A    RE-Evaluating OT: Nico Gomez OTR/L   Referring Provider: Teo Carrasco MD     AM-PAC Daily Activity Raw Score:   Recommended Adaptive Equipment: continue to assess       Diagnosis: Hydrocephalus   Procedure/Surgery:   insertion of lumbar drain,   shunt    Pertinent Medical History: home o2 at night PRN, CA, CAD, COPD, HLD, HTN, PVD, RA    Precautions:  Falls     Home Living: Pt lives with daughter (works nights)  in a 2 story home with 3 step(s) to enter and 2 rail(s); bed/bath on 2nd floor: flight with rail. Laundry in basement (daughter assists)  Bathroom setup: walk in shower with seat and rail; standard height commode  Equipment owned: shower chair, rail, rollator      Prior Level of Function: IND with ADLs; Assist with IADLs. Rollator for ambulation. Driving: yes (pt has not driven in a while)  Occupation: retired     Pain Level: pt c/o surgical site pain   Cognition: A&O: 3; Follows 1-2 step directions- re-direction occasionally, decreased insight and attention to task.  Limited awareness of safety and objects in lower visual field (required cues to avoid objects)   Memory: F- STM   Sequencing: F   Problem solving: F-   Judgement/safety: F-     CAM-ICU: negative   RASS: 0     Functional Assessment:   Initial Eval Status  Date:  Treatment Status  Date: Short Term Goals  Treatment frequency: 1-3x/week on PRN    Feeding IND     Grooming/Hygiene SBA standing at sink  To brush teeth and wash face standing at sink   Mod I    UB Dressing SBA after setup seated   Mod I  With item retrieval    LB Dressing Mod A overall  Pt able to thread LEs through pants but required assist to pull up around hips      Mod I   with AE PRN   Bathing Mod A     Toileting Mod A  For santos care and clothing mgmt   Mod I  Including all clothing mgmt    Bed Mobility  Supine to sit: SBA  Sit to supine: NT     Functional Transfers Sit to stand: Min A  Stand to sit: Min A  Mod I   From varying surfaces with G safety    Functional Mobility Min A with WW  In room/hallway and to and from sink-pt with fair- safety awareness of WW in environment and avoiding hitting objects despite cues-low vision possibly contributing; F- STM of room location    Mod I   with G endurance and safety for household distances    Balance Sitting:      Static: SBA    Dynamic:Min A  Standing: Min A     Endurance/Activity Tolerance F   G    during 15-20 min ADL task    Visual/  Perceptual Glasses: reading glasses; reports baseline blurry/low vision        UE strengthening    See UE Assessment Below  G tolerance  For BUE AROM/AAROM in all planes to improve overall function for ADL tasks      UE Assessment  Hand dominance: R       Strength ROM  Comments   RUE  Proximal: 4-/5  Distal: 4-/5 Proximal:WFL  Distal: WFL G  and G FMC/dexterity noted during ADL tasks   LUE Proximal: 4-/5  Distal: 4-/5 Proximal: WFL  Distal: WFL G  and G FMC/dexterity noted during ADL tasks     Hearing: WFL  Sensation:  No c/o numbness or tingling  Tone: generalized weakenss  Edema: none noted    Vitals:  Heart Rate at rest 52 bpm Heart Rate post session 56 bpm   SpO2 at rest 98% SpO2 post session 95%   Blood Pressure at rest 155/95 mmHg Blood Pressure post session 145/88 mmHg                               Comments:  OK from RN to see pt. Upon arrival pt supine in bed. After Bed mobility, dressing task, functional transfers and mobility in room/hallway and to and from sink, visual and cognitive tasks in hallway, and grooming/hygiene task at sink, pt then returned to bedside chair, pt lef with all devices within reach, all lines and tubes intact. Therapist provided skilled monitoring of HR, O2 saturation, blood pressure and patients response during treatment session.   Prior to and at the end of session, environmental modifications/line management completed for patients safety and efficiency of treatment session. Overall, patient demonstrates mild difficulties with completion of BADLs and IADLs. Factors contributing to these difficulties include safety and mild cognitive impairments, decreased endurance, and generalized weakness. Based on patient's functional performance as stated above and level of assistance needed prior to admission, this therapist believes that the patient would benefit from Continued therapy for ADL retraining, strengthening, building endurance/activity tolerance and overall functioning in order to safely return home. Treatment:     ? Bed mobility: Facilitated bed mobility with cues for proper body mechanics and sequencing to prepare for ADL completion at edge of bed  ? ADL completion: Self-care retraining for the above-mentioned ADLs; training on proper hand placement, safety technique, sequencing, and energy conservation techniques during ADLs and while completing functional transfers. ? Cognitive/Perceptual training: retraining exercises to improve attention, mentation, and spatial awareness for ADLs & transfers. ? Delirium Prevention/Management: Implementation of non-pharmacologic interventions for delirium prevention incorporated throughout session to patient. Including environmental and sensory modifications to decrease patient's internal distraction caused by delirium, and improve overall mentation. -pt is at high risk of developing ICU delirium or subsyndromal delirium  ? Education: OT POC, OT role, Importance of completing ADL tasks daily as IND as possible to aide in recovery process, fall risk precautions, safe us of commode and building strength .        Assessment of current deficits:   Functional mobility [x]  ADLs [x] Strength [x]  Cognition [x]  Functional transfers  [x] IADLs [x] Safety Awareness [x]  Endurance [x]  Fine Motor Coordination [] Balance [x] Vision/perception [x] Sensation []   Gross Motor Coordination [] ROM [x] Delirium []                  Motor Control []    Plan of Care:  ADL retraining [x]   Equipment needs [x]   Neuromuscular re-education [] Energy Conservation Techniques [x]  Functional Transfer training [x] Patient and/or Family Education [x]  Functional Mobility training [x]  Environmental Modifications [x]  Cognitive re-training [x]   Compensatory techniques for ADLs [x]  Splinting Needs []   Positioning to improve overall function [x]   Therapeutic Activity [x]                       Therapeutic Exercise  [x]  Visual/Perceptual: [x]    Delirium prevention/treatment  [x]   Other:  []    Rehab Potential: Good for established goals    Patient / Family Goal: to improve function    Patient and/or family were instructed/educated on diagnosis, prognosis/goals and plan of care. Demonstrated F- understanding, further information  needed. [] Malnutrition indicators have been identified and nursing has been notified to ensure a dietitian consult is ordered. Eval Complexity:   · Re-eval s/p  shunt and transfer to ICU    Moderate Evaluation + 25 tx mins  Time In:1100           Time Out: 1130                Treatment Charges: Mins Units   Ther Ex  04749       Manual Therapy 91461       Thera Activities [id number] 10    ADL/Home Mgt 59442  15     Neuro Re-ed 36105       Orthotic manage/training  08572       Non-Billable Time       Total Timed Treatment 25 2      Evaluation time includes thorough review of current medical information, gathering information on past medical history/social history and prior level of function, completion of standardized testing/informal observation of tasks, assessment of data and development of POC/Goals.      Marcia Manning, OTR/L   [id number]

## 2020-09-24 NOTE — PROGRESS NOTES
Physical Therapy  Physical Therapy Re-Assessment     Name: Josef Dan  : 1950  MRN: [id number]    Referring Provider:  Emy Coyne MD     Date of Service: 2020    Evaluating PT:  Deion Riddle PT, DPT [id number]    Room #:  7576/0371-U  Diagnosis:  Hydropcephalus  Precautions: Falls  Procedure/Surgery:   insertion of lumbar drain,   shunt  PMHx/PSHx:  CA, CAD, COPD, HLD, HTN, PVD, RA  Equipment Needs:  TBD    SUBJECTIVE:    Pt lives with daughter in a 2 story home with 3 stairs to enter and 2 rail. Full flight of steps and 1 rail to bedroom. Pt ambulated with [employer] PTA. OBJECTIVE:   Re-Evaluation  Date: 20 Treatment Short Term/ Long Term   Goals   AM-PAC 6 Clicks      Was pt agreeable to Eval/treatment? Yes     Does pt have pain? No c/o pain     Bed Mobility  Rolling: NT  Supine to sit: SBA with HOB elevated  Sit to supine: NT  Scooting: SBA  Mod Independent   Transfers Sit to stand: Hany  Stand to sit: Hany  Stand pivot: Hany with Ww  Mod Independent with [employer]   Ambulation   60 feet x 2 reps with Hany with [employer]  >400 feet with Mod Independent with [employer]   Stair negotiation: ascended and descended NT  >10 steps with 1 rail Mod Independent   ROM BUE:  Defer to OT note  BLE:  WNL     Strength BUE:  Defer to OT note  BLE:  4/5  Increase by 1/3 MMT grade   Balance Sitting EOB:  SBA  Dynamic Standing:  Hany with [employer]  Sitting EOB:  Independent  Dynamic Standing:   Mod Independent with [employer]     Pt is A & O x 4  CAM-ICU: NT  RASS: 0  Sensation:  WNL  Edema:  None    Vitals:  Heart Rate at rest 52 bpm Heart Rate post session 56 bpm   SpO2 at rest 98% SpO2 post session 95%   Blood Pressure at rest 155/95 mmHg Blood Pressure post session 145/88 mmHg     Functional Status Score-Intensive Care Unit (FSS-ICU)   Rolling -/   Supine to sit transfer 5/7   Unsupported sitting  4/   Sit to stand transfers /   Ambulation    Total         Therapeutic Exercises:  NA    Patient education  Pt educated on safety    Patient response to education:   Pt verbalized understanding Pt demonstrated skill Pt requires further education in this area   x x x     ASSESSMENT:    Comments:  RN reported pt was stable for session. Pt was in bed upon arrival, agreeable to re-evaluation s/p  shunt. Pt required increased time for all mobility and was easily distracted/tangential.  Pt continues to ambulate with decreased speed, narrow WILL and R inattention. Several cues for safety awareness and to avoid objects on R side of environment. Pt was able to increase gait speed with cues which improved step length as well. Pt was returned to chair with all needs met and call light in reach. Pt may benefit from a short stay intensive rehabilitation program at discharge. Treatment:  Patient practiced and was instructed in the following treatment:     Bed mobility training - pt given verbal cues to facilitate proper sequencing and safety during supine>sit to complete task    Sitting EOB for >8 minutes for upright tolerance, postural awareness and BLE ROM   Transfer training - pt was given verbal and tactile cues to facilitate proper hand placement, technique and safety during sit to stand and stand to sit as well as provided with physical assistance to complete task.  Gait training- pt was given verbal and tactile cues to facilitate safety, balance and use of AD during ambulation as well as provided with physical assistance to complete task. Pt's/ family goals   1. Return home    Patient and or family understand(s) diagnosis, prognosis, and plan of care.   Yes    PLAN OF CARE:    Current Treatment Recommendations     [x] Strengthening     [] ROM   [x] Balance Training   [x] Endurance Training   [x] Transfer Training   [x] Gait Training   [x] Stair Training   [] Positioning   [x] Safety and Education Training   [x] Patient/Caregiver Education   [] HEP  [] Other     Frequency of treatments: 2-5x/week x 1-2 weeks.    Time in  1040  Time out  1110    Total Treatment Time  25 minutes     Evaluation Time includes thorough review of current medical information, gathering information on past medical history/social history and prior level of function, completion of standardized testing/informal observation of tasks, assessment of data and education on plan of care and goals.     CPT codes:  [] Low Complexity PT evaluation 86466  [] Moderate Complexity PT evaluation 15671  [] High Complexity PT evaluation 60875  [x] PT Re-evaluation 72539  [] Gait training 92687 - minutes  [] Manual therapy 98440 - minutes  [x] Therapeutic activities 40083 25 minutes  [] Therapeutic exercises 97126 - minutes  [] Neuromuscular reeducation 60124 - minutes     Elizabeth Proctor, PT, DPT  [id number]

## 2020-09-24 NOTE — PROGRESS NOTES
PO#1   shunt placement  Doing well. No complications. Reviewed CT scan of brain:  New  shunt placement on the right.  Ventricular size slightly    diminished compared to prior study.           Will transfer out of NSICU

## 2020-09-24 NOTE — PROGRESS NOTES
Hospital Medicine    Subjective:  Pt pod # 1 s/p  shunt / pt alert conversive      Current Facility-Administered Medications:     ondansetron (ZOFRAN) injection 4 mg, 4 mg, Intravenous, Q6H PRN, Sherrill Velásquez MD, 4 mg at 09/22/20 2045    loperamide (IMODIUM) capsule 2 mg, 2 mg, Oral, 4x Daily PRN, Sherrill Velásquez MD, 2 mg at 09/21/20 1653    oxyCODONE (ROXICODONE) immediate release tablet 2.5 mg, 2.5 mg, Oral, Q6H PRN, 2.5 mg at 09/22/20 0102 **OR** oxyCODONE (ROXICODONE) immediate release tablet 5 mg, 5 mg, Oral, Q6H PRN, Sherrill Velásquez MD, 5 mg at 09/23/20 2327    acetaminophen (TYLENOL) tablet 500 mg, 500 mg, Oral, Q4H PRN, 500 mg at 09/24/20 0032 **OR** acetaminophen (TYLENOL) suppository 325 mg, 325 mg, Rectal, Q4H PRN, Sherrill Velásquez MD    amLODIPine (NORVASC) tablet 5 mg, 5 mg, Oral, Daily, Sherrill Velásquez MD, 5 mg at 09/23/20 0818    [Held by provider] apixaban (ELIQUIS) tablet 5 mg, 5 mg, Oral, BID, Sherrill Velásquez MD, Stopped at 09/17/20 0900    buPROPion [facility]) extended release tablet 100 mg, 100 mg, Oral, Daily, Stanley Crain MD, 100 mg at 09/23/20 0818    levothyroxine (SYNTHROID) tablet 100 mcg, 100 mcg, Oral, Daily, Sherrill Velásquez MD, 100 mcg at 09/24/20 0508    sotalol (BETAPACE) tablet 120 mg, 120 mg, Oral, BID, Sherrill Velásquez MD, 120 mg at 09/23/20 2117    sodium chloride flush 0.9 % injection 10 mL, 10 mL, Intravenous, 2 times per day, Sherrill Velásquez MD, 10 mL at 09/23/20 2117    sodium chloride flush 0.9 % injection 10 mL, 10 mL, Intravenous, PRN, Sherrill Velásquez MD    Objective:    BP (!) 148/75   Pulse 54   Temp 97.5 °F (36.4 °C) (Oral)   Resp 24   Ht 5' 6\" (1.676 m)   Wt 110 lb 1.6 oz (49.9 kg)   SpO2 100%   BMI 17.77 kg/m²     Heart:  Reg  Lungs:  CTA bilaterally, no wheeze, rales or rhonchi  Abd: bowel sounds present, nontender, nondistended, no masses  Extrem:  No clubbing, cyanosis, or edema    CBC with Differential:    Lab Results

## 2020-09-24 NOTE — CARE COORDINATION
Pod #1 s/p  shunt. Met with pt and daughter Mely Ross in room. Discussed prior Pt recommendations for ww. Pt states she owns 2 ww's, Bsc, shower bench and also has home o2 2l @ hs provided by [employer] Shah lives with her daughter in a 2 story home with 3 steps and 2 rails to enter, Rails to 2nd floor also. Bed and bath on 1st floor. Plan is to return home. Pt would like [facility]  for skilled nursing to monitor her bp and meds as she feels some of her meds make her dizzy. Will await post  shunt therapy evals to determine if home PT/OT needed. She has used [employer] in the past, but does not want to cont. She is requesting [address] Referral made to [facility] at [address] [facility] will transport her home at discharge. Will need HHC orders.

## 2020-09-24 NOTE — PLAN OF CARE
Problem: Falls - Risk of:  Goal: Will remain free from falls  Description: Will remain free from falls  9/24/2020 1746 by Edwin Joyner RN  Outcome: Met This Shift  9/24/2020 1633 by Lorie Conrad RN  Outcome: Met This Shift  9/24/2020 1423 by Annette Pendleton  Outcome: Met This Shift  9/24/2020 0548 by James Carter RN  Outcome: Met This Shift  Goal: Absence of physical injury  Description: Absence of physical injury  9/24/2020 1746 by Edwin Joyner RN  Outcome: Met This Shift  9/24/2020 1633 by Lorie Conrad RN  Outcome: Met This Shift  9/24/2020 1423 by Annette Pendleton  Outcome: Met This Shift  9/24/2020 0548 by James Carter RN  Outcome: Met This Shift     Problem: Pain:  Goal: Pain level will decrease  Description: Pain level will decrease  9/24/2020 1633 by Lorie Conrad RN  Outcome: Met This Shift  Goal: Control of acute pain  Description: Control of acute pain  9/24/2020 1633 by Lorie Conrad RN  Outcome: Met This Shift  9/24/2020 1423 by Annette Pendleton  Outcome: Met This Shift  9/24/2020 0548 by James Carter RN  Outcome: Met This Shift     Problem: Skin Integrity:  Goal: Will show no infection signs and symptoms  Description: Will show no infection signs and symptoms  9/24/2020 1423 by Annette Pendleton  Outcome: Met This Shift  9/24/2020 0548 by James Carter RN  Outcome: Met This Shift  Goal: Absence of new skin breakdown  Description: Absence of new skin breakdown  9/24/2020 1423 by Annette Pendleton  Outcome: Met This Shift  9/24/2020 0548 by James Carter RN  Outcome: Met This Shift     Problem: Infection - Surgical Site:  Goal: Will show no infection signs and symptoms  Description: Will show no infection signs and symptoms  9/24/2020 1423 by Annette Pendleton  Outcome: Met This Shift  9/24/2020 0548 by James Carter RN  Outcome: Met This Shift

## 2020-09-24 NOTE — OP NOTE
[address]                  [address] [id number],  [facility]                                OPERATIVE REPORT    PATIENT NAME: Rashad Narayanan                 :        1950  MED REC NO:   [id number]                            ROOM:       [facility]  ACCOUNT NO:   [de-identified]                           ADMIT DATE: 09/15/2020  PROVIDER:     Lisa Covington MD    DATE OF PROCEDURE:  2020    SURGEONS:  Lisa Covington MD; and Dr. Nacho Nicole. PREOPERATIVE DIAGNOSIS:  Normal pressure hydrocephalus. POSTOPERATIVE DIAGNOSIS:  Normal pressure hydrocephalus. OPERATION PERFORMED:  Insertion of ventriculoperitoneal shunt using ebenezer  hole Strata valve set at delta 1.5. TECHNIQUE:  The patient was placed on the operating room table in supine  position. After satisfactory endotracheal general anesthesia had been  administered, the patient's head was turned to the left side, the right  side was facing up. The area of the scalp behind the pinna of the right  ear was shaved along with the right side of the neck. Chest and abdomen  were prepared with Betadine scrub and solution and routinely draped. A  question jennifer incision was made behind the pinna of the right ear which  was centered over a point 1.5 inches above and 1.5 inches behind the top  of the pinna of the ear. The incision was taken down through the skin  and subcutaneous tissue and aponeurosis. The scalp flap was reflected  anterolaterally. Subperiosteal dissection was carried out in the  exposed bone and a ebenezer hole was made in the center of the exposed bone  using Ama drill. Hemostasis was secured in this ebenezer hole. Cruciate incision was made in the dura in the center of the ebenezer hole. Through this a ventricular catheter was inserted in the frontal horn of  the right lateral ventricle. 9.5 cm catheter was inserted  intracranially. CSF came out under normal pressure.   Then

## 2020-09-24 NOTE — PROGRESS NOTES
[facility]  Critical Care  Daily Progress Note 9/24/2020    Date of Admission: 9/15    CC:  S/p  shunt      HOSPITAL EVENTS  9/18 insertion of lumbar drain. Admitted to ICU post op  9/19 No significant issues. T Max 98.1  9/23  shunt inserted. PHYSICAL EXAM:    BP (!) 155/95   Pulse 54   Temp 98.5 °F (36.9 °C) (Oral)   Resp 19   Ht 5' 6\" (1.676 m)   Wt 110 lb 1.6 oz (49.9 kg)   SpO2 92%   BMI 17.77 kg/m²     Intake/Output Summary (Last 24 hours) at 9/24/2020 1239  Last data filed at 9/24/2020 0902  Gross per 24 hour   Intake 740 ml   Output 200 ml   Net 540 ml         General appearance:  Comfortable. NEUROLOGIC:        GCS:  15       Pupil size:  Left 3 mm  Right 3 mm  Pupil reaction: Yes   PERRLA  Wiggles fingers: Left Yes Right Yes  Hand grasp:   Left present     Right present  Wiggles toes: Left Yes    Right Yes  Plantar flexion: Left present    Right present    CONSTITUTIONAL: No acute distress. Cranial dressing small amount old blood. CARDIOVASCULAR: S1 S2, regular rate, regular rhythm,Monitor: SR  PULMONARY:  Respirations unlabored. No rhonchi/rales/wheezes. ABDOMEN: Soft, nontender, nondistended, nontympanic, normal bowel sounds. Bowel incision well approximated. MUSCULOSKELETAL: AGUIRRE  SKIN/EXTREMITIES: No rashes/ecchymosis, no edema/clubbing, warm/dry, good capillary refill. IV ACCESS:  PIV       ASSESSMENT/PLAN:     Principal Problem:    Hydrocephalus (HCC)  Active Problems:    Essential hypertension    Depression    Asymptomatic bilateral carotid artery stenosis    PAD (peripheral artery disease) (HCC)    Hyperlipidemia LDL goal <100    Acquired hypothyroidism    Severe protein-calorie malnutrition (HCC)    CAD (coronary artery disease)    Paroxysmal atrial fibrillation (HCC)    Laceration of scalp  Resolved Problems:    Dehydration    Diarrhea          Neuro:  S/p  shunt. S/p lumbar drain insertion. Neurosurgery following. Monitor neuro status.  Lumbar drain. Wellbutrin  CV: No acute issues. Hx. PVD. HTN. HLD. CAD. Bilateral carotid stenosis. BP goal <160mm Hg. Amlodipine. Sotalol  Pulm: No acute issues. Hx COPD. Monitor pulmonary status. GI: No acute issues. Hx hemicolectomy. Tolerating diet. Monitor bowel function  Renal: No acute issues. ID: Afebrile. Endocrine:  Hx Thyroid disease. Synthroid    MSK:. Deconditioned. PT/OT   Heme: No acute issues. Bowel regime: Colace. Senna. Pain control/Sedation:  Acetaminophen. Oxycodone. DVT prophylaxis: SCDs. No chemical prophylaxis until approved by neurosurgery. GI prophylaxis: Diet. Mouth/Eye care: As needed. Consults:  Neurosurgery. Medicine. Patient/Family update: Will update as family available  Code status:  Full      Disposition:  Transfer from [facility]. Abelino Henderson DNP.  BRIAN-CNP  9/24/2020  12:39 PM

## 2020-09-25 VITALS
SYSTOLIC BLOOD PRESSURE: 138 MMHG | HEIGHT: 66 IN | HEART RATE: 58 BPM | DIASTOLIC BLOOD PRESSURE: 66 MMHG | RESPIRATION RATE: 18 BRPM | TEMPERATURE: 98.2 F | WEIGHT: 110.1 LBS | BODY MASS INDEX: 17.69 KG/M2 | OXYGEN SATURATION: 94 %

## 2020-09-25 PROCEDURE — 2580000003 HC RX 258: Performed by: NEUROLOGICAL SURGERY

## 2020-09-25 PROCEDURE — 2700000000 HC OXYGEN THERAPY PER DAY

## 2020-09-25 PROCEDURE — 6370000000 HC RX 637 (ALT 250 FOR IP): Performed by: INTERNAL MEDICINE

## 2020-09-25 PROCEDURE — 6370000000 HC RX 637 (ALT 250 FOR IP): Performed by: NEUROLOGICAL SURGERY

## 2020-09-25 RX ORDER — ACETAMINOPHEN 500 MG
500 TABLET ORAL EVERY 6 HOURS PRN
Qty: 120 TABLET | Refills: 3 | COMMUNITY
Start: 2020-09-25

## 2020-09-25 RX ADMIN — Medication 10 ML: at 09:13

## 2020-09-25 RX ADMIN — OXYCODONE HYDROCHLORIDE 5 MG: 5 TABLET ORAL at 06:50

## 2020-09-25 RX ADMIN — AMLODIPINE BESYLATE 5 MG: 5 TABLET ORAL at 09:13

## 2020-09-25 RX ADMIN — BISACODYL 10 MG: 5 TABLET, COATED ORAL at 09:16

## 2020-09-25 RX ADMIN — ACETAMINOPHEN 500 MG: 500 TABLET ORAL at 12:26

## 2020-09-25 RX ADMIN — LEVOTHYROXINE SODIUM 100 MCG: 0.1 TABLET ORAL at 06:50

## 2020-09-25 RX ADMIN — ACETAMINOPHEN 500 MG: 500 TABLET ORAL at 01:57

## 2020-09-25 RX ADMIN — SOTALOL HYDROCHLORIDE 120 MG: 120 TABLET ORAL at 09:13

## 2020-09-25 RX ADMIN — BUPROPION HYDROCHLORIDE 100 MG: 100 TABLET, EXTENDED RELEASE ORAL at 09:13

## 2020-09-25 ASSESSMENT — PAIN SCALES - GENERAL
PAINLEVEL_OUTOF10: 7
PAINLEVEL_OUTOF10: 0
PAINLEVEL_OUTOF10: 5
PAINLEVEL_OUTOF10: 4
PAINLEVEL_OUTOF10: 5
PAINLEVEL_OUTOF10: 0
PAINLEVEL_OUTOF10: 6

## 2020-09-25 NOTE — PROGRESS NOTES
Hospital Medicine    Subjective:  Pt alert conversive pod # 2 tiffani diet      Current Facility-Administered Medications:     ondansetron (ZOFRAN) injection 4 mg, 4 mg, Intravenous, Q6H PRN, Mckinley Mcadams MD, 4 mg at 09/22/20 2045    loperamide (IMODIUM) capsule 2 mg, 2 mg, Oral, 4x Daily PRN, Mckinley Mcadams MD, 2 mg at 09/21/20 1653    oxyCODONE (ROXICODONE) immediate release tablet 2.5 mg, 2.5 mg, Oral, Q6H PRN, 2.5 mg at 09/22/20 0102 **OR** oxyCODONE (ROXICODONE) immediate release tablet 5 mg, 5 mg, Oral, Q6H PRN, Mckinley Mcadams MD, 5 mg at 09/25/20 9548    acetaminophen (TYLENOL) tablet 500 mg, 500 mg, Oral, Q4H PRN, 500 mg at 09/25/20 1226 **OR** acetaminophen (TYLENOL) suppository 325 mg, 325 mg, Rectal, Q4H PRN, Mckinley Mcadams MD    amLODIPine (NORVASC) tablet 5 mg, 5 mg, Oral, Daily, Mckinley Mcadams MD, 5 mg at 09/25/20 0913    [Held by provider] apixaban (ELIQUIS) tablet 5 mg, 5 mg, Oral, BID, Mckinley Mcadams MD, Stopped at 09/17/20 0900    buPROPion [facility]) extended release tablet 100 mg, 100 mg, Oral, Daily, Stanley Baez MD, 100 mg at 09/25/20 0913    levothyroxine (SYNTHROID) tablet 100 mcg, 100 mcg, Oral, Daily, Mckinley Mcadams MD, 100 mcg at 09/25/20 0650    sotalol (BETAPACE) tablet 120 mg, 120 mg, Oral, BID, Mckinley Mcadams MD, 120 mg at 09/25/20 0913    sodium chloride flush 0.9 % injection 10 mL, 10 mL, Intravenous, 2 times per day, Mckinley Mcadams MD, 10 mL at 09/25/20 0913    sodium chloride flush 0.9 % injection 10 mL, 10 mL, Intravenous, PRN, Mckinley Mcadams MD    Objective:    BP (!) 150/67   Pulse 52   Temp 98 °F (36.7 °C) (Temporal)   Resp 18   Ht 5' 6\" (1.676 m)   Wt 110 lb 1.6 oz (49.9 kg)   SpO2 94%   BMI 17.77 kg/m²     Heart:  Reg  Lungs:  CTA bilaterally, no wheeze, rales or rhonchi  Abd: bowel sounds present, nontender, nondistended, no masses  Extrem:  No clubbing, cyanosis, or edema    CBC with Differential:    Lab Results   Component

## 2020-09-25 NOTE — CARE COORDINATION
9/25, SW was informed by charge nurse that patient was refusing to leave. SW met with patient on discharge plan which is home with [facility]. Explained to patient her Medicare rights and possibly patient may be responsible for payment of stay. Patient informed SW she was not staying and wanted to go home. [facility] for [facility]. Patient to be discharged today. Patient in agreement with leaving hospital.  Daughter contacted on picking patient up today. Message left with daughter's VM. Discussed patient being responsible for lift back to her home should transport have to be arranged. Patient has no money or credit card on her as of now. Will talk with daughter on payment should daughter not be able to pick patient up today. Patient noted that she does not need oxygen to get home. She does not wear it continuously. Nursing updated. SW to follow for further discharge planning.     Joseluis Damon, [address] [facility]  used

## 2020-09-25 NOTE — PLAN OF CARE
Problem: Falls - Risk of:  Goal: Will remain free from falls  Description: Will remain free from falls  9/25/2020 1202 by Mikayla Ascencio RN  Outcome: Met This Shift  9/25/2020 0559 by Gene Castillo RN  Outcome: Met This Shift  9/25/2020 0557 by Gene Castillo RN  Outcome: Met This Shift  Goal: Absence of physical injury  Description: Absence of physical injury  9/25/2020 1202 by Mikayla Ascencio RN  Outcome: Met This Shift  9/25/2020 0559 by Gene Castillo RN  Outcome: Met This Shift  9/25/2020 0557 by Gene Castillo RN  Outcome: Met This Shift     Problem: Neurological  Goal: Maximum potential motor/sensory/cognitive function  Outcome: Met This Shift     Problem: Pain:  Goal: Pain level will decrease  Description: Pain level will decrease  Outcome: Met This Shift  Goal: Control of acute pain  Description: Control of acute pain  Outcome: Met This Shift  Goal: Control of chronic pain  Description: Control of chronic pain  Outcome: Met This Shift     Problem: Skin Integrity:  Goal: Will show no infection signs and symptoms  Description: Will show no infection signs and symptoms  Outcome: Met This Shift  Goal: Absence of new skin breakdown  Description: Absence of new skin breakdown  Outcome: Met This Shift     Problem: Infection - Surgical Site:  Goal: Will show no infection signs and symptoms  Description: Will show no infection signs and symptoms  Outcome: Met This Shift

## 2020-09-25 NOTE — CARE COORDINATION
9/25, Transportation has been set up for patient with [facility] has not called SW back. Patient is agreeable to go by wheelchair. Josue to pick patient up at 6:30pm tonight. Patient requested to have [facility] number due to personnel she knows there. Information was given to patient. Ambulette, face sheet and envelope on soft chart. SW to follow for further discharge needs.     Gracia Loya, [address]

## 2020-11-02 ENCOUNTER — HOSPITAL ENCOUNTER (OUTPATIENT)
Dept: CT IMAGING | Age: 70
Discharge: HOME | End: 2020-11-04
Payer: MEDICARE

## 2020-11-02 PROCEDURE — 70450 CT HEAD/BRAIN W/O DYE: CPT

## 2020-11-03 PROBLEM — I73.9 PAD (PERIPHERAL ARTERY DISEASE) (HCC): Status: RESOLVED | Noted: 2019-01-29 | Resolved: 2020-11-03

## 2021-02-05 ENCOUNTER — TELEPHONE (OUTPATIENT)
Dept: VASCULAR SURGERY | Age: 71
End: 2021-02-05

## 2021-02-08 ENCOUNTER — TELEPHONE (OUTPATIENT)
Dept: VASCULAR SURGERY | Age: 71
End: 2021-02-08

## 2021-02-08 ENCOUNTER — OFFICE VISIT (OUTPATIENT)
Dept: VASCULAR SURGERY | Age: 71
End: 2021-02-08
Payer: MEDICARE

## 2021-02-08 VITALS — WEIGHT: 96 LBS | BODY MASS INDEX: 15.43 KG/M2 | HEIGHT: 66 IN

## 2021-02-08 DIAGNOSIS — I73.9 PVD (PERIPHERAL VASCULAR DISEASE) (HCC): Primary | ICD-10-CM

## 2021-02-08 PROCEDURE — G8400 PT W/DXA NO RESULTS DOC: HCPCS | Performed by: SURGERY

## 2021-02-08 PROCEDURE — 4040F PNEUMOC VAC/ADMIN/RCVD: CPT | Performed by: SURGERY

## 2021-02-08 PROCEDURE — 3017F COLORECTAL CA SCREEN DOC REV: CPT | Performed by: SURGERY

## 2021-02-08 PROCEDURE — G8419 CALC BMI OUT NRM PARAM NOF/U: HCPCS | Performed by: SURGERY

## 2021-02-08 PROCEDURE — 99214 OFFICE O/P EST MOD 30 MIN: CPT | Performed by: SURGERY

## 2021-02-08 PROCEDURE — G8484 FLU IMMUNIZE NO ADMIN: HCPCS | Performed by: SURGERY

## 2021-02-08 PROCEDURE — 1123F ACP DISCUSS/DSCN MKR DOCD: CPT | Performed by: SURGERY

## 2021-02-08 PROCEDURE — 1090F PRES/ABSN URINE INCON ASSESS: CPT | Performed by: SURGERY

## 2021-02-08 PROCEDURE — G8427 DOCREV CUR MEDS BY ELIG CLIN: HCPCS | Performed by: SURGERY

## 2021-02-08 PROCEDURE — 1036F TOBACCO NON-USER: CPT | Performed by: SURGERY

## 2021-02-08 NOTE — PATIENT INSTRUCTIONS
Patient Education        Learning About Peripheral Arterial Disease of the Legs  What is peripheral arterial disease? Peripheral arterial disease (PAD) is narrowing or blockage of arteries in your arms and legs. The most common cause of PAD is the buildup of plaque on the inside of arteries. Plaque is made of extra cholesterol, calcium, and other material in your blood. Over time, plaque builds up in the walls of the arteries, including those that supply blood to your legs. This buildup leads to poor blood flow. When you have PAD, you have a risk of having plaque in other arteries in your body. This raises your risk of a heart attack and stroke. This information focuses on peripheral arterial disease of the legs, the area where it is most common. When you have PAD of the legs and you walk or exercise, your leg muscles may not get enough blood. This may cause symptoms, such as leg pain during exercise. Peripheral arterial disease is also called peripheral vascular disease. What are the symptoms? Many people who have PAD do not have any symptoms. But if you have symptoms, you may have weak or tired legs, difficulty walking or balancing, or pain. If you have pain, you might feel a tight, aching, or squeezing pain in the calf, thigh, or buttock. This pain usually happens after you have walked a certain distance. The pain goes away if you stop walking. This pain is called intermittent claudication. If PAD gets worse, you may have other symptoms that are caused by poor blood flow to your legs and feet. You may have:  · Cold or numb feet or toes. · Sores that are slow to heal.  · Leg or foot pain while you are at rest.  · Feet and toes that become pale from exercise or when elevated. · Feet that turn red when dangled. · Blue or purple marks on your legs, feet, or toes. How can you prevent PAD? · Quit smoking. Quitting smoking is one of the best things you can do to help prevent PAD. If you need help quitting, talk to your doctor about stop-smoking programs and medicines. These can increase your chances of quitting for good. · Stay at a healthy weight. · Manage other health problems, including diabetes, high blood pressure, and high cholesterol. · Be physically active. Get at least 30 minutes of exercise on most days of the week. Walking is a good choice. You also may want to do other activities, such as running, swimming, cycling, or playing tennis or team sports. · Eat a variety of heart-healthy foods. ? Eat fruits, vegetables, whole grains (like oatmeal), dried beans and peas, nuts and seeds, soy products (like tofu), and fat-free or low-fat dairy products. ? Replace butter, margarine, and hydrogenated or partially hydrogenated oils with olive and canola oils. (Canola oil margarine without trans fat is fine.)  ? Replace red meat with fish, poultry, and soy protein (like tofu). ? Limit processed and packaged foods like chips, crackers, and cookies. How is PAD treated? Your doctor may suggest ways to relieve symptoms and lower your risk of heart attack and stroke. These may include:  · Quitting smoking. It's one of the most important things you can do. If you need help quitting, talk to your doctor about stop-smoking programs and medicines. These can increase your chances of quitting for good. · Eating heart-healthy foods. · Staying at a healthy weight. Lose weight if you need to. · Regular exercise (if your doctor says it's safe). Try walking, swimming, or biking for at least 30 minutes on most, if not all, days of the week. If you have leg symptoms when you exercise, your doctor might recommend a specialized exercise program that may relieve symptoms. The goal is to be able to walk farther without pain.

## 2021-02-08 NOTE — PROGRESS NOTES
Vascular Surgery Outpatient Followup    PCP : Han Smith MD  Podiatrist : Dr. Rush Londono  Cardiologist : Dr. Neville Go     Previous Vascular Procedure  1/29/19 L sfa, popliteal atherectomy, dcb   1/7/2020 R LE angiogram - SFA  - R LE LL claudication - needs fem ak pop bypass      HISTORY OF PRESENT ILLNESS:    This is a 65 y.o. female who presents in fu regarding pvd, lifestyle limiting claudication. She is no longer having issues with her left leg. Left groin pain has also resolved. R LE having issues with numbnes and tingling in her foot primarily. She denies tissue loss or rest pain. She is able to walk about 200 feet which is much improved and is no longer lifestyle limiting per her report. She is no longer using a walker. She feels that PT helped her a great deal.      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 Maggie Pink 2/2020. She is recovered but has chronic diarrhea. She is taking medications for this which has helped some. She is following with Dr. Josiah King. She is following with Dr. Maggie Pink also. 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 She is on eliquis per Dr. Neville Go after developing atrial fibrillation postop after her colon resection. She has had previous L LE intervention for wound of the left calf which was first noted approximately 7/2018 after trauma.   She followed with Dr. Tamara Saunders and after intervention it did heal.      Past Medical History:        Diagnosis Date    Aortoiliac occlusive disease ([address] 1/16/2019    Atherosclerosis of native arteries of extremity with rest pain ([address] 5/3/2018    Atherosclerosis of native artery of extremity with ulceration ([address] 1/16/2019    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 with rest pain ([address] 1/16/2019    Bilateral carotid artery stenosis 5/22/2018    Bruit of right carotid artery 5/3/2018    CAD (coronary artery disease)     Cancer (HCC)     SKIN CA/Thigh    COPD [facility]ch[facility] obstructive [facility]     Depression     Femoro-popliteal artery disease [facility] 2019    History of tobacco use 5/3/2018    Hydrocephalus ([facility])     Hyperlipidemia     Hypertension     Pain in both feet 5/3/2018    PVD (peripheral vascular disease) [facility]     PVD (peripheral vascular disease) with claudication ([facility] 5/3/2018    [facility]     Thyroid disease     Venous stasis ulcer of left calf with fat layer exposed without varicose veins ([facility] 2019     Past Surgical History:        Procedure Laterality Date     SECTION      COLONOSCOPY      DILATION AND CURETTAGE OF UTERUS      LAPAROTOMY N/A 2020    LAPAROTOMY EXPLORATORY, RIGHT HEMICOLECTOMY performed by Sue Cardenas MD at [address] [place] [address] N/A 2020    PLACEMENT OF INTRATHECAL CATHETER FOR LUMBAR DRAIN performed by Brennan Cruz MD at [place] [address]  2019    L SFA angioplasty    TUBAL LIGATION      VENTRICULOPERITONEAL SHUNT N/A 2020    VENTRICULAR PERITONEAL SHUNT PLACEMENT performed by Brennan Cruz MD at [address] [place] [address]     Current Medications:   Current Outpatient Medications   Medication Sig Dispense Refill    acetaminophen (TYLENOL) 500 MG tablet Take 1 tablet by mouth every 6 hours as needed for Pain 120 tablet 3    ipratropium-albuterol (DUONEB) 0.5-2.5 (3) MG/3ML SOLN nebulizer solution Inhale 3 mLs into the lungs every 4 hours (while awake) 360 mL 0    apixaban (ELIQUIS) 5 MG TABS tablet Take 1 tablet by mouth 2 times daily 60 tablet 0    buPROPion (WELLBUTRIN SR) 100 MG extended release tablet Take 1 tablet by mouth daily 60 tablet 3    sotalol (BETAPACE) 120 MG tablet Take 1 tablet by mouth 2 times daily 60 tablet 3    amLODIPine (NORVASC) 5 MG tablet Take 1 tablet by mouth daily 30 tablet 3    levothyroxine (SYNTHROID) 100 MCG tablet Take 100 mcg by mouth Daily        No current facility-administered medications for this visit.       Allergies:  Flagyl [metronidazole], Hornet venom, and Wasp venom  Social History     Socioeconomic History    Marital status:      Spouse name: Not on file    Number of children: Not on file    Years of education: Not on file    Highest education level: Not on file   Occupational History    Occupation: Retired    Social Needs    Financial resource strain: Not on file    Food insecurity     Worry: Not on file     Inability: Not on file   [employer] needs     Medical: Not on file     Non-medical: Not on file   Tobacco Use    Smoking status: Former Smoker     Packs/day: 0.25     Types: Cigarettes     Quit date: 5/3/2017     Years since quitting: 3.7    Smokeless tobacco: Never Used   Substance and Sexual Activity    Alcohol use: Yes     Comment: beer/Weekly     Drug use: No    Sexual activity: Never   Lifestyle    Physical activity     Days per week: Not on file     Minutes per session: Not on file    Stress: Not on file   Relationships    Social connections     Talks on phone: Not on file     Gets together: Not on file     Attends Congregation service: Not on file     Active member of club or organization: Not on file     Attends meetings of clubs or organizations: Not on file     Relationship status: Not on file    Intimate partner violence     Fear of current or ex partner: Not on file     Emotionally abused: Not on file     Physically abused: Not on file     Forced sexual activity: Not on file   Other Topics Concern    Not on file   Social History Narrative    Not on file     Family History   Problem Relation Age of Onset    Kidney Disease Mother     Coronary Art Dis Mother     High Blood Pressure Mother     Cancer Father     Other Father      Labs  Lab Results   Component Value Date    WBC 5.8 09/21/2020    HGB 11.4 (L) 09/21/2020    HCT 35.6 09/21/2020     09/21/2020    PROTIME 11.8 02/17/2020 INR 1.0 02/17/2020    APTT 31.3 01/20/2020    K 3.9 09/21/2020    BUN 18 09/21/2020    CREATININE 0.6 09/21/2020       PHYSICAL EXAM:    CONSTITUTIONAL:   Awake, alert, cooperative  PSYCHIATRIC :  Oriented to time, place and person     Appropriate insight to disease process  EYES: Lids and lashes normal  ENT:  External ears and nose without lesions   Hearing deficits not note  NECK: Supple, symmetrical, trachea midline   Carotid bruit notnoted  LUNGS:  No increased work of breathing                 Clear to auscultation  CARDIOVASCULAR:  regular rate and rhythm   ABDOMEN:  soft, non-distended, non-tender   Aorta is not palpable  SKIN:   Normal skin color   Texture and turgor normal, no induration  EXTREMITIES:   R LE Edema absent   No wounds   Cyanosis of toes  L LE Edema absent  R posterior tibial monophasic L posterior tibial monophasic   R dorsalis pedis No signal L dorsalis pedis Weakly biophasic     RADIOLOGY:    A/P PVD with R LE claudication  · Her claudication sxs have resolved  · She is on eliquis for   · Discussed with pt tobacco use and relationship to PVD  pt currently is not a smoker  Pt understands tobacco use can contribute to worsening of pvd and development of limb loss   · Emphasized importance of foot care and to call if develops any wounds, ulcerations, changes in appearance, claudication and/or symptoms  · We discussed importance of a walking program and them gauging how far they have walked on a daily basis and progressively increasing that distance and walking through the pain  · Will not proceed with R fem ak pop bypass as she is no longer sx, if she would need to proceed with this would need cardiac risk stratification per Dr. Fernanda Shah  · Fu in 1 year with arterial studies - will get studies in near future also    Left groin pain  · Completely resolved        Radha Torres

## 2021-02-15 ENCOUNTER — PREP FOR PROCEDURE (OUTPATIENT)
Dept: SURGERY | Age: 71
End: 2021-02-15

## 2021-02-15 RX ORDER — SODIUM CHLORIDE 9 MG/ML
INJECTION, SOLUTION INTRAVENOUS CONTINUOUS
Status: CANCELLED | OUTPATIENT
Start: 2021-02-15

## 2021-02-15 RX ORDER — MONTELUKAST SODIUM 4 MG/1
1 TABLET, CHEWABLE ORAL 3 TIMES DAILY
Status: ON HOLD | COMMUNITY
End: 2021-02-19 | Stop reason: HOSPADM

## 2021-02-15 RX ORDER — MULTIVITAMIN WITH IRON
100 TABLET ORAL DAILY
COMMUNITY
End: 2022-01-25

## 2021-02-15 RX ORDER — MULTIVIT-MIN/IRON/FOLIC ACID/K 18-600-40
1 CAPSULE ORAL DAILY
COMMUNITY

## 2021-02-15 RX ORDER — LOPERAMIDE HYDROCHLORIDE 2 MG/1
2 CAPSULE ORAL 4 TIMES DAILY PRN
Status: ON HOLD | COMMUNITY
End: 2021-02-19 | Stop reason: HOSPADM

## 2021-02-15 NOTE — PROGRESS NOTES
Patient medical history includes paroxysmal a-fib (most recent EKG from PCP office on paper chart 1-8-21 = SR), PVD bilat Carotid artery stenosis (f/u w/ Dr. Iron Hall),  shunt placed 9/2020 (f/u w/ Dr. Mario Patel - notes in [address]), and hemicolectomy 2/2020 for colon mass, COPD/RBY uses 2L NC nightly and during day prn. Patient was recently evaluated by PCP for clearance for recent cataract OR (1/2021) - note on paper chart states patient follows with cardiology. Patient states she does not follow with cardiology. Above reviewed with Dr. Barber Ochoa, no needs - patient to be evaluated DOS.

## 2021-02-15 NOTE — PROGRESS NOTES
Have you been tested for COVID  Yes   Tested on 2-15-21 for OR scheduled 2-19-21         Have you been told you were positive for COVID No  Have you had any known exposure to someone that is positive for COVID No  Do you have a cough                   No              Do you have shortness of breath No                 Do you have a sore throat            No                Are you having chills                    No                Are you having muscle aches. No                    Please come to the hospital wearing a mask and have your significant other wear a mask as well. Both of you should check your temperature before leaving to come here,  if it is 100 or higher please call [phone] for instruction. [facility] PRE-ADMISSION TESTING INSTRUCTIONS    The Preadmission Testing patient is instructed accordingly using the following criteria (check applicable):    ARRIVAL INSTRUCTIONS:  [x] Parking the day of Surgery is located in the Main Entrance lot. Upon entering the door, make an immediate right to the surgery reception desk    [x] Bring photo ID and insurance card    [] Bring in a copy of Living will or Durable Power of  papers.     [x] Please be sure to arrange for responsible adult to provide transportation to and from the hospital    [x] Please arrange for responsible adult to be with you for the 24 hour period post procedure due to having anesthesia      GENERAL INSTRUCTIONS:    [x] Nothing by mouth after midnight, including gum, candy, mints or water    [x] You may brush your teeth, but do not swallow any water    [] Take medications as instructed with 1-2 oz of water    [x] Stop herbal supplements and vitamins 5 days prior to procedure    [x] Follow preop dosing of blood thinners per physician instructions    [] Take 1/2 dose of evening insulin, but no insulin after midnight    [] No oral diabetic medications after midnight    [] If diabetic and have low blood sugar or feel symptomatic, take 1-2oz apple juice only    [] Bring inhalers day of surgery    [] Bring C-PAP/ Bi-Pap day of surgery    [] Bring urine specimen day of surgery    [] Shower or bath with soap, lather and rinse well, AM of Surgery, no lotion, powders or creams to surgical site    [x] Follow bowel prep as instructed per surgeon    [x] No tobacco products within 24 hours of surgery     [x] No alcohol or illegal drug use within 24 hours of surgery.     [x] Jewelry, body piercing's, eyeglasses, contact lenses and dentures are not permitted into surgery (bring cases)      [x] Please do not wear any nail polish, make up or hair products on the day of surgery    [x] You can expect a call the business day prior to procedure to notify you if your arrival time changes    [x] If you receive a survey after surgery we would greatly appreciate your comments    [] Parent/guardian of a minor must accompany their child and remain on the premises  the entire time they are under our care     [] Pediatric patients may bring favorite toy, blanket or comfort item with them    [] A caregiver or family member must remain with the patient during their stay if they are mentally handicapped, have dementia, disoriented or unable to use a call light or would be a safety concern if left unattended    [x] Please notify surgeon if you develop any illness between now and time of surgery (cold, cough, sore throat, fever, nausea, vomiting) or any signs of infections  including skin, wounds, and dental.    [x]  The Outpatient Pharmacy is available to fill your prescription here on your day of surgery, ask your preop nurse for details    [x] Other instructions    EDUCATIONAL MATERIALS PROVIDED:    [] PAT Preoperative Education Packet/Booklet     [] Medication List    [] Transfusion bracelet applied with instructions    [] Shower with soap, lather and rinse well, and use CHG wipes provided the evening before surgery as instructed    [] Incentive spirometer with instructions

## 2021-02-15 NOTE — H&P (VIEW-ONLY)
Date:   02/10/21[facility]  Name: Ro Velazquez           : 1950 Sex: F  Age: 65 yrs  Acct#:  [id number]          Patient was referred by Farhan June MD.  Patient's primary care provider is Farhan June MD.  CC:  Diarrhea    HPI: A 66-year-old female known to me. About a year ago she underwent urgent laparotomy with right hemicolectomy for volvulus of the cecum. She had a difficult postoperative course, but no major complications in the abdomen. She complains of diarrhea and fecal incontinence. She denies any bleeding. Meds Prior to Visit:  Lomotil  2.5-0.025 mg  1 tab by mouth twice a day  Acetaminophen     Amlodipine Besylate     Bupropion HCL     Ipratropium Bromide/Albuterol Sulfate     Levothyroxine Sodium     Simethicone     Sotalol HCL     Dicyclomine HCL     Eliquis        Allergies:  NKDA            Exam:  Constitution:  Non-toxic, no acute distress  Heart :  RRR  Lungs CTA bilaterally  Abdomen: Soft, nondistended, well-healed midline incision  Extremeties: No rash, cyanosis, or jaundice         Assessment #1: Hx R19.7 Diarrhea, unspecified   Care Plan:              Comments       :  Colonoscopy scheduled. Random biopsies and cultures planned  Med Current    :  Lomotil 2.5-0.025 mg 1 tab by mouth twice a day            Time spent reviewing records, discussing findings, answering questions, reviewing laboratory studies, radiologic exams, and other diagnostics, and reviewing the diagnostic and therapeutic plan: 20 minutes    Voice recognition software was used in the preparation of this document. Despite all efforts to prevent them, transcription errors may have occurred.   Seen by:

## 2021-02-16 ENCOUNTER — HOSPITAL ENCOUNTER (OUTPATIENT)
Age: 71
Discharge: HOME | End: 2021-02-18
Payer: MEDICARE

## 2021-02-16 DIAGNOSIS — Z01.818 PREOP TESTING: ICD-10-CM

## 2021-02-16 PROCEDURE — U0003 INFECTIOUS AGENT DETECTION BY NUCLEIC ACID (DNA OR RNA); SEVERE ACUTE RESPIRATORY SYNDROME CORONAVIRUS 2 (SARS-COV-2) (CORONAVIRUS DISEASE [COVID-19]), AMPLIFIED PROBE TECHNIQUE, MAKING USE OF HIGH THROUGHPUT TECHNOLOGIES AS DESCRIBED BY CMS-2020-01-R: HCPCS

## 2021-02-17 LAB
SARS-COV-2: NOT DETECTED
SOURCE: NORMAL

## 2021-02-18 ENCOUNTER — ANESTHESIA EVENT (OUTPATIENT)
Dept: ENDOSCOPY | Age: 71
End: 2021-02-18
Payer: MEDICARE

## 2021-02-19 ENCOUNTER — HOSPITAL ENCOUNTER (OUTPATIENT)
Age: 71
Setting detail: OUTPATIENT SURGERY
Discharge: HOME | End: 2021-02-19
Attending: SURGERY | Admitting: SURGERY
Payer: MEDICARE

## 2021-02-19 ENCOUNTER — ANESTHESIA (OUTPATIENT)
Dept: ENDOSCOPY | Age: 71
End: 2021-02-19
Payer: MEDICARE

## 2021-02-19 VITALS
TEMPERATURE: 96.9 F | HEIGHT: 66 IN | DIASTOLIC BLOOD PRESSURE: 62 MMHG | WEIGHT: 95 LBS | SYSTOLIC BLOOD PRESSURE: 132 MMHG | RESPIRATION RATE: 16 BRPM | OXYGEN SATURATION: 97 % | HEART RATE: 57 BPM | BODY MASS INDEX: 15.27 KG/M2

## 2021-02-19 VITALS — DIASTOLIC BLOOD PRESSURE: 42 MMHG | SYSTOLIC BLOOD PRESSURE: 87 MMHG | OXYGEN SATURATION: 100 %

## 2021-02-19 DIAGNOSIS — Z01.818 PREOP TESTING: Primary | ICD-10-CM

## 2021-02-19 DIAGNOSIS — R19.7 DIARRHEA, UNSPECIFIED TYPE: ICD-10-CM

## 2021-02-19 PROCEDURE — 7100000011 HC PHASE II RECOVERY - ADDTL 15 MIN: Performed by: SURGERY

## 2021-02-19 PROCEDURE — 2709999900 HC NON-CHARGEABLE SUPPLY: Performed by: SURGERY

## 2021-02-19 PROCEDURE — 3700000000 HC ANESTHESIA ATTENDED CARE: Performed by: SURGERY

## 2021-02-19 PROCEDURE — 6360000002 HC RX W HCPCS: Performed by: NURSE ANESTHETIST, CERTIFIED REGISTERED

## 2021-02-19 PROCEDURE — 2500000003 HC RX 250 WO HCPCS: Performed by: NURSE ANESTHETIST, CERTIFIED REGISTERED

## 2021-02-19 PROCEDURE — 3609010300 HC COLONOSCOPY W/BIOPSY SINGLE/MULTIPLE: Performed by: SURGERY

## 2021-02-19 PROCEDURE — 3700000001 HC ADD 15 MINUTES (ANESTHESIA): Performed by: SURGERY

## 2021-02-19 PROCEDURE — 88305 TISSUE EXAM BY PATHOLOGIST: CPT

## 2021-02-19 PROCEDURE — 2580000003 HC RX 258: Performed by: SURGERY

## 2021-02-19 PROCEDURE — 7100000010 HC PHASE II RECOVERY - FIRST 15 MIN: Performed by: SURGERY

## 2021-02-19 RX ORDER — PROPOFOL 10 MG/ML
INJECTION, EMULSION INTRAVENOUS PRN
Status: DISCONTINUED | OUTPATIENT
Start: 2021-02-19 | End: 2021-02-19 | Stop reason: SDUPTHER

## 2021-02-19 RX ORDER — DIPHENOXYLATE HYDROCHLORIDE AND ATROPINE SULFATE 2.5; .025 MG/1; MG/1
1 TABLET ORAL 4 TIMES DAILY
Qty: 120 TABLET | Refills: 5 | Status: SHIPPED | OUTPATIENT
Start: 2021-02-19 | End: 2021-03-01

## 2021-02-19 RX ORDER — CHOLESTYRAMINE 4 G/9G
1 POWDER, FOR SUSPENSION ORAL 2 TIMES DAILY
Qty: 90 PACKET | Refills: 3 | Status: SHIPPED | OUTPATIENT
Start: 2021-02-19 | End: 2022-05-03

## 2021-02-19 RX ORDER — EPHEDRINE SULFATE 50 MG/ML
INJECTION INTRAVENOUS PRN
Status: DISCONTINUED | OUTPATIENT
Start: 2021-02-19 | End: 2021-02-19 | Stop reason: SDUPTHER

## 2021-02-19 RX ORDER — SODIUM CHLORIDE 9 MG/ML
INJECTION, SOLUTION INTRAVENOUS CONTINUOUS
Status: DISCONTINUED | OUTPATIENT
Start: 2021-02-19 | End: 2021-02-19 | Stop reason: HOSPADM

## 2021-02-19 RX ADMIN — SODIUM CHLORIDE: 9 INJECTION, SOLUTION INTRAVENOUS at 07:48

## 2021-02-19 RX ADMIN — EPHEDRINE SULFATE 10 MG: 50 INJECTION, SOLUTION INTRAVENOUS at 08:08

## 2021-02-19 RX ADMIN — PROPOFOL 210 MG: 10 INJECTION, EMULSION INTRAVENOUS at 07:54

## 2021-02-19 ASSESSMENT — PAIN SCALES - GENERAL
PAINLEVEL_OUTOF10: 0
PAINLEVEL_OUTOF10: 0

## 2021-02-19 ASSESSMENT — LIFESTYLE VARIABLES: SMOKING_STATUS: 1

## 2021-02-19 ASSESSMENT — PAIN - FUNCTIONAL ASSESSMENT: PAIN_FUNCTIONAL_ASSESSMENT: 0-10

## 2021-02-19 NOTE — ANESTHESIA PRE PROCEDURE
Department of Anesthesiology  Preprocedure Note       Name:  Tommy Dandy   Age:  65 y.o.  :  1950                                          MRN:  [id number]         Date:  2021      Surgeon: Santos Peña):  Colton Sparrow MD    Procedure: Procedure(s):  COLONOSCOPY DIAGNOSTIC    Medications prior to admission:   Prior to Admission medications    Medication Sig Start Date End Date Taking? Authorizing Provider   vitamin B-6 (PYRIDOXINE) 100 MG tablet Take 100 mg by mouth daily   Yes Historical Provider, MD   Cholecalciferol (VITAMIN D) 50 MCG ( UT) CAPS capsule Take by mouth   Yes Historical Provider, MD   colestipol (COLESTID) 1 g tablet Take 1 g by mouth 3 times daily   Yes Historical Provider, MD   loperamide (IMODIUM) 2 MG capsule Take 2 mg by mouth 4 times daily as needed for Diarrhea   Yes Historical Provider, MD   acetaminophen (TYLENOL) 500 MG tablet Take 1 tablet by mouth every 6 hours as needed for Pain 20  Yes Blima Relic, DO   ipratropium-albuterol (DUONEB) 0.5-2.5 (3) MG/3ML SOLN nebulizer solution Inhale 3 mLs into the lungs every 4 hours (while awake) 3/6/20  Yes Blima Relic, DO   apixaban (ELIQUIS) 5 MG TABS tablet Take 1 tablet by mouth 2 times daily 3/6/20  Yes Blima Relic, DO   buPROPion [facility] SR) 100 MG extended release tablet Take 1 tablet by mouth daily 3/7/20  Yes Blima Relic, DO   sotalol (BETAPACE) 120 MG tablet Take 1 tablet by mouth 2 times daily 3/6/20  Yes Blima Relic, DO   amLODIPine (NORVASC) 5 MG tablet Take 1 tablet by mouth daily 3/7/20  Yes Blima Relic, DO   levothyroxine (SYNTHROID) 100 MCG tablet Take 125 mcg by mouth Daily    Yes Historical Provider, MD       Current medications:    Current Facility-Administered Medications   Medication Dose Route Frequency Provider Last Rate Last Admin    0.9 % sodium chloride infusion   Intravenous Continuous Colton Sparrow MD           Allergies:     Allergies   Allergen Reactions  Urinary incontinence     Venous stasis ulcer of left calf with fat layer exposed without varicose veins ([address] 2019    Weight loss        Past Surgical History:        Procedure Laterality Date     SECTION      COLONOSCOPY      DILATION AND CURETTAGE OF UTERUS      EYE SURGERY      bilat cataract     LAPAROTOMY N/A 2020    LAPAROTOMY EXPLORATORY, RIGHT HEMICOLECTOMY performed by Glo Longo MD at [address] [place] [address],[address] N/A 2020    PLACEMENT OF INTRATHECAL CATHETER FOR LUMBAR DRAIN performed by Devora Luis MD at [place] [address]  2019    L SFA angioplasty    TUBAL LIGATION      VENTRICULOPERITONEAL SHUNT N/A 2020    VENTRICULAR PERITONEAL SHUNT PLACEMENT performed by Devora Luis MD at [address] [place] [address] History:    Social History     Tobacco Use    Smoking status: Former Smoker     Packs/day: 0.25     Types: Cigarettes     Quit date: 5/3/2017     Years since quitting: 3.8    Smokeless tobacco: Never Used   Substance Use Topics    Alcohol use: Yes     Comment: rare                                Counseling given: Not Answered      Vital Signs (Current):   Vitals:    02/15/21 1204   Weight: 95 lb (43.1 kg)   Height: 5' 6\" (1.676 m)                                              BP Readings from Last 3 Encounters:   20 138/66   20 127/61   20 100/61       NPO Status:                                                                                 BMI:   Wt Readings from Last 3 Encounters:   02/15/21 95 lb (43.1 kg)   21 96 lb (43.5 kg)   20 110 lb 1.6 oz (49.9 kg)     Body mass index is 15.33 kg/m².     CBC:   Lab Results   Component Value Date    WBC 5.8 2020    RBC 3.68 2020    HGB 11.4 2020    HCT 35.6 2020    MCV 96.7 2020    RDW 14.7 2020     2020       CMP:   Lab Results   Component Value Date     2020 K 3.9 09/21/2020     09/21/2020    CO2 26 09/21/2020    BUN 18 09/21/2020    CREATININE 0.6 09/21/2020    GFRAA >60 09/21/2020    LABGLOM >60 09/21/2020    GLUCOSE 92 09/21/2020    GLUCOSE 102 10/07/2010    PROT 7.2 06/12/2020    CALCIUM 8.8 09/21/2020    BILITOT 0.4 06/12/2020    ALKPHOS 96 06/12/2020    AST 17 06/12/2020    ALT 14 06/12/2020       POC Tests: No results for input(s): POCGLU, POCNA, POCK, POCCL, POCBUN, POCHEMO, POCHCT in the last 72 hours. Coags:   Lab Results   Component Value Date    PROTIME 11.8 02/17/2020    PROTIME 11.1 04/13/2011    INR 1.0 02/17/2020    APTT 31.3 01/20/2020       HCG (If Applicable): No results found for: PREGTESTUR, PREGSERUM, HCG, HCGQUANT     ABGs: No results found for: PHART, PO2ART, AON2ASK, AXS1ATR, BEART, A9IVBYBV     Type & Screen (If Applicable):  No results found for: LABABO, LABRH    Drug/Infectious Status (If Applicable):  No results found for: HIV, HEPCAB    COVID-19 Screening (If Applicable):   Lab Results   Component Value Date    COVID19 Not Detected 02/16/2021         Anesthesia Evaluation  Patient summary reviewed and Nursing notes reviewed history of anesthetic complications (memory issues): Airway: Mallampati: II  TM distance: >3 FB   Neck ROM: full  Mouth opening: > = 3 FB Dental: normal exam         Pulmonary: breath sounds clear to auscultation  (+) COPD:  sleep apnea:  current smoker                           Cardiovascular:    (+) hypertension:, CAD:, dysrhythmias: atrial fibrillation,         Rhythm: regular  Rate: normal                    Neuro/Psych:   (+) psychiatric history:             ROS comment: Memory loss GI/Hepatic/Renal:   (+) liver disease:, bowel prep,           Endo/Other:    (+) hypothyroidism, blood dyscrasia: anticoagulation therapy, arthritis: rheumatoid. , malignancy/cancer. Abdominal:           Vascular:   + PVD, aortic or cerebral, .                                      Anesthesia Plan      MAC ASA 3       Induction: intravenous. Anesthetic plan and risks discussed with patient.       Plan discussed with CRNA and surgical team.                  Yohannes De La Torre MD   2/19/2021

## 2021-02-19 NOTE — INTERVAL H&P NOTE
Update History & Physical    The patient's History and Physical of February 10, 2021 was reviewed with the patient and I examined the patient. There was no change. The surgical site was confirmed by the patient and me. Plan: The risks, benefits, expected outcome, and alternative to the recommended procedure have been discussed with the patient. Patient understands and wants to proceed with the procedure.      Electronically signed by Emy Nick MD on 2/19/2021 at 7:29 AM 16

## 2021-03-26 ENCOUNTER — APPOINTMENT (OUTPATIENT)
Dept: GENERAL RADIOLOGY | Age: 71
End: 2021-03-26
Payer: MEDICARE

## 2021-03-26 ENCOUNTER — HOSPITAL ENCOUNTER (EMERGENCY)
Age: 71
Discharge: HOME | End: 2021-03-26
Attending: EMERGENCY MEDICINE
Payer: MEDICARE

## 2021-03-26 VITALS
HEART RATE: 68 BPM | SYSTOLIC BLOOD PRESSURE: 144 MMHG | TEMPERATURE: 96.9 F | BODY MASS INDEX: 14.79 KG/M2 | HEIGHT: 66 IN | RESPIRATION RATE: 16 BRPM | DIASTOLIC BLOOD PRESSURE: 73 MMHG | OXYGEN SATURATION: 93 % | WEIGHT: 92 LBS

## 2021-03-26 DIAGNOSIS — J44.1 COPD EXACERBATION (HCC): Primary | ICD-10-CM

## 2021-03-26 LAB
ALBUMIN SERPL-MCNC: 4.6 G/DL (ref 3.5–5.2)
ALP BLD-CCNC: 79 U/L (ref 35–104)
ALT SERPL-CCNC: 34 U/L (ref 0–32)
ANION GAP SERPL CALCULATED.3IONS-SCNC: 7 MMOL/L (ref 7–16)
AST SERPL-CCNC: 31 U/L (ref 0–31)
BASOPHILS ABSOLUTE: 0.05 E9/L (ref 0–0.2)
BASOPHILS RELATIVE PERCENT: 0.6 % (ref 0–2)
BILIRUB SERPL-MCNC: 0.6 MG/DL (ref 0–1.2)
BUN BLDV-MCNC: 19 MG/DL (ref 8–23)
CALCIUM SERPL-MCNC: 9.4 MG/DL (ref 8.6–10.2)
CHLORIDE BLD-SCNC: 105 MMOL/L (ref 98–107)
CO2: 30 MMOL/L (ref 22–29)
CREAT SERPL-MCNC: 0.6 MG/DL (ref 0.5–1)
EKG ATRIAL RATE: 69 BPM
EKG P AXIS: 77 DEGREES
EKG P-R INTERVAL: 140 MS
EKG Q-T INTERVAL: 424 MS
EKG QRS DURATION: 72 MS
EKG QTC CALCULATION (BAZETT): 454 MS
EKG R AXIS: 80 DEGREES
EKG T AXIS: 74 DEGREES
EKG VENTRICULAR RATE: 69 BPM
EOSINOPHILS ABSOLUTE: 0.26 E9/L (ref 0.05–0.5)
EOSINOPHILS RELATIVE PERCENT: 2.9 % (ref 0–6)
GFR AFRICAN AMERICAN: >60
GFR NON-AFRICAN AMERICAN: >60 ML/MIN/1.73
GLUCOSE BLD-MCNC: 97 MG/DL (ref 74–99)
HCT VFR BLD CALC: 45 % (ref 34–48)
HEMOGLOBIN: 14.4 G/DL (ref 11.5–15.5)
IMMATURE GRANULOCYTES #: 0.02 E9/L
IMMATURE GRANULOCYTES %: 0.2 % (ref 0–5)
INR BLD: 1.2
LYMPHOCYTES ABSOLUTE: 1.57 E9/L (ref 1.5–4)
LYMPHOCYTES RELATIVE PERCENT: 17.6 % (ref 20–42)
MCH RBC QN AUTO: 31.8 PG (ref 26–35)
MCHC RBC AUTO-ENTMCNC: 32 % (ref 32–34.5)
MCV RBC AUTO: 99.3 FL (ref 80–99.9)
MONOCYTES ABSOLUTE: 0.77 E9/L (ref 0.1–0.95)
MONOCYTES RELATIVE PERCENT: 8.7 % (ref 2–12)
NEUTROPHILS ABSOLUTE: 6.23 E9/L (ref 1.8–7.3)
NEUTROPHILS RELATIVE PERCENT: 70 % (ref 43–80)
PDW BLD-RTO: 14 FL (ref 11.5–15)
PLATELET # BLD: 348 E9/L (ref 130–450)
PMV BLD AUTO: 9.5 FL (ref 7–12)
POTASSIUM SERPL-SCNC: 4.2 MMOL/L (ref 3.5–5)
PRO-BNP: 773 PG/ML (ref 0–125)
PROTHROMBIN TIME: 13.2 SEC (ref 9.3–12.4)
RBC # BLD: 4.53 E12/L (ref 3.5–5.5)
SARS-COV-2, NAAT: NOT DETECTED
SODIUM BLD-SCNC: 142 MMOL/L (ref 132–146)
TOTAL PROTEIN: 7.5 G/DL (ref 6.4–8.3)
TROPONIN: <0.01 NG/ML (ref 0–0.03)
WBC # BLD: 8.9 E9/L (ref 4.5–11.5)

## 2021-03-26 PROCEDURE — 87635 SARS-COV-2 COVID-19 AMP PRB: CPT

## 2021-03-26 PROCEDURE — 6370000000 HC RX 637 (ALT 250 FOR IP): Performed by: EMERGENCY MEDICINE

## 2021-03-26 PROCEDURE — 85025 COMPLETE CBC W/AUTO DIFF WBC: CPT

## 2021-03-26 PROCEDURE — 84484 ASSAY OF TROPONIN QUANT: CPT

## 2021-03-26 PROCEDURE — 94664 DEMO&/EVAL PT USE INHALER: CPT

## 2021-03-26 PROCEDURE — 93005 ELECTROCARDIOGRAM TRACING: CPT | Performed by: NURSE PRACTITIONER

## 2021-03-26 PROCEDURE — 6360000002 HC RX W HCPCS: Performed by: EMERGENCY MEDICINE

## 2021-03-26 PROCEDURE — 83880 ASSAY OF NATRIURETIC PEPTIDE: CPT

## 2021-03-26 PROCEDURE — 93010 ELECTROCARDIOGRAM REPORT: CPT | Performed by: INTERNAL MEDICINE

## 2021-03-26 PROCEDURE — 71045 X-RAY EXAM CHEST 1 VIEW: CPT

## 2021-03-26 PROCEDURE — 99284 EMERGENCY DEPT VISIT MOD MDM: CPT

## 2021-03-26 PROCEDURE — 80053 COMPREHEN METABOLIC PANEL: CPT

## 2021-03-26 PROCEDURE — 85610 PROTHROMBIN TIME: CPT

## 2021-03-26 PROCEDURE — 94640 AIRWAY INHALATION TREATMENT: CPT

## 2021-03-26 RX ORDER — PREDNISONE 50 MG/1
TABLET ORAL
Qty: 5 TABLET | Refills: 0 | Status: ON HOLD | OUTPATIENT
Start: 2021-03-26 | End: 2021-05-23 | Stop reason: HOSPADM

## 2021-03-26 RX ORDER — IPRATROPIUM BROMIDE AND ALBUTEROL SULFATE 2.5; .5 MG/3ML; MG/3ML
3 SOLUTION RESPIRATORY (INHALATION) ONCE
Status: COMPLETED | OUTPATIENT
Start: 2021-03-26 | End: 2021-03-26

## 2021-03-26 RX ORDER — METHYLPREDNISOLONE SODIUM SUCCINATE 125 MG/2ML
125 INJECTION, POWDER, LYOPHILIZED, FOR SOLUTION INTRAMUSCULAR; INTRAVENOUS ONCE
Status: COMPLETED | OUTPATIENT
Start: 2021-03-26 | End: 2021-03-26

## 2021-03-26 RX ADMIN — IPRATROPIUM BROMIDE AND ALBUTEROL SULFATE 3 AMPULE: .5; 3 SOLUTION RESPIRATORY (INHALATION) at 04:19

## 2021-03-26 RX ADMIN — METHYLPREDNISOLONE SODIUM SUCCINATE 125 MG: 125 INJECTION, POWDER, FOR SOLUTION INTRAMUSCULAR; INTRAVENOUS at 04:41

## 2021-03-26 NOTE — ED PROVIDER NOTES
0.25 packs per day. She has never used smokeless tobacco. She reports current alcohol use. She reports that she does not use drugs. Family History: family history includes Cancer in her father; Coronary Art Dis in her mother; High Blood Pressure in her mother; Kidney Disease in her mother; Other in her father. The patients home medications have been reviewed.     Allergies: Hornet venom, Wasp venom, and Flagyl [metronidazole]    -------------------------------------------------- RESULTS -------------------------------------------------  All laboratory and radiology results have been personally reviewed by myself   LABS:  Results for orders placed or performed during the hospital encounter of 03/26/21   COVID-19, Rapid    Specimen: Nasopharyngeal Swab   Result Value Ref Range    SARS-CoV-2, NAAT Not Detected Not Detected   CBC Auto Differential   Result Value Ref Range    WBC 8.9 4.5 - 11.5 E9/L    RBC 4.53 3.50 - 5.50 E12/L    Hemoglobin 14.4 11.5 - 15.5 g/dL    Hematocrit 45.0 34.0 - 48.0 %    MCV 99.3 80.0 - 99.9 fL    MCH 31.8 26.0 - 35.0 pg    MCHC 32.0 32.0 - 34.5 %    RDW 14.0 11.5 - 15.0 fL    Platelets 667 544 - 946 E9/L    MPV 9.5 7.0 - 12.0 fL    Neutrophils % 70.0 43.0 - 80.0 %    Immature Granulocytes % 0.2 0.0 - 5.0 %    Lymphocytes % 17.6 (L) 20.0 - 42.0 %    Monocytes % 8.7 2.0 - 12.0 %    Eosinophils % 2.9 0.0 - 6.0 %    Basophils % 0.6 0.0 - 2.0 %    Neutrophils Absolute 6.23 1.80 - 7.30 E9/L    Immature Granulocytes # 0.02 E9/L    Lymphocytes Absolute 1.57 1.50 - 4.00 E9/L    Monocytes Absolute 0.77 0.10 - 0.95 E9/L    Eosinophils Absolute 0.26 0.05 - 0.50 E9/L    Basophils Absolute 0.05 0.00 - 0.20 E9/L   Comprehensive Metabolic Panel   Result Value Ref Range    Sodium 142 132 - 146 mmol/L    Potassium 4.2 3.5 - 5.0 mmol/L    Chloride 105 98 - 107 mmol/L    CO2 30 (H) 22 - 29 mmol/L    Anion Gap 7 7 - 16 mmol/L    Glucose 97 74 - 99 mg/dL    BUN 19 8 - 23 mg/dL    CREATININE 0.6 0.5 - 1.0 mg/dL    GFR Non-African American >60 >=60 mL/min/1.73    GFR African American >60     Calcium 9.4 8.6 - 10.2 mg/dL    Total Protein 7.5 6.4 - 8.3 g/dL    Albumin 4.6 3.5 - 5.2 g/dL    Total Bilirubin 0.6 0.0 - 1.2 mg/dL    Alkaline Phosphatase 79 35 - 104 U/L    ALT 34 (H) 0 - 32 U/L    AST 31 0 - 31 U/L   Troponin   Result Value Ref Range    Troponin <0.01 0.00 - 0.03 ng/mL   Brain Natriuretic Peptide   Result Value Ref Range    Pro- (H) 0 - 125 pg/mL   Protime-INR   Result Value Ref Range    Protime 13.2 (H) 9.3 - 12.4 sec    INR 1.2    EKG 12 Lead   Result Value Ref Range    Ventricular Rate 69 BPM    Atrial Rate 69 BPM    P-R Interval 140 ms    QRS Duration 72 ms    Q-T Interval 424 ms    QTc Calculation (Bazett) 454 ms    P Axis 77 degrees    R Axis 80 degrees    T Axis 74 degrees       RADIOLOGY:  Interpreted by Radiologist.  XR CHEST PORTABLE   Final Result   Similar chronic appearing findings. PA and lateral views would be useful for   further assessment, if symptoms persist.             ------------------------- NURSING NOTES AND VITALS REVIEWED ---------------------------   The nursing notes within the ED encounter and vital signs as below have been reviewed. BP (!) 171/87   Pulse 68   Temp 96.9 °F (36.1 °C)   Resp 22   Ht 5' 6\" (1.676 m)   Wt 92 lb (41.7 kg)   SpO2 94%   BMI 14.85 kg/m²   Oxygen Saturation Interpretation: Normal      ---------------------------------------------------PHYSICAL EXAM--------------------------------------      Constitutional/General: Alert and oriented x3, cachectic with conversational dyspnea  Head: NC/AT  Eyes: PERRL, EOMI  Mouth: Oropharynx clear, handling secretions, no trismus  Neck: Supple, full ROM, no meningeal signs  Pulmonary: Diminished throughout all fields with slight intercostal retractions  Cardiovascular:  Regular rate and rhythm, no murmurs, gallops, or rubs.  2+ distal pulses  Abdomen: Soft, non tender, non distended,   Extremities: Moves all extremities x 4. Warm and well perfused  Skin: warm and dry without rash  Neurologic: GCS 15,  Psych: Normal Affect      ------------------------------ ED COURSE/MEDICAL DECISION MAKING----------------------  Medications   ipratropium-albuterol (DUONEB) nebulizer solution 3 ampule (3 ampules Inhalation Given 3/26/21 1859)   methylPREDNISolone sodium (SOLU-MEDROL) injection 125 mg (125 mg Intravenous Given 3/26/21 9549)         Medical Decision Making:    COPD exacerbation versus pneumonia versus CHF    Counseling: The emergency provider has spoken with the patient and discussed todays results, in addition to providing specific details for the plan of care and counseling regarding the diagnosis and prognosis.   Questions are answered at this time and they are agreeable with the plan.      --------------------------------- IMPRESSION AND DISPOSITION ---------------------------------    IMPRESSION  1. COPD exacerbation ([facility] [address] Improving       DISPOSITION  Disposition: Discharge to home  Patient condition is stable      ED course: Patient feeling significantly better after breathing treatments therefoie to be discharged            Ambrosio Mcardle, MD  03/26/21 9173

## 2021-04-23 ENCOUNTER — HOSPITAL ENCOUNTER (OUTPATIENT)
Dept: MAMMOGRAPHY | Age: 71
Discharge: HOME | End: 2021-04-25
Payer: MEDICARE

## 2021-04-23 ENCOUNTER — HOSPITAL ENCOUNTER (OUTPATIENT)
Age: 71
Discharge: HOME | End: 2021-04-25
Payer: MEDICARE

## 2021-04-23 DIAGNOSIS — Z12.31 ENCOUNTER FOR SCREENING MAMMOGRAM FOR MALIGNANT NEOPLASM OF BREAST: ICD-10-CM

## 2021-04-23 PROCEDURE — 77063 BREAST TOMOSYNTHESIS BI: CPT

## 2021-05-19 ENCOUNTER — HOSPITAL ENCOUNTER (OUTPATIENT)
Dept: CT IMAGING | Age: 71
Discharge: HOME | End: 2021-05-21
Payer: MEDICARE

## 2021-05-19 DIAGNOSIS — G91.2 IDIOPATHIC NORMAL PRESSURE HYDROCEPHALUS (HCC): ICD-10-CM

## 2021-05-19 PROCEDURE — 70450 CT HEAD/BRAIN W/O DYE: CPT

## 2021-05-21 ENCOUNTER — APPOINTMENT (OUTPATIENT)
Dept: GENERAL RADIOLOGY | Age: 71
End: 2021-05-21
Payer: MEDICARE

## 2021-05-21 ENCOUNTER — HOSPITAL ENCOUNTER (OUTPATIENT)
Age: 71
Setting detail: OBSERVATION
Discharge: HOME | End: 2021-05-23
Attending: EMERGENCY MEDICINE | Admitting: INTERNAL MEDICINE
Payer: MEDICARE

## 2021-05-21 DIAGNOSIS — R06.03 ACUTE RESPIRATORY DISTRESS: ICD-10-CM

## 2021-05-21 DIAGNOSIS — R09.02 HYPOXIA: ICD-10-CM

## 2021-05-21 DIAGNOSIS — J44.1 COPD EXACERBATION (HCC): Primary | ICD-10-CM

## 2021-05-21 LAB
ALBUMIN SERPL-MCNC: 3.8 G/DL (ref 3.5–5.2)
ALP BLD-CCNC: 60 U/L (ref 35–104)
ALT SERPL-CCNC: 85 U/L (ref 0–32)
ANION GAP SERPL CALCULATED.3IONS-SCNC: 10 MMOL/L (ref 7–16)
AST SERPL-CCNC: 50 U/L (ref 0–31)
BASOPHILS ABSOLUTE: 0.02 E9/L (ref 0–0.2)
BASOPHILS RELATIVE PERCENT: 0.1 % (ref 0–2)
BILIRUB SERPL-MCNC: 0.7 MG/DL (ref 0–1.2)
BUN BLDV-MCNC: 18 MG/DL (ref 6–23)
CALCIUM SERPL-MCNC: 7.8 MG/DL (ref 8.6–10.2)
CHLORIDE BLD-SCNC: 97 MMOL/L (ref 98–107)
CO2: 26 MMOL/L (ref 22–29)
CREAT SERPL-MCNC: 0.4 MG/DL (ref 0.5–1)
EOSINOPHILS ABSOLUTE: 0 E9/L (ref 0.05–0.5)
EOSINOPHILS RELATIVE PERCENT: 0 % (ref 0–6)
GFR AFRICAN AMERICAN: >60
GFR NON-AFRICAN AMERICAN: >60 ML/MIN/1.73
GLUCOSE BLD-MCNC: 112 MG/DL (ref 74–99)
HCT VFR BLD CALC: 43.1 % (ref 34–48)
HEMOGLOBIN: 14.1 G/DL (ref 11.5–15.5)
IMMATURE GRANULOCYTES #: 0.1 E9/L
IMMATURE GRANULOCYTES %: 0.6 % (ref 0–5)
LIPASE: 15 U/L (ref 13–60)
LYMPHOCYTES ABSOLUTE: 1.18 E9/L (ref 1.5–4)
LYMPHOCYTES RELATIVE PERCENT: 6.6 % (ref 20–42)
MAGNESIUM: 1.7 MG/DL (ref 1.6–2.6)
MCH RBC QN AUTO: 32 PG (ref 26–35)
MCHC RBC AUTO-ENTMCNC: 32.7 % (ref 32–34.5)
MCV RBC AUTO: 98 FL (ref 80–99.9)
MONOCYTES ABSOLUTE: 0.75 E9/L (ref 0.1–0.95)
MONOCYTES RELATIVE PERCENT: 4.2 % (ref 2–12)
NEUTROPHILS ABSOLUTE: 15.81 E9/L (ref 1.8–7.3)
NEUTROPHILS RELATIVE PERCENT: 88.5 % (ref 43–80)
PDW BLD-RTO: 13 FL (ref 11.5–15)
PLATELET # BLD: 386 E9/L (ref 130–450)
PMV BLD AUTO: 9.1 FL (ref 7–12)
POTASSIUM REFLEX MAGNESIUM: 3.4 MMOL/L (ref 3.5–5)
PRO-BNP: 2233 PG/ML (ref 0–125)
RBC # BLD: 4.4 E12/L (ref 3.5–5.5)
SARS-COV-2, NAAT: NOT DETECTED
SODIUM BLD-SCNC: 133 MMOL/L (ref 132–146)
TOTAL PROTEIN: 5.9 G/DL (ref 6.4–8.3)
TROPONIN: <0.01 NG/ML (ref 0–0.03)
WBC # BLD: 17.9 E9/L (ref 4.5–11.5)

## 2021-05-21 PROCEDURE — 6370000000 HC RX 637 (ALT 250 FOR IP): Performed by: STUDENT IN AN ORGANIZED HEALTH CARE EDUCATION/TRAINING PROGRAM

## 2021-05-21 PROCEDURE — 96374 THER/PROPH/DIAG INJ IV PUSH: CPT

## 2021-05-21 PROCEDURE — 84484 ASSAY OF TROPONIN QUANT: CPT

## 2021-05-21 PROCEDURE — 83690 ASSAY OF LIPASE: CPT

## 2021-05-21 PROCEDURE — 71045 X-RAY EXAM CHEST 1 VIEW: CPT

## 2021-05-21 PROCEDURE — 99284 EMERGENCY DEPT VISIT MOD MDM: CPT

## 2021-05-21 PROCEDURE — 80053 COMPREHEN METABOLIC PANEL: CPT

## 2021-05-21 PROCEDURE — 94640 AIRWAY INHALATION TREATMENT: CPT

## 2021-05-21 PROCEDURE — 87635 SARS-COV-2 COVID-19 AMP PRB: CPT

## 2021-05-21 PROCEDURE — 93005 ELECTROCARDIOGRAM TRACING: CPT | Performed by: EMERGENCY MEDICINE

## 2021-05-21 PROCEDURE — 85025 COMPLETE CBC W/AUTO DIFF WBC: CPT

## 2021-05-21 PROCEDURE — 83735 ASSAY OF MAGNESIUM: CPT

## 2021-05-21 PROCEDURE — 83880 ASSAY OF NATRIURETIC PEPTIDE: CPT

## 2021-05-21 PROCEDURE — 6360000002 HC RX W HCPCS: Performed by: STUDENT IN AN ORGANIZED HEALTH CARE EDUCATION/TRAINING PROGRAM

## 2021-05-21 RX ORDER — METHYLPREDNISOLONE SODIUM SUCCINATE 125 MG/2ML
125 INJECTION, POWDER, LYOPHILIZED, FOR SOLUTION INTRAMUSCULAR; INTRAVENOUS ONCE
Status: COMPLETED | OUTPATIENT
Start: 2021-05-21 | End: 2021-05-21

## 2021-05-21 RX ORDER — IPRATROPIUM BROMIDE AND ALBUTEROL SULFATE 2.5; .5 MG/3ML; MG/3ML
3 SOLUTION RESPIRATORY (INHALATION) ONCE
Status: COMPLETED | OUTPATIENT
Start: 2021-05-21 | End: 2021-05-21

## 2021-05-21 RX ADMIN — METHYLPREDNISOLONE SODIUM SUCCINATE 125 MG: 125 INJECTION, POWDER, FOR SOLUTION INTRAMUSCULAR; INTRAVENOUS at 20:38

## 2021-05-21 RX ADMIN — IPRATROPIUM BROMIDE AND ALBUTEROL SULFATE 3 AMPULE: .5; 3 SOLUTION RESPIRATORY (INHALATION) at 20:42

## 2021-05-22 PROBLEM — J96.01 ACUTE HYPOXEMIC RESPIRATORY FAILURE (HCC): Status: ACTIVE | Noted: 2021-05-22

## 2021-05-22 LAB
ANION GAP SERPL CALCULATED.3IONS-SCNC: 13 MMOL/L (ref 7–16)
BASOPHILS ABSOLUTE: 0.01 E9/L (ref 0–0.2)
BASOPHILS RELATIVE PERCENT: 0.1 % (ref 0–2)
BUN BLDV-MCNC: 19 MG/DL (ref 6–23)
CALCIUM SERPL-MCNC: 9.2 MG/DL (ref 8.6–10.2)
CHLORIDE BLD-SCNC: 97 MMOL/L (ref 98–107)
CO2: 29 MMOL/L (ref 22–29)
CREAT SERPL-MCNC: 0.6 MG/DL (ref 0.5–1)
EKG ATRIAL RATE: 82 BPM
EKG P AXIS: 82 DEGREES
EKG P-R INTERVAL: 130 MS
EKG Q-T INTERVAL: 404 MS
EKG QRS DURATION: 78 MS
EKG QTC CALCULATION (BAZETT): 472 MS
EKG R AXIS: 82 DEGREES
EKG T AXIS: 72 DEGREES
EKG VENTRICULAR RATE: 82 BPM
EOSINOPHILS ABSOLUTE: 0 E9/L (ref 0.05–0.5)
EOSINOPHILS RELATIVE PERCENT: 0 % (ref 0–6)
GFR AFRICAN AMERICAN: >60
GFR NON-AFRICAN AMERICAN: >60 ML/MIN/1.73
GLUCOSE BLD-MCNC: 249 MG/DL (ref 74–99)
HCT VFR BLD CALC: 45.4 % (ref 34–48)
HEMOGLOBIN: 14.7 G/DL (ref 11.5–15.5)
IMMATURE GRANULOCYTES #: 0.07 E9/L
IMMATURE GRANULOCYTES %: 0.4 % (ref 0–5)
LYMPHOCYTES ABSOLUTE: 0.99 E9/L (ref 1.5–4)
LYMPHOCYTES RELATIVE PERCENT: 5.5 % (ref 20–42)
MCH RBC QN AUTO: 31.7 PG (ref 26–35)
MCHC RBC AUTO-ENTMCNC: 32.4 % (ref 32–34.5)
MCV RBC AUTO: 98.1 FL (ref 80–99.9)
MONOCYTES ABSOLUTE: 0.23 E9/L (ref 0.1–0.95)
MONOCYTES RELATIVE PERCENT: 1.3 % (ref 2–12)
NEUTROPHILS ABSOLUTE: 16.82 E9/L (ref 1.8–7.3)
NEUTROPHILS RELATIVE PERCENT: 92.7 % (ref 43–80)
PDW BLD-RTO: 13.1 FL (ref 11.5–15)
PLATELET # BLD: 441 E9/L (ref 130–450)
PMV BLD AUTO: 9.7 FL (ref 7–12)
POTASSIUM REFLEX MAGNESIUM: 3.7 MMOL/L (ref 3.5–5)
PROCALCITONIN: 0.12 NG/ML (ref 0–0.08)
RBC # BLD: 4.63 E12/L (ref 3.5–5.5)
SODIUM BLD-SCNC: 139 MMOL/L (ref 132–146)
TROPONIN: <0.01 NG/ML (ref 0–0.03)
WBC # BLD: 18.1 E9/L (ref 4.5–11.5)

## 2021-05-22 PROCEDURE — 2580000003 HC RX 258

## 2021-05-22 PROCEDURE — 84145 PROCALCITONIN (PCT): CPT

## 2021-05-22 PROCEDURE — 2580000003 HC RX 258: Performed by: PHYSICIAN ASSISTANT

## 2021-05-22 PROCEDURE — 94640 AIRWAY INHALATION TREATMENT: CPT

## 2021-05-22 PROCEDURE — 6370000000 HC RX 637 (ALT 250 FOR IP): Performed by: INTERNAL MEDICINE

## 2021-05-22 PROCEDURE — 2700000000 HC OXYGEN THERAPY PER DAY

## 2021-05-22 PROCEDURE — 36415 COLL VENOUS BLD VENIPUNCTURE: CPT

## 2021-05-22 PROCEDURE — 6370000000 HC RX 637 (ALT 250 FOR IP): Performed by: PHYSICIAN ASSISTANT

## 2021-05-22 PROCEDURE — 80048 BASIC METABOLIC PNL TOTAL CA: CPT

## 2021-05-22 PROCEDURE — 85025 COMPLETE CBC W/AUTO DIFF WBC: CPT

## 2021-05-22 PROCEDURE — G0378 HOSPITAL OBSERVATION PER HR: HCPCS

## 2021-05-22 PROCEDURE — 96376 TX/PRO/DX INJ SAME DRUG ADON: CPT

## 2021-05-22 PROCEDURE — 6360000002 HC RX W HCPCS: Performed by: PHYSICIAN ASSISTANT

## 2021-05-22 PROCEDURE — 84484 ASSAY OF TROPONIN QUANT: CPT

## 2021-05-22 PROCEDURE — 93010 ELECTROCARDIOGRAM REPORT: CPT | Performed by: INTERNAL MEDICINE

## 2021-05-22 RX ORDER — POLYETHYLENE GLYCOL 3350 17 G/17G
17 POWDER, FOR SOLUTION ORAL DAILY PRN
Status: DISCONTINUED | OUTPATIENT
Start: 2021-05-22 | End: 2021-05-23 | Stop reason: HOSPADM

## 2021-05-22 RX ORDER — AMLODIPINE BESYLATE 5 MG/1
5 TABLET ORAL DAILY
Status: DISCONTINUED | OUTPATIENT
Start: 2021-05-22 | End: 2021-05-23 | Stop reason: HOSPADM

## 2021-05-22 RX ORDER — DOXYCYCLINE HYCLATE 100 MG/1
100 CAPSULE ORAL EVERY 12 HOURS SCHEDULED
Status: DISCONTINUED | OUTPATIENT
Start: 2021-05-22 | End: 2021-05-23 | Stop reason: HOSPADM

## 2021-05-22 RX ORDER — BUPROPION HYDROCHLORIDE 100 MG/1
100 TABLET, EXTENDED RELEASE ORAL DAILY
Status: DISCONTINUED | OUTPATIENT
Start: 2021-05-22 | End: 2021-05-23 | Stop reason: HOSPADM

## 2021-05-22 RX ORDER — LEVOTHYROXINE SODIUM 0.12 MG/1
125 TABLET ORAL DAILY
Status: DISCONTINUED | OUTPATIENT
Start: 2021-05-22 | End: 2021-05-23 | Stop reason: HOSPADM

## 2021-05-22 RX ORDER — ACETAMINOPHEN 650 MG/1
650 SUPPOSITORY RECTAL EVERY 6 HOURS PRN
Status: DISCONTINUED | OUTPATIENT
Start: 2021-05-22 | End: 2021-05-23 | Stop reason: HOSPADM

## 2021-05-22 RX ORDER — SODIUM CHLORIDE 0.9 % (FLUSH) 0.9 %
5-40 SYRINGE (ML) INJECTION PRN
Status: DISCONTINUED | OUTPATIENT
Start: 2021-05-22 | End: 2021-05-23 | Stop reason: HOSPADM

## 2021-05-22 RX ORDER — SOTALOL HYDROCHLORIDE 120 MG/1
120 TABLET ORAL 2 TIMES DAILY
Status: DISCONTINUED | OUTPATIENT
Start: 2021-05-22 | End: 2021-05-23 | Stop reason: HOSPADM

## 2021-05-22 RX ORDER — SODIUM CHLORIDE 0.9 % (FLUSH) 0.9 %
5-40 SYRINGE (ML) INJECTION EVERY 12 HOURS SCHEDULED
Status: DISCONTINUED | OUTPATIENT
Start: 2021-05-22 | End: 2021-05-23 | Stop reason: HOSPADM

## 2021-05-22 RX ORDER — SODIUM CHLORIDE 9 MG/ML
25 INJECTION, SOLUTION INTRAVENOUS PRN
Status: DISCONTINUED | OUTPATIENT
Start: 2021-05-22 | End: 2021-05-23 | Stop reason: HOSPADM

## 2021-05-22 RX ORDER — ASCORBIC ACID 500 MG
500 TABLET ORAL DAILY
COMMUNITY

## 2021-05-22 RX ORDER — CHOLESTYRAMINE 4 G/9G
1 POWDER, FOR SUSPENSION ORAL 2 TIMES DAILY
Status: DISCONTINUED | OUTPATIENT
Start: 2021-05-22 | End: 2021-05-23 | Stop reason: HOSPADM

## 2021-05-22 RX ORDER — IPRATROPIUM BROMIDE AND ALBUTEROL SULFATE 2.5; .5 MG/3ML; MG/3ML
3 SOLUTION RESPIRATORY (INHALATION)
Status: DISCONTINUED | OUTPATIENT
Start: 2021-05-22 | End: 2021-05-23 | Stop reason: HOSPADM

## 2021-05-22 RX ORDER — PREDNISONE 20 MG/1
40 TABLET ORAL DAILY
Status: DISCONTINUED | OUTPATIENT
Start: 2021-05-24 | End: 2021-05-23 | Stop reason: HOSPADM

## 2021-05-22 RX ORDER — METHYLPREDNISOLONE SODIUM SUCCINATE 40 MG/ML
40 INJECTION, POWDER, LYOPHILIZED, FOR SOLUTION INTRAMUSCULAR; INTRAVENOUS EVERY 8 HOURS
Status: DISCONTINUED | OUTPATIENT
Start: 2021-05-22 | End: 2021-05-23 | Stop reason: HOSPADM

## 2021-05-22 RX ORDER — ACETAMINOPHEN 325 MG/1
650 TABLET ORAL EVERY 6 HOURS PRN
Status: DISCONTINUED | OUTPATIENT
Start: 2021-05-22 | End: 2021-05-23 | Stop reason: HOSPADM

## 2021-05-22 RX ADMIN — WATER 10 ML: 1 INJECTION INTRAMUSCULAR; INTRAVENOUS; SUBCUTANEOUS at 12:26

## 2021-05-22 RX ADMIN — IPRATROPIUM BROMIDE AND ALBUTEROL SULFATE 3 ML: 2.5; .5 SOLUTION RESPIRATORY (INHALATION) at 19:44

## 2021-05-22 RX ADMIN — DOXYCYCLINE HYCLATE 100 MG: 100 CAPSULE ORAL at 10:04

## 2021-05-22 RX ADMIN — BUPROPION HYDROCHLORIDE 100 MG: 100 TABLET, EXTENDED RELEASE ORAL at 10:04

## 2021-05-22 RX ADMIN — DOXYCYCLINE HYCLATE 100 MG: 100 CAPSULE ORAL at 21:41

## 2021-05-22 RX ADMIN — AMLODIPINE BESYLATE 5 MG: 5 TABLET ORAL at 10:04

## 2021-05-22 RX ADMIN — IPRATROPIUM BROMIDE AND ALBUTEROL SULFATE 3 ML: 2.5; .5 SOLUTION RESPIRATORY (INHALATION) at 16:23

## 2021-05-22 RX ADMIN — APIXABAN 5 MG: 5 TABLET, FILM COATED ORAL at 21:41

## 2021-05-22 RX ADMIN — APIXABAN 5 MG: 5 TABLET, FILM COATED ORAL at 00:48

## 2021-05-22 RX ADMIN — METHYLPREDNISOLONE SODIUM SUCCINATE 40 MG: 40 INJECTION, POWDER, LYOPHILIZED, FOR SOLUTION INTRAMUSCULAR; INTRAVENOUS at 03:54

## 2021-05-22 RX ADMIN — CHOLESTYRAMINE 4 G: 4 POWDER, FOR SUSPENSION ORAL at 17:34

## 2021-05-22 RX ADMIN — CHOLESTYRAMINE 4 G: 4 POWDER, FOR SUSPENSION ORAL at 10:04

## 2021-05-22 RX ADMIN — SOTALOL HYDROCHLORIDE 120 MG: 120 TABLET ORAL at 21:40

## 2021-05-22 RX ADMIN — METHYLPREDNISOLONE SODIUM SUCCINATE 40 MG: 40 INJECTION, POWDER, LYOPHILIZED, FOR SOLUTION INTRAMUSCULAR; INTRAVENOUS at 21:40

## 2021-05-22 RX ADMIN — SOTALOL HYDROCHLORIDE 120 MG: 120 TABLET ORAL at 00:48

## 2021-05-22 RX ADMIN — IPRATROPIUM BROMIDE AND ALBUTEROL SULFATE 3 ML: 2.5; .5 SOLUTION RESPIRATORY (INHALATION) at 09:12

## 2021-05-22 RX ADMIN — IPRATROPIUM BROMIDE AND ALBUTEROL SULFATE 3 ML: 2.5; .5 SOLUTION RESPIRATORY (INHALATION) at 13:10

## 2021-05-22 RX ADMIN — APIXABAN 5 MG: 5 TABLET, FILM COATED ORAL at 10:04

## 2021-05-22 RX ADMIN — SOTALOL HYDROCHLORIDE 120 MG: 120 TABLET ORAL at 10:04

## 2021-05-22 RX ADMIN — SODIUM CHLORIDE, PRESERVATIVE FREE 10 ML: 5 INJECTION INTRAVENOUS at 21:40

## 2021-05-22 RX ADMIN — LEVOTHYROXINE SODIUM 125 MCG: 125 TABLET ORAL at 05:26

## 2021-05-22 RX ADMIN — SODIUM CHLORIDE, PRESERVATIVE FREE 10 ML: 5 INJECTION INTRAVENOUS at 10:05

## 2021-05-22 RX ADMIN — METHYLPREDNISOLONE SODIUM SUCCINATE 40 MG: 40 INJECTION, POWDER, LYOPHILIZED, FOR SOLUTION INTRAMUSCULAR; INTRAVENOUS at 12:26

## 2021-05-22 ASSESSMENT — ENCOUNTER SYMPTOMS
SHORTNESS OF BREATH: 1
RHINORRHEA: 0
PHOTOPHOBIA: 0
ABDOMINAL PAIN: 0
COUGH: 0
TROUBLE SWALLOWING: 0
CHEST TIGHTNESS: 0
CONSTIPATION: 0
SORE THROAT: 0
DIARRHEA: 0
VOMITING: 0
NAUSEA: 0
EYE PAIN: 0
BACK PAIN: 0
WHEEZING: 0
APNEA: 0

## 2021-05-22 ASSESSMENT — PAIN SCALES - GENERAL
PAINLEVEL_OUTOF10: 0
PAINLEVEL_OUTOF10: 0

## 2021-05-22 NOTE — ED NOTES
Pt up to ambulate on room air, pulse oximeter read 86%.   made aware     Gabbie Abdullahi RN  05/21/21 5265

## 2021-05-22 NOTE — H&P
COLONOSCOPY N/A 2/19/2021    COLONOSCOPY WITH BIOPSY performed by Julio Childs MD at [address] [facility] cataract     LAPAROTOMY N/A 2/17/2020    LAPAROTOMY EXPLORATORY, RIGHT HEMICOLECTOMY performed by Julio Childs MD at [address] [place] [address],[address] N/A 9/18/2020    PLACEMENT OF INTRATHECAL CATHETER FOR LUMBAR DRAIN performed by Joanne Friedman MD at [place] [address]  01/29/2019    L SFA angioplasty    TUBAL LIGATION      VENTRICULOPERITONEAL SHUNT N/A 9/23/2020    VENTRICULAR PERITONEAL SHUNT PLACEMENT performed by Joanne Friedman MD at [address] [place] [address]       Medications Prior to Admission:    Medications Prior to Admission: ascorbic acid (VITAMIN C) 500 MG tablet, Take 500 mg by mouth daily  predniSONE (DELTASONE) 50 MG tablet, Take 50mg po qd x 5 days QS for 5 days  albuterol (PROVENTIL) (5 MG/ML) 0.5% nebulizer solution, Take 0.5 mLs by nebulization every 4 hours as needed for Wheezing  cholestyramine (QUESTRAN) 4 g packet, Take 1 packet by mouth 2 times daily  vitamin B-6 (PYRIDOXINE) 100 MG tablet, Take 100 mg by mouth daily  Cholecalciferol (VITAMIN D) 50 MCG (2000 UT) CAPS capsule, Take by mouth  acetaminophen (TYLENOL) 500 MG tablet, Take 1 tablet by mouth every 6 hours as needed for Pain  ipratropium-albuterol (DUONEB) 0.5-2.5 (3) MG/3ML SOLN nebulizer solution, Inhale 3 mLs into the lungs every 4 hours (while awake)  apixaban (ELIQUIS) 5 MG TABS tablet, Take 1 tablet by mouth 2 times daily  buPROPion (WELLBUTRIN SR) 100 MG extended release tablet, Take 1 tablet by mouth daily  sotalol (BETAPACE) 120 MG tablet, Take 1 tablet by mouth 2 times daily  amLODIPine (NORVASC) 5 MG tablet, Take 1 tablet by mouth daily  levothyroxine (SYNTHROID) 100 MCG tablet, Take 125 mcg by mouth Daily   Respiratory Therapy Supplies (FULL KIT NEBULIZER SET) MISC, Use as directed with nebulized medication.     Note that the patient's home medications were reviewed and the above list is accurate to the best of my knowledge at the time of the exam.    Allergies:    Hornet venom, Wasp venom, and Flagyl [metronidazole]    Social History:    reports that she quit smoking about 4 years ago. Her smoking use included cigarettes. She smoked 0.25 packs per day. She has never used smokeless tobacco. She reports current alcohol use. She reports that she does not use drugs. Family History:   family history includes Cancer in her father; Coronary Art Dis in her mother; High Blood Pressure in her mother; Kidney Disease in her mother; Other in her father. PHYSICAL EXAM:    Vitals:  BP (!) 170/77   Pulse 79   Temp 98 °F (36.7 °C) (Oral)   Resp 18   Ht 5' 6\" (1.676 m)   Wt 94 lb (42.6 kg)   SpO2 93%   BMI 15.17 kg/m²       General appearance: NAD, conversant  Eyes: Sclerae anicteric, PERRLA  HEENT: AT/NC, MMM  Neck: FROM, supple, no thyromegaly  Lymph: No cervical / supraclavicular lymphadenopathy  Lungs: Clear to auscultation, somewhat distant, WOB normal  CV: RRR, no MRGs, no lower extremity edema  Abdomen: Soft, non-tender; no masses or HSM, +BS  Extremities: FROM without synovitis. No clubbing or cyanosis of the hands. Skin: no rash, induration, lesions, or ulcers  Psych: Calm and cooperative. Normal judgement and insight. Normal mood and affect. Neuro: Alert and interactive, face symmetric, speech fluent. LABS:  All labs reviewed.   Of note:  CBC with Differential:    Lab Results   Component Value Date    WBC 18.1 05/22/2021    RBC 4.63 05/22/2021    HGB 14.7 05/22/2021    HCT 45.4 05/22/2021     05/22/2021    MCV 98.1 05/22/2021    MCH 31.7 05/22/2021    MCHC 32.4 05/22/2021    RDW 13.1 05/22/2021    NRBC 1.7 02/23/2020    METASPCT 1.7 03/05/2020    LYMPHOPCT 5.5 05/22/2021    MONOPCT 1.3 05/22/2021    MYELOPCT 0.9 02/22/2020    EOSPCT 3 10/07/2010    BASOPCT 0.1 05/22/2021    MONOSABS 0.23 05/22/2021    LYMPHSABS 0.99

## 2021-05-22 NOTE — ED NOTES
Patient 94% on room air, c/o sob. 02 NC placed at 2 L. Pt ordered 2L NC 02 at home PRN. Placed on NC 2L at this time.      Mary Patel RN  05/21/21 2015

## 2021-05-22 NOTE — ED PROVIDER NOTES
[address] ENCOUNTER      Pt Name: Taisha Harden  MRN: [id number]  [place] 1950  Date of evaluation: 5/21/2021      CHIEF COMPLAINT       Chief Complaint   Patient presents with    Shortness of Breath     hx copd and allergies, working in yard today        HPI  Taisha Harden is a 66 y.o. female with history of hyperlipidemia, atrial fibrillation on Eliquis, COPD, who presents to the emergency department with shortness of breath. The patient states that she was working in the yard today when she gradually became more short of breath. She states that she went inside and became even more short of breath. EMS arrived and found her hypoxic in the mid [de-identified]. The patient complaining of mild cough. She tried using inhaler at home however did not have improvement. She was recently placed on prednisone for COPD in the outpatient setting and had been taking it over the last for 5 days. She states that only occasionally does she use any nasal cannula oxygen at home. Apparently she has been having trouble with the machine at home and her daughter does not know how to use it. She denies any chest pain, lightheadedness, syncope. Except as noted above the remainder of the review of systems was reviewed and negative. Review of Systems   Constitutional: Negative for chills, diaphoresis, fatigue and fever. HENT: Negative for rhinorrhea, sore throat and trouble swallowing. Eyes: Negative for photophobia and pain. Respiratory: Positive for shortness of breath. Negative for apnea, cough, chest tightness and wheezing. Cardiovascular: Negative for chest pain, palpitations and leg swelling. Gastrointestinal: Negative for abdominal pain, constipation, diarrhea, nausea and vomiting. Endocrine: Negative for polyuria. Genitourinary: Negative for difficulty urinating and dysuria. Musculoskeletal: Negative for back pain, neck pain and neck stiffness.    Skin: Negative for pallor and rash. Neurological: Negative for dizziness, speech difficulty, weakness, light-headedness and headaches. Psychiatric/Behavioral: Negative for confusion. The patient is not nervous/anxious. Physical Exam  Vitals and nursing note reviewed. Constitutional:       General: She is not in acute distress. Appearance: She is well-developed. Comments: Awake and alert. Sitting in the gurney in no obvious distress. HENT:      Head: Normocephalic and atraumatic. Right Ear: External ear normal.      Left Ear: External ear normal.      Mouth/Throat:      Mouth: Mucous membranes are moist.   Eyes:      General: No scleral icterus. Pupils: Pupils are equal, round, and reactive to light. Cardiovascular:      Rate and Rhythm: Normal rate and regular rhythm. Heart sounds: No murmur heard. Comments: 2+ radial and dorsal pedis pulses bilaterally  Pulmonary:      Effort: Respiratory distress present. Comments: Mild respiratory distress. Mild increased work of breathing. Diminished breath sounds bilaterally. No obvious wheezing. Abdominal:      Palpations: Abdomen is soft. Tenderness: There is no abdominal tenderness. There is no guarding or rebound. Musculoskeletal:         General: No tenderness or deformity. Normal range of motion. Cervical back: Normal range of motion and neck supple. Right lower leg: No edema. Left lower leg: No edema. Skin:     General: Skin is warm and dry. Capillary Refill: Capillary refill takes less than 2 seconds. Neurological:      General: No focal deficit present. Mental Status: She is alert and oriented to person, place, and time. Cranial Nerves: No cranial nerve deficit. Sensory: No sensory deficit. Motor: No weakness or abnormal muscle tone.    Psychiatric:         Mood and Affect: Mood normal.         Behavior: Behavior normal.          Procedures     MDM   This is a 75-year-old female with history of COPD, coronary artery disease who presents to the emergency department with worsening shortness of breath, hypoxia. In the emergency department patient is awake and alert, hemodynamic stable. Hypoxic on room air. Hypoxic with standing even on 2 L nasal cannula oxygen. Chest x-ray showed no evidence of pneumonia. Administered duo nebs as well as Solu-Medrol. Covid test negative. Patient with leukocytosis likely related to recent prednisone use. Patient afebrile no other signs of underlying bacterial etiology. Patient's daughter in the room. Apparently they were having trouble with her at home oxygen and unable to use it and unable to use nebulizer. She has only intermittently ever needed oxygen apparently per the family. Concern for worsening COPD exacerbation and need for increased oxygen requirements. Discussed the case with EVANGELINA Choudhary working with Dr. Linda Helm, who accepts the patient for further evaluation for COPD exacerbation. ED Course as of May 22 0125   Fri May 21, 2021   2241 ATTENDING PHYSICIAN ATTESTATION:     I have personally performed and/or participated in the history, exam, medical decision making, and procedures and agree with all pertinent clinical information. I have also reviewed and agree with the past medical, family and social history unless otherwise noted. I have discussed this patient in detail with the resident, and provided the instruction and education regarding COPD exacerbation. My findings/Plan: DuoNeb's and steroids with improvement however patient remains hypoxic and dropped as low as 86% with ambulation. Patient typically only wears oxygen at night so this is a new increase O2 requirement.   Patient is on Eliquis so no concern for PE she is leukocytosis but is already on steroids prior to arrival.  Patient BNP is elevated with no known history of CHF normal echo 1 year ago new CHF could be contributing factor to patient's shortness of breath and hypoxia significant signs of pulmonary edema on chest x-ray. [MF]      ED Course User Index  [MF] Sobia Angeles DO       ED Course as of May 22 0125   Fri May 21, 2021   2241 ATTENDING PHYSICIAN ATTESTATION:     I have personally performed and/or participated in the history, exam, medical decision making, and procedures and agree with all pertinent clinical information. I have also reviewed and agree with the past medical, family and social history unless otherwise noted. I have discussed this patient in detail with the resident, and provided the instruction and education regarding COPD exacerbation. My findings/Plan: DuoNeb's and steroids with improvement however patient remains hypoxic and dropped as low as 86% with ambulation. Patient typically only wears oxygen at night so this is a new increase O2 requirement. Patient is on Eliquis so no concern for PE she is leukocytosis but is already on steroids prior to arrival.  Patient BNP is elevated with no known history of CHF normal echo 1 year ago new CHF could be contributing factor to patient's shortness of breath and hypoxia significant signs of pulmonary edema on chest x-ray.        [MF]      ED Course User Index  [MF] Sobia Angeles DO       --------------------------------------------- PAST HISTORY ---------------------------------------------  Past Medical History:  has a past medical history of 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 with rest pain ([address] Atypical mole, Bilateral carotid artery stenosis, Bruit of right carotid artery, CAD (coronary artery disease), Cancer ([address] COPD (chronic obstructive pulmonary disease) ([address] Depression, Diarrhea, Femoro-popliteal artery disease ([address] History of tobacco use, Hydrocephalus ([address] Hyperlipidemia, Hypertension, BRY (obstructive sleep apnea), Pain in both feet, Paroxysmal A-fib ([address] PVD (peripheral vascular disease) ([address] PVD (peripheral vascular disease) with claudication ([address] Rheumatoid arthritis ([address] Short-term memory loss, Thyroid disease, Urinary incontinence, Venous stasis ulcer of left calf with fat layer exposed without varicose veins ([address] and Weight loss. Past Surgical History:  has a past surgical history that includes  section; Colonoscopy; Tubal ligation; Dilation and curettage of uterus; PERIPHERAL PERCUTANEOUS ARTERIAL INTERVENTION (2019); laparotomy (N/A, 2020); lumbar drain implantation (N/A, 2020); Ventriculoperitoneal shunt (N/A, 2020); eye surgery; and Colonoscopy (N/A, 2021). Social History:  reports that she quit smoking about 4 years ago. Her smoking use included cigarettes. She smoked 0.25 packs per day. She has never used smokeless tobacco. She reports current alcohol use. She reports that she does not use drugs. Family History: family history includes Cancer in her father; Coronary Art Dis in her mother; High Blood Pressure in her mother; Kidney Disease in her mother; Other in her father. The patients home medications have been reviewed.     Allergies: Hornet venom, Wasp venom, and Flagyl [metronidazole]    -------------------------------------------------- RESULTS -------------------------------------------------    LABS:  Results for orders placed or performed during the hospital encounter of 21   COVID-19, Rapid    Specimen: Nasopharyngeal Swab   Result Value Ref Range    SARS-CoV-2, NAAT Not Detected Not Detected   CBC Auto Differential   Result Value Ref Range    WBC 17.9 (H) 4.5 - 11.5 E9/L    RBC 4.40 3.50 - 5.50 E12/L    Hemoglobin 14.1 11.5 - 15.5 g/dL    Hematocrit 43.1 34.0 - 48.0 %    MCV 98.0 80.0 - 99.9 fL    MCH 32.0 26.0 - 35.0 pg    MCHC 32.7 32.0 - 34.5 %    RDW 13.0 11.5 - 15.0 fL    Platelets 835 114 - 061 E9/L    MPV 9.1 7.0 - 12.0 fL    Neutrophils % 88.5 (H) 43.0 - 80.0 %    Immature Granulocytes % 0.6 0.0 - 5.0 %    Lymphocytes % 6.6 (L) 20.0 - 42.0 %    Monocytes % 4.2 2.0 - 12.0 %    Eosinophils % 0.0 0.0 - 6.0 %    Basophils % 0.1 0.0 - 2.0 %    Neutrophils Absolute 15.81 (H) 1.80 - 7.30 E9/L    Immature Granulocytes # 0.10 E9/L    Lymphocytes Absolute 1.18 (L) 1.50 - 4.00 E9/L    Monocytes Absolute 0.75 0.10 - 0.95 E9/L    Eosinophils Absolute 0.00 (L) 0.05 - 0.50 E9/L    Basophils Absolute 0.02 0.00 - 0.20 E9/L   Comprehensive Metabolic Panel w/ Reflex to MG   Result Value Ref Range    Sodium 133 132 - 146 mmol/L    Potassium reflex Magnesium 3.4 (L) 3.5 - 5.0 mmol/L    Chloride 97 (L) 98 - 107 mmol/L    CO2 26 22 - 29 mmol/L    Anion Gap 10 7 - 16 mmol/L    Glucose 112 (H) 74 - 99 mg/dL    BUN 18 6 - 23 mg/dL    CREATININE 0.4 (L) 0.5 - 1.0 mg/dL    GFR Non-African American >60 >=60 mL/min/1.73    GFR African American >60     Calcium 7.8 (L) 8.6 - 10.2 mg/dL    Total Protein 5.9 (L) 6.4 - 8.3 g/dL    Albumin 3.8 3.5 - 5.2 g/dL    Total Bilirubin 0.7 0.0 - 1.2 mg/dL    Alkaline Phosphatase 60 35 - 104 U/L    ALT 85 (H) 0 - 32 U/L    AST 50 (H) 0 - 31 U/L   Lipase   Result Value Ref Range    Lipase 15 13 - 60 U/L   Troponin   Result Value Ref Range    Troponin <0.01 0.00 - 0.03 ng/mL   Brain Natriuretic Peptide   Result Value Ref Range    Pro-BNP 2,233 (H) 0 - 125 pg/mL   Magnesium   Result Value Ref Range    Magnesium 1.7 1.6 - 2.6 mg/dL   EKG 12 Lead   Result Value Ref Range    Ventricular Rate 82 BPM    Atrial Rate 82 BPM    P-R Interval 130 ms    QRS Duration 78 ms    Q-T Interval 404 ms    QTc Calculation (Bazett) 472 ms    P Axis 82 degrees    R Axis 82 degrees    T Axis 72 degrees       RADIOLOGY:  XR CHEST PORTABLE   Final Result   No acute process. EKG:  This EKG is signed and interpreted by me. Rate: 82bpm  Rhythm: sinus  Interpretation: Normal axis. Nonspecific T wave changes. No ST segment elevation or depression.   Comparison: stable as compared to patient's most recent EKG      ------------------------- NURSING NOTES AND VITALS REVIEWED ---------------------------  Date / Time Roomed:  5/21/2021  8:07 PM  ED Bed Assignment:  1093/[id number]    The nursing notes within the ED encounter and vital signs as below have been reviewed. Patient Vitals for the past 24 hrs:   BP Temp Temp src Pulse Resp SpO2 Height Weight   05/22/21 0015 (!) 170/77 98 °F (36.7 °C) Oral 79 18 93 % -- --   05/21/21 2332 (!) 158/78 -- -- 69 18 92 % -- --   05/21/21 2156 (!) 140/68 -- -- 69 -- 93 % -- --   05/21/21 2121 (!) 156/73 -- -- 76 -- 91 % -- --   05/21/21 2042 -- -- -- -- -- (!) 89 % -- --   05/21/21 2008 (!) 193/98 97.9 °F (36.6 °C) Oral 82 22 94 % 5' 6\" (1.676 m) 94 lb (42.6 kg)       Oxygen Saturation Interpretation: Abnormal    ------------------------------------------ PROGRESS NOTES ------------------------------------------    Counseling:  I have spoken with the patient and discussed todays results, in addition to providing specific details for the plan of care and counseling regarding the diagnosis and prognosis. Their questions are answered at this time and they are agreeable with the plan of admission.    --------------------------------- ADDITIONAL PROVIDER NOTES ---------------------------------  Consultations:   Spoke with Ihsan Daniels with Dr. Sangeetha Jones. Discussed case. They will admit the patient. This patient's ED course included: a personal history and physicial examination, re-evaluation prior to disposition, multiple bedside re-evaluations, IV medications, cardiac monitoring and continuous pulse oximetry    This patient has remained hemodynamically stable during their ED course. Diagnosis:  1. COPD exacerbation ([address]    2. Hypoxia    3. Acute respiratory distress        Disposition:  Patient's disposition: Admit to telemetry  Patient's condition is stable.          Kelly Childs DO  Resident  05/22/21 1182

## 2021-05-23 VITALS
HEART RATE: 64 BPM | WEIGHT: 93.03 LBS | TEMPERATURE: 98.2 F | RESPIRATION RATE: 16 BRPM | OXYGEN SATURATION: 96 % | HEIGHT: 66 IN | SYSTOLIC BLOOD PRESSURE: 142 MMHG | DIASTOLIC BLOOD PRESSURE: 72 MMHG | BODY MASS INDEX: 14.95 KG/M2

## 2021-05-23 PROBLEM — J44.1 COPD EXACERBATION (HCC): Status: ACTIVE | Noted: 2021-05-23

## 2021-05-23 LAB
ANION GAP SERPL CALCULATED.3IONS-SCNC: 10 MMOL/L (ref 7–16)
BASOPHILS ABSOLUTE: 0.01 E9/L (ref 0–0.2)
BASOPHILS RELATIVE PERCENT: 0.1 % (ref 0–2)
BUN BLDV-MCNC: 27 MG/DL (ref 6–23)
CALCIUM SERPL-MCNC: 8.9 MG/DL (ref 8.6–10.2)
CHLORIDE BLD-SCNC: 104 MMOL/L (ref 98–107)
CO2: 26 MMOL/L (ref 22–29)
CREAT SERPL-MCNC: 0.6 MG/DL (ref 0.5–1)
EOSINOPHILS ABSOLUTE: 0 E9/L (ref 0.05–0.5)
EOSINOPHILS RELATIVE PERCENT: 0 % (ref 0–6)
GFR AFRICAN AMERICAN: >60
GFR NON-AFRICAN AMERICAN: >60 ML/MIN/1.73
GLUCOSE BLD-MCNC: 153 MG/DL (ref 74–99)
HCT VFR BLD CALC: 41 % (ref 34–48)
HEMOGLOBIN: 13 G/DL (ref 11.5–15.5)
IMMATURE GRANULOCYTES #: 0.09 E9/L
IMMATURE GRANULOCYTES %: 0.5 % (ref 0–5)
LYMPHOCYTES ABSOLUTE: 0.67 E9/L (ref 1.5–4)
LYMPHOCYTES RELATIVE PERCENT: 4.1 % (ref 20–42)
MCH RBC QN AUTO: 31.4 PG (ref 26–35)
MCHC RBC AUTO-ENTMCNC: 31.7 % (ref 32–34.5)
MCV RBC AUTO: 99 FL (ref 80–99.9)
MONOCYTES ABSOLUTE: 0.45 E9/L (ref 0.1–0.95)
MONOCYTES RELATIVE PERCENT: 2.7 % (ref 2–12)
NEUTROPHILS ABSOLUTE: 15.32 E9/L (ref 1.8–7.3)
NEUTROPHILS RELATIVE PERCENT: 92.6 % (ref 43–80)
PDW BLD-RTO: 13.3 FL (ref 11.5–15)
PLATELET # BLD: 362 E9/L (ref 130–450)
PMV BLD AUTO: 10.1 FL (ref 7–12)
POTASSIUM SERPL-SCNC: 4.1 MMOL/L (ref 3.5–5)
RBC # BLD: 4.14 E12/L (ref 3.5–5.5)
SODIUM BLD-SCNC: 140 MMOL/L (ref 132–146)
WBC # BLD: 16.5 E9/L (ref 4.5–11.5)

## 2021-05-23 PROCEDURE — 36415 COLL VENOUS BLD VENIPUNCTURE: CPT

## 2021-05-23 PROCEDURE — 94640 AIRWAY INHALATION TREATMENT: CPT

## 2021-05-23 PROCEDURE — G0378 HOSPITAL OBSERVATION PER HR: HCPCS

## 2021-05-23 PROCEDURE — 6360000002 HC RX W HCPCS: Performed by: PHYSICIAN ASSISTANT

## 2021-05-23 PROCEDURE — 96376 TX/PRO/DX INJ SAME DRUG ADON: CPT

## 2021-05-23 PROCEDURE — 97161 PT EVAL LOW COMPLEX 20 MIN: CPT

## 2021-05-23 PROCEDURE — 6370000000 HC RX 637 (ALT 250 FOR IP): Performed by: PHYSICIAN ASSISTANT

## 2021-05-23 PROCEDURE — 2580000003 HC RX 258: Performed by: PHYSICIAN ASSISTANT

## 2021-05-23 PROCEDURE — 6370000000 HC RX 637 (ALT 250 FOR IP): Performed by: INTERNAL MEDICINE

## 2021-05-23 PROCEDURE — 2700000000 HC OXYGEN THERAPY PER DAY

## 2021-05-23 PROCEDURE — 85025 COMPLETE CBC W/AUTO DIFF WBC: CPT

## 2021-05-23 PROCEDURE — 80048 BASIC METABOLIC PNL TOTAL CA: CPT

## 2021-05-23 RX ORDER — DOXYCYCLINE HYCLATE 100 MG/1
100 CAPSULE ORAL EVERY 12 HOURS SCHEDULED
Qty: 6 CAPSULE | Refills: 0 | Status: SHIPPED | OUTPATIENT
Start: 2021-05-23 | End: 2021-05-26

## 2021-05-23 RX ORDER — PREDNISONE 20 MG/1
40 TABLET ORAL DAILY
Qty: 6 TABLET | Refills: 0 | Status: SHIPPED | OUTPATIENT
Start: 2021-05-24 | End: 2021-05-27

## 2021-05-23 RX ADMIN — METHYLPREDNISOLONE SODIUM SUCCINATE 40 MG: 40 INJECTION, POWDER, LYOPHILIZED, FOR SOLUTION INTRAMUSCULAR; INTRAVENOUS at 04:00

## 2021-05-23 RX ADMIN — AMLODIPINE BESYLATE 5 MG: 5 TABLET ORAL at 09:26

## 2021-05-23 RX ADMIN — BUPROPION HYDROCHLORIDE 100 MG: 100 TABLET, EXTENDED RELEASE ORAL at 09:26

## 2021-05-23 RX ADMIN — IPRATROPIUM BROMIDE AND ALBUTEROL SULFATE 3 ML: 2.5; .5 SOLUTION RESPIRATORY (INHALATION) at 08:01

## 2021-05-23 RX ADMIN — APIXABAN 5 MG: 5 TABLET, FILM COATED ORAL at 09:26

## 2021-05-23 RX ADMIN — SODIUM CHLORIDE, PRESERVATIVE FREE 10 ML: 5 INJECTION INTRAVENOUS at 04:00

## 2021-05-23 RX ADMIN — LEVOTHYROXINE SODIUM 125 MCG: 125 TABLET ORAL at 05:56

## 2021-05-23 RX ADMIN — CHOLESTYRAMINE 4 G: 4 POWDER, FOR SUSPENSION ORAL at 09:26

## 2021-05-23 RX ADMIN — SOTALOL HYDROCHLORIDE 120 MG: 120 TABLET ORAL at 09:26

## 2021-05-23 RX ADMIN — SODIUM CHLORIDE, PRESERVATIVE FREE 10 ML: 5 INJECTION INTRAVENOUS at 09:26

## 2021-05-23 RX ADMIN — DOXYCYCLINE HYCLATE 100 MG: 100 CAPSULE ORAL at 09:26

## 2021-05-23 ASSESSMENT — PAIN SCALES - GENERAL: PAINLEVEL_OUTOF10: 0

## 2021-05-23 NOTE — PLAN OF CARE
Problem: Falls - Risk of:  Goal: Will remain free from falls  Description: Will remain free from falls  5/23/2021 0043 by Breann Louis RN  Outcome: Met This Shift  5/22/2021 1401 by Nithin Wan RN  Outcome: Met This Shift  Goal: Absence of physical injury  Description: Absence of physical injury  Outcome: Met This Shift     Problem: Breathing Pattern - Ineffective:  Goal: Ability to achieve and maintain a regular respiratory rate will improve  Description: Ability to achieve and maintain a regular respiratory rate will improve  5/23/2021 0043 by Breann Louis RN  Outcome: Met This Shift  5/22/2021 1401 by Nithin Wan RN  Outcome: Met This Shift

## 2021-05-23 NOTE — PLAN OF CARE
Problem: Falls - Risk of:  Goal: Will remain free from falls  Description: Will remain free from falls  5/23/2021 1015 by Mary Mejias RN  Outcome: Met This Shift  5/23/2021 0043 by Annita Aguilar RN  Outcome: Met This Shift  Goal: Absence of physical injury  Description: Absence of physical injury  5/23/2021 1015 by Mary Mejias RN  Outcome: Met This Shift  5/23/2021 0043 by Annita Aguilar RN  Outcome: Met This Shift     Problem: Breathing Pattern - Ineffective:  Goal: Ability to achieve and maintain a regular respiratory rate will improve  Description: Ability to achieve and maintain a regular respiratory rate will improve  5/23/2021 1015 by Mary Mejias RN  Outcome: Met This Shift  5/23/2021 0043 by Annita Aguilar RN  Outcome: Met This Shift

## 2021-05-23 NOTE — PROGRESS NOTES
Initial Evaluation      Attending Provider:  Gail Underwood MD    Evaluating PT:  Marco A Mays PT    Room #:  1475/5788-M  Diagnosis:  COPD exacerbation   Pertinent PMHx/PSHx:  See notes  Procedure/Surgery:  NA  Precautions:  None  Equipment Needs:  NA    SUBJECTIVE:    Pt lives with Dtr in a 2 story home with 3 stairs and 1 rail to enter. Pt ambulated with no AD PTA. OBJECTIVE:   Initial Evaluation  Date: 5/23/21 Treatment Short Term/ Long Term   Goals   Was pt agreeable to Eval/treatment? Yes     Does pt have pain? No     Bed Mobility  Rolling: Indep  Supine to sit: Indep  Sit to supine: Indep  Scooting: Indep     Transfers Sit to stand: Indep  Stand to sit: Indep  Stand pivot: Indep     Ambulation   30 feet with no AD indep        Stair negotiation: ascended and descended  NA/predict indep      ROM WNL     MMT WNL     AM-PAC 6 Clicks 50/79       Pt is A & O x 3   Sensation:  Pt denies numbness and tingling to extremities  Edema:  NA  Balance: sitting:Indep standing: Indep  Endurance: Functional    ASSESSMENT:    Comments: Indep with all levels of mobility. NMo safety issues mobilizing w/o AD    Treatment:  Patient practiced and was instructed in the following treatment:     Eval       Pt's/ family goals   1. Home today    Patient and or family understand(s) diagnosis, prognosis, and plan of care. PLAN:     No PT services required    Total Treatment Time  0 minutes     Evaluation Time includes thorough review of current medical information, gathering information on past medical history/social history and prior level of function, completion of standardized testing/informal observation of tasks, assessment of data and education on plan of care and goals.     CPT codes:  [x] Low Complexity PT evaluation 64636  []    Marco A Mays PT

## 2021-05-23 NOTE — CARE COORDINATION
Met w/ patient. Explained role of , OBS LOC, and plan of care. Daughter Clive Oseguera lives w/ pt in a 2 story house- 2 steps to entrance. Uses [employer] occasionally, has shower bench, extended toilet. Independent PTA-drives. [facility] and [facility] [place]. Hx [facility]. On room air- wears home O2 2lNC at HS only provided by [facility]. On Eliquis at home. PCP is Dr. Danica Butler and pharmacy is [facility]. Per pt, plan is to return home on discharge- wants to go home today. States has no needs.  Will follow Cheryl Disla RN case manager

## 2021-05-23 NOTE — DISCHARGE SUMMARY
(DELTASONE) 20 MG tablet Take 2 tablets by mouth daily for 3 days  Qty: 6 tablet, Refills: 0         CONTINUE these medications which have NOT CHANGED    Details   ascorbic acid (VITAMIN C) 500 MG tablet Take 500 mg by mouth daily      albuterol (PROVENTIL) (5 MG/ML) 0.5% nebulizer solution Take 0.5 mLs by nebulization every 4 hours as needed for Wheezing  Qty: 120 each, Refills: 0      cholestyramine (QUESTRAN) 4 g packet Take 1 packet by mouth 2 times daily  Qty: 90 packet, Refills: 3      vitamin B-6 (PYRIDOXINE) 100 MG tablet Take 100 mg by mouth daily      Cholecalciferol (VITAMIN D) 50 MCG (2000 UT) CAPS capsule Take by mouth      acetaminophen (TYLENOL) 500 MG tablet Take 1 tablet by mouth every 6 hours as needed for Pain  Qty: 120 tablet, Refills: 3      ipratropium-albuterol (DUONEB) 0.5-2.5 (3) MG/3ML SOLN nebulizer solution Inhale 3 mLs into the lungs every 4 hours (while awake)  Qty: 360 mL, Refills: 0      apixaban (ELIQUIS) 5 MG TABS tablet Take 1 tablet by mouth 2 times daily  Qty: 60 tablet, Refills: 0      buPROPion (WELLBUTRIN SR) 100 MG extended release tablet Take 1 tablet by mouth daily  Qty: 60 tablet, Refills: 3      sotalol (BETAPACE) 120 MG tablet Take 1 tablet by mouth 2 times daily  Qty: 60 tablet, Refills: 3      amLODIPine (NORVASC) 5 MG tablet Take 1 tablet by mouth daily  Qty: 30 tablet, Refills: 3      levothyroxine (SYNTHROID) 100 MCG tablet Take 125 mcg by mouth Daily       Respiratory Therapy Supplies (FULL KIT NEBULIZER SET) MISC Use as directed with nebulized medication. Qty: 1 each, Refills: 0    Comments: Supply prescription to Pharmacy or [address] [place] [address] location of patient or coverage preference. Dispense full nebulizer kit - compressor, tubing, mouthpiece, mask, ancillary supplies - per requirements of ordered product, patient preference, or coverage allowances.            Activity: activity as tolerated  Diet: regular diet    Follow-up with PCP in 1

## 2021-09-10 ENCOUNTER — TELEPHONE (OUTPATIENT)
Dept: VASCULAR SURGERY | Age: 71
End: 2021-09-10

## 2021-09-10 DIAGNOSIS — I73.9 PVD (PERIPHERAL VASCULAR DISEASE) (HCC): Primary | ICD-10-CM

## 2021-09-10 NOTE — TELEPHONE ENCOUNTER
Scheduled le arterial Doppler study at [facility] ([facility]) 9/30 at 1:00 pm, ov with Dr. Christa Cope 10/4 at 1:00 pm, pt notified.

## 2021-09-15 ENCOUNTER — NURSE TRIAGE (OUTPATIENT)
Dept: OTHER | Facility: CLINIC | Age: 71
End: 2021-09-15

## 2021-09-15 NOTE — TELEPHONE ENCOUNTER
Patient called [facility] with red flag complaint to establish care with [facility] office. Brief description of triage:  Patient calling for COVID testing due to concerns for COVID. No known exposures. Started with symptoms yesterday (headache, nausea, fatigue and intermittent dizziness). Would like to establish with [facility] office for visit with testing. Triage indicates for patient to see today in office:  Patient high risk and requesting appointment with testing. Care advice provided, patient verbalizes understanding; denies any other questions or concerns; instructed to call back for any new or worsening symptoms. Writer provided warm transfer to [facility] for appointment scheduling. Attention Provider: Thank you for allowing me to participate in the care of your patient. The patient was connected to triage in response to information provided to the [facility]. Please do not respond through this encounter as the response is not directed to a shared pool. Reason for Disposition   [1] HIGH RISK patient (e.g., age > 59 years, diabetes, heart or lung disease, weak immune system) AND [2] new or worsening symptoms    Answer Assessment - Initial Assessment Questions  1. COVID-19 DIAGNOSIS: \"Who made your Coronavirus (COVID-19) diagnosis? \" \"Was it confirmed by a positive lab test?\" If not diagnosed by a HCP, ask \"Are there lots of cases (community spread) where you live? \" (See public health department website, if unsure)      Not tested yet, would like to get tested    2. COVID-19 EXPOSURE: \"Was there any known exposure to COVID before the symptoms began? \" CDC Definition of close contact: within 6 feet (2 meters) for a total of 15 minutes or more over a 24-hour period. No known exposure, went to [employer] and grocery store but follows precautions    3. ONSET: \"When did the COVID-19 symptoms start? \"       Last night    4. WORST SYMPTOM: \"What is your worst symptom? \" (e.g., cough, fever, shortness of breath, muscle aches)      When she changes position she feels dizzy, still walking and doing activities    5. COUGH: \"Do you have a cough? \" If so, ask: \"How bad is the cough? \"        No    6. FEVER: \"Do you have a fever? \" If so, ask: \"What is your temperature, how was it measured, and when did it start? \"      Doesn't know, sweating last night and felt feverish, current temp=98.0 F temporal    7. RESPIRATORY STATUS: \"Describe your breathing? \" (e.g., shortness of breath, wheezing, unable to speak)       Good    8. BETTER-SAME-WORSE: Susi Pittman you getting better, staying the same or getting worse compared to yesterday? \"  If getting worse, ask, \"In what way? \"      A little better    9. HIGH RISK DISEASE: \"Do you have any chronic medical problems? \" (e.g., asthma, heart or lung disease, weak immune system, obesity, etc.)      COPD (wears oxygen at night), 1.5 years ago colon resection, A-fib,  shunt due to hydrocephalus    10. PREGNANCY: \"Is there any chance you are pregnant? \" \"When was your last menstrual period? \"        N/a    11. OTHER SYMPTOMS: \"Do you have any other symptoms? \"  (e.g., chills, fatigue, headache, loss of smell or taste, muscle pain, sore throat; new loss of smell or taste especially support the diagnosis of COVID-19)        Headache, runny nose, fatigue, nausea    Protocols used: CORONAVIRUS (COVID-19) DIAGNOSED OR [facility]USPECTED-[facility]

## 2021-09-30 ENCOUNTER — HOSPITAL ENCOUNTER (OUTPATIENT)
Dept: INTERVENTIONAL RADIOLOGY/VASCULAR | Age: 71
Discharge: HOME | End: 2021-10-02
Payer: MEDICARE

## 2021-09-30 DIAGNOSIS — I73.9 PVD (PERIPHERAL VASCULAR DISEASE) (HCC): ICD-10-CM

## 2021-09-30 PROCEDURE — 93923 UPR/LXTR ART STDY 3+ LVLS: CPT

## 2021-10-01 ENCOUNTER — TELEPHONE (OUTPATIENT)
Dept: VASCULAR SURGERY | Age: 71
End: 2021-10-01

## 2021-10-04 ENCOUNTER — OFFICE VISIT (OUTPATIENT)
Dept: VASCULAR SURGERY | Age: 71
End: 2021-10-04
Payer: MEDICARE

## 2021-10-04 VITALS — WEIGHT: 92 LBS | BODY MASS INDEX: 14.79 KG/M2 | HEIGHT: 66 IN

## 2021-10-04 DIAGNOSIS — I73.9 PVD (PERIPHERAL VASCULAR DISEASE) (HCC): Primary | ICD-10-CM

## 2021-10-04 PROCEDURE — G8427 DOCREV CUR MEDS BY ELIG CLIN: HCPCS | Performed by: SURGERY

## 2021-10-04 PROCEDURE — G8400 PT W/DXA NO RESULTS DOC: HCPCS | Performed by: SURGERY

## 2021-10-04 PROCEDURE — 1090F PRES/ABSN URINE INCON ASSESS: CPT | Performed by: SURGERY

## 2021-10-04 PROCEDURE — 3017F COLORECTAL CA SCREEN DOC REV: CPT | Performed by: SURGERY

## 2021-10-04 PROCEDURE — G8484 FLU IMMUNIZE NO ADMIN: HCPCS | Performed by: SURGERY

## 2021-10-04 PROCEDURE — G8419 CALC BMI OUT NRM PARAM NOF/U: HCPCS | Performed by: SURGERY

## 2021-10-04 PROCEDURE — 1123F ACP DISCUSS/DSCN MKR DOCD: CPT | Performed by: SURGERY

## 2021-10-04 PROCEDURE — 1036F TOBACCO NON-USER: CPT | Performed by: SURGERY

## 2021-10-04 PROCEDURE — 4040F PNEUMOC VAC/ADMIN/RCVD: CPT | Performed by: SURGERY

## 2021-10-04 PROCEDURE — 99214 OFFICE O/P EST MOD 30 MIN: CPT | Performed by: SURGERY

## 2021-10-04 NOTE — PROGRESS NOTES
2018    Dr. Lorrie Salmeron of right carotid artery 5/3/2018    CAD (coronary artery disease)     f/u w/ PCP     Cancer ([facility]     SKIN CA/Thigh    COPD (chronic obstructive pulmonary disease) ([facility]     uses O2 2L NC at night - and during day prn     Depression     Diarrhea     for colonoscopy 21     Femoro-popliteal artery disease ([facility] 2019    History of tobacco use 5/3/2018    Hydrocephalus (HCC)     Hyperlipidemia     no medications     Hypertension     BRY (obstructive sleep apnea)     uses O2 2L NC     Pain in both feet 5/3/2018    Paroxysmal A-fib (HCC)     PVD (peripheral vascular disease) ([facility]     PVD (peripheral vascular disease) [facility] claudication [facility] 5/3/2018    Rheumatoid arthritis ([facility])     Short-term memory loss     Thyroid disease     Urinary incontinence     Venous stasis ulcer of left calf with fat layer exposed without varicose veins ([facility] 2019    Weight loss      Past Surgical History:        Procedure Laterality Date     SECTION      COLONOSCOPY      COLONOSCOPY N/A 2021    COLONOSCOPY WITH BIOPSY performed by Shreya Skelton MD at [facility] cataract     LAPAROTOMY N/A 2020    LAPAROTOMY EXPLORATORY, RIGHT HEMICOLECTOMY performed by Shreya Skelton MD at [address] [place] [address],[address] N/A 2020    PLACEMENT OF INTRATHECAL CATHETER FOR LUMBAR DRAIN performed by Darnell Pierce MD at [place] [address]  2019    L SFA angioplasty    TUBAL LIGATION      VENTRICULOPERITONEAL SHUNT N/A 2020    VENTRICULAR PERITONEAL SHUNT PLACEMENT performed by Darnell Pierce MD at [address] [place] [address]     Current Medications:   Current Outpatient Medications   Medication Sig Dispense Refill    ascorbic acid (VITAMIN C) 500 MG tablet Take 500 mg by mouth daily      albuterol (PROVENTIL) (5 MG/ML) 0.5% nebulizer solution Take 0.5 mLs by nebulization every 4 hours as needed for Wheezing 120 each 0    Respiratory Therapy Supplies (FULL KIT NEBULIZER SET) MISC Use as directed with nebulized medication. 1 each 0    cholestyramine (QUESTRAN) 4 g packet Take 1 packet by mouth 2 times daily 90 packet 3    vitamin B-6 (PYRIDOXINE) 100 MG tablet Take 100 mg by mouth daily      Cholecalciferol (VITAMIN D) 50 MCG (2000 UT) CAPS capsule Take by mouth      acetaminophen (TYLENOL) 500 MG tablet Take 1 tablet by mouth every 6 hours as needed for Pain 120 tablet 3    ipratropium-albuterol (DUONEB) 0.5-2.5 (3) MG/3ML SOLN nebulizer solution Inhale 3 mLs into the lungs every 4 hours (while awake) 360 mL 0    apixaban (ELIQUIS) 5 MG TABS tablet Take 1 tablet by mouth 2 times daily 60 tablet 0    buPROPion (WELLBUTRIN SR) 100 MG extended release tablet Take 1 tablet by mouth daily 60 tablet 3    sotalol (BETAPACE) 120 MG tablet Take 1 tablet by mouth 2 times daily 60 tablet 3    amLODIPine (NORVASC) 5 MG tablet Take 1 tablet by mouth daily 30 tablet 3    levothyroxine (SYNTHROID) 100 MCG tablet Take 125 mcg by mouth Daily        No current facility-administered medications for this visit.      Allergies:  Hornet venom, Wasp venom, and Flagyl [metronidazole]  Social History     Socioeconomic History    Marital status:      Spouse name: Not on file    Number of children: Not on file    Years of education: Not on file    Highest education level: Not on file   Occupational History    Occupation: Retired    Tobacco Use    Smoking status: Former Smoker     Packs/day: 0.25     Types: Cigarettes     Quit date: 5/3/2017     Years since quittin.4    Smokeless tobacco: Never Used   Vaping Use    Vaping Use: Never used   Substance and Sexual Activity    Alcohol use: Yes     Comment: rare    Drug use: No    Sexual activity: Never   Other Topics Concern    Not on file   Social History Narrative    Not on file     Social Determinants of Health     Financial Resource Strain:     Difficulty of Paying Living Expenses:    Food Insecurity:     Worried About Running Out of Food in the Last Year:     [address] in the Last Year:    Transportation Needs:     Lack of Transportation (Medical):      Lack of Transportation (Non-Medical):    Physical Activity:     Days of Exercise per Week:     Minutes of Exercise per Session:    Stress:     Feeling of Stress :    Social Connections:     Frequency of Communication with Friends and Family:     Frequency of Social Gatherings with Friends and Family:     Attends Church Services:     Active Member of Clubs or Organizations:     Attends Club or Organization Meetings:     Marital Status:    Intimate Partner Violence:     Fear of Current or Ex-Partner:     Emotionally Abused:     Physically Abused:     Sexually Abused:      Family History   Problem Relation Age of Onset    Kidney Disease Mother     Coronary Art Dis Mother     High Blood Pressure Mother     Cancer Father     Other Father      Labs  Lab Results   Component Value Date    WBC 16.5 (H) 05/23/2021    HGB 13.0 05/23/2021    HCT 41.0 05/23/2021     05/23/2021    PROTIME 13.2 (H) 03/26/2021    INR 1.2 03/26/2021    APTT 31.3 01/20/2020    K 4.1 05/23/2021    BUN 27 (H) 05/23/2021    CREATININE 0.6 05/23/2021       PHYSICAL EXAM:    CONSTITUTIONAL:   Awake, alert, cooperative  PSYCHIATRIC :  Oriented to time, place and person     Appropriate insight to disease process  EYES: Lids and lashes normal  ENT:  External ears and nose without lesions   Hearing deficits not note  NECK: Supple, symmetrical, trachea midline   Carotid bruit notnoted  LUNGS:  No increased work of breathing                 Clear to auscultation  CARDIOVASCULAR:  regular rate and rhythm   ABDOMEN:  soft, non-distended, non-tender   Aorta is not palpable  SKIN:   Normal skin color   Texture and turgor normal, no induration  EXTREMITIES:   R LE Edema absent   No wounds   Cyanosis of toes  L LE Edema absent  R posterior tibial monophasic L posterior tibial monophasic   R dorsalis pedis No signal L dorsalis pedis Weakly biophasic     RADIOLOGY:  9/30/21 ABIs and PVRs  R ANTONY 0.57   L ANTONY 0.87  Unable to obtain tbis due to machine     Overall R LE slightly improved, left lower extremity significantly improved    A/P PVD with R LE claudication  · Her lifestyle claudication sxs remains resolved  · She is on eliquis for afib  · Discussed with pt tobacco use and relationship to PVD  pt currently is not a smoker  Pt understands tobacco use can contribute to worsening of pvd and development of limb loss   · Emphasized importance of foot care and to call if develops any wounds, ulcerations, changes in appearance, claudication and/or symptoms  · We discussed importance of a walking program and them gauging how far they have walked on a daily basis and progressively increasing that distance and walking through the pain  · Will not proceed with R fem ak pop bypass as she is no longer sx, if she would need to proceed with this would need cardiac risk stratification per Dr. Ellen Roth  · Fu in 1 year with arterial studies - will get studies in near future also    Left groin pain  · Completely resolved    Genevieve Guzman MD

## 2021-10-04 NOTE — PATIENT INSTRUCTIONS
Patient Education        Learning About Peripheral Arterial Disease (PAD)  What is peripheral arterial disease? Peripheral arterial disease (PAD) is narrowing or blockage of arteries that causes poor blood flow to your arms and legs. PAD is most common in the legs. The most common cause of PAD is the buildup of plaque on the inside of arteries. Over time, plaque builds up in the walls of the arteries, including those that supply blood to your legs. If you have PAD, you're likely to have plaque in other arteries in your body. This raises your risk of a heart attack and stroke. Medicines and lifestyle changes may lower your risk of heart attack and stroke. They may also help if you have symptoms. In some cases, surgery or other treatment is needed. Peripheral arterial disease is also called peripheral vascular disease. What are the symptoms? Many people who have PAD don't have symptoms. If you have symptoms, they may include a tight, aching, or squeezing pain in your calf, thigh, or buttock. This pain is called intermittent claudication. It usually happens after you have walked a certain distance. The pain goes away if you stop walking. As PAD gets worse, you may have pain in your foot or toe when you aren't walking. Other symptoms may include weak or tired legs. You might have trouble walking or balancing. If PAD gets worse, you may have other symptoms caused by poor blood flow to your legs and feet. These symptoms aren't common. They may include cold or numb feet or toes, sores that are slow to heal, or leg or foot pain when you're at rest.  How can you prevent PAD? · Quit smoking. Quitting smoking is one of the best things you can do to help prevent PAD. If you need help quitting, talk to your doctor about stop-smoking programs and medicines. These can increase your chances of quitting for good. · Stay at a healthy weight.   · Manage other health problems, including diabetes, high blood pressure, and high cholesterol. · Be physically active. Try to do moderate activity at least 2½ hours a week. Or try to do vigorous activity at least 1¼ hours a week. You may want to walk or try other activities, such as running, swimming, cycling, or playing tennis or team sports. · Eat a variety of heart-healthy foods. ? Eat fruits, vegetables, whole grains, beans, and other high-fiber foods. ? Eat lean proteins, such as seafood, lean meats, beans, nuts, and soy products. ? Eat healthy fats, such as canola and olive oil. ? Choose foods that are low in saturated fat and avoid trans fat. ? Limit sodium and alcohol. ? Limit drinks and foods with added sugar. How is PAD treated? Treatment for PAD focuses on relieving symptoms and lowering your risk of heart attack and stroke. Making healthy lifestyle changes can help you lower this risk. · If you smoke, quit. Quitting is the best thing you can do when you have PAD. Medicines and counseling can help you quit for good. · Get regular exercise (if your doctor says it's safe). Try walking, swimming, or biking for at least 30 minutes on most, if not all, days of the week. If you have leg symptoms when you exercise, your doctor might recommend a specialized exercise program that may relieve symptoms. The goal is to be able to walk farther without pain. · Eat heart-healthy foods, such as vegetables, fruits, nuts, beans, fish, and whole grains. Limit foods that have a lot of salt, fat, and sugar. · Stay at a healthy weight. Lose weight if you need to. You may need medicines to help lower your risk of heart attack and stroke. These include medicine to prevent blood clots, improve cholesterol, or lower blood pressure. You also may take a medicine that can help ease pain while you are walking. People who have severe PAD may have bypass surgery or other procedures (such as angioplasty) to restore proper blood flow to the legs.   Follow-up care is a key part of your treatment and safety. Be sure to make and go to all appointments, and call your doctor if you are having problems. It's also a good idea to know your test results and keep a list of the medicines you take. Where can you learn more? Go to [email or web address] and sign in to your [email or web address] account. Enter C870 in the [facility] box to learn more about \"Learning About Peripheral Arterial Disease (PAD). \"     If you do not have an account, please click on the \"Sign Up Now\" link. Current as of: April 29, 2021               Content Version: 13.0  © 3293-7443 Healthwise, Incorporated. Care instructions adapted under license by [facility] ([facility]). If you have questions about a medical condition or this instruction, always ask your healthcare professional. [address] any warranty or liability for your use of this information.

## 2021-12-29 ENCOUNTER — APPOINTMENT (OUTPATIENT)
Dept: GENERAL RADIOLOGY | Age: 71
End: 2021-12-29
Payer: MEDICARE

## 2021-12-29 ENCOUNTER — HOSPITAL ENCOUNTER (EMERGENCY)
Age: 71
Discharge: HOME | End: 2021-12-29
Attending: EMERGENCY MEDICINE
Payer: MEDICARE

## 2021-12-29 VITALS
SYSTOLIC BLOOD PRESSURE: 213 MMHG | DIASTOLIC BLOOD PRESSURE: 98 MMHG | RESPIRATION RATE: 28 BRPM | TEMPERATURE: 97.3 F | HEIGHT: 66 IN | OXYGEN SATURATION: 88 % | WEIGHT: 93 LBS | BODY MASS INDEX: 14.94 KG/M2 | HEART RATE: 81 BPM

## 2021-12-29 DIAGNOSIS — J44.1 COPD EXACERBATION (HCC): Primary | ICD-10-CM

## 2021-12-29 DIAGNOSIS — R06.00 DYSPNEA, UNSPECIFIED TYPE: ICD-10-CM

## 2021-12-29 LAB
ALBUMIN SERPL-MCNC: 4.4 G/DL (ref 3.5–5.2)
ALP BLD-CCNC: 80 U/L (ref 35–104)
ALT SERPL-CCNC: 21 U/L (ref 0–32)
ANION GAP SERPL CALCULATED.3IONS-SCNC: 11 MMOL/L (ref 7–16)
AST SERPL-CCNC: 27 U/L (ref 0–31)
BASOPHILS ABSOLUTE: 0.04 E9/L (ref 0–0.2)
BASOPHILS RELATIVE PERCENT: 0.3 % (ref 0–2)
BILIRUB SERPL-MCNC: 1.1 MG/DL (ref 0–1.2)
BUN BLDV-MCNC: 21 MG/DL (ref 6–23)
CALCIUM SERPL-MCNC: 9.4 MG/DL (ref 8.6–10.2)
CHLORIDE BLD-SCNC: 99 MMOL/L (ref 98–107)
CO2: 29 MMOL/L (ref 22–29)
CREAT SERPL-MCNC: 0.6 MG/DL (ref 0.5–1)
EKG ATRIAL RATE: 80 BPM
EKG P AXIS: 80 DEGREES
EKG P-R INTERVAL: 134 MS
EKG Q-T INTERVAL: 418 MS
EKG QRS DURATION: 74 MS
EKG QTC CALCULATION (BAZETT): 482 MS
EKG R AXIS: 81 DEGREES
EKG T AXIS: 69 DEGREES
EKG VENTRICULAR RATE: 80 BPM
EOSINOPHILS ABSOLUTE: 0.06 E9/L (ref 0.05–0.5)
EOSINOPHILS RELATIVE PERCENT: 0.4 % (ref 0–6)
GFR AFRICAN AMERICAN: >60
GFR NON-AFRICAN AMERICAN: >60 ML/MIN/1.73
GLUCOSE BLD-MCNC: 97 MG/DL (ref 74–99)
HCT VFR BLD CALC: 41.4 % (ref 34–48)
HEMOGLOBIN: 13 G/DL (ref 11.5–15.5)
IMMATURE GRANULOCYTES #: 0.04 E9/L
IMMATURE GRANULOCYTES %: 0.3 % (ref 0–5)
INFLUENZA A BY PCR: NOT DETECTED
INFLUENZA B BY PCR: NOT DETECTED
LACTIC ACID: 1.2 MMOL/L (ref 0.5–2.2)
LYMPHOCYTES ABSOLUTE: 1.59 E9/L (ref 1.5–4)
LYMPHOCYTES RELATIVE PERCENT: 10.6 % (ref 20–42)
MCH RBC QN AUTO: 31.5 PG (ref 26–35)
MCHC RBC AUTO-ENTMCNC: 31.4 % (ref 32–34.5)
MCV RBC AUTO: 100.2 FL (ref 80–99.9)
MONOCYTES ABSOLUTE: 0.75 E9/L (ref 0.1–0.95)
MONOCYTES RELATIVE PERCENT: 5 % (ref 2–12)
NEUTROPHILS ABSOLUTE: 12.59 E9/L (ref 1.8–7.3)
NEUTROPHILS RELATIVE PERCENT: 83.4 % (ref 43–80)
PDW BLD-RTO: 13.7 FL (ref 11.5–15)
PLATELET # BLD: 397 E9/L (ref 130–450)
PMV BLD AUTO: 9.2 FL (ref 7–12)
POTASSIUM REFLEX MAGNESIUM: 4.5 MMOL/L (ref 3.5–5)
PRO-BNP: 2172 PG/ML (ref 0–125)
RBC # BLD: 4.13 E12/L (ref 3.5–5.5)
SARS-COV-2, NAAT: NOT DETECTED
SODIUM BLD-SCNC: 139 MMOL/L (ref 132–146)
TOTAL PROTEIN: 7.3 G/DL (ref 6.4–8.3)
TROPONIN, HIGH SENSITIVITY: 13 NG/L (ref 0–9)
TROPONIN, HIGH SENSITIVITY: 14 NG/L (ref 0–9)
WBC # BLD: 15.1 E9/L (ref 4.5–11.5)

## 2021-12-29 PROCEDURE — 85025 COMPLETE CBC W/AUTO DIFF WBC: CPT

## 2021-12-29 PROCEDURE — 80053 COMPREHEN METABOLIC PANEL: CPT

## 2021-12-29 PROCEDURE — 93005 ELECTROCARDIOGRAM TRACING: CPT | Performed by: EMERGENCY MEDICINE

## 2021-12-29 PROCEDURE — 6360000002 HC RX W HCPCS: Performed by: PHYSICIAN ASSISTANT

## 2021-12-29 PROCEDURE — 83880 ASSAY OF NATRIURETIC PEPTIDE: CPT

## 2021-12-29 PROCEDURE — 93010 ELECTROCARDIOGRAM REPORT: CPT | Performed by: INTERNAL MEDICINE

## 2021-12-29 PROCEDURE — 94664 DEMO&/EVAL PT USE INHALER: CPT

## 2021-12-29 PROCEDURE — 87502 INFLUENZA DNA AMP PROBE: CPT

## 2021-12-29 PROCEDURE — 83605 ASSAY OF LACTIC ACID: CPT

## 2021-12-29 PROCEDURE — 71046 X-RAY EXAM CHEST 2 VIEWS: CPT

## 2021-12-29 PROCEDURE — 87635 SARS-COV-2 COVID-19 AMP PRB: CPT

## 2021-12-29 PROCEDURE — 2580000003 HC RX 258: Performed by: PHYSICIAN ASSISTANT

## 2021-12-29 PROCEDURE — 94640 AIRWAY INHALATION TREATMENT: CPT

## 2021-12-29 PROCEDURE — 84484 ASSAY OF TROPONIN QUANT: CPT

## 2021-12-29 PROCEDURE — 99283 EMERGENCY DEPT VISIT LOW MDM: CPT

## 2021-12-29 PROCEDURE — 96374 THER/PROPH/DIAG INJ IV PUSH: CPT

## 2021-12-29 PROCEDURE — 6370000000 HC RX 637 (ALT 250 FOR IP): Performed by: PHYSICIAN ASSISTANT

## 2021-12-29 RX ORDER — METHYLPREDNISOLONE SODIUM SUCCINATE 125 MG/2ML
125 INJECTION, POWDER, LYOPHILIZED, FOR SOLUTION INTRAMUSCULAR; INTRAVENOUS ONCE
Status: COMPLETED | OUTPATIENT
Start: 2021-12-29 | End: 2021-12-29

## 2021-12-29 RX ORDER — PREDNISONE 10 MG/1
TABLET ORAL
Qty: 20 TABLET | Refills: 0 | Status: SHIPPED | OUTPATIENT
Start: 2021-12-29 | End: 2022-01-08

## 2021-12-29 RX ORDER — 0.9 % SODIUM CHLORIDE 0.9 %
1000 INTRAVENOUS SOLUTION INTRAVENOUS ONCE
Status: COMPLETED | OUTPATIENT
Start: 2021-12-29 | End: 2021-12-29

## 2021-12-29 RX ORDER — IPRATROPIUM BROMIDE AND ALBUTEROL SULFATE 2.5; .5 MG/3ML; MG/3ML
3 SOLUTION RESPIRATORY (INHALATION) ONCE
Status: COMPLETED | OUTPATIENT
Start: 2021-12-29 | End: 2021-12-29

## 2021-12-29 RX ORDER — DOXYCYCLINE HYCLATE 100 MG
100 TABLET ORAL 2 TIMES DAILY
Qty: 20 TABLET | Refills: 0 | Status: SHIPPED | OUTPATIENT
Start: 2021-12-29 | End: 2022-01-08

## 2021-12-29 RX ADMIN — METHYLPREDNISOLONE SODIUM SUCCINATE 125 MG: 125 INJECTION, POWDER, FOR SOLUTION INTRAMUSCULAR; INTRAVENOUS at 11:05

## 2021-12-29 RX ADMIN — IPRATROPIUM BROMIDE AND ALBUTEROL SULFATE 3 AMPULE: 2.5; .5 SOLUTION RESPIRATORY (INHALATION) at 12:51

## 2021-12-29 RX ADMIN — SODIUM CHLORIDE 1000 ML: 9 INJECTION, SOLUTION INTRAVENOUS at 09:13

## 2021-12-29 NOTE — ED PROVIDER NOTES
ED Attending shared visit  CC: No                                                                                                                                      Department of Emergency Medicine   ED  Provider Note  Admit Date/RoomTime: 12/29/2021  8:04 AM  ED Room: 14/14        HPI:  12/29/21,   Time: 9:13 AM SHANNON Schultz is a 66 y.o. female presenting to the ED for SOB, beginning 1 day ago. The complaint has been persistent, moderate in severity, and worsened by nothing. The patient has a significant history of COPD, coronary artery disease and hypertension. She states that she follows with pulmonology and has oxygen at home to use as needed. She states that she typically wears it only at night. The patient presents to the emergency room today reporting increased shortness of breath. The symptoms began yesterday. She has been wearing her oxygen continually since that time. The patient denies any chest pain. She does report a headache, chills and a fever a few days ago. The patient has not been vaccinated for COVID-19. She is not having any chest pain. Denies any lower extremity swelling. She is on Eliquis chronically for A. Fib. Has Duonebs at home.         ROS:     Constitutional: See HPI  HENT: See HPI  Eyes: Negative for pain, discharge and redness  Respiratory:  See HPI  Cardiovascular: Negative for CP, edema or palpitations  Gastrointestinal: Negative for nausea, vomiting, diarrhea and abdominal distention  Genitourinary: Negative for dysuria and frequency  Musculoskeletal: Negative for back pain and arthralgia  Skin: Negative for rash and wound  Neurological: Negative for weakness and headaches  Hematological: Negative for adenopathy    All other systems reviewed and are negative      -------------------------------- PAST HISTORY ----------------------------------  Past Medical History:  has a past medical history of 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 with rest pain ([facility] Atrial fibrillation ([address] Atypical mole, Bilateral carotid artery stenosis, Bruit of right carotid artery, CAD (coronary artery disease), Cancer ([facility] COPD (chronic obstructive pulmonary disease) ([facility] [facility] Depression, Diarrhea, Femoro-popliteal artery disease ([facility] [facility] History of tobacco use, Hydrocephalus ([facility] [facility] Hyperlipidemia, Hypertension, BRY (obstructive sleep apnea), Pain in both feet, Paroxysmal A-fib ([facility] PVD (peripheral vascular disease) ([facility] [facility] (peripheral vascular disease) [facility] Rheumatoid arthritis ([facility] Short-term memory loss, Thyroid disease, Urinary incontinence, Venous stasis ulcer of left calf with fat layer exposed without varicose veins ([facility] [facility] and Weight loss. Past Surgical History:  has a past surgical history that includes  section; Colonoscopy; Tubal ligation; Dilation and curettage of uterus; PERIPHERAL PERCUTANEOUS ARTERIAL INTERVENTION (2019); laparotomy (N/A, 2020); lumbar drain implantation (N/A, 2020); Ventriculoperitoneal shunt (N/A, 2020); eye surgery; and Colonoscopy (N/A, 2021). Social History:  reports that she quit smoking about 4 years ago. Her smoking use included cigarettes. She smoked 0.25 packs per day. She has never used smokeless tobacco. She reports current alcohol use. She reports that she does not use drugs. Family History: family history includes Cancer in her father; Coronary Art Dis in her mother; High Blood Pressure in her mother; Kidney Disease in her mother; Other in her father. The patients home medications have been reviewed.     Allergies: Hornet venom, Wasp venom, and Flagyl [metronidazole]    --------------------------------- RESULTS ------------------------------------------  All laboratory and radiology results have been personally reviewed by myself   LABS:  Results for orders placed or performed during the hospital encounter of 21   COVID-Ricardo, Rapid Specimen: Nasopharyngeal Swab   Result Value Ref Range    SARS-CoV-2, NAAT Not Detected Not Detected   Rapid influenza A/B antigens    Specimen: Nares   Result Value Ref Range    Influenza A by PCR Not Detected Not Detected    Influenza B by PCR Not Detected Not Detected   CBC Auto Differential   Result Value Ref Range    WBC 15.1 (H) 4.5 - 11.5 E9/L    RBC 4.13 3.50 - 5.50 E12/L    Hemoglobin 13.0 11.5 - 15.5 g/dL    Hematocrit 41.4 34.0 - 48.0 %    .2 (H) 80.0 - 99.9 fL    MCH 31.5 26.0 - 35.0 pg    MCHC 31.4 (L) 32.0 - 34.5 %    RDW 13.7 11.5 - 15.0 fL    Platelets 932 706 - 681 E9/L    MPV 9.2 7.0 - 12.0 fL    Neutrophils % 83.4 (H) 43.0 - 80.0 %    Immature Granulocytes % 0.3 0.0 - 5.0 %    Lymphocytes % 10.6 (L) 20.0 - 42.0 %    Monocytes % 5.0 2.0 - 12.0 %    Eosinophils % 0.4 0.0 - 6.0 %    Basophils % 0.3 0.0 - 2.0 %    Neutrophils Absolute 12.59 (H) 1.80 - 7.30 E9/L    Immature Granulocytes # 0.04 E9/L    Lymphocytes Absolute 1.59 1.50 - 4.00 E9/L    Monocytes Absolute 0.75 0.10 - 0.95 E9/L    Eosinophils Absolute 0.06 0.05 - 0.50 E9/L    Basophils Absolute 0.04 0.00 - 0.20 E9/L   Comprehensive Metabolic Panel w/ Reflex to MG   Result Value Ref Range    Sodium 139 132 - 146 mmol/L    Potassium reflex Magnesium 4.5 3.5 - 5.0 mmol/L    Chloride 99 98 - 107 mmol/L    CO2 29 22 - 29 mmol/L    Anion Gap 11 7 - 16 mmol/L    Glucose 97 74 - 99 mg/dL    BUN 21 6 - 23 mg/dL    CREATININE 0.6 0.5 - 1.0 mg/dL    GFR Non-African American >60 >=60 mL/min/1.73    GFR African American >60     Calcium 9.4 8.6 - 10.2 mg/dL    Total Protein 7.3 6.4 - 8.3 g/dL    Albumin 4.4 3.5 - 5.2 g/dL    Total Bilirubin 1.1 0.0 - 1.2 mg/dL    Alkaline Phosphatase 80 35 - 104 U/L    ALT 21 0 - 32 U/L    AST 27 0 - 31 U/L   Troponin   Result Value Ref Range    Troponin, High Sensitivity 14 (H) 0 - 9 ng/L   Brain Natriuretic Peptide   Result Value Ref Range    Pro-BNP 2,172 (H) 0 - 125 pg/mL   Lactic Acid, Plasma   Result Value Ref Range    Lactic Acid 1.2 0.5 - 2.2 mmol/L   Troponin   Result Value Ref Range    Troponin, High Sensitivity 13 (H) 0 - 9 ng/L   EKG 12 Lead   Result Value Ref Range    Ventricular Rate 80 BPM    Atrial Rate 80 BPM    P-R Interval 134 ms    QRS Duration 74 ms    Q-T Interval 418 ms    QTc Calculation (Bazett) 482 ms    P Axis 80 degrees    R Axis 81 degrees    T Axis 69 degrees       RADIOLOGY:  Interpreted by Radiologist.  XR CHEST (2 VW)   Final Result   No pneumonia or pleural effusion.             ----------------- NURSING NOTES AND VITALS REVIEWED ---------------   The nursing notes within the ED encounter and vital signs as below have been reviewed. BP (!) 213/98   Pulse 81   Temp 97.3 °F (36.3 °C) (Tympanic)   Resp 28   Ht 5' 6\" (1.676 m)   Wt 93 lb (42.2 kg)   SpO2 (!) 88%   BMI 15.01 kg/m²   Oxygen Saturation Interpretation: Normal      --------------------------------PHYSICAL EXAM------------------------------------      Constitutional/General: Alert and oriented x3, well appearing, non toxic in NAD  Head: NC/AT  Eyes: PERRL, EOMI  Mouth: Oropharynx clear, handling secretions, no trismus  Neck: Supple, full ROM, no meningeal signs  Pulmonary: Lungs clear to auscultation bilaterally, no wheezes, rales, or rhonchi. Not in respiratory distress  Cardiovascular:  Regular rate and rhythm, no murmurs, gallops, or rubs. 2+ distal pulses  Abdomen: Soft, + BS. No distension. Nontender. No palpable rigidity, rebound or guarding  Extremities: Moves all extremities x 4. Warm and well perfused  Skin: warm and dry without rash  Neurologic: GCS 15,  Intact.   No focal deficits  Psych: Normal Affect      ------------------------ ED COURSE/MEDICAL DECISION MAKING----------------------  Medications   0.9 % sodium chloride bolus (0 mLs IntraVENous Stopped 12/29/21 1051)   methylPREDNISolone sodium (SOLU-MEDROL) injection 125 mg (125 mg IntraVENous Given 12/29/21 1105)   ipratropium-albuterol (DUONEB) nebulizer solution 3 ampule (3 ampules Inhalation Given 12/29/21 1251)         Medical Decision Making:    Suspect acute exacerbation of COPD. Her influenza and Covid swabs are negative. EKG negative for ischemic changes and chest x-ray is clear. Patient is not having any chest pain at this time. She has supplemental oxygen at home already. She was advised to get a pulse ox at home's to keep an eye on her O2 sats. We did give her a breathing tx. Advised to follow-up with pulmonology any persistent or ongoing symptoms. Plan discharge home with doxycycline and steroids. Counseling: The emergency provider has spoken with the patient and discussed todays results, in addition to providing specific details for the plan of care and counseling regarding the diagnosis and prognosis. Questions are answered at this time and they are agreeable with the plan.      ------------------------ IMPRESSION AND DISPOSITION -------------------------------    IMPRESSION  1. COPD exacerbation ([address]    2.  Dyspnea, unspecified type        DISPOSITION  Disposition: Discharge to home  Patient condition is stable                   Jem Guardado PA-C  12/29/21 [id number]

## 2021-12-29 NOTE — Clinical Note
Karely Power was seen and treated in our emergency department on 12/29/2021. She may return to work on 12/30/2021. Pt seen here in ED today. Daughter was present at [facility]. Please excuse from work today     If you have any questions or concerns, please don't hesitate to call.       Valerie Alexander, DO

## 2022-01-25 ENCOUNTER — HOSPITAL ENCOUNTER (INPATIENT)
Age: 72
LOS: 3 days | Discharge: HOME | DRG: 177 | End: 2022-01-28
Attending: EMERGENCY MEDICINE | Admitting: INTERNAL MEDICINE
Payer: MEDICARE

## 2022-01-25 ENCOUNTER — APPOINTMENT (OUTPATIENT)
Dept: CT IMAGING | Age: 72
DRG: 177 | End: 2022-01-25
Payer: MEDICARE

## 2022-01-25 ENCOUNTER — APPOINTMENT (OUTPATIENT)
Dept: GENERAL RADIOLOGY | Age: 72
DRG: 177 | End: 2022-01-25
Payer: MEDICARE

## 2022-01-25 DIAGNOSIS — J44.1 COPD EXACERBATION (HCC): ICD-10-CM

## 2022-01-25 DIAGNOSIS — J96.01 ACUTE RESPIRATORY FAILURE WITH HYPOXIA (HCC): Primary | ICD-10-CM

## 2022-01-25 DIAGNOSIS — U07.1 COVID-19: ICD-10-CM

## 2022-01-25 LAB
ANION GAP SERPL CALCULATED.3IONS-SCNC: 14 MMOL/L (ref 7–16)
B.E.: -2.1 MMOL/L (ref -3–3)
BUN BLDV-MCNC: 17 MG/DL (ref 6–23)
C-REACTIVE PROTEIN: 1.5 MG/DL (ref 0–0.4)
C-REACTIVE PROTEIN: 2 MG/DL (ref 0–0.4)
CALCIUM SERPL-MCNC: 9 MG/DL (ref 8.6–10.2)
CHLORIDE BLD-SCNC: 97 MMOL/L (ref 98–107)
CO2: 26 MMOL/L (ref 22–29)
COHB: 0.8 % (ref 0–1.5)
CREAT SERPL-MCNC: 0.8 MG/DL (ref 0.5–1)
CRITICAL: ABNORMAL
D DIMER: 295 NG/ML DDU
DATE ANALYZED: ABNORMAL
DATE OF COLLECTION: ABNORMAL
FERRITIN: 76 NG/ML
GFR AFRICAN AMERICAN: >60
GFR NON-AFRICAN AMERICAN: >60 ML/MIN/1.73
GLUCOSE BLD-MCNC: 129 MG/DL (ref 74–99)
HCO3: 22.9 MMOL/L (ref 22–26)
HCT VFR BLD CALC: 45.2 % (ref 34–48)
HEMOGLOBIN: 14.2 G/DL (ref 11.5–15.5)
HHB: 1.6 % (ref 0–5)
LAB: ABNORMAL
LACTATE DEHYDROGENASE: 290 U/L (ref 135–214)
Lab: ABNORMAL
MCH RBC QN AUTO: 32.1 PG (ref 26–35)
MCHC RBC AUTO-ENTMCNC: 31.4 % (ref 32–34.5)
MCV RBC AUTO: 102.3 FL (ref 80–99.9)
METHB: 0.3 % (ref 0–1.5)
MODE: ABNORMAL
O2 CONTENT: 18.7 ML/DL
O2 SATURATION: 98.4 % (ref 92–98.5)
O2HB: 97.3 % (ref 94–97)
OPERATOR ID: 7874
PATIENT TEMP: 37 C
PCO2: 39.7 MMHG (ref 35–45)
PDW BLD-RTO: 13.5 FL (ref 11.5–15)
PH BLOOD GAS: 7.38 (ref 7.35–7.45)
PLATELET # BLD: 367 E9/L (ref 130–450)
PMV BLD AUTO: 9 FL (ref 7–12)
PO2: 135.8 MMHG (ref 75–100)
POTASSIUM SERPL-SCNC: 4.7 MMOL/L (ref 3.5–5)
PROCALCITONIN: 0.22 NG/ML (ref 0–0.08)
RBC # BLD: 4.42 E12/L (ref 3.5–5.5)
SARS-COV-2, NAAT: DETECTED
SODIUM BLD-SCNC: 137 MMOL/L (ref 132–146)
SOURCE, BLOOD GAS: ABNORMAL
T4 FREE: 2.03 NG/DL (ref 0.93–1.7)
THB: 13.5 G/DL (ref 11.5–16.5)
TIME ANALYZED: 1403
TROPONIN, HIGH SENSITIVITY: 11 NG/L (ref 0–9)
TSH SERPL DL<=0.05 MIU/L-ACNC: 1.12 UIU/ML (ref 0.27–4.2)
WBC # BLD: 6.9 E9/L (ref 4.5–11.5)

## 2022-01-25 PROCEDURE — 94664 DEMO&/EVAL PT USE INHALER: CPT

## 2022-01-25 PROCEDURE — 2580000003 HC RX 258: Performed by: PHYSICIAN ASSISTANT

## 2022-01-25 PROCEDURE — 71045 X-RAY EXAM CHEST 1 VIEW: CPT

## 2022-01-25 PROCEDURE — 86140 C-REACTIVE PROTEIN: CPT

## 2022-01-25 PROCEDURE — 84484 ASSAY OF TROPONIN QUANT: CPT

## 2022-01-25 PROCEDURE — 85027 COMPLETE CBC AUTOMATED: CPT

## 2022-01-25 PROCEDURE — 93005 ELECTROCARDIOGRAM TRACING: CPT | Performed by: PHYSICIAN ASSISTANT

## 2022-01-25 PROCEDURE — 82805 BLOOD GASES W/O2 SATURATION: CPT

## 2022-01-25 PROCEDURE — 87040 BLOOD CULTURE FOR BACTERIA: CPT

## 2022-01-25 PROCEDURE — 36415 COLL VENOUS BLD VENIPUNCTURE: CPT

## 2022-01-25 PROCEDURE — 1200000000 HC SEMI PRIVATE

## 2022-01-25 PROCEDURE — 85378 FIBRIN DEGRADE SEMIQUANT: CPT

## 2022-01-25 PROCEDURE — XW0DXM6 INTRODUCTION OF BARICITINIB INTO MOUTH AND PHARYNX, EXTERNAL APPROACH, NEW TECHNOLOGY GROUP 6: ICD-10-PCS | Performed by: INTERNAL MEDICINE

## 2022-01-25 PROCEDURE — 6360000002 HC RX W HCPCS: Performed by: STUDENT IN AN ORGANIZED HEALTH CARE EDUCATION/TRAINING PROGRAM

## 2022-01-25 PROCEDURE — 80048 BASIC METABOLIC PNL TOTAL CA: CPT

## 2022-01-25 PROCEDURE — 83615 LACTATE (LD) (LDH) ENZYME: CPT

## 2022-01-25 PROCEDURE — 2700000000 HC OXYGEN THERAPY PER DAY

## 2022-01-25 PROCEDURE — 96374 THER/PROPH/DIAG INJ IV PUSH: CPT

## 2022-01-25 PROCEDURE — 84145 PROCALCITONIN (PCT): CPT

## 2022-01-25 PROCEDURE — 84439 ASSAY OF FREE THYROXINE: CPT

## 2022-01-25 PROCEDURE — 87635 SARS-COV-2 COVID-19 AMP PRB: CPT

## 2022-01-25 PROCEDURE — 6370000000 HC RX 637 (ALT 250 FOR IP): Performed by: STUDENT IN AN ORGANIZED HEALTH CARE EDUCATION/TRAINING PROGRAM

## 2022-01-25 PROCEDURE — 82728 ASSAY OF FERRITIN: CPT

## 2022-01-25 PROCEDURE — 99284 EMERGENCY DEPT VISIT MOD MDM: CPT

## 2022-01-25 PROCEDURE — 94640 AIRWAY INHALATION TREATMENT: CPT

## 2022-01-25 PROCEDURE — 84443 ASSAY THYROID STIM HORMONE: CPT

## 2022-01-25 PROCEDURE — 6370000000 HC RX 637 (ALT 250 FOR IP): Performed by: INTERNAL MEDICINE

## 2022-01-25 RX ORDER — OXYBUTYNIN CHLORIDE 15 MG/1
15 TABLET, EXTENDED RELEASE ORAL DAILY
COMMUNITY

## 2022-01-25 RX ORDER — SODIUM CHLORIDE 0.9 % (FLUSH) 0.9 %
5-40 SYRINGE (ML) INJECTION PRN
Status: DISCONTINUED | OUTPATIENT
Start: 2022-01-25 | End: 2022-01-28 | Stop reason: HOSPADM

## 2022-01-25 RX ORDER — AMLODIPINE BESYLATE 5 MG/1
5 TABLET ORAL DAILY
Status: DISCONTINUED | OUTPATIENT
Start: 2022-01-26 | End: 2022-01-28 | Stop reason: HOSPADM

## 2022-01-25 RX ORDER — SODIUM CHLORIDE 0.9 % (FLUSH) 0.9 %
5-40 SYRINGE (ML) INJECTION EVERY 12 HOURS SCHEDULED
Status: DISCONTINUED | OUTPATIENT
Start: 2022-01-25 | End: 2022-01-28 | Stop reason: HOSPADM

## 2022-01-25 RX ORDER — DOXYCYCLINE HYCLATE 100 MG/1
100 CAPSULE ORAL EVERY 12 HOURS SCHEDULED
Status: DISCONTINUED | OUTPATIENT
Start: 2022-01-25 | End: 2022-01-28 | Stop reason: HOSPADM

## 2022-01-25 RX ORDER — ACETAMINOPHEN 650 MG/1
650 SUPPOSITORY RECTAL EVERY 6 HOURS PRN
Status: DISCONTINUED | OUTPATIENT
Start: 2022-01-25 | End: 2022-01-28 | Stop reason: HOSPADM

## 2022-01-25 RX ORDER — LEVOTHYROXINE SODIUM 0.12 MG/1
125 TABLET ORAL DAILY
Status: DISCONTINUED | OUTPATIENT
Start: 2022-01-26 | End: 2022-01-28 | Stop reason: HOSPADM

## 2022-01-25 RX ORDER — BUPROPION HYDROCHLORIDE 100 MG/1
100 TABLET, EXTENDED RELEASE ORAL DAILY
Status: DISCONTINUED | OUTPATIENT
Start: 2022-01-26 | End: 2022-01-28 | Stop reason: HOSPADM

## 2022-01-25 RX ORDER — ACETAMINOPHEN 325 MG/1
650 TABLET ORAL EVERY 6 HOURS PRN
Status: DISCONTINUED | OUTPATIENT
Start: 2022-01-25 | End: 2022-01-28 | Stop reason: HOSPADM

## 2022-01-25 RX ORDER — SOTALOL HYDROCHLORIDE 120 MG/1
120 TABLET ORAL 2 TIMES DAILY
Status: DISCONTINUED | OUTPATIENT
Start: 2022-01-25 | End: 2022-01-28 | Stop reason: HOSPADM

## 2022-01-25 RX ORDER — MONTELUKAST SODIUM 4 MG/1
1 TABLET, CHEWABLE ORAL 2 TIMES DAILY
COMMUNITY
Start: 2021-11-01

## 2022-01-25 RX ORDER — SODIUM CHLORIDE 9 MG/ML
25 INJECTION, SOLUTION INTRAVENOUS PRN
Status: DISCONTINUED | OUTPATIENT
Start: 2022-01-25 | End: 2022-01-28 | Stop reason: HOSPADM

## 2022-01-25 RX ORDER — 0.9 % SODIUM CHLORIDE 0.9 %
500 INTRAVENOUS SOLUTION INTRAVENOUS ONCE
Status: COMPLETED | OUTPATIENT
Start: 2022-01-25 | End: 2022-01-25

## 2022-01-25 RX ORDER — SODIUM CHLORIDE 0.9 % (FLUSH) 0.9 %
10 SYRINGE (ML) INJECTION PRN
Status: DISCONTINUED | OUTPATIENT
Start: 2022-01-25 | End: 2022-01-28 | Stop reason: HOSPADM

## 2022-01-25 RX ORDER — METHYLPREDNISOLONE SODIUM SUCCINATE 125 MG/2ML
125 INJECTION, POWDER, LYOPHILIZED, FOR SOLUTION INTRAMUSCULAR; INTRAVENOUS ONCE
Status: COMPLETED | OUTPATIENT
Start: 2022-01-25 | End: 2022-01-25

## 2022-01-25 RX ORDER — IPRATROPIUM BROMIDE AND ALBUTEROL SULFATE 2.5; .5 MG/3ML; MG/3ML
1 SOLUTION RESPIRATORY (INHALATION)
Status: DISCONTINUED | OUTPATIENT
Start: 2022-01-25 | End: 2022-01-25 | Stop reason: ALTCHOICE

## 2022-01-25 RX ORDER — IPRATROPIUM BROMIDE AND ALBUTEROL SULFATE 2.5; .5 MG/3ML; MG/3ML
3 SOLUTION RESPIRATORY (INHALATION) ONCE
Status: COMPLETED | OUTPATIENT
Start: 2022-01-25 | End: 2022-01-25

## 2022-01-25 RX ORDER — POLYETHYLENE GLYCOL 3350 17 G/17G
17 POWDER, FOR SOLUTION ORAL DAILY PRN
Status: DISCONTINUED | OUTPATIENT
Start: 2022-01-25 | End: 2022-01-28 | Stop reason: HOSPADM

## 2022-01-25 RX ORDER — ALBUTEROL SULFATE 90 UG/1
2 AEROSOL, METERED RESPIRATORY (INHALATION) EVERY 4 HOURS PRN
Status: DISCONTINUED | OUTPATIENT
Start: 2022-01-25 | End: 2022-01-28 | Stop reason: HOSPADM

## 2022-01-25 RX ORDER — OXYBUTYNIN CHLORIDE 5 MG/1
5 TABLET ORAL DAILY
COMMUNITY
End: 2022-01-25

## 2022-01-25 RX ORDER — METHYLPREDNISOLONE SODIUM SUCCINATE 40 MG/ML
40 INJECTION, POWDER, LYOPHILIZED, FOR SOLUTION INTRAMUSCULAR; INTRAVENOUS EVERY 8 HOURS
Status: DISCONTINUED | OUTPATIENT
Start: 2022-01-26 | End: 2022-01-28 | Stop reason: HOSPADM

## 2022-01-25 RX ORDER — CHOLESTYRAMINE 4 G/9G
1 POWDER, FOR SUSPENSION ORAL 2 TIMES DAILY
Status: DISCONTINUED | OUTPATIENT
Start: 2022-01-25 | End: 2022-01-28 | Stop reason: HOSPADM

## 2022-01-25 RX ADMIN — SODIUM CHLORIDE 500 ML: 9 INJECTION, SOLUTION INTRAVENOUS at 13:39

## 2022-01-25 RX ADMIN — IPRATROPIUM BROMIDE AND ALBUTEROL SULFATE 3 AMPULE: .5; 2.5 SOLUTION RESPIRATORY (INHALATION) at 13:47

## 2022-01-25 RX ADMIN — DOXYCYCLINE HYCLATE 100 MG: 100 CAPSULE ORAL at 16:47

## 2022-01-25 RX ADMIN — IPRATROPIUM BROMIDE AND ALBUTEROL 1 PUFF: 20; 100 SPRAY, METERED RESPIRATORY (INHALATION) at 21:23

## 2022-01-25 RX ADMIN — METHYLPREDNISOLONE SODIUM SUCCINATE 125 MG: 125 INJECTION, POWDER, FOR SOLUTION INTRAMUSCULAR; INTRAVENOUS at 13:43

## 2022-01-25 ASSESSMENT — ENCOUNTER SYMPTOMS
BACK PAIN: 0
DIARRHEA: 0
COUGH: 1
COLOR CHANGE: 0
VOMITING: 0
ABDOMINAL PAIN: 0
NAUSEA: 0
SHORTNESS OF BREATH: 1

## 2022-01-25 NOTE — ED PROVIDER NOTES
HPI   This is 77-year-old female patient with past history of COPD and wears 2 L of nasal cannula oxygen at night. Patient reports she has been feeling increasingly short of breath since yesterday, she been having associated cough with slightly productive yellow-colored sputum. She denies any fevers or chills however she is having intermittent sternal chest pain with shortness of breath. Symptoms are severe in severity, not better or worse with anything and persistent. Patient is on Eliquis and has been taking as prescribed. Patient was 89% on room air on 2 L of oxygen upon arrival.  Review of Systems   Constitutional: Negative for chills and fever. HENT: Negative for congestion. Respiratory: Positive for cough and shortness of breath. Cardiovascular: Positive for chest pain. Gastrointestinal: Negative for abdominal pain, diarrhea, nausea and vomiting. Genitourinary: Negative for difficulty urinating, dysuria and hematuria. Musculoskeletal: Negative for back pain. Skin: Negative for color change. All other systems reviewed and are negative. Physical Exam  Vitals and nursing note reviewed. Constitutional:       Appearance: Normal appearance. HENT:      Head: Normocephalic and atraumatic. Nose: Nose normal. No congestion. Mouth/Throat:      Mouth: Mucous membranes are moist.      Pharynx: Oropharynx is clear. Eyes:      Conjunctiva/sclera: Conjunctivae normal.      Pupils: Pupils are equal, round, and reactive to light. Cardiovascular:      Rate and Rhythm: Normal rate and regular rhythm. Pulses: Normal pulses. Heart sounds: Normal heart sounds. Pulmonary:      Comments: Patient is tachypneic, with decreased breath sounds and expiratory wheezes throughout. Patient on nasal cannula oxygen. Abdominal:      General: Bowel sounds are normal. There is no distension. Tenderness: There is no abdominal tenderness.    Musculoskeletal:         General: Normal range of motion. Cervical back: Normal range of motion and neck supple. Skin:     General: Skin is warm and dry. Capillary Refill: Capillary refill takes less than 2 seconds. Neurological:      General: No focal deficit present. Mental Status: She is alert. Procedures     MDM   25-year-old female patient with past history of COPD on chronic oxygen at night. Patient here hypoxic on 2 L nasal cannula oxygen. Oxygen increased. Labs and imaging obtained. Patient found to be Covid positive. Patient symptoms improved after DuoNeb and Solu-Medrol treatment. At this time patient to be admitted for acute hypoxic respiratory failure. ED Course as of 01/25/22 1801 Tue Jan 25, 2022   1757 EKG  Sinus rhythm 69 bpm, QTC slightly prolonged at 488. No ST elevations. There is compared to prior. [FG]   9461 Symptoms appear to be improved on 5 L nasal cannula oxygen. Also improved after DuoNeb treatments and Solu-Medrol. [FG]      ED Course User Index  [FG] Fredie Nageotte, DO        ED Course as of 01/25/22 1801 Tue Jan 25, 2022 1757 EKG  Sinus rhythm 69 bpm, QTC slightly prolonged at 488. No ST elevations. There is compared to prior. [FG]   2212 Symptoms appear to be improved on 5 L nasal cannula oxygen.   Also improved after DuoNeb treatments and Solu-Medrol. [FG]      ED Course User Index  [FG] Fredie Nageotte, DO       --------------------------------------------- PAST HISTORY ---------------------------------------------  Past Medical History:  has a past medical history of 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 with rest pain ([facility] Atrial fibrillation ([facility] Atypical mole, Bilateral carotid artery stenosis, Bruit of right carotid artery, CAD (coronary artery disease), Cancer ([facility] COPD (chronic obstructive pulmonary disease) ([facility] Depression, Diarrhea, Femoro-popliteal artery disease ([facility] History of tobacco use, Hydrocephalus ([facility] Hyperlipidemia, Hypertension, BRY (obstructive sleep apnea), Pain in both feet, Paroxysmal A-fib (HCC), PVD (peripheral vascular disease) ([facility] PVD (peripheral vascular disease) with claudication (HCC), Rheumatoid arthritis ([facility] Short-term memory loss, Thyroid disease, Urinary incontinence, Venous stasis ulcer of left calf with fat layer exposed without varicose veins ([facility] and Weight loss. Past Surgical History:  has a past surgical history that includes  section; Colonoscopy; Tubal ligation; Dilation and curettage of uterus; PERIPHERAL PERCUTANEOUS ARTERIAL INTERVENTION (2019); laparotomy (N/A, 2020); lumbar drain implantation (N/A, 2020); Ventriculoperitoneal shunt (N/A, 2020); eye surgery; and Colonoscopy (N/A, 2021). Social History:  reports that she quit smoking about 4 years ago. Her smoking use included cigarettes. She smoked 0.25 packs per day. She has never used smokeless tobacco. She reports current alcohol use. She reports that she does not use drugs. Family History: family history includes Cancer in her father; Coronary Art Dis in her mother; High Blood Pressure in her mother; Kidney Disease in her mother; Other in her father. The patients home medications have been reviewed.     Allergies: Hornet venom, Wasp venom, and Flagyl [metronidazole]    -------------------------------------------------- RESULTS -------------------------------------------------    LABS:  Results for orders placed or performed during the hospital encounter of 22   COVID-19, Rapid    Specimen: Nasopharyngeal Swab; Nasal   Result Value Ref Range    SARS-CoV-2, NAAT DETECTED (A) Not Detected   Basic metabolic panel   Result Value Ref Range    Sodium 137 132 - 146 mmol/L    Potassium 4.7 3.5 - 5.0 mmol/L    Chloride 97 (L) 98 - 107 mmol/L    CO2 26 22 - 29 mmol/L    Anion Gap 14 7 - 16 mmol/L    Glucose 129 (H) 74 - 99 mg/dL    BUN 17 6 - 23 mg/dL    CREATININE 0.8 0.5 - 1.0 mg/dL    GFR Non- American >60 >=60 mL/min/1.73    GFR African American >60     Calcium 9.0 8.6 - 10.2 mg/dL   CBC   Result Value Ref Range    WBC 6.9 4.5 - 11.5 E9/L    RBC 4.42 3.50 - 5.50 E12/L    Hemoglobin 14.2 11.5 - 15.5 g/dL    Hematocrit 45.2 34.0 - 48.0 %    .3 (H) 80.0 - 99.9 fL    MCH 32.1 26.0 - 35.0 pg    MCHC 31.4 (L) 32.0 - 34.5 %    RDW 13.5 11.5 - 15.0 fL    Platelets 423 810 - 871 E9/L    MPV 9.0 7.0 - 12.0 fL   Troponin I   Result Value Ref Range    Troponin, High Sensitivity 11 (H) 0 - 9 ng/L   TSH without Reflex   Result Value Ref Range    TSH 1.120 0.270 - 4.200 uIU/mL   T4, Free   Result Value Ref Range    T4 Free 2.03 (H) 0.93 - 1.70 ng/dL   Blood Gas, Arterial   Result Value Ref Range    Date Analyzed [id number]     Time Analyzed 1403     Source: Blood Arterial     pH, Blood Gas 7.378 7.350 - 7.450    PCO2 39.7 35.0 - 45.0 mmHg    PO2 135.8 (H) 75.0 - 100.0 mmHg    HCO3 22.9 22.0 - 26.0 mmol/L    B.E. -2.1 -3.0 - 3.0 mmol/L    O2 Sat 98.4 92.0 - 98.5 %    O2Hb 97.3 (H) 94.0 - 97.0 %    COHb 0.8 0.0 - 1.5 %    MetHb 0.3 0.0 - 1.5 %    O2 Content 18.7 mL/dL    HHb 1.6 0.0 - 5.0 %    tHb (est) 13.5 11.5 - 16.5 g/dL    Mode NC- 3 L     Date Of Collection      Time Collected      Pt Temp 37.0 C     ID [id number]     Lab [id number]     Critical(s) Notified . No Critical Values    EKG 12 Lead   Result Value Ref Range    Ventricular Rate 67 BPM    Atrial Rate 67 BPM    P-R Interval 146 ms    QRS Duration 68 ms    Q-T Interval 462 ms    QTc Calculation (Bazett) 488 ms    P Axis 84 degrees    R Axis 85 degrees    T Axis 73 degrees       RADIOLOGY:  XR CHEST PORTABLE   Final Result   No pneumonia or pleural effusion.             ------------------------- NURSING NOTES AND VITALS REVIEWED ---------------------------  Date / Time Roomed:  1/25/2022 12:55 PM  ED Bed Assignment:  05/05    The nursing notes within the ED encounter and vital signs as below have been reviewed.      Patient Vitals for the past 24 hrs:   BP Temp Temp src Pulse Resp SpO2 Height Weight   01/25/22 1538 -- -- -- -- -- 94 % -- --   01/25/22 1530 131/77 97.2 °F (36.2 °C) Oral 74 20 96 % -- --   01/25/22 1237 (!) 141/70 98.3 °F (36.8 °C) Temporal 76 20 (!) 89 % 5' 6\" (1.676 m) 90 lb (40.8 kg)   01/25/22 1211 -- -- -- -- -- 96 % -- --         ------------------------------------------ PROGRESS NOTES ------------------------------------------    Counseling:  I have spoken with the patient and discussed todays results, in addition to providing specific details for the plan of care and counseling regarding the diagnosis and prognosis. Their questions are answered at this time and they are agreeable with the plan of admission.    --------------------------------- ADDITIONAL PROVIDER NOTES ---------------------------------  Consultations:  Time: 5pm. Spoke with Dr. Luci Garcia. Discussed case. They will admit the patient. This patient's ED course included: a personal history and physicial examination, re-evaluation prior to disposition, multiple bedside re-evaluations, IV medications, cardiac monitoring, continuous pulse oximetry and complex medical decision making and emergency management    This patient has remained hemodynamically stable during their ED course. Diagnosis:  1. Acute respiratory failure with hypoxia ([address]    2. COPD exacerbation ([facility]    3. COVID-19        Disposition:  Patient's disposition: Admit to med/surg floor  Patient's condition is stable.          Leonie Gale DO  Resident  01/25/22 6068

## 2022-01-25 NOTE — ED NOTES
Pt states she normally wears 3L of Oxygen via nasal canula at night.  Pt's oxygen was titrated down  From 5L to 3L and pt's oxygen saturation is steady at 94%     Nguyen Gibbons RN  01/25/22 5037

## 2022-01-25 NOTE — ACP (ADVANCE CARE PLANNING)
Advance Care Planning     Advance Care Planning Activator (Inpatient)  Conversation Note      Date of ACP Conversation: 1/25/2022     Conversation Conducted with: Patient with Decision Making Capacity    ACP Activator: Magalys Aguilera RN      Health Care Decision Maker:     Current Designated Health Care Decision Maker:     Primary Decision Maker: Yamileth Kirby Child - [address] Preferences    Ventilation: \"If you were in your present state of health and suddenly became very ill and were unable to breathe on your own, what would your preference be about the use of a ventilator (breathing machine) if it were available to you? \"      Would the patient desire the use of ventilator (breathing machine)?: yes    \"If your health worsens and it becomes clear that your chance of recovery is unlikely, what would your preference be about the use of a ventilator (breathing machine) if it were available to you? \"     Would the patient desire the use of ventilator (breathing machine)?: Yes      Resuscitation  \"CPR works best to restart the heart when there is a sudden event, like a heart attack, in someone who is otherwise healthy. Unfortunately, CPR does not typically restart the heart for people who have serious health conditions or who are very sick. \"    \"In the event your heart stopped as a result of an underlying serious health condition, would you want attempts to be made to restart your heart (answer \"yes\" for attempt to resuscitate) or would you prefer a natural death (answer \"no\" for do not attempt to resuscitate)? \" yes       [] Yes   [] No   Educated Patient / Brittney Guzman regarding differences between Advance Directives and portable DNR orders.     Length of ACP Conversation in minutes:      Conversation Outcomes:  [x] ACP discussion completed  [] Existing advance directive reviewed with patient; no changes to patient's previously recorded wishes  [] New Advance Directive completed  [] Portable Do Not Rescitate prepared for Provider review and signature  [] POLST/POST/MOLST/MOST prepared for Provider review and signature      Follow-up plan:    [] Schedule follow-up conversation to continue planning  [x] Referred individual to Provider for additional questions/concerns   [] Advised patient/agent/surrogate to review completed ACP document and update if needed with changes in condition, patient preferences or care setting    [] This note routed to one or more involved healthcare providers

## 2022-01-25 NOTE — ED NOTES
Department of Emergency Medicine  FIRST PROVIDER TRIAGE NOTE             Independent MLP           1/25/22  12:39 PM EST    Date of Encounter: 1/25/22   MRN: [id number]      HPI: Ron Mensah is a 66 y.o. female who presents to the ED for Shortness of Breath (pt with COPD becoming more short of breath. Arrived in ED with O2 from home at 2L NC )       ROS: Negative for vomiting, diarrhea, urinary complaints or rash. PE: Gen Appearance/Constitutional: alert  HEENT: NC/NT. PERRLA,  Airway patent. CV: regular rate  Pulm: abnormal breath sounds auscultated     Initial Plan of Care: All treatment areas with department are currently occupied. Plan to order/Initiate the following while awaiting opening in ED: labs, EKG, imaging studies, IV fluids and supplemental oxygen.     Initial Plan of Care: Initiate Treatment-Testing, Proceed toTreatment Area When Bed Available for ED Attending/MLP to Continue Care    Electronically signed by FREDI Pedersen   DD: 1/25/22         FREDI Washington  01/25/22 2836

## 2022-01-25 NOTE — PROGRESS NOTES
Admission database completed to best of this RN's ability. Care plan and education initiated. Pt has oxygen concentrator at home through [facility], wears 3L n/c HS. Also has a nebulizer machine. Denies any HHC prior to admission.

## 2022-01-26 PROBLEM — R73.09 ELEVATED GLUCOSE LEVEL: Status: ACTIVE | Noted: 2022-01-26

## 2022-01-26 PROBLEM — U07.1 COVID-19: Status: ACTIVE | Noted: 2022-01-26

## 2022-01-26 LAB
ALBUMIN SERPL-MCNC: 3.6 G/DL (ref 3.5–5.2)
ALP BLD-CCNC: 72 U/L (ref 35–104)
ALT SERPL-CCNC: 24 U/L (ref 0–32)
ANION GAP SERPL CALCULATED.3IONS-SCNC: 15 MMOL/L (ref 7–16)
AST SERPL-CCNC: 27 U/L (ref 0–31)
BASOPHILS ABSOLUTE: 0 E9/L (ref 0–0.2)
BASOPHILS RELATIVE PERCENT: 0 % (ref 0–2)
BILIRUB SERPL-MCNC: 0.4 MG/DL (ref 0–1.2)
BUN BLDV-MCNC: 25 MG/DL (ref 6–23)
CALCIUM SERPL-MCNC: 8.6 MG/DL (ref 8.6–10.2)
CHLORIDE BLD-SCNC: 99 MMOL/L (ref 98–107)
CO2: 22 MMOL/L (ref 22–29)
CREAT SERPL-MCNC: 0.9 MG/DL (ref 0.5–1)
EKG ATRIAL RATE: 67 BPM
EKG P AXIS: 84 DEGREES
EKG P-R INTERVAL: 146 MS
EKG Q-T INTERVAL: 462 MS
EKG QRS DURATION: 68 MS
EKG QTC CALCULATION (BAZETT): 488 MS
EKG R AXIS: 85 DEGREES
EKG T AXIS: 73 DEGREES
EKG VENTRICULAR RATE: 67 BPM
EOSINOPHILS ABSOLUTE: 0 E9/L (ref 0.05–0.5)
EOSINOPHILS RELATIVE PERCENT: 0 % (ref 0–6)
GFR AFRICAN AMERICAN: >60
GFR NON-AFRICAN AMERICAN: >60 ML/MIN/1.73
GLUCOSE BLD-MCNC: 311 MG/DL (ref 74–99)
HBA1C MFR BLD: 5.7 % (ref 4–5.6)
HCT VFR BLD CALC: 41.1 % (ref 34–48)
HEMOGLOBIN: 13.2 G/DL (ref 11.5–15.5)
IMMATURE GRANULOCYTES #: 0.01 E9/L
IMMATURE GRANULOCYTES %: 0.3 % (ref 0–5)
LYMPHOCYTES ABSOLUTE: 0.76 E9/L (ref 1.5–4)
LYMPHOCYTES RELATIVE PERCENT: 21.3 % (ref 20–42)
MCH RBC QN AUTO: 32.2 PG (ref 26–35)
MCHC RBC AUTO-ENTMCNC: 32.1 % (ref 32–34.5)
MCV RBC AUTO: 100.2 FL (ref 80–99.9)
METER GLUCOSE: 129 MG/DL (ref 74–99)
METER GLUCOSE: 184 MG/DL (ref 74–99)
METER GLUCOSE: 210 MG/DL (ref 74–99)
MONOCYTES ABSOLUTE: 0.18 E9/L (ref 0.1–0.95)
MONOCYTES RELATIVE PERCENT: 5 % (ref 2–12)
NEUTROPHILS ABSOLUTE: 2.62 E9/L (ref 1.8–7.3)
NEUTROPHILS RELATIVE PERCENT: 73.4 % (ref 43–80)
PDW BLD-RTO: 13.3 FL (ref 11.5–15)
PLATELET # BLD: 345 E9/L (ref 130–450)
PMV BLD AUTO: 9.4 FL (ref 7–12)
POTASSIUM REFLEX MAGNESIUM: 3.8 MMOL/L (ref 3.5–5)
RBC # BLD: 4.1 E12/L (ref 3.5–5.5)
SODIUM BLD-SCNC: 136 MMOL/L (ref 132–146)
TOTAL PROTEIN: 6.3 G/DL (ref 6.4–8.3)
WBC # BLD: 3.6 E9/L (ref 4.5–11.5)

## 2022-01-26 PROCEDURE — 85025 COMPLETE CBC W/AUTO DIFF WBC: CPT

## 2022-01-26 PROCEDURE — 97165 OT EVAL LOW COMPLEX 30 MIN: CPT

## 2022-01-26 PROCEDURE — 6370000000 HC RX 637 (ALT 250 FOR IP): Performed by: INTERNAL MEDICINE

## 2022-01-26 PROCEDURE — 6370000000 HC RX 637 (ALT 250 FOR IP): Performed by: FAMILY MEDICINE

## 2022-01-26 PROCEDURE — 83036 HEMOGLOBIN GLYCOSYLATED A1C: CPT

## 2022-01-26 PROCEDURE — 1200000000 HC SEMI PRIVATE

## 2022-01-26 PROCEDURE — 82962 GLUCOSE BLOOD TEST: CPT

## 2022-01-26 PROCEDURE — 2700000000 HC OXYGEN THERAPY PER DAY

## 2022-01-26 PROCEDURE — 80053 COMPREHEN METABOLIC PANEL: CPT

## 2022-01-26 PROCEDURE — 6360000002 HC RX W HCPCS: Performed by: INTERNAL MEDICINE

## 2022-01-26 PROCEDURE — 97530 THERAPEUTIC ACTIVITIES: CPT

## 2022-01-26 PROCEDURE — 97161 PT EVAL LOW COMPLEX 20 MIN: CPT

## 2022-01-26 PROCEDURE — 2580000003 HC RX 258: Performed by: INTERNAL MEDICINE

## 2022-01-26 PROCEDURE — 36415 COLL VENOUS BLD VENIPUNCTURE: CPT

## 2022-01-26 RX ORDER — NICOTINE POLACRILEX 4 MG
15 LOZENGE BUCCAL PRN
Status: DISCONTINUED | OUTPATIENT
Start: 2022-01-26 | End: 2022-01-28 | Stop reason: HOSPADM

## 2022-01-26 RX ORDER — DEXTROSE MONOHYDRATE 50 MG/ML
100 INJECTION, SOLUTION INTRAVENOUS PRN
Status: DISCONTINUED | OUTPATIENT
Start: 2022-01-26 | End: 2022-01-28 | Stop reason: HOSPADM

## 2022-01-26 RX ORDER — DEXTROSE MONOHYDRATE 25 G/50ML
12.5 INJECTION, SOLUTION INTRAVENOUS PRN
Status: DISCONTINUED | OUTPATIENT
Start: 2022-01-26 | End: 2022-01-28 | Stop reason: HOSPADM

## 2022-01-26 RX ADMIN — APIXABAN 5 MG: 5 TABLET, FILM COATED ORAL at 08:27

## 2022-01-26 RX ADMIN — DOXYCYCLINE HYCLATE 100 MG: 100 CAPSULE ORAL at 21:30

## 2022-01-26 RX ADMIN — METHYLPREDNISOLONE SODIUM SUCCINATE 40 MG: 40 INJECTION, POWDER, LYOPHILIZED, FOR SOLUTION INTRAMUSCULAR; INTRAVENOUS at 06:00

## 2022-01-26 RX ADMIN — DOXYCYCLINE HYCLATE 100 MG: 100 CAPSULE ORAL at 08:26

## 2022-01-26 RX ADMIN — BARICITINIB 4 MG: 2 TABLET, FILM COATED ORAL at 14:11

## 2022-01-26 RX ADMIN — Medication 10 ML: at 21:32

## 2022-01-26 RX ADMIN — SOTALOL HYDROCHLORIDE 120 MG: 120 TABLET ORAL at 08:26

## 2022-01-26 RX ADMIN — METHYLPREDNISOLONE SODIUM SUCCINATE 40 MG: 40 INJECTION, POWDER, LYOPHILIZED, FOR SOLUTION INTRAMUSCULAR; INTRAVENOUS at 21:31

## 2022-01-26 RX ADMIN — CHOLESTYRAMINE 4 G: 4 POWDER, FOR SUSPENSION ORAL at 21:33

## 2022-01-26 RX ADMIN — IPRATROPIUM BROMIDE AND ALBUTEROL 1 PUFF: 20; 100 SPRAY, METERED RESPIRATORY (INHALATION) at 12:05

## 2022-01-26 RX ADMIN — SOTALOL HYDROCHLORIDE 120 MG: 120 TABLET ORAL at 21:32

## 2022-01-26 RX ADMIN — LEVOTHYROXINE SODIUM 125 MCG: 0.12 TABLET ORAL at 06:00

## 2022-01-26 RX ADMIN — Medication 10 ML: at 08:27

## 2022-01-26 RX ADMIN — Medication 10 ML: at 02:57

## 2022-01-26 RX ADMIN — BUPROPION HYDROCHLORIDE 100 MG: 100 TABLET, EXTENDED RELEASE ORAL at 08:26

## 2022-01-26 RX ADMIN — IPRATROPIUM BROMIDE AND ALBUTEROL 1 PUFF: 20; 100 SPRAY, METERED RESPIRATORY (INHALATION) at 17:02

## 2022-01-26 RX ADMIN — APIXABAN 5 MG: 5 TABLET, FILM COATED ORAL at 21:30

## 2022-01-26 RX ADMIN — APIXABAN 5 MG: 5 TABLET, FILM COATED ORAL at 00:37

## 2022-01-26 RX ADMIN — IPRATROPIUM BROMIDE AND ALBUTEROL 1 PUFF: 20; 100 SPRAY, METERED RESPIRATORY (INHALATION) at 21:31

## 2022-01-26 RX ADMIN — AMLODIPINE BESYLATE 5 MG: 5 TABLET ORAL at 08:26

## 2022-01-26 RX ADMIN — IPRATROPIUM BROMIDE AND ALBUTEROL 1 PUFF: 20; 100 SPRAY, METERED RESPIRATORY (INHALATION) at 08:27

## 2022-01-26 RX ADMIN — METHYLPREDNISOLONE SODIUM SUCCINATE 40 MG: 40 INJECTION, POWDER, LYOPHILIZED, FOR SOLUTION INTRAMUSCULAR; INTRAVENOUS at 14:11

## 2022-01-26 RX ADMIN — CHOLESTYRAMINE 4 G: 4 POWDER, FOR SUSPENSION ORAL at 08:27

## 2022-01-26 ASSESSMENT — PAIN SCALES - GENERAL
PAINLEVEL_OUTOF10: 0

## 2022-01-26 NOTE — H&P
[address] [facility]  History and Physical      CHIEF COMPLAINT:    Chief Complaint   Patient presents with    Shortness of Breath     pt with COPD becoming more short of breath. Arrived in ED with O2 from home at 2L NC         Patient of Dorothy See MD presents with:  COVID-19    History of Present Illness:   Patient is a 78-year-old female with a past medical history of A. fib, CAD, COPD, hyperlipidemia, hypertension, BRY, proximal A. fib, and PVD. Patient presented to the ED with shortness of breath. Patient's baseline O2 is 2 L at bedtime. Patient is currently utilizing 3 L continuously for saturation of 98%. Patient admits that she is not vaccinated and she will never be vaccinated. Patient began her shortness of breat approximately 3 days ago and it has progressively worsened. Patient admits shortness of breath is worse with exertion and mildly relieved with rest.  Patient notes that she has had a nonproductive cough for approximately 5 days. Patient was started on prednisone from her PCP approximately 3 days ago. Patient's glucose levels are 311. Discussion held with patient as she was refusing insulin patient has agreed to insulin sliding scale. Patient is alert and oriented on examination. Patient denies any chest pain, headache, dizziness, blurred vision, abdominal pain, and chills. ER work-up revealed positive for Covid, elevated pro-Francis 0.22, CRP 2.0, and D-dimer 295. Patient was admitted to telemetry unit for further testing and treatment. REVIEW OF SYSTEMS:  Pertinent negatives are above in HPI. 10 point ROS otherwise negative.       Past Medical History:   Diagnosis Date    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 with rest pain ([facility] 1/16/2019    Atrial fibrillation (HCC)     Atypical mole     upper right arm - black mole     Bilateral carotid artery stenosis 05/22/2018    Dr. Diamantina Buerger of right carotid artery 5/3/2018    CAD (coronary artery disease)     f/u w/ PCP     Cancer ([facility]     SKIN CA/Thigh    COPD (chronic obstructive pulmonary disease) ([facility])     uses O2 2L NC at night - and during day prn     Depression     Diarrhea     for colonoscopy 21     Femoro-popliteal artery disease ([facility] 2019    History of tobacco use 5/3/2018    Hydrocephalus ([facility])     Hyperlipidemia     no medications     Hypertension     BRY (obstructive sleep apnea)     uses O2 2L NC     Pain in both feet 5/3/2018    Paroxysmal A-fib ([facility])     PVD (peripheral vascular disease) ([facility]     PVD (peripheral vascular disease) with claudication ([facility]) 5/3/2018    Rheumatoid arthritis ([facility])     Short-term memory loss     Thyroid disease     Urinary incontinence     Venous stasis ulcer of left calf with fat layer exposed without varicose veins ([facility] 2019    Weight loss          Past Surgical History:   Procedure Laterality Date     SECTION      COLONOSCOPY      COLONOSCOPY N/A 2021    COLONOSCOPY WITH BIOPSY performed by Zoie Sage MD at [facility] cataract     LAPAROTOMY N/A 2020    LAPAROTOMY EXPLORATORY, RIGHT HEMICOLECTOMY performed by Zoie Sage MD at [address] [place] [address],[address] N/A 2020    PLACEMENT OF INTRATHECAL CATHETER FOR LUMBAR DRAIN performed by Pradeep Posey MD at [place] [address]  2019    L SFA angioplasty    TUBAL LIGATION      VENTRICULOPERITONEAL SHUNT N/A 2020    VENTRICULAR PERITONEAL SHUNT PLACEMENT performed by Pradeep Posey MD at [address] [place] [address]       Medications Prior to Admission:    Medications Prior to Admission: colestipol (COLESTID) 1 g tablet, Take 1 tablet by mouth 2 times daily  oxybutynin (DITROPAN XL) 15 MG extended release tablet, Take 15 mg by mouth daily  ascorbic acid (VITAMIN C) 500 MG tablet, Take 500 mg by mouth daily  Cholecalciferol (VITAMIN D) 50 MCG (2000 UT) CAPS capsule, Take 1 capsule by mouth daily   acetaminophen (TYLENOL) 500 MG tablet, Take 1 tablet by mouth every 6 hours as needed for Pain  apixaban (ELIQUIS) 5 MG TABS tablet, Take 1 tablet by mouth 2 times daily  buPROPion (WELLBUTRIN SR) 100 MG extended release tablet, Take 1 tablet by mouth daily  sotalol (BETAPACE) 120 MG tablet, Take 1 tablet by mouth 2 times daily  amLODIPine (NORVASC) 5 MG tablet, Take 1 tablet by mouth daily  levothyroxine (SYNTHROID) 100 MCG tablet, Take 112 mcg by mouth Daily   albuterol (PROVENTIL) (5 MG/ML) 0.5% nebulizer solution, Take 0.5 mLs by nebulization every 4 hours as needed for Wheezing  Respiratory Therapy Supplies (FULL KIT NEBULIZER SET) MISC, Use as directed with nebulized medication. cholestyramine (QUESTRAN) 4 g packet, Take 1 packet by mouth 2 times daily  ipratropium-albuterol (DUONEB) 0.5-2.5 (3) MG/3ML SOLN nebulizer solution, Inhale 3 mLs into the lungs every 4 hours (while awake)    Note that the patient's home medications were reviewed and the above list is accurate to the best of my knowledge at the time of the exam.    Allergies:    Hornet venom, Wasp venom, and Flagyl [metronidazole]    Social History:    reports that she quit smoking about 4 years ago. Her smoking use included cigarettes. She smoked 0.25 packs per day. She has never used smokeless tobacco. She reports current alcohol use. She reports that she does not use drugs. Family History:   family history includes Cancer in her father; Coronary Art Dis in her mother; High Blood Pressure in her mother; Kidney Disease in her mother; Other in her father.       PHYSICAL EXAM:    Vitals:  BP (!) 145/67   Pulse 68   Temp 97.6 °F (36.4 °C) (Oral)   Resp 18   Ht 5' 6\" (1.676 m)   Wt 87 lb (39.5 kg)   SpO2 100%   BMI 14.04 kg/m²       General appearance: NAD, conversant, ill appearing  Eyes: Sclerae anicteric, PERRLA  HEENT: AT/NC, MMM,  shunt  Neck: FROM, supple, no thyromegaly  Lymph: No cervical / supraclavicular lymphadenopathy  Lungs: Normal lung sounds  CV: RRR, no MRGs, no lower extremity edema,   Abdomen: Soft, non-tender; no masses or HSM, +BS  Extremities: FROM without synovitis. No clubbing or cyanosis of the hands. Skin: no rash, induration, lesions, or ulcers  Psych: Calm and cooperative. Normal judgement and insight. Normal mood and affect. Neuro: Alert and interactive, face symmetric, speech fluent. LABS:  All labs reviewed. Of note:  CBC with Differential:    Lab Results   Component Value Date    WBC 3.6 01/26/2022    RBC 4.10 01/26/2022    HGB 13.2 01/26/2022    HCT 41.1 01/26/2022     01/26/2022    .2 01/26/2022    MCH 32.2 01/26/2022    MCHC 32.1 01/26/2022    RDW 13.3 01/26/2022    NRBC 1.7 02/23/2020    METASPCT 1.7 03/05/2020    LYMPHOPCT 21.3 01/26/2022    MONOPCT 5.0 01/26/2022    MYELOPCT 0.9 02/22/2020    EOSPCT 3 10/07/2010    BASOPCT 0.0 01/26/2022    MONOSABS 0.18 01/26/2022    LYMPHSABS 0.76 01/26/2022    EOSABS 0.00 01/26/2022    BASOSABS 0.00 01/26/2022     CMP:    Lab Results   Component Value Date     01/26/2022    K 3.8 01/26/2022    CL 99 01/26/2022    CO2 22 01/26/2022    BUN 25 01/26/2022    CREATININE 0.9 01/26/2022    GFRAA >60 01/26/2022    LABGLOM >60 01/26/2022    GLUCOSE 311 01/26/2022    GLUCOSE 102 10/07/2010    PROT 6.3 01/26/2022    LABALBU 3.6 01/26/2022    LABALBU 4.5 10/07/2010    CALCIUM 8.6 01/26/2022    BILITOT 0.4 01/26/2022    ALKPHOS 72 01/26/2022    AST 27 01/26/2022    ALT 24 01/26/2022       Imaging:  CXR:  No pneumonia or pleural effusion    EKG:  NSR    Telemetry:  I've personally reviewed the patient's telemetry:      ASSESSMENT/PLAN:  Principal Problem:    COVID-19  Active Problems:    Acute respiratory failure with hypoxia (HCC)    Elevated glucose level  Resolved Problems:    * No resolved hospital problems.  *    77-year-old female with an extensive past medical history of COPD, BRY, A. fib, and CAD admitted to telemetry unit with    Covid 19 in a nonvaccinated patient  -Consult pharmacy for Baricitinib/Rem all  -Solumedrol 40 iv daily  -Doxy  -Vitamin D, zinc, vitamin C  -Monitor O2 saturations and supplement oxygen to keep saturations greater than 93%, wean as necessary, incentive spirometer, no nebulizers  -Droplet plus isolation  -Encourage ISP use/self proning  -Follow inflammatory markers  CRP-2.0  D-dimer-295  Pro-Francis-0.22    Elevated glucose levels  -Monitor labs - 311 fasting  -ISS glucose control - low dose sliding scale  -Hypoglycemia protocol initiated  -Diabetes education  -HgbA1C - pending  -Discuss diet modification  - adjusted diet to 4 arb controlled. Medication for other comorbidities continue as appropriate dose adjustment as necessary. DVT prophylaxis - Eliquis  PT OT  Discharge planning  Case discussed with attending and agreed upon plan of care. Code status: Full  Requires inpatient level of care  Estevan Hatchet, APRN - CNP    10:58 AM  1/26/2022     Above note edited to reflect my thoughts     I personally saw, examined and provided care for the patient. Radiographs, labs and medication list were reviewed by me independently. The case was discussed in detail and plans for care were established. Review of Vee FLEMING CNP, documentation was conducted and revisions were made as appropriate directly by me. I agree with the above documented exam, problem list, and plan of care.      Ileana Taylor MD  2:05 PM  1/26/2022

## 2022-01-26 NOTE — PROGRESS NOTES
Pharmacy Consultation Note    Consult date: 1/26/2022  Physician/provider: Tatyana Rodriguez  Pharmacy has been consulted to evaluate criteria for Baricitinib therapy. Based on the algorithm, the patient DOES currently meet [facility] P&T approved Covid-19 treatment criteria for Baricitinib.     Thank you for the consult,  rand payton RP

## 2022-01-26 NOTE — PROGRESS NOTES
Occupational Therapy  OCCUPATIONAL THERAPY INITIAL EVALUATION  [facility] [address] [facility]  [address], [facility], [place]    Date: 2022     Patient Name: Nura Krishnan  MRN: [id number]  : 1950  Room: [facility]9889X    Evaluating OT: Dewayne Marquez, OTR/L - [id number]    Referring Provider: Darrick Hathaway MD  Specific Provider Orders/Date: \"OT eval and treat\" - 2022    Diagnosis: COPD exacerbation ([facility] [J44.1], Acute respiratory failure with hypoxia ([facility] [J96.01], COVID-19 [U07.1]    Pertinent Medical History: COPD, a-fib, CAD, HTN, depression, BRY, urinary incontinence, short-term memory loss, RA    Precautions: fall risk, droplet plus isolation (COVID-19), O2 via nasal cannula    Assessment of Current Deficits:    [x] Functional mobility   [x]ADLs  [x] Strength               [x]Cognition   [x] Functional transfers   [x] IADLs         [x] Safety Awareness   [x]Endurance   [] Fine Coordination              [x] Balance      [] Vision/perception   [x]Sensation    []Gross Motor Coordination  [] ROM  [] Delirium                   [] Motor Control     OT PLAN OF CARE   OT POC is based on physician orders, patient diagnosis, and results of clinical assessment.   Frequency/Duration 2-5 days/week for 2 weeks PRN   Specific OT Treatment Interventions to Include:   * Instruction/training on adapted ADL techniques and AE recommendations to increase functional independence within precautions       * Training on energy conservation strategies, correct breathing pattern and techniques to improve independence/tolerance for self-care routine  * Functional transfer/mobility training/DME recommendations for increased independence, safety, and fall prevention  * Patient/Family education to increase follow through with safety techniques and functional independence  * Recommendation of environmental modifications for increased safety with functional transfers/mobility and Mod I / Independent   Bed Mobility  Supine-to-Sit: Supervision   Independent - in order to maximize patient's independence with ADLs, re-positioning, and other functional tasks. Functional Transfers Sit-to-Stand: CGA   from EOB  Independent   Functional Mobility CGA - SBA   (without device) for few steps from EOB to bedside chair. Mod I / Independent with functional mobility (with device, as needed/appropriate) in order to maximize independence with ADLs/IADLs and other functional tasks. Balance Sitting: Good  (at EOB)  Standing: Fair+  (without device)  Good dynamic standing balance during completion of ADLs/IADLs and other functional tasks. Activity Tolerance Fair-  Patient will demonstrate Good understanding and consistent implementation of energy conservation techniques and work simplification techniques into ADL/IADL routines. Visual/  Perceptual WFL     N/A   B UE Strength 4-/5  Patient will demonstrate 4+/5 B UE strength in order to maximize independence with ADLs/IADLs and functional transfers. Additional Long-Term Goal: Patient will increase functional independence to PLOF in order to allow patient to live in least restrictive environment. ROM: Additional Information:    R UE  WFL    L UE WFL      Hearing: WFL  Sensation: No complaints of numbness/tingling in B UEs. Tone: WFL  Edema: No    Comments: RN approved patient's participation in [address] [place] [address] activities. Upon arrival, patient supine in bed. At end of session, patient seated in bedside chair with call light and phone within reach and all lines and tubes intact. Patient would benefit from continued skilled OT to increase safety and independence with completion of ADL/IADL tasks for functional independence and quality of life. Patient education provided regardin) importance of OOB activities during hospitalization, 2) potential benefits of DME to maximize independence/safety with ADLs in home environment.  Patient indicated Fair+ understanding. Further skilled OT treatment indicated to increase patient's safety and independence with completion of ADL/IADL tasks in order to maximize patient's functional independence and quality of life. Rehab Potential: Good for established goals. Patient / Family Goal: Patient noted that she wants to be able to return to her PLOF. Patient and/or family were instructed on functional diagnosis, prognosis/goals, and OT plan of care. Demonstrated Good understanding. Eval Complexity: Low    Time In: 1355  Time Out: 1410  Total Treatment Time: 0 minutes      Minutes Units   OT Eval Low 29387 15 1   OT Eval Medium 51116     OT Eval High 45358     OT Re-Eval [id number]     Therapeutic Ex 45217     Therapeutic Activities 92695     ADL/Self Care 69098     Orthotic Management 68028     Neuro Re-Ed 65616     Non-Billable Time N/A ---     Evaluation time includes thorough review of current medical information, gathering information on past medical history/social history and prior level of function, completion of standardized testing/informal observation of tasks, assessment of data, and education on plan of care and goals. Thalia Marquez OTR/L  License Number: [id number]

## 2022-01-26 NOTE — PROGRESS NOTES
Physical Therapy    Facility/Department: [address] MED SURG/TELE  Initial Assessment    NAME: Ariella Ríos  : 1950  MRN: [id number]    Date of Service: 2022      Attending Provider:  Alyson Forde MD    Evaluating PT:  Napoleon Britt. Irene Cerda P.T. Room #:  5168/7846-W  Diagnosis:  COPD exacerbation ([facility] [J44.1]  Acute respiratory failure with hypoxia ([facility] [J96.01]  COVID-19 [U07.1]  Pertinent PMHx/PSHx:  COPD, spinal stenosis, PVD  Precautions:  Falls, droplet +    SUBJECTIVE:    Pt lives with her daughter in a 3 story home with 3 stairs and 1 rail to enter. There are 13 steps and 1 rail to 2nd floor bed and bath. Pt ambulated with no AD PTA. She has a ww if needed and uses 3L O2 at night    OBJECTIVE:   Initial Evaluation  Date: 22 Treatment Short Term/ Long Term   Goals   Was pt agreeable to Eval/treatment? yes     Does pt have pain? No c/o pain     Bed Mobility  Rolling: Independent  Supine to sit: Independent  Sit to supine: Independent  Scooting: Independent  Independent   Transfers Sit to stand: Independent  Stand to sit: Independent  Stand pivot: supervision  Independent    Ambulation   200 feet with no AD supervision  300 feet with no AD Independent    Stair negotiation: ascended and descended NA, pt is in isolation  TBA if pt is out of isolation: 10 steps with 1 rail Independent    AM-PAC 6 Clicks 42/80       BLE ROM is WFL. BLE strength is grossly 4/5.    Edema:  None noted  Balance: sitting is Independent and standing with no AD is Independent   Endurance: fair+    ASSESSMENT:    Conditions Requiring Skilled Therapeutic Intervention:    [x]Decreased strength     []Decreased ROM  [x]Decreased functional mobility  [x]Decreased balance   [x]Decreased endurance   []Decreased posture  []Decreased sensation  []Decreased coordination   []Decreased vision  []Decreased safety awareness   []Increased pain     Comments:  Pt walked in the room back and forth with slow gait speed and short step lengths. She had occasional mild unsteady gait and had 1 episode mild LOB while turning to change direction, but was able to self correct. She had no SOB amb on 3L O2 and was able to hold conversation throughout PT session. Treatment:  Patient practiced and was instructed in the following treatment:     Bed mobility, transfers, and gait to improve functional strength, balance, and endurance. Pt was left in bed with call light left by patient. Pt's/ family goals   1. To breathe better and go home. Patient and or family understand(s) diagnosis, prognosis, and plan of care. PHYSICAL THERAPY PLAN OF CARE:    PT POC is established based on physician order and patient diagnosis     Referring provider/PT Order:  Pt eval and treat  Diagnosis:  COPD exacerbation ([facility] [J44.1]  [facility] [J96.01]  COVID-19 [U07.1]  Specific instructions for next treatment:  Increase amb distance. Current Treatment Recommendations:     [x] Strengthening to improve independence with functional mobility   [] ROM to improve ROM and decrease spasm and pain which will help promote independence with functional mobility   [x] Balance Training to improve static/dynamic balance and to reduce fall risk  [x] Endurance Training to improve activity tolerance during functional mobility   [x] Transfer Training to improve safety and independence with all functional transfers   [x] Gait Training to improve gait mechanics, endurance and assess need for appropriate assistive device  [x] Stair Training in preparation for safe discharge home and/or into the community   [] Positioning to prevent skin breakdown and contractures  [] Safety and Education Training   [x] Patient/Caregiver Education   [] HEP  [] Other     PT long term treatment goals are located in above grid    Frequency of treatments: 2-5x/week x 1-2 weeks.     Time in  09:40  Time out  10:05    Total Treatment Time 10 minutes

## 2022-01-26 NOTE — CARE COORDINATION
Spoke with pt by phone,+covid , no vax; daughter lives with pt. Pt independent PTA. has home 02 (nocturnal only 3lnc via [address] verified with luann)iv sm; xiomara. Atb's. Using IS. Proning . Plan is home at discharge, no needs. Willis Mejias.

## 2022-01-27 LAB
ANION GAP SERPL CALCULATED.3IONS-SCNC: 9 MMOL/L (ref 7–16)
BASOPHILS ABSOLUTE: 0.01 E9/L (ref 0–0.2)
BASOPHILS RELATIVE PERCENT: 0.1 % (ref 0–2)
BUN BLDV-MCNC: 31 MG/DL (ref 6–23)
C-REACTIVE PROTEIN: 0.6 MG/DL (ref 0–0.4)
CALCIUM SERPL-MCNC: 9.5 MG/DL (ref 8.6–10.2)
CHLORIDE BLD-SCNC: 105 MMOL/L (ref 98–107)
CO2: 30 MMOL/L (ref 22–29)
CREAT SERPL-MCNC: 0.6 MG/DL (ref 0.5–1)
D DIMER: <200 NG/ML DDU
EOSINOPHILS ABSOLUTE: 0 E9/L (ref 0.05–0.5)
EOSINOPHILS RELATIVE PERCENT: 0 % (ref 0–6)
GFR AFRICAN AMERICAN: >60
GFR NON-AFRICAN AMERICAN: >60 ML/MIN/1.73
GLUCOSE BLD-MCNC: 139 MG/DL (ref 74–99)
HCT VFR BLD CALC: 38.9 % (ref 34–48)
HEMOGLOBIN: 12.2 G/DL (ref 11.5–15.5)
IMMATURE GRANULOCYTES #: 0.06 E9/L
IMMATURE GRANULOCYTES %: 0.6 % (ref 0–5)
LYMPHOCYTES ABSOLUTE: 1.1 E9/L (ref 1.5–4)
LYMPHOCYTES RELATIVE PERCENT: 10.3 % (ref 20–42)
MCH RBC QN AUTO: 31.8 PG (ref 26–35)
MCHC RBC AUTO-ENTMCNC: 31.4 % (ref 32–34.5)
MCV RBC AUTO: 101.3 FL (ref 80–99.9)
METER GLUCOSE: 119 MG/DL (ref 74–99)
METER GLUCOSE: 148 MG/DL (ref 74–99)
MONOCYTES ABSOLUTE: 0.82 E9/L (ref 0.1–0.95)
MONOCYTES RELATIVE PERCENT: 7.7 % (ref 2–12)
NEUTROPHILS ABSOLUTE: 8.67 E9/L (ref 1.8–7.3)
NEUTROPHILS RELATIVE PERCENT: 81.3 % (ref 43–80)
PDW BLD-RTO: 13.5 FL (ref 11.5–15)
PLATELET # BLD: 364 E9/L (ref 130–450)
PMV BLD AUTO: 9.4 FL (ref 7–12)
POTASSIUM SERPL-SCNC: 4.7 MMOL/L (ref 3.5–5)
PROCALCITONIN: 0.24 NG/ML (ref 0–0.08)
RBC # BLD: 3.84 E12/L (ref 3.5–5.5)
SODIUM BLD-SCNC: 144 MMOL/L (ref 132–146)
WBC # BLD: 10.7 E9/L (ref 4.5–11.5)

## 2022-01-27 PROCEDURE — 80048 BASIC METABOLIC PNL TOTAL CA: CPT

## 2022-01-27 PROCEDURE — 85378 FIBRIN DEGRADE SEMIQUANT: CPT

## 2022-01-27 PROCEDURE — 1200000000 HC SEMI PRIVATE

## 2022-01-27 PROCEDURE — 86140 C-REACTIVE PROTEIN: CPT

## 2022-01-27 PROCEDURE — 85025 COMPLETE CBC W/AUTO DIFF WBC: CPT

## 2022-01-27 PROCEDURE — 84145 PROCALCITONIN (PCT): CPT

## 2022-01-27 PROCEDURE — 2700000000 HC OXYGEN THERAPY PER DAY

## 2022-01-27 PROCEDURE — 36415 COLL VENOUS BLD VENIPUNCTURE: CPT

## 2022-01-27 PROCEDURE — 6360000002 HC RX W HCPCS: Performed by: INTERNAL MEDICINE

## 2022-01-27 PROCEDURE — 6370000000 HC RX 637 (ALT 250 FOR IP): Performed by: INTERNAL MEDICINE

## 2022-01-27 PROCEDURE — 6370000000 HC RX 637 (ALT 250 FOR IP): Performed by: FAMILY MEDICINE

## 2022-01-27 PROCEDURE — 2580000003 HC RX 258: Performed by: INTERNAL MEDICINE

## 2022-01-27 PROCEDURE — 82962 GLUCOSE BLOOD TEST: CPT

## 2022-01-27 RX ADMIN — BARICITINIB 4 MG: 2 TABLET, FILM COATED ORAL at 09:22

## 2022-01-27 RX ADMIN — IPRATROPIUM BROMIDE AND ALBUTEROL 1 PUFF: 20; 100 SPRAY, METERED RESPIRATORY (INHALATION) at 21:29

## 2022-01-27 RX ADMIN — DOXYCYCLINE HYCLATE 100 MG: 100 CAPSULE ORAL at 09:22

## 2022-01-27 RX ADMIN — METHYLPREDNISOLONE SODIUM SUCCINATE 40 MG: 40 INJECTION, POWDER, LYOPHILIZED, FOR SOLUTION INTRAMUSCULAR; INTRAVENOUS at 06:00

## 2022-01-27 RX ADMIN — LEVOTHYROXINE SODIUM 125 MCG: 0.12 TABLET ORAL at 06:00

## 2022-01-27 RX ADMIN — APIXABAN 5 MG: 5 TABLET, FILM COATED ORAL at 21:24

## 2022-01-27 RX ADMIN — Medication 10 ML: at 21:24

## 2022-01-27 RX ADMIN — CHOLESTYRAMINE 4 G: 4 POWDER, FOR SUSPENSION ORAL at 21:28

## 2022-01-27 RX ADMIN — IPRATROPIUM BROMIDE AND ALBUTEROL 1 PUFF: 20; 100 SPRAY, METERED RESPIRATORY (INHALATION) at 16:50

## 2022-01-27 RX ADMIN — BUPROPION HYDROCHLORIDE 100 MG: 100 TABLET, EXTENDED RELEASE ORAL at 09:22

## 2022-01-27 RX ADMIN — APIXABAN 5 MG: 5 TABLET, FILM COATED ORAL at 09:22

## 2022-01-27 RX ADMIN — SOTALOL HYDROCHLORIDE 120 MG: 120 TABLET ORAL at 21:24

## 2022-01-27 RX ADMIN — IPRATROPIUM BROMIDE AND ALBUTEROL 1 PUFF: 20; 100 SPRAY, METERED RESPIRATORY (INHALATION) at 14:45

## 2022-01-27 RX ADMIN — CHOLESTYRAMINE 4 G: 4 POWDER, FOR SUSPENSION ORAL at 09:22

## 2022-01-27 RX ADMIN — METHYLPREDNISOLONE SODIUM SUCCINATE 40 MG: 40 INJECTION, POWDER, LYOPHILIZED, FOR SOLUTION INTRAMUSCULAR; INTRAVENOUS at 21:24

## 2022-01-27 RX ADMIN — IPRATROPIUM BROMIDE AND ALBUTEROL 1 PUFF: 20; 100 SPRAY, METERED RESPIRATORY (INHALATION) at 09:25

## 2022-01-27 RX ADMIN — DOXYCYCLINE HYCLATE 100 MG: 100 CAPSULE ORAL at 21:24

## 2022-01-27 RX ADMIN — METHYLPREDNISOLONE SODIUM SUCCINATE 40 MG: 40 INJECTION, POWDER, LYOPHILIZED, FOR SOLUTION INTRAMUSCULAR; INTRAVENOUS at 14:56

## 2022-01-27 RX ADMIN — SOTALOL HYDROCHLORIDE 120 MG: 120 TABLET ORAL at 09:22

## 2022-01-27 RX ADMIN — Medication 10 ML: at 09:22

## 2022-01-27 RX ADMIN — AMLODIPINE BESYLATE 5 MG: 5 TABLET ORAL at 09:22

## 2022-01-27 ASSESSMENT — PAIN SCALES - GENERAL: PAINLEVEL_OUTOF10: 0

## 2022-01-27 NOTE — CARE COORDINATION
+ covid , no vax has home 02 with [facility] medical 3l nc at nite only. currently on 3lnc continuous. should pt require 02 continuous at discharge, will need new 02 orders. Joe Merida.

## 2022-01-27 NOTE — PROGRESS NOTES
Subjective: The patient is awake and alert. No acute events overnight. Denies chest pain, angina, SOB     Objective:    BP (!) 152/71   Pulse 72   Temp 98.7 °F (37.1 °C) (Oral)   Resp 17   Ht 5' 6\" (1.676 m)   Wt 87 lb (39.5 kg)   SpO2 94%   BMI 14.04 kg/m²     No intake/output data recorded. No intake/output data recorded. General appearance: NAD, conversant  HEENT: AT/NC, MMM  Neck: FROM, supple  Lungs: Clear to auscultation  CV: RRR, no MRGs  Vasc: Radial pulses 2+  Abdomen: Soft, non-tender; no masses or HSM  Extremities: No peripheral edema or digital cyanosis  Skin: no rash, lesions or ulcers  Psych: Alert and oriented to person, place and time  Neuro: Alert and interactive     Recent Labs     01/25/22  1333 01/26/22  0359 01/27/22  1047   WBC 6.9 3.6* 10.7   HGB 14.2 13.2 12.2   HCT 45.2 41.1 38.9    345 364       Recent Labs     01/25/22  1333 01/26/22  0359 01/27/22  1047    136 144   K 4.7 3.8 4.7   CL 97* 99 105   CO2 26 22 30*   BUN 17 25* 31*   CREATININE 0.8 0.9 0.6   CALCIUM 9.0 8.6 9.5       Assessment:    Principal Problem:    COVID-19  Active Problems:    Acute respiratory failure with hypoxia (HCC)    Elevated glucose level  Resolved Problems:    * No resolved hospital problems.  *      Plan:  55-year-old female with an extensive past medical history of COPD, BRY, A. fib, and CAD admitted to telemetry unit with     Covid 19 in a nonvaccinated patient  -Baricitinib day #2  -Solumedrol 40 iv daily  -Doxy  -Vitamin D, zinc, vitamin C  -Monitor O2 saturations and supplement oxygen to keep saturations greater than 93%, wean as necessary, incentive spirometer, no nebulizers  -Droplet plus isolation  -Encourage ISP use/self proning  -Follow inflammatory markers  CRP-0.24  D-dimer-<200  Pro-Francis-0.24   Patient currently utilizing 3 liters O2     Elevated glucose levels  -Monitor labs - 311 fasting - 139  -ISS glucose control - low dose sliding scale  -Hypoglycemia protocol initiated  -Diabetes education  -HgbA1C - pending    DVT Prophylaxis   PT/OT  Discharge planning     BRIAN Ledbetter CNP  2:52 PM  1/27/2022     Above note edited to reflect my thoughts     I personally saw, examined and provided care for the patient. Radiographs, labs and medication list were reviewed by me independently. The case was discussed in detail and plans for care were established. Review of Vee FLEMING CNP, documentation was conducted and revisions were made as appropriate directly by me. I agree with the above documented exam, problem list, and plan of care.      Alyson Forde MD  7:58 PM  2/3/2022

## 2022-01-28 VITALS
DIASTOLIC BLOOD PRESSURE: 69 MMHG | HEIGHT: 66 IN | WEIGHT: 87 LBS | OXYGEN SATURATION: 98 % | TEMPERATURE: 97.8 F | SYSTOLIC BLOOD PRESSURE: 161 MMHG | HEART RATE: 72 BPM | RESPIRATION RATE: 18 BRPM | BODY MASS INDEX: 13.98 KG/M2

## 2022-01-28 LAB
ANION GAP SERPL CALCULATED.3IONS-SCNC: 10 MMOL/L (ref 7–16)
BASOPHILS ABSOLUTE: 0.01 E9/L (ref 0–0.2)
BASOPHILS RELATIVE PERCENT: 0.1 % (ref 0–2)
BUN BLDV-MCNC: 26 MG/DL (ref 6–23)
C-REACTIVE PROTEIN: 0.3 MG/DL (ref 0–0.4)
CALCIUM SERPL-MCNC: 9.1 MG/DL (ref 8.6–10.2)
CHLORIDE BLD-SCNC: 105 MMOL/L (ref 98–107)
CO2: 27 MMOL/L (ref 22–29)
CREAT SERPL-MCNC: 0.6 MG/DL (ref 0.5–1)
D DIMER: <200 NG/ML DDU
EOSINOPHILS ABSOLUTE: 0 E9/L (ref 0.05–0.5)
EOSINOPHILS RELATIVE PERCENT: 0 % (ref 0–6)
GFR AFRICAN AMERICAN: >60
GFR NON-AFRICAN AMERICAN: >60 ML/MIN/1.73
GLUCOSE BLD-MCNC: 115 MG/DL (ref 74–99)
HCT VFR BLD CALC: 38.2 % (ref 34–48)
HEMOGLOBIN: 11.9 G/DL (ref 11.5–15.5)
IMMATURE GRANULOCYTES #: 0.04 E9/L
IMMATURE GRANULOCYTES %: 0.4 % (ref 0–5)
LYMPHOCYTES ABSOLUTE: 0.81 E9/L (ref 1.5–4)
LYMPHOCYTES RELATIVE PERCENT: 8.2 % (ref 20–42)
MCH RBC QN AUTO: 31.9 PG (ref 26–35)
MCHC RBC AUTO-ENTMCNC: 31.2 % (ref 32–34.5)
MCV RBC AUTO: 102.4 FL (ref 80–99.9)
METER GLUCOSE: 102 MG/DL (ref 74–99)
METER GLUCOSE: 201 MG/DL (ref 74–99)
MONOCYTES ABSOLUTE: 0.51 E9/L (ref 0.1–0.95)
MONOCYTES RELATIVE PERCENT: 5.2 % (ref 2–12)
NEUTROPHILS ABSOLUTE: 8.5 E9/L (ref 1.8–7.3)
NEUTROPHILS RELATIVE PERCENT: 86.1 % (ref 43–80)
PDW BLD-RTO: 13.2 FL (ref 11.5–15)
PLATELET # BLD: 331 E9/L (ref 130–450)
PMV BLD AUTO: 9.3 FL (ref 7–12)
POTASSIUM SERPL-SCNC: 4 MMOL/L (ref 3.5–5)
PROCALCITONIN: 0.13 NG/ML (ref 0–0.08)
RBC # BLD: 3.73 E12/L (ref 3.5–5.5)
SODIUM BLD-SCNC: 142 MMOL/L (ref 132–146)
WBC # BLD: 9.9 E9/L (ref 4.5–11.5)

## 2022-01-28 PROCEDURE — 6360000002 HC RX W HCPCS: Performed by: INTERNAL MEDICINE

## 2022-01-28 PROCEDURE — 80048 BASIC METABOLIC PNL TOTAL CA: CPT

## 2022-01-28 PROCEDURE — 86140 C-REACTIVE PROTEIN: CPT

## 2022-01-28 PROCEDURE — 6370000000 HC RX 637 (ALT 250 FOR IP): Performed by: FAMILY MEDICINE

## 2022-01-28 PROCEDURE — 2700000000 HC OXYGEN THERAPY PER DAY

## 2022-01-28 PROCEDURE — 85378 FIBRIN DEGRADE SEMIQUANT: CPT

## 2022-01-28 PROCEDURE — 6370000000 HC RX 637 (ALT 250 FOR IP): Performed by: INTERNAL MEDICINE

## 2022-01-28 PROCEDURE — 84145 PROCALCITONIN (PCT): CPT

## 2022-01-28 PROCEDURE — 2580000003 HC RX 258: Performed by: INTERNAL MEDICINE

## 2022-01-28 PROCEDURE — 85025 COMPLETE CBC W/AUTO DIFF WBC: CPT

## 2022-01-28 PROCEDURE — 82962 GLUCOSE BLOOD TEST: CPT

## 2022-01-28 PROCEDURE — 36415 COLL VENOUS BLD VENIPUNCTURE: CPT

## 2022-01-28 RX ORDER — DOXYCYCLINE HYCLATE 100 MG/1
100 CAPSULE ORAL EVERY 12 HOURS SCHEDULED
Qty: 20 CAPSULE | Refills: 0 | Status: SHIPPED | OUTPATIENT
Start: 2022-01-28 | End: 2022-01-28

## 2022-01-28 RX ORDER — DEXAMETHASONE 6 MG/1
6 TABLET ORAL
Qty: 10 TABLET | Refills: 0 | Status: SHIPPED | OUTPATIENT
Start: 2022-01-28 | End: 2022-02-07

## 2022-01-28 RX ORDER — DOXYCYCLINE HYCLATE 100 MG/1
100 CAPSULE ORAL EVERY 12 HOURS SCHEDULED
Qty: 20 CAPSULE | Refills: 0 | Status: SHIPPED | OUTPATIENT
Start: 2022-01-28 | End: 2022-02-07

## 2022-01-28 RX ORDER — LEVOTHYROXINE SODIUM 0.12 MG/1
125 TABLET ORAL DAILY
Qty: 30 TABLET | Refills: 3 | Status: SHIPPED | OUTPATIENT
Start: 2022-01-29

## 2022-01-28 RX ORDER — LEVOTHYROXINE SODIUM 0.12 MG/1
125 TABLET ORAL DAILY
Qty: 30 TABLET | Refills: 3 | Status: SHIPPED | OUTPATIENT
Start: 2022-01-29 | End: 2022-01-28

## 2022-01-28 RX ORDER — DEXAMETHASONE 0.5 MG/1
6 TABLET ORAL
Qty: 120 TABLET | Refills: 0 | Status: SHIPPED | OUTPATIENT
Start: 2022-01-28 | End: 2022-01-28 | Stop reason: SDUPTHER

## 2022-01-28 RX ADMIN — SOTALOL HYDROCHLORIDE 120 MG: 120 TABLET ORAL at 09:13

## 2022-01-28 RX ADMIN — IPRATROPIUM BROMIDE AND ALBUTEROL 1 PUFF: 20; 100 SPRAY, METERED RESPIRATORY (INHALATION) at 09:19

## 2022-01-28 RX ADMIN — APIXABAN 5 MG: 5 TABLET, FILM COATED ORAL at 09:13

## 2022-01-28 RX ADMIN — AMLODIPINE BESYLATE 5 MG: 5 TABLET ORAL at 09:16

## 2022-01-28 RX ADMIN — Medication 10 ML: at 09:14

## 2022-01-28 RX ADMIN — METHYLPREDNISOLONE SODIUM SUCCINATE 40 MG: 40 INJECTION, POWDER, LYOPHILIZED, FOR SOLUTION INTRAMUSCULAR; INTRAVENOUS at 06:25

## 2022-01-28 RX ADMIN — BARICITINIB 4 MG: 2 TABLET, FILM COATED ORAL at 09:12

## 2022-01-28 RX ADMIN — LEVOTHYROXINE SODIUM 125 MCG: 0.12 TABLET ORAL at 06:25

## 2022-01-28 RX ADMIN — IPRATROPIUM BROMIDE AND ALBUTEROL 1 PUFF: 20; 100 SPRAY, METERED RESPIRATORY (INHALATION) at 12:00

## 2022-01-28 RX ADMIN — BUPROPION HYDROCHLORIDE 100 MG: 100 TABLET, EXTENDED RELEASE ORAL at 09:12

## 2022-01-28 RX ADMIN — DOXYCYCLINE HYCLATE 100 MG: 100 CAPSULE ORAL at 09:12

## 2022-01-28 ASSESSMENT — PAIN SCALES - GENERAL: PAINLEVEL_OUTOF10: 0

## 2022-01-28 NOTE — PROGRESS NOTES
Patient oxygen saturation on room air at rest was 99%. With ambulation, patient dropped to 86%. Patient returned to rest, 3L of oxygen reapplied. Patient's oxygen saturation returned to 96%.

## 2022-01-28 NOTE — CARE COORDINATION
Social Work / Discharge Planning:    Pulse ox testing completed and patient is requiring 3 liters continuous. Faxed order to [facility] [phone].  Electronically signed by HAILEY Michel on 1/28/22 at 12:49 PM EST Written discharge instructions given to patient's mother and CPS Ms. Mariee. Discharge ambulatory with mother and Ms. Mariee in no distress. Patient ate 30% lunch.

## 2022-01-30 LAB
BLOOD CULTURE, ROUTINE: NORMAL
CULTURE, BLOOD 2: NORMAL

## 2022-01-31 ENCOUNTER — CARE COORDINATION (OUTPATIENT)
Dept: CARE COORDINATION | Age: 72
End: 2022-01-31

## 2022-02-02 NOTE — PROGRESS NOTES
Refer to Clinical Documentation Reviewer    PROVIDER RESPONSE TEXT:    This patient is in acute respiratory failure as evidenced by 89% 02sats on ra    Query created by: Ketty Dean on 1/27/2022 12:53 PM      Electronically signed by:  Mike Wilks MD 2/2/2022 4:37 PM

## 2022-02-03 NOTE — DISCHARGE SUMMARY
Physician Discharge Summary     Patient ID:  Valorie Kirk  [id number]  66 y.o.  1950    Admit date: 1/25/2022    Discharge date and time: 1/28/2022    Admission Diagnoses:   Patient Active Problem List   Diagnosis    Essential hypertension    Depression    PVD (peripheral vascular disease) with claudication ([facility]    History of tobacco use    Asymptomatic bilateral carotid artery stenosis    Hyperlipidemia LDL goal <100    Acquired hypothyroidism    Left groin pain    ROSELYN (acute kidney injury) ([facility]    Shock liver    Severe protein-calorie malnutrition (HCC)    Atherosclerosis of aortic bifurcation and common iliac arteries (HCC)    CAD (coronary artery disease)    History of bilateral carotid artery stenosis    Paroxysmal atrial fibrillation (HCC)    Hydrocephalus (HCC)    Laceration of scalp    COPD with acute exacerbation (HCC)    Acute hypoxemic respiratory failure (HCC)    COPD exacerbation ([facility]    Acute respiratory failure with hypoxia ([facility]    COVID-19    Elevated glucose level       Discharge Diagnoses: Covid    Consults: none    Procedures: None    Hospital Course: The patient is a 66 y.o. female of Karla Rivers MD -Baricitinib day #2  -Solumedrol 40 iv daily  -Doxy  -Vitamin D, zinc, vitamin C  -Monitor O2 saturations and supplement oxygen to keep saturations greater than 93%, wean as necessary, incentive spirometer, no nebulizers  -Droplet plus isolation  -Encourage ISP use/self proning  -Follow inflammatory markers  CRP-0.24  D-dimer-<200  Pro-Francis-0.24   Patient currently utilizing 3 liters O2     Elevated glucose levels  -Monitor labs - 311 fasting - 139  -ISS glucose control - low dose sliding scale  -Hypoglycemia protocol initiated  -Diabetes education  -HgbA1C - pending    Patient was discharged home in stable condition with medications listed below. Patient was instructed to follow up with all consults and PCP within the next two week.       No results for input(s): WBC, HGB, HCT, PLT in the last 72 hours. No results for input(s): NA, K, CL, CO2, BUN, CREATININE, GLU, CALCIUM in the last 72 hours. XR CHEST PORTABLE    Result Date: 1/25/2022  EXAMINATION: ONE XRAY VIEW OF THE CHEST 1/25/2022 1:58 pm COMPARISON: December 29, 2021 HISTORY: ORDERING SYSTEM PROVIDED HISTORY: SOB TECHNOLOGIST PROVIDED HISTORY: Reason for exam:->SOB FINDINGS: No airspace opacity or pleural effusion. Right-sided ventriculoperitoneal shunt catheter present. The heart is normal size. No pneumothorax. No free air beneath the hemidiaphragms. No pneumonia or pleural effusion. Discharge Exam:    HEENT: NCAT,  PERRLA, No JVD  Heart:  RRR, no murmurs, gallops, or rubs.   Lungs:  CTA bilaterally, no wheeze, rales or rhonchi  Abd: bowel sounds present, nontender, nondistended, no masses  Extrem:  No clubbing, cyanosis, or edema    Disposition: home     Patient Condition at DischarStable    Patient Instructions:      Medication List        START taking these medications      dexamethasone 6 MG tablet  Commonly known as: DECADRON  Take 1 tablet by mouth daily (with breakfast) for 10 days     doxycycline hyclate 100 MG capsule  Commonly known as: VIBRAMYCIN  Take 1 capsule by mouth every 12 hours for 10 days            CHANGE how you take these medications      levothyroxine 125 MCG tablet  Commonly known as: SYNTHROID  Take 1 tablet by mouth Daily  What changed:   medication strength  how much to take            CONTINUE taking these medications      acetaminophen 500 MG tablet  Commonly known as: TYLENOL  Take 1 tablet by mouth every 6 hours as needed for Pain     albuterol (5 MG/ML) 0.5% nebulizer solution  Commonly known as: PROVENTIL  Take 0.5 mLs by nebulization every 4 hours as needed for Wheezing     amLODIPine 5 MG tablet  Commonly known as: NORVASC  Take 1 tablet by mouth daily     apixaban 5 MG Tabs tablet  Commonly known as: ELIQUIS  Take 1 tablet by mouth 2 times daily     ascorbic acid 500 MG tablet  Commonly known as: VITAMIN C     buPROPion 100 MG extended release tablet  Commonly known as: WELLBUTRIN SR  Take 1 tablet by mouth daily     cholestyramine 4 g packet  Commonly known as: Questran  Take 1 packet by mouth 2 times daily     colestipol 1 g tablet  Commonly known as: COLESTID     Full Kit Nebulizer Set Misc  Use as directed with nebulized medication. ipratropium-albuterol 0.5-2.5 (3) MG/3ML Soln nebulizer solution  Commonly known as: DUONEB  Inhale 3 mLs into the lungs every 4 hours (while awake)     oxybutynin 15 MG extended release tablet  Commonly known as: DITROPAN XL     sotalol 120 MG tablet  Commonly known as: BETAPACE  Take 1 tablet by mouth 2 times daily     vitamin D 50 MCG (2000 UT) Caps capsule               Where to Get Your Medications        These medications were sent to [address] [place] [address] [phone]  [address] [place] [id number]      Phone: [phone]   dexamethasone 6 MG tablet  doxycycline hyclate 100 MG capsule  levothyroxine 125 MCG tablet       Activity: activity as tolerated  Diet: diabetic diet    Pt has been advised to: Follow-up with Valerio Roberts MD in 1 week. Follow-up with consultants as recommended by them    Note that over 30 minutes was spent in preparing discharge papers, discussing discharge with patient, medication review, etc.    Signed:  BRIAN Aguilar CNP  2/3/2022  2:43 PM    Above note edited to reflect my thoughts     I personally saw, examined and provided care for the patient. Radiographs, labs and medication list were reviewed by me independently. The case was discussed in detail and plans for care were established. Review of Vee FLEMING CNP, documentation was conducted and revisions were made as appropriate directly by me. I agree with the above documented exam, problem list, and plan of care.      Carlos Solares MD  3:15 PM  2/3/2022

## 2022-04-01 ENCOUNTER — TELEPHONE (OUTPATIENT)
Dept: VASCULAR SURGERY | Age: 72
End: 2022-04-01

## 2022-04-04 ENCOUNTER — OFFICE VISIT (OUTPATIENT)
Dept: VASCULAR SURGERY | Age: 72
End: 2022-04-04
Payer: MEDICARE

## 2022-04-04 VITALS — HEIGHT: 66 IN | BODY MASS INDEX: 14.94 KG/M2 | WEIGHT: 93 LBS

## 2022-04-04 DIAGNOSIS — I73.9 PVD (PERIPHERAL VASCULAR DISEASE) WITH CLAUDICATION (HCC): Primary | ICD-10-CM

## 2022-04-04 PROCEDURE — 99214 OFFICE O/P EST MOD 30 MIN: CPT | Performed by: SURGERY

## 2022-04-04 NOTE — PROGRESS NOTES
Vascular Surgery Outpatient Followup    PCP : Rosemary Puckett MD  Podiatrist : Dr. Ministerio Shah  Cardiologist : Dr. James Edward     Previous Vascular Procedure  1/29/19 L sfa, popliteal atherectomy, dcb   1/7/2020 R LE angiogram - SFA  - R LE LL claudication - needs fem ak pop bypass      HISTORY OF PRESENT ILLNESS:    This is a 74 y.o. female who presents in fu regarding pvd, lifestyle limiting claudication. Her leg groin pain remains resolved. Previously she had no issues with left leg but now has more issues with ambulation. R LE having issues with numbnes and tingling in her foot primarily which is stable. She has a 2/10 at its worst.      R LE is her primary issue though she has intermittent problems with her left. She denies tissue loss or rest pain. She is able to walk about 200 feet which is stable as previous and is still not longer lifestyle limiting per her report. But she feels like she is having more pain than before 9/10 achey and sore, cramping in the whole leg, not just her calf  She rest and feels better. She denies significant swelling     She feels like her spinal stenosis is also causing her issues and she has been having more back pain. 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 Hernandez Factor 2/2020. She is recovered but has chronic diarrhea. She is frustrated that her diarrhea continues to be an issues, she is following with Dr. Iva Farley. He suggested marijuana but patient was not interested. Still having issues with diarrhea and urinary incontinence, is now on colestipol for diarrhea which seems like it has helped a little. 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 She is on eliquis per Dr. James Edward after developing atrial fibrillation postop after her colon resection. She has had previous L LE intervention for wound of the left calf which was first noted approximately 7/2018 after trauma.   She followed with  Arjun and after intervention it did heal.      Past Medical History:        Diagnosis Date    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 with rest pain ([facility] 2019    Atrial fibrillation (HCC)     Atypical mole     upper right arm - black mole     Bilateral carotid artery stenosis 2018    Dr. 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[facility] 2019    History of tobacco use 5/3/2018    Hydrocephalus (HCC)     Hyperlipidemia     no medications     Hypertension     BRY (obstructive sleep apnea)     uses O2 2L NC     Pain in both feet 5/3/2018    Paroxysmal A-fib (HCC)     PVD (peripheral vascular disease) ([facility]     PVD (peripheral vascular disease) with claudication (HCC) 5/3/2018    Rheumatoid arthritis (HCC)     Short-term memory loss     Thyroid disease     Urinary incontinence     Venous stasis ulcer of left calf with fat layer exposed without varicose veins ([address] 2019    Weight loss      Past Surgical History:        Procedure Laterality Date     SECTION      COLONOSCOPY      COLONOSCOPY N/A 2021    COLONOSCOPY WITH BIOPSY performed by Minnie Sharma MD at [address] [place] [address] cataract     LAPAROTOMY N/A 2020    LAPAROTOMY EXPLORATORY, RIGHT HEMICOLECTOMY performed by Minnie Sharma MD at [address] [place] [address],[address] N/A 2020    PLACEMENT OF INTRATHECAL CATHETER FOR LUMBAR DRAIN performed by Mariaelena Trivedi MD at [place] [address]  2019    L SFA angioplasty    TUBAL LIGATION      VENTRICULOPERITONEAL SHUNT N/A 2020 VENTRICULAR PERITONEAL SHUNT PLACEMENT performed by Xin Beckham MD at [address]     Current Medications:   Current Outpatient Medications   Medication Sig Dispense Refill    levothyroxine (SYNTHROID) 125 MCG tablet Take 1 tablet by mouth Daily 30 tablet 3    colestipol (COLESTID) 1 g tablet Take 1 tablet by mouth 2 times daily      oxybutynin (DITROPAN XL) 15 MG extended release tablet Take 15 mg by mouth daily      ascorbic acid (VITAMIN C) 500 MG tablet Take 500 mg by mouth daily      albuterol (PROVENTIL) (5 MG/ML) 0.5% nebulizer solution Take 0.5 mLs by nebulization every 4 hours as needed for Wheezing 120 each 0    Respiratory Therapy Supplies (FULL KIT NEBULIZER SET) MISC Use as directed with nebulized medication. 1 each 0    cholestyramine (QUESTRAN) 4 g packet Take 1 packet by mouth 2 times daily 90 packet 3    Cholecalciferol (VITAMIN D) 50 MCG (2000 UT) CAPS capsule Take 1 capsule by mouth daily       acetaminophen (TYLENOL) 500 MG tablet Take 1 tablet by mouth every 6 hours as needed for Pain 120 tablet 3    ipratropium-albuterol (DUONEB) 0.5-2.5 (3) MG/3ML SOLN nebulizer solution Inhale 3 mLs into the lungs every 4 hours (while awake) 360 mL 0    apixaban (ELIQUIS) 5 MG TABS tablet Take 1 tablet by mouth 2 times daily 60 tablet 0    buPROPion (WELLBUTRIN SR) 100 MG extended release tablet Take 1 tablet by mouth daily 60 tablet 3    sotalol (BETAPACE) 120 MG tablet Take 1 tablet by mouth 2 times daily 60 tablet 3    amLODIPine (NORVASC) 5 MG tablet Take 1 tablet by mouth daily 30 tablet 3     No current facility-administered medications for this visit.      Allergies:  Hornet venom, Wasp venom, and Flagyl [metronidazole]  Social History     Socioeconomic History    Marital status:      Spouse name: Not on file    Number of children: Not on file    Years of education: Not on file    Highest education level: Not on file   Occupational History    Occupation: Retired  Tobacco Use    Smoking status: Former Smoker     Packs/day: 0.25     Types: Cigarettes     Quit date: 5/3/2017     Years since quittin.9    Smokeless tobacco: Never Used   Vaping Use    Vaping Use: Never used   Substance and Sexual Activity    Alcohol use: Yes     Comment: rare    Drug use: No    Sexual activity: Never   Other Topics Concern    Not on file   Social History Narrative    Not on file     Social Determinants of Health     Financial Resource Strain:     Difficulty of Paying Living Expenses: Not on file   Food Insecurity:     Worried About Running Out of Food in the Last Year: Not on file    Kyler of Food in the Last Year: Not on file   Transportation Needs:     Lack of Transportation (Medical): Not on file    Lack of Transportation (Non-Medical):  Not on file   Physical Activity:     Days of Exercise per Week: Not on file    Minutes of Exercise per Session: Not on file   Stress:     Feeling of Stress : Not on file   Social Connections:     Frequency of Communication with Friends and Family: Not on file    Frequency of Social Gatherings with Friends and Family: Not on file    Attends Voodoo Services: Not on file    Active Member of [address] [place] [address] [employer] or Organizations: Not on file    Attends Club or Organization Meetings: Not on file    Marital Status: Not on file   Intimate Partner Violence:     Fear of Current or Ex-Partner: Not on file    Emotionally Abused: Not on file    Physically Abused: Not on file    Sexually Abused: Not on file   Housing Stability:     Unable to Pay for Housing in the Last Year: Not on file    Number of [place] in the Last Year: Not on file    Unstable Housing in the Last Year: Not on file     Family History   Problem Relation Age of Onset    Kidney Disease Mother     Coronary Art Dis Mother     High Blood Pressure Mother     Cancer Father     Other Father      Labs  Lab Results   Component Value Date    WBC 9.9 2022    HGB 11.9 01/28/2022    HCT 38.2 01/28/2022     01/28/2022    PROTIME 13.2 (H) 03/26/2021    INR 1.2 03/26/2021    APTT 31.3 01/20/2020    K 4.0 01/28/2022    BUN 26 (H) 01/28/2022    CREATININE 0.6 01/28/2022       PHYSICAL EXAM:    CONSTITUTIONAL:   Awake, alert, cooperative  PSYCHIATRIC :  Oriented to time, place and person     Appropriate insight to disease process  EYES: Lids and lashes normal  ENT:  External ears and nose without lesions   Hearing deficits not note  NECK: Supple, symmetrical, trachea midline   Carotid bruit notnoted  LUNGS:  No increased work of breathing                 Clear to auscultation  CARDIOVASCULAR:  regular rate and rhythm   ABDOMEN:  soft, non-distended, non-tender   Aorta is not palpable  SKIN:   Normal skin color   Texture and turgor normal, no induration  EXTREMITIES:   R LE Edema absent   No wounds   Cyanosis of toes  L LE Edema absent  R posterior tibial monophasic L posterior tibial No signal   R dorsalis pedis No signal L dorsalis pedis monophasic     RADIOLOGY:  9/30/21 ABIs and PVRs  R ANTONY 0.57   L ANTONY 0.87  Unable to obtain tbis due to machine    A/P PVD with R LE claudication  · Her lifestyle claudication sxs are stable in distance  · She is also having more pain - though it is in the whole leg as compared to in the posterior calf only  · Concern whether her sxs are more related to her spinal stenosis and osteoporosis  · Will get abis and pvrs  · R LE PE is stable  · L LE PE is slightly worse than previous  · Previously had discussed R fem ak pop bypass when she was sx in 2020 - would need card clearance and another angiogram to ensure that target still present if felt that her sxs are related to arterial disease  · She is on eliquis for afib  · Discussed with pt tobacco use and relationship to PVD  pt currently is not a smoker  Pt understands tobacco use can contribute to worsening of pvd and development of limb loss   · Emphasized importance of foot care and to call if develops any wounds, ulcerations, changes in appearance, claudication and/or symptoms  · We discussed importance of a walking program and them gauging how far they have walked on a daily basis and progressively increasing that distance and walking through the pain    Left groin pain  · Completely resolved    Giana Whitman MD

## 2022-04-05 NOTE — PATIENT INSTRUCTIONS
Patient Education        Learning About Peripheral Arterial Disease (PAD)  What is peripheral arterial disease? Peripheral arterial disease (PAD) is narrowing or blockage of arteries thatcauses poor blood flow to your arms and legs. PAD is most common in the legs. The most common cause of PAD is the buildup of plaque on the inside of arteries. Over time, plaque builds up in the walls of the arteries, including those that supply blood to your legs. If you have PAD, you're likely to have plaque in other arteries in your body. This raises your risk of a heart attackand stroke. Medicines and lifestyle changes may lower your risk of heart attack and stroke. They may also help if you have symptoms. In some cases, surgery or othertreatment is needed. Peripheral arterial disease is also called peripheral vascular disease. What are the symptoms? Many people who have PAD don't have symptoms. If you have symptoms, they may include a tight, aching, or squeezing pain in your calf, thigh, or buttock. This pain is called intermittent claudication. It usually happens after you have walked a certain distance. The pain goes away if you stop walking. As PAD gets worse, you may have pain in your foot or toe whenyou aren't walking. Other symptoms may include weak or tired legs. You might have trouble walkingor balancing. If PAD gets worse, you may have other symptoms caused by poor blood flow to your legs and feet. These symptoms aren't common. They may include cold or numb feet or toes, sores that are slow to heal, or leg or foot pain when you're atrest.  How can you prevent PAD?  Quit smoking. Quitting smoking is one of the best things you can do to help prevent PAD. If you need help quitting, talk to your doctor about stop-smoking programs and medicines. These can increase your chances of quitting for good.  Stay at a healthy weight.    Manage other health problems, including diabetes, high blood pressure, and high cholesterol.  Be physically active. Try to do moderate activity at least 2½ hours a week. Or try to do vigorous activity at least 1¼ hours a week. You may want to walk or try other activities, such as running, swimming, cycling, or playing tennis or team sports.  Eat a variety of heart-healthy foods. ? Eat fruits, vegetables, whole grains, beans, and other high-fiber foods. ? Eat lean proteins, such as seafood, lean meats, beans, nuts, and soy products. ? Eat healthy fats, such as canola and olive oil. ? Choose foods that are low in saturated fat and avoid trans fat. ? Limit sodium and alcohol. ? Limit drinks and foods with added sugar. How is PAD treated? Treatment for PAD focuses on relieving symptoms and lowering your risk of heart attack and stroke. Making healthy lifestyle changes can help you lower thisrisk.  If you smoke, quit. Quitting is the best thing you can do when you have PAD. Medicines and counseling can help you quit for good.  Get regular exercise (if your doctor says it's safe). Try walking, swimming, or biking for at least 30 minutes on most, if not all, days of the week. If you have leg symptoms when you exercise, your doctor might recommend a specialized exercise program that may relieve symptoms. The goal is to be able to walk farther without pain.  Eat heart-healthy foods, such as vegetables, fruits, nuts, beans, fish, and whole grains. Limit foods that have a lot of salt, fat, and sugar.  Stay at a healthy weight. Lose weight if you need to. You may need medicines to help lower your risk of heart attack and stroke. These include medicine to prevent blood clots, improve cholesterol, or lower blood pressure. You also may take a medicine that can help ease pain while youare walking. People who have severe PAD may have bypass surgery or other procedures (such asangioplasty) to restore proper blood flow to the legs. Follow-up care is a key part of your treatment and safety. Be sure to make and go to all appointments, and call your doctor if you are having problems. It's also a good idea to know your test results and keep alist of the medicines you take. Where can you learn more? Go to https://chnicho.[employer]. org and sign in to your [employer] account. Enter A581 in the [employer] box to learn more about \"Learning About Peripheral Arterial Disease (PAD). \"     If you do not have an account, please click on the \"Sign Up Now\" link. Current as of: January 10, 2022               Content Version: 13.2  © 2006-2022 [employer]. Care instructions adapted under license by [facility] ([facility]). If you have questions about a medical condition or this instruction, always ask your healthcare professional. [address] any warranty or liability for your use of this information.

## 2022-04-08 ENCOUNTER — TELEPHONE (OUTPATIENT)
Dept: VASCULAR SURGERY | Age: 72
End: 2022-04-08

## 2022-04-11 ENCOUNTER — HOSPITAL ENCOUNTER (OUTPATIENT)
Dept: CARDIOLOGY | Age: 72
Discharge: HOME | End: 2022-04-11
Payer: MEDICARE

## 2022-04-11 DIAGNOSIS — I73.9 PVD (PERIPHERAL VASCULAR DISEASE) WITH CLAUDICATION (HCC): ICD-10-CM

## 2022-04-11 PROCEDURE — 93923 UPR/LXTR ART STDY 3+ LVLS: CPT

## 2022-04-13 ENCOUNTER — TELEPHONE (OUTPATIENT)
Dept: VASCULAR SURGERY | Age: 72
End: 2022-04-13

## 2022-04-13 NOTE — TELEPHONE ENCOUNTER
Patient called for test results, please call her, thank you.     9/30/21 ABIs and PVRs  R ANTONY 0.57   L ANTONY 0.87  Unable to obtain tbis due to machine    R ANTONY 0.42, TBI 0.11  L ANTONY 0.35, TBI 0.11    Significant worsening in bilateral LE    Would recommend lower extremity angiogram.  She likely will need a bypass but would ideally d0 angiogram to evaluate for surgical planning as it has been two years since last angiogram.      Anshul Garcia to schedule if willing to proceed    If doesn't want to schedule angiogram please schedule to be see in the office    Cynthia Jay MD

## 2022-04-18 ENCOUNTER — TELEPHONE (OUTPATIENT)
Dept: VASCULAR SURGERY | Age: 72
End: 2022-04-18

## 2022-04-18 NOTE — TELEPHONE ENCOUNTER
Pt phoned, scheduled abdominal aortogram possible intervention with Dr. Yasmine Mueller 5/3 at 11:30 a.m. She was instructed to report to [facility] (-) registration at 9:30 a.m, NPO after midnight except heart and/or BP meds in a.m with sips of water, hold Eliquis x 2 days, must have a , will get COVID tested at [facility].

## 2022-04-27 DIAGNOSIS — I73.9 PVD (PERIPHERAL VASCULAR DISEASE) WITH CLAUDICATION (HCC): Primary | ICD-10-CM

## 2022-04-28 DIAGNOSIS — I73.9 PVD (PERIPHERAL VASCULAR DISEASE) WITH CLAUDICATION (HCC): ICD-10-CM

## 2022-05-01 LAB — SARS-COV-2, PCR: NOT DETECTED

## 2022-05-02 ENCOUNTER — TELEPHONE (OUTPATIENT)
Dept: CARDIAC CATH/INVASIVE PROCEDURES | Age: 72
End: 2022-05-02

## 2022-05-02 NOTE — TELEPHONE ENCOUNTER
Reminded patient of scheduled procedure on 5/3/22. Instructions given and COVID questionnaire completed.

## 2022-05-03 ENCOUNTER — HOSPITAL ENCOUNTER (OUTPATIENT)
Dept: CARDIAC CATH/INVASIVE PROCEDURES | Age: 72
Discharge: HOME | End: 2022-05-03
Attending: SURGERY | Admitting: SURGERY
Payer: MEDICARE

## 2022-05-03 VITALS
HEIGHT: 66 IN | OXYGEN SATURATION: 96 % | DIASTOLIC BLOOD PRESSURE: 63 MMHG | BODY MASS INDEX: 15.11 KG/M2 | RESPIRATION RATE: 18 BRPM | TEMPERATURE: 96.2 F | HEART RATE: 64 BPM | WEIGHT: 94 LBS | SYSTOLIC BLOOD PRESSURE: 152 MMHG

## 2022-05-03 DIAGNOSIS — I73.9 PVD (PERIPHERAL VASCULAR DISEASE) (HCC): ICD-10-CM

## 2022-05-03 DIAGNOSIS — E13.51 PERIPHERAL VASCULAR DISEASE DUE TO SECONDARY DIABETES MELLITUS (HCC): ICD-10-CM

## 2022-05-03 LAB
ABO/RH: NORMAL
ANION GAP SERPL CALCULATED.3IONS-SCNC: 13 MMOL/L (ref 7–16)
ANTIBODY SCREEN: NORMAL
BUN BLDV-MCNC: 22 MG/DL (ref 6–23)
CALCIUM SERPL-MCNC: 9.3 MG/DL (ref 8.6–10.2)
CHLORIDE BLD-SCNC: 105 MMOL/L (ref 98–107)
CO2: 27 MMOL/L (ref 22–29)
CREAT SERPL-MCNC: 0.7 MG/DL (ref 0.5–1)
GFR AFRICAN AMERICAN: >60
GFR NON-AFRICAN AMERICAN: >60 ML/MIN/1.73
GLUCOSE BLD-MCNC: 82 MG/DL (ref 74–99)
HCT VFR BLD CALC: 36.4 % (ref 34–48)
HEMOGLOBIN: 11.6 G/DL (ref 11.5–15.5)
MCH RBC QN AUTO: 31.1 PG (ref 26–35)
MCHC RBC AUTO-ENTMCNC: 31.9 % (ref 32–34.5)
MCV RBC AUTO: 97.6 FL (ref 80–99.9)
PDW BLD-RTO: 13.6 FL (ref 11.5–15)
PLATELET # BLD: 319 E9/L (ref 130–450)
PMV BLD AUTO: 9.6 FL (ref 7–12)
POTASSIUM REFLEX MAGNESIUM: 4.5 MMOL/L (ref 3.5–5)
RBC # BLD: 3.73 E12/L (ref 3.5–5.5)
SODIUM BLD-SCNC: 145 MMOL/L (ref 132–146)
WBC # BLD: 4.8 E9/L (ref 4.5–11.5)

## 2022-05-03 PROCEDURE — 6360000002 HC RX W HCPCS

## 2022-05-03 PROCEDURE — 37225 PR REVSC OPN/PRQ FEM/POP W/ATHRC/ANGIOP SM VSL: CPT | Performed by: SURGERY

## 2022-05-03 PROCEDURE — 75710 ARTERY X-RAYS ARM/LEG: CPT

## 2022-05-03 PROCEDURE — C1884 EMBOLIZATION PROTECT SYST: HCPCS

## 2022-05-03 PROCEDURE — C1725 CATH, TRANSLUMIN NON-LASER: HCPCS

## 2022-05-03 PROCEDURE — 37225 HC FEM POP TERRITORY ATHERECTOMY: CPT

## 2022-05-03 PROCEDURE — 6370000000 HC RX 637 (ALT 250 FOR IP)

## 2022-05-03 PROCEDURE — 2580000003 HC RX 258

## 2022-05-03 PROCEDURE — 2709999900 HC NON-CHARGEABLE SUPPLY

## 2022-05-03 PROCEDURE — C1714 CATH, TRANS ATHERECTOMY, DIR: HCPCS

## 2022-05-03 PROCEDURE — 86901 BLOOD TYPING SEROLOGIC RH(D): CPT

## 2022-05-03 PROCEDURE — C2623 CATH, TRANSLUMIN, DRUG-COAT: HCPCS

## 2022-05-03 PROCEDURE — 75710 ARTERY X-RAYS ARM/LEG: CPT | Performed by: SURGERY

## 2022-05-03 PROCEDURE — 2500000003 HC RX 250 WO HCPCS

## 2022-05-03 PROCEDURE — 86850 RBC ANTIBODY SCREEN: CPT

## 2022-05-03 PROCEDURE — 86900 BLOOD TYPING SEROLOGIC ABO: CPT

## 2022-05-03 PROCEDURE — 80048 BASIC METABOLIC PNL TOTAL CA: CPT

## 2022-05-03 PROCEDURE — C1887 CATHETER, GUIDING: HCPCS

## 2022-05-03 PROCEDURE — 36415 COLL VENOUS BLD VENIPUNCTURE: CPT

## 2022-05-03 PROCEDURE — C1769 GUIDE WIRE: HCPCS

## 2022-05-03 PROCEDURE — 85027 COMPLETE CBC AUTOMATED: CPT

## 2022-05-03 PROCEDURE — C1760 CLOSURE DEV, VASC: HCPCS

## 2022-05-03 PROCEDURE — C1894 INTRO/SHEATH, NON-LASER: HCPCS

## 2022-05-03 RX ORDER — ACETAMINOPHEN 325 MG/1
650 TABLET ORAL EVERY 4 HOURS PRN
Status: DISCONTINUED | OUTPATIENT
Start: 2022-05-03 | End: 2022-05-03 | Stop reason: HOSPADM

## 2022-05-03 RX ORDER — SODIUM CHLORIDE 9 MG/ML
INJECTION, SOLUTION INTRAVENOUS PRN
Status: DISCONTINUED | OUTPATIENT
Start: 2022-05-03 | End: 2022-05-03

## 2022-05-03 RX ORDER — LOPERAMIDE HYDROCHLORIDE 2 MG/1
2 CAPSULE ORAL 4 TIMES DAILY PRN
COMMUNITY

## 2022-05-03 RX ORDER — SODIUM CHLORIDE 9 MG/ML
INJECTION, SOLUTION INTRAVENOUS CONTINUOUS
Status: DISCONTINUED | OUTPATIENT
Start: 2022-05-03 | End: 2022-05-03

## 2022-05-03 RX ORDER — SODIUM CHLORIDE 0.9 % (FLUSH) 0.9 %
5-40 SYRINGE (ML) INJECTION PRN
Status: DISCONTINUED | OUTPATIENT
Start: 2022-05-03 | End: 2022-05-03

## 2022-05-03 RX ORDER — SODIUM CHLORIDE 0.9 % (FLUSH) 0.9 %
5-40 SYRINGE (ML) INJECTION EVERY 12 HOURS SCHEDULED
Status: DISCONTINUED | OUTPATIENT
Start: 2022-05-03 | End: 2022-05-03

## 2022-05-03 RX ADMIN — SODIUM CHLORIDE: 9 INJECTION, SOLUTION INTRAVENOUS at 10:29

## 2022-05-03 RX ADMIN — ACETAMINOPHEN 650 MG: 325 TABLET ORAL at 15:39

## 2022-05-03 ASSESSMENT — PAIN SCALES - GENERAL: PAINLEVEL_OUTOF10: 4

## 2022-05-03 ASSESSMENT — PAIN DESCRIPTION - LOCATION: LOCATION: ABDOMEN

## 2022-05-03 ASSESSMENT — PAIN DESCRIPTION - DESCRIPTORS: DESCRIPTORS: ACHING;DULL;DISCOMFORT

## 2022-05-03 ASSESSMENT — PAIN DESCRIPTION - ORIENTATION: ORIENTATION: MID

## 2022-05-03 NOTE — PROGRESS NOTES
Extended Stay Recovery Discharge Documentation    Final procedure site check completed, stable for discharge- Yes  Ambulated without issue post recovery completion, gait steady. Peripheral IV sites removed (see IV Flowsheet) and site asymptomatic- Yes  Patient dressed self without assistance. Discharge instructions reviewed with Patient and verbalized understanding.    Patient discharged Home with daughter at 0PM  Monitor cleaned and placed back in nurses' station- Yes

## 2022-05-03 NOTE — H&P
Vascular Surgery History & Physical Exam    Chief Complaint: Peripheral vascular disease, R LE claudication    HISTORY OF PRESENT ILLNESS:    The patient is a 74 y.o. female who presents to the hospital for elective arteriogram with possible intervention. The patient has a history of peripheral vascular disease, afib, CAD and COPD.        ROS : All others Negative if blank [], Positive if [x]  General   [] Fevers   [] Chills   [] Weight Loss   Skin   [x] Tissue Loss   Eyes   [x] Wears Glasses/Contacts   [] Vision Changes   Respiratory    [] Shortness of breath   Cardiovascular   [] Chest Pain   [] Shortness of breath with exertion   Gastrointestinal   [] Abdominal Pain     Past Medical History:   Diagnosis Date    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 with rest pain ([address] 2019    Atrial fibrillation (HCC)     Atypical mole     upper right arm - black mole     Bilateral carotid artery stenosis 2018    Dr. 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    History of tobacco use 5/3/2018    Hydrocephalus ([facility])     Hyperlipidemia     no medications     Hypertension     BRY (obstructive sleep apnea)     uses O2 2L NC     Pain in both feet 5/3/2018    Paroxysmal A-fib (HCC)     PVD (peripheral vascular disease) ([facility])     PVD (peripheral vascular disease) with claudication ([facility]) 5/3/2018    Rheumatoid arthritis ([address]     Short-term memory loss     Thyroid disease     Urinary incontinence     Venous stasis ulcer of left calf with fat layer exposed without varicose veins ([address] 2019    Weight loss      Past Surgical History:   Procedure Laterality Date     SECTION COLONOSCOPY      COLONOSCOPY N/A 2/19/2021    COLONOSCOPY WITH BIOPSY performed by Kike Nugent MD at [facility] cataract     LAPAROTOMY N/A 2/17/2020    LAPAROTOMY EXPLORATORY, RIGHT HEMICOLECTOMY performed by Kike Nugent MD at [address] N/A 9/18/2020    PLACEMENT OF INTRATHECAL CATHETER FOR LUMBAR DRAIN performed by Xin Beckham MD at [address]  01/29/2019    L SFA angioplasty    TUBAL LIGATION      VENTRICULOPERITONEAL SHUNT N/A 9/23/2020    VENTRICULAR PERITONEAL SHUNT PLACEMENT performed by Xin Beckham MD at [address]     Current Medications:     Current Outpatient Medications:     loperamide (IMODIUM) 2 MG capsule, Take 2 mg by mouth 4 times daily as needed for Diarrhea, Disp: , Rfl:     albuterol-ipratropium (COMBIVENT RESPIMAT)  MCG/ACT AERS inhaler, Inhale 1 puff into the lungs every 4 hours as needed for Wheezing, Disp: , Rfl:     levothyroxine (SYNTHROID) 125 MCG tablet, Take 1 tablet by mouth Daily (Patient taking differently: Take 100 mcg by mouth Daily ), Disp: 30 tablet, Rfl: 3    colestipol (COLESTID) 1 g tablet, Take 1 tablet by mouth 2 times daily, Disp: , Rfl:     oxybutynin (DITROPAN XL) 15 MG extended release tablet, Take 15 mg by mouth daily, Disp: , Rfl:     ascorbic acid (VITAMIN C) 500 MG tablet, Take 500 mg by mouth daily, Disp: , Rfl:     albuterol (PROVENTIL) (5 MG/ML) 0.5% nebulizer solution, Take 0.5 mLs by nebulization every 4 hours as needed for Wheezing, Disp: 120 each, Rfl: 0    Respiratory Therapy Supplies (FULL KIT NEBULIZER SET) MISC, Use as directed with nebulized medication. , Disp: 1 each, Rfl: 0    Cholecalciferol (VITAMIN D) 50 MCG (2000 UT) CAPS capsule, Take 1 capsule by mouth daily , Disp: , Rfl:     acetaminophen (TYLENOL) 500 MG tablet, Take 1 tablet by mouth every 6 hours as needed for Pain, Disp: 120 tablet, Rfl: 3    ipratropium-albuterol (DUONEB) 0.5-2.5 (3) MG/3ML SOLN nebulizer solution, Inhale 3 mLs into the lungs every 4 hours (while awake), Disp: 360 mL, Rfl: 0    apixaban (ELIQUIS) 5 MG TABS tablet, Take 1 tablet by mouth 2 times daily, Disp: 60 tablet, Rfl: 0    buPROPion (WELLBUTRIN SR) 100 MG extended release tablet, Take 1 tablet by mouth daily, Disp: 60 tablet, Rfl: 3    sotalol (BETAPACE) 120 MG tablet, Take 1 tablet by mouth 2 times daily, Disp: 60 tablet, Rfl: 3    amLODIPine (NORVASC) 5 MG tablet, Take 1 tablet by mouth daily, Disp: 30 tablet, Rfl: 3    Current Facility-Administered Medications:     0.9 % sodium chloride infusion, , IntraVENous, Continuous, BRIAN Newell CNP, Last Rate: 75 mL/hr at 22 1029, New Bag at 22 1029    sodium chloride flush 0.9 % injection 5-40 mL, 5-40 mL, IntraVENous, 2 times per day, BRIAN Newell - CNP    sodium chloride flush 0.9 % injection 5-40 mL, 5-40 mL, IntraVENous, PRN, BRIAN Newell - CNP    0.9 % sodium chloride infusion, , IntraVENous, PRN, Santos Man APRN - CNP  Allergies:  Hornet venom, Wasp venom, and Flagyl [metronidazole]  Social History     Socioeconomic History    Marital status:      Spouse name: Not on file    Number of children: Not on file    Years of education: Not on file    Highest education level: Not on file   Occupational History    Occupation: Retired    Tobacco Use    Smoking status: Former Smoker     Packs/day: 0.25     Types: Cigarettes     Quit date: 5/3/2017     Years since quittin.0    Smokeless tobacco: Never Used   Vaping Use    Vaping Use: Never used   Substance and Sexual Activity    Alcohol use: Yes     Comment: rare    Drug use: No    Sexual activity: Never   Other Topics Concern    Not on file   Social History Narrative    Not on file     Social Determinants of Health     Financial Resource Strain:     Difficulty of Paying Living Expenses: Not on file   Food Insecurity: Worried About [facility] in the Last Year: Not on file    Kyler of Food in the Last Year: Not on file   Transportation Needs:     Lack of Transportation (Medical): Not on file    Lack of Transportation (Non-Medical):  Not on file   Physical Activity:     Days of Exercise per Week: Not on file    Minutes of Exercise per Session: Not on file   Stress:     Feeling of Stress : Not on file   Social Connections:     Frequency of Communication with Friends and Family: Not on file    Frequency of Social Gatherings with Friends and Family: Not on file    Attends [employer] Services: Not on file    Active Member of [address] [place] [address] or Organizations: Not on file    Attends Club or Organization Meetings: Not on file    Marital Status: Not on file   Intimate Partner Violence:     Fear of Current or Ex-Partner: Not on file    Emotionally Abused: Not on file    Physically Abused: Not on file    Sexually Abused: Not on file   Housing Stability:     Unable to Pay for Housing in the Last Year: Not on file    Number of [place] in the Last Year: Not on file    Unstable Housing in the Last Year: Not on file     Family History   Problem Relation Age of Onset    Kidney Disease Mother     Coronary Art Dis Mother     High Blood Pressure Mother     Cancer Father     Other Father      PHYSICAL EXAM:    Vitals:    05/03/22 1013   BP: (!) 144/113   Pulse: 62   Resp: 18   Temp: 97.3 °F (36.3 °C)   SpO2: 95%     CONSTITUTIONAL:  awake, alert, cooperative, no apparent distress, and appears stated age  NECK:  supple, symmetrical, trachea midline  LUNGS:  no increased work of breathing, good air exchange  CARDIOVASCULAR:  regular rate and rhythm  ABDOMEN:  soft, non-distended and non-tender   Pulse Exam   R femoral biphasic L femoral biphasic   R dorsalis pedis No signal L dorsalis pedis monophasic   R posterior tibial No signal L posterior tibial No signal     LABS:    Lab Results   Component Value Date    WBC 9.9 01/28/2022    HGB 11.9 01/28/2022    HCT 38.2 01/28/2022     01/28/2022    PROTIME 13.2 (H) 03/26/2021    INR 1.2 03/26/2021    APTT 31.3 01/20/2020    K 4.0 01/28/2022    BUN 26 (H) 01/28/2022    CREATININE 0.6 01/28/2022     Assesment:  Peripheral vascular disease. R LE claudication  PLAN:    Aortogram,  Right lower extremity arteriogram, possible intervention. I reviewed the procedure with the patient and family as available. I discussed the procedure, risks, benefits, complications, and alternatives of the procedure. They understand and consent.   All questions were answered    BRIAN Reddy - CNP    Giana Whitman MD

## 2022-05-03 NOTE — OP NOTE
Cardiovascular Lab Procedure Report    Ariela Montana  1950    Date : 5/3/2022  Surgeon: Zara Laurent M.D. Pre-procedure Diagnosis: R LE lifestyle limiting claudication  Post-procedure Diagnosis: Same  Procedure:      Left  common femoral artery access   with US guidance    6 fr angioseal used for closure    Bilateral lower extremity angiogram    TF Right lower extremity angiogram   11562 Angiogram with catheter in Right   common femoral, popliteal artery   [address]104 Right SFA, Popliteal   atherectomy (Turbohawk - hawkone 6 fr)    Right SFA, Popliteal angioplasty with   4 x 250 DCB impact balloon     # 4 Spyder used   Anesthesia: Local with IV sedation  Assistants: Cath Lab Staff  Estimated Blood Loss: Minimal  Complications: none  Findings: Abdominal aorta :   Right Right s/p Intervention   Common Femoral Art Patent Patent   Superficial Femoral Art Flush  Patent, distal disease present   Profunda Femoral Art patent patent   AK Popliteal Art patent patent   BK Popliteal Art patent patent   Anterior Tibial Art Proximal occlusion Proximal occlusion   Tibioperoneal Trunk Patent but diseasd Patent but diseased   Peroneal Art Proximal occlusion Proximal occlusion   Posterior Tibial Art Patent diseased but flow to foot Patent diseased but flow to foot     Procedure Details : There was not previous CTA  or catheter based diagnostic imaging preformed prior to today's procedure. Timeout preformed identifying pt and procedure. Groins prepped and draped in sterile fashion. Patient given sedation as needed throughout the case. Left  common femoral artery was noted to be patent and was accessed under ultrasound guidance after infiltrating with local.  Micropuncture placed, exchanged out for 5 fr sheath. Advantage glide wire and contra catheter advanced into distal aorta.      Catheter and wire used to access Right  iliac system and catheter placed at common femoral and angiogram of theRight  lower extremity preformed. Significant occlusive disease was noted in the sfa. Patient was given 5000 units of heparin and than a 6 fr destination sheath advanced to common femoral artery on the Right. Imaging with the catheter in the sfa to further evaluate the sfa, popliteal lesion. 0.35 [place] blazer catheter and advantage glide wire used to traverse stenosis. A # 4 spyder filter deployed. The sfa lesion was treated with atherectomy. Post dilation with a 4x250 balloon was completed. There was evidence of some residual stenosis. Completion angiogram noted much improved filling distally and brisk flow though the stenotic area. Sheath and wire pulled back to Left iliac system.   After femoral angiogram a 6 fr angioseal device used to close the Left common femoral artery    Postop Exam  Right DP no signal  PT biphasic  Left DP monophasic  PT no signal    Plan  Encourage continued tobacco cessation    Moy fu in office and depending on pts sx will consider L LE intervention at that time - L SFA did appear to be proximally occluded on imaging    Cas Guerra MD    PCP : Amelia Hartley MD  Podiatrist : Dr. Lisbeth Storm  Cardiologist : Dr. Norrsi Bear    Previous Vascular Procedure  1/29/19 L sfa, popliteal atherectomy, dcb   1/7/2020 R LE angiogram - SFA  - R LE LL claudication - needs fem ak pop bypass   5/3/22 R LE angiogram  R SFA atherectomy, and plasty with 4x250 DCB

## 2022-05-17 ENCOUNTER — HOSPITAL ENCOUNTER (OUTPATIENT)
Dept: CT IMAGING | Age: 72
Discharge: HOME | End: 2022-05-19
Payer: MEDICARE

## 2022-05-17 DIAGNOSIS — G91.2 IDIOPATHIC NORMAL PRESSURE HYDROCEPHALUS (HCC): ICD-10-CM

## 2022-05-17 PROBLEM — Z98.2 S/P VP SHUNT: Status: ACTIVE | Noted: 2022-05-17

## 2022-05-17 PROCEDURE — 70450 CT HEAD/BRAIN W/O DYE: CPT

## 2022-05-20 ENCOUNTER — TELEPHONE (OUTPATIENT)
Dept: VASCULAR SURGERY | Age: 72
End: 2022-05-20

## 2022-05-23 ENCOUNTER — OFFICE VISIT (OUTPATIENT)
Dept: VASCULAR SURGERY | Age: 72
End: 2022-05-23
Payer: MEDICARE

## 2022-05-23 VITALS — BODY MASS INDEX: 15.43 KG/M2 | WEIGHT: 96 LBS | HEIGHT: 66 IN

## 2022-05-23 DIAGNOSIS — I70.0 ATHEROSCLEROSIS OF AORTIC BIFURCATION AND COMMON ILIAC ARTERIES (HCC): Primary | ICD-10-CM

## 2022-05-23 DIAGNOSIS — I70.8 ATHEROSCLEROSIS OF AORTIC BIFURCATION AND COMMON ILIAC ARTERIES (HCC): Primary | ICD-10-CM

## 2022-05-23 DIAGNOSIS — I73.9 PVD (PERIPHERAL VASCULAR DISEASE) WITH CLAUDICATION (HCC): Primary | ICD-10-CM

## 2022-05-23 PROCEDURE — 99213 OFFICE O/P EST LOW 20 MIN: CPT | Performed by: SURGERY

## 2022-05-23 NOTE — PATIENT INSTRUCTIONS
Patient Education        Learning About Peripheral Arterial Disease (PAD)  What is peripheral arterial disease? Peripheral arterial disease (PAD) is narrowing or blockage of arteries thatcauses poor blood flow to your arms and legs. PAD is most common in the legs. The most common cause of PAD is the buildup of plaque on the inside of arteries. Over time, plaque builds up in the walls of the arteries, including those that supply blood to your legs. If you have PAD, you're likely to have plaque in other arteries in your body. This raises your risk of a heart attackand stroke. Medicines and lifestyle changes may lower your risk of heart attack and stroke. They may also help if you have symptoms. In some cases, surgery or othertreatment is needed. Peripheral arterial disease is also called peripheral vascular disease. What are the symptoms? Many people who have PAD don't have symptoms. If you have symptoms, they may include a tight, aching, or squeezing pain in your calf, thigh, or buttock. This pain is called intermittent claudication. It usually happens after you have walked a certain distance. The pain goes away if you stop walking. As PAD gets worse, you may have pain in your foot or toe whenyou aren't walking. Other symptoms may include weak or tired legs. You might have trouble walkingor balancing. If PAD gets worse, you may have other symptoms caused by poor blood flow to your legs and feet. These symptoms aren't common. They may include cold or numb feet or toes, sores that are slow to heal, or leg or foot pain when you're atrest.  How can you prevent PAD?  Quit smoking. Quitting smoking is one of the best things you can do to help prevent PAD. If you need help quitting, talk to your doctor about stop-smoking programs and medicines. These can increase your chances of quitting for good.  Stay at a healthy weight.    Manage other health problems, including diabetes, high blood pressure, and high cholesterol.  Be physically active. Try to do moderate activity at least 2½ hours a week. Or try to do vigorous activity at least 1¼ hours a week. You may want to walk or try other activities, such as running, swimming, cycling, or playing tennis or team sports.  Eat a variety of heart-healthy foods. ? Eat fruits, vegetables, whole grains, beans, and other high-fiber foods. ? Eat lean proteins, such as seafood, lean meats, beans, nuts, and soy products. ? Eat healthy fats, such as canola and olive oil. ? Choose foods that are low in saturated fat and avoid trans fat. ? Limit sodium and alcohol. ? Limit drinks and foods with added sugar. How is PAD treated? Treatment for PAD focuses on relieving symptoms and lowering your risk of heart attack and stroke. Making healthy lifestyle changes can help you lower thisrisk.  If you smoke, quit. Quitting is the best thing you can do when you have PAD. Medicines and counseling can help you quit for good.  Get regular exercise (if your doctor says it's safe). Try walking, swimming, or biking for at least 30 minutes on most, if not all, days of the week. If you have leg symptoms when you exercise, your doctor might recommend a specialized exercise program that may relieve symptoms. The goal is to be able to walk farther without pain.  Eat heart-healthy foods, such as vegetables, fruits, nuts, beans, fish, and whole grains. Limit foods that have a lot of salt, fat, and sugar.  Stay at a healthy weight. Lose weight if you need to. You may need medicines to help lower your risk of heart attack and stroke. These include medicine to prevent blood clots, improve cholesterol, or lower blood pressure. You also may take a medicine that can help ease pain while youare walking. People who have severe PAD may have bypass surgery or other procedures (such asangioplasty) to restore proper blood flow to the legs. Follow-up care is a key part of your treatment and safety. Be sure to make and go to all appointments, and call your doctor if you are having problems. It's also a good idea to know your test results and keep alist of the medicines you take. Where can you learn more? Go to https://rolando.StoreFront.net. org and sign in to your [employer] account. Enter W746 in the [facility] box to learn more about \"Learning About Peripheral Arterial Disease (PAD). \"     If you do not have an account, please click on the \"Sign Up Now\" link. Current as of: January 10, 2022               Content Version: 13.2  © 0101-5599 [employer]wise, Incorporated. Care instructions adapted under license by [facility] ([facility]). If you have questions about a medical condition or this instruction, always ask your healthcare professional. [address] any warranty or liability for your use of this information. [email or web address]

## 2022-05-23 NOTE — PROGRESS NOTES
Vascular Surgery Outpatient Followup    PCP :Bekah Uriostegui MD  Podiatrist : Dr. Dede Healy  Cardiologist : Dr. Marleny Lagos     Previous Vascular Procedure  1/29/19 L sfa, popliteal atherectomy, dcb   1/7/2020 R LE angiogram - SFA  - R LE LL claudication - needs fem ak pop bypass   5/3/22 R LE angiogram  R SFA atherectomy, and plasty with 4x250 DCB       HISTORY OF PRESENT ILLNESS:    This is a 74 y.o. female who presents in fu regarding pvd, lifestyle limiting claudication. Her R LE claudication has resolved post angiogram.  Her L LE is now more significant 200-250 feet she is getting calf claudication sx. She denies tissue loss or rest pain. she is having 9/10 pain which is achey and sore, cramping in the whole leg, not just her calf  She rest and feels better. Her leg groin pain remains resolved. R LE having issues with numbnes and tingling in her foot primarily which is stable. She has a 2/10 at its worst.      She denies significant swelling     She feels like her spinal stenosis is also causing her issues and she has been having more back pain. 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 Zulma Rubinstein 2/2020. She is recovered but has chronic diarrhea. She is frustrated that her diarrhea continues to be an issues, she is following with Dr. Ginger Ng. He suggested marijuana but patient was not interested. Still having issues with diarrhea and urinary incontinence, is now on colestipol for diarrhea which seems like it has helped a little. 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 She is on eliquis per Dr. Marleny Lagos after developing atrial fibrillation postop after her colon resection. She has had previous L LE intervention for wound of the left calf which was first noted approximately 7/2018 after trauma.   She followed with Dr. Marta Wilkes and after intervention it did heal.      Past Medical History:        Diagnosis Date    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 with rest pain ([address] 2019    Atrial fibrillation (HCC)     Atypical mole     upper right arm - black mole     Bilateral carotid artery stenosis 2018    Dr. Guzman Pagan of right carotid artery 5/3/2018    CAD (coronary artery disease)     f/u w/ PCP     Cancer ([address]     SKIN CA/Thigh    COPD (chronic obstructive pulmonary disease) ([address]     uses O2 2L NC at night - and during day prn     COVID     Depression     Diarrhea     for colonoscopy 21     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    History of tobacco use 5/3/2018    Hydrocephalus (HCC)     Hyperlipidemia     no medications     Hypertension     BRY (obstructive sleep apnea)     uses O2 2L NC     Pain in both feet 5/3/2018    [facility]fib ([facility])     Peripheral vascular disease due to secondary diabetes mellitus ([address] 5/3/2022    PVD (peripheral vascular disease) ([address]     PVD (peripheral vascular disease) with claudication ([address] 5/3/2018    Rheumatoid arthritis ([address]     Short-term memory loss     Thyroid disease     Urinary incontinence     Venous stasis ulcer of left calf with fat layer exposed without varicose veins ([address] 2019    Weight loss      Past Surgical History:        Procedure Laterality Date     SECTION      COLONOSCOPY      COLONOSCOPY N/A 2021    COLONOSCOPY WITH BIOPSY performed by Taylor Massey MD at [address] [place] [address] cataract     LAPAROTOMY N/A 2020    LAPAROTOMY EXPLORATORY, RIGHT HEMICOLECTOMY performed by Taylor Massey MD at [address] [place] [address] N/A 2020    PLACEMENT OF INTRATHECAL CATHETER FOR LUMBAR DRAIN performed by Nora Hernandez MD at [place] [address]  2019    L SFA angioplasty    TUBAL LIGATION      VENTRICULOPERITONEAL SHUNT N/A 2020    VENTRICULAR PERITONEAL SHUNT PLACEMENT performed by Michele Bella MD at [address]     Current Medications:   Current Outpatient Medications   Medication Sig Dispense Refill    loperamide (IMODIUM) 2 MG capsule Take 2 mg by mouth 4 times daily as needed for Diarrhea      albuterol-ipratropium (COMBIVENT RESPIMAT)  MCG/ACT AERS inhaler Inhale 1 puff into the lungs every 4 hours as needed for Wheezing      levothyroxine (SYNTHROID) 125 MCG tablet Take 1 tablet by mouth Daily (Patient taking differently: Take 88 mcg by mouth Daily ) 30 tablet 3    colestipol (COLESTID) 1 g tablet Take 1 tablet by mouth 2 times daily      oxybutynin (DITROPAN XL) 15 MG extended release tablet Take 15 mg by mouth daily      ascorbic acid (VITAMIN C) 500 MG tablet Take 500 mg by mouth daily      albuterol (PROVENTIL) (5 MG/ML) 0.5% nebulizer solution Take 0.5 mLs by nebulization every 4 hours as needed for Wheezing 120 each 0    Respiratory Therapy Supplies (FULL KIT NEBULIZER SET) MISC Use as directed with nebulized medication. 1 each 0    Cholecalciferol (VITAMIN D) 50 MCG (2000 UT) CAPS capsule Take 1 capsule by mouth daily       acetaminophen (TYLENOL) 500 MG tablet Take 1 tablet by mouth every 6 hours as needed for Pain 120 tablet 3    ipratropium-albuterol (DUONEB) 0.5-2.5 (3) MG/3ML SOLN nebulizer solution Inhale 3 mLs into the lungs every 4 hours (while awake) 360 mL 0    apixaban (ELIQUIS) 5 MG TABS tablet Take 1 tablet by mouth 2 times daily 60 tablet 0    buPROPion (WELLBUTRIN SR) 100 MG extended release tablet Take 1 tablet by mouth daily 60 tablet 3    sotalol (BETAPACE) 120 MG tablet Take 1 tablet by mouth 2 times daily 60 tablet 3    amLODIPine (NORVASC) 5 MG tablet Take 1 tablet by mouth daily 30 tablet 3     No current facility-administered medications for this visit.      Allergies:  Hornet venom, Wasp venom, and Flagyl [metronidazole]  Social History     Socioeconomic History    Marital status:      Spouse name: Not on file    Number of children: Not on file    Years of education: Not on file    Highest education level: Not on file   Occupational History    Occupation: Retired    Tobacco Use    Smoking status: Former Smoker     Packs/day: 0.25     Types: Cigarettes     Quit date: 5/3/2017     Years since quittin.0    Smokeless tobacco: Never Used   Vaping Use    Vaping Use: Never used   Substance and Sexual Activity    Alcohol use: Yes     Comment: rare    Drug use: No    Sexual activity: Never   Other Topics Concern    Not on file   Social History Narrative    Not on file     Social Determinants of Health     Financial Resource Strain:     Difficulty of Paying Living Expenses: Not on file   Food Insecurity:     Worried About [address] in the Last Year: Not on file    [address] in the Last Year: Not on file   Transportation Needs:     Lack of Transportation (Medical): Not on file    Lack of Transportation (Non-Medical):  Not on file   Physical Activity:     Days of Exercise per Week: Not on file    Minutes of Exercise per Session: Not on file   Stress:     Feeling of Stress : Not on file   Social Connections:     Frequency of Communication with Friends and Family: Not on file    Frequency of Social Gatherings with Friends and Family: Not on file    Attends Jewish Services: Not on file    Active Member of [address] [place] [address] or Organizations: Not on file    Attends Club or Organization Meetings: Not on file    Marital Status: Not on file   Intimate Partner Violence:     Fear of Current or Ex-Partner: Not on file    Emotionally Abused: Not on file    Physically Abused: Not on file    Sexually Abused: Not on file   Housing Stability:     Unable to Pay for Housing in the Last Year: Not on file    Number of [place] in the Last Year: Not on file    Unstable Housing in the Last Year: Not on file     Family History   Problem Relation Age of Onset    Kidney Disease Mother    Sandra Montano Coronary Art Dis Mother     High Blood Pressure Mother     Cancer Father     Other Father      Labs  Lab Results   Component Value Date    WBC 4.8 05/03/2022    HGB 11.6 05/03/2022    HCT 36.4 05/03/2022     05/03/2022    PROTIME 13.2 (H) 03/26/2021    INR 1.2 03/26/2021    APTT 31.3 01/20/2020    K 4.5 05/03/2022    BUN 22 05/03/2022    CREATININE 0.7 05/03/2022       PHYSICAL EXAM:    CONSTITUTIONAL:   Awake, alert, cooperative  PSYCHIATRIC :  Oriented to time, place and person     Appropriate insight to disease process  EYES: Lids and lashes normal  ENT:  External ears and nose without lesions   Hearing deficits not note  NECK: Supple, symmetrical, trachea midline   Carotid bruit notnoted  LUNGS:  No increased work of breathing                 Clear to auscultation  CARDIOVASCULAR:  regular rate and rhythm   ABDOMEN:  soft, non-distended, non-tender   Aorta is not palpable  SKIN:   Normal skin color   Texture and turgor normal, no induration  EXTREMITIES:   R LE Edema absent   No wounds   Cyanosis of toes  L LE Edema absent  R posterior tibial biphasic L posterior tibial No signal   R dorsalis pedis monophasic L dorsalis pedis monophasic     RADIOLOGY:    A/P PVD with claudication  S/p R LE angiogram with intervention  · R LE claudication sxs have resolved  · No having L LE lifestyle claudication sxs   · She is on eliquis for afib  · Discussed with pt tobacco use and relationship to PVD  pt currently is not a smoker  Pt understands tobacco use can contribute to worsening of pvd and development of limb loss   · Emphasized importance of foot care and to call if develops any wounds, ulcerations, changes in appearance, claudication and/or symptoms  · We discussed importance of a walking program and them gauging how far they have walked on a daily basis and progressively increasing that distance and walking through the pain  · Plan for L LE intervention - known sfa occlusion  · Hold eliquis for 2 days prior to angiogram    Left groin pain  · Completely resolved    Kari Hanson MD

## 2022-06-02 ENCOUNTER — TELEPHONE (OUTPATIENT)
Dept: VASCULAR SURGERY | Age: 72
End: 2022-06-02

## 2022-06-02 DIAGNOSIS — I73.9 PVD (PERIPHERAL VASCULAR DISEASE) WITH CLAUDICATION (HCC): ICD-10-CM

## 2022-06-02 DIAGNOSIS — I73.9 PVD (PERIPHERAL VASCULAR DISEASE) WITH CLAUDICATION (HCC): Primary | ICD-10-CM

## 2022-06-02 NOTE — TELEPHONE ENCOUNTER
Pt is scheduled for an aortogram on 6/7; called to report she fell on her deck a few days ago and has an open area on her (R) leg, healing. She will call with any s/s of infection.     Ok thanks

## 2022-06-03 LAB — SARS-COV-2, PCR: NOT DETECTED

## 2022-06-06 ENCOUNTER — TELEPHONE (OUTPATIENT)
Dept: CARDIAC CATH/INVASIVE PROCEDURES | Age: 72
End: 2022-06-06

## 2022-06-06 NOTE — TELEPHONE ENCOUNTER
Reminded patient of scheduled procedure on  6/7   Instructions given and COVID questionnaire completed.

## 2022-06-07 ENCOUNTER — HOSPITAL ENCOUNTER (OUTPATIENT)
Dept: CARDIAC CATH/INVASIVE PROCEDURES | Age: 72
Discharge: HOME | End: 2022-06-07
Payer: MEDICARE

## 2022-06-07 VITALS
DIASTOLIC BLOOD PRESSURE: 98 MMHG | HEIGHT: 66 IN | TEMPERATURE: 98 F | HEART RATE: 69 BPM | SYSTOLIC BLOOD PRESSURE: 150 MMHG | BODY MASS INDEX: 15.43 KG/M2 | WEIGHT: 96 LBS | RESPIRATION RATE: 20 BRPM

## 2022-06-07 DIAGNOSIS — I73.9 PVD (PERIPHERAL VASCULAR DISEASE) (HCC): ICD-10-CM

## 2022-06-07 LAB
ABO/RH: NORMAL
ANION GAP SERPL CALCULATED.3IONS-SCNC: 11 MMOL/L (ref 7–16)
ANTIBODY SCREEN: NORMAL
BUN BLDV-MCNC: 16 MG/DL (ref 6–23)
CALCIUM SERPL-MCNC: 9.4 MG/DL (ref 8.6–10.2)
CHLORIDE BLD-SCNC: 105 MMOL/L (ref 98–107)
CO2: 28 MMOL/L (ref 22–29)
CREAT SERPL-MCNC: 0.6 MG/DL (ref 0.5–1)
GFR AFRICAN AMERICAN: >60
GFR NON-AFRICAN AMERICAN: >60 ML/MIN/1.73
GLUCOSE BLD-MCNC: 95 MG/DL (ref 74–99)
HCT VFR BLD CALC: 45.4 % (ref 34–48)
HEMOGLOBIN: 14.3 G/DL (ref 11.5–15.5)
INR BLD: 1
MCH RBC QN AUTO: 30.6 PG (ref 26–35)
MCHC RBC AUTO-ENTMCNC: 31.5 % (ref 32–34.5)
MCV RBC AUTO: 97.2 FL (ref 80–99.9)
PDW BLD-RTO: 14 FL (ref 11.5–15)
PLATELET # BLD: 369 E9/L (ref 130–450)
PMV BLD AUTO: 9.4 FL (ref 7–12)
POTASSIUM REFLEX MAGNESIUM: 4.2 MMOL/L (ref 3.5–5)
PROTHROMBIN TIME: 10.6 SEC (ref 9.3–12.4)
RBC # BLD: 4.67 E12/L (ref 3.5–5.5)
SODIUM BLD-SCNC: 144 MMOL/L (ref 132–146)
WBC # BLD: 7.7 E9/L (ref 4.5–11.5)

## 2022-06-07 PROCEDURE — 37225 HC FEM POP TERRITORY ATHERECTOMY: CPT

## 2022-06-07 PROCEDURE — 86900 BLOOD TYPING SEROLOGIC ABO: CPT

## 2022-06-07 PROCEDURE — 80048 BASIC METABOLIC PNL TOTAL CA: CPT

## 2022-06-07 PROCEDURE — 37225 PR REVSC OPN/PRQ FEM/POP W/ATHRC/ANGIOP SM VSL: CPT | Performed by: SURGERY

## 2022-06-07 PROCEDURE — 36415 COLL VENOUS BLD VENIPUNCTURE: CPT

## 2022-06-07 PROCEDURE — 37228 PR REVSC OPN/PRQ TIB/PERO W/ANGIOPLASTY UNI: CPT | Performed by: SURGERY

## 2022-06-07 PROCEDURE — C1769 GUIDE WIRE: HCPCS

## 2022-06-07 PROCEDURE — 75710 ARTERY X-RAYS ARM/LEG: CPT | Performed by: SURGERY

## 2022-06-07 PROCEDURE — C1894 INTRO/SHEATH, NON-LASER: HCPCS

## 2022-06-07 PROCEDURE — C1760 CLOSURE DEV, VASC: HCPCS

## 2022-06-07 PROCEDURE — 86901 BLOOD TYPING SEROLOGIC RH(D): CPT

## 2022-06-07 PROCEDURE — C1714 CATH, TRANS ATHERECTOMY, DIR: HCPCS

## 2022-06-07 PROCEDURE — 37228 HC TIB PER TERRITORY PLASTY: CPT

## 2022-06-07 PROCEDURE — 6360000002 HC RX W HCPCS

## 2022-06-07 PROCEDURE — C2623 CATH, TRANSLUMIN, DRUG-COAT: HCPCS

## 2022-06-07 PROCEDURE — 6370000000 HC RX 637 (ALT 250 FOR IP): Performed by: SURGERY

## 2022-06-07 PROCEDURE — 85027 COMPLETE CBC AUTOMATED: CPT

## 2022-06-07 PROCEDURE — C1884 EMBOLIZATION PROTECT SYST: HCPCS

## 2022-06-07 PROCEDURE — 85610 PROTHROMBIN TIME: CPT

## 2022-06-07 PROCEDURE — 75774 ARTERY X-RAY EACH VESSEL: CPT | Performed by: SURGERY

## 2022-06-07 PROCEDURE — C1887 CATHETER, GUIDING: HCPCS

## 2022-06-07 PROCEDURE — C1725 CATH, TRANSLUMIN NON-LASER: HCPCS

## 2022-06-07 PROCEDURE — 86850 RBC ANTIBODY SCREEN: CPT

## 2022-06-07 PROCEDURE — 2709999900 HC NON-CHARGEABLE SUPPLY

## 2022-06-07 RX ORDER — SODIUM CHLORIDE 9 MG/ML
INJECTION, SOLUTION INTRAVENOUS PRN
Status: DISCONTINUED | OUTPATIENT
Start: 2022-06-07 | End: 2022-06-08 | Stop reason: HOSPADM

## 2022-06-07 RX ORDER — SODIUM CHLORIDE 0.9 % (FLUSH) 0.9 %
5-40 SYRINGE (ML) INJECTION PRN
Status: DISCONTINUED | OUTPATIENT
Start: 2022-06-07 | End: 2022-06-08 | Stop reason: HOSPADM

## 2022-06-07 RX ORDER — ACETAMINOPHEN 325 MG/1
650 TABLET ORAL ONCE
Status: COMPLETED | OUTPATIENT
Start: 2022-06-07 | End: 2022-06-07

## 2022-06-07 RX ORDER — SODIUM CHLORIDE 9 MG/ML
INJECTION, SOLUTION INTRAVENOUS CONTINUOUS
Status: DISCONTINUED | OUTPATIENT
Start: 2022-06-07 | End: 2022-06-08 | Stop reason: HOSPADM

## 2022-06-07 RX ORDER — SODIUM CHLORIDE 0.9 % (FLUSH) 0.9 %
5-40 SYRINGE (ML) INJECTION EVERY 12 HOURS SCHEDULED
Status: DISCONTINUED | OUTPATIENT
Start: 2022-06-07 | End: 2022-06-08 | Stop reason: HOSPADM

## 2022-06-07 RX ORDER — ONDANSETRON 2 MG/ML
4 INJECTION INTRAMUSCULAR; INTRAVENOUS EVERY 6 HOURS PRN
Status: CANCELLED | OUTPATIENT
Start: 2022-06-07

## 2022-06-07 RX ORDER — ACETAMINOPHEN 325 MG/1
650 TABLET ORAL EVERY 4 HOURS PRN
Status: CANCELLED | OUTPATIENT
Start: 2022-06-07

## 2022-06-07 RX ADMIN — ACETAMINOPHEN 650 MG: 325 TABLET ORAL at 13:53

## 2022-06-07 ASSESSMENT — PAIN SCALES - GENERAL: PAINLEVEL_OUTOF10: 3

## 2022-06-07 ASSESSMENT — PAIN DESCRIPTION - LOCATION: LOCATION: ABDOMEN

## 2022-06-07 NOTE — H&P
Vascular Surgery History & Physical Exam    Chief Complaint: Peripheral vascular disease, L LE lifestyle limiting claudication    HISTORY OF PRESENT ILLNESS:    The patient is a 74 y.o. female who presents to the hospital for elective arteriogram with possible intervention. The patient has a history of peripheral vascular disease, afib, COPD and L LE claudication.       ROS : All others Negative if blank [], Positive if [x]  General   [] Fevers   [] Chills   [] Weight Loss   Skin   [x] Tissue Loss   Eyes   [] Wears Glasses/Contacts   [] Vision Changes   Respiratory    [] Shortness of breath   Cardiovascular   [] Chest Pain   [] Shortness of breath with exertion   Gastrointestinal   [] Abdominal Pain     Past Medical History:   Diagnosis Date    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 with rest pain ([address] 1/16/2019    Atrial fibrillation (HCC)     Atypical mole     upper right arm - black mole     Bilateral carotid artery stenosis 05/22/2018    Dr. Chetan Danielle of right carotid artery 5/3/2018    CAD (coronary artery disease)     f/u w/ PCP     Cancer ([address]     SKIN CA/Thigh    COPD (chronic obstructive pulmonary disease) ([address]     uses O2 2L NC at night - and during day prn     COVID     Depression     Diarrhea     for colonoscopy 2-19-21     Femoro-popliteal artery disease ([address] 1/16/2019    History of tobacco use 5/3/2018    Hydrocephalus ([facility])     Hyperlipidemia     no medications     Hypertension     BRY (obstructive sleep apnea)     uses O2 2L NC     Pain in both feet 5/3/2018    Paroxysmal A-fib ([facility])     Peripheral vascular disease due to secondary diabetes mellitus ([address] 5/3/2022    PVD (peripheral vascular disease) ([facility])     PVD (peripheral vascular disease) with claudication ([address] 5/3/2018    Rheumatoid arthritis ([address]     Short-term memory loss     Thyroid disease     Urinary incontinence     Venous stasis ulcer of left calf with fat layer exposed without varicose veins ([address] 2019    Weight loss      Past Surgical History:   Procedure Laterality Date     SECTION      COLONOSCOPY      COLONOSCOPY N/A 2021    COLONOSCOPY WITH BIOPSY performed by Jean Carlos Ramirez MD at [place] [address] cataract     LAPAROTOMY N/A 2020    LAPAROTOMY EXPLORATORY, RIGHT HEMICOLECTOMY performed by Jean Carlos Ramirez MD at [place] [address] 2020    PLACEMENT OF INTRATHECAL CATHETER FOR LUMBAR DRAIN performed by Ayana Smith MD at [address]  2019    L SFA angioplasty    TUBAL LIGATION      VENTRICULOPERITONEAL SHUNT N/A 2020    VENTRICULAR PERITONEAL SHUNT PLACEMENT performed by Ayana Smith MD at [address]     Current Medications:     Current Outpatient Medications:     loperamide (IMODIUM) 2 MG capsule, Take 2 mg by mouth 4 times daily as needed for Diarrhea, Disp: , Rfl:     albuterol-ipratropium (COMBIVENT RESPIMAT)  MCG/ACT AERS inhaler, Inhale 1 puff into the lungs every 4 hours as needed for Wheezing, Disp: , Rfl:     levothyroxine (SYNTHROID) 125 MCG tablet, Take 1 tablet by mouth Daily (Patient taking differently: Take 88 mcg by mouth Daily ), Disp: 30 tablet, Rfl: 3    colestipol (COLESTID) 1 g tablet, Take 1 tablet by mouth 2 times daily, Disp: , Rfl:     oxybutynin (DITROPAN XL) 15 MG extended release tablet, Take 15 mg by mouth daily, Disp: , Rfl:     ascorbic acid (VITAMIN C) 500 MG tablet, Take 500 mg by mouth daily, Disp: , Rfl:     albuterol (PROVENTIL) (5 MG/ML) 0.5% nebulizer solution, Take 0.5 mLs by nebulization every 4 hours as needed for Wheezing, Disp: 120 each, Rfl: 0    Respiratory Therapy Supplies (FULL KIT NEBULIZER SET) MISC, Use as directed with nebulized medication. , Disp: 1 each, Rfl: 0    Cholecalciferol (VITAMIN D) 50 MCG ( UT) CAPS capsule, Take 1 capsule by mouth daily , Disp: , Rfl:     acetaminophen (TYLENOL) 500 MG tablet, Take 1 tablet by mouth every 6 hours as needed for Pain, Disp: 120 tablet, Rfl: 3    ipratropium-albuterol (DUONEB) 0.5-2.5 (3) MG/3ML SOLN nebulizer solution, Inhale 3 mLs into the lungs every 4 hours (while awake), Disp: 360 mL, Rfl: 0    apixaban (ELIQUIS) 5 MG TABS tablet, Take 1 tablet by mouth 2 times daily, Disp: 60 tablet, Rfl: 0    buPROPion (WELLBUTRIN SR) 100 MG extended release tablet, Take 1 tablet by mouth daily, Disp: 60 tablet, Rfl: 3    sotalol (BETAPACE) 120 MG tablet, Take 1 tablet by mouth 2 times daily, Disp: 60 tablet, Rfl: 3    amLODIPine (NORVASC) 5 MG tablet, Take 1 tablet by mouth daily, Disp: 30 tablet, Rfl: 3    Current Facility-Administered Medications:     0.9 % sodium chloride infusion, , IntraVENous, Continuous, Lazaro Cain APRN - CNP    sodium chloride flush 0.9 % injection 5-40 mL, 5-40 mL, IntraVENous, 2 times per day, Lazaro Cain, APRN - CNP    sodium chloride flush 0.9 % injection 5-40 mL, 5-40 mL, IntraVENous, PRN, Lazaro Cain, APRN - CNP    0.9 % sodium chloride infusion, , IntraVENous, PRN, Lazaro Cain, APRN - CNP  Allergies:  Hornet venom, Wasp venom, and Flagyl [metronidazole]  Social History     Socioeconomic History    Marital status:      Spouse name: Not on file    Number of children: Not on file    Years of education: Not on file    Highest education level: Not on file   Occupational History    Occupation: Retired    Tobacco Use    Smoking status: Former Smoker     Packs/day: 0.25     Types: Cigarettes     Quit date: 5/3/2017     Years since quittin.0    Smokeless tobacco: Never Used   Vaping Use    Vaping Use: Never used   Substance and Sexual Activity    Alcohol use: Yes     Comment: rare    Drug use: No    Sexual activity: Never   Other Topics Concern    Not on file   Social History Narrative    Not on file     Social Determinants of Health     Financial Resource Strain:     Difficulty of Paying Living Expenses: Not on file   Food Insecurity:     Worried About [address] in the Last Year: Not on file    [place] [employer] in the Last Year: Not on file   Transportation Needs:     Lack of Transportation (Medical): Not on file    Lack of Transportation (Non-Medical):  Not on file   Physical Activity:     Days of Exercise per Week: Not on file    Minutes of Exercise per Session: Not on file   Stress:     Feeling of Stress : Not on file   Social Connections:     Frequency of Communication with Friends and Family: Not on file    Frequency of Social Gatherings with Friends and Family: Not on file    Attends Adventism Services: Not on file    Active Member of Clubs or Organizations: Not on file    Attends Club or Organization Meetings: Not on file    Marital Status: Not on file   Intimate Partner Violence:     Fear of Current or Ex-Partner: Not on file    Emotionally Abused: Not on file    Physically Abused: Not on file    Sexually Abused: Not on file   Housing Stability:     Unable to Pay for Housing in the Last Year: Not on file    Number of [place] in the Last Year: Not on file    Unstable Housing in the Last Year: Not on file     Family History   Problem Relation Age of Onset    Kidney Disease Mother     Coronary Art Dis Mother     High Blood Pressure Mother     Cancer Father     Other Father      PHYSICAL EXAM:    Vitals:    06/07/22 0951   BP: (!) 150/98   Pulse: 69   Resp: 20   Temp: 98 °F (36.7 °C)     CONSTITUTIONAL:  awake, alert, cooperative, no apparent distress, and appears stated age  NECK:  supple, symmetrical, trachea midline  LUNGS:  no increased work of breathing, good air exchange  CARDIOVASCULAR:  regular rate and rhythm  ABDOMEN:  soft, non-distended and non-tenderl   Pulse Exam   R femoral  L femoral    R dorsalis pedis  L dorsalis pedis    R posterior tibial  L posterior tibial      LABS:    Lab Results   Component Value Date    WBC 7.7 06/07/2022    HGB 14.3 06/07/2022    HCT 45.4 06/07/2022     06/07/2022    PROTIME 10.6 06/07/2022    INR 1.0 06/07/2022    APTT 31.3 01/20/2020    K 4.2 06/07/2022    BUN 16 06/07/2022    CREATININE 0.6 06/07/2022     Assesment:  Peripheral vascular disease. Lifestyle limiting claudication L LE  PLAN:    Aortogram,  Left lower extremity arteriogram, possible intervention. I reviewed the procedure with the patient and family as available. I discussed the procedure, risks, benefits, complications, and alternatives of the procedure. They understand and consent.   All questions were answered    Radha Khoury APRN - CNP    Johnathon Alonzo MD

## 2022-06-07 NOTE — OP NOTE
Cardiovascular Lab Procedure Report    Aaron Hernandez  1950    Date : 6/7/2022  Surgeon: Kassidy Braden M.D. Pre-procedure Diagnosis: B/L LE tissue loss  Post-procedure Diagnosis: Same  Procedure:      Right  common femoral artery access   with US guidance    8 fr angioseal used for closure   46166 TF Left lower extremity angiogram   70581 Angiogram with catheter in Left   common femoral, popliteal artery   [address]  Left SFA, Popliteal atherectomy (Turbohawk - hawkone 7 fr 9.6 cm)    Left SFA, Popliteal angioplasty   Predilation with 4x220 coyote  Than post with 4x250 DCB distally and proximally with 5x 250 DCB (impact)     # 5 Spyder  used   17770 Left AT, DP plasty with 2.5x120 coytoe   Anesthesia: Local with IV sedation  Assistants: Cath Lab Staff  Estimated Blood Loss: Minimal  Complications: none  Findings:    Left Left s/p Intervention   Common Femoral Art Patent patent   Superficial Femoral Art occluded patent   Profunda Femoral Art Patent patent   AK Popliteal Art Proximally diseased patent   BK Popliteal Art patent patent   Anterior Tibial Art Minimal flow seen Patent, runoff to foot, occludes in foot   Tibioperoneal Trunk Minimal flow seen patent   Peroneal Art Minimal flow seen Patent occludes proximal calf   Posterior Tibial Art occluded occluded     Procedure Details : There was previous CTA , or catheter based diagnostic imaging preformed prior to today's procedure. Timeout preformed identifying pt and procedure. Groins prepped and draped in sterile fashion. Patient given sedation as needed throughout the case. Right  common femoral artery was noted to be patent and was accessed under ultrasound guidance after infiltrating with local.  A printer was used to print out an image. Micropuncture placed, exchanged out for 5 fr sheath. Advantage glide wire and contra 2 catheter advanced into distal aorta.   Contra 2 and wire used to access Left  iliac system and catheter placed at common femoral and angiogram of theLeft  lower extremity preformed. Left sfa occlusion. Patient was given 5000 units of heparin and than a 7 fr destination sheath advanced to common femoral artery on the Left. Imaging with the catheter in the sfa to further evaluate the sfa, popliteal lesion. 0.35 [place] blazer catheter and advantage glide wire used to traverse stenosis. The catheter was placed in the below knee popliteal artery and angiogram was preformed to further evaluate the tibial arteries and runoff. A # 5 spyder filter deployed. The sfa pop lesion was treated with atherectomy. Predilation with 4x220 coyote. Post dilation with a 4x250 distally and proximally with 5x250 DCB balloon was completed. The distal at was occluded. Plasty with 2.5x120 coytoe from foot to mid calf. There was evidence of some residual stenosis. Completion angiogram noted much improved filling distally and brisk flow though the stenotic area. Sheath and wire pulled back to Right iliac system.   After femoral angiogram a 8 fr exoseal device used to close the Right common femoral artery    Plaque and old thrombus removed during atherectomy    Postop Exam  Right DP weakly biphasic  PT biphasic  Left AT 1+  PT monophasic    Plan  Continue Medical Management with asa, plavix and pletal  Encourage tobacco cessation    MD Rakesh Moser MD Dr. Junnie Cambric    PCP :Rakesh Bowie MD  Podiatrist : Dr. Swati Lyle  Cardiologist : Dr. Terrell Rodriguez     Previous Vascular Procedure  1/29/19 L sfa, popliteal atherectomy, dcb   1/7/2020 R LE angiogram - SFA  - R LE LL claudication - needs fem ak pop bypass   5/3/22 R LE angiogram  R SFA atherectomy, and plasty with 4x250 DCB    6/7/22 L LE angiogram  L SFA, pop atherectomy plasty with 5x250, 4x250 DCB  L AT plasty with 2.5x120 coyote

## 2022-06-10 ENCOUNTER — TELEPHONE (OUTPATIENT)
Dept: VASCULAR SURGERY | Age: 72
End: 2022-06-10

## 2022-06-10 NOTE — TELEPHONE ENCOUNTER
Dr. Sharyn Gao notified, can order urinalysis. Patient would like to wait until next week and will call if she wants testing done.

## 2022-06-10 NOTE — TELEPHONE ENCOUNTER
Patient has had urinary incontinence starting day after angiogram, states it is like \"hyper bladder activity\". Some discomfort in back, right side at waistline level. She has had bowel and urinary incontinence in past but hasn't had urinary incontinence recently until now. States her legs feel wonderful. She states she wants to wait it out over the weekend and call Monday with an update. Asked her to go to urgent care or ER if symptoms increase. Body Location Override (Optional - Billing Will Still Be Based On Selected Body Map Location If Applicable): left lateral forehead Detail Level: Detailed Depth Of Biopsy: dermis Was A Bandage Applied: Yes Size Of Lesion In Cm: 0.8 X Size Of Lesion In Cm: 0 Biopsy Type: H and E Biopsy Method: Personna blade Anesthesia Type: 1% lidocaine with epinephrine and a 1:10 solution of 8.4% sodium bicarbonate Anesthesia Volume In Cc (Will Not Render If 0): 0.5 Hemostasis: Aluminum Chloride Wound Care: Vaseline Dressing: Band-Aid Destruction After The Procedure: No Type Of Destruction Used: Curettage Curettage Text: The wound bed was treated with curettage after the biopsy was performed. Cryotherapy Text: The wound bed was treated with cryotherapy after the biopsy was performed. Electrodesiccation Text: The wound bed was treated with electrodesiccation after the biopsy was performed. Electrodesiccation And Curettage Text: The wound bed was treated with electrodesiccation and curettage after the biopsy was performed. Silver Nitrate Text: The wound bed was treated with silver nitrate after the biopsy was performed. Lab: [facility] Lab Facility: [address] Consent: The provider's intent is to obtain a tissue sample solely for diagnostic purposes. Written consent to obtain tissue sample was obtained and risks were reviewed including but not limited to scarring, infection, bleeding, scabbing, incomplete removal, nerve damage and allergy to anesthesia. Post-Care Instructions: I reviewed with the patient in detail post-care instructions. Patient is to keep the biopsy site dry overnight, and then apply bacitracin twice daily until healed. Patient may apply hydrogen peroxide soaks to remove any crusting. Notification Instructions: Patient will be notified of biopsy results. However, patient instructed to call the office if not contacted within 2 weeks. Billing Type: [employer] Body Location Override (Optional - Billing Will Still Be Based On Selected Body Map Location If Applicable): left pre Auric Size Of Lesion In Cm: 2 Lab: [id number] Lab Facility: [id number] Billing Type: Third-Party Bill

## 2022-07-08 ENCOUNTER — TELEPHONE (OUTPATIENT)
Dept: VASCULAR SURGERY | Age: 72
End: 2022-07-08

## 2022-07-08 NOTE — TELEPHONE ENCOUNTER
Called to confirm appointment for 7/11/22 at 9 a.m, left message with date, time, and phone number for patient.
- - -

## 2022-07-11 ENCOUNTER — TELEPHONE (OUTPATIENT)
Dept: VASCULAR SURGERY | Age: 72
End: 2022-07-11

## 2022-07-11 ENCOUNTER — OFFICE VISIT (OUTPATIENT)
Dept: VASCULAR SURGERY | Age: 72
End: 2022-07-11
Payer: MEDICARE

## 2022-07-11 DIAGNOSIS — I73.9 PVD (PERIPHERAL VASCULAR DISEASE) WITH CLAUDICATION (HCC): Primary | ICD-10-CM

## 2022-07-11 DIAGNOSIS — I73.9 PERIPHERAL VASCULAR DISEASE (HCC): Primary | ICD-10-CM

## 2022-07-11 PROCEDURE — 1123F ACP DISCUSS/DSCN MKR DOCD: CPT

## 2022-07-11 PROCEDURE — 99213 OFFICE O/P EST LOW 20 MIN: CPT

## 2022-07-11 RX ORDER — CEPHALEXIN 500 MG/1
CAPSULE ORAL
COMMUNITY
Start: 2022-07-08 | End: 2022-07-18 | Stop reason: ALTCHOICE

## 2022-07-11 RX ORDER — VITAMIN E 268 MG
400 CAPSULE ORAL DAILY
COMMUNITY

## 2022-07-11 RX ORDER — OMEPRAZOLE 40 MG/1
40 CAPSULE, DELAYED RELEASE ORAL DAILY
COMMUNITY
Start: 2022-06-04

## 2022-07-11 NOTE — PROGRESS NOTES
Arjun and after intervention it did heal.      Past Medical History:        Diagnosis Date    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 with rest pain ([address] 2019    Atrial fibrillation (HCC)     Atypical mole     upper right arm - black mole     Bilateral carotid artery stenosis 2018    Dr. Enoch Johnson of right carotid artery 5/3/2018    CAD (coronary artery disease)     f/u w/ PCP     Cancer ([address]     SKIN CA/Thigh    COPD (chronic obstructive pulmonary disease) ([address]     uses O2 2L NC at night - and during day prn     COVID     Depression     Diarrhea     for colonoscopy 21     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    History of tobacco use 5/3/2018    Hydrocephalus (HCC)     Hyperlipidemia     no medications     Hypertension     BRY (obstructive sleep apnea)     uses O2 2L NC     Pain in both feet 5/3/2018    Paroxysmal A-fib (HCC)     Peripheral vascular disease due to secondary diabetes mellitus ([address] 5/3/2022    PVD (peripheral vascular disease) ([address]     PVD (peripheral vascular disease) with claudication ([address] 5/3/2018    Rheumatoid arthritis ([address]     Short-term memory loss     Thyroid disease     Urinary incontinence     Venous stasis ulcer of left calf with fat layer exposed without varicose veins ([address] 2019    Weight loss      Past Surgical History:        Procedure Laterality Date     SECTION      COLONOSCOPY      COLONOSCOPY N/A 2021    COLONOSCOPY WITH BIOPSY performed by Omer Mcgregor MD at [address] [facility] cataract     LAPAROTOMY N/A 2020    LAPAROTOMY EXPLORATORY, RIGHT HEMICOLECTOMY performed by Omer Mcgregor MD at [address] [place] [address],[address] N/A 2020    PLACEMENT OF INTRATHECAL CATHETER FOR LUMBAR DRAIN performed by Reina Vera MD at [place] [address]  2019    L SFA angioplasty    TUBAL LIGATION      VENTRICULOPERITONEAL SHUNT N/A 9/23/2020    VENTRICULAR PERITONEAL SHUNT PLACEMENT performed by Gillian Skinner MD at [address]     Current Medications:   Current Outpatient Medications   Medication Sig Dispense Refill    cephALEXin (KEFLEX) 500 MG capsule take 1 capsule by mouth three times a day for 7 days      omeprazole (PRILOSEC) 40 MG delayed release capsule Take 40 mg by mouth daily      OXYGEN Inhale 3 L/min into the lungs nightly      Apoaequorin (PREVAGEN PO) Take by mouth      vitamin E 400 UNIT capsule Take 400 Units by mouth daily      loperamide (IMODIUM) 2 MG capsule Take 2 mg by mouth 4 times daily as needed for Diarrhea      albuterol-ipratropium (COMBIVENT RESPIMAT)  MCG/ACT AERS inhaler Inhale 1 puff into the lungs every 4 hours as needed for Wheezing      levothyroxine (SYNTHROID) 125 MCG tablet Take 1 tablet by mouth Daily (Patient taking differently: Take 88 mcg by mouth Daily ) 30 tablet 3    colestipol (COLESTID) 1 g tablet Take 1 tablet by mouth 2 times daily      oxybutynin (DITROPAN XL) 15 MG extended release tablet Take 15 mg by mouth daily      ascorbic acid (VITAMIN C) 500 MG tablet Take 500 mg by mouth daily      albuterol (PROVENTIL) (5 MG/ML) 0.5% nebulizer solution Take 0.5 mLs by nebulization every 4 hours as needed for Wheezing 120 each 0    Respiratory Therapy Supplies (FULL KIT NEBULIZER SET) MISC Use as directed with nebulized medication.  1 each 0    Cholecalciferol (VITAMIN D) 50 MCG (2000 UT) CAPS capsule Take 1 capsule by mouth daily       acetaminophen (TYLENOL) 500 MG tablet Take 1 tablet by mouth every 6 hours as needed for Pain 120 tablet 3    ipratropium-albuterol (DUONEB) 0.5-2.5 (3) MG/3ML SOLN nebulizer solution Inhale 3 mLs into the lungs every 4 hours (while awake) 360 mL 0    apixaban (ELIQUIS) 5 MG TABS tablet Take 1 tablet by mouth 2 times daily 60 tablet 0    buPROPion (WELLBUTRIN SR) 100 MG extended release tablet Take 1 tablet by mouth daily 60 tablet 3    sotalol (BETAPACE) 120 MG tablet Take 1 tablet by mouth 2 times daily 60 tablet 3    amLODIPine (NORVASC) 5 MG tablet Take 1 tablet by mouth daily 30 tablet 3     No current facility-administered medications for this visit. Allergies:  Hornet venom, Wasp venom, and Flagyl [metronidazole]  Social History     Socioeconomic History    Marital status:      Spouse name: Not on file    Number of children: Not on file    Years of education: Not on file    Highest education level: Not on file   Occupational History    Occupation: Retired    Tobacco Use    Smoking status: Former Smoker     Packs/day: 0.25     Types: Cigarettes     Quit date: 5/3/2017     Years since quittin.1    Smokeless tobacco: Never Used   Vaping Use    Vaping Use: Never used   Substance and Sexual Activity    Alcohol use: Yes     Comment: rare    Drug use: No    Sexual activity: Never   Other Topics Concern    Not on file   Social History Narrative    Not on file     Social Determinants of Health     Financial Resource Strain:     Difficulty of Paying Living Expenses: Not on file   Food Insecurity:     Worried About [address] in the Last Year: Not on file    [address] in the Last Year: Not on file   Transportation Needs:     Lack of Transportation (Medical): Not on file    Lack of Transportation (Non-Medical):  Not on file   Physical Activity:     Days of Exercise per Week: Not on file    Minutes of Exercise per Session: Not on file   Stress:     Feeling of Stress : Not on file   Social Connections:     Frequency of Communication with Friends and Family: Not on file    Frequency of Social Gatherings with Friends and Family: Not on file    Attends Jew Services: Not on file    Active Member of Clubs or Organizations: Not on file    Attends Club or Organization Meetings: Not on file    Marital Status: Not on tobacco use and relationship to PVD  pt currently is not a smoker  Pt understands tobacco use can contribute to worsening of pvd and development of limb loss   · Emphasized importance of foot care and to call if develops any wounds, ulcerations, changes in appearance, claudication and/or symptoms  · We discussed importance of a walking program and them gauging how far they have walked on a daily basis and progressively increasing that distance and walking through the pain  · Plan for L LE intervention - known sfa occlusion  · Hold eliquis for 2 days prior to angiogram    Left groin pain  · Completely resolved    Beth Moran MD

## 2022-07-11 NOTE — TELEPHONE ENCOUNTER
Left message regarding lower extremity arterial doppler study at [facility] on Monday, 8-22-22 at 1:00 pm. [place] at 12:30 pm. Appointment with Dr. Travon Murry in office immediately following doppler study.

## 2022-07-11 NOTE — PROGRESS NOTES
Vascular Surgery Outpatient Followup    PCP :Carmela Garcia MD  Podiatrist : Dr. Aureliano Alexander  Cardiologist : Dr. Nisha Narvaez     Previous Vascular Procedure  1/29/19 L sfa, popliteal atherectomy, dcb   1/7/2020 R LE angiogram - SFA  - R LE LL claudication - needs fem ak pop bypass   5/3/22 R LE angiogram  R SFA atherectomy, and plasty with 4x250 DCB    6/7/22 L LE angiogram - sfa, popliteal athrectomy - sfa, popliteal plasty - AT, DP plasty       HISTORY OF PRESENT ILLNESS:    This is a 74 y.o. female who presents in fu regarding pvd, lifestyle limiting claudication. Her B LE claudication has resolved post angiogram.  She was recently diagnosed with a UTI and is currently on abx. She denies pain. She is only able to ambulate about 200 feet, but this is related to her urinary symptoms and unsteadiness. She denies symptoms of claudication. She has an open wound on her left shin which happened yesterday from her dog scratching her. B LE having issues with numbnes and tingling in her foot primarily which is stable. She has a 2/10 at its worst.      She denies significant swelling     She developed a perforated cecal volvulus and underwent right hemicolectomy per Dr. Rory Marin 2/2020. She is recovered but has chronic diarrhea. She is frustrated that her diarrhea continues to be an issues, she is following with Dr. Chuyita Keita. He suggested marijuana but patient was not interested. Still having issues with diarrhea and urinary incontinence, is now on colestipol for diarrhea which seems like it has helped a little. 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 She is on eliquis per Dr. Nisha Narvaez after developing atrial fibrillation postop after her colon resection. She has had previous L LE intervention for wound of the left calf which was first noted approximately 7/2018 after trauma.   She followed with Dr. Lucia Morales and after intervention it did heal.      Past Medical History:        Diagnosis Date Emotionally Abused: Not on file    Physically Abused: Not on file    Sexually Abused: Not on file   Housing Stability:     Unable to Pay for Housing in the Last Year: Not on file    Number of Places Lived in the Last Year: Not on file    Unstable Housing in the Last Year: Not on file     Family History   Problem Relation Age of Onset    Kidney Disease Mother     Coronary Art Dis Mother     High Blood Pressure Mother     Cancer Father     Other Father      Labs  Lab Results   Component Value Date    WBC 7.7 06/07/2022    HGB 14.3 06/07/2022    HCT 45.4 06/07/2022     06/07/2022    PROTIME 10.6 06/07/2022    INR 1.0 06/07/2022    APTT 31.3 01/20/2020    K 4.2 06/07/2022    BUN 16 06/07/2022    CREATININE 0.6 06/07/2022       PHYSICAL EXAM:    CONSTITUTIONAL:   Awake, alert, cooperative  PSYCHIATRIC :  Oriented to time, place and person     Appropriate insight to disease process  EYES: Lids and lashes normal  ENT:  External ears and nose without lesions   Hearing deficits not note  NECK: Supple, symmetrical, trachea midline   Carotid bruit notnoted  LUNGS:  No increased work of breathing                 Clear to auscultation  CARDIOVASCULAR:  regular rate and rhythm   ABDOMEN:  soft, non-distended, non-tender   Aorta is not palpable  SKIN:   Normal skin color   Texture and turgor normal, no induration  EXTREMITIES:   R LE Edema absent   No wounds   Cyanosis of toes  L LE Edema absent  R posterior tibial biphasic L anterior tibial monophasic   R dorsalis pedis biphasic L posterior tibial monophasic     RADIOLOGY:    A/P PVD with claudication  S/p R LE angiogram with intervention  S/p L LE angiogram with intervention  · B LE claudication sxs have resolved  · Not having B LE lifestyle claudication sxs   · She is on eliquis for afib  · Discussed with pt tobacco use and relationship to PVD  pt currently is not a smoker  Pt understands tobacco use can contribute to worsening of pvd and development of limb loss   · Emphasized importance of foot care and to call if develops any wounds, ulcerations, changes in appearance, claudication and/or symptoms  · We discussed importance of a walking program and them gauging how far they have walked on a daily basis and progressively increasing that distance and walking through the pain  · New wound to L LE from a dog scratch.   Will have her follow up with Dr. Neale Runner regarding this to make sure it is healing  · F/u in 6 weeks with arterial studies    Pt seen and plan discussed with Dr. Emmy Mcclendon, APRN - CNP

## 2022-07-18 ENCOUNTER — OFFICE VISIT (OUTPATIENT)
Dept: VASCULAR SURGERY | Age: 72
End: 2022-07-18
Payer: MEDICARE

## 2022-07-18 DIAGNOSIS — I73.9 PVD (PERIPHERAL VASCULAR DISEASE) (HCC): Primary | ICD-10-CM

## 2022-07-18 PROCEDURE — 99213 OFFICE O/P EST LOW 20 MIN: CPT

## 2022-07-18 PROCEDURE — 1123F ACP DISCUSS/DSCN MKR DOCD: CPT

## 2022-07-18 NOTE — PROGRESS NOTES
Vascular Surgery Outpatient Followup    PCP : Karla Tate MD  Podiatrist : Dr. Vinay Peterson  Cardiologist : Dr. Gabriella Vick     Previous Vascular Procedure  1/29/19 L sfa, popliteal atherectomy, dcb   1/7/2020 R LE angiogram - SFA  - R LE LL claudication - needs fem ak pop bypass   5/3/22 R LE angiogram  R SFA atherectomy, and plasty with 4x250 DCB    6/7/22 L LE angiogram - sfa, popliteal athrectomy - sfa, popliteal plasty - AT, DP plasty       HISTORY OF PRESENT ILLNESS:    This is a 74 y.o. female who presents in fu regarding pvd, lifestyle limiting claudication. Her B LE claudication has resolved post angiogram.  She was recently diagnosed with a UTI and is currently on abx. She denies pain. She is only able to ambulate about 200 feet, but this is related to her urinary symptoms and unsteadiness. She denies symptoms of claudication. She has an open wound on her left shin which happened yesterday from her dog scratching her. B LE having issues with numbnes and tingling in her foot primarily which is stable. She has a 2/10 at its worst.      She denies significant swelling     She developed a perforated cecal volvulus and underwent right hemicolectomy per Dr. Giovanna Merino 2/2020. She is recovered but has chronic diarrhea. She is frustrated that her diarrhea continues to be an issues, she is following with Dr. Pam Washington. He suggested marijuana but patient was not interested. Still having issues with diarrhea and urinary incontinence, is now on colestipol for diarrhea which seems like it has helped a little. She had a  shunt placed by Dr. Manuel Barber for NPH. She is on eliquis per Dr. Gabriella Vick after developing atrial fibrillation postop after her colon resection. She has had previous L LE intervention for wound of the left calf which was first noted approximately 7/2018 after trauma.   She followed with Dr. Marcin Barakat and after intervention it did heal.      She was unable to see Dr. Marcin Barakat regarding her L LE wound so she presented here to the office.       Past Medical History:        Diagnosis Date    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 with rest pain ([address] 2019    Atrial fibrillation (HCC)     Atypical mole     upper right arm - black mole     Bilateral carotid artery stenosis 2018    Dr. 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    History of tobacco use 5/3/2018    Hydrocephalus (HCC)     Hyperlipidemia     no medications     Hypertension     BRY (obstructive sleep apnea)     uses O2 2L NC     Pain in both feet 5/3/2018    Paroxysmal A-fib (HCC)     Peripheral vascular disease due to secondary diabetes mellitus ([address] 5/3/2022    PVD (peripheral vascular disease) ([address]     PVD (peripheral vascular disease) with claudication ([address] 5/3/2018    Rheumatoid arthritis ([address]     Short-term memory loss     Thyroid disease     Urinary incontinence     Venous stasis ulcer of left calf with fat layer exposed without varicose veins ([address] 2019    Weight loss      Past Surgical History:        Procedure Laterality Date     SECTION      COLONOSCOPY      COLONOSCOPY N/A 2021    COLONOSCOPY WITH BIOPSY performed by Eli Garrett MD at [facility] cataract     LAPAROTOMY N/A 2020    LAPAROTOMY EXPLORATORY, RIGHT HEMICOLECTOMY performed by Eli Garrett MD at [address] N/A 2020    PLACEMENT OF INTRATHECAL CATHETER FOR LUMBAR DRAIN performed by Christiano Clifford MD at [address]  2019    L SFA angioplasty    TUBAL LIGATION      VENTRICULOPERITONEAL SHUNT N/A 9/23/2020    VENTRICULAR PERITONEAL SHUNT PLACEMENT performed by Sima Grigsby MD at [address]     Current Medications:   Current Outpatient Medications   Medication Sig Dispense Refill    omeprazole (PRILOSEC) 40 MG delayed release capsule Take 40 mg by mouth daily      OXYGEN Inhale 3 L/min into the lungs nightly      Apoaequorin (PREVAGEN PO) Take by mouth      vitamin E 400 UNIT capsule Take 400 Units by mouth daily      loperamide (IMODIUM) 2 MG capsule Take 2 mg by mouth 4 times daily as needed for Diarrhea      albuterol-ipratropium (COMBIVENT RESPIMAT)  MCG/ACT AERS inhaler Inhale 1 puff into the lungs every 4 hours as needed for Wheezing      levothyroxine (SYNTHROID) 125 MCG tablet Take 1 tablet by mouth Daily (Patient taking differently: Take 88 mcg by mouth in the morning.) 30 tablet 3    colestipol (COLESTID) 1 g tablet Take 1 tablet by mouth 2 times daily      oxybutynin (DITROPAN XL) 15 MG extended release tablet Take 15 mg by mouth daily      ascorbic acid (VITAMIN C) 500 MG tablet Take 500 mg by mouth daily      albuterol (PROVENTIL) (5 MG/ML) 0.5% nebulizer solution Take 0.5 mLs by nebulization every 4 hours as needed for Wheezing 120 each 0    Respiratory Therapy Supplies (FULL KIT NEBULIZER SET) MISC Use as directed with nebulized medication.  1 each 0    Cholecalciferol (VITAMIN D) 50 MCG (2000 UT) CAPS capsule Take 1 capsule by mouth daily       acetaminophen (TYLENOL) 500 MG tablet Take 1 tablet by mouth every 6 hours as needed for Pain 120 tablet 3    ipratropium-albuterol (DUONEB) 0.5-2.5 (3) MG/3ML SOLN nebulizer solution Inhale 3 mLs into the lungs every 4 hours (while awake) 360 mL 0    apixaban (ELIQUIS) 5 MG TABS tablet Take 1 tablet by mouth 2 times daily 60 tablet 0    buPROPion (WELLBUTRIN SR) 100 MG extended release tablet Take 1 tablet by mouth daily 60 tablet 3    sotalol (BETAPACE) 120 MG tablet Take 1 tablet by mouth 2 times trachea midline   Carotid bruit notnoted  LUNGS:  No increased work of breathing                 Clear to auscultation  CARDIOVASCULAR:  regular rate and rhythm   ABDOMEN:  soft, non-distended, non-tender   Aorta is not palpable  SKIN:   Normal skin color   Texture and turgor normal, no induration  EXTREMITIES:   R LE Edema absent   No wounds   Cyanosis of toes  L LE Edema absent  R posterior tibial biphasic L anterior tibial monophasic   R dorsalis pedis biphasic L posterior tibial monophasic     RADIOLOGY:    A/P PVD with claudication  S/p R LE angiogram with intervention  S/p L LE angiogram with intervention  B LE claudication sxs have resolved  Not having B LE lifestyle claudication sxs   She is on eliquis for afib  Discussed with pt tobacco use and relationship to PVD  pt currently is not a smoker  Pt understands tobacco use can contribute to worsening of pvd and development of limb loss   Emphasized importance of foot care and to call if develops any wounds, ulcerations, changes in appearance, claudication and/or symptoms  We discussed importance of a walking program and them gauging how far they have walked on a daily basis and progressively increasing that distance and walking through the pain  New wound to L LE from a dog scratch. She was unable to get into Dr. Kendra Rodriguez office so she came here to the office. She is to follow up with Dr. Steve Yang in the wound care center.   F/u in 5 weeks with arterial studies    Pt seen and plan discussed with Dr. Sharyl Dancer, APRN - CNP

## 2022-07-25 NOTE — DISCHARGE INSTRUCTIONS
Visit Discharge/Physician Orders    Discharge condition: {STABLE/UNSTABLE:909111508}    Assessment of pain at discharge:    Anesthetic used:     Discharge to: {[facility] Wound Discharge To:47113}    Left via:{Left GGL:31341}    Accompanied by: accompanied by {:[id number]}    ECF/HHA:     Dressing Orders:    Treatment Orders:    [address] [place] [address],[address] followup visit _____________________________  (Please note your next appointment above and if you are unable to keep, kindly give a 24 hour notice. Thank you.)    Physician signature:__________________________      If you experience any of the following, please call the [facility] during business hours:    * Increase in Pain  * Temperature over 101  * Increase in drainage from your wound  * Drainage with a foul odor  * Bleeding  * Increase in swelling  * Need for compression bandage changes due to slippage, breakthrough drainage. If you need medical attention outside of the business hours of the [facility] please contact your PCP or go to the nearest emergency room.

## 2022-07-29 ENCOUNTER — HOSPITAL ENCOUNTER (OUTPATIENT)
Dept: WOUND CARE | Age: 72
Discharge: HOME | End: 2022-07-29

## 2022-08-10 ENCOUNTER — HOSPITAL ENCOUNTER (OUTPATIENT)
Dept: INTERVENTIONAL RADIOLOGY/VASCULAR | Age: 72
Discharge: HOME | End: 2022-08-12
Payer: MEDICARE

## 2022-08-10 DIAGNOSIS — I73.9 PERIPHERAL VASCULAR DISEASE (HCC): ICD-10-CM

## 2022-08-10 PROCEDURE — 93923 UPR/LXTR ART STDY 3+ LVLS: CPT

## 2022-08-19 ENCOUNTER — TELEPHONE (OUTPATIENT)
Dept: VASCULAR SURGERY | Age: 72
End: 2022-08-19

## 2022-08-22 ENCOUNTER — OFFICE VISIT (OUTPATIENT)
Dept: VASCULAR SURGERY | Age: 72
End: 2022-08-22
Payer: MEDICARE

## 2022-08-22 ENCOUNTER — TELEPHONE (OUTPATIENT)
Dept: VASCULAR SURGERY | Age: 72
End: 2022-08-22

## 2022-08-22 VITALS — BODY MASS INDEX: 15.43 KG/M2 | WEIGHT: 96 LBS | HEIGHT: 66 IN

## 2022-08-22 DIAGNOSIS — I65.23 BILATERAL CAROTID ARTERY STENOSIS: Chronic | ICD-10-CM

## 2022-08-22 DIAGNOSIS — I65.22 STENOSIS OF LEFT CAROTID ARTERY: Primary | ICD-10-CM

## 2022-08-22 PROCEDURE — 99213 OFFICE O/P EST LOW 20 MIN: CPT | Performed by: PHYSICIAN ASSISTANT

## 2022-08-22 PROCEDURE — 1123F ACP DISCUSS/DSCN MKR DOCD: CPT | Performed by: PHYSICIAN ASSISTANT

## 2022-08-22 NOTE — PROGRESS NOTES
Vascular Surgery Outpatient Followup    PCP : Opal Newton MD  Podiatrist : Dr. Julio Begum  Cardiologist : Dr. Brittany Pruitt     Previous Vascular Procedure  1/29/19 L sfa, popliteal atherectomy, dcb   1/7/2020 R LE angiogram - SFA  - R LE LL claudication - needs fem ak pop bypass   5/3/22 R LE angiogram  R SFA atherectomy, and plasty with 4x250 DCB    6/7/22 L LE angiogram - sfa, popliteal athrectomy - sfa, popliteal plasty - AT, DP plasty       HISTORY OF PRESENT ILLNESS:    This is a 74 y.o. female who presents in fu regarding pvd, lifestyle limiting claudication. Her B LE claudication had resolved post angiogram but recently she has been experiencing right leg pain with ambulation. She was again diagnosed with a UTI and placed on abx and states her right leg pain seems to be worse when this all happened. She notes she is able to walk about 100 feet before she has to stop because of aching pain in the right leg. She is not certain where the pain is, she states the whole leg feels like it is tightening. Once she stops to rest for a few minutes the pain improves and she can walk again. She denies rest pain in her feet or ulcerations. B LE having issues with numbness and tingling in her foot primarily which is stable. Her right leg bothers her more than her left. This has been ongoing for at least a year. It doesn't really bother her. She has a 2/10 pain at its worst.  She also notes she has been told she has lumbar spinal stenosis but never had it addressed. 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 Helga Medley 2/2020. She is recovered but has chronic diarrhea. She is frustrated that her diarrhea continues to be an issues, she is following with Dr. Dimitri Barton. He suggested marijuana but patient was not interested. Still having issues with diarrhea and urinary incontinence, is now on colestipol for diarrhea which seems like it has helped a little.     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 She is on eliquis per Dr. Iverson Her after developing atrial fibrillation postop after her colon resection. She has had previous L LE intervention for wound of the left calf which was first noted approximately 2018 after trauma. She followed with Dr. Sudha Chakraborty and after intervention it did heal.      Her L leg wound she had at her last visit from her dog scratching her leg has healed.     Past Medical History:        Diagnosis Date    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 with rest pain ([address] 2019    Atrial fibrillation (HCC)     Atypical mole     upper right arm - black mole     Bilateral carotid artery stenosis 2018    Dr. Elo Godoy of right carotid artery 5/3/2018    CAD (coronary artery disease)     f/u w/ PCP     Cancer ([address]     SKIN CA/Thigh    COPD (chronic obstructive pulmonary disease) ([address]     uses O2 2L NC at night - and during day prn     COVID     Depression     Diarrhea     for colonoscopy 19     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    History of tobacco use 5/3/2018    Hydrocephalus (HCC)     Hyperlipidemia     no medications     Hypertension     BRY (obstructive sleep apnea)     uses O2 2L NC     Pain in both feet 5/3/2018    Paroxysmal A-fib (HCC)     Peripheral vascular disease due to secondary diabetes mellitus ([address] 5/3/2022    PVD (peripheral vascular disease) (HCC)     PVD (peripheral vascular disease) with claudication ([address] 5/3/2018    Rheumatoid arthritis ([address]     Short-term memory loss     Thyroid disease     Urinary incontinence     Venous stasis ulcer of left calf with fat layer exposed without varicose veins ([address] 2019    Weight loss      Past Surgical History:        Procedure Laterality Date     SECTION      COLONOSCOPY      COLONOSCOPY N/A 2021    COLONOSCOPY WITH BIOPSY performed by Kai Bryant MD at [facility] bilat cataract     LAPAROTOMY N/A 2/17/2020    LAPAROTOMY EXPLORATORY, RIGHT HEMICOLECTOMY performed by Frieda Huffman MD at [address] N/A 9/18/2020    PLACEMENT OF INTRATHECAL CATHETER FOR LUMBAR DRAIN performed by Simeon Hart MD at [address]  01/29/2019    L SFA angioplasty    TUBAL LIGATION      VENTRICULOPERITONEAL SHUNT N/A 9/23/2020    VENTRICULAR PERITONEAL SHUNT PLACEMENT performed by Simeon Hart MD at [address]     Current Medications:   Current Outpatient Medications   Medication Sig Dispense Refill    omeprazole (PRILOSEC) 40 MG delayed release capsule Take 40 mg by mouth daily      OXYGEN Inhale 3 L/min into the lungs nightly      Apoaequorin (PREVAGEN PO) Take by mouth      vitamin E 400 UNIT capsule Take 400 Units by mouth daily      loperamide (IMODIUM) 2 MG capsule Take 2 mg by mouth 4 times daily as needed for Diarrhea      albuterol-ipratropium (COMBIVENT RESPIMAT)  MCG/ACT AERS inhaler Inhale 1 puff into the lungs every 4 hours as needed for Wheezing      levothyroxine (SYNTHROID) 125 MCG tablet Take 1 tablet by mouth Daily (Patient taking differently: Take 88 mcg by mouth Daily) 30 tablet 3    colestipol (COLESTID) 1 g tablet Take 1 tablet by mouth 2 times daily      oxybutynin (DITROPAN XL) 15 MG extended release tablet Take 15 mg by mouth daily      ascorbic acid (VITAMIN C) 500 MG tablet Take 500 mg by mouth daily      albuterol (PROVENTIL) (5 MG/ML) 0.5% nebulizer solution Take 0.5 mLs by nebulization every 4 hours as needed for Wheezing 120 each 0    Respiratory Therapy Supplies (FULL KIT NEBULIZER SET) MISC Use as directed with nebulized medication.  1 each 0    Cholecalciferol (VITAMIN D) 50 MCG (2000 UT) CAPS capsule Take 1 capsule by mouth daily       acetaminophen (TYLENOL) 500 MG tablet Take 1 tablet by mouth every 6 hours as needed for Pain 120 tablet 3    ipratropium-albuterol (DUONEB) 0.5-2.5 (3) MG/3ML SOLN nebulizer solution Inhale 3 mLs into the lungs every 4 hours (while awake) 360 mL 0    apixaban (ELIQUIS) 5 MG TABS tablet Take 1 tablet by mouth 2 times daily 60 tablet 0    buPROPion (WELLBUTRIN SR) 100 MG extended release tablet Take 1 tablet by mouth daily 60 tablet 3    sotalol (BETAPACE) 120 MG tablet Take 1 tablet by mouth 2 times daily 60 tablet 3    amLODIPine (NORVASC) 5 MG tablet Take 1 tablet by mouth daily 30 tablet 3     No current facility-administered medications for this visit.      Allergies:  Hornet venom, Wasp venom, and Flagyl [metronidazole]  Social History     Socioeconomic History    Marital status:      Spouse name: Not on file    Number of children: Not on file    Years of education: Not on file    Highest education level: Not on file   Occupational History    Occupation: Retired    Tobacco Use    Smoking status: Former     Packs/day: 0.25     Types: Cigarettes     Quit date: 5/3/2017     Years since quittin.3    Smokeless tobacco: Never   Vaping Use    Vaping Use: Never used   Substance and Sexual Activity    Alcohol use: Yes     Comment: rare    Drug use: No    Sexual activity: Never   Other Topics Concern    Not on file   Social History Narrative    Not on file     Social Determinants of Health     Financial Resource Strain: Not on file   Food Insecurity: Not on file   Transportation Needs: Not on file   Physical Activity: Not on file   Stress: Not on file   Social Connections: Not on file   Intimate Partner Violence: Not on file   Housing Stability: Not on file     Family History   Problem Relation Age of Onset    Kidney Disease Mother     Coronary Art Dis Mother     High Blood Pressure Mother     Cancer Father     Other Father      Labs  Lab Results   Component Value Date    WBC 7.7 2022    HGB 14.3 2022    HCT 45.4 2022     2022    PROTIME 10.6 2022    INR 1.0 2022    APTT 31.3 2020    K 4.2 06/07/2022    BUN 16 06/07/2022    CREATININE 0.6 06/07/2022       PHYSICAL EXAM:    CONSTITUTIONAL:   Awake, alert, cooperative  PSYCHIATRIC :  Oriented to time, place and person     Appropriate insight to disease process  EYES: Lids and lashes normal  ENT:  External ears and nose without lesions   Hearing deficits not noted  NECK: Supple, symmetrical, trachea midline   Carotid bruit notnoted  LUNGS:  No increased work of breathing                 Clear to auscultation  CARDIOVASCULAR:  regular rate and rhythm   ABDOMEN:  soft, non-distended, non-tender   Aorta is not palpable  SKIN:   Normal skin color   Texture and turgor normal, no induration  EXTREMITIES:   R LE Edema absent   No wounds  L LE Edema absent   Wound healed  R posterior tibial Weakly biphasic L anterior tibial monophasic   R dorsalis pedis No signal L posterior tibial No signal     RADIOLOGY:  ABIs reviewed  ANTONY R 0.58 (0.48) L 0.38 (0.55)    A/P PVD with claudication  S/p R LE angiogram with intervention  S/p L LE angiogram with intervention  She is now experiencing what may be RLE short distance claudication but her symptoms of her whole leg aching aren't consistent with PVD alone  She has history of lumbar spinal stenosis which likely is confounding her symptoms  Since her symptoms are rather vague, she is having no rest pain or ulcerations, we asked her to pay close attn to her pain so she can better describe her symptoms as we do not want to put her through a procedure if it will not improve the pain she is having  New wound to L LE from a dog scratch healed  She is on eliquis for afib  Discussed with pt tobacco use and relationship to PVD  pt currently is not a smoker  Pt understands tobacco use can contribute to worsening of pvd and development of limb loss   Emphasized importance of foot care and to call if develops any wounds, ulcerations, changes in appearance, claudication and/or symptoms  F/u in 6 weeks or sooner with any issues    Bilateral Carotid Artery Stenosis  US in 2018 noted 50-59% left ICA stenosis  Will order carotid US for next visit to reassess    Pt seen and plan discussed with Dr. Jesus Benjamin PA-C

## 2022-08-22 NOTE — TELEPHONE ENCOUNTER
Left message on voicemail regarding rescheduling 10-3-22 appointment to 10-10-22 at 10:30 am in order to accommodate a carotid ultrasound on the same day.

## 2022-09-12 ENCOUNTER — OFFICE VISIT (OUTPATIENT)
Dept: NEUROSURGERY | Age: 72
End: 2022-09-12
Payer: MEDICARE

## 2022-09-12 VITALS
BODY MASS INDEX: 15.99 KG/M2 | SYSTOLIC BLOOD PRESSURE: 145 MMHG | OXYGEN SATURATION: 93 % | DIASTOLIC BLOOD PRESSURE: 66 MMHG | TEMPERATURE: 97 F | RESPIRATION RATE: 18 BRPM | HEART RATE: 60 BPM | WEIGHT: 96 LBS | HEIGHT: 65 IN

## 2022-09-12 DIAGNOSIS — G91.0 COMMUNICATING HYDROCEPHALUS (HCC): Primary | ICD-10-CM

## 2022-09-12 DIAGNOSIS — M79.605 PAIN OF LEFT LOWER EXTREMITY: ICD-10-CM

## 2022-09-12 DIAGNOSIS — I73.9 PVD (PERIPHERAL VASCULAR DISEASE) (HCC): ICD-10-CM

## 2022-09-12 PROCEDURE — 99202 OFFICE O/P NEW SF 15 MIN: CPT | Performed by: NEUROLOGICAL SURGERY

## 2022-09-12 PROCEDURE — 1123F ACP DISCUSS/DSCN MKR DOCD: CPT | Performed by: NEUROLOGICAL SURGERY

## 2022-09-12 PROCEDURE — 99203 OFFICE O/P NEW LOW 30 MIN: CPT | Performed by: NEUROLOGICAL SURGERY

## 2022-09-12 RX ORDER — LEVOTHYROXINE SODIUM 88 UG/1
TABLET ORAL
COMMUNITY
Start: 2022-08-19

## 2022-09-12 RX ORDER — BUPROPION HYDROCHLORIDE 100 MG/1
TABLET ORAL
COMMUNITY
Start: 2022-07-25

## 2022-09-12 NOTE — PROGRESS NOTES
[facility] NEUROSURGERY  [address]  [place] Aragon [phone]       Chief Complaint:   Chief Complaint   Patient presents with    Consultation     Pain in right leg  had shunt put in over a year ago by dr Zuly Nicole         HPI:     I had the pleasure of seeing Jamari Davenport today in neurosurgical clinic. As you know this delightful 64-year-old  mother of 1 non-smoker current drinker is status post ventriculoperitoneal shunt placement by Dr. Beck Hammond. She was last seen in May at which time her shunt verification revealed a setting of 0.5. She continues to do well. She does state that she has occasional headache. She has occasional memory difficulties. She endorses right leg pain.     Past Medical History:   Diagnosis Date    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 with rest pain ([address] 1/16/2019    Atrial fibrillation (HCC)     Atypical mole     upper right arm - black mole     Bilateral carotid artery stenosis 05/22/2018    Dr. Chicho Fish of right carotid artery 5/3/2018    CAD (coronary artery disease)     f/u w/ PCP     Cancer ([address]     SKIN CA/Thigh    COPD (chronic obstructive pulmonary disease) ([address]     uses O2 2L NC at night - and during day prn     COVID     Depression     Diarrhea     for colonoscopy 2-19-21     Femoro-popliteal artery disease ([address] 1/16/2019    History of tobacco use 5/3/2018    Hydrocephalus (HCC)     Hyperlipidemia     no medications     Hypertension     BRY (obstructive sleep apnea)     uses O2 2L NC     Pain in both feet 5/3/2018    Paroxysmal A-fib ([facility])     Peripheral vascular disease due to secondary diabetes mellitus ([address] 5/3/2022    PVD (peripheral vascular disease) ([facility])     PVD (peripheral vascular disease) with claudication ([address] 5/3/2018    Rheumatoid arthritis ([address]     Short-term memory loss     Thyroid disease     Urinary incontinence     Venous stasis ulcer of left calf with fat layer exposed without varicose veins ([address] 2019    Weight loss      Past Surgical History:   Procedure Laterality Date     SECTION      COLONOSCOPY      COLONOSCOPY N/A 2021    COLONOSCOPY WITH BIOPSY performed by Julianna Lake MD at [facility] cataract     LAPAROTOMY N/A 2020    LAPAROTOMY EXPLORATORY, RIGHT HEMICOLECTOMY performed by Julianna Lake MD at [address] N/A 2020    PLACEMENT OF INTRATHECAL CATHETER FOR LUMBAR DRAIN performed by Yuki Koch MD at [place] [address]  2019    L SFA angioplasty    TUBAL LIGATION      VENTRICULOPERITONEAL SHUNT N/A 2020    VENTRICULAR PERITONEAL SHUNT PLACEMENT performed by Yuki Koch MD at [address] [place] [address] History   Problem Relation Age of Onset    Kidney Disease Mother     Coronary Art Dis Mother     High Blood Pressure Mother     Cancer Father     Other Father       Social History     Socioeconomic History    Marital status:      Spouse name: Not on file    Number of children: Not on file    Years of education: Not on file    Highest education level: Not on file   Occupational History    Occupation: Retired    Tobacco Use    Smoking status: Former     Packs/day: 0.25     Years: 20.00     Pack years: 5.00     Types: Cigarettes     Quit date: 5/3/2017     Years since quittin.3    Smokeless tobacco: Never   Vaping Use    Vaping Use: Never used   Substance and Sexual Activity    Alcohol use: Yes     Comment: rare    Drug use: No    Sexual activity: Never   Other Topics Concern    Not on file   Social History Narrative    Not on file     Social Determinants of Health     Financial Resource Strain: Not on file   Food Insecurity: Not on file   Transportation Needs: Not on file   Physical Activity: Not on file   Stress: Not on file   Social Connections: Not on file   Intimate Partner Violence: Not on file   Housing Stability: Not on file       Medications:   Current Outpatient Medications   Medication Sig Dispense Refill    buPROPion (WELLBUTRIN) 100 MG tablet take 1 tablet by mouth once daily      levothyroxine (SYNTHROID) 88 MCG tablet take 1 tablet by mouth once daily      omeprazole (PRILOSEC) 40 MG delayed release capsule Take 40 mg by mouth daily      OXYGEN Inhale 3 L/min into the lungs nightly      Apoaequorin (PREVAGEN PO) Take by mouth      vitamin E 400 UNIT capsule Take 400 Units by mouth daily      loperamide (IMODIUM) 2 MG capsule Take 2 mg by mouth 4 times daily as needed for Diarrhea      albuterol-ipratropium (COMBIVENT RESPIMAT)  MCG/ACT AERS inhaler Inhale 1 puff into the lungs every 4 hours as needed for Wheezing      levothyroxine (SYNTHROID) 125 MCG tablet Take 1 tablet by mouth Daily (Patient taking differently: Take 88 mcg by mouth Daily) 30 tablet 3    colestipol (COLESTID) 1 g tablet Take 1 tablet by mouth 2 times daily      oxybutynin (DITROPAN XL) 15 MG extended release tablet Take 15 mg by mouth daily      ascorbic acid (VITAMIN C) 500 MG tablet Take 500 mg by mouth daily      albuterol (PROVENTIL) (5 MG/ML) 0.5% nebulizer solution Take 0.5 mLs by nebulization every 4 hours as needed for Wheezing 120 each 0    Respiratory Therapy Supplies (FULL KIT NEBULIZER SET) MISC Use as directed with nebulized medication.  1 each 0    Cholecalciferol (VITAMIN D) 50 MCG (2000 UT) CAPS capsule Take 1 capsule by mouth daily       acetaminophen (TYLENOL) 500 MG tablet Take 1 tablet by mouth every 6 hours as needed for Pain 120 tablet 3    ipratropium-albuterol (DUONEB) 0.5-2.5 (3) MG/3ML SOLN nebulizer solution Inhale 3 mLs into the lungs every 4 hours (while awake) 360 mL 0    apixaban (ELIQUIS) 5 MG TABS tablet Take 1 tablet by mouth 2 times daily 60 tablet 0    buPROPion (WELLBUTRIN SR) 100 MG extended release tablet Take 1 tablet needs tests completed prior to appt, discuss results. Electronically signed by Carlota Christensen MD on 9/14/2022 at 9:11 AM       NOTE: This report was transcribed using voice recognition software.  Every effort was made to ensure accuracy; however, inadvertent computerized transcription errors may be present

## 2022-09-19 ENCOUNTER — HOSPITAL ENCOUNTER (OUTPATIENT)
Dept: CT IMAGING | Age: 72
Discharge: HOME | End: 2022-09-21
Payer: MEDICARE

## 2022-09-19 DIAGNOSIS — I73.9 PVD (PERIPHERAL VASCULAR DISEASE) (HCC): ICD-10-CM

## 2022-09-19 DIAGNOSIS — M79.605 PAIN OF LEFT LOWER EXTREMITY: ICD-10-CM

## 2022-09-19 DIAGNOSIS — G91.0 COMMUNICATING HYDROCEPHALUS (HCC): ICD-10-CM

## 2022-09-19 PROCEDURE — 72131 CT LUMBAR SPINE W/O DYE: CPT

## 2022-09-19 PROCEDURE — 70450 CT HEAD/BRAIN W/O DYE: CPT

## 2022-09-21 DIAGNOSIS — I65.23 BILATERAL CAROTID ARTERY STENOSIS: Primary | ICD-10-CM

## 2022-09-23 ENCOUNTER — OFFICE VISIT (OUTPATIENT)
Dept: NEUROSURGERY | Age: 72
End: 2022-09-23
Payer: MEDICARE

## 2022-09-23 VITALS
HEIGHT: 65 IN | RESPIRATION RATE: 21 BRPM | OXYGEN SATURATION: 96 % | HEART RATE: 53 BPM | BODY MASS INDEX: 15.99 KG/M2 | TEMPERATURE: 97.1 F | DIASTOLIC BLOOD PRESSURE: 74 MMHG | SYSTOLIC BLOOD PRESSURE: 128 MMHG | WEIGHT: 96 LBS

## 2022-09-23 DIAGNOSIS — M79.604 BILATERAL LEG PAIN: ICD-10-CM

## 2022-09-23 DIAGNOSIS — Z98.2 S/P VP SHUNT: ICD-10-CM

## 2022-09-23 DIAGNOSIS — M79.605 BILATERAL LEG PAIN: ICD-10-CM

## 2022-09-23 DIAGNOSIS — G91.0 COMMUNICATING HYDROCEPHALUS (HCC): Primary | ICD-10-CM

## 2022-09-23 PROCEDURE — 99212 OFFICE O/P EST SF 10 MIN: CPT | Performed by: NEUROLOGICAL SURGERY

## 2022-09-23 PROCEDURE — 99214 OFFICE O/P EST MOD 30 MIN: CPT | Performed by: NEUROLOGICAL SURGERY

## 2022-09-23 PROCEDURE — 1123F ACP DISCUSS/DSCN MKR DOCD: CPT | Performed by: NEUROLOGICAL SURGERY

## 2022-09-23 NOTE — PROGRESS NOTES
[facility] NEUROSURGERY  [facility]  [phone]       Chief Complaint:   Chief Complaint   Patient presents with    Follow-up     Dicuss CT Scan, RIGHT leg is getting worse, makes her extremely weak causing her to sit often and pain subsides after about 20 minutes. She states when she is walking her leg starts hurting so severely that it feels like the oxygen is just being pulled out of her leg, \"its a horrible feeling\". HPI:     I had the pleasure of seeing Oksanaastrid Sparrowheladio today in neurosurgical clinic. As you know this 58-year-old presents in follow up for her CT head. She is s/p VPS placement by our colleague Dr. Godwin Henriquez. She continues to endorse right leg pain and with weakness. She denies change with bowel or bladder.     Past Medical History:   Diagnosis Date    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 with rest pain ([address] 1/16/2019    Atrial fibrillation (HCC)     Atypical mole     upper right arm - black mole     Bilateral carotid artery stenosis 05/22/2018    Dr. Padna Comfort of right carotid artery 5/3/2018    CAD (coronary artery disease)     f/u w/ PCP     Cancer ([address]     SKIN CA/Thigh    COPD (chronic obstructive pulmonary disease) ([address]     uses O2 2L NC at night - and during day prn     COVID     Depression     Diarrhea     for colonoscopy 2-19-21     Femoro-popliteal artery disease ([address] 1/16/2019    History of tobacco use 5/3/2018    Hydrocephalus (HCC)     Hyperlipidemia     no medications     Hypertension     BRY (obstructive sleep apnea)     uses O2 2L NC     Pain in both feet 5/3/2018    Paroxysmal A-fib (HCC)     Peripheral vascular disease due to secondary diabetes mellitus ([address] 5/3/2022    PVD (peripheral vascular disease) ([address]     PVD (peripheral vascular disease) with claudication ([address] 5/3/2018    Rheumatoid arthritis ([address]     Short-term memory loss     Thyroid disease     Urinary incontinence     Venous stasis ulcer of left calf with fat layer exposed without varicose veins ([address] 2019    Weight loss      Past Surgical History:   Procedure Laterality Date     SECTION      COLONOSCOPY      COLONOSCOPY N/A 2021    COLONOSCOPY WITH BIOPSY performed by Rebecca Peguero MD at [facility] 48 cataract     LAPAROTOMY N/A 2020    LAPAROTOMY EXPLORATORY, RIGHT HEMICOLECTOMY performed by Rebecca Peguero MD at [address] N/A 2020    PLACEMENT OF INTRATHECAL CATHETER FOR LUMBAR DRAIN performed by Reynaldo Saez MD at [address]  2019    L SFA angioplasty    TUBAL LIGATION      VENTRICULOPERITONEAL SHUNT N/A 2020    VENTRICULAR PERITONEAL SHUNT PLACEMENT performed by Reynaldo Saez MD at [address] [place] [address] History   Problem Relation Age of Onset    Kidney Disease Mother     Coronary Art Dis Mother     High Blood Pressure Mother     Cancer Father     Other Father       Social History     Socioeconomic History    Marital status:      Spouse name: Not on file    Number of children: Not on file    Years of education: Not on file    Highest education level: Not on file   Occupational History    Occupation: Retired    Tobacco Use    Smoking status: Former     Packs/day: 0.25     Years: 20.00     Pack years: 5.00     Types: Cigarettes     Quit date: 5/3/2017     Years since quittin.4    Smokeless tobacco: Never   Vaping Use    Vaping Use: Never used   Substance and Sexual Activity    Alcohol use: Yes     Comment: rare    Drug use: No    Sexual activity: Never   Other Topics Concern    Not on file   Social History Narrative    Not on file     Social Determinants of Health     Financial Resource Strain: Not on file   Food Insecurity: Not on file   Transportation Needs: Not on file Physical Activity: Not on file   Stress: Not on file   Social Connections: Not on file   Intimate Partner Violence: Not on file   Housing Stability: Not on file       Medications:   Current Outpatient Medications   Medication Sig Dispense Refill    buPROPion (WELLBUTRIN) 100 MG tablet take 1 tablet by mouth once daily      levothyroxine (SYNTHROID) 88 MCG tablet take 1 tablet by mouth once daily      omeprazole (PRILOSEC) 40 MG delayed release capsule Take 40 mg by mouth daily      OXYGEN Inhale 3 L/min into the lungs nightly      Apoaequorin (PREVAGEN PO) Take by mouth      vitamin E 400 UNIT capsule Take 400 Units by mouth daily      loperamide (IMODIUM) 2 MG capsule Take 2 mg by mouth 4 times daily as needed for Diarrhea      albuterol-ipratropium (COMBIVENT RESPIMAT)  MCG/ACT AERS inhaler Inhale 1 puff into the lungs every 4 hours as needed for Wheezing      levothyroxine (SYNTHROID) 125 MCG tablet Take 1 tablet by mouth Daily (Patient taking differently: Take 88 mcg by mouth Daily) 30 tablet 3    colestipol (COLESTID) 1 g tablet Take 1 tablet by mouth 2 times daily      oxybutynin (DITROPAN XL) 15 MG extended release tablet Take 15 mg by mouth daily      ascorbic acid (VITAMIN C) 500 MG tablet Take 500 mg by mouth daily      albuterol (PROVENTIL) (5 MG/ML) 0.5% nebulizer solution Take 0.5 mLs by nebulization every 4 hours as needed for Wheezing 120 each 0    Respiratory Therapy Supplies (FULL KIT NEBULIZER SET) MISC Use as directed with nebulized medication.  1 each 0    Cholecalciferol (VITAMIN D) 50 MCG (2000 UT) CAPS capsule Take 1 capsule by mouth daily       acetaminophen (TYLENOL) 500 MG tablet Take 1 tablet by mouth every 6 hours as needed for Pain 120 tablet 3    ipratropium-albuterol (DUONEB) 0.5-2.5 (3) MG/3ML SOLN nebulizer solution Inhale 3 mLs into the lungs every 4 hours (while awake) 360 mL 0    apixaban (ELIQUIS) 5 MG TABS tablet Take 1 tablet by mouth 2 times daily 60 tablet 0 buPROPion (WELLBUTRIN SR) 100 MG extended release tablet Take 1 tablet by mouth daily 60 tablet 3    sotalol (BETAPACE) 120 MG tablet Take 1 tablet by mouth 2 times daily 60 tablet 3    amLODIPine (NORVASC) 5 MG tablet Take 1 tablet by mouth daily 30 tablet 3     No current facility-administered medications for this visit. Allergies:    Hornet venom, Wasp venom, and Flagyl [metronidazole]       Review of Systems:    Denies any chest pain, dyspnea, recent weight loss, fevers, chills or night sweats. Physical Examination:    /74   Pulse 53   Temp 97.1 °F (36.2 °C) (Temporal)   Resp 21   Ht 5' 5\" (1.651 m)   Wt 96 lb (43.5 kg)   SpO2 96%   BMI 15.98 kg/m²      On focused neurological examination, she  is awake alert oriented and rationally conversant. Speech is clear and fluent. Pupils are equal and reactive to light bilaterally, extraocular movements are intact, visual fields are full to confrontation. Her  face is symmetric and grossly intact to fine touch. Uvula and tongue are both midline. Shoulder shrug is symmetric and strong. Motor examination reveals preserved power in the upper and lower extremities at 5 out of 5 throughout. Reflexes are symmetric and brisk. Plantar responses are downgoing. There is no clonus. Patient is intact to fine touch in all dermatomes throughout. Palpation of her shunt fails to identify any kinks or dislocations. ASSESSMENT:    I personally reviewed Vinnie Elizabeth's radiographic images, particularly her CT head dated 20 September 2022 which shows stable ventricular size. 1. Communicating hydrocephalus ([address]  2. Bilateral leg pain  -     EMG; Future  3. S/P  shunt    MEDICAL DECISION MAKING & PLAN:    I showed the patient her images and explained the findings. No acute neurosurgical intervention is required. We will obtain an EMG to better understand the aetiology of her lower extremity complaints.   She will return to clinic with the above results in hand. Thank you so much for allowing us to participate in the care of this patient. Return for needs tests completed prior to appt, discuss results. Electronically signed by Zack oMrales MD on 10/12/2022 at 10:01 AM       NOTE: This report was transcribed using voice recognition software.  Every effort was made to ensure accuracy; however, inadvertent computerized transcription errors may be present

## 2022-10-06 ENCOUNTER — TELEPHONE (OUTPATIENT)
Dept: VASCULAR SURGERY | Age: 72
End: 2022-10-06

## 2022-10-10 ENCOUNTER — OFFICE VISIT (OUTPATIENT)
Dept: VASCULAR SURGERY | Age: 72
End: 2022-10-10
Payer: MEDICARE

## 2022-10-10 ENCOUNTER — HOSPITAL ENCOUNTER (OUTPATIENT)
Dept: NEUROLOGY | Age: 72
Discharge: HOME | End: 2022-10-10
Payer: MEDICARE

## 2022-10-10 ENCOUNTER — HOSPITAL ENCOUNTER (OUTPATIENT)
Dept: CARDIOLOGY | Age: 72
Discharge: HOME | End: 2022-10-10
Payer: MEDICARE

## 2022-10-10 VITALS — HEIGHT: 66 IN | BODY MASS INDEX: 15.43 KG/M2 | WEIGHT: 96 LBS

## 2022-10-10 VITALS — WEIGHT: 94 LBS | BODY MASS INDEX: 15.11 KG/M2 | HEIGHT: 66 IN

## 2022-10-10 DIAGNOSIS — M79.605 BILATERAL LEG PAIN: ICD-10-CM

## 2022-10-10 DIAGNOSIS — M79.604 BILATERAL LEG PAIN: ICD-10-CM

## 2022-10-10 DIAGNOSIS — I65.23 BILATERAL CAROTID ARTERY STENOSIS: Primary | ICD-10-CM

## 2022-10-10 DIAGNOSIS — I73.9 PVD (PERIPHERAL VASCULAR DISEASE) WITH CLAUDICATION (HCC): ICD-10-CM

## 2022-10-10 DIAGNOSIS — I65.23 BILATERAL CAROTID ARTERY STENOSIS: ICD-10-CM

## 2022-10-10 PROCEDURE — 1123F ACP DISCUSS/DSCN MKR DOCD: CPT | Performed by: SURGERY

## 2022-10-10 PROCEDURE — 95911 NRV CNDJ TEST 9-10 STUDIES: CPT

## 2022-10-10 PROCEDURE — 99213 OFFICE O/P EST LOW 20 MIN: CPT | Performed by: SURGERY

## 2022-10-10 PROCEDURE — 93880 EXTRACRANIAL BILAT STUDY: CPT

## 2022-10-10 PROCEDURE — 95886 MUSC TEST DONE W/N TEST COMP: CPT

## 2022-10-10 NOTE — PROCEDURES
EMG Julio 1978   Electrodiagnostic Laboratory  [facility]        Full Name: Lashay Aranda Gender: Female  MRN: [id number] YOB: 1950  Location[de-identified] Outpt. Visit Date: 10/10/2022 12:53  Age: 74 Years 9 Months Old  Examining Physician: Dr. Triston Champion  Referring Physician: Dr. Cullen Blair  Technician: Elizabeth Carlos   Height: 5 feet 6 inch  Weight: 94 lbs  Notes: Bilat. leg pain M79.604, M79.605      Motor NCS      Nerve / Sites Lat. Amplitude Distance Lat Diff Velocity Temp. Amp. 1-2    ms mV cm ms m/s °C %   L Peroneal - EDB      Ankle 5.00 0.8 8   31 100      Fib head 11.56 0.5 28 6.56 43 31 56.6      Pop fossa 13.91 0.4 10 2.34 43 31 46.9   R Peroneal - EDB      Ankle 6.35 1.2 8   31 100      Fib head 13.02 1.1 28 6.67 42 31 91.8      Pop fossa 14.84 1.1 10 1.82 55 31 92.8   R Tibial - AH      Ankle 5.05 8.0 8   31.1 100      Pop fossa 14.84 6.1 39 9.79 40 31.1 76   L Tibial - AH      Ankle 4.43 5.1 8   31.1 100      Pop fossa 14.17 4.4 39 9.74 40 31.1 87.3       Sensory NCS      Nerve / Sites Onset Lat Peak Lat PP Amp Distance Velocity Temp.    ms ms µV cm m/s °C   L Superficial peroneal - Ankle      Lat leg NR NR NR 10 NR 29.9   R Superficial peroneal - Ankle      Lat leg NR NR NR 10 NR 31.1   R Sural - Ankle (Calf)      Calf 3.65 4.53 6.1 14 38 31   L Sural - Ankle (Calf)      Calf 4.01 4.90 3.0 14 35 31                 F  Wave      Nerve F Lat M Lat F-M Lat    ms ms ms   L Peroneal - EDB 55.1 8.3 46.8   R Peroneal - EDB 59.0 7.9 51.0   R Tibial - AH 54.3 5.6 48.7   L Tibial - AH 57.9 6.1 51.7       H Reflex      Nerve Lat Hmax    ms   R Tibial - Soleus 33.1   L Tibial - Soleus 33.7       EMG         EMG Summary Table     Spontaneous MUAP Recruitment   Muscle IA Fib PSW Fasc H.F. Amp Dur. PPP Pattern   R. Lumbar paraspinals (low) N None None None None N N 1+ N   L. Lumbar paraspinals (low) N None None None None N N 1+ N   R.  Gastrocnemius (Medial head) N None None None None N N 1+ N   L. Gastrocnemius (Medial head) N None None None None N N 1+ N   R. Tibialis anterior N None None None None N N 1+ N   L. Tibialis anterior N None None None None N N 1+ N   R. Flexor digitorum longus N None None None None N N 1+ N   L. Flexor digitorum longus N None None None None N N 1+ N   R. Extensor hallucis longus N None None None None N N 1+ N   L. Extensor hallucis longus N None None None None N N 1+ N   L. Vastus lateralis N None None None None N N N N   R. Vastus lateralis N None None None None N N N N            Patient cannot recall having had in the past, and electromyographic examination. He was advised as to the benefits, risks, indications, and contraindications to this examination. She voices understanding and consent to proceed. She tolerated the procedure well without any significant effects. Reason for the examination was plaint of bilateral leg pain. Summary:  Nerve conduction studies in the lower extremities reveal no mixed motor latency of the left peroneal and right peroneal nerves. Amplitude of the left peroneal at the ankle was reduced, right peroneal, low normal.  Velocity above and below the fibular head of the left and right peroneal nerves, within excepted limits of normal.   Normal mixed motor latency of the tibial nerves bilaterally, normal amplitudes as well as velocities. Temperature measured the lower remedies as normal..    Sensory studies in the lower extremities revealed no response to attempted stimulation of the superficial peroneal nerves bilaterally. Right sural peak latency was  Normal, left sural peak latency was prolonged. Left sural amplitude was also diminished. F waves normal left peroneal and right tibial, prolonged right peroneal and left tibial.  H reflex prolonged bilaterally.       Insertional activity in the lower extremity revealing increased polyphasic units diffusely in the anterior compartment muscles bilaterally of both lower extremities muscle sampled. This included medial gastroc. Similar changes found diffusely in the lumbar paraspinals. Please see the above summarization table for full details of the results of the insertional examination. Impression:  Study compatible with the following:    #1 no response to attempted stimulation of the superficial peroneal bilaterally. Long peak latency and upper limits of normal latency of the left sural and right sural nerve respectively. This is compatible with sensory peripheral neuropathy. #2 normal nerve conduction studies of the following nerves: Peroneal, right tibial, left tibial.  Left peroneal amplitude at the ankle slightly reduced. #3  Examination of the lower extremity muscles including the lumbar's demonstrating no active acute neuropathic nor myopathic process. No changes in polyphasic and serrated units be compatible with his history of central stenosis and neurogenic claudication. Recommendations: Please see accompanying letter to referring physician, [address], . Clinical correlation is advised, correlation of these results with imaging and physical examination thank you  Clinical correlation recommended.         Electronically signed by Gaurav Briones MD on 38/39/8243 at 3:20 PM

## 2022-10-10 NOTE — LETTER
RE:  Yovani Albarran    Dear Dr. Kandy Harry,     Enclosed you will find a copy of the requested EMG on your patient, Yovani Albarran completed 10/10/2022. EMG Findings:     Meng Chadwick MD   Physician   Internal Medicine   Procedures       Signed   Date of Service:  10/10/2022  1:00 PM              Procedure Orders   EMG [[id number]] ordered by  at 09/23/22 1309     Pre-procedure Diagnoses   Bilateral leg pain [M79.604, M79.605]     Procedures   EMG [NEU5]          Signed        EMG LOWER ABNORMAL         [address]   Electrodiagnostic Laboratory  [place]         Full Name:   Yovani Albarran                       Gender:             Female  MRN:            [id number]                                      YOB: 1950  Location[de-identified]    Outpt. Visit Date:                          10/10/2022 12:53  Age:                                   74 Years 9 Months Old  Examining Physician:      Dr. Griselda Seo  Referring Physician:        Dr. Mel Andres  Technician:                       Suraj Vaughan   Height:                               5 feet 6 inch  Weight:                              94 lbs  Notes:                                Bilat. leg pain M79.604, M79.605      Motor NCS      Nerve / Sites Lat. Amplitude Distance Lat Diff Velocity Temp. Amp. 1-2     ms mV cm ms m/s °C %   L Peroneal - EDB      Ankle 5.00 0.8 8     31 100      Fib head 11.56 0.5 28 6.56 43 31 56.6      Pop fossa 13.91 0.4 10 2.34 43 31 46.9   R Peroneal - EDB      Ankle 6.35 1.2 8     31 100      Fib head 13.02 1.1 28 6.67 42 31 91.8      Pop fossa 14.84 1.1 10 1.82 55 31 92.8   R Tibial - AH      Ankle 5.05 8.0 8     31.1 100      Pop fossa 14.84 6.1 39 9.79 40 31.1 76   L Tibial - AH      Ankle 4.43 5.1 8     31.1 100      Pop fossa 14.17 4.4 39 9.74 40 31.1 87.3       Sensory NCS      Nerve / Sites Onset Lat Peak Lat PP Amp Distance Velocity Temp.     ms ms µV cm m/s °C   L Superficial peroneal - Ankle      Lat leg NR NR NR 10 NR 29.9   R Superficial peroneal - Ankle      Lat leg NR NR NR 10 NR 31.1   R Sural - Ankle (Calf)      Calf 3.65 4.53 6.1 14 38 31   L Sural - Ankle (Calf)      Calf 4.01 4.90 3.0 14 35 31                      F  Wave      Nerve F Lat M Lat F-M Lat     ms ms ms   L Peroneal - EDB 55.1 8.3 46.8   R Peroneal - EDB 59.0 7.9 51.0   R Tibial - AH 54.3 5.6 48.7   L Tibial - AH 57.9 6.1 51.7       H Reflex      Nerve Lat Hmax     ms   R Tibial - Soleus 33.1   L Tibial - Soleus 33.7       EMG                     EMG Summary Table       Spontaneous MUAP Recruitment   Muscle IA Fib PSW Fasc H.F. Amp Dur. PPP Pattern   R. Lumbar paraspinals (low) N None None None None N N 1+ N   L. Lumbar paraspinals (low) N None None None None N N 1+ N   R. Gastrocnemius (Medial head) N None None None None N N 1+ N   L. Gastrocnemius (Medial head) N None None None None N N 1+ N   R. Tibialis anterior N None None None None N N 1+ N   L. Tibialis anterior N None None None None N N 1+ N   R. Flexor digitorum longus N None None None None N N 1+ N   L. Flexor digitorum longus N None None None None N N 1+ N   R. Extensor hallucis longus N None None None None N N 1+ N   L. Extensor hallucis longus N None None None None N N 1+ N   L. Vastus lateralis N None None None None N N N N   R. Vastus lateralis N None None None None N N N N             Patient cannot recall having had in the past, and electromyographic examination. He was advised as to the benefits, risks, indications, and contraindications to this examination. She voices understanding and consent to proceed. She tolerated the procedure well without any significant effects. Reason for the examination was plaint of bilateral leg pain.     Summary:  Nerve conduction studies in the lower extremities reveal no mixed motor latency of the left peroneal and right peroneal nerves.   Amplitude of the left peroneal at the ankle was reduced, right peroneal, low normal.  Velocity above and below the fibular head of the left and right peroneal nerves, within excepted limits of normal.   Normal mixed motor latency of the tibial nerves bilaterally, normal amplitudes as well as velocities. Temperature measured the lower remedies as normal..     Sensory studies in the lower extremities revealed no response to attempted stimulation of the superficial peroneal nerves bilaterally. Right sural peak latency was  Normal, left sural peak latency was prolonged. Left sural amplitude was also diminished.     F waves normal left peroneal and right tibial, prolonged right peroneal and left tibial.  H reflex prolonged bilaterally.       Insertional activity in the lower extremity revealing increased polyphasic units diffusely in the anterior compartment muscles bilaterally of both lower extremities muscle sampled. This included medial gastroc. Similar changes found diffusely in the lumbar paraspinals. Please see the above summarization table for full details of the results of the insertional examination.        Impression:  Study compatible with the following:     #1 no response to attempted stimulation of the superficial peroneal bilaterally. Long peak latency and upper limits of normal latency of the left sural and right sural nerve respectively. This is compatible with sensory peripheral neuropathy.     #2 normal nerve conduction studies of the following nerves: Peroneal, right tibial, left tibial.  Left peroneal amplitude at the ankle slightly reduced.     #3   Examination of the lower extremity muscles including the lumbar's demonstrating no active acute neuropathic nor myopathic process. No changes in polyphasic and serrated units be compatible with his history of central stenosis and neurogenic claudication.     Recommendations: Please see accompanying letter to referring physician, [address], .   Clinical correlation is advised, correlation of these results with imaging and physical examination thank you  Clinical correlation recommended.           Electronically signed by Antonia Pineda MD on 13/76/2347 at 3:20 PM                    History:  Gladys bean historian. It appears as though she has memory deficits and has difficulty with directly answering applying information to questions. However she does complain of \"pain in my right leg\". Discomfort is also present in the left lower extremity however to a lesser degree. She has difficulty with walking and symptoms are basically related to ambulation more than 20 to 25 feet. It is pertinent to note that she has been given the diagnosis of peripheral vascular disease and she has had 2 vascular procedures. She had a stent recently in 2022. She continues to complain of numbness of her feet, difficulty with balance, and endurance in standing and ambulation. She describes lower back pain however there is no radiation from the back into the lower extremities. Imaging: She did have a CT of the lower back demonstrating central L3-4-5 stenosis and a herniated mild disc at right L5-S1 foraminal area. .     ROS:   See the above history. Patient denies any voluntary activity of bowel or bladder. She does describe arthritis, COPD secondary to smoking, hypertension that is being treated, and potential heart disease as well as her peripheral vascular disease. It is pertinent to note that in regard to her memory, she does have a history of hydrocephalus, and managed with shunt. She does complain of depression some stress related urinary incontinence. Further systems review negative    Meds:    Prior to Admission medications    Medication Sig Start Date End Date Taking?  Authorizing Provider   buPROPion [facility]) 100 MG tablet take 1 tablet by mouth once daily 7/25/22   Historical Provider, MD   levothyroxine (SYNTHROID) 88 MCG tablet take 1 tablet by mouth once daily 8/19/22   Historical Provider, MD   omeprazole (PRILOSEC) 40 MG delayed release capsule Take 40 mg by mouth daily 6/4/22   Historical Provider, MD   OXYGEN Inhale 3 L/min into the lungs nightly    Historical Provider, MD   Apoaequorin (PREVAGEN PO) Take by mouth    Historical Provider, MD   vitamin E 400 UNIT capsule Take 400 Units by mouth daily    Historical Provider, MD   loperamide (IMODIUM) 2 MG capsule Take 2 mg by mouth 4 times daily as needed for Diarrhea    Historical Provider, MD   albuterol-ipratropium (COMBIVENT RESPIMAT)  MCG/ACT AERS inhaler Inhale 1 puff into the lungs every 4 hours as needed for Wheezing    Historical Provider, MD   levothyroxine (SYNTHROID) 125 MCG tablet Take 1 tablet by mouth Daily  Patient taking differently: Take 88 mcg by mouth Daily 1/29/22   Ghada Sherwood MD   colestipol (COLESTID) 1 g tablet Take 1 tablet by mouth 2 times daily 11/1/21   Historical Provider, MD   oxybutynin (DITROPAN XL) 15 MG extended release tablet Take 15 mg by mouth daily    Historical Provider, MD   ascorbic acid (VITAMIN C) 500 MG tablet Take 500 mg by mouth daily    Historical Provider, MD   albuterol (PROVENTIL) (5 MG/ML) 0.5% nebulizer solution Take 0.5 mLs by nebulization every 4 hours as needed for Wheezing 3/26/21   Liza Mancilla MD   Respiratory Therapy Supplies (FULL KIT NEBULIZER SET) MISC Use as directed with nebulized medication.  3/26/21   Glaalejandrostjuli Mancilla MD   Cholecalciferol (VITAMIN D) 50 MCG (2000 UT) CAPS capsule Take 1 capsule by mouth daily     Historical Provider, MD   acetaminophen (TYLENOL) 500 MG tablet Take 1 tablet by mouth every 6 hours as needed for Pain 9/25/20   Windy Ladd,    ipratropium-albuterol (DUONEB) 0.5-2.5 (3) MG/3ML SOLN nebulizer solution Inhale 3 mLs into the lungs every 4 hours (while awake) 3/6/20   Jared Borges,    apixaban (ELIQUIS) 5 MG TABS tablet Take 1 tablet by mouth 2 times daily 3/6/20   Jared Borges, DO   buPROPion [facility]) 100 MG extended release tablet Take 1 tablet by mouth daily 3/7/20   Marleen Hotter, DO   sotalol (BETAPACE) 120 MG tablet Take 1 tablet by mouth 2 times daily 3/6/20   Drucella Hotter, DO   amLODIPine (NORVASC) 5 MG tablet Take 1 tablet by mouth daily 3/7/20   Drucella Hotter, DO       PMH:     Past Medical History:   Diagnosis Date    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 with rest pain ([address] 2019    Atrial fibrillation (HCC)     Atypical mole     upper right arm - black mole     Bilateral carotid artery stenosis 2018    Dr. Alma Dhaliwal of right carotid artery 5/3/2018    CAD (coronary artery disease)     f/u w/ PCP     Cancer ([address]     SKIN CA/Thigh    COPD (chronic obstructive pulmonary disease) ([address]     uses O2 2L NC at night - and during day prn     COVID     Depression     Diarrhea     for colonoscopy 21     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    History of tobacco use 5/3/2018    Hydrocephalus (HCC)     Hyperlipidemia     no medications     Hypertension     BRY (obstructive sleep apnea)     uses O2 2L NC     Pain in both feet 5/3/2018    Paroxysmal A-fib (HCC)     Peripheral vascular disease due to secondary diabetes mellitus ([address] 5/3/2022    PVD (peripheral vascular disease) ([address]     PVD (peripheral vascular disease) with claudication ([address] 5/3/2018    Rheumatoid arthritis ([address]     Short-term memory loss     Thyroid disease     Urinary incontinence     Venous stasis ulcer of left calf with fat layer exposed without varicose veins ([address] 2019    Weight loss        PSH:    Past Surgical History:   Procedure Laterality Date     SECTION      COLONOSCOPY      COLONOSCOPY N/A 2021    COLONOSCOPY WITH BIOPSY performed by Tricia Bravo MD at [facility] cataract     LAPAROTOMY N/A 2020    LAPAROTOMY EXPLORATORY, RIGHT HEMICOLECTOMY performed by Tricia Bravo MD at [facility] IMPLANTATION N/A 2020    PLACEMENT OF INTRATHECAL CATHETER FOR LUMBAR DRAIN performed by Nicole Glass MD at [place] [address]  2019    L SFA angioplasty    TUBAL LIGATION      VENTRICULOPERITONEAL SHUNT N/A 2020    VENTRICULAR PERITONEAL SHUNT PLACEMENT performed by Nicole Glass MD at  [facility] History:    Social History     Socioeconomic History    Marital status:    Occupational History    Occupation: Retired    Tobacco Use    Smoking status: Former     Packs/day: 0.25     Years: 20.00     Pack years: 5.00     Types: Cigarettes     Quit date: 5/3/2017     Years since quittin.4    Smokeless tobacco: Never   Vaping Use    Vaping Use: Never used   Substance and Sexual Activity    Alcohol use: Yes     Comment: rare    Drug use: No    Sexual activity: Never       Family History:    Family History   Problem Relation Age of Onset    Kidney Disease Mother     Coronary Art Dis Mother     High Blood Pressure Mother     Cancer Father     Other Father        Exam: [facility] reveals a 26-year-old female 5 foot 6 inches weighing 4 pounds. She is capable of following a 1-2 step commands. Skin: Skin in the lower extremity reveals areas of ecchymoses, and reddened area of her toes mostly on the dorsal surface as well as a ventral.  No open wounds. Sutures cool distally. Motor examination reveals G-/N strength in the lower extremities globally. Patient demonstrates no abnormal tone no significant atrophy. It is difficult to assess secondary to her weight and lack of body fat. .    Sensory examination is intact to pin prick in the lower extremities. .     Reflexes knee is 1-2+, and ankle jerks are questionable. Downgoing toes and no clonus. .     Discussion:    Is refer to the results of the electrodiagnostic study demonstrating a peripheral neuropathic sensory process in the lower extremities that is relatively mild in nature. There are no acute changes on today's electrodiagnostic study reflecting acute central foraminal neuropathic pathology. Chronic changes are present however. Thank you    If there are any questions, please contact me for discussion. Thank you once again for allowing me to participate along with you in his behalf.             Electronically signed by Leslie Cee MD on 98/26/8806 at 3:32 PM

## 2022-10-10 NOTE — PROGRESS NOTES
[facility] Heart & Vascular Lab - [facility]    This is a pre read worksheet - prior to official physician interpretation    Alfonso Chacon  1950  Date of study: 10/10/22    Indication for study:  Carotid artery stenosis  Study : Bilateral Carotid Artery Duplex Examination    Duplex examination of the RIGHT carotid artery system identifies atherosclerotic plaque. The peak systolic velocity in internal carotid artery was 106 centimeters / second. The maximum end diastolic velocity was 25 centimeters / second. The ICA/CCA ratio is 1.3. The right vertebral artery has antegrade flow. Duplex examination of the LEFT carotid artery system identifies atherosclerotic plaque. The peak systolic velocity in internal carotid artery was 146 centimeters / second. The maximum end diastolic velocity was 34 centimeters / second. The ICA/CCA ratio is 1.4. The left vertebral artery has antegrade flow.         LAST STUDY  5/15/2018 @ [facility]

## 2022-10-21 ENCOUNTER — TELEPHONE (OUTPATIENT)
Dept: VASCULAR SURGERY | Age: 72
End: 2022-10-21

## 2022-10-21 DIAGNOSIS — I73.9 PVD (PERIPHERAL VASCULAR DISEASE) WITH CLAUDICATION (HCC): Primary | ICD-10-CM

## 2022-10-21 NOTE — TELEPHONE ENCOUNTER
Pt confirmed ultrasound appt for 10- and asked if you can send a script for an anti inflammatory or a steroid to help with her leg pain. Painful to walk, has discomfort if walking on a incline and has to stop. Ache pain. Tylenol and advil are not working. [facility] in [facility]. [phone]    I called pt to discuss    Will have her set up for angiogram  Arterial study is worse than previous    Suspect reocclusion of recent intervention    Will have her hold her eliquis 2 days prior     Will send script for tramadol to her pharmacy    Alll >?  Fady Silva MD

## 2022-10-24 ENCOUNTER — HOSPITAL ENCOUNTER (OUTPATIENT)
Dept: CARDIOLOGY | Age: 72
Discharge: HOME | End: 2022-10-24
Payer: MEDICARE

## 2022-10-24 DIAGNOSIS — I73.9 PVD (PERIPHERAL VASCULAR DISEASE) WITH CLAUDICATION (HCC): ICD-10-CM

## 2022-10-24 PROCEDURE — 93923 UPR/LXTR ART STDY 3+ LVLS: CPT

## 2022-10-25 ENCOUNTER — TELEPHONE (OUTPATIENT)
Dept: VASCULAR SURGERY | Age: 72
End: 2022-10-25

## 2022-10-25 RX ORDER — TRAMADOL HYDROCHLORIDE 50 MG/1
50 TABLET ORAL EVERY 6 HOURS PRN
Qty: 28 TABLET | Refills: 0 | Status: SHIPPED | OUTPATIENT
Start: 2022-10-25 | End: 2022-11-01

## 2022-10-25 NOTE — TELEPHONE ENCOUNTER
Pt phoned for results of le arterial Doppler study. She also c/o increased (R) leg pain and has been alternating Advil and Tylenol w/o relief.

## 2022-10-26 ENCOUNTER — TELEPHONE (OUTPATIENT)
Dept: VASCULAR SURGERY | Age: 72
End: 2022-10-26

## 2022-10-26 NOTE — TELEPHONE ENCOUNTER
Pt returned my call, scheduled abdominal aortogram possible intervention with Dr. Veronica Hernández 11/8/22 at 11:30 a.m. Pt was instructed to report to [facility] ([facility] 1st floor registration at 9:30 a.m, must be NPO after midnight the night before except heart and/or BP meds in a.m with sips of water, take last dose of Eliquis on 11/5 and must have a . She will call with questions.

## 2022-10-26 NOTE — TELEPHONE ENCOUNTER
Message left on pt's voicemail for a return call to schedule an abdominal aortogram with Dr. Jenny Roldan.

## 2022-11-07 ENCOUNTER — TELEPHONE (OUTPATIENT)
Dept: CARDIAC CATH/INVASIVE PROCEDURES | Age: 72
End: 2022-11-07

## 2022-11-08 ENCOUNTER — HOSPITAL ENCOUNTER (OUTPATIENT)
Dept: CARDIAC CATH/INVASIVE PROCEDURES | Age: 72
Discharge: HOME | End: 2022-11-08
Payer: MEDICARE

## 2022-11-08 VITALS
HEIGHT: 66 IN | OXYGEN SATURATION: 96 % | BODY MASS INDEX: 14.46 KG/M2 | HEART RATE: 67 BPM | DIASTOLIC BLOOD PRESSURE: 77 MMHG | WEIGHT: 90 LBS | RESPIRATION RATE: 17 BRPM | TEMPERATURE: 98.1 F | SYSTOLIC BLOOD PRESSURE: 135 MMHG

## 2022-11-08 LAB
ABO/RH: NORMAL
ANION GAP SERPL CALCULATED.3IONS-SCNC: 14 MMOL/L (ref 7–16)
ANTIBODY SCREEN: NORMAL
BUN BLDV-MCNC: 22 MG/DL (ref 6–23)
CALCIUM SERPL-MCNC: 9.4 MG/DL (ref 8.6–10.2)
CHLORIDE BLD-SCNC: 108 MMOL/L (ref 98–107)
CO2: 21 MMOL/L (ref 22–29)
CREAT SERPL-MCNC: 1 MG/DL (ref 0.5–1)
GFR SERPL CREATININE-BSD FRML MDRD: 59 ML/MIN/1.73
GLUCOSE BLD-MCNC: 85 MG/DL (ref 74–99)
HCT VFR BLD CALC: 43.1 % (ref 34–48)
HEMOGLOBIN: 14.3 G/DL (ref 11.5–15.5)
MCH RBC QN AUTO: 32.7 PG (ref 26–35)
MCHC RBC AUTO-ENTMCNC: 33.2 % (ref 32–34.5)
MCV RBC AUTO: 98.6 FL (ref 80–99.9)
PDW BLD-RTO: 13.6 FL (ref 11.5–15)
PLATELET # BLD: 336 E9/L (ref 130–450)
PMV BLD AUTO: 10.3 FL (ref 7–12)
POTASSIUM REFLEX MAGNESIUM: 3.8 MMOL/L (ref 3.5–5)
RBC # BLD: 4.37 E12/L (ref 3.5–5.5)
SODIUM BLD-SCNC: 143 MMOL/L (ref 132–146)
WBC # BLD: 8 E9/L (ref 4.5–11.5)

## 2022-11-08 PROCEDURE — C1894 INTRO/SHEATH, NON-LASER: HCPCS

## 2022-11-08 PROCEDURE — 86850 RBC ANTIBODY SCREEN: CPT

## 2022-11-08 PROCEDURE — 36415 COLL VENOUS BLD VENIPUNCTURE: CPT

## 2022-11-08 PROCEDURE — 37224 PR REVSC OPN/PRG FEM/POP W/ANGIOPLASTY UNI: CPT | Performed by: SURGERY

## 2022-11-08 PROCEDURE — 75774 ARTERY X-RAY EACH VESSEL: CPT

## 2022-11-08 PROCEDURE — C1725 CATH, TRANSLUMIN NON-LASER: HCPCS

## 2022-11-08 PROCEDURE — 37221 PR REVSC OPN/PRQ ILIAC ART W/STNT PLMT & ANGIOPLSTY: CPT | Performed by: SURGERY

## 2022-11-08 PROCEDURE — 75716 ARTERY X-RAYS ARMS/LEGS: CPT

## 2022-11-08 PROCEDURE — 2580000003 HC RX 258

## 2022-11-08 PROCEDURE — 6360000002 HC RX W HCPCS

## 2022-11-08 PROCEDURE — C1769 GUIDE WIRE: HCPCS

## 2022-11-08 PROCEDURE — 2709999900 HC NON-CHARGEABLE SUPPLY

## 2022-11-08 PROCEDURE — 80048 BASIC METABOLIC PNL TOTAL CA: CPT

## 2022-11-08 PROCEDURE — C1760 CLOSURE DEV, VASC: HCPCS

## 2022-11-08 PROCEDURE — 85027 COMPLETE CBC AUTOMATED: CPT

## 2022-11-08 PROCEDURE — 86901 BLOOD TYPING SEROLOGIC RH(D): CPT

## 2022-11-08 PROCEDURE — C1887 CATHETER, GUIDING: HCPCS

## 2022-11-08 PROCEDURE — 37222 PR REVASCULARIZATION ILIAC ART ANGIOP EA IPSI VSL: CPT | Performed by: SURGERY

## 2022-11-08 PROCEDURE — C1874 STENT, COATED/COV W/DEL SYS: HCPCS

## 2022-11-08 PROCEDURE — 6370000000 HC RX 637 (ALT 250 FOR IP): Performed by: SURGERY

## 2022-11-08 PROCEDURE — 86900 BLOOD TYPING SEROLOGIC ABO: CPT

## 2022-11-08 PROCEDURE — C9765 REVASC INTRA LITHOTRIP-STENT: HCPCS

## 2022-11-08 PROCEDURE — 2500000003 HC RX 250 WO HCPCS

## 2022-11-08 PROCEDURE — 75710 ARTERY X-RAYS ARM/LEG: CPT | Performed by: SURGERY

## 2022-11-08 RX ORDER — SOTALOL HYDROCHLORIDE 120 MG/1
120 TABLET ORAL 2 TIMES DAILY
Status: CANCELLED | OUTPATIENT
Start: 2022-11-08

## 2022-11-08 RX ORDER — ONDANSETRON 2 MG/ML
4 INJECTION INTRAMUSCULAR; INTRAVENOUS EVERY 6 HOURS PRN
Status: CANCELLED | OUTPATIENT
Start: 2022-11-08

## 2022-11-08 RX ORDER — IPRATROPIUM BROMIDE AND ALBUTEROL SULFATE 2.5; .5 MG/3ML; MG/3ML
3 SOLUTION RESPIRATORY (INHALATION)
Status: CANCELLED | OUTPATIENT
Start: 2022-11-08

## 2022-11-08 RX ORDER — ACETAMINOPHEN 325 MG/1
650 TABLET ORAL EVERY 4 HOURS PRN
Status: CANCELLED | OUTPATIENT
Start: 2022-11-08

## 2022-11-08 RX ORDER — CLOPIDOGREL BISULFATE 75 MG/1
75 TABLET ORAL ONCE
Status: CANCELLED | OUTPATIENT
Start: 2022-11-08

## 2022-11-08 RX ORDER — SODIUM CHLORIDE 9 MG/ML
INJECTION, SOLUTION INTRAVENOUS CONTINUOUS
Status: DISCONTINUED | OUTPATIENT
Start: 2022-11-08 | End: 2022-11-09 | Stop reason: HOSPADM

## 2022-11-08 RX ORDER — SODIUM CHLORIDE 9 MG/ML
INJECTION, SOLUTION INTRAVENOUS PRN
Status: DISCONTINUED | OUTPATIENT
Start: 2022-11-08 | End: 2022-11-09 | Stop reason: HOSPADM

## 2022-11-08 RX ORDER — SODIUM CHLORIDE 0.9 % (FLUSH) 0.9 %
5-40 SYRINGE (ML) INJECTION PRN
Status: DISCONTINUED | OUTPATIENT
Start: 2022-11-08 | End: 2022-11-09 | Stop reason: HOSPADM

## 2022-11-08 RX ORDER — SODIUM CHLORIDE 0.9 % (FLUSH) 0.9 %
5-40 SYRINGE (ML) INJECTION EVERY 12 HOURS SCHEDULED
Status: DISCONTINUED | OUTPATIENT
Start: 2022-11-08 | End: 2022-11-09 | Stop reason: HOSPADM

## 2022-11-08 RX ORDER — OXYCODONE HYDROCHLORIDE AND ACETAMINOPHEN 5; 325 MG/1; MG/1
1 TABLET ORAL ONCE
Status: COMPLETED | OUTPATIENT
Start: 2022-11-08 | End: 2022-11-08

## 2022-11-08 RX ADMIN — OXYCODONE AND ACETAMINOPHEN 1 TABLET: 5; 325 TABLET ORAL at 12:30

## 2022-11-08 RX ADMIN — CEFAZOLIN 2000 MG: 2 INJECTION, POWDER, FOR SOLUTION INTRAMUSCULAR; INTRAVENOUS at 10:37

## 2022-11-08 ASSESSMENT — PAIN DESCRIPTION - LOCATION: LOCATION: LEG

## 2022-11-08 ASSESSMENT — PAIN SCALES - GENERAL: PAINLEVEL_OUTOF10: 5

## 2022-11-08 ASSESSMENT — PAIN DESCRIPTION - DESCRIPTORS: DESCRIPTORS: ACHING

## 2022-11-08 ASSESSMENT — PAIN DESCRIPTION - ORIENTATION: ORIENTATION: LEFT;RIGHT

## 2022-11-08 NOTE — DISCHARGE INSTRUCTIONS
Do not take eliquis today. You may resume your eliquis tomorrow morning 11/9/22. Discharge Instructions for Lower extremity angiogram    Keep dressing on groin until tomorrow. Remove tomorrow and replace with dry dressing daily until no longer draining. Call Dr. Fabi Mckee office [phone] for follow-up appointment. Groin Care  - keep clean and dry  - ok to shower or sponge bath  - ok to clean site with lukewarm water and mild soap  - use a soft wash cloth to gently wipe the incision area  - do not scrub the incision areas  - no swimming or baths    Home Care    Follow these guidelines after surgery:   Rest. Try to move as tolerated. A mix of rest and light activity improves healing. The incision area may be sore for a few days. To minimize pain and soreness: Take pain medicine as directed. Avoid strenuous activity and heavy lifting. Diet    You can return to your regular diet. You may work with a dietician who will help you follow a heart-healthy diet. Physical Activity    You will feel sore after the surgery. Try to walk steadily within two weeks. You may be able to return to normal activities within 1-3 weeks. While recovering, you will need to avoid strenuous activities, like heavy lifting. Ask your doctor when you will be able to return to work. Do not drive unless your doctor has given you permission to do so. Medications    Your doctor may recommend:   Over-the-counter or prescription pain medicine   Aspirin or a cholesterol-lowering drug to prevent complications   If you had to stop medicines before the procedure, ask your doctor when you can start again. Medicines that may have been stopped include:   Anti-inflammatory drugs (eg, aspirin, ibuprofen)   Blood thinners, like warfarin (Coumadin)   Clopidogrel (Plavix)   When taking medicines:   Take your medicine as directed. Do not change the amount or the schedule. Do not stop taking them without talking to your doctor. Do not share them. Know what results and side effects to look for. Report them to your doctor. Some drugs can be dangerous when mixed. Talk to a doctor or pharmacist if you are taking more than one drug. This includes over-the-counter medicines and herb or dietary supplements. Plan ahead for refills so you do not run out. Lifestyle Changes    You and your doctor will plan lifestyle changes that will help you recover. Some things to keep in mind include: Atherosclerosis and high blood pressure should be carefully managed. This can be done with medicines and a healthy lifestyle. If you smoke, talk to your doctor about quitting. Follow-up   Call Your Doctor If Any of the Following Occurs   After you leave the hospital, call your doctor if any of the following occurs:   Redness, swelling, increasing pain, excessive bleeding, or discharge at the incision site   Signs of infection, including fever and chills   Any change of color or sensation in your legs or feet   Burning, pain, or other problems when urinating   Nausea or vomiting   Abdominal cramps or diarrhea   Unusual fatigue or depression   Disorientation or confusion   Numbness or tingling in the legs   New, unexplained symptoms   Cough   Call 911 or go to the emergency room right away if you have:   Shortness of breath   Chest pain   If you think you have an emergency,  CALL 911.

## 2022-11-08 NOTE — OP NOTE
Cardiovascular Lab Procedure Report    Melody Olson  1950    Date : 11/8/2022  Surgeon: Alex Esqueda M.D. Pre-procedure Diagnosis: R LE lifestyle limiting claudication  Post-procedure Diagnosis: Same  Procedure:      Left  common femoral artery access   with US guidance    7 fr exoseal used for closure    Bilateral lower extremity angiogram    TF Right lower extremity angiogram   70791 Angiogram with catheter in Right   common femoral, popliteal artery   [id number]  87590 Right EIA, CFA, SFA lithotripsy with 5x60 shockwave   43934 Right BRITNEY stent with 7x38 (x2)   Anesthesia: Local with IV sedation  Assistants: Cath Lab Staff  Estimated Blood Loss: Minimal  Complications: none  Findings: Abdominal aorta : patent   Right Right s/p Intervention Left   Common iliac > 90% stenosis patent patent   External iliac > 60% stenosis patent > 50% stenosis   Internal iliac occluded occluded occluded   Common Femoral Art Patent Patent > 80% stenosis   Superficial Femoral Art Patent proximal stenosis > 70% and distal stenosis 50% Patent, distal stenosis 50% Proximally occluded   Profunda Femoral Art patent patent Patent, diseased   AK Popliteal Art patent patent Not imaged   BK Popliteal Art occluded occluded Not imaged   Anterior Tibial Art Proximal occlusion Proximal occlusion, recons of flow distally occludes distal calf Not imaged   Tibioperoneal Trunk Patent but diseasd Patent but diseased Not imaged   Peroneal Art Proximal occlusion Proximal occlusion Not imaged   Posterior Tibial Art Patent diseased but flow to foot Patent diseased but flow to foot Not imaged     Procedure Details : There was not previous CTA  or catheter based diagnostic imaging preformed prior to today's procedure. Timeout preformed identifying pt and procedure. Groins prepped and draped in sterile fashion. Patient given sedation as needed throughout the case.      Left  common femoral artery was noted to be patent but diseased and was accessed under ultrasound guidance after infiltrating with local.  Micropuncture placed, exchanged out for 5 fr sheath. Advantage glide wire and contra catheter advanced into distal aorta. Imaging done to evaluate aorta and iliac. Catheter and wire used to access Right  iliac system using 0.35 wire and trailblazer the common iliac lesion was crossed and catheter placed at common [facility]. Significant occlusive disease was noted in the popliteal and tibials distally     Patient was given 5000 units of heparin and than a 7 fr destination sheath advanced to distal common iliac artery on the Right. The sfa, common femoral and external iliac lesion was treated with lithotripsy 5x 60 shockwave. A 7x38 ICAST stent was than placed in proximal common iliac. There was some residual disease in distal common iliac and 7x38 was used to extent with appropriate overlap. There was evidence of some residual stenosis. Completion angiogram noted much improved filling distally and brisk flow though the stenotic area. Sheath and wire pulled back to Left iliac system. After femoral angiogram a 7 fr exoseal device used to close the Left common femoral artery. There was extensive disease in the mid left common femoral.  The access point was in the proximal common femoral.      Postop Exam  Right AT monophasic PT no signal  Left AT monophasic  PT no signal    Plan  Encourage continued tobacco cessation  She has extensive popliteal and tibial disease. May need further R LE intervention distally in future.     Depending on sxs may need L LE intervention again as L SFA is occluded    Leanord Sacks, MD    PCP : Vijaya Donovan MD  Podiatrist : Dr. Bushra Garza  Cardiologist : Dr. Dwayne Molina     Previous Vascular Procedure  1/29/19 L sfa, popliteal atherectomy, dcb   1/7/2020 R LE angiogram - SFA  - R LE LL claudication - needs fem ak pop bypass   5/3/22 R LE angiogram  R SFA atherectomy, and plasty with 4x250 DCB    6/7/22 L LE angiogram - sfa, popliteal athrectomy - sfa, popliteal plasty - AT, DP plasty    11/8/22 Aortogram  R common iliac stent 7x38 (2)  R proximal sfa, CFA, EIA lithotripsy with 5x60 shockwave

## 2023-02-15 NOTE — CONSULTS
Surgical Intensive Care Unit   Consult Note    Patient's name:  Ira Traore  Age/Gender: 68 y.o. female  Date of Admission: 2/17/2020  1:30 PM  Length of Stay: 0    Reason for ICU: Postoperative monitoring after right hemicolectomy for cecal volvulus    HPI: Ira Traore is a 68 y.o. female with extensive PVD on plavix and pletal, who presents for evaluation of SBO. She has had diffuse abdominal pain since last Friday. She had been tachycardic and hypotensive decision was made for exploratory laparotomy where she was found to have a perforated cecal volvulus for which she underwent a right hemicolectomy with side to side ileocolonic anastomosis 2/17. Hospital Course:   2/17: Ex lap for perforated cecal volvulus. She was extubated postoperatively started on a Dilaudid PCA and admitted to SICU for postoperative care. ROS: Intubated sedated unable to obtain at this time    Problem List:   Patient Active Problem List   Diagnosis    Essential hypertension    Depression    PVD (peripheral vascular disease) with claudication ([address]    History of tobacco use    Asymptomatic bilateral carotid artery stenosis    PAD (peripheral artery disease) ([address]    Hyperlipidemia LDL goal <100    Acquired hypothyroidism    Left groin pain    SBO (small bowel obstruction) ([address]       Surgical/Interventional Procedures:   2/17: Exploratory laparotomy with right hemicolectomy. Vent Settings: Additional Respiratory  Assessments  Pulse: 87  Resp: 17  SpO2: 100 %  ABG: No results for input(s): PH, PCO2, PO2, HCO3, BE, O2SAT in the last 72 hours. I/O:  I/O last 3 completed shifts: In: 2550 [I.V.:2550]  Out: 450 [Urine:400; Blood:50]  No intake/output data recorded.   Urethral Catheter Non-latex 16 fr-Output (mL): 100 mL        Lines:   Left radial art line -2/17  Peripheral IVs    Tubes:   Barry catheter-2/17  ET tube    Drains:   No drains    Drips:   sodium chloride      fentaNYL 5 mcg/ml in 0.9%  ml infusion      propofol         Physical Exam:   BP (!) 173/81   Pulse 87   Temp 98.2 °F (36.8 °C) (Temporal)   Resp 17   SpO2 100%     Average, Min, and Max for last 24 hours Vitals:  Temp:  Temp  Av.5 °F (35.8 °C)  Min: 92.7 °F (33.7 °C)  Max: 99 °F (37.2 °C)  RR: Resp  Av.2  Min: 0  Max: 31  HR: Pulse  Av  Min: 87  Max: 710  BP:  Systolic (75WGW), XXX:114 , Min:103 , CGY:744   ; Diastolic (55NEK), X, Min:74, Max:109    SpO2: SpO2  Av.6 %  Min: 76 %  Max: 100 %        CONSTITUTIONAL: Cachectic, intubated, sedated  NEUROLOGIC: Sedated, awaiting paralytic to wear off  CARDIOVASCULAR: A. fib with RVR, hypertension  PULMONARY: Intubated, clear to auscultation bilaterally, RENAL: weber to gravity, clear yellow urine  ABDOMEN: Soft appropriately tender midline incision with minimal strike leak through. Bilateral flank ecchymoses.   MUSCULOSKELETAL: moves all extremities purposefully, 5/5 strength     ASSESSMENT / PLAN:   · Neuro: GCS 11T   · Remains intubated postop  · Fentanyl/propofol  · Restarted home meds  · CV: New onset A. fib with RVR  · Likely related to sepsis  · Continue to monitor  ·  History of peripheral vascular disease and coronary artery disease, hypertension  · IV fluid resuscitation-4 L given   · Postop labs pending  · Hypertension improved with metoprolol  · Pulm: History of COPD  · Monitor respiratory status, no acute issues  ·   · GI: Status post right hemicolectomy with side-to-side ileocolonic anastomosis  ·  N.p.o./NG tube-confirmed intraoperatively  · Renal: ROSELYN  · Monitor creatinine  · 400 cc of urine since Weber placement  · ID: Perforated cecal volvulus  · Zosyn day 1 continue for 3 days  · Endocrine: Hypothyroidism  · On Synthroid  · MSK: History of frequent falls  · Known to Ortho, vascular for claudication work-up no acute issues  · Heme: Hemoglobin stable  · Monitor H&H  · Transfuse as needed      Pain/Analgesia: Fentanyl propofol  Bowel regimen: N.p.o. ,DirectAddress_Unknown

## 2023-04-13 NOTE — PROGRESS NOTES
Physical Therapy Daily Treatment Note     Name: Marie Taylor  Clinic Number: [id number]    Therapy Diagnosis:   No diagnosis found.    Physician: Reji Domínguez MD    Visit Date: 4/13/2023  Physician Orders: PT Eval and Treat   Medical Diagnosis from Referral: M75.01 (ICD-10-CM) - Adhesive capsulitis of right shoulder  Evaluation Date: 3/8/2023  Authorization Period Expiration: 12/31/2023  Plan of Care Expiration: 6/8/2023  Visit # / Visits authorized: 2 / 20     Time In: 10:00  Time Out: 11:00  Total Appointment Time (timed & untimed codes): 60 minutes     Precautions: Standard    Subjective     Pt reports: her shoulder is hurting her today and it feels stiff.  She was compliant with home exercise program.  Response to previous treatment: soreness  Functional change: improved overhead movement    Pain: 2/10  Location: left shoulder      Objective     Pre-session PROM flexion 150  Post-session PROM flexion 175    MHP pre session x 10 min; skin intact post.    Marie received therapeutic exercises to develop strength, endurance, ROM, flexibility, and posture for 40 minutes including:  Supine shoulder flexion AAROM x 20 (pre and post session)  Prone high row 2x15  Prone extension 2x15  Prone T 2x15  Quad rocking for shoulder flexion 3x15     Marie received the following manual therapy techniques: Joint mobilizations and PROM were applied to the: left shoulder for 10 minutes, including:  PROM shoulder flexion/IR/ER       Education provided:   - HEP, current condition, PT PLAN     Written Home Exercises Provided: yes.  Exercises were reviewed and patient was able to demonstrate them prior to the end of the session.  Patient demonstrated good  understanding of the education provided.      See EMR under Patient Instructions for exercisesprovided 3/8/2023.    Assessment     Able to significantly improve shoulder ROM and reduce capsular tightness with scapular strengthening and AROM exercises for shoulder ROM.  Occupational Therapy  OT consult received. Chart reviewed. Will hold evaluation secondary to awaiting POC from NS . Will re-attempt OT evaluation as schedule permits when patient is appropriate.  Jorge A Barnes, OTR/L #[id number] Continues to have tightness at end range.     Marie Is progressing well towards her goals.   Pt prognosis is Good.     Pt will continue to benefit from skilled outpatient physical therapy to address the deficits listed in the problem list box on initial evaluation, provide pt/family education and to maximize pt's level of independence in the home and community environment.     Pt's spiritual, cultural and educational needs considered and pt agreeable to plan of care and goals.    Anticipated barriers to physical therapy: none    Goals:   Short term goals: 2 weeks  Patient will become independent in HEP for maximal gains made in PT.  Patient to improve AROM flexion to 160 for improvements in reaching activities.        Long term goals: 12 weeks  Patient to demo 180 degrees of AROM flexion for improved overhead tasks.  Patient will improve strength to 5/5 for improved performance of household duties.  Patient will lift and carry 65 lb for safe transfer of daughter.    Plan     Focus on obtaining full ROM and maximize scapular and RC strength.    Sudha Mccracken, PT , DPT, OCS

## 2023-04-24 ENCOUNTER — OFFICE VISIT (OUTPATIENT)
Dept: VASCULAR SURGERY | Age: 73
End: 2023-04-24
Payer: MEDICARE

## 2023-04-24 VITALS — BODY MASS INDEX: 15.59 KG/M2 | WEIGHT: 97 LBS | HEIGHT: 66 IN

## 2023-04-24 DIAGNOSIS — I73.9 PVD (PERIPHERAL VASCULAR DISEASE) WITH CLAUDICATION (HCC): Primary | ICD-10-CM

## 2023-04-24 PROCEDURE — G8427 DOCREV CUR MEDS BY ELIG CLIN: HCPCS

## 2023-04-24 PROCEDURE — 3017F COLORECTAL CA SCREEN DOC REV: CPT

## 2023-04-24 PROCEDURE — G8419 CALC BMI OUT NRM PARAM NOF/U: HCPCS

## 2023-04-24 PROCEDURE — G8400 PT W/DXA NO RESULTS DOC: HCPCS

## 2023-04-24 PROCEDURE — 1123F ACP DISCUSS/DSCN MKR DOCD: CPT

## 2023-04-24 PROCEDURE — 1090F PRES/ABSN URINE INCON ASSESS: CPT

## 2023-04-24 PROCEDURE — 99213 OFFICE O/P EST LOW 20 MIN: CPT

## 2023-04-24 PROCEDURE — 1036F TOBACCO NON-USER: CPT

## 2023-04-24 RX ORDER — VIBEGRON 75 MG/1
TABLET, FILM COATED ORAL DAILY
COMMUNITY

## 2023-04-24 NOTE — PROGRESS NOTES
Vascular Surgery Outpatient Followup    PCP : Ambreen Henderson MD  Podiatrist : Dr. Romero  Cardiologist : Dr. Giselle Valentine     Previous Vascular Procedure  1/29/19 L sfa, popliteal atherectomy, dcb   1/7/2020 R LE angiogram - SFA  - R LE LL claudication - needs fem ak pop bypass   5/3/22 R LE angiogram  R SFA atherectomy, and plasty with 4x250 DCB    6/7/22 L LE angiogram - sfa, popliteal athrectomy - sfa, popliteal plasty - AT, DP plasty    11/8/22 Aortogram  R common iliac stent 7x38 (2)  R proximal sfa, CFA, EIA lithotripsy with 5x60 shockwave         HISTORY OF PRESENT ILLNESS:    This is a 74 y.o. female who presents in fu regarding pvd, lifestyle limiting claudication. She has had previous L LE intervention for wound of the left calf which was first noted approximately 7/2018 after trauma. She followed with Dr. Renea Villanueva and after intervention it did heal.      She saw Dr Rose Sever - checking emg. Her pain starts in her lower back on the right and goes down the lateral right leg. She has to stop at about 50-75 feet. She is taking advil. Her B LE claudication had resolved post angiogram but she developed right leg pain with ambulation 8/2022. She notes she is able to walk about 50 feet (previously 100 feet) before she has to stop because of aching pain in the right leg. She is not certain where the pain is, she states the whole leg feels like it is tightening. Once she stops to rest for a few minutes the pain improves and she can walk again. She denies rest pain in her feet or ulcerations. B LE having issues with numbness and tingling in her foot primarily which is stable. Her right leg bothers her more than her left. This has been ongoing for at least a year. It doesn't really bother her. She has a 2/10 pain at its worst.  She also notes she has been told she has lumbar spinal stenosis but never had it addressed.     She developed a perforated cecal volvulus and underwent right hemicolectomy

## 2023-04-24 NOTE — PROGRESS NOTES
Vascular Surgery Outpatient Followup    PCP : Anyi Diego MD  Podiatrist : Dr. Aldair Puri  Cardiologist : Dr. Cassandra Kline     Previous Vascular Procedure  1/29/19 L sfa, popliteal atherectomy, dcb   1/7/2020 R LE angiogram - SFA  - R LE LL claudication - needs fem ak pop bypass   5/3/22 R LE angiogram  R SFA atherectomy, and plasty with 4x250 DCB    6/7/22 L LE angiogram - sfa, popliteal athrectomy - sfa, popliteal plasty - AT, DP plasty    11/8/22 Aortogram  R common iliac stent 7x38 (2)  R proximal sfa, CFA, EIA lithotripsy with 5x60 shockwave        HISTORY OF PRESENT ILLNESS:    This is a 74 y.o. female who presents in fu regarding pvd, lifestyle limiting claudication. She has had previous L LE intervention for wound of the left calf which was first noted approximately 7/2018 after trauma. She followed with Dr. Rosalind Justice and after intervention it did heal.      Her B LE claudication had resolved post angiogram but she developed right leg pain with ambulation 8/2022. She notes she is able to walk about 20 feet (previously 50 feet) before she has to stop because of aching pain in the right leg. The pain is in her right calf. Once she stops to rest for a few minutes the pain improves and she can walk again. She denies rest pain in her feet or ulcerations. B LE having issues with numbness and tingling in her foot primarily which is stable. Her right leg bothers her more than her left. She also notes she has been told she has lumbar spinal stenosis but never had it addressed. 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 Moo Choudhury 2/2020. She is recovered but has chronic diarrhea. She is frustrated that her diarrhea continues to be an issues, she is following with Dr. Sunny Barksdale. 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 She has known assx carotid stenosis. She remains assx, denies stroke, tia, slurred speech, or amaurosis fugax.     She is on eliquis per

## 2023-04-26 ENCOUNTER — TELEPHONE (OUTPATIENT)
Dept: VASCULAR SURGERY | Age: 73
End: 2023-04-26

## 2023-04-26 NOTE — TELEPHONE ENCOUNTER
Patient fell 4-24-23 and hit the right side of her waist which caused SOB. She called Dr. Lety Turcios and was Rx with Prednisone 10 mg for 14 days, feeling a little better, just wanted doctor to know.     Thanks for update    PK

## 2023-05-12 ENCOUNTER — HOSPITAL ENCOUNTER (OUTPATIENT)
Age: 73
End: 2023-05-12
Payer: MEDICARE

## 2023-05-12 ENCOUNTER — HOSPITAL ENCOUNTER (OUTPATIENT)
Dept: GENERAL RADIOLOGY | Age: 73
End: 2023-05-12
Payer: MEDICARE

## 2023-05-12 DIAGNOSIS — M25.551 RIGHT HIP PAIN: ICD-10-CM

## 2023-05-12 DIAGNOSIS — R06.02 SHORTNESS OF BREATH: ICD-10-CM

## 2023-05-12 DIAGNOSIS — M25.552 LEFT HIP PAIN: ICD-10-CM

## 2023-05-12 PROCEDURE — 73502 X-RAY EXAM HIP UNI 2-3 VIEWS: CPT

## 2023-05-12 PROCEDURE — 71046 X-RAY EXAM CHEST 2 VIEWS: CPT

## 2023-05-22 ENCOUNTER — TELEPHONE (OUTPATIENT)
Dept: VASCULAR SURGERY | Age: 73
End: 2023-05-22

## 2023-05-22 ENCOUNTER — TELEPHONE (OUTPATIENT)
Dept: CARDIAC CATH/INVASIVE PROCEDURES | Age: 73
End: 2023-05-22

## 2023-05-22 NOTE — TELEPHONE ENCOUNTER
*Perfect Serve message* Patient is scheduled for abdominal aortogram on 5-23-23 and is holding her Eliquis as advised. However she is in pain and started taking Etodolac 400 mg. Is it ok to take?       Ok to take    Thank tr

## 2023-05-23 ENCOUNTER — HOSPITAL ENCOUNTER (OUTPATIENT)
Dept: CARDIAC CATH/INVASIVE PROCEDURES | Age: 73
Discharge: HOME | End: 2023-05-23
Attending: SURGERY | Admitting: SURGERY
Payer: MEDICARE

## 2023-05-23 VITALS
BODY MASS INDEX: 15.43 KG/M2 | RESPIRATION RATE: 18 BRPM | WEIGHT: 96 LBS | DIASTOLIC BLOOD PRESSURE: 66 MMHG | HEIGHT: 66 IN | OXYGEN SATURATION: 94 % | HEART RATE: 71 BPM | SYSTOLIC BLOOD PRESSURE: 155 MMHG | TEMPERATURE: 97.7 F

## 2023-05-23 PROBLEM — I70.219 ATHEROSCLEROSIS OF ARTERY OF EXTREMITY WITH INTERMITTENT CLAUDICATION (HCC): Status: ACTIVE | Noted: 2023-05-23

## 2023-05-23 LAB
ABO + RH BLD: NORMAL
ANION GAP SERPL CALCULATED.3IONS-SCNC: 10 MMOL/L (ref 7–16)
BLD GP AB SCN SERPL QL: NORMAL
BUN SERPL-MCNC: 30 MG/DL (ref 6–23)
CALCIUM SERPL-MCNC: 9 MG/DL (ref 8.6–10.2)
CHLORIDE SERPL-SCNC: 105 MMOL/L (ref 98–107)
CO2 SERPL-SCNC: 28 MMOL/L (ref 22–29)
CREAT SERPL-MCNC: 0.8 MG/DL (ref 0.5–1)
ERYTHROCYTE [DISTWIDTH] IN BLOOD BY AUTOMATED COUNT: 14 FL (ref 11.5–15)
GLUCOSE SERPL-MCNC: 92 MG/DL (ref 74–99)
HCT VFR BLD AUTO: 43.6 % (ref 34–48)
HGB BLD-MCNC: 13.2 G/DL (ref 11.5–15.5)
INR BLD: 1
MCH RBC QN AUTO: 31.5 PG (ref 26–35)
MCHC RBC AUTO-ENTMCNC: 30.3 % (ref 32–34.5)
MCV RBC AUTO: 104.1 FL (ref 80–99.9)
PLATELET # BLD AUTO: 297 E9/L (ref 130–450)
PMV BLD AUTO: 9.3 FL (ref 7–12)
POTASSIUM SERPL-SCNC: 4 MMOL/L (ref 3.5–5)
PROTHROMBIN TIME: 11.1 SEC (ref 9.3–12.4)
RBC # BLD AUTO: 4.19 E12/L (ref 3.5–5.5)
SODIUM SERPL-SCNC: 143 MMOL/L (ref 132–146)
WBC # BLD: 6.4 E9/L (ref 4.5–11.5)

## 2023-05-23 PROCEDURE — C1887 CATHETER, GUIDING: HCPCS

## 2023-05-23 PROCEDURE — C1725 CATH, TRANSLUMIN NON-LASER: HCPCS

## 2023-05-23 PROCEDURE — 6360000002 HC RX W HCPCS: Performed by: SURGERY

## 2023-05-23 PROCEDURE — 6360000002 HC RX W HCPCS

## 2023-05-23 PROCEDURE — 2709999900 HC NON-CHARGEABLE SUPPLY

## 2023-05-23 PROCEDURE — 86901 BLOOD TYPING SEROLOGIC RH(D): CPT

## 2023-05-23 PROCEDURE — 85610 PROTHROMBIN TIME: CPT

## 2023-05-23 PROCEDURE — C2623 CATH, TRANSLUMIN, DRUG-COAT: HCPCS

## 2023-05-23 PROCEDURE — C1894 INTRO/SHEATH, NON-LASER: HCPCS

## 2023-05-23 PROCEDURE — 86900 BLOOD TYPING SEROLOGIC ABO: CPT

## 2023-05-23 PROCEDURE — 2580000003 HC RX 258: Performed by: PHYSICIAN ASSISTANT

## 2023-05-23 PROCEDURE — 86850 RBC ANTIBODY SCREEN: CPT

## 2023-05-23 PROCEDURE — 2500000003 HC RX 250 WO HCPCS

## 2023-05-23 PROCEDURE — 85027 COMPLETE CBC AUTOMATED: CPT

## 2023-05-23 PROCEDURE — 36415 COLL VENOUS BLD VENIPUNCTURE: CPT

## 2023-05-23 PROCEDURE — C1769 GUIDE WIRE: HCPCS

## 2023-05-23 PROCEDURE — 6360000002 HC RX W HCPCS: Performed by: PHYSICIAN ASSISTANT

## 2023-05-23 PROCEDURE — 80048 BASIC METABOLIC PNL TOTAL CA: CPT

## 2023-05-23 PROCEDURE — 75710 ARTERY X-RAYS ARM/LEG: CPT

## 2023-05-23 PROCEDURE — C9764 REVASC INTRAVASC LITHOTRIPSY: HCPCS

## 2023-05-23 RX ORDER — ONDANSETRON 2 MG/ML
4 INJECTION INTRAMUSCULAR; INTRAVENOUS EVERY 6 HOURS PRN
Status: DISCONTINUED | OUTPATIENT
Start: 2023-05-23 | End: 2023-05-23

## 2023-05-23 RX ORDER — ONDANSETRON 2 MG/ML
4 INJECTION INTRAMUSCULAR; INTRAVENOUS EVERY 6 HOURS PRN
Status: DISCONTINUED | OUTPATIENT
Start: 2023-05-23 | End: 2023-05-23 | Stop reason: HOSPADM

## 2023-05-23 RX ORDER — SODIUM CHLORIDE 9 MG/ML
INJECTION, SOLUTION INTRAVENOUS CONTINUOUS
Status: DISCONTINUED | OUTPATIENT
Start: 2023-05-23 | End: 2023-05-23

## 2023-05-23 RX ORDER — ACETAMINOPHEN 325 MG/1
650 TABLET ORAL EVERY 4 HOURS PRN
Status: DISCONTINUED | OUTPATIENT
Start: 2023-05-23 | End: 2023-05-23 | Stop reason: HOSPADM

## 2023-05-23 RX ORDER — SODIUM CHLORIDE 0.9 % (FLUSH) 0.9 %
5-40 SYRINGE (ML) INJECTION PRN
Status: DISCONTINUED | OUTPATIENT
Start: 2023-05-23 | End: 2023-05-23 | Stop reason: HOSPADM

## 2023-05-23 RX ORDER — ETODOLAC 400 MG/1
400 TABLET, FILM COATED ORAL 2 TIMES DAILY
COMMUNITY

## 2023-05-23 RX ORDER — SODIUM CHLORIDE 0.9 % (FLUSH) 0.9 %
5-40 SYRINGE (ML) INJECTION EVERY 12 HOURS SCHEDULED
Status: DISCONTINUED | OUTPATIENT
Start: 2023-05-23 | End: 2023-05-23

## 2023-05-23 RX ORDER — SODIUM CHLORIDE 9 MG/ML
INJECTION, SOLUTION INTRAVENOUS PRN
Status: DISCONTINUED | OUTPATIENT
Start: 2023-05-23 | End: 2023-05-23

## 2023-05-23 RX ORDER — ONDANSETRON 2 MG/ML
4 INJECTION INTRAMUSCULAR; INTRAVENOUS ONCE
Status: COMPLETED | OUTPATIENT
Start: 2023-05-23 | End: 2023-05-23

## 2023-05-23 RX ADMIN — ONDANSETRON HYDROCHLORIDE 4 MG: 2 INJECTION, SOLUTION INTRAMUSCULAR; INTRAVENOUS at 15:02

## 2023-05-23 RX ADMIN — SODIUM CHLORIDE: 9 INJECTION, SOLUTION INTRAVENOUS at 16:12

## 2023-05-23 RX ADMIN — CEFAZOLIN 1000 MG: 1 INJECTION, POWDER, FOR SOLUTION INTRAMUSCULAR; INTRAVENOUS at 12:00

## 2023-05-23 NOTE — DISCHARGE INSTRUCTIONS
Do not take eliquis today or tomorrow. Ok to resume eliquis Thursday 5/25/23. Discharge Instructions for Lower extremity angiogram    Keep dressing on groin until tomorrow. Remove tomorrow and replace with dry dressing daily until no longer draining. Call Dr. Jennifer Damian office [phone] for follow-up appointment. Groin Care  - keep clean and dry  - ok to shower or sponge bath  - ok to clean site with lukewarm water and mild soap  - use a soft wash cloth to gently wipe the incision area  - do not scrub the incision areas  - no swimming or baths    Home Care    Follow these guidelines after surgery:   Rest. Try to move as tolerated. A mix of rest and light activity improves healing. The incision area may be sore for a few days. To minimize pain and soreness: Take pain medicine as directed. Avoid strenuous activity and heavy lifting. Diet    You can return to your regular diet. You may work with a dietician who will help you follow a heart-healthy diet. Physical Activity    You will feel sore after the surgery. Try to walk steadily within two weeks. You may be able to return to normal activities within 1-3 weeks. While recovering, you will need to avoid strenuous activities, like heavy lifting. Ask your doctor when you will be able to return to work. Do not drive unless your doctor has given you permission to do so. Medications    Your doctor may recommend:   Over-the-counter or prescription pain medicine   Aspirin or a cholesterol-lowering drug to prevent complications   If you had to stop medicines before the procedure, ask your doctor when you can start again. Medicines that may have been stopped include:   Anti-inflammatory drugs (eg, aspirin, ibuprofen)   Blood thinners, like warfarin (Coumadin)   Clopidogrel (Plavix)   When taking medicines:   Take your medicine as directed. Do not change the amount or the schedule. Do not stop taking them without talking to your doctor.

## 2023-05-23 NOTE — H&P
on file   Stress: Not on file   Social Connections: Not on file   Intimate Partner Violence: Not on file   Housing Stability: Not on file     Family History   Problem Relation Age of Onset    Kidney Disease Mother     Coronary Art Dis Mother     High Blood Pressure Mother     Cancer Father     Other Father      PHYSICAL EXAM:    There were no vitals filed for this visit. CONSTITUTIONAL:  awake, alert, cooperative, no apparent distress, and appears stated age  NECK:  supple, symmetrical, trachea midline  LUNGS:  no increased work of breathing, good air exchange   CARDIOVASCULAR:  regular rate and rhythm  ABDOMEN:  soft, non-distended and non-tenderl   Pulse Exam   R femoral  L femoral    R dorsalis pedis No signal L dorsalis pedis monophasic   R posterior tibial monophasic L posterior tibial No signal     LABS:    Lab Results   Component Value Date    WBC 8.0 11/08/2022    HGB 14.3 11/08/2022    HCT 43.1 11/08/2022     11/08/2022    PROTIME 10.6 06/07/2022    INR 1.0 06/07/2022    APTT 31.3 01/20/2020    K 3.8 11/08/2022    BUN 22 11/08/2022    CREATININE 1.0 11/08/2022     Assesment:  Peripheral vascular disease. R LE lifestyle limiting claudication  PLAN:    Aortogram,  Bilateral lower extremity arteriogram, possible intervention. I reviewed the procedure with the patient and family as available. I discussed the procedure, risks, benefits, complications, and alternatives of the procedure. They understand and consent.   All questions were answered    Jack Lewis MD

## 2023-05-23 NOTE — OP NOTE
Cardiovascular Lab Procedure Report    Angelica Tapia  1950    Date : 5/23/2023  Surgeon: Nicole Schmitt M.D. Pre-procedure Diagnosis: R LE lifestyle limiting claudication  Post-procedure Diagnosis: Same  Procedure:      Biilateral common femoral artery access   with US guidance   57583 Bilateral lower extremity angiogram    TF Right lower extremity angiogram   03842 Angiogram with catheter in Right   common femoral, popliteal artery    Right sfa, pop plasty with 4x200 [place]  Post dilation with 4x200 DCB, 5x250 DCB   15169  [id number]  68556 Right BRITNEY, EIA, CFA, SFA lithotripsy with 6x60 shockwave  Post dilation with 6x250 DCB   95506  31453 Left BRITNEY, EIA lithotripsy with 6x60 shockwave   Anesthesia: Local with IV sedation  Assistants: Cath Lab Staff  Estimated Blood Loss: Minimal  Complications: none  Findings: Abdominal aorta : patent   Right Right s/p Intervention Left   Common iliac > 80% stenosis proximal to stent Patent stent > 50% stenosis s/p patent   External iliac Focal occlusion, diffusely diseased patent > 80% stenosis s/p patent   Internal iliac occluded occluded occluded   Common Femoral Art > 50% stenosis Patent > 80% stenosis   Superficial Femoral Art Patent multiple areas of stenosis > 50% Patent Proximally occluded   Profunda Femoral Art patent patent Patent, diseased   AK Popliteal Art patent patent Not imaged   BK Popliteal Art occluded patent Not imaged   Anterior Tibial Art Proximal occlusion Proximal occlusion Not imaged   Tibioperoneal Trunk Patent but diseasd Patent but diseased Not imaged   Peroneal Art Proximal occlusion Proximal occlusion Not imaged   Posterior Tibial Art Patent diseased but flow to foot Patent diseased but flow to foot Not imaged     Procedure Details : There was not previous CTA  or catheter based diagnostic imaging preformed prior to today's procedure. Timeout preformed identifying pt and procedure. Groins prepped and draped in sterile fashion.  Patient

## 2023-05-24 NOTE — PROGRESS NOTES
Patient discharged in wheelchair to [facility]. Lobby by Lucy ARRINGTON. Cell phone, clothes, shoes, purse and personal toiletries returned to patient. Telemetry monitor 6327 returned to storage drawer. DC instructions reviewed with patient. Wound care, physician follow up and medication resumption reviewed with patient and she verbalized understanding.   She is aware  requested anticoagulation to resume 5.25.23.me  5/24/2023

## 2023-05-25 ENCOUNTER — TELEPHONE (OUTPATIENT)
Dept: VASCULAR SURGERY | Age: 73
End: 2023-05-25

## 2023-05-25 NOTE — TELEPHONE ENCOUNTER
Pt called to report she is still experiencing (R) foot numbness s/p percutaneous intervention on 5/23 but no worse than prior; she will call with any increasing s/s or with questions.

## 2023-06-19 ENCOUNTER — OFFICE VISIT (OUTPATIENT)
Dept: VASCULAR SURGERY | Age: 73
End: 2023-06-19
Payer: MEDICARE

## 2023-06-19 DIAGNOSIS — I70.242 ATHEROSCLEROSIS OF NATIVE ARTERY OF LEFT LOWER EXTREMITY WITH ULCERATION OF CALF (HCC): Primary | ICD-10-CM

## 2023-06-19 DIAGNOSIS — I65.23 ASYMPTOMATIC BILATERAL CAROTID ARTERY STENOSIS: Chronic | ICD-10-CM

## 2023-06-19 PROCEDURE — G8400 PT W/DXA NO RESULTS DOC: HCPCS | Performed by: PHYSICIAN ASSISTANT

## 2023-06-19 PROCEDURE — 1123F ACP DISCUSS/DSCN MKR DOCD: CPT | Performed by: PHYSICIAN ASSISTANT

## 2023-06-19 PROCEDURE — 1090F PRES/ABSN URINE INCON ASSESS: CPT | Performed by: PHYSICIAN ASSISTANT

## 2023-06-19 PROCEDURE — 99214 OFFICE O/P EST MOD 30 MIN: CPT | Performed by: PHYSICIAN ASSISTANT

## 2023-06-19 PROCEDURE — 1036F TOBACCO NON-USER: CPT | Performed by: PHYSICIAN ASSISTANT

## 2023-06-19 PROCEDURE — 3017F COLORECTAL CA SCREEN DOC REV: CPT | Performed by: PHYSICIAN ASSISTANT

## 2023-06-19 PROCEDURE — G8419 CALC BMI OUT NRM PARAM NOF/U: HCPCS | Performed by: PHYSICIAN ASSISTANT

## 2023-06-19 PROCEDURE — G8427 DOCREV CUR MEDS BY ELIG CLIN: HCPCS | Performed by: PHYSICIAN ASSISTANT

## 2023-06-19 RX ORDER — DOXYCYCLINE HYCLATE 100 MG
100 TABLET ORAL 2 TIMES DAILY
Qty: 20 TABLET | Refills: 0 | Status: SHIPPED | OUTPATIENT
Start: 2023-06-19 | End: 2023-06-29

## 2023-06-19 NOTE — PROGRESS NOTES
Vascular Surgery Outpatient Followup    PCP : Cristina Knapp MD  Podiatrist : Dr. Terrell Gruber  Cardiologist : Dr. Karina Hackett    Previous Vascular Procedure  1/29/19 L sfa, popliteal atherectomy, dcb   1/7/2020 R LE angiogram - SFA  - R LE LL claudication - needs fem ak pop bypass   5/3/22 R LE angiogram  R SFA atherectomy, and plasty with 4x250 DCB    6/7/22 L LE angiogram - sfa, popliteal athrectomy - sfa, popliteal plasty - AT, DP plasty    11/8/22 Aortogram  R common iliac stent 7x38 (2)  R proximal sfa, CFA, EIA lithotripsy with 5x60 shockwave   5/23/23 Aortogram  R common, external iliac, CFA SFA lithotripsy with 6x60 shockwave  R SFA,Pop plasty 4x200, 5x250 DCB  R CFA EIA, BRITNEY plasty 6x250 DCB  L BRITNEY, EIA lithotripsy 6x60 shockwave          HISTORY OF PRESENT ILLNESS:    This is a 74 y.o. female who presents in fu regarding pvd, lifestyle limiting claudication. She has had previous L LE intervention for wound of the left calf which was first noted approximately 7/2018 after trauma. She followed with Dr. Chely Orantes and after intervention it did heal.      Her B LE claudication had resolved post angiogram but she developed right leg pain with ambulation 8/2022. Since her most recent intervention her claudication in the right pain has resolved. She denies rest pain in her feet. She developed a new ulcer to her L calf last week which has not improved much since it began. Denies fevers or chills. She has pain localized to the wound with palpation. B LE having issues with numbness and tingling in her foot primarily which is stable. Her right leg bothers her more than her left. This has been ongoing for at least a year. It doesn't really bother her. She has a 2/10 pain at its worst.  She also notes she has been told she has lumbar spinal stenosis but never had it addressed. 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 Garima Armstrong 2/2020.   She is recovered but has chronic

## 2023-06-20 ENCOUNTER — TELEPHONE (OUTPATIENT)
Dept: ADMINISTRATIVE | Age: 73
End: 2023-06-20

## 2023-06-20 NOTE — TELEPHONE ENCOUNTER
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

## 2023-06-20 NOTE — TELEPHONE ENCOUNTER
Pt called and requested to schedule an appt for the urgent referral that was received. Staff unavailable due to pt care. Please contact pt.

## 2023-06-21 PROBLEM — I70.242 ATHEROSCLEROSIS OF NATIVE ARTERY OF LEFT LOWER EXTREMITY WITH ULCERATION OF CALF (HCC): Status: ACTIVE | Noted: 2023-06-21

## 2023-06-23 ENCOUNTER — TELEPHONE (OUTPATIENT)
Dept: VASCULAR SURGERY | Age: 73
End: 2023-06-23

## 2023-06-23 NOTE — TELEPHONE ENCOUNTER
Pt. Called stating that she is SOB since Monday 6-19-23 and nausea and sleepy. After taking Doxycyline  Ely Seip PA saw her on Monday. And  prescribed it for her  Wanted to know if she can prescribe prednisone for her. Pt. Does have oxygen and is using it. Pt.  Advised to call her PCP and or Pulmonologist.

## 2023-06-26 ENCOUNTER — OFFICE VISIT (OUTPATIENT)
Dept: PODIATRY | Age: 73
End: 2023-06-26
Payer: MEDICARE

## 2023-06-26 VITALS — HEIGHT: 66 IN | WEIGHT: 90 LBS | BODY MASS INDEX: 14.46 KG/M2

## 2023-06-26 DIAGNOSIS — E13.51 PERIPHERAL VASCULAR DISEASE DUE TO SECONDARY DIABETES MELLITUS (HCC): ICD-10-CM

## 2023-06-26 DIAGNOSIS — I70.242 ATHEROSCLEROSIS OF NATIVE ARTERY OF LEFT LOWER EXTREMITY WITH ULCERATION OF CALF (HCC): Primary | ICD-10-CM

## 2023-06-26 PROCEDURE — 1090F PRES/ABSN URINE INCON ASSESS: CPT | Performed by: PODIATRIST

## 2023-06-26 PROCEDURE — 1123F ACP DISCUSS/DSCN MKR DOCD: CPT | Performed by: PODIATRIST

## 2023-06-26 PROCEDURE — G8427 DOCREV CUR MEDS BY ELIG CLIN: HCPCS | Performed by: PODIATRIST

## 2023-06-26 PROCEDURE — G8400 PT W/DXA NO RESULTS DOC: HCPCS | Performed by: PODIATRIST

## 2023-06-26 PROCEDURE — 3046F HEMOGLOBIN A1C LEVEL >9.0%: CPT | Performed by: PODIATRIST

## 2023-06-26 PROCEDURE — 99203 OFFICE O/P NEW LOW 30 MIN: CPT | Performed by: PODIATRIST

## 2023-06-26 PROCEDURE — 3017F COLORECTAL CA SCREEN DOC REV: CPT | Performed by: PODIATRIST

## 2023-06-26 PROCEDURE — G8419 CALC BMI OUT NRM PARAM NOF/U: HCPCS | Performed by: PODIATRIST

## 2023-06-26 PROCEDURE — 1036F TOBACCO NON-USER: CPT | Performed by: PODIATRIST

## 2023-07-10 ENCOUNTER — OFFICE VISIT (OUTPATIENT)
Dept: PODIATRY | Age: 73
End: 2023-07-10
Payer: MEDICARE

## 2023-07-10 VITALS
BODY MASS INDEX: 13.34 KG/M2 | WEIGHT: 83 LBS | HEIGHT: 66 IN | TEMPERATURE: 97.6 F | DIASTOLIC BLOOD PRESSURE: 72 MMHG | SYSTOLIC BLOOD PRESSURE: 160 MMHG

## 2023-07-10 DIAGNOSIS — I70.242 ATHEROSCLEROSIS OF NATIVE ARTERY OF LEFT LOWER EXTREMITY WITH ULCERATION OF CALF (HCC): Primary | ICD-10-CM

## 2023-07-10 DIAGNOSIS — E13.51 PERIPHERAL VASCULAR DISEASE DUE TO SECONDARY DIABETES MELLITUS (HCC): ICD-10-CM

## 2023-07-10 PROCEDURE — 99213 OFFICE O/P EST LOW 20 MIN: CPT | Performed by: PODIATRIST

## 2023-07-10 PROCEDURE — 3017F COLORECTAL CA SCREEN DOC REV: CPT | Performed by: PODIATRIST

## 2023-07-10 PROCEDURE — 1036F TOBACCO NON-USER: CPT | Performed by: PODIATRIST

## 2023-07-10 PROCEDURE — 3046F HEMOGLOBIN A1C LEVEL >9.0%: CPT | Performed by: PODIATRIST

## 2023-07-10 PROCEDURE — G8419 CALC BMI OUT NRM PARAM NOF/U: HCPCS | Performed by: PODIATRIST

## 2023-07-10 PROCEDURE — 3078F DIAST BP <80 MM HG: CPT | Performed by: PODIATRIST

## 2023-07-10 PROCEDURE — G8400 PT W/DXA NO RESULTS DOC: HCPCS | Performed by: PODIATRIST

## 2023-07-10 PROCEDURE — 1090F PRES/ABSN URINE INCON ASSESS: CPT | Performed by: PODIATRIST

## 2023-07-10 PROCEDURE — G8427 DOCREV CUR MEDS BY ELIG CLIN: HCPCS | Performed by: PODIATRIST

## 2023-07-10 PROCEDURE — 1123F ACP DISCUSS/DSCN MKR DOCD: CPT | Performed by: PODIATRIST

## 2023-07-10 PROCEDURE — 3077F SYST BP >= 140 MM HG: CPT | Performed by: PODIATRIST

## 2023-07-17 ENCOUNTER — TELEPHONE (OUTPATIENT)
Dept: CARDIAC CATH/INVASIVE PROCEDURES | Age: 73
End: 2023-07-17

## 2023-07-18 ENCOUNTER — HOSPITAL ENCOUNTER (INPATIENT)
Dept: CARDIAC CATH/INVASIVE PROCEDURES | Age: 73
LOS: 4 days | Discharge: NURSING, REHAB OR LONG-TERM CARE | DRG: 270 | End: 2023-07-22
Attending: SURGERY | Admitting: SURGERY
Payer: MEDICARE

## 2023-07-18 DIAGNOSIS — I70.232 ATHEROSCLEROSIS OF NATIVE ARTERY OF BOTH LOWER EXTREMITIES WITH BILATERAL ULCERATION OF CALF (HCC): Primary | ICD-10-CM

## 2023-07-18 DIAGNOSIS — I70.242 ATHEROSCLEROSIS OF NATIVE ARTERY OF BOTH LOWER EXTREMITIES WITH BILATERAL ULCERATION OF CALF (HCC): Primary | ICD-10-CM

## 2023-07-18 PROBLEM — I73.9 PAD (PERIPHERAL ARTERY DISEASE) (HCC): Status: ACTIVE | Noted: 2023-07-18

## 2023-07-18 LAB
ABO + RH BLD: NORMAL
ANION GAP SERPL CALCULATED.3IONS-SCNC: 7 MMOL/L (ref 7–16)
ARM BAND NUMBER: NORMAL
BLOOD BANK SAMPLE EXPIRATION: NORMAL
BLOOD GROUP ANTIBODIES SERPL: NEGATIVE
BUN SERPL-MCNC: 28 MG/DL (ref 6–23)
CALCIUM SERPL-MCNC: 9.5 MG/DL (ref 8.6–10.2)
CHLORIDE SERPL-SCNC: 105 MMOL/L (ref 98–107)
CO2 SERPL-SCNC: 33 MMOL/L (ref 22–29)
CREAT SERPL-MCNC: 0.6 MG/DL (ref 0.5–1)
ERYTHROCYTE [DISTWIDTH] IN BLOOD BY AUTOMATED COUNT: 14.2 % (ref 11.5–15)
GFR SERPL CREATININE-BSD FRML MDRD: >60 ML/MIN/1.73M2
GLUCOSE SERPL-MCNC: 85 MG/DL (ref 74–99)
HCT VFR BLD AUTO: 42.9 % (ref 34–48)
HGB BLD-MCNC: 12.7 G/DL (ref 11.5–15.5)
INR PPP: 1
MCH RBC QN AUTO: 31.4 PG (ref 26–35)
MCHC RBC AUTO-ENTMCNC: 29.6 G/DL (ref 32–34.5)
MCV RBC AUTO: 105.9 FL (ref 80–99.9)
PLATELET # BLD AUTO: 347 K/UL (ref 130–450)
PMV BLD AUTO: 9.6 FL (ref 7–12)
POTASSIUM SERPL-SCNC: 3.7 MMOL/L (ref 3.5–5)
PROTHROMBIN TIME: 10.9 SEC (ref 9.3–12.4)
RBC # BLD AUTO: 4.05 M/UL (ref 3.5–5.5)
SODIUM SERPL-SCNC: 145 MMOL/L (ref 132–146)
WBC OTHER # BLD: 7 K/UL (ref 4.5–11.5)

## 2023-07-18 PROCEDURE — C2623 CATH, TRANSLUMIN, DRUG-COAT: HCPCS

## 2023-07-18 PROCEDURE — 85610 PROTHROMBIN TIME: CPT

## 2023-07-18 PROCEDURE — 04CL3ZZ EXTIRPATION OF MATTER FROM LEFT FEMORAL ARTERY, PERCUTANEOUS APPROACH: ICD-10-PCS | Performed by: SURGERY

## 2023-07-18 PROCEDURE — 86900 BLOOD TYPING SEROLOGIC ABO: CPT

## 2023-07-18 PROCEDURE — C1725 CATH, TRANSLUMIN NON-LASER: HCPCS

## 2023-07-18 PROCEDURE — 2709999900 HC NON-CHARGEABLE SUPPLY

## 2023-07-18 PROCEDURE — 37225 HC FEM POP TERRITORY ATHERECTOMY: CPT

## 2023-07-18 PROCEDURE — 2500000003 HC RX 250 WO HCPCS

## 2023-07-18 PROCEDURE — 047Q3ZZ DILATION OF LEFT ANTERIOR TIBIAL ARTERY, PERCUTANEOUS APPROACH: ICD-10-PCS | Performed by: SURGERY

## 2023-07-18 PROCEDURE — 2580000003 HC RX 258

## 2023-07-18 PROCEDURE — 6370000000 HC RX 637 (ALT 250 FOR IP): Performed by: SURGERY

## 2023-07-18 PROCEDURE — 75716 ARTERY X-RAYS ARMS/LEGS: CPT

## 2023-07-18 PROCEDURE — B41G1ZZ FLUOROSCOPY OF LEFT LOWER EXTREMITY ARTERIES USING LOW OSMOLAR CONTRAST: ICD-10-PCS | Performed by: SURGERY

## 2023-07-18 PROCEDURE — 94664 DEMO&/EVAL PT USE INHALER: CPT

## 2023-07-18 PROCEDURE — 37225 PR REVSC OPN/PRQ FEM/POP W/ATHRC/ANGIOP SM VSL: CPT | Performed by: SURGERY

## 2023-07-18 PROCEDURE — 2580000003 HC RX 258: Performed by: SURGERY

## 2023-07-18 PROCEDURE — 37228 PR REVSC OPN/PRQ TIB/PERO W/ANGIOPLASTY UNI: CPT | Performed by: SURGERY

## 2023-07-18 PROCEDURE — C1769 GUIDE WIRE: HCPCS

## 2023-07-18 PROCEDURE — 37232 HC TIB PER TERR ADDL PLASTY: CPT

## 2023-07-18 PROCEDURE — 75774 ARTERY X-RAY EACH VESSEL: CPT

## 2023-07-18 PROCEDURE — 80048 BASIC METABOLIC PNL TOTAL CA: CPT

## 2023-07-18 PROCEDURE — C1760 CLOSURE DEV, VASC: HCPCS

## 2023-07-18 PROCEDURE — 6360000002 HC RX W HCPCS

## 2023-07-18 PROCEDURE — 85027 COMPLETE CBC AUTOMATED: CPT

## 2023-07-18 PROCEDURE — 047N3ZZ DILATION OF LEFT POPLITEAL ARTERY, PERCUTANEOUS APPROACH: ICD-10-PCS | Performed by: SURGERY

## 2023-07-18 PROCEDURE — C1884 EMBOLIZATION PROTECT SYST: HCPCS

## 2023-07-18 PROCEDURE — 86850 RBC ANTIBODY SCREEN: CPT

## 2023-07-18 PROCEDURE — C1894 INTRO/SHEATH, NON-LASER: HCPCS

## 2023-07-18 PROCEDURE — 75716 ARTERY X-RAYS ARMS/LEGS: CPT | Performed by: SURGERY

## 2023-07-18 PROCEDURE — C1887 CATHETER, GUIDING: HCPCS

## 2023-07-18 PROCEDURE — 37232 PR REVSC OPN/PRQ TIB/PERO W/ANGIOPLASTY UNI EA VSL: CPT | Performed by: SURGERY

## 2023-07-18 PROCEDURE — 2700000000 HC OXYGEN THERAPY PER DAY

## 2023-07-18 PROCEDURE — 37228 HC TIB PER TERRITORY PLASTY: CPT

## 2023-07-18 PROCEDURE — 2140000000 HC CCU INTERMEDIATE R&B

## 2023-07-18 PROCEDURE — 86901 BLOOD TYPING SEROLOGIC RH(D): CPT

## 2023-07-18 PROCEDURE — C1714 CATH, TRANS ATHERECTOMY, DIR: HCPCS

## 2023-07-18 RX ORDER — BUPROPION HYDROCHLORIDE 100 MG/1
100 TABLET, EXTENDED RELEASE ORAL DAILY
Status: DISCONTINUED | OUTPATIENT
Start: 2023-07-18 | End: 2023-07-22 | Stop reason: HOSPADM

## 2023-07-18 RX ORDER — SODIUM CHLORIDE 0.9 % (FLUSH) 0.9 %
5-40 SYRINGE (ML) INJECTION EVERY 12 HOURS SCHEDULED
Status: DISCONTINUED | OUTPATIENT
Start: 2023-07-18 | End: 2023-07-18

## 2023-07-18 RX ORDER — SODIUM CHLORIDE 9 MG/ML
INJECTION, SOLUTION INTRAVENOUS CONTINUOUS
Status: ACTIVE | OUTPATIENT
Start: 2023-07-18 | End: 2023-07-18

## 2023-07-18 RX ORDER — ENOXAPARIN SODIUM 100 MG/ML
20 INJECTION SUBCUTANEOUS DAILY
Status: DISCONTINUED | OUTPATIENT
Start: 2023-07-19 | End: 2023-07-18

## 2023-07-18 RX ORDER — IPRATROPIUM BROMIDE AND ALBUTEROL SULFATE 2.5; .5 MG/3ML; MG/3ML
1 SOLUTION RESPIRATORY (INHALATION)
Status: DISCONTINUED | OUTPATIENT
Start: 2023-07-18 | End: 2023-07-22 | Stop reason: HOSPADM

## 2023-07-18 RX ORDER — OXYCODONE HYDROCHLORIDE AND ACETAMINOPHEN 5; 325 MG/1; MG/1
1 TABLET ORAL EVERY 4 HOURS PRN
Status: DISCONTINUED | OUTPATIENT
Start: 2023-07-18 | End: 2023-07-22 | Stop reason: HOSPADM

## 2023-07-18 RX ORDER — SOTALOL HYDROCHLORIDE 120 MG/1
120 TABLET ORAL 2 TIMES DAILY
Status: DISCONTINUED | OUTPATIENT
Start: 2023-07-18 | End: 2023-07-22 | Stop reason: HOSPADM

## 2023-07-18 RX ORDER — CHOLESTYRAMINE LIGHT 4 G/5.7G
4 POWDER, FOR SUSPENSION ORAL DAILY
Status: DISCONTINUED | OUTPATIENT
Start: 2023-07-18 | End: 2023-07-18

## 2023-07-18 RX ORDER — IBUPROFEN 200 MG
200 TABLET ORAL
Status: DISCONTINUED | OUTPATIENT
Start: 2023-07-18 | End: 2023-07-18

## 2023-07-18 RX ORDER — ALBUTEROL SULFATE 2.5 MG/3ML
2.5 SOLUTION RESPIRATORY (INHALATION) EVERY 4 HOURS PRN
Status: DISCONTINUED | OUTPATIENT
Start: 2023-07-18 | End: 2023-07-18 | Stop reason: CLARIF

## 2023-07-18 RX ORDER — HEPARIN SODIUM 10000 [USP'U]/ML
5000 INJECTION, SOLUTION INTRAVENOUS; SUBCUTANEOUS EVERY 12 HOURS
Status: DISCONTINUED | OUTPATIENT
Start: 2023-07-19 | End: 2023-07-22 | Stop reason: HOSPADM

## 2023-07-18 RX ORDER — SODIUM CHLORIDE 9 MG/ML
INJECTION, SOLUTION INTRAVENOUS CONTINUOUS
Status: DISCONTINUED | OUTPATIENT
Start: 2023-07-18 | End: 2023-07-18

## 2023-07-18 RX ORDER — LEVOTHYROXINE SODIUM 0.12 MG/1
125 TABLET ORAL DAILY
Status: DISCONTINUED | OUTPATIENT
Start: 2023-07-18 | End: 2023-07-22 | Stop reason: HOSPADM

## 2023-07-18 RX ORDER — MORPHINE SULFATE 2 MG/ML
2 INJECTION, SOLUTION INTRAMUSCULAR; INTRAVENOUS
Status: DISCONTINUED | OUTPATIENT
Start: 2023-07-18 | End: 2023-07-22 | Stop reason: HOSPADM

## 2023-07-18 RX ORDER — MULTIVIT-MIN/IRON/FOLIC ACID/K 18-600-40
1 CAPSULE ORAL DAILY
Status: DISCONTINUED | OUTPATIENT
Start: 2023-07-18 | End: 2023-07-18

## 2023-07-18 RX ORDER — SODIUM CHLORIDE 9 MG/ML
INJECTION, SOLUTION INTRAVENOUS PRN
Status: DISCONTINUED | OUTPATIENT
Start: 2023-07-18 | End: 2023-07-18

## 2023-07-18 RX ORDER — VITAMIN E 268 MG
400 CAPSULE ORAL DAILY
Status: DISCONTINUED | OUTPATIENT
Start: 2023-07-18 | End: 2023-07-18

## 2023-07-18 RX ORDER — SODIUM CHLORIDE 0.9 % (FLUSH) 0.9 %
5-40 SYRINGE (ML) INJECTION PRN
Status: DISCONTINUED | OUTPATIENT
Start: 2023-07-18 | End: 2023-07-18

## 2023-07-18 RX ORDER — SODIUM CHLORIDE 9 MG/ML
INJECTION, SOLUTION INTRAVENOUS PRN
Status: DISCONTINUED | OUTPATIENT
Start: 2023-07-18 | End: 2023-07-22 | Stop reason: HOSPADM

## 2023-07-18 RX ORDER — LOPERAMIDE HYDROCHLORIDE 2 MG/1
2 CAPSULE ORAL 4 TIMES DAILY PRN
Status: DISCONTINUED | OUTPATIENT
Start: 2023-07-18 | End: 2023-07-22 | Stop reason: HOSPADM

## 2023-07-18 RX ORDER — SODIUM CHLORIDE 0.9 % (FLUSH) 0.9 %
5-40 SYRINGE (ML) INJECTION EVERY 12 HOURS SCHEDULED
Status: DISCONTINUED | OUTPATIENT
Start: 2023-07-18 | End: 2023-07-22 | Stop reason: HOSPADM

## 2023-07-18 RX ORDER — IPRATROPIUM BROMIDE AND ALBUTEROL SULFATE 2.5; .5 MG/3ML; MG/3ML
1 SOLUTION RESPIRATORY (INHALATION) EVERY 4 HOURS PRN
Status: DISCONTINUED | OUTPATIENT
Start: 2023-07-18 | End: 2023-07-22 | Stop reason: HOSPADM

## 2023-07-18 RX ORDER — ASCORBIC ACID 500 MG
500 TABLET ORAL DAILY
Status: DISCONTINUED | OUTPATIENT
Start: 2023-07-18 | End: 2023-07-18

## 2023-07-18 RX ORDER — SODIUM CHLORIDE 0.9 % (FLUSH) 0.9 %
5-40 SYRINGE (ML) INJECTION PRN
Status: DISCONTINUED | OUTPATIENT
Start: 2023-07-18 | End: 2023-07-22 | Stop reason: HOSPADM

## 2023-07-18 RX ORDER — PANTOPRAZOLE SODIUM 40 MG/1
40 TABLET, DELAYED RELEASE ORAL
Status: DISCONTINUED | OUTPATIENT
Start: 2023-07-19 | End: 2023-07-22 | Stop reason: HOSPADM

## 2023-07-18 RX ORDER — AMLODIPINE BESYLATE 5 MG/1
5 TABLET ORAL DAILY
Status: DISCONTINUED | OUTPATIENT
Start: 2023-07-18 | End: 2023-07-22 | Stop reason: HOSPADM

## 2023-07-18 RX ADMIN — LOPERAMIDE HYDROCHLORIDE 2 MG: 2 CAPSULE ORAL at 21:30

## 2023-07-18 RX ADMIN — IPRATROPIUM BROMIDE AND ALBUTEROL SULFATE 1 DOSE: .5; 2.5 SOLUTION RESPIRATORY (INHALATION) at 21:00

## 2023-07-18 RX ADMIN — OXYCODONE HYDROCHLORIDE AND ACETAMINOPHEN 1 TABLET: 5; 325 TABLET ORAL at 15:39

## 2023-07-18 RX ADMIN — OXYCODONE HYDROCHLORIDE AND ACETAMINOPHEN 1 TABLET: 5; 325 TABLET ORAL at 21:30

## 2023-07-18 RX ADMIN — CEFAZOLIN 1000 MG: 1 INJECTION, POWDER, FOR SOLUTION INTRAMUSCULAR; INTRAVENOUS at 12:57

## 2023-07-18 RX ADMIN — SOTALOL HYDROCHLORIDE 120 MG: 120 TABLET ORAL at 21:29

## 2023-07-18 RX ADMIN — SODIUM CHLORIDE: 9 INJECTION, SOLUTION INTRAVENOUS at 08:16

## 2023-07-18 RX ADMIN — SODIUM CHLORIDE, PRESERVATIVE FREE 10 ML: 5 INJECTION INTRAVENOUS at 21:29

## 2023-07-18 ASSESSMENT — PAIN SCALES - GENERAL
PAINLEVEL_OUTOF10: 6
PAINLEVEL_OUTOF10: 5
PAINLEVEL_OUTOF10: 3
PAINLEVEL_OUTOF10: 10

## 2023-07-18 ASSESSMENT — PAIN DESCRIPTION - LOCATION
LOCATION: LEG
LOCATION: LEG

## 2023-07-18 ASSESSMENT — PAIN DESCRIPTION - PAIN TYPE: TYPE: CHRONIC PAIN

## 2023-07-18 ASSESSMENT — PAIN DESCRIPTION - ORIENTATION
ORIENTATION: LEFT
ORIENTATION: LEFT

## 2023-07-18 ASSESSMENT — PAIN DESCRIPTION - DESCRIPTORS
DESCRIPTORS: BURNING
DESCRIPTORS: ACHING;NAGGING

## 2023-07-18 ASSESSMENT — PAIN - FUNCTIONAL ASSESSMENT: PAIN_FUNCTIONAL_ASSESSMENT: PREVENTS OR INTERFERES SOME ACTIVE ACTIVITIES AND ADLS

## 2023-07-18 ASSESSMENT — PAIN DESCRIPTION - FREQUENCY: FREQUENCY: CONTINUOUS

## 2023-07-18 NOTE — PROGRESS NOTES
4 Eyes Skin Assessment     NAME:  Veronica Galindo  YOB: 1950  MEDICAL RECORD NUMBER:  [id number]    The patient is being assessed for  Admission    I agree that at least one RN has performed a thorough Head to Toe Skin Assessment on the patient. ALL assessment sites listed below have been assessed. Areas assessed by both nurses:    Head, Face, Ears, Shoulders, Back, Chest, Arms, Elbows, Hands, Sacrum. Buttock, Coccyx, Ischium, Legs. Feet and Heels, and Under Medical Devices         Does the Patient have a Wound? Yes wound(s) were present on assessment.  LDA wound assessment was Initiated and completed by RN       Tony Prevention initiated by RN: Yes  Wound Care Orders initiated by RN: Yes    Pressure Injury (Stage 3,4, Unstageable, DTI, NWPT, and Complex wounds) if present, place Wound referral order by RN under : No    New Ostomies, if present place, Ostomy referral order under : No     Nurse 1 eSignature: Electronically signed by Trina Dailey RN on 7/18/23 at 6:52 PM EDT    **SHARE this note so that the co-signing nurse can place an eSignature**    Nurse 2 eSignature: Electronically signed by Leonides Cassidy RN on 7/18/23 at 6:59 PM EDT

## 2023-07-18 NOTE — OP NOTE
Cardiovascular Lab Procedure Report    Gay Nicholson  1950    Date : 7/18/2023  Surgeon: Wing Mira M.D. Pre-procedure Diagnosis: B/L LE tissue loss  Post-procedure Diagnosis: Same  Procedure:      Right  common femoral artery access   with US guidance    8 fr angioseal used for closure    TF Left lower extremity angiogram   62554 Angiogram with catheter in Left   common femoral, superficial femoral, popliteal artery   15155 Left SFA atherectomy ([place]urbohawk - hawk one 7 fr 9.6 cm)  Left sfa pop plasty with 4x250 (x2)   25721 Left AT plasty with 2.5x120   61653 Left TP trunk/peroneal plasty with 2.5x120     # 4 Spyder used   Anesthesia: Local with IV sedation  Assistants: Cath Lab Staff  Estimated Blood Loss: Minimal  Complications: none  Findings:    Right Left Left s/p Intervention   Common Iliac Art      External Iliac Art      Internal Iliac Art      Common Femoral Art      Superficial Femoral Art      Profunda Femoral Art      AK Popliteal Art      BK Popliteal Art      Anterior Tibial Art      Tibioperoneal Trunk      Peroneal Art      Posterior Tibial Art        Procedure Details : There was not previous CTA , or catheter based diagnostic imaging preformed prior to today's procedure. Timeout preformed identifying pt and procedure. Groins prepped and draped in sterile fashion. Patient given sedation as needed throughout the case. Right  common femoral artery was noted to be patent and was accessed under ultrasound guidance after infiltrating with local.  Micropuncture placed, exchanged out for 5 fr sheath. Advantage glide wire and contra catheter advanced into distal aorta. Bilateral lower extremity angiogram preformed. Catheter and wire used to access Left  iliac system and catheter placed at common femoral and angiogram of theLeft  lower extremity preformed. Significant occlusive disease was noted in the distal external iliac, sfa, tibials.      Patient was given 5000 units of

## 2023-07-19 PROBLEM — E43 SEVERE PROTEIN-CALORIE MALNUTRITION (HCC): Chronic | Status: ACTIVE | Noted: 2023-07-19

## 2023-07-19 PROBLEM — I70.242: Status: ACTIVE | Noted: 2023-07-19

## 2023-07-19 PROBLEM — I70.232: Status: ACTIVE | Noted: 2023-07-19

## 2023-07-19 LAB
ANION GAP SERPL CALCULATED.3IONS-SCNC: 6 MMOL/L (ref 7–16)
BUN SERPL-MCNC: 15 MG/DL (ref 6–23)
CALCIUM SERPL-MCNC: 8.5 MG/DL (ref 8.6–10.2)
CHLORIDE SERPL-SCNC: 106 MMOL/L (ref 98–107)
CO2 SERPL-SCNC: 27 MMOL/L (ref 22–29)
CREAT SERPL-MCNC: 0.6 MG/DL (ref 0.5–1)
ERYTHROCYTE [DISTWIDTH] IN BLOOD BY AUTOMATED COUNT: 14.2 % (ref 11.5–15)
GFR SERPL CREATININE-BSD FRML MDRD: >60 ML/MIN/1.73M2
GLUCOSE SERPL-MCNC: 115 MG/DL (ref 74–99)
HCT VFR BLD AUTO: 38.6 % (ref 34–48)
HGB BLD-MCNC: 11.3 G/DL (ref 11.5–15.5)
MCH RBC QN AUTO: 31 PG (ref 26–35)
MCHC RBC AUTO-ENTMCNC: 29.3 G/DL (ref 32–34.5)
MCV RBC AUTO: 106 FL (ref 80–99.9)
PLATELET # BLD AUTO: 314 K/UL (ref 130–450)
PMV BLD AUTO: 9.6 FL (ref 7–12)
POTASSIUM SERPL-SCNC: 3.6 MMOL/L (ref 3.5–5)
RBC # BLD AUTO: 3.64 M/UL (ref 3.5–5.5)
SODIUM SERPL-SCNC: 139 MMOL/L (ref 132–146)
WBC OTHER # BLD: 8.4 K/UL (ref 4.5–11.5)

## 2023-07-19 PROCEDURE — 97165 OT EVAL LOW COMPLEX 30 MIN: CPT

## 2023-07-19 PROCEDURE — 6370000000 HC RX 637 (ALT 250 FOR IP): Performed by: SURGERY

## 2023-07-19 PROCEDURE — 85027 COMPLETE CBC AUTOMATED: CPT

## 2023-07-19 PROCEDURE — 80048 BASIC METABOLIC PNL TOTAL CA: CPT

## 2023-07-19 PROCEDURE — 2700000000 HC OXYGEN THERAPY PER DAY

## 2023-07-19 PROCEDURE — 6360000002 HC RX W HCPCS: Performed by: SURGERY

## 2023-07-19 PROCEDURE — 2140000000 HC CCU INTERMEDIATE R&B

## 2023-07-19 PROCEDURE — 97530 THERAPEUTIC ACTIVITIES: CPT

## 2023-07-19 PROCEDURE — 36415 COLL VENOUS BLD VENIPUNCTURE: CPT

## 2023-07-19 PROCEDURE — 2580000003 HC RX 258: Performed by: SURGERY

## 2023-07-19 PROCEDURE — 99231 SBSQ HOSP IP/OBS SF/LOW 25: CPT | Performed by: SURGERY

## 2023-07-19 PROCEDURE — 97535 SELF CARE MNGMENT TRAINING: CPT

## 2023-07-19 PROCEDURE — 94640 AIRWAY INHALATION TREATMENT: CPT

## 2023-07-19 PROCEDURE — 97161 PT EVAL LOW COMPLEX 20 MIN: CPT

## 2023-07-19 RX ORDER — ROSUVASTATIN CALCIUM 5 MG/1
5 TABLET, COATED ORAL
Status: DISCONTINUED | OUTPATIENT
Start: 2023-07-19 | End: 2023-07-22 | Stop reason: HOSPADM

## 2023-07-19 RX ADMIN — IPRATROPIUM BROMIDE AND ALBUTEROL SULFATE 1 DOSE: .5; 2.5 SOLUTION RESPIRATORY (INHALATION) at 12:26

## 2023-07-19 RX ADMIN — SODIUM CHLORIDE, PRESERVATIVE FREE 10 ML: 5 INJECTION INTRAVENOUS at 08:36

## 2023-07-19 RX ADMIN — SOTALOL HYDROCHLORIDE 120 MG: 120 TABLET ORAL at 08:37

## 2023-07-19 RX ADMIN — OXYCODONE HYDROCHLORIDE AND ACETAMINOPHEN 1 TABLET: 5; 325 TABLET ORAL at 08:35

## 2023-07-19 RX ADMIN — SOTALOL HYDROCHLORIDE 120 MG: 120 TABLET ORAL at 20:08

## 2023-07-19 RX ADMIN — IPRATROPIUM BROMIDE AND ALBUTEROL SULFATE 1 DOSE: .5; 2.5 SOLUTION RESPIRATORY (INHALATION) at 09:19

## 2023-07-19 RX ADMIN — OXYCODONE HYDROCHLORIDE AND ACETAMINOPHEN 1 TABLET: 5; 325 TABLET ORAL at 20:09

## 2023-07-19 RX ADMIN — OXYCODONE HYDROCHLORIDE AND ACETAMINOPHEN 1 TABLET: 5; 325 TABLET ORAL at 04:22

## 2023-07-19 RX ADMIN — HEPARIN SODIUM 5000 UNITS: 10000 INJECTION INTRAVENOUS; SUBCUTANEOUS at 20:12

## 2023-07-19 RX ADMIN — SODIUM CHLORIDE, PRESERVATIVE FREE 10 ML: 5 INJECTION INTRAVENOUS at 20:10

## 2023-07-19 RX ADMIN — IPRATROPIUM BROMIDE AND ALBUTEROL SULFATE 1 DOSE: .5; 2.5 SOLUTION RESPIRATORY (INHALATION) at 16:51

## 2023-07-19 RX ADMIN — BUPROPION HYDROCHLORIDE 100 MG: 100 TABLET, EXTENDED RELEASE ORAL at 08:36

## 2023-07-19 RX ADMIN — PANTOPRAZOLE SODIUM 40 MG: 40 TABLET, DELAYED RELEASE ORAL at 07:02

## 2023-07-19 RX ADMIN — HEPARIN SODIUM 5000 UNITS: 10000 INJECTION INTRAVENOUS; SUBCUTANEOUS at 08:36

## 2023-07-19 RX ADMIN — IPRATROPIUM BROMIDE AND ALBUTEROL SULFATE 1 DOSE: .5; 2.5 SOLUTION RESPIRATORY (INHALATION) at 20:35

## 2023-07-19 RX ADMIN — LEVOTHYROXINE SODIUM 125 MCG: 0.12 TABLET ORAL at 07:02

## 2023-07-19 RX ADMIN — AMLODIPINE BESYLATE 5 MG: 5 TABLET ORAL at 08:36

## 2023-07-19 ASSESSMENT — PAIN DESCRIPTION - DESCRIPTORS
DESCRIPTORS: ACHING;STABBING
DESCRIPTORS: DISCOMFORT;THROBBING
DESCRIPTORS: ACHING;DISCOMFORT;NAGGING

## 2023-07-19 ASSESSMENT — PAIN DESCRIPTION - ORIENTATION
ORIENTATION: LEFT
ORIENTATION: LEFT;ANTERIOR
ORIENTATION: RIGHT

## 2023-07-19 ASSESSMENT — PAIN SCALES - GENERAL
PAINLEVEL_OUTOF10: 6
PAINLEVEL_OUTOF10: 4
PAINLEVEL_OUTOF10: 5
PAINLEVEL_OUTOF10: 2

## 2023-07-19 ASSESSMENT — PAIN - FUNCTIONAL ASSESSMENT: PAIN_FUNCTIONAL_ASSESSMENT: PREVENTS OR INTERFERES SOME ACTIVE ACTIVITIES AND ADLS

## 2023-07-19 ASSESSMENT — PAIN DESCRIPTION - FREQUENCY: FREQUENCY: CONTINUOUS

## 2023-07-19 ASSESSMENT — PAIN DESCRIPTION - PAIN TYPE: TYPE: ACUTE PAIN;CHRONIC PAIN

## 2023-07-19 ASSESSMENT — PAIN DESCRIPTION - LOCATION
LOCATION: LEG
LOCATION: HEAD;LEG
LOCATION: LEG

## 2023-07-19 ASSESSMENT — PAIN DESCRIPTION - ONSET: ONSET: ON-GOING

## 2023-07-19 NOTE — PROGRESS NOTES
Occupational Therapy  OCCUPATIONAL THERAPY INITIAL EVALUATION    MARCELINA Arellanojohnny [address] [place] [address]   [address] , [place], [place]      Date:2023                                                Patient Name: Neelam Sutton  MRN: [id number]  : 1950  Room: -A    Evaluating OT: Joycristi Ann OTR/L #[id number]     Referring Provider: Kelvin Perkins MD  Specific Provider Orders/Date: OT eval and treat 23    Diagnosis: Atherosclerosis of native arteries of left leg with ulceration of calf [I70.242]  PVD (peripheral vascular disease) ([address]) [I73.9]  PAD (peripheral artery disease) ([address]) [I73.9]   Pt admitted to hospital for elective arteriogram with possible intervention    Surgery / Procedure: s/p angiography of the left lower extremity 23     Pertinent Medical History:  has a past medical history of 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 with rest pain ([address]), Atrial fibrillation ([address]), Atypical mole, Bilateral carotid artery stenosis, Bruit of right carotid artery, CAD (coronary artery disease), Cancer ([address]), COPD (chronic obstructive pulmonary disease) ([address]), COVID, Depression, Diarrhea, Femoro-popliteal artery disease ([address]), History of tobacco use, Hydrocephalus ([address]), Hyperlipidemia, Hypertension, BRY (obstructive sleep apnea), Pain in both feet, Paroxysmal A-fib ([address]), Peripheral vascular disease due to secondary diabetes mellitus ([address]), PVD (peripheral vascular disease) ([address]), PVD (peripheral vascular disease) with claudication ([address]), Rheumatoid arthritis ([address]), Short-term memory loss, Thyroid disease, Urinary incontinence, Venous stasis ulcer of left calf with fat layer exposed without varicose veins ([address]), and Weight loss.        Precautions:  Fall Risk, O2, monitor O2 sat    Assessment of current deficits    [x] Functional mobility  [x]ADLs  [x] Strength               []Cognition    [x] Functional transfers   [x] IADLs

## 2023-07-19 NOTE — CARE COORDINATION
7/19, Patient admitted for PVD. SW spoke with RN on patient wanting to go to rehab and requesting [facility] facility. Patient was at facility 3 years ago. Would need PT/OT evals to determine if appropriate for rehab. Call placed to Joseph Perea at [address] will pull up information on patient and wait for PT/OT evals. If appropriate for facility and accepted-Rosa to start precert. Patient will need a precert to go to [facility] for rehab. Therapy called and informed that STAT needed for PT/OT evals. SW to follow.       HAILEY Morel  [facility] Case Management  [phone]

## 2023-07-19 NOTE — CARE COORDINATION
7/19, SW spoke with patient on recent PT/OT evals. Patient feels strongly about needing rehab. Patient not wanting [facility]. Call placed to [facility] from [facility] and  left to return call on if patient has been accepted and if so to start precert. SW to follow.       Mirella Cuenca, [facility] Case Management  [phone]

## 2023-07-19 NOTE — PROGRESS NOTES
Comprehensive Nutrition Assessment    Type and Reason for Visit:  Initial, Positive Nutrition Screen    Nutrition Recommendations/Plan:   Recommend and start Ensure Plus high protein supplement BID and Lonnie wound healing supplement BID to help meet increased nutritional needs from wound healing. Malnutrition Assessment:  Malnutrition Status:  Severe malnutrition (07/19/23 1343)    Context:  Chronic Illness     Findings of the 6 clinical characteristics of malnutrition:  Energy Intake:  75% or less estimated energy requirements for 1 month or longer  Weight Loss:  Unable to assess (d/t lack of actual weight history)     Body Fat Loss:  Severe body fat loss Orbital, Triceps   Muscle Mass Loss:  Severe muscle mass loss Temples (temporalis), Clavicles (pectoralis & deltoids)  Fluid Accumulation:  No significant fluid accumulation     Strength:  Not Performed    Nutrition Assessment:    Patient adm w/ PVD ; s/p angiography on 7/18 ; hx of COPD/CAD/RA ; hx of  shunt ; hx of severe malnutrition ; pt does meet criteria for severe malnutrition ; wound noted ; will start ONS    Nutrition Related Findings:    I&Os WNL, no edema, puncture site, muscle/fat wasting ; Wound Type: Open Wounds, Skin Tears, Surgical Incision, Wound Consult Pending (wound x 1 ; skin tear x 1 ; Incision x 1)       Current Nutrition Intake & Therapies:    Average Meal Intake: 51-75%     ADULT DIET; Regular  ADULT ORAL NUTRITION SUPPLEMENT; Breakfast, Lunch, Dinner; Standard 4 oz Oral Supplement    Anthropometric Measures:  Height: 5' 6\" (167.6 cm)  Ideal Body Weight (IBW): 130 lbs (59 kg)       Current Body Weight: 88 lb (39.9 kg) (7/18, stated), 67.7 % IBW.  Weight Source: Stated  Current BMI (kg/m2): 14.2  Usual Body Weight:  (UTO ; EMR shows past weights of 83# no method on 7/10/23 and 96# no method on 8/22/22)                       BMI Categories: Underweight (BMI less than 22) age over 72    Estimated Daily Nutrient Needs:  Energy

## 2023-07-19 NOTE — PROGRESS NOTES
Physical Therapy  Physical Therapy Initial Assessment     Name: Gabrielle Yee  : 1950  MRN: [id number]      Date of Service: 2023    Evaluating PT:  Az De Luna PT, DPT [id number]    Room #:  3466/2117-C  Diagnosis:  Atherosclerosis of native arteries of left leg with ulceration of calf [I70.242]  PVD (peripheral vascular disease) ([facility]) [I73.9]  PAD (peripheral artery disease) ([address]) [I73.9]  PMHx/PSHx:    Past Medical History:   Diagnosis Date    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 with rest pain ([address]) 2019    Atrial fibrillation ([facility])     Atypical mole     upper right arm - black mole     Bilateral carotid artery stenosis 2018    Dr. Nicolasa Bell of right carotid artery 5/3/2018    CAD (coronary artery disease)     f/u w/ PCP     Cancer ([address])     SKIN CA/Thigh    COPD (chronic obstructive pulmonary disease) ([address])     uses O2 2L NC at night - and during day prn     COVID     Depression     Diarrhea     for colonoscopy 21     Femoro-popliteal artery disease ([address]) 2019    History of tobacco use 5/3/2018    Hydrocephalus ([facility])     Hyperlipidemia     no medications     Hypertension     BRY (obstructive sleep apnea)     uses O2 2L NC     Pain in both feet 5/3/2018    Paroxysmal A-fib ([facility])     Peripheral vascular disease due to secondary diabetes mellitus ([address]) 5/3/2022    PVD (peripheral vascular disease) ([facility])     PVD (peripheral vascular disease) with claudication ([facility]) 5/3/2018    Rheumatoid arthritis ([address])     Short-term memory loss     Thyroid disease     Urinary incontinence     Venous stasis ulcer of left calf with fat layer exposed without varicose veins ([address]) 2019    Weight loss       Procedure/Surgery:  RLE angiogram   Precautions:  Falls, O2  Equipment Needs:  TBD    SUBJECTIVE:    Pt lives with daugther in a 2 story home with 2 stairs to enter and 1 rail. Bed is on 2nd floor and bath is on 2nd floor.   Pt ambulated with no AD use restroom -- ambulation to restroom with similar assist. Time provided for pt to void in seated. Pt stood at sink for hand hygiene. Pt returned to chair at end of session. All needs met and call light in reach. All lines remained intact. Patient to benefit from continued skilled PT at DC to improve functional mobility and decrease fall risk. Treatment:  Patient practiced and was instructed in the following treatment:    Transfer Training: Verbal and tactile cueing provided for sequencing and safety during mobility. Manual assist provided for completion of task  Gait Training: Ambulation with WW and verbal cues for proper technique and safety. Manual assist provided for completion of task     Pt's/ family goals   1. DC to [facility]    Prognosis is good for reaching above PT goals. Patient and or family understand(s) diagnosis, prognosis, and plan of care.   yes    PHYSICAL THERAPY PLAN OF CARE:    PT POC is established based on physician order and patient diagnosis     Referring provider/PT Order:    07/19/23 1000  PT eval and treat  Start:  07/19/23 1000,   End:  07/19/23 1000,   ONE TIME,   Standing Count:  1 Occurrences,   Marya Juarez MD     Diagnosis:  Atherosclerosis of native arteries of left leg with ulceration of calf [I70.242]  PVD (peripheral vascular disease) ([address]) [I73.9]  PAD (peripheral artery disease) ([address]) [I73.9]  Specific instructions for next treatment:  progress mobility as appropriate     Current Treatment Recommendations:     [x] Strengthening to improve independence with functional mobility   [x] ROM to improve independence with functional mobility   [x] Balance Training to improve static/dynamic balance and to reduce fall risk  [x] Endurance Training to improve activity tolerance during functional mobility   [x] Transfer Training to improve safety and independence with all functional transfers   [x] Gait Training to improve gait mechanics, endurance and assess

## 2023-07-20 ENCOUNTER — APPOINTMENT (OUTPATIENT)
Dept: CT IMAGING | Age: 73
DRG: 270 | End: 2023-07-20
Attending: SURGERY
Payer: MEDICARE

## 2023-07-20 LAB
ALBUMIN SERPL-MCNC: 3 G/DL (ref 3.5–5.2)
ALP SERPL-CCNC: 55 U/L (ref 35–104)
ALT SERPL-CCNC: 13 U/L (ref 0–32)
ANION GAP SERPL CALCULATED.3IONS-SCNC: 10 MMOL/L (ref 7–16)
AST SERPL-CCNC: 15 U/L (ref 0–31)
BILIRUB SERPL-MCNC: 0.3 MG/DL (ref 0–1.2)
BUN SERPL-MCNC: 14 MG/DL (ref 6–23)
CALCIUM SERPL-MCNC: 8.2 MG/DL (ref 8.6–10.2)
CHLORIDE SERPL-SCNC: 106 MMOL/L (ref 98–107)
CO2 SERPL-SCNC: 30 MMOL/L (ref 22–29)
CREAT SERPL-MCNC: 0.6 MG/DL (ref 0.5–1)
ERYTHROCYTE [DISTWIDTH] IN BLOOD BY AUTOMATED COUNT: 14.1 % (ref 11.5–15)
GFR SERPL CREATININE-BSD FRML MDRD: >60 ML/MIN/1.73M2
GLUCOSE SERPL-MCNC: 81 MG/DL (ref 74–99)
HCT VFR BLD AUTO: 33.6 % (ref 34–48)
HGB BLD-MCNC: 10.2 G/DL (ref 11.5–15.5)
MCH RBC QN AUTO: 31.5 PG (ref 26–35)
MCHC RBC AUTO-ENTMCNC: 30.4 G/DL (ref 32–34.5)
MCV RBC AUTO: 103.7 FL (ref 80–99.9)
PLATELET # BLD AUTO: 305 K/UL (ref 130–450)
PMV BLD AUTO: 10 FL (ref 7–12)
POTASSIUM SERPL-SCNC: 3.9 MMOL/L (ref 3.5–5)
PREALB SERPL-MCNC: 11 MG/DL (ref 20–40)
PROT SERPL-MCNC: 5.5 G/DL (ref 6.4–8.3)
RBC # BLD AUTO: 3.24 M/UL (ref 3.5–5.5)
SODIUM SERPL-SCNC: 146 MMOL/L (ref 132–146)
WBC OTHER # BLD: 6.4 K/UL (ref 4.5–11.5)

## 2023-07-20 PROCEDURE — 85027 COMPLETE CBC AUTOMATED: CPT

## 2023-07-20 PROCEDURE — 6370000000 HC RX 637 (ALT 250 FOR IP): Performed by: SURGERY

## 2023-07-20 PROCEDURE — 6360000002 HC RX W HCPCS: Performed by: SURGERY

## 2023-07-20 PROCEDURE — 80053 COMPREHEN METABOLIC PANEL: CPT

## 2023-07-20 PROCEDURE — 2580000003 HC RX 258: Performed by: SURGERY

## 2023-07-20 PROCEDURE — 84134 ASSAY OF PREALBUMIN: CPT

## 2023-07-20 PROCEDURE — 75574 CT ANGIO HRT W/3D IMAGE: CPT

## 2023-07-20 PROCEDURE — 2140000000 HC CCU INTERMEDIATE R&B

## 2023-07-20 PROCEDURE — 36415 COLL VENOUS BLD VENIPUNCTURE: CPT

## 2023-07-20 PROCEDURE — 6370000000 HC RX 637 (ALT 250 FOR IP): Performed by: INTERNAL MEDICINE

## 2023-07-20 PROCEDURE — 94640 AIRWAY INHALATION TREATMENT: CPT

## 2023-07-20 PROCEDURE — 6360000004 HC RX CONTRAST MEDICATION: Performed by: RADIOLOGY

## 2023-07-20 PROCEDURE — 2700000000 HC OXYGEN THERAPY PER DAY

## 2023-07-20 RX ORDER — NITROGLYCERIN 0.4 MG/1
0.8 TABLET SUBLINGUAL ONCE
Status: COMPLETED | OUTPATIENT
Start: 2023-07-20 | End: 2023-07-20

## 2023-07-20 RX ADMIN — OXYCODONE HYDROCHLORIDE AND ACETAMINOPHEN 1 TABLET: 5; 325 TABLET ORAL at 06:12

## 2023-07-20 RX ADMIN — LEVOTHYROXINE SODIUM 125 MCG: 0.12 TABLET ORAL at 06:10

## 2023-07-20 RX ADMIN — HEPARIN SODIUM 5000 UNITS: 10000 INJECTION INTRAVENOUS; SUBCUTANEOUS at 08:28

## 2023-07-20 RX ADMIN — IPRATROPIUM BROMIDE AND ALBUTEROL SULFATE 1 DOSE: .5; 2.5 SOLUTION RESPIRATORY (INHALATION) at 07:29

## 2023-07-20 RX ADMIN — SOTALOL HYDROCHLORIDE 120 MG: 120 TABLET ORAL at 20:55

## 2023-07-20 RX ADMIN — IOPAMIDOL 60 ML: 755 INJECTION, SOLUTION INTRAVENOUS at 12:04

## 2023-07-20 RX ADMIN — OXYCODONE HYDROCHLORIDE AND ACETAMINOPHEN 1 TABLET: 5; 325 TABLET ORAL at 17:46

## 2023-07-20 RX ADMIN — NITROGLYCERIN 0.8 MG: 0.4 TABLET SUBLINGUAL at 12:15

## 2023-07-20 RX ADMIN — SODIUM CHLORIDE, PRESERVATIVE FREE 10 ML: 5 INJECTION INTRAVENOUS at 08:23

## 2023-07-20 RX ADMIN — BUPROPION HYDROCHLORIDE 100 MG: 100 TABLET, EXTENDED RELEASE ORAL at 08:27

## 2023-07-20 RX ADMIN — PANTOPRAZOLE SODIUM 40 MG: 40 TABLET, DELAYED RELEASE ORAL at 06:10

## 2023-07-20 RX ADMIN — HEPARIN SODIUM 5000 UNITS: 10000 INJECTION INTRAVENOUS; SUBCUTANEOUS at 20:55

## 2023-07-20 RX ADMIN — METOPROLOL TARTRATE 50 MG: 25 TABLET, FILM COATED ORAL at 11:12

## 2023-07-20 RX ADMIN — AMLODIPINE BESYLATE 5 MG: 5 TABLET ORAL at 08:28

## 2023-07-20 RX ADMIN — SOTALOL HYDROCHLORIDE 120 MG: 120 TABLET ORAL at 08:27

## 2023-07-20 ASSESSMENT — PAIN DESCRIPTION - LOCATION
LOCATION: LEG
LOCATION: LEG

## 2023-07-20 ASSESSMENT — PAIN SCALES - GENERAL
PAINLEVEL_OUTOF10: 4
PAINLEVEL_OUTOF10: 0
PAINLEVEL_OUTOF10: 6

## 2023-07-20 ASSESSMENT — PAIN DESCRIPTION - DESCRIPTORS
DESCRIPTORS: ACHING;THROBBING
DESCRIPTORS: ACHING;NAGGING;DISCOMFORT

## 2023-07-20 ASSESSMENT — PAIN DESCRIPTION - ORIENTATION
ORIENTATION: LEFT
ORIENTATION: RIGHT;LEFT

## 2023-07-20 NOTE — PROGRESS NOTES
Nutrition Note    Nutrition consult note for wounds. Patient is currently NPO. When diet advances, will start Ensure Plus high protein supplement BID and Lonnie wound healing supplement BID to help meet increased nutritional needs from wound healing. See full nutrition assessment on 7/19 if needed.       Electronically signed by Miladis Moreira RD, KATLIN on 7/20/23 at 10:16 AM EDT    Contact: [phone]

## 2023-07-20 NOTE — H&P
7/20, HOSSEIN spoke with Charbel Hammond from [facility] rehab and patient has been accepted and precert was obtained on patient. Patient was met with earlier this am and updated on Soila. Patient can go to facility once CT of Coronary to be done today prior to discharge. Nursing updated on the above. Ambulance form, face sheet, PAS/RR and envelope nn soft chart./  HOSSEIN/CM to follow.       Francheska Jamison, [facility] Case Management  [phone]

## 2023-07-20 NOTE — CONSULTS
CARDIOLOGY CONSULTATION    Patient Name:  Gabrielle Yee    :  1950    Reason for Consultation:   Paroxysmal atrial fibrillation rapid response my: Systolic hypertension    History of Present Illness:   Gabrielle Yee presents to [facility] following history of having losing her balance and falling to the ground only to discover further advancement of her peripheral vascular disease which she was keenly aware of over the past few years. She denies any chest discomfort however she does not exert much secondary to her lower extremity claudication. She has no previous documented history of severe coronary artery disease but has suggestions of significant coronary disease based upon a previous CAT scan demonstrating calcification within the coronary arterial tree. Following admission she has undergone peripheral intervention by Dr. Dank Landeros however he is planning potential surgical base intervention in the very near future and wishes for her to have cardiovascular evaluation before proceeding. She has not undergone previous catheterization nor any recent nuclear stress testing.   Past Medical History:   has a past medical history of 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 with rest pain ([address]), Atrial fibrillation ([address]), Atypical mole, Bilateral carotid artery stenosis, Bruit of right carotid artery, CAD (coronary artery disease), Cancer ([address]), COPD (chronic obstructive pulmonary disease) ([address]), COVID, Depression, Diarrhea, Femoro-popliteal artery disease ([address]), History of tobacco use, Hydrocephalus ([address]), Hyperlipidemia, Hypertension, BRY (obstructive sleep apnea), Pain in both feet, Paroxysmal A-fib ([address]), Peripheral vascular disease due to secondary diabetes mellitus ([address]), PVD (peripheral vascular disease) ([address]), PVD (peripheral vascular disease) with claudication ([address]), Rheumatoid arthritis ([address]), Short-term memory loss, Thyroid disease, Urinary Profile:   Lab Results   Component Value Date/Time    TRIG 110 02/27/2020 05:15 AM    HDL 76 01/30/2019 04:48 AM    LDLCALC 76 01/30/2019 04:48 AM    CHOL 169 01/30/2019 04:48 AM      Thyroid:   Lab Results   Component Value Date    TSH 1.120 01/25/2022      Hemoglobin A1C: No components found for: HGBA1C   ECG:  See report    Radiology:  No results found. Assessment:    Principal Problem:    PVD (peripheral vascular disease) (HCC)  Active Problems:    Severe protein-calorie malnutrition ([address])    Atherosclerosis of native artery of both lower extremities with bilateral ulceration of calf (HCC)  Resolved Problems:    * No resolved hospital problems. *      Plan:  Mrs. Brisa Neri presents with a very difficult situation and that she most likely requires peripheral vascular surgical intervention at this point. Nonetheless statistically her incidence for coronary artery disease of significance is approximately 70% and further substantiated by evidence for coronary calcifications as per previous CAT scan. Thus it is of utmost importance that we minimize her exposure for cardiac event and would thus order a coronary CT angiogram as her renal function presently is normal.  Based upon the results further recommendations will be made but certainly would recommend continuation of a dihydropyridine in the form of amlodipine the hope of improving microvasculature perfusion. Additionally she should maintain her LDL cholesterol at < 70 mg/dL in accordance with updated 2020 ACC/AHA/AACE/ESC/EAS cholesterol guidelines. I have spent more than 55 face to face with Austyn Solis reviewing notes and laboratory data with greater than 50% of this time instructing and counseling the patient  regarding my findings and recommendations and I have answered all questions as posed to me by Ms. Elizabeth.  Thank you, Kristi Sanderson MD and Emmy Brown MD for allowing me to consult in the care of this

## 2023-07-21 ENCOUNTER — TELEPHONE (OUTPATIENT)
Dept: VASCULAR SURGERY | Age: 73
End: 2023-07-21

## 2023-07-21 VITALS
SYSTOLIC BLOOD PRESSURE: 134 MMHG | TEMPERATURE: 98.4 F | DIASTOLIC BLOOD PRESSURE: 77 MMHG | OXYGEN SATURATION: 92 % | HEART RATE: 70 BPM | HEIGHT: 66 IN | BODY MASS INDEX: 14.14 KG/M2 | WEIGHT: 88 LBS | RESPIRATION RATE: 20 BRPM

## 2023-07-21 LAB
ANION GAP SERPL CALCULATED.3IONS-SCNC: 11 MMOL/L (ref 7–16)
BUN SERPL-MCNC: 14 MG/DL (ref 6–23)
CALCIUM SERPL-MCNC: 8.4 MG/DL (ref 8.6–10.2)
CHLORIDE SERPL-SCNC: 106 MMOL/L (ref 98–107)
CO2 SERPL-SCNC: 26 MMOL/L (ref 22–29)
CREAT SERPL-MCNC: 0.5 MG/DL (ref 0.5–1)
ERYTHROCYTE [DISTWIDTH] IN BLOOD BY AUTOMATED COUNT: 14.1 % (ref 11.5–15)
GFR SERPL CREATININE-BSD FRML MDRD: >60 ML/MIN/1.73M2
GLUCOSE SERPL-MCNC: 195 MG/DL (ref 74–99)
HCT VFR BLD AUTO: 32 % (ref 34–48)
HGB BLD-MCNC: 10 G/DL (ref 11.5–15.5)
MCH RBC QN AUTO: 32.3 PG (ref 26–35)
MCHC RBC AUTO-ENTMCNC: 31.3 G/DL (ref 32–34.5)
MCV RBC AUTO: 103.2 FL (ref 80–99.9)
PLATELET # BLD AUTO: 318 K/UL (ref 130–450)
PMV BLD AUTO: 9.9 FL (ref 7–12)
POTASSIUM SERPL-SCNC: 3.8 MMOL/L (ref 3.5–5)
RBC # BLD AUTO: 3.1 M/UL (ref 3.5–5.5)
SODIUM SERPL-SCNC: 143 MMOL/L (ref 132–146)
WBC OTHER # BLD: 6.7 K/UL (ref 4.5–11.5)

## 2023-07-21 PROCEDURE — 6370000000 HC RX 637 (ALT 250 FOR IP): Performed by: SURGERY

## 2023-07-21 PROCEDURE — 94640 AIRWAY INHALATION TREATMENT: CPT

## 2023-07-21 PROCEDURE — 2140000000 HC CCU INTERMEDIATE R&B

## 2023-07-21 PROCEDURE — 99232 SBSQ HOSP IP/OBS MODERATE 35: CPT | Performed by: SURGERY

## 2023-07-21 PROCEDURE — 36415 COLL VENOUS BLD VENIPUNCTURE: CPT

## 2023-07-21 PROCEDURE — 6370000000 HC RX 637 (ALT 250 FOR IP): Performed by: INTERNAL MEDICINE

## 2023-07-21 PROCEDURE — 75574 CT ANGIO HRT W/3D IMAGE: CPT | Performed by: INTERNAL MEDICINE

## 2023-07-21 PROCEDURE — 2580000003 HC RX 258: Performed by: SURGERY

## 2023-07-21 PROCEDURE — 85027 COMPLETE CBC AUTOMATED: CPT

## 2023-07-21 PROCEDURE — 80048 BASIC METABOLIC PNL TOTAL CA: CPT

## 2023-07-21 PROCEDURE — 6360000002 HC RX W HCPCS: Performed by: SURGERY

## 2023-07-21 RX ORDER — IPRATROPIUM BROMIDE AND ALBUTEROL SULFATE 2.5; .5 MG/3ML; MG/3ML
3 SOLUTION RESPIRATORY (INHALATION) EVERY 4 HOURS PRN
Qty: 360 ML | Refills: 3 | Status: SHIPPED | OUTPATIENT
Start: 2023-07-21

## 2023-07-21 RX ORDER — OXYCODONE HYDROCHLORIDE AND ACETAMINOPHEN 5; 325 MG/1; MG/1
1 TABLET ORAL EVERY 6 HOURS PRN
Qty: 28 TABLET | Refills: 0 | Status: SHIPPED | OUTPATIENT
Start: 2023-07-21 | End: 2023-07-28

## 2023-07-21 RX ORDER — ROSUVASTATIN CALCIUM 5 MG/1
5 TABLET, COATED ORAL
Qty: 30 TABLET | Refills: 3 | Status: SHIPPED | OUTPATIENT
Start: 2023-07-24

## 2023-07-21 RX ADMIN — OXYCODONE HYDROCHLORIDE AND ACETAMINOPHEN 1 TABLET: 5; 325 TABLET ORAL at 06:03

## 2023-07-21 RX ADMIN — HEPARIN SODIUM 5000 UNITS: 10000 INJECTION INTRAVENOUS; SUBCUTANEOUS at 08:31

## 2023-07-21 RX ADMIN — ROSUVASTATIN 5 MG: 10 TABLET, FILM COATED ORAL at 08:31

## 2023-07-21 RX ADMIN — SOTALOL HYDROCHLORIDE 120 MG: 120 TABLET ORAL at 21:07

## 2023-07-21 RX ADMIN — IPRATROPIUM BROMIDE AND ALBUTEROL SULFATE 1 DOSE: .5; 2.5 SOLUTION RESPIRATORY (INHALATION) at 12:18

## 2023-07-21 RX ADMIN — HEPARIN SODIUM 5000 UNITS: 10000 INJECTION INTRAVENOUS; SUBCUTANEOUS at 21:08

## 2023-07-21 RX ADMIN — AMLODIPINE BESYLATE 5 MG: 5 TABLET ORAL at 08:31

## 2023-07-21 RX ADMIN — SODIUM CHLORIDE, PRESERVATIVE FREE 10 ML: 5 INJECTION INTRAVENOUS at 08:31

## 2023-07-21 RX ADMIN — LEVOTHYROXINE SODIUM 125 MCG: 0.12 TABLET ORAL at 05:56

## 2023-07-21 RX ADMIN — OXYCODONE HYDROCHLORIDE AND ACETAMINOPHEN 1 TABLET: 5; 325 TABLET ORAL at 19:04

## 2023-07-21 RX ADMIN — IPRATROPIUM BROMIDE AND ALBUTEROL SULFATE 1 DOSE: .5; 2.5 SOLUTION RESPIRATORY (INHALATION) at 07:30

## 2023-07-21 RX ADMIN — IPRATROPIUM BROMIDE AND ALBUTEROL SULFATE 1 DOSE: .5; 2.5 SOLUTION RESPIRATORY (INHALATION) at 15:27

## 2023-07-21 RX ADMIN — BUPROPION HYDROCHLORIDE 100 MG: 100 TABLET, EXTENDED RELEASE ORAL at 08:32

## 2023-07-21 RX ADMIN — SOTALOL HYDROCHLORIDE 120 MG: 120 TABLET ORAL at 08:31

## 2023-07-21 ASSESSMENT — PAIN DESCRIPTION - ORIENTATION
ORIENTATION: LEFT

## 2023-07-21 ASSESSMENT — PAIN SCALES - GENERAL
PAINLEVEL_OUTOF10: 5
PAINLEVEL_OUTOF10: 2
PAINLEVEL_OUTOF10: 2
PAINLEVEL_OUTOF10: 6

## 2023-07-21 ASSESSMENT — PAIN DESCRIPTION - DESCRIPTORS
DESCRIPTORS: ACHING;DISCOMFORT;NAGGING
DESCRIPTORS: ACHING;DISCOMFORT
DESCRIPTORS: ACHING

## 2023-07-21 ASSESSMENT — PAIN DESCRIPTION - LOCATION
LOCATION: LEG
LOCATION: GENERALIZED;LEG
LOCATION: LEG

## 2023-07-21 NOTE — CARE COORDINATION
7/21, SW spoke with Aisha from [facility]. Precert has been obtained and will be good until tomorrow. In anticipation of patient being discharged later today transportation has been set up for a 5pm  later today [facility]  Those informed:  Kin Hoot from facility and patient -who will contact her daughter on own on her going to [facility]. PAS/RR, ambulance form, face sheet and envelope on soft chart. SW/CM to follow.       Shan Olson, [facility] Case Management  [phone]

## 2023-07-21 NOTE — DISCHARGE INSTRUCTIONS
Discharge Instructions for Lower extremity angiogram    Wound Care:   Bilateral lower extremity wounds: Cleanse with normal saline and pat dry. Cover with aquacel (calcium alginate) and cover with dry dressing. Change daily and PRN. Groin Care  - keep clean and dry  - ok to shower or sponge bath  - ok to clean site with lukewarm water and mild soap  - use a soft wash cloth to gently wipe the incision area  - do not scrub the incision areas  - no swimming or baths    Home Care    Follow these guidelines after surgery:   Rest. Try to move as tolerated. A mix of rest and light activity improves healing. The incision area may be sore for a few days. To minimize pain and soreness: Take pain medicine as directed. Avoid strenuous activity and heavy lifting. Diet    You can return to your regular diet. You may work with a dietician who will help you follow a heart-healthy diet. Physical Activity    You will feel sore after the surgery. Try to walk steadily within two weeks. You may be able to return to normal activities within 1-3 weeks. While recovering, you will need to avoid strenuous activities, like heavy lifting. Ask your doctor when you will be able to return to work. Do not drive unless your doctor has given you permission to do so. Medications    Your doctor may recommend:   Over-the-counter or prescription pain medicine   Aspirin or a cholesterol-lowering drug to prevent complications   If you had to stop medicines before the procedure, ask your doctor when you can start again. Medicines that may have been stopped include:   Anti-inflammatory drugs (eg, aspirin, ibuprofen)   Blood thinners, like warfarin (Coumadin)   Clopidogrel (Plavix)   When taking medicines:   Take your medicine as directed. Do not change the amount or the schedule. Do not stop taking them without talking to your doctor. Do not share them. Know what results and side effects to look for.  Report them to your

## 2023-07-21 NOTE — PROGRESS NOTES
PROGRESS NOTE       PATIENT PROBLEM LIST:  Patient Active Problem List   Diagnosis Code    Essential hypertension I10    Depression F32. A    PVD (peripheral vascular disease) with claudication ([facility]) I73.9    History of tobacco use Z87.891    Asymptomatic bilateral carotid artery stenosis I65.23    Hyperlipidemia LDL goal <100 E78.5    Acquired hypothyroidism E03.9    Left groin pain R10.32    ROSELYN (acute kidney injury) ([address]) N17.9    Shock liver K72.00    Severe protein-calorie malnutrition (HCC) E43    Atherosclerosis of aortic bifurcation and common iliac arteries ([facility]) I70.0, I70.8    CAD (coronary artery disease) I25.10    History of bilateral carotid artery stenosis I65.23    Paroxysmal atrial fibrillation ([facility]) I48.0    Hydrocephalus ([facility]) G91.9    Laceration of scalp S01. 01XA    COPD with acute exacerbation ([facility]) J44.1    Acute hypoxemic respiratory failure (HCC) J96.01    COPD exacerbation ([facility]) J44.1    Acute respiratory failure with hypoxia ([facility]) J96.01    COVID-19 U07.1    Elevated glucose level R73.09    PVD (peripheral vascular disease) ([facility]) I73.9    Peripheral vascular disease due to secondary diabetes mellitus ([facility]) E13.51    S/P  shunt Z98.2    Atherosclerosis of artery of extremity with intermittent claudication ([facility]) I70.219    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 with ulceration of calf ([facility]) I70.242    PAD (peripheral artery disease) ([facility]) I73.9    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 Denies palpitations as well. REVIEW OF SYSTEMS:  General ROS: negative for - fatigue, malaise,  weight gain or weight loss  Psychological ROS: positive for - anxiety  Ophthalmic ROS: negative for - decreased vision or visual distortion.   ENT ROS: negative  Allergy and rales.  Heart: Regular  rhythm; S1 > S2, no gallop or murmur. No clicks, rub, palpable thrills   or heaves. PMI nondisplaced, 5th intercostal space MCL. Abdomen: Soft, nontender, nondistended,  scaphoid, no masses or organomegaly. Bowel sounds active. Extremities: Without clubbing, cyanosis or edema. Pulses present 3+ upper extermities bilaterally; absent DP  bilaterally and absent PT bilaterally. Data:   Scheduled Meds: Reviewed  Continuous Infusions:    sodium chloride             CBC:   Recent Labs     07/19/23 0757 07/20/23 0617   WBC 8.4 6.4   HGB 11.3* 10.2*   HCT 38.6 33.6*    305     BMP:  Recent Labs     07/19/23 0757 07/20/23 0617    146   K 3.6 3.9    106   CO2 27 30*   BUN 15 14   CREATININE 0.6 0.6   LABGLOM >60 >60     ABGs:   Lab Results   Component Value Date/Time    PH 7.378 01/25/2022 02:03 PM    PO2 135.8 01/25/2022 02:03 PM    PCO2 39.7 01/25/2022 02:03 PM     INR: No results for input(s): INR in the last 72 hours. PRO-BNP:   Lab Results   Component Value Date    PROBNP 2,172 (H) 12/29/2021    PROBNP 2,233 (H) 05/21/2021      TSH:   Lab Results   Component Value Date    TSH 1.120 01/25/2022      Cardiac Injury Profile: No results for input(s): TROPHS in the last 72 hours. Lipid Profile:   Lab Results   Component Value Date/Time    TRIG 110 02/27/2020 05:15 AM    HDL 76 01/30/2019 04:48 AM    LDLCALC 76 01/30/2019 04:48 AM    CHOL 169 01/30/2019 04:48 AM      Hemoglobin A1C: No components found for: HGBA1C      RAD:   No results found. EKG: See Report  Echo: See Report      IMPRESSIONS:  Patient Active Problem List   Diagnosis Code    Essential hypertension I10    Depression F32. A    PVD (peripheral vascular disease) with claudication (HCC) I73.9    History of tobacco use Z87.891    Asymptomatic bilateral carotid artery stenosis I65.23    Hyperlipidemia LDL goal <100 E78.5    Acquired hypothyroidism E03.9    Left groin pain R10.32    ROSELYN (acute kidney injury) Every effort was made to ensure accuracy; however, inadvertent computerized transcription errors may be present

## 2023-07-21 NOTE — PROGRESS NOTES
Discharge instructions given to patient. Educated the patient on the importance of medication compliance as well as the importance of follow up appointments. Ivs removed and heart monitor cleansed and placed back in nurses station. All questions answered appropriately. Patients belongings packed awaiting [facility] ambulance. Footwear, pants, shirt, cellphone, purse, bags.

## 2023-07-22 PROCEDURE — 6370000000 HC RX 637 (ALT 250 FOR IP): Performed by: SURGERY

## 2023-07-22 RX ADMIN — OXYCODONE HYDROCHLORIDE AND ACETAMINOPHEN 1 TABLET: 5; 325 TABLET ORAL at 00:56

## 2023-07-22 ASSESSMENT — PAIN DESCRIPTION - LOCATION: LOCATION: LEG

## 2023-07-22 ASSESSMENT — PAIN DESCRIPTION - DESCRIPTORS: DESCRIPTORS: THROBBING

## 2023-07-22 ASSESSMENT — PAIN DESCRIPTION - ONSET: ONSET: ON-GOING

## 2023-07-22 ASSESSMENT — PAIN DESCRIPTION - PAIN TYPE: TYPE: CHRONIC PAIN

## 2023-07-22 ASSESSMENT — PAIN DESCRIPTION - ORIENTATION: ORIENTATION: LEFT

## 2023-07-22 ASSESSMENT — PAIN - FUNCTIONAL ASSESSMENT: PAIN_FUNCTIONAL_ASSESSMENT: PREVENTS OR INTERFERES SOME ACTIVE ACTIVITIES AND ADLS

## 2023-07-22 ASSESSMENT — PAIN SCALES - GENERAL: PAINLEVEL_OUTOF10: 6

## 2023-07-22 ASSESSMENT — PAIN DESCRIPTION - FREQUENCY: FREQUENCY: CONTINUOUS

## 2023-07-24 NOTE — DISCHARGE SUMMARY
Vascular Surgery Discharge Summary    [facility] SUMMARY:                The patient is a 74 y.o. female who was admitted to the hospital on 7/18/2023  4:16 PM for treatment of peripheral vascular disease and left lower extremity tissue loss. On the day of admission, an angiogram was performed. There was some concern due to postop leg swelling, patient was wrapped and elevated. Her swelling did improved. Unfotunately during angiogram unable to open up sfa. So we did discuss possible bypass. She was evaluated by cardiology Dr James Umanzor who deemed her his risk. She was also noted to be malnourished. Which would also make her higher risk for wound healing. Decision was made to have her go to rehab, try and improve her mobility and nutrition. Plan is to do fu in 4 weeks with possible angiogram in 6-7 weeks  to try and avoid bypass. Cardiology would also consider possible PCI in future depending on how she is doing    The patient's hospital course was uncomplicated and consisted of  puncture site observation, and a return to normal oral intake. The patient was discharged on 7/22/2023  1:00 AM tolerating a diet, moving bowels, and urinating without difficulty. The puncture site was clean and intact. The patient was discharged to skilled rehab in satisfactory condition with instructions to call the office for a follow up appointment. 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  *         Discharge Medications:      Medication List        START taking these medications      oxyCODONE-acetaminophen 5-325 MG per tablet  Commonly known as: PERCOCET  Take 1 tablet by mouth every 6 hours as needed for Pain for up

## 2023-08-02 ENCOUNTER — TELEPHONE (OUTPATIENT)
Dept: VASCULAR SURGERY | Age: 73
End: 2023-08-02

## 2023-08-02 NOTE — TELEPHONE ENCOUNTER
Message left on pt's voicemail for a return call to schedule a repeat angiogram with Dr. Delgado Pac (? 9/5)

## 2023-08-14 ENCOUNTER — OFFICE VISIT (OUTPATIENT)
Dept: PODIATRY | Age: 73
End: 2023-08-14
Payer: MEDICARE

## 2023-08-14 VITALS — BODY MASS INDEX: 14.46 KG/M2 | HEIGHT: 66 IN | WEIGHT: 90 LBS

## 2023-08-14 DIAGNOSIS — L03.116 CELLULITIS OF LEFT LEG: ICD-10-CM

## 2023-08-14 DIAGNOSIS — I73.9 PVD (PERIPHERAL VASCULAR DISEASE) (HCC): ICD-10-CM

## 2023-08-14 DIAGNOSIS — R26.2 DIFFICULTY WALKING: ICD-10-CM

## 2023-08-14 DIAGNOSIS — Z87.891 HISTORY OF TOBACCO USE: ICD-10-CM

## 2023-08-14 DIAGNOSIS — I70.242 ATHEROSCLEROSIS OF NATIVE ARTERY OF LEFT LOWER EXTREMITY WITH ULCERATION OF CALF (HCC): Primary | ICD-10-CM

## 2023-08-14 PROCEDURE — G8419 CALC BMI OUT NRM PARAM NOF/U: HCPCS | Performed by: PODIATRIST

## 2023-08-14 PROCEDURE — G8427 DOCREV CUR MEDS BY ELIG CLIN: HCPCS | Performed by: PODIATRIST

## 2023-08-14 PROCEDURE — 1036F TOBACCO NON-USER: CPT | Performed by: PODIATRIST

## 2023-08-14 PROCEDURE — 1111F DSCHRG MED/CURRENT MED MERGE: CPT | Performed by: PODIATRIST

## 2023-08-14 PROCEDURE — 1090F PRES/ABSN URINE INCON ASSESS: CPT | Performed by: PODIATRIST

## 2023-08-14 PROCEDURE — 99214 OFFICE O/P EST MOD 30 MIN: CPT | Performed by: PODIATRIST

## 2023-08-14 PROCEDURE — 3017F COLORECTAL CA SCREEN DOC REV: CPT | Performed by: PODIATRIST

## 2023-08-14 PROCEDURE — 1123F ACP DISCUSS/DSCN MKR DOCD: CPT | Performed by: PODIATRIST

## 2023-08-14 PROCEDURE — G8400 PT W/DXA NO RESULTS DOC: HCPCS | Performed by: PODIATRIST

## 2023-08-14 RX ORDER — DOXYCYCLINE HYCLATE 100 MG
100 TABLET ORAL 2 TIMES DAILY
Qty: 28 TABLET | Refills: 0 | Status: SHIPPED | OUTPATIENT
Start: 2023-08-14 | End: 2023-08-28

## 2023-08-14 NOTE — PROGRESS NOTES
Patient is in today for evaluation of left ankle wound. Patients says while she was in a nursing home, her wound got worse and it very painful.  Pcp is Maria Del Carmen Izaguirre MD  Last ov 6/20/23

## 2023-08-15 DIAGNOSIS — Z87.891 HISTORY OF TOBACCO USE: ICD-10-CM

## 2023-08-15 DIAGNOSIS — L03.116 CELLULITIS OF LEFT LEG: ICD-10-CM

## 2023-08-15 DIAGNOSIS — I70.242 ATHEROSCLEROSIS OF NATIVE ARTERY OF LEFT LOWER EXTREMITY WITH ULCERATION OF CALF (HCC): ICD-10-CM

## 2023-08-15 DIAGNOSIS — I73.9 PVD (PERIPHERAL VASCULAR DISEASE) (HCC): ICD-10-CM

## 2023-08-15 NOTE — PROGRESS NOTES
8/15/23     Howie Trujillo    : 1950   Sex: female    Age: 74 y.o. Patient's PCP/Provider is:  Page Alvarenga MD    Subjective:  Patient is seen today for follow-up regarding continued evaluation regarding chronic arterial ulceration left lower extremity. Patient just got discharged from a nursing facility recently. Patient presented today due to the fact of increased swelling and redness to her left lower leg. Patient is in no acute distress. She denies any nausea, vomiting, fever, chills. Chief Complaint   Patient presents with    Wound Check     Left ankle        ROS:  Const: Positives and pertinent negatives as per HPI. Musculo: Denies symptoms other than stated above. Neuro: Denies symptoms other than stated above. Skin: Denies symptoms other than stated above.     Current Medications:    Current Outpatient Medications:     doxycycline hyclate (VIBRA-TABS) 100 MG tablet, Take 1 tablet by mouth 2 times daily for 14 days, Disp: 28 tablet, Rfl: 0    ipratropium 0.5 mg-albuterol 2.5 mg (DUONEB) 0.5-2.5 (3) MG/3ML SOLN nebulizer solution, Inhale 3 mLs into the lungs every 4 hours as needed for Wheezing, Disp: 360 mL, Rfl: 3    rosuvastatin (CRESTOR) 5 MG tablet, Take 1 tablet by mouth three times a week, Disp: 30 tablet, Rfl: 3    fluticasone-umeclidin-vilant (TRELEGY ELLIPTA) 200-62.5-25 MCG/ACT AEPB inhaler, Inhale 1 puff into the lungs daily, Disp: , Rfl:     etodolac (LODINE) 400 MG tablet, Take 1 tablet by mouth 2 times daily, Disp: , Rfl:     vibegron (GEMTESA) 75 MG TABS tablet, Take by mouth daily, Disp: , Rfl:     levothyroxine (SYNTHROID) 88 MCG tablet, 125 mcg, Disp: , Rfl:     omeprazole (PRILOSEC) 40 MG delayed release capsule, Take 1 capsule by mouth daily, Disp: , Rfl:     OXYGEN, Inhale 3 L/min into the lungs nightly, Disp: , Rfl:     Apoaequorin (PREVAGEN PO), Take by mouth, Disp: , Rfl:     vitamin E 400 UNIT capsule, Take 1 capsule by mouth daily, Disp: , Rfl:

## 2023-08-18 LAB
CULTURE: ABNORMAL
CULTURE: ABNORMAL
DIRECT EXAM: ABNORMAL
DIRECT EXAM: ABNORMAL
SPECIMEN DESCRIPTION: ABNORMAL

## 2023-08-21 ENCOUNTER — OFFICE VISIT (OUTPATIENT)
Dept: PODIATRY | Age: 73
End: 2023-08-21
Payer: MEDICARE

## 2023-08-21 VITALS — WEIGHT: 90 LBS | HEIGHT: 66 IN | BODY MASS INDEX: 14.46 KG/M2

## 2023-08-21 DIAGNOSIS — I73.9 PVD (PERIPHERAL VASCULAR DISEASE) (HCC): ICD-10-CM

## 2023-08-21 DIAGNOSIS — I70.242 ATHEROSCLEROSIS OF NATIVE ARTERY OF LEFT LOWER EXTREMITY WITH ULCERATION OF CALF (HCC): Primary | ICD-10-CM

## 2023-08-21 DIAGNOSIS — R26.2 DIFFICULTY WALKING: ICD-10-CM

## 2023-08-21 DIAGNOSIS — Z87.891 HISTORY OF TOBACCO USE: ICD-10-CM

## 2023-08-21 PROCEDURE — 1090F PRES/ABSN URINE INCON ASSESS: CPT | Performed by: PODIATRIST

## 2023-08-21 PROCEDURE — G8419 CALC BMI OUT NRM PARAM NOF/U: HCPCS | Performed by: PODIATRIST

## 2023-08-21 PROCEDURE — G8400 PT W/DXA NO RESULTS DOC: HCPCS | Performed by: PODIATRIST

## 2023-08-21 PROCEDURE — 1123F ACP DISCUSS/DSCN MKR DOCD: CPT | Performed by: PODIATRIST

## 2023-08-21 PROCEDURE — 3017F COLORECTAL CA SCREEN DOC REV: CPT | Performed by: PODIATRIST

## 2023-08-21 PROCEDURE — 1111F DSCHRG MED/CURRENT MED MERGE: CPT | Performed by: PODIATRIST

## 2023-08-21 PROCEDURE — 99214 OFFICE O/P EST MOD 30 MIN: CPT | Performed by: PODIATRIST

## 2023-08-21 PROCEDURE — 1036F TOBACCO NON-USER: CPT | Performed by: PODIATRIST

## 2023-08-21 PROCEDURE — G8427 DOCREV CUR MEDS BY ELIG CLIN: HCPCS | Performed by: PODIATRIST

## 2023-08-21 RX ORDER — LEVOFLOXACIN 500 MG/1
500 TABLET, FILM COATED ORAL DAILY
Qty: 10 TABLET | Refills: 0 | Status: SHIPPED | OUTPATIENT
Start: 2023-08-21 | End: 2023-08-31

## 2023-08-21 NOTE — DISCHARGE INSTRUCTIONS
Visit Discharge/Physician Orders    Discharge condition: {STABLE/UNSTABLE:559208463}    Assessment of pain at discharge:yes    Anesthetic used: 4% lidocaine solution    Discharge to: Home    Left via:Private automobile    Accompanied by:  self    ECF/HHA: [facility]-Fax # [phone]    Dressing Orders: LEFT MEDIAL ANKLE-Cleanse with normal saline, pack loosely with calcium alginate, dry dressing and secure. Change daily. Apply spandagrip. On in am and off in pm. Elevate legs as much as possible above level of the heart. Treatment Orders: Eat a diet high in protein and vitamin C. Take a multiple vitamin daily unless contraindicated. Keep all pressure off of wound site. [address] [place] [address] followup visit : 1 week_____________________________  (Please note your next appointment above and if you are unable to keep, kindly give a 24 hour notice. Thank you.)    Physician signature:__________________________    If you experience any of the following, please call the [facility] during business hours:    * Increase in Pain  * Temperature over 101  * Increase in drainage from your wound  * Drainage with a foul odor  * Bleeding  * Increase in swelling  * Need for compression bandage changes due to slippage, breakthrough drainage. If you need medical attention outside of the business hours of the [facility] please contact your PCP or go to the nearest emergency room.

## 2023-08-22 NOTE — PROGRESS NOTES
23     Moses Mosqueda    : 1950   Sex: female    Age: 74 y.o. Patient's PCP/Provider is:  Ingrid Waggoner MD    Subjective:  Patient is seen today for follow-up regarding continued evaluation regarding ulceration left lower extremity. Patient stated she is still having some swelling and pain at the wound site. Patient is in no acute distress. Patient denies any nausea, vomiting, fever, chills. Patient does have an appointment this week with Dr. Cristian Moore at the wound care center. No other additional abnormalities noted. Chief Complaint   Patient presents with    Wound Check     Left leg ulcer       ROS:  Const: Positives and pertinent negatives as per HPI. Musculo: Denies symptoms other than stated above. Neuro: Denies symptoms other than stated above. Skin: Denies symptoms other than stated above.     Current Medications:    Current Outpatient Medications:     levoFLOXacin (LEVAQUIN) 500 MG tablet, Take 1 tablet by mouth daily for 10 days, Disp: 10 tablet, Rfl: 0    doxycycline hyclate (VIBRA-TABS) 100 MG tablet, Take 1 tablet by mouth 2 times daily for 14 days, Disp: 28 tablet, Rfl: 0    ipratropium 0.5 mg-albuterol 2.5 mg (DUONEB) 0.5-2.5 (3) MG/3ML SOLN nebulizer solution, Inhale 3 mLs into the lungs every 4 hours as needed for Wheezing, Disp: 360 mL, Rfl: 3    rosuvastatin (CRESTOR) 5 MG tablet, Take 1 tablet by mouth three times a week, Disp: 30 tablet, Rfl: 3    fluticasone-umeclidin-vilant (TRELEGY ELLIPTA) 200-62.5-25 MCG/ACT AEPB inhaler, Inhale 1 puff into the lungs daily, Disp: , Rfl:     etodolac (LODINE) 400 MG tablet, Take 1 tablet by mouth 2 times daily, Disp: , Rfl:     vibegron (GEMTESA) 75 MG TABS tablet, Take by mouth daily, Disp: , Rfl:     levothyroxine (SYNTHROID) 88 MCG tablet, 125 mcg, Disp: , Rfl:     omeprazole (PRILOSEC) 40 MG delayed release capsule, Take 1 capsule by mouth daily, Disp: , Rfl:     OXYGEN, Inhale 3 L/min into the lungs nightly, Disp: , Rfl:

## 2023-08-23 ENCOUNTER — HOSPITAL ENCOUNTER (OUTPATIENT)
Dept: WOUND CARE | Age: 73
Discharge: HOME | End: 2023-08-23
Payer: MEDICARE

## 2023-08-23 ENCOUNTER — TELEPHONE (OUTPATIENT)
Dept: PODIATRY | Age: 73
End: 2023-08-23

## 2023-08-23 VITALS
TEMPERATURE: 97.1 F | WEIGHT: 90 LBS | BODY MASS INDEX: 14.46 KG/M2 | RESPIRATION RATE: 18 BRPM | HEART RATE: 54 BPM | HEIGHT: 66 IN | SYSTOLIC BLOOD PRESSURE: 163 MMHG | DIASTOLIC BLOOD PRESSURE: 68 MMHG

## 2023-08-23 DIAGNOSIS — I70.242 ATHEROSCLEROSIS OF NATIVE ARTERY OF LEFT LOWER EXTREMITY WITH ULCERATION OF CALF (HCC): Primary | ICD-10-CM

## 2023-08-23 PROCEDURE — 99214 OFFICE O/P EST MOD 30 MIN: CPT

## 2023-08-23 PROCEDURE — 11042 DBRDMT SUBQ TIS 1ST 20SQCM/<: CPT

## 2023-08-23 RX ORDER — LIDOCAINE HYDROCHLORIDE 40 MG/ML
SOLUTION TOPICAL ONCE
Status: COMPLETED | OUTPATIENT
Start: 2023-08-23 | End: 2023-08-23

## 2023-08-23 RX ADMIN — LIDOCAINE HYDROCHLORIDE 5 ML: 40 SOLUTION TOPICAL at 12:11

## 2023-08-24 NOTE — PROGRESS NOTES
07/18/23 Leg Right; Anterior (Active)   Number of days: 36       Incision 09/18/20 Back Lower;Medial (Active)   Number of days: 0010       Procedure Note  Indications:  Based on my examination of this patient's wound(s)/ulcer(s) today, debridement {IS/IS NOT:1349786851} required to promote healing and evaluate the wound base. Performed by: Lokesh Murdock MD    Consent obtained:  {yes NL:859878}    Time out taken:  {yes no:947825}    Pain Control: Anesthetic  Anesthetic: 4% Lidocaine Liquid Topical     Debridement:{DEBRIDEMENT PROCEDURE:17479156}    Using {DEBRIDEMENT INSTRUMENTS:87429} the wound(s)/ulcer(s) was/were sharply debrided down through and including the removal of {WOUND CARE DEBRIDEMENT:291899400}. Devitalized Tissue Debrided:  {DEVITALIZED TISSUE DEBRIDED:322865936} to stimulate bleeding to promote healing, post debridement good bleeding base and wound edges noted    Wound/Ulcer #: { WOUNDS NUMBERS:762158926}    Percent of Wound/Ulcer Debrided: {0-100%:680672852}    Total Surface Area Debrided:  *** sq cm     Estimated Blood Loss:  {ESTIMATED BLOOD XQJR:212124605}  Hemostasis Achieved:  {CONTROLLED LGKP:468379290}    Procedural Pain:  {NUMBERS 0-10:28382}  / 10   Post Procedural Pain:  {NUMBERS 0-10:06501} / 10     Response to treatment:  [address] TOLERATED:452699}     Plan:   Treatment Note please see attached Discharge Instructions    Written patient dismissal instructions given to patient and signed by patient or POA. Discharge Instructions         Visit Discharge/Physician Orders    Discharge condition: {STABLE/UNSTABLE:486226755}    Assessment of pain at discharge:yes    Anesthetic used: 4% lidocaine solution    Discharge to: Home    Left via:Private automobile    Accompanied by:  self    ECF/HHA: [facility]-Fax # [phone]    Dressing Orders: LEFT MEDIAL ANKLE-Cleanse with normal saline, pack loosely with calcium alginate, dry dressing and secure.  Change

## 2023-08-28 ENCOUNTER — OFFICE VISIT (OUTPATIENT)
Dept: PODIATRY | Age: 73
End: 2023-08-28
Payer: MEDICARE

## 2023-08-28 VITALS — WEIGHT: 90 LBS | HEIGHT: 66 IN | BODY MASS INDEX: 14.46 KG/M2

## 2023-08-28 DIAGNOSIS — Z87.891 HISTORY OF TOBACCO USE: ICD-10-CM

## 2023-08-28 DIAGNOSIS — R26.2 DIFFICULTY WALKING: ICD-10-CM

## 2023-08-28 DIAGNOSIS — I73.9 PVD (PERIPHERAL VASCULAR DISEASE) (HCC): ICD-10-CM

## 2023-08-28 DIAGNOSIS — I70.242 ATHEROSCLEROSIS OF NATIVE ARTERY OF LEFT LOWER EXTREMITY WITH ULCERATION OF CALF (HCC): Primary | ICD-10-CM

## 2023-08-28 PROCEDURE — 99214 OFFICE O/P EST MOD 30 MIN: CPT | Performed by: PODIATRIST

## 2023-08-28 PROCEDURE — 1036F TOBACCO NON-USER: CPT | Performed by: PODIATRIST

## 2023-08-28 PROCEDURE — 3017F COLORECTAL CA SCREEN DOC REV: CPT | Performed by: PODIATRIST

## 2023-08-28 PROCEDURE — 1123F ACP DISCUSS/DSCN MKR DOCD: CPT | Performed by: PODIATRIST

## 2023-08-28 PROCEDURE — G8400 PT W/DXA NO RESULTS DOC: HCPCS | Performed by: PODIATRIST

## 2023-08-28 PROCEDURE — G8419 CALC BMI OUT NRM PARAM NOF/U: HCPCS | Performed by: PODIATRIST

## 2023-08-28 PROCEDURE — G8427 DOCREV CUR MEDS BY ELIG CLIN: HCPCS | Performed by: PODIATRIST

## 2023-08-28 PROCEDURE — 1090F PRES/ABSN URINE INCON ASSESS: CPT | Performed by: PODIATRIST

## 2023-08-28 RX ORDER — GENTAMICIN SULFATE 1 MG/G
CREAM TOPICAL
Qty: 30 G | Refills: 4 | Status: SHIPPED | OUTPATIENT
Start: 2023-08-28

## 2023-08-28 NOTE — PROGRESS NOTES
Patient is here today with a follow up on a left leg wound. She reports terrible pain at night. She confirms taking 5 days of the antibiotics instead of the 10 day prescribed amount due to being concerned about side effects. She is currently using a walker now to ambulate due to unsteadiness and pain. PCP is Dr. Rukhsana Rea, last seen 06/20/2023.

## 2023-08-28 NOTE — PROGRESS NOTES
23     Mya Love    : 1950   Sex: female    Age: 74 y.o. Patient's PCP/Provider is:  Celine Barnett MD    Subjective:  Patient is seen today for follow-up regarding evaluation regarding arterial ulceration left lower extremity. Patient did go to the wound care center last week and did see the vascular service, they did debride the ulceration at that time. Patient stated she only took the oral antibiotic which she was concerned about side effects. Patient is in no acute distress, she denies any nausea, vomiting, fever, chills. She does have a follow-up appointment with the wound care center this coming week. She denies any additional issues at this time. Chief Complaint   Patient presents with    Leg Pain     Left leg wound        ROS:  Const: Positives and pertinent negatives as per HPI. Musculo: Denies symptoms other than stated above. Neuro: Denies symptoms other than stated above. Skin: Denies symptoms other than stated above. Current Medications:    Current Outpatient Medications:     gentamicin (GARAMYCIN) 0.1 % cream, Apply topically 1 times daily with dressing changes. , Disp: 30 g, Rfl: 4    levoFLOXacin (LEVAQUIN) 500 MG tablet, Take 1 tablet by mouth daily for 10 days, Disp: 10 tablet, Rfl: 0    doxycycline hyclate (VIBRA-TABS) 100 MG tablet, Take 1 tablet by mouth 2 times daily for 14 days, Disp: 28 tablet, Rfl: 0    ipratropium 0.5 mg-albuterol 2.5 mg (DUONEB) 0.5-2.5 (3) MG/3ML SOLN nebulizer solution, Inhale 3 mLs into the lungs every 4 hours as needed for Wheezing, Disp: 360 mL, Rfl: 3    rosuvastatin (CRESTOR) 5 MG tablet, Take 1 tablet by mouth three times a week, Disp: 30 tablet, Rfl: 3    fluticasone-umeclidin-vilant (TRELEGY ELLIPTA) 200-62.5-25 MCG/ACT AEPB inhaler, Inhale 1 puff into the lungs daily, Disp: , Rfl:     etodolac (LODINE) 400 MG tablet, Take 1 tablet by mouth 2 times daily, Disp: , Rfl:     vibegron (GEMTESA) 75 MG TABS tablet, Take by mouth

## 2023-08-29 NOTE — DISCHARGE INSTRUCTIONS
Visit Discharge/Physician Orders     Discharge condition: Stable     Assessment of pain at discharge:yes     Anesthetic used: 4% lidocaine solution     Discharge to: Home     Left via:Private automobile     Accompanied by:  self     ECF/HHA: [facility]-Fax # [phone]     Dressing Orders: LEFT MEDIAL ANKLE-Cleanse with normal saline, pack loosely with calcium alginate, dry dressing and secure. Change daily. Apply spandagrip. On in am and off in pm. Elevate legs as much as possible above level of the heart. Treatment Orders: Eat a diet high in protein and vitamin C. Take a multiple vitamin daily unless contraindicated. Keep all pressure off of wound site. [facility] followup visit : 1 week_____________________________  (Please note your next appointment above and if you are unable to keep, kindly give a 24 hour notice. Thank you.)     Physician signature:__________________________     If you experience any of the following, please call the [facility] during business hours:     * Increase in Pain  * Temperature over 101  * Increase in drainage from your wound  * Drainage with a foul odor  * Bleeding  * Increase in swelling  * Need for compression bandage changes due to slippage, breakthrough drainage. If you need medical attention outside of the business hours of the [facility] please contact your PCP or go to the nearest emergency room.

## 2023-08-30 ENCOUNTER — HOSPITAL ENCOUNTER (OUTPATIENT)
Dept: WOUND CARE | Age: 73
Discharge: HOME | End: 2023-08-30
Payer: MEDICARE

## 2023-08-30 VITALS
WEIGHT: 90 LBS | TEMPERATURE: 97 F | HEART RATE: 62 BPM | RESPIRATION RATE: 18 BRPM | BODY MASS INDEX: 14.46 KG/M2 | SYSTOLIC BLOOD PRESSURE: 158 MMHG | DIASTOLIC BLOOD PRESSURE: 71 MMHG | HEIGHT: 66 IN

## 2023-08-30 DIAGNOSIS — I70.242 ATHEROSCLEROSIS OF NATIVE ARTERY OF LEFT LOWER EXTREMITY WITH ULCERATION OF CALF (HCC): Primary | ICD-10-CM

## 2023-08-30 DIAGNOSIS — I70.232 ATHEROSCLEROSIS OF NATIVE ARTERY OF BOTH LOWER EXTREMITIES WITH BILATERAL ULCERATION OF CALF (HCC): ICD-10-CM

## 2023-08-30 DIAGNOSIS — I70.242 ATHEROSCLEROSIS OF NATIVE ARTERY OF BOTH LOWER EXTREMITIES WITH BILATERAL ULCERATION OF CALF (HCC): ICD-10-CM

## 2023-08-30 PROCEDURE — 11042 DBRDMT SUBQ TIS 1ST 20SQCM/<: CPT

## 2023-08-30 RX ORDER — LIDOCAINE HYDROCHLORIDE 20 MG/ML
JELLY TOPICAL ONCE
OUTPATIENT
Start: 2023-08-30 | End: 2023-08-30

## 2023-08-30 RX ORDER — LIDOCAINE HYDROCHLORIDE 40 MG/ML
SOLUTION TOPICAL ONCE
OUTPATIENT
Start: 2023-08-30 | End: 2023-08-30

## 2023-08-30 RX ORDER — LIDOCAINE HYDROCHLORIDE 40 MG/ML
SOLUTION TOPICAL ONCE
Status: COMPLETED | OUTPATIENT
Start: 2023-08-30 | End: 2023-08-30

## 2023-08-30 RX ORDER — LIDOCAINE 40 MG/G
CREAM TOPICAL ONCE
OUTPATIENT
Start: 2023-08-30 | End: 2023-08-30

## 2023-08-30 RX ORDER — BETAMETHASONE DIPROPIONATE 0.05 %
OINTMENT (GRAM) TOPICAL ONCE
OUTPATIENT
Start: 2023-08-30 | End: 2023-08-30

## 2023-08-30 RX ORDER — IBUPROFEN 200 MG
TABLET ORAL ONCE
OUTPATIENT
Start: 2023-08-30 | End: 2023-08-30

## 2023-08-30 RX ORDER — SODIUM CHLOR/HYPOCHLOROUS ACID 0.033 %
SOLUTION, IRRIGATION IRRIGATION ONCE
OUTPATIENT
Start: 2023-08-30 | End: 2023-08-30

## 2023-08-30 RX ORDER — BACITRACIN ZINC AND POLYMYXIN B SULFATE 500; 1000 [USP'U]/G; [USP'U]/G
OINTMENT TOPICAL ONCE
OUTPATIENT
Start: 2023-08-30 | End: 2023-08-30

## 2023-08-30 RX ORDER — CLOBETASOL PROPIONATE 0.5 MG/G
OINTMENT TOPICAL ONCE
OUTPATIENT
Start: 2023-08-30 | End: 2023-08-30

## 2023-08-30 RX ORDER — TRAMADOL HYDROCHLORIDE 50 MG/1
50 TABLET ORAL EVERY 4 HOURS PRN
Qty: 42 TABLET | Refills: 0 | Status: SHIPPED | OUTPATIENT
Start: 2023-08-30 | End: 2023-09-06

## 2023-08-30 RX ORDER — BACITRACIN ZINC 500 [USP'U]/G
OINTMENT TOPICAL ONCE
OUTPATIENT
Start: 2023-08-30 | End: 2023-08-30

## 2023-08-30 RX ORDER — GENTAMICIN SULFATE 1 MG/G
OINTMENT TOPICAL ONCE
OUTPATIENT
Start: 2023-08-30 | End: 2023-08-30

## 2023-08-30 RX ORDER — LIDOCAINE 50 MG/G
OINTMENT TOPICAL ONCE
OUTPATIENT
Start: 2023-08-30 | End: 2023-08-30

## 2023-08-30 RX ADMIN — LIDOCAINE HYDROCHLORIDE 10 ML: 40 SOLUTION TOPICAL at 10:47

## 2023-09-05 NOTE — DISCHARGE INSTRUCTIONS
Visit Discharge/Physician Orders     Discharge condition: Stable     Assessment of pain at discharge:yes     Anesthetic used: 4% lidocaine solution     Discharge to: Home     Left via:Private automobile     Accompanied by:  self     ECF/HHA: [facility]-Fax # [phone]     Dressing Orders: LEFT MEDIAL ANKLE-Cleanse with normal saline, pack loosely with calcium alginate, dry dressing and secure. Change daily. Apply spandagrip. On in am and off in pm. Elevate legs as much as possible above level of the heart. Treatment Orders: Eat a diet high in protein and vitamin C. Take a multiple vitamin daily unless contraindicated. Keep all pressure off of wound site. [address] [place] [address] followup visit : 1 week_____________________________  (Please note your next appointment above and if you are unable to keep, kindly give a 24 hour notice. Thank you.)     Physician signature:__________________________     If you experience any of the following, please call the [facility] during business hours:     * Increase in Pain  * Temperature over 101  * Increase in drainage from your wound  * Drainage with a foul odor  * Bleeding  * Increase in swelling  * Need for compression bandage changes due to slippage, breakthrough drainage. If you need medical attention outside of the business hours of the [facility] please contact your PCP or go to the nearest emergency room.

## 2023-09-06 ENCOUNTER — HOSPITAL ENCOUNTER (OUTPATIENT)
Dept: WOUND CARE | Age: 73
Discharge: HOME | End: 2023-09-06
Payer: MEDICARE

## 2023-09-06 VITALS — HEART RATE: 60 BPM | DIASTOLIC BLOOD PRESSURE: 64 MMHG | RESPIRATION RATE: 18 BRPM | SYSTOLIC BLOOD PRESSURE: 136 MMHG

## 2023-09-06 DIAGNOSIS — I70.242 ATHEROSCLEROSIS OF NATIVE ARTERY OF LEFT LOWER EXTREMITY WITH ULCERATION OF CALF (HCC): Primary | ICD-10-CM

## 2023-09-06 PROCEDURE — 11042 DBRDMT SUBQ TIS 1ST 20SQCM/<: CPT

## 2023-09-06 RX ORDER — BETAMETHASONE DIPROPIONATE 0.05 %
OINTMENT (GRAM) TOPICAL ONCE
OUTPATIENT
Start: 2023-09-06 | End: 2023-09-06

## 2023-09-06 RX ORDER — LIDOCAINE 40 MG/G
CREAM TOPICAL ONCE
OUTPATIENT
Start: 2023-09-06 | End: 2023-09-06

## 2023-09-06 RX ORDER — GENTAMICIN SULFATE 1 MG/G
OINTMENT TOPICAL ONCE
OUTPATIENT
Start: 2023-09-06 | End: 2023-09-06

## 2023-09-06 RX ORDER — LIDOCAINE HYDROCHLORIDE 20 MG/ML
JELLY TOPICAL ONCE
OUTPATIENT
Start: 2023-09-06 | End: 2023-09-06

## 2023-09-06 RX ORDER — LIDOCAINE HYDROCHLORIDE 40 MG/ML
SOLUTION TOPICAL ONCE
Status: COMPLETED | OUTPATIENT
Start: 2023-09-06 | End: 2023-09-06

## 2023-09-06 RX ORDER — BACITRACIN ZINC 500 [USP'U]/G
OINTMENT TOPICAL ONCE
OUTPATIENT
Start: 2023-09-06 | End: 2023-09-06

## 2023-09-06 RX ORDER — IBUPROFEN 200 MG
TABLET ORAL ONCE
OUTPATIENT
Start: 2023-09-06 | End: 2023-09-06

## 2023-09-06 RX ORDER — LIDOCAINE 50 MG/G
OINTMENT TOPICAL ONCE
OUTPATIENT
Start: 2023-09-06 | End: 2023-09-06

## 2023-09-06 RX ORDER — SODIUM CHLOR/HYPOCHLOROUS ACID 0.033 %
SOLUTION, IRRIGATION IRRIGATION ONCE
OUTPATIENT
Start: 2023-09-06 | End: 2023-09-06

## 2023-09-06 RX ORDER — BACITRACIN ZINC AND POLYMYXIN B SULFATE 500; 1000 [USP'U]/G; [USP'U]/G
OINTMENT TOPICAL ONCE
OUTPATIENT
Start: 2023-09-06 | End: 2023-09-06

## 2023-09-06 RX ORDER — LIDOCAINE HYDROCHLORIDE 40 MG/ML
SOLUTION TOPICAL ONCE
OUTPATIENT
Start: 2023-09-06 | End: 2023-09-06

## 2023-09-06 RX ORDER — CLOBETASOL PROPIONATE 0.5 MG/G
OINTMENT TOPICAL ONCE
OUTPATIENT
Start: 2023-09-06 | End: 2023-09-06

## 2023-09-06 RX ADMIN — LIDOCAINE HYDROCHLORIDE 5 ML: 40 SOLUTION TOPICAL at 10:36

## 2023-09-06 ASSESSMENT — PAIN SCALES - GENERAL: PAINLEVEL_OUTOF10: 3

## 2023-09-06 ASSESSMENT — PAIN DESCRIPTION - LOCATION: LOCATION: ANKLE

## 2023-09-06 ASSESSMENT — PAIN DESCRIPTION - ORIENTATION: ORIENTATION: LEFT

## 2023-09-06 ASSESSMENT — PAIN DESCRIPTION - DESCRIPTORS: DESCRIPTORS: BURNING

## 2023-09-06 NOTE — PROGRESS NOTES
capsule by mouth daily      acetaminophen (TYLENOL) 500 MG tablet Take 1 tablet by mouth every 6 hours as needed for Pain 120 tablet 3    ipratropium-albuterol (DUONEB) 0.5-2.5 (3) MG/3ML SOLN nebulizer solution Inhale 3 mLs into the lungs every 4 hours (while awake) 360 mL 0    apixaban (ELIQUIS) 5 MG TABS tablet Take 1 tablet by mouth 2 times daily 60 tablet 0    buPROPion (WELLBUTRIN SR) 100 MG extended release tablet Take 1 tablet by mouth daily (Patient taking differently: Take 150 mg by mouth daily) 60 tablet 3    amLODIPine (NORVASC) 5 MG tablet Take 1 tablet by mouth daily 30 tablet 3     No current facility-administered medications on file prior to encounter. REVIEW OF SYSTEMS See HPI    Objective:    /64   Pulse 60   Resp 18   Wt Readings from Last 3 Encounters:   08/30/23 90 lb (40.8 kg)   08/28/23 90 lb (40.8 kg)   08/23/23 90 lb (40.8 kg)     PHYSICAL EXAM  CONSTITUTIONAL:   Awake, alert, cooperative   EYES:  lids and lashes normal   ENT: external ears and nose without lesions   NECK:  supple, symmetrical, trachea midline   SKIN:  Open wound Present    Assessment:     Problem List Items Addressed This Visit       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 with ulceration of calf ([address]) - Primary    Relevant Orders    Initiate Outpatient Wound Care Protocol       Pre Debridement Measurements:  Are located in the [place]  Documentation Flow Sheet  Post Debridement Measurements:  Wound/Ulcer Descriptions are Pre Debridement except measurements:    Puncture 02/26/20 Back Medial;Right;Upper (Active)   Number of days: 3294       Wound 07/18/23 Leg Left;Medial (Active)   Number of days: 49       Wound 07/18/23 Leg Right; Anterior (Active)   Number of days: 49       Wound 08/23/23 Ankle Left;Medial #1 (Active)   Wound Image   08/23/23 1208   Dressing Status New dressing applied;Clean;Dry; Intact 08/30/23 1145   Wound Cleansed Cleansed with saline 08/30/23 1145   Dressing/Treatment

## 2023-09-08 ENCOUNTER — TELEPHONE (OUTPATIENT)
Dept: VASCULAR SURGERY | Age: 73
End: 2023-09-08

## 2023-09-08 DIAGNOSIS — I70.242 ATHEROSCLEROSIS OF NATIVE ARTERY OF LEFT LOWER EXTREMITY WITH ULCERATION OF CALF (HCC): Primary | ICD-10-CM

## 2023-09-08 RX ORDER — TRAMADOL HYDROCHLORIDE 50 MG/1
50 TABLET ORAL EVERY 4 HOURS PRN
Qty: 42 TABLET | Refills: 0 | Status: SHIPPED | OUTPATIENT
Start: 2023-09-08 | End: 2023-09-15

## 2023-09-08 NOTE — TELEPHONE ENCOUNTER
Patient called to request Rx for Tramadol. She changed her dressing on left ankle   9-7-23 and has had pain ever since.      Script sent    Hector torrez

## 2023-09-08 NOTE — DISCHARGE INSTRUCTIONS
Visit Discharge/Physician Orders     Discharge condition: Stable     Assessment of pain at discharge:yes     Anesthetic used: 4% lidocaine solution     Discharge to: Home     Left via:Private automobile     Accompanied by:  self     ECF/HHA: [facility]-Fax # [phone]     Dressing Orders: LEFT MEDIAL ANKLE-Cleanse with normal saline, pack loosely with calcium alginate, dry dressing and secure. Change daily. Apply spandagrip. On in am and off in pm. Elevate legs as much as possible above level of the heart. Treatment Orders: Eat a diet high in protein and vitamin C. Take a multiple vitamin daily unless contraindicated. Keep all pressure off of wound site. [facility] followup visit : 1 week_____________________________  (Please note your next appointment above and if you are unable to keep, kindly give a 24 hour notice. Thank you.)     Physician signature:__________________________     If you experience any of the following, please call the [facility] during business hours:     * Increase in Pain  * Temperature over 101  * Increase in drainage from your wound  * Drainage with a foul odor  * Bleeding  * Increase in swelling  * Need for compression bandage changes due to slippage, breakthrough drainage. If you need medical attention outside of the business hours of the [facility] please contact your PCP or go to the nearest emergency room.

## 2023-09-13 ENCOUNTER — HOSPITAL ENCOUNTER (OUTPATIENT)
Dept: WOUND CARE | Age: 73
Discharge: HOME | End: 2023-09-13
Payer: MEDICARE

## 2023-09-13 VITALS
SYSTOLIC BLOOD PRESSURE: 154 MMHG | TEMPERATURE: 97.9 F | RESPIRATION RATE: 16 BRPM | HEART RATE: 61 BPM | DIASTOLIC BLOOD PRESSURE: 62 MMHG

## 2023-09-13 DIAGNOSIS — I70.242 ATHEROSCLEROSIS OF NATIVE ARTERY OF LEFT LOWER EXTREMITY WITH ULCERATION OF CALF (HCC): Primary | ICD-10-CM

## 2023-09-13 PROCEDURE — 11042 DBRDMT SUBQ TIS 1ST 20SQCM/<: CPT

## 2023-09-13 RX ORDER — BACITRACIN ZINC 500 [USP'U]/G
OINTMENT TOPICAL ONCE
OUTPATIENT
Start: 2023-09-13 | End: 2023-09-13

## 2023-09-13 RX ORDER — LIDOCAINE 40 MG/G
CREAM TOPICAL ONCE
OUTPATIENT
Start: 2023-09-13 | End: 2023-09-13

## 2023-09-13 RX ORDER — BACITRACIN ZINC AND POLYMYXIN B SULFATE 500; 1000 [USP'U]/G; [USP'U]/G
OINTMENT TOPICAL ONCE
OUTPATIENT
Start: 2023-09-13 | End: 2023-09-13

## 2023-09-13 RX ORDER — LIDOCAINE HYDROCHLORIDE 20 MG/ML
JELLY TOPICAL ONCE
OUTPATIENT
Start: 2023-09-13 | End: 2023-09-13

## 2023-09-13 RX ORDER — IBUPROFEN 200 MG
TABLET ORAL ONCE
OUTPATIENT
Start: 2023-09-13 | End: 2023-09-13

## 2023-09-13 RX ORDER — LIDOCAINE 50 MG/G
OINTMENT TOPICAL ONCE
OUTPATIENT
Start: 2023-09-13 | End: 2023-09-13

## 2023-09-13 RX ORDER — LIDOCAINE HYDROCHLORIDE 40 MG/ML
SOLUTION TOPICAL ONCE
Status: COMPLETED | OUTPATIENT
Start: 2023-09-13 | End: 2023-09-13

## 2023-09-13 RX ORDER — SODIUM CHLOR/HYPOCHLOROUS ACID 0.033 %
SOLUTION, IRRIGATION IRRIGATION ONCE
OUTPATIENT
Start: 2023-09-13 | End: 2023-09-13

## 2023-09-13 RX ORDER — GENTAMICIN SULFATE 1 MG/G
OINTMENT TOPICAL ONCE
OUTPATIENT
Start: 2023-09-13 | End: 2023-09-13

## 2023-09-13 RX ORDER — BETAMETHASONE DIPROPIONATE 0.05 %
OINTMENT (GRAM) TOPICAL ONCE
OUTPATIENT
Start: 2023-09-13 | End: 2023-09-13

## 2023-09-13 RX ORDER — CLOBETASOL PROPIONATE 0.5 MG/G
OINTMENT TOPICAL ONCE
OUTPATIENT
Start: 2023-09-13 | End: 2023-09-13

## 2023-09-13 RX ORDER — LIDOCAINE HYDROCHLORIDE 40 MG/ML
SOLUTION TOPICAL ONCE
OUTPATIENT
Start: 2023-09-13 | End: 2023-09-13

## 2023-09-13 RX ADMIN — LIDOCAINE HYDROCHLORIDE 10 ML: 40 SOLUTION TOPICAL at 10:07

## 2023-09-13 ASSESSMENT — PAIN DESCRIPTION - ORIENTATION: ORIENTATION: LEFT

## 2023-09-13 ASSESSMENT — PAIN DESCRIPTION - LOCATION: LOCATION: ANKLE

## 2023-09-13 ASSESSMENT — PAIN DESCRIPTION - DESCRIPTORS: DESCRIPTORS: BURNING

## 2023-09-13 ASSESSMENT — PAIN SCALES - GENERAL: PAINLEVEL_OUTOF10: 2

## 2023-09-13 NOTE — PLAN OF CARE
Problem: Wound:  Goal: Will show signs of wound healing; wound closure and no evidence of infection  Description: Will show signs of wound healing; wound closure and no evidence of infection  Outcome: Progressing     Problem: Arterial:  Goal: Optimize blood flow for wound healing  Description: Optimize blood flow for wound healing  Outcome: Progressing     Problem: Venous:  Goal: Signs of wound healing will improve  Description: Signs of wound healing will improve  Outcome: Progressing     Problem: Cognitive:  Goal: Knowledge of wound care  Description: Knowledge of wound care  Outcome: Adequate for Discharge  Goal: Understands risk factors for wounds  Description: Understands risk factors for wounds  Outcome: Adequate for Discharge

## 2023-09-15 NOTE — DISCHARGE INSTRUCTIONS
Visit Discharge/Physician Orders     Discharge condition: Stable     Assessment of pain at discharge:yes     Anesthetic used: 4% lidocaine solution     Discharge to: Home     Left via:Private automobile     Accompanied by:  self     ECF/HHA: [facility]-Fax # [phone]     Dressing Orders: LEFT MEDIAL ANKLE-Cleanse with normal saline, pack loosely with calcium alginate, dry dressing and secure. Change daily. Apply spandagrip. On in am and off in pm. Elevate legs as much as possible above level of the heart. Treatment Orders: Eat a diet high in protein and vitamin C. Take a multiple vitamin daily unless contraindicated. Keep all pressure off of wound site. [facility] followup visit : 1 week_____________________________  (Please note your next appointment above and if you are unable to keep, kindly give a 24 hour notice. Thank you.)     Physician signature:__________________________     If you experience any of the following, please call the [facility] during business hours:     * Increase in Pain  * Temperature over 101  * Increase in drainage from your wound  * Drainage with a foul odor  * Bleeding  * Increase in swelling  * Need for compression bandage changes due to slippage, breakthrough drainage. If you need medical attention outside of the business hours of the [facility] please contact your PCP or go to the nearest emergency room.

## 2023-09-20 ENCOUNTER — HOSPITAL ENCOUNTER (OUTPATIENT)
Dept: WOUND CARE | Age: 73
Discharge: HOME | End: 2023-09-20
Payer: MEDICARE

## 2023-09-20 VITALS
RESPIRATION RATE: 16 BRPM | SYSTOLIC BLOOD PRESSURE: 151 MMHG | TEMPERATURE: 97.1 F | HEART RATE: 66 BPM | DIASTOLIC BLOOD PRESSURE: 72 MMHG

## 2023-09-20 DIAGNOSIS — I70.242 ATHEROSCLEROSIS OF NATIVE ARTERY OF LEFT LOWER EXTREMITY WITH ULCERATION OF CALF (HCC): Primary | ICD-10-CM

## 2023-09-20 PROCEDURE — 11042 DBRDMT SUBQ TIS 1ST 20SQCM/<: CPT

## 2023-09-20 RX ORDER — LIDOCAINE HYDROCHLORIDE 40 MG/ML
SOLUTION TOPICAL ONCE
Status: COMPLETED | OUTPATIENT
Start: 2023-09-20 | End: 2023-09-20

## 2023-09-20 RX ORDER — LIDOCAINE 40 MG/G
CREAM TOPICAL ONCE
OUTPATIENT
Start: 2023-09-20 | End: 2023-09-20

## 2023-09-20 RX ORDER — IBUPROFEN 200 MG
TABLET ORAL ONCE
OUTPATIENT
Start: 2023-09-20 | End: 2023-09-20

## 2023-09-20 RX ORDER — LIDOCAINE HYDROCHLORIDE 20 MG/ML
JELLY TOPICAL ONCE
OUTPATIENT
Start: 2023-09-20 | End: 2023-09-20

## 2023-09-20 RX ORDER — SODIUM CHLOR/HYPOCHLOROUS ACID 0.033 %
SOLUTION, IRRIGATION IRRIGATION ONCE
OUTPATIENT
Start: 2023-09-20 | End: 2023-09-20

## 2023-09-20 RX ORDER — LIDOCAINE 50 MG/G
OINTMENT TOPICAL ONCE
OUTPATIENT
Start: 2023-09-20 | End: 2023-09-20

## 2023-09-20 RX ORDER — LIDOCAINE HYDROCHLORIDE 40 MG/ML
SOLUTION TOPICAL ONCE
OUTPATIENT
Start: 2023-09-20 | End: 2023-09-20

## 2023-09-20 RX ORDER — BETAMETHASONE DIPROPIONATE 0.05 %
OINTMENT (GRAM) TOPICAL ONCE
OUTPATIENT
Start: 2023-09-20 | End: 2023-09-20

## 2023-09-20 RX ORDER — BACITRACIN ZINC AND POLYMYXIN B SULFATE 500; 1000 [USP'U]/G; [USP'U]/G
OINTMENT TOPICAL ONCE
OUTPATIENT
Start: 2023-09-20 | End: 2023-09-20

## 2023-09-20 RX ORDER — BACITRACIN ZINC 500 [USP'U]/G
OINTMENT TOPICAL ONCE
OUTPATIENT
Start: 2023-09-20 | End: 2023-09-20

## 2023-09-20 RX ORDER — TRIAMCINOLONE ACETONIDE 1 MG/G
OINTMENT TOPICAL ONCE
OUTPATIENT
Start: 2023-09-20 | End: 2023-09-20

## 2023-09-20 RX ORDER — CLOBETASOL PROPIONATE 0.5 MG/G
OINTMENT TOPICAL ONCE
OUTPATIENT
Start: 2023-09-20 | End: 2023-09-20

## 2023-09-20 RX ORDER — GENTAMICIN SULFATE 1 MG/G
OINTMENT TOPICAL ONCE
OUTPATIENT
Start: 2023-09-20 | End: 2023-09-20

## 2023-09-20 RX ADMIN — LIDOCAINE HYDROCHLORIDE 5 ML: 40 SOLUTION TOPICAL at 14:31

## 2023-09-20 ASSESSMENT — PAIN DESCRIPTION - DESCRIPTORS: DESCRIPTORS: BURNING

## 2023-09-20 ASSESSMENT — PAIN DESCRIPTION - LOCATION: LOCATION: ANKLE

## 2023-09-20 ASSESSMENT — PAIN SCALES - GENERAL: PAINLEVEL_OUTOF10: 2

## 2023-09-20 NOTE — PROGRESS NOTES
Wound Healing Center Followup Visit Note    Referring Physician : Dylan Alex MD  [facility]  MEDICAL RECORD NUMBER:  [id number]  AGE: 74 y.o. GENDER: female  : 1950  EPISODE DATE:  2023    Subjective:     Chief Complaint   Patient presents with    Wound Check     ankle      HISTORY of PRESENT ILLNESS HPI   [facility] is a 74 y.o. female who presents today in regards to follow up evaluation and treatment of wound/ulcer. That patient's past medical, family and social hx were reviewed and changes were made if present. History of Wound Context:  Patient presents in regards to left lower extremity wounds. They have been present since 2023. She has had previous L LE intervention for wound of the left calf which was first noted approximately 2018 after trauma. She followed with Dr. Pepito Ballard and after intervention it did heal.      B LE having issues with numbness and tingling in her foot primarily which is stable. Her right leg bothers her more than her left. This has been ongoing for at least a year. She has lumbar spinal stenosis but never had it addressed. 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 Sage Fuentes 2020. She is recovered but has chronic diarrhea. She feels her diarrhea has improved gradually over time. 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 She has known assx carotid stenosis. She remains assx, denies stroke, tia, slurred speech, or amaurosis fugax. She is on eliquis per Dr. James Umanzor after developing atrial fibrillation postop after her colon resection. After most recent intervention 23 it was not felt she was a good candidate for bypass due to her nutrition, low weight, and her heart. She is at first apptmt in center 23 not on abx and her wound per her report has been improving.       Previous Vascular Procedure  19 L sfa, popliteal atherectomy, dcb   2020 R LE

## 2023-09-27 ENCOUNTER — HOSPITAL ENCOUNTER (OUTPATIENT)
Dept: WOUND CARE | Age: 73
Discharge: HOME | End: 2023-09-27
Payer: MEDICARE

## 2023-09-27 VITALS
TEMPERATURE: 97.8 F | HEIGHT: 66 IN | HEART RATE: 62 BPM | WEIGHT: 90 LBS | SYSTOLIC BLOOD PRESSURE: 137 MMHG | BODY MASS INDEX: 14.46 KG/M2 | DIASTOLIC BLOOD PRESSURE: 68 MMHG | RESPIRATION RATE: 16 BRPM

## 2023-09-27 DIAGNOSIS — I70.242 ATHEROSCLEROSIS OF NATIVE ARTERY OF LEFT LOWER EXTREMITY WITH ULCERATION OF CALF (HCC): Primary | ICD-10-CM

## 2023-09-27 DIAGNOSIS — I70.242 ATHEROSCLEROSIS OF NATIVE ARTERY OF BOTH LOWER EXTREMITIES WITH BILATERAL ULCERATION OF CALF (HCC): ICD-10-CM

## 2023-09-27 DIAGNOSIS — I70.232 ATHEROSCLEROSIS OF NATIVE ARTERY OF BOTH LOWER EXTREMITIES WITH BILATERAL ULCERATION OF CALF (HCC): ICD-10-CM

## 2023-09-27 PROCEDURE — 11042 DBRDMT SUBQ TIS 1ST 20SQCM/<: CPT

## 2023-09-27 RX ORDER — BACITRACIN ZINC AND POLYMYXIN B SULFATE 500; 1000 [USP'U]/G; [USP'U]/G
OINTMENT TOPICAL ONCE
OUTPATIENT
Start: 2023-09-27 | End: 2023-09-27

## 2023-09-27 RX ORDER — LIDOCAINE 50 MG/G
OINTMENT TOPICAL ONCE
OUTPATIENT
Start: 2023-09-27 | End: 2023-09-27

## 2023-09-27 RX ORDER — TRIAMCINOLONE ACETONIDE 1 MG/G
OINTMENT TOPICAL ONCE
OUTPATIENT
Start: 2023-09-27 | End: 2023-09-27

## 2023-09-27 RX ORDER — CLOBETASOL PROPIONATE 0.5 MG/G
OINTMENT TOPICAL ONCE
OUTPATIENT
Start: 2023-09-27 | End: 2023-09-27

## 2023-09-27 RX ORDER — LIDOCAINE HYDROCHLORIDE 20 MG/ML
JELLY TOPICAL ONCE
OUTPATIENT
Start: 2023-09-27 | End: 2023-09-27

## 2023-09-27 RX ORDER — LIDOCAINE 40 MG/G
CREAM TOPICAL ONCE
OUTPATIENT
Start: 2023-09-27 | End: 2023-09-27

## 2023-09-27 RX ORDER — BETAMETHASONE DIPROPIONATE 0.05 %
OINTMENT (GRAM) TOPICAL ONCE
OUTPATIENT
Start: 2023-09-27 | End: 2023-09-27

## 2023-09-27 RX ORDER — LIDOCAINE HYDROCHLORIDE 40 MG/ML
SOLUTION TOPICAL ONCE
OUTPATIENT
Start: 2023-09-27 | End: 2023-09-27

## 2023-09-27 RX ORDER — IBUPROFEN 200 MG
TABLET ORAL ONCE
OUTPATIENT
Start: 2023-09-27 | End: 2023-09-27

## 2023-09-27 RX ORDER — LIDOCAINE HYDROCHLORIDE 40 MG/ML
SOLUTION TOPICAL ONCE
Status: COMPLETED | OUTPATIENT
Start: 2023-09-27 | End: 2023-09-27

## 2023-09-27 RX ORDER — GENTAMICIN SULFATE 1 MG/G
OINTMENT TOPICAL ONCE
OUTPATIENT
Start: 2023-09-27 | End: 2023-09-27

## 2023-09-27 RX ORDER — SODIUM CHLOR/HYPOCHLOROUS ACID 0.033 %
SOLUTION, IRRIGATION IRRIGATION ONCE
OUTPATIENT
Start: 2023-09-27 | End: 2023-09-27

## 2023-09-27 RX ORDER — BACITRACIN ZINC 500 [USP'U]/G
OINTMENT TOPICAL ONCE
OUTPATIENT
Start: 2023-09-27 | End: 2023-09-27

## 2023-09-27 RX ORDER — IBUPROFEN 200 MG
200 TABLET ORAL EVERY 6 HOURS PRN
COMMUNITY

## 2023-09-27 RX ADMIN — LIDOCAINE HYDROCHLORIDE 5 ML: 40 SOLUTION TOPICAL at 14:56

## 2023-09-27 ASSESSMENT — PAIN SCALES - GENERAL: PAINLEVEL_OUTOF10: 8

## 2023-09-27 ASSESSMENT — PAIN DESCRIPTION - DESCRIPTORS: DESCRIPTORS: SHOOTING;BURNING

## 2023-09-27 ASSESSMENT — PAIN DESCRIPTION - LOCATION: LOCATION: ANKLE

## 2023-09-27 ASSESSMENT — PAIN DESCRIPTION - ORIENTATION: ORIENTATION: LEFT

## 2023-09-27 NOTE — DISCHARGE INSTRUCTIONS
Visit Discharge/Physician Orders     Discharge condition: Stable     Assessment of pain at discharge:yes     Anesthetic used: 4% lidocaine solution     Discharge to: Home     Left via:Private automobile     Accompanied by:  self     ECF/HHA: [facility]-Fax # [phone]     Dressing Orders: LEFT MEDIAL ANKLE-Cleanse with normal saline, pack loosely with calcium alginate, dry dressing and secure. Change daily. Apply spandagrip. On in am and off in pm. Elevate legs as much as possible above level of the heart. Treatment Orders: Eat a diet high in protein and vitamin C. Take a multiple vitamin daily unless contraindicated. Keep all pressure off of wound site. [address] [place] [address] followup visit : 1 week_______________________  (Please note your next appointment above and if you are unable to keep, kindly give a 24 hour notice. Thank you.)     Physician signature:__________________________     If you experience any of the following, please call the [facility] during business hours:     * Increase in Pain  * Temperature over 101  * Increase in drainage from your wound  * Drainage with a foul odor  * Bleeding  * Increase in swelling  * Need for compression bandage changes due to slippage, breakthrough drainage. If you need medical attention outside of the business hours of the [facility] please contact your PCP or go to the nearest emergency room.

## 2023-09-27 NOTE — PROGRESS NOTES
measurements:    Puncture 02/26/20 Back Medial;Right;Upper (Active)   Number of days: 1309       Wound 07/18/23 Leg Left;Medial (Active)   Number of days: 70       Wound 07/18/23 Leg Right; Anterior (Active)   Number of days: 70       Wound 08/23/23 Ankle Left;Medial #1 (Active)   Wound Image   08/23/23 1208   Dressing Status New dressing applied 09/20/23 1515   Wound Cleansed Cleansed with saline 09/20/23 1515   Dressing/Treatment Alginate;Dry dressing 09/20/23 1515   Wound Length (cm) 1.8 cm 09/27/23 1452   Wound Width (cm) 1.2 cm 09/27/23 1452   Wound Depth (cm) 0.4 cm 09/27/23 1452   Wound Surface Area (cm^2) 2.16 cm^2 09/27/23 1452   Change in Wound Size % (l*w) 26.53 09/27/23 1452   Wound Volume (cm^3) 0.864 cm^3 09/27/23 1452   Wound Healing % 27 09/27/23 1452   Post-Procedure Length (cm) 1.9 cm 09/20/23 1507   Post-Procedure Width (cm) 1.8 cm 09/20/23 1507   Post-Procedure Depth (cm) 0.5 cm 09/20/23 1507   Post-Procedure Surface Area (cm^2) 3.42 cm^2 09/20/23 1507   Post-Procedure Volume (cm^3) 1.71 cm^3 09/20/23 1507   Wound Assessment Pink/red;Fibrin 09/27/23 1452   Drainage Amount Moderate (25-50%) 09/27/23 1452   Drainage Description Serosanguinous; Yellow 09/27/23 1452   Odor None 09/27/23 1452   Rhoda-wound Assessment Blanchable erythema; Intact 09/27/23 1452   Margins Attached edges 09/20/23 1430   Wound Thickness Description not for Pressure Injury Full thickness 09/20/23 1430   Number of days: 35     Incision 07/18/23 Leg Right; Anterior (Active)   Number of days: 70       Incision 09/18/20 Back Lower;Medial (Active)   Number of days: 1104       Procedure Note  Indications:  Based on my examination of this patient's wound(s)/ulcer(s) today, debridement is required to promote healing and evaluate the wound base.     Performed by: BRIAN Patel CNP    Consent obtained:  Yes    Time out taken:  Yes    Pain Control: Anesthetic  Anesthetic: 4% Lidocaine Liquid Topical     Debridement:Excisional

## 2023-09-28 ENCOUNTER — TELEPHONE (OUTPATIENT)
Dept: VASCULAR SURGERY | Age: 73
End: 2023-09-28

## 2023-09-28 RX ORDER — TRAMADOL HYDROCHLORIDE 50 MG/1
50 TABLET ORAL EVERY 4 HOURS PRN
Qty: 42 TABLET | Refills: 0 | Status: SHIPPED | OUTPATIENT
Start: 2023-09-28 | End: 2023-10-05

## 2023-09-28 NOTE — TELEPHONE ENCOUNTER
Patient called and reports that she has a pain from her ankle wound that has traveled up into her left leg. She would like to know if she needs tested for an infection. I  left message on wound care voice mail for the office to give her a call.

## 2023-10-02 NOTE — DISCHARGE INSTRUCTIONS
Visit Discharge/Physician Orders     Discharge condition: Stable     Assessment of pain at discharge:yes     Anesthetic used: 4% lidocaine solution     Discharge to: Home     Left via:Private automobile     Accompanied by:  self     ECF/HHA: [facility]-Fax # [phone]     Dressing Orders: LEFT MEDIAL ANKLE-Cleanse with normal saline, pack loosely with calcium alginate, dry dressing and secure. Change daily. Apply spandagrip. On in am and off in pm. Elevate legs as much as possible above level of the heart. Treatment Orders: Eat a diet high in protein and vitamin C. Take a multiple vitamin daily unless contraindicated. Keep all pressure off of wound site. [facility] followup visit : 1 week_____________________________  (Please note your next appointment above and if you are unable to keep, kindly give a 24 hour notice. Thank you.)     Physician signature:__________________________     If you experience any of the following, please call the [facility] during business hours:     * Increase in Pain  * Temperature over 101  * Increase in drainage from your wound  * Drainage with a foul odor  * Bleeding  * Increase in swelling  * Need for compression bandage changes due to slippage, breakthrough drainage. If you need medical attention outside of the business hours of the [facility] please contact your PCP or go to the nearest emergency room.

## 2023-10-04 ENCOUNTER — HOSPITAL ENCOUNTER (OUTPATIENT)
Dept: WOUND CARE | Age: 73
Discharge: HOME | End: 2023-10-04

## 2023-10-08 NOTE — PROGRESS NOTES
7/10/23     Moses Mosqueda    : 1950   Sex: female    Age: 74 y.o. Patient's PCP/Provider is:  Ingrid Waggoner MD    Subjective:  Patient is seen today for follow-up regarding evaluation regarding left lower extremity ulceration. Patient is not having any issues into the left lower extremity. Patient has not noticed any significant changes in the ulceration left lower extremity since last visit. Patient has become more lethargic and has lost some weight over the last 2 weeks since her last visit. Patient was going to get in contact with her family physician for further evaluation. Patient is in no acute distress. She denies any nausea, vomiting, fever, chills. No other additional abnormalities noted. Chief Complaint   Patient presents with    Leg Pain     Left lower leg pain       ROS:  Const: Positives and pertinent negatives as per HPI. Musculo: Denies symptoms other than stated above. Neuro: Denies symptoms other than stated above. Skin: Denies symptoms other than stated above.     Current Medications:    Current Outpatient Medications:     etodolac (LODINE) 400 MG tablet, Take 1 tablet by mouth 2 times daily, Disp: , Rfl:     vibegron (GEMTESA) 75 MG TABS tablet, Take by mouth daily, Disp: , Rfl:     levothyroxine (SYNTHROID) 88 MCG tablet, 100 mcg, Disp: , Rfl:     omeprazole (PRILOSEC) 40 MG delayed release capsule, Take 1 capsule by mouth daily, Disp: , Rfl:     OXYGEN, Inhale 3 L/min into the lungs nightly, Disp: , Rfl:     Apoaequorin (PREVAGEN PO), Take by mouth, Disp: , Rfl:     vitamin E 400 UNIT capsule, Take 1 capsule by mouth daily, Disp: , Rfl:     loperamide (IMODIUM) 2 MG capsule, Take 1 capsule by mouth 4 times daily as needed for Diarrhea, Disp: , Rfl:     albuterol-ipratropium (COMBIVENT RESPIMAT)  MCG/ACT AERS inhaler, Inhale 1 puff into the lungs every 4 hours as needed for Wheezing, Disp: , Rfl:     colestipol (COLESTID) 1 g tablet, Take 1 tablet by mouth 2
Patient is here today for a follow up to a wound to the left lower extremity. She confirms changing dressings almost daily and cleansing the wound with NSS. She believes the wound is not healing. PCP is Dr. Jessica Dhaliwal, last seen 06/20/2023.
unk

## 2023-10-09 NOTE — DISCHARGE INSTRUCTIONS
Visit Discharge/Physician Orders     Discharge condition: Stable     Assessment of pain at discharge:yes     Anesthetic used: 4% lidocaine solution     Discharge to: Home     Left via:Private automobile     Accompanied by:  self     ECF/HHA: [facility]-Fax # [phone]     Dressing Orders: LEFT MEDIAL ANKLE-Cleanse with normal saline, pack loosely with calcium alginate, dry dressing and secure. Change daily. Apply spandagrip. On in am and off in pm. Elevate legs as much as possible above level of the heart. Treatment Orders: Eat a diet high in protein and vitamin C. Take a multiple vitamin daily unless contraindicated. Keep all pressure off of wound site. [facility] followup visit : 2 weeks_______________________  (Please note your next appointment above and if you are unable to keep, kindly give a 24 hour notice. Thank you.)     Physician signature:__________________________     If you experience any of the following, please call the [facility] during business hours:     * Increase in Pain  * Temperature over 101  * Increase in drainage from your wound  * Drainage with a foul odor  * Bleeding  * Increase in swelling  * Need for compression bandage changes due to slippage, breakthrough drainage. If you need medical attention outside of the business hours of the [facility] please contact your PCP or go to the nearest emergency room.

## 2023-10-11 ENCOUNTER — HOSPITAL ENCOUNTER (OUTPATIENT)
Dept: WOUND CARE | Age: 73
Discharge: HOME | End: 2023-10-11
Attending: SURGERY

## 2023-10-11 VITALS — DIASTOLIC BLOOD PRESSURE: 72 MMHG | RESPIRATION RATE: 16 BRPM | SYSTOLIC BLOOD PRESSURE: 142 MMHG | TEMPERATURE: 97.3 F

## 2023-10-11 DIAGNOSIS — I70.242 ATHEROSCLEROSIS OF NATIVE ARTERY OF LEFT LOWER EXTREMITY WITH ULCERATION OF CALF (HCC): Primary | ICD-10-CM

## 2023-10-11 DIAGNOSIS — I70.242 ATHEROSCLEROSIS OF NATIVE ARTERY OF BOTH LOWER EXTREMITIES WITH BILATERAL ULCERATION OF CALF (HCC): ICD-10-CM

## 2023-10-11 DIAGNOSIS — I70.219 ATHEROSCLEROSIS OF ARTERY OF EXTREMITY WITH INTERMITTENT CLAUDICATION (HCC): ICD-10-CM

## 2023-10-11 DIAGNOSIS — I70.232 ATHEROSCLEROSIS OF NATIVE ARTERY OF BOTH LOWER EXTREMITIES WITH BILATERAL ULCERATION OF CALF (HCC): ICD-10-CM

## 2023-10-11 RX ORDER — LIDOCAINE HYDROCHLORIDE 20 MG/ML
JELLY TOPICAL ONCE
OUTPATIENT
Start: 2023-10-11 | End: 2023-10-11

## 2023-10-11 RX ORDER — BACITRACIN ZINC AND POLYMYXIN B SULFATE 500; 1000 [USP'U]/G; [USP'U]/G
OINTMENT TOPICAL ONCE
OUTPATIENT
Start: 2023-10-11 | End: 2023-10-11

## 2023-10-11 RX ORDER — TRAMADOL HYDROCHLORIDE 50 MG/1
50 TABLET ORAL EVERY 4 HOURS PRN
Qty: 42 TABLET | Refills: 0 | Status: CANCELLED | OUTPATIENT
Start: 2023-10-11 | End: 2023-10-18

## 2023-10-11 RX ORDER — TRIAMCINOLONE ACETONIDE 1 MG/G
OINTMENT TOPICAL ONCE
OUTPATIENT
Start: 2023-10-11 | End: 2023-10-11

## 2023-10-11 RX ORDER — IBUPROFEN 200 MG
TABLET ORAL ONCE
OUTPATIENT
Start: 2023-10-11 | End: 2023-10-11

## 2023-10-11 RX ORDER — LIDOCAINE 40 MG/G
CREAM TOPICAL ONCE
OUTPATIENT
Start: 2023-10-11 | End: 2023-10-11

## 2023-10-11 RX ORDER — LIDOCAINE 50 MG/G
OINTMENT TOPICAL ONCE
OUTPATIENT
Start: 2023-10-11 | End: 2023-10-11

## 2023-10-11 RX ORDER — TRAMADOL HYDROCHLORIDE 50 MG/1
50 TABLET ORAL EVERY 4 HOURS PRN
COMMUNITY

## 2023-10-11 RX ORDER — LIDOCAINE HYDROCHLORIDE 40 MG/ML
SOLUTION TOPICAL ONCE
Status: COMPLETED | OUTPATIENT
Start: 2023-10-11 | End: 2023-10-11

## 2023-10-11 RX ORDER — BACITRACIN ZINC 500 [USP'U]/G
OINTMENT TOPICAL ONCE
OUTPATIENT
Start: 2023-10-11 | End: 2023-10-11

## 2023-10-11 RX ORDER — SODIUM CHLOR/HYPOCHLOROUS ACID 0.033 %
SOLUTION, IRRIGATION IRRIGATION ONCE
OUTPATIENT
Start: 2023-10-11 | End: 2023-10-11

## 2023-10-11 RX ORDER — GENTAMICIN SULFATE 1 MG/G
OINTMENT TOPICAL ONCE
OUTPATIENT
Start: 2023-10-11 | End: 2023-10-11

## 2023-10-11 RX ORDER — LIDOCAINE HYDROCHLORIDE 40 MG/ML
SOLUTION TOPICAL ONCE
OUTPATIENT
Start: 2023-10-11 | End: 2023-10-11

## 2023-10-11 RX ORDER — CLOBETASOL PROPIONATE 0.5 MG/G
OINTMENT TOPICAL ONCE
OUTPATIENT
Start: 2023-10-11 | End: 2023-10-11

## 2023-10-11 RX ORDER — BETAMETHASONE DIPROPIONATE 0.05 %
OINTMENT (GRAM) TOPICAL ONCE
OUTPATIENT
Start: 2023-10-11 | End: 2023-10-11

## 2023-10-11 RX ADMIN — LIDOCAINE HYDROCHLORIDE 10 ML: 40 SOLUTION TOPICAL at 10:40

## 2023-10-11 NOTE — PLAN OF CARE
Problem: Chronic Conditions and Co-morbidities  Goal: Patient's chronic conditions and co-morbidity symptoms are monitored and maintained or improved  Outcome: Progressing     Problem: Cognitive:  Goal: Knowledge of wound care  Description: Knowledge of wound care  Outcome: Progressing  Goal: Understands risk factors for wounds  Description: Understands risk factors for wounds  Outcome: Progressing     Problem: Wound:  Goal: Will show signs of wound healing; wound closure and no evidence of infection  Description: Will show signs of wound healing; wound closure and no evidence of infection  Outcome: Progressing     Problem: Arterial:  Goal: Optimize blood flow for wound healing  Description: Optimize blood flow for wound healing  Outcome: Adequate for Discharge     Problem: Arterial:  Goal: Optimize blood flow for wound healing  Description: Optimize blood flow for wound healing  Outcome: Adequate for Discharge     Problem: Venous:  Goal: Signs of wound healing will improve  Description: Signs of wound healing will improve  Outcome: Adequate for Discharge

## 2023-10-11 NOTE — PROGRESS NOTES
[id number] [address] [place] and spoke with DEANN pharmacist and ordered Tramadol 50mg  every 4 hours prn for pain.  Dispense 42 tablets
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)       Pre Debridement Measurements:  Are located in the [facility]  Documentation Flow Sheet  Post Debridement Measurements:  Wound/Ulcer Descriptions are Pre Debridement except measurements:    Puncture 02/26/20 Back Medial;Right;Upper (Active)   Number of days: 1323       Wound 07/18/23 Leg Left;Medial (Active)   Number of days: 84       Wound 07/18/23 Leg Right; Anterior (Active)   Number of days: 84       Wound 08/23/23 Ankle Left;Medial #1 (Active)   Wound Image   08/23/23 1208   Dressing Status New dressing applied 09/20/23 1515   Wound Cleansed Cleansed with saline 09/20/23 1515   Dressing/Treatment Alginate;Dry dressing 09/20/23 1515   Wound Length (cm) 2.2 cm 10/11/23 1036   Wound Width (cm) 1 cm 10/11/23 1036   Wound Depth (cm) 0.4 cm 10/11/23 1036   Wound Surface Area (cm^2) 2.2 cm^2 10/11/23 1036   Change in Wound Size % (l*w) 25.17 10/11/23 1036   Wound Volume (cm^3) 0.88 cm^3 10/11/23 1036   Wound Healing % 25 10/11/23 1036   Post-Procedure Length (cm) 1.8 cm 09/27/23 1505   Post-Procedure Width (cm) 1.2 cm 09/27/23 1505   Post-Procedure Depth (cm) 0.4 cm 09/27/23 1505   Post-Procedure Surface Area (cm^2) 2.16 cm^2 09/27/23 1505   Post-Procedure Volume (cm^3) 0.864 cm^3 09/27/23 1505   Wound Assessment Pink/red;Fibrin;Slough 10/11/23 1036   Drainage Amount Moderate (25-50%) 10/11/23 1036   Drainage Description Serosanguinous; Yellow 10/11/23 1036   Odor None 10/11/23 1036   Rhoda-wound Assessment Blanchable erythema; Intact 10/11/23 1036   Margins Attached edges 10/11/23 1036   Wound Thickness Description not for Pressure Injury Full thickness 10/11/23 1036   Number of days: 48     Incision 07/18/23 Leg Right; Anterior (Active)   Number of days: 84       Incision 09/18/20 Back Lower;Medial (Active)

## 2023-10-16 ENCOUNTER — TELEPHONE (OUTPATIENT)
Dept: VASCULAR SURGERY | Age: 73
End: 2023-10-16

## 2023-10-16 DIAGNOSIS — I65.23 BILATERAL CAROTID ARTERY STENOSIS: Chronic | ICD-10-CM

## 2023-10-16 DIAGNOSIS — I70.242 ATHEROSCLEROSIS OF NATIVE ARTERY OF LEFT LOWER EXTREMITY WITH ULCERATION OF CALF (HCC): ICD-10-CM

## 2023-10-16 DIAGNOSIS — I73.9 PVD (PERIPHERAL VASCULAR DISEASE) (HCC): Primary | ICD-10-CM

## 2023-10-16 NOTE — TELEPHONE ENCOUNTER
As patient is seeing Dr. Shanti Clark at the [address] [place] [address], I cancelled her appointment in the office on 10-30-23.   Scheduled her carotid ultrasound and lower extremity arterial doppler study at [facility]. [facility] on Wed, 11-8-23 starting at 10:00 am.  [place] at 9:30 am.

## 2023-10-23 NOTE — DISCHARGE INSTRUCTIONS
Visit Discharge/Physician Orders     Discharge condition: Stable     Assessment of pain at discharge:yes     Anesthetic used: 4% lidocaine solution     Discharge to: Home     Left via:Private automobile     Accompanied by:  self     ECF/HHA: [facility]-Fax # [phone] *UPDATED ORDER     Dressing Orders: LEFT MEDIAL ANKLE-Cleanse with normal saline, pack loosely with calcium alginate, dry dressing and secure. Change daily. Apply spandagrip. On in am and off in pm. Elevate legs as much as possible above level of the heart. Treatment Orders: Eat a diet high in protein and vitamin C. Take a multiple vitamin daily unless contraindicated. Keep all pressure off of wound site. [address] [place] [address] followup visit : 2 weeks_______________________  (Please note your next appointment above and if you are unable to keep, kindly give a 24 hour notice. Thank you.)     Physician signature:__________________________     If you experience any of the following, please call the [facility] during business hours:     * Increase in Pain  * Temperature over 101  * Increase in drainage from your wound  * Drainage with a foul odor  * Bleeding  * Increase in swelling  * Need for compression bandage changes due to slippage, breakthrough drainage. If you need medical attention outside of the business hours of the [facility] please contact your PCP or go to the nearest emergency room.

## 2023-10-25 ENCOUNTER — HOSPITAL ENCOUNTER (OUTPATIENT)
Dept: WOUND CARE | Age: 73
Discharge: HOME | End: 2023-10-25
Attending: SURGERY
Payer: MEDICARE

## 2023-10-25 VITALS
TEMPERATURE: 98 F | SYSTOLIC BLOOD PRESSURE: 145 MMHG | BODY MASS INDEX: 14.46 KG/M2 | DIASTOLIC BLOOD PRESSURE: 55 MMHG | RESPIRATION RATE: 16 BRPM | HEIGHT: 66 IN | HEART RATE: 64 BPM | WEIGHT: 90 LBS

## 2023-10-25 DIAGNOSIS — I70.242 ATHEROSCLEROSIS OF NATIVE ARTERY OF LEFT LOWER EXTREMITY WITH ULCERATION OF CALF (HCC): Primary | ICD-10-CM

## 2023-10-25 PROCEDURE — 11042 DBRDMT SUBQ TIS 1ST 20SQCM/<: CPT

## 2023-10-25 RX ORDER — LIDOCAINE 40 MG/G
CREAM TOPICAL ONCE
OUTPATIENT
Start: 2023-10-25 | End: 2023-10-25

## 2023-10-25 RX ORDER — LIDOCAINE 50 MG/G
OINTMENT TOPICAL ONCE
OUTPATIENT
Start: 2023-10-25 | End: 2023-10-25

## 2023-10-25 RX ORDER — LIDOCAINE HYDROCHLORIDE 20 MG/ML
JELLY TOPICAL ONCE
OUTPATIENT
Start: 2023-10-25 | End: 2023-10-25

## 2023-10-25 RX ORDER — SODIUM CHLOR/HYPOCHLOROUS ACID 0.033 %
SOLUTION, IRRIGATION IRRIGATION ONCE
OUTPATIENT
Start: 2023-10-25 | End: 2023-10-25

## 2023-10-25 RX ORDER — BACITRACIN ZINC 500 [USP'U]/G
OINTMENT TOPICAL ONCE
OUTPATIENT
Start: 2023-10-25 | End: 2023-10-25

## 2023-10-25 RX ORDER — GENTAMICIN SULFATE 1 MG/G
OINTMENT TOPICAL ONCE
OUTPATIENT
Start: 2023-10-25 | End: 2023-10-25

## 2023-10-25 RX ORDER — BACITRACIN ZINC AND POLYMYXIN B SULFATE 500; 1000 [USP'U]/G; [USP'U]/G
OINTMENT TOPICAL ONCE
OUTPATIENT
Start: 2023-10-25 | End: 2023-10-25

## 2023-10-25 RX ORDER — BETAMETHASONE DIPROPIONATE 0.05 %
OINTMENT (GRAM) TOPICAL ONCE
OUTPATIENT
Start: 2023-10-25 | End: 2023-10-25

## 2023-10-25 RX ORDER — CLOBETASOL PROPIONATE 0.5 MG/G
OINTMENT TOPICAL ONCE
OUTPATIENT
Start: 2023-10-25 | End: 2023-10-25

## 2023-10-25 RX ORDER — TRIAMCINOLONE ACETONIDE 1 MG/G
OINTMENT TOPICAL ONCE
OUTPATIENT
Start: 2023-10-25 | End: 2023-10-25

## 2023-10-25 RX ORDER — LIDOCAINE HYDROCHLORIDE 40 MG/ML
SOLUTION TOPICAL ONCE
Status: COMPLETED | OUTPATIENT
Start: 2023-10-25 | End: 2023-10-25

## 2023-10-25 RX ORDER — IBUPROFEN 200 MG
TABLET ORAL ONCE
OUTPATIENT
Start: 2023-10-25 | End: 2023-10-25

## 2023-10-25 RX ORDER — TRAMADOL HYDROCHLORIDE 50 MG/1
50 TABLET ORAL EVERY 4 HOURS PRN
Qty: 42 TABLET | Refills: 0 | Status: SHIPPED | OUTPATIENT
Start: 2023-10-25 | End: 2023-11-01

## 2023-10-25 RX ORDER — LIDOCAINE HYDROCHLORIDE 40 MG/ML
SOLUTION TOPICAL ONCE
OUTPATIENT
Start: 2023-10-25 | End: 2023-10-25

## 2023-10-25 RX ADMIN — LIDOCAINE HYDROCHLORIDE 5 ML: 40 SOLUTION TOPICAL at 11:35

## 2023-10-25 ASSESSMENT — PAIN DESCRIPTION - ONSET: ONSET: ON-GOING

## 2023-10-25 ASSESSMENT — PAIN DESCRIPTION - ORIENTATION: ORIENTATION: LEFT

## 2023-10-25 ASSESSMENT — PAIN DESCRIPTION - PAIN TYPE: TYPE: CHRONIC PAIN

## 2023-10-25 ASSESSMENT — PAIN DESCRIPTION - FREQUENCY: FREQUENCY: INTERMITTENT

## 2023-10-25 ASSESSMENT — PAIN - FUNCTIONAL ASSESSMENT: PAIN_FUNCTIONAL_ASSESSMENT: PREVENTS OR INTERFERES SOME ACTIVE ACTIVITIES AND ADLS

## 2023-10-25 ASSESSMENT — PAIN SCALES - GENERAL: PAINLEVEL_OUTOF10: 4

## 2023-10-25 ASSESSMENT — PAIN DESCRIPTION - DESCRIPTORS: DESCRIPTORS: SHOOTING;BURNING

## 2023-10-25 ASSESSMENT — PAIN DESCRIPTION - LOCATION: LOCATION: ANKLE

## 2023-10-25 NOTE — PROGRESS NOTES
diet high in protein and vitamin C. Take a multiple vitamin daily unless contraindicated. Keep all pressure off of wound site. [facility] followup visit : 1 week_____________________________  (Please note your next appointment above and if you are unable to keep, kindly give a 24 hour notice. Thank you.)    Physician signature:__________________________    If you experience any of the following, please call the [facility] during business hours:    * Increase in Pain  * Temperature over 101  * Increase in drainage from your wound  * Drainage with a foul odor  * Bleeding  * Increase in swelling  * Need for compression bandage changes due to slippage, breakthrough drainage. If you need medical attention outside of the business hours of the [facility] please contact your PCP or go to the nearest emergency room.      BRIAN Hercules - CNP

## 2023-11-06 NOTE — DISCHARGE INSTRUCTIONS
Visit Discharge/Physician Orders     Discharge condition: Stable     Assessment of pain at discharge:yes     Anesthetic used: 4% lidocaine solution     Discharge to: Home     Left via:Private automobile     Accompanied by:  self     ECF/HHA: [facility]-Fax # [phone] *  phone# [phone] UPDATED ORDER     Dressing Orders: LEFT MEDIAL ANKLE-Cleanse with normal saline, pack loosely with calcium alginate, dry dressing and secure. Change daily. Apply spandagrip. On in am and off in pm. Elevate legs as much as possible above level of the heart. Treatment Orders: Eat a diet high in protein and vitamin C. Take a multiple vitamin daily unless contraindicated. Keep all pressure off of wound site. [address] [place] [address] followup visit : 2 weeks in AM_______________________  (Please note your next appointment above and if you are unable to keep, kindly give a 24 hour notice. Thank you.)     Physician signature:__________________________     If you experience any of the following, please call the [facility] during business hours:     * Increase in Pain  * Temperature over 101  * Increase in drainage from your wound  * Drainage with a foul odor  * Bleeding  * Increase in swelling  * Need for compression bandage changes due to slippage, breakthrough drainage. If you need medical attention outside of the business hours of the [facility] please contact your PCP or go to the nearest emergency room.

## 2023-11-08 ENCOUNTER — HOSPITAL ENCOUNTER (OUTPATIENT)
Dept: WOUND CARE | Age: 73
Discharge: HOME | End: 2023-11-08
Attending: SURGERY
Payer: MEDICARE

## 2023-11-08 ENCOUNTER — HOSPITAL ENCOUNTER (OUTPATIENT)
Dept: INTERVENTIONAL RADIOLOGY/VASCULAR | Age: 73
Discharge: HOME | End: 2023-11-10
Attending: SURGERY
Payer: MEDICARE

## 2023-11-08 ENCOUNTER — HOSPITAL ENCOUNTER (OUTPATIENT)
Dept: ULTRASOUND IMAGING | Age: 73
Discharge: HOME | End: 2023-11-10
Attending: SURGERY
Payer: MEDICARE

## 2023-11-08 VITALS
WEIGHT: 91 LBS | TEMPERATURE: 97.8 F | BODY MASS INDEX: 14.63 KG/M2 | DIASTOLIC BLOOD PRESSURE: 70 MMHG | SYSTOLIC BLOOD PRESSURE: 128 MMHG | RESPIRATION RATE: 16 BRPM | HEART RATE: 80 BPM | HEIGHT: 66 IN

## 2023-11-08 DIAGNOSIS — I70.242 ATHEROSCLEROSIS OF NATIVE ARTERY OF LEFT LOWER EXTREMITY WITH ULCERATION OF CALF (HCC): ICD-10-CM

## 2023-11-08 DIAGNOSIS — I70.242 ATHEROSCLEROSIS OF NATIVE ARTERY OF LEFT LOWER EXTREMITY WITH ULCERATION OF CALF (HCC): Primary | ICD-10-CM

## 2023-11-08 DIAGNOSIS — I65.23 BILATERAL CAROTID ARTERY STENOSIS: Chronic | ICD-10-CM

## 2023-11-08 PROCEDURE — 93923 UPR/LXTR ART STDY 3+ LVLS: CPT

## 2023-11-08 PROCEDURE — 93880 EXTRACRANIAL BILAT STUDY: CPT

## 2023-11-08 PROCEDURE — 11042 DBRDMT SUBQ TIS 1ST 20SQCM/<: CPT

## 2023-11-08 PROCEDURE — 93923 UPR/LXTR ART STDY 3+ LVLS: CPT | Performed by: SURGERY

## 2023-11-08 RX ORDER — LIDOCAINE HYDROCHLORIDE 20 MG/ML
JELLY TOPICAL ONCE
OUTPATIENT
Start: 2023-11-08 | End: 2023-11-08

## 2023-11-08 RX ORDER — PREDNISONE 10 MG/1
TABLET ORAL
COMMUNITY

## 2023-11-08 RX ORDER — LIDOCAINE 40 MG/G
CREAM TOPICAL ONCE
OUTPATIENT
Start: 2023-11-08 | End: 2023-11-08

## 2023-11-08 RX ORDER — BETAMETHASONE DIPROPIONATE 0.05 %
OINTMENT (GRAM) TOPICAL ONCE
OUTPATIENT
Start: 2023-11-08 | End: 2023-11-08

## 2023-11-08 RX ORDER — SODIUM CHLOR/HYPOCHLOROUS ACID 0.033 %
SOLUTION, IRRIGATION IRRIGATION ONCE
OUTPATIENT
Start: 2023-11-08 | End: 2023-11-08

## 2023-11-08 RX ORDER — GENTAMICIN SULFATE 1 MG/G
OINTMENT TOPICAL ONCE
OUTPATIENT
Start: 2023-11-08 | End: 2023-11-08

## 2023-11-08 RX ORDER — IBUPROFEN 200 MG
TABLET ORAL ONCE
OUTPATIENT
Start: 2023-11-08 | End: 2023-11-08

## 2023-11-08 RX ORDER — CLOBETASOL PROPIONATE 0.5 MG/G
OINTMENT TOPICAL ONCE
OUTPATIENT
Start: 2023-11-08 | End: 2023-11-08

## 2023-11-08 RX ORDER — LIDOCAINE HYDROCHLORIDE 40 MG/ML
SOLUTION TOPICAL ONCE
Status: COMPLETED | OUTPATIENT
Start: 2023-11-08 | End: 2023-11-08

## 2023-11-08 RX ORDER — BACITRACIN ZINC AND POLYMYXIN B SULFATE 500; 1000 [USP'U]/G; [USP'U]/G
OINTMENT TOPICAL ONCE
OUTPATIENT
Start: 2023-11-08 | End: 2023-11-08

## 2023-11-08 RX ORDER — ERGOCALCIFEROL 1.25 MG/1
50000 CAPSULE ORAL WEEKLY
COMMUNITY

## 2023-11-08 RX ORDER — LIDOCAINE 50 MG/G
OINTMENT TOPICAL ONCE
OUTPATIENT
Start: 2023-11-08 | End: 2023-11-08

## 2023-11-08 RX ORDER — LIDOCAINE HYDROCHLORIDE 40 MG/ML
SOLUTION TOPICAL ONCE
OUTPATIENT
Start: 2023-11-08 | End: 2023-11-08

## 2023-11-08 RX ORDER — BACITRACIN ZINC 500 [USP'U]/G
OINTMENT TOPICAL ONCE
OUTPATIENT
Start: 2023-11-08 | End: 2023-11-08

## 2023-11-08 RX ORDER — TRIAMCINOLONE ACETONIDE 1 MG/G
OINTMENT TOPICAL ONCE
OUTPATIENT
Start: 2023-11-08 | End: 2023-11-08

## 2023-11-08 RX ADMIN — LIDOCAINE HYDROCHLORIDE 8 ML: 40 SOLUTION TOPICAL at 13:42

## 2023-11-20 NOTE — DISCHARGE INSTRUCTIONS
Visit Discharge/Physician Orders     Discharge condition: Stable     Assessment of pain at discharge:yes     Anesthetic used: 4% lidocaine solution     Discharge to: Home     Left via:Private automobile     Accompanied by:  self     ECF/HHA: [facility] supplies     Dressing Orders: LEFT MEDIAL ANKLE-Cleanse with normal saline, pack loosely with calcium alginate, dry dressing and secure. Change daily. Apply spandagrip. On in am and off in pm. Elevate legs as much as possible above level of the heart. Treatment Orders: Eat a diet high in protein and vitamin C. Take a multiple vitamin daily unless contraindicated. Keep all pressure off of wound site. [address] [place] [address] followup visit : 2 weeks _______________________  (Please note your next appointment above and if you are unable to keep, kindly give a 24 hour notice. Thank you.)     Physician signature:__________________________     If you experience any of the following, please call the [facility] during business hours:     * Increase in Pain  * Temperature over 101  * Increase in drainage from your wound  * Drainage with a foul odor  * Bleeding  * Increase in swelling  * Need for compression bandage changes due to slippage, breakthrough drainage. If you need medical attention outside of the business hours of the [facility] please contact your PCP or go to the nearest emergency room.

## 2023-11-22 ENCOUNTER — HOSPITAL ENCOUNTER (OUTPATIENT)
Dept: WOUND CARE | Age: 73
Discharge: HOME | End: 2023-11-22
Attending: SURGERY
Payer: MEDICARE

## 2023-11-22 VITALS
WEIGHT: 91 LBS | RESPIRATION RATE: 16 BRPM | HEART RATE: 62 BPM | DIASTOLIC BLOOD PRESSURE: 80 MMHG | BODY MASS INDEX: 14.63 KG/M2 | TEMPERATURE: 97.3 F | SYSTOLIC BLOOD PRESSURE: 159 MMHG | HEIGHT: 66 IN

## 2023-11-22 DIAGNOSIS — I73.9 PVD (PERIPHERAL VASCULAR DISEASE) WITH CLAUDICATION (HCC): ICD-10-CM

## 2023-11-22 DIAGNOSIS — I70.242 ATHEROSCLEROSIS OF NATIVE ARTERY OF LEFT LOWER EXTREMITY WITH ULCERATION OF CALF (HCC): Primary | ICD-10-CM

## 2023-11-22 DIAGNOSIS — I65.23 BILATERAL CAROTID ARTERY STENOSIS: ICD-10-CM

## 2023-11-22 PROBLEM — K72.00 SHOCK LIVER: Status: RESOLVED | Noted: 2020-02-19 | Resolved: 2023-11-22

## 2023-11-22 PROBLEM — S01.01XA LACERATION OF SCALP: Status: RESOLVED | Noted: 2020-09-16 | Resolved: 2023-11-22

## 2023-11-22 PROBLEM — J44.1 COPD WITH ACUTE EXACERBATION (HCC): Status: RESOLVED | Noted: 2021-05-21 | Resolved: 2023-11-22

## 2023-11-22 PROBLEM — I70.219 ATHEROSCLEROSIS OF ARTERY OF EXTREMITY WITH INTERMITTENT CLAUDICATION (HCC): Status: RESOLVED | Noted: 2023-05-23 | Resolved: 2023-11-22

## 2023-11-22 PROBLEM — U07.1 COVID-19: Status: RESOLVED | Noted: 2022-01-26 | Resolved: 2023-11-22

## 2023-11-22 PROBLEM — J44.1 COPD EXACERBATION (HCC): Status: RESOLVED | Noted: 2021-05-23 | Resolved: 2023-11-22

## 2023-11-22 PROBLEM — J96.01 ACUTE RESPIRATORY FAILURE WITH HYPOXIA (HCC): Status: RESOLVED | Noted: 2022-01-25 | Resolved: 2023-11-22

## 2023-11-22 PROBLEM — R73.09 ELEVATED GLUCOSE LEVEL: Status: RESOLVED | Noted: 2022-01-26 | Resolved: 2023-11-22

## 2023-11-22 PROBLEM — I70.232: Status: RESOLVED | Noted: 2023-07-19 | Resolved: 2023-11-22

## 2023-11-22 PROBLEM — J96.01 ACUTE HYPOXEMIC RESPIRATORY FAILURE (HCC): Status: RESOLVED | Noted: 2021-05-22 | Resolved: 2023-11-22

## 2023-11-22 PROCEDURE — 11042 DBRDMT SUBQ TIS 1ST 20SQCM/<: CPT

## 2023-11-22 PROCEDURE — 99212 OFFICE O/P EST SF 10 MIN: CPT | Performed by: SURGERY

## 2023-11-22 PROCEDURE — 11042 DBRDMT SUBQ TIS 1ST 20SQCM/<: CPT | Performed by: SURGERY

## 2023-11-22 RX ORDER — TRIAMCINOLONE ACETONIDE 1 MG/G
OINTMENT TOPICAL ONCE
OUTPATIENT
Start: 2023-11-22 | End: 2023-11-22

## 2023-11-22 RX ORDER — BACITRACIN ZINC 500 [USP'U]/G
OINTMENT TOPICAL ONCE
OUTPATIENT
Start: 2023-11-22 | End: 2023-11-22

## 2023-11-22 RX ORDER — LIDOCAINE HYDROCHLORIDE 20 MG/ML
JELLY TOPICAL ONCE
OUTPATIENT
Start: 2023-11-22 | End: 2023-11-22

## 2023-11-22 RX ORDER — LIDOCAINE HYDROCHLORIDE 40 MG/ML
SOLUTION TOPICAL ONCE
Status: COMPLETED | OUTPATIENT
Start: 2023-11-22 | End: 2023-11-22

## 2023-11-22 RX ORDER — LIDOCAINE HYDROCHLORIDE 40 MG/ML
SOLUTION TOPICAL ONCE
OUTPATIENT
Start: 2023-11-22 | End: 2023-11-22

## 2023-11-22 RX ORDER — BACITRACIN ZINC AND POLYMYXIN B SULFATE 500; 1000 [USP'U]/G; [USP'U]/G
OINTMENT TOPICAL ONCE
OUTPATIENT
Start: 2023-11-22 | End: 2023-11-22

## 2023-11-22 RX ORDER — SODIUM CHLOR/HYPOCHLOROUS ACID 0.033 %
SOLUTION, IRRIGATION IRRIGATION ONCE
OUTPATIENT
Start: 2023-11-22 | End: 2023-11-22

## 2023-11-22 RX ORDER — IBUPROFEN 200 MG
TABLET ORAL ONCE
OUTPATIENT
Start: 2023-11-22 | End: 2023-11-22

## 2023-11-22 RX ORDER — LIDOCAINE 50 MG/G
OINTMENT TOPICAL ONCE
OUTPATIENT
Start: 2023-11-22 | End: 2023-11-22

## 2023-11-22 RX ORDER — LIDOCAINE 40 MG/G
CREAM TOPICAL ONCE
OUTPATIENT
Start: 2023-11-22 | End: 2023-11-22

## 2023-11-22 RX ORDER — OXYBUTYNIN CHLORIDE 10 MG/1
10 TABLET, EXTENDED RELEASE ORAL DAILY
COMMUNITY

## 2023-11-22 RX ORDER — CLOBETASOL PROPIONATE 0.5 MG/G
OINTMENT TOPICAL ONCE
OUTPATIENT
Start: 2023-11-22 | End: 2023-11-22

## 2023-11-22 RX ORDER — GENTAMICIN SULFATE 1 MG/G
OINTMENT TOPICAL ONCE
OUTPATIENT
Start: 2023-11-22 | End: 2023-11-22

## 2023-11-22 RX ORDER — BETAMETHASONE DIPROPIONATE 0.05 %
OINTMENT (GRAM) TOPICAL ONCE
OUTPATIENT
Start: 2023-11-22 | End: 2023-11-22

## 2023-11-22 RX ADMIN — LIDOCAINE HYDROCHLORIDE 5 ML: 40 SOLUTION TOPICAL at 10:39

## 2023-11-22 NOTE — PROGRESS NOTES
"[facility] Hospital Medicine  History & Physical    Patient Name: Jessica Floyd  MRN: [id number]  Admission Date: 5/25/2017  Attending Physician: Rafael Collazo MD   Primary Care Provider: Jose Peacock MD    [facility] Medicine Team: [facility] MED C Denis Whitman PA-C     Patient information was obtained from patient, past medical records and ER records.     Subjective:     Principal Problem:Chest pain, rule out acute myocardial infarction    Chief Complaint:   Chief Complaint   Patient presents with    Chest Pain     Pt sent down from cardiology clinic-states had chest "squeezing" this am.  Sent down b/c of abnormal EKG.   Pt s/p toe surgery on Monday.         HPI: 65F with PMHx of HLD, HTN, R CCA stent for dissection during f/u angiogram in 2002 for cerebral angiogram clipping done in 1999, nonobstructive CAD per [facility] in 2016 as part of syncope workup presents from cardiology clinic for evaluation of chest pain with Ant/Lat TWI on EKG. Pt notes she had uncomplicated outpatient foot surgery on 5/22. She has been taking pain medications which have caused her to feel nauseated and have multiple episodes of emesis for the past few days. Her nausea has improved with zofran given in the ED and with PO phenergan that she got from a friend while at home. She awoke this AM around 4AM with pain in her foot and took some pain medication which made her nauseated with emesis. About 15 minutes after emesis she noted 1/10 left sided chest pressure/discomfort lasting about 20 sec and returning intermittently until about 12N today. She decided to make a cardiology appt out of precautionary measures. She denies any CP now and denies any palpitations, SOB, or dizziness. The patient is new to [facility] and does not have her past medical records from [facility] where her last cardiologist is located. Her EKG in clinic showed Ant/Lat TWI and she was sent to ED. On arrival to ED, trop at 1716 and 2318 negative and TWI " [address] [address] [address]:     [facility] [address] [facility] [address] [facility], [address]  p: [phone] f: [phone]     Ordering Center:     [address] [place] [address] [address]  [facility] [address]  [phone]  [facility] [address] [facility] [phone]    Patient Information:      Akbar Flowers  [address]  [address]   [phone]   : 1950  AGE: 74 y.o. GENDER: female   EPISODE DATE: 2023    Insurance:      PRIMARY INSURANCE:  Plan: [employer] MEDICARE  Coverage: [employer] MEDICARE  Effective Date: 2015  Group Number: [unfilled]  Subscriber Number: [id number] - (Medicare Managed)    Payer/Plan Subscr  Sex Relation Sub. Ins. ID Effective Group Num   1. [address]* 1950 Female Self [id number]                                   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 ([address] ) - Primary    Relevant Orders    Initiate Outpatient Wound Care Protocol    Bilateral carotid artery stenosis       WOUNDS REQUIRING DRESSING SUPPLIES:     Puncture 20 Back Medial;Right;Upper (Active)   Number of days: 1365       Wound 23 Leg Left;Medial (Active)   Number of days: 126       Wound 23 Leg Right; Anterior (Active)   Number of days: 126       Wound 23 Ankle Left;Medial #1 (Active)   Wound Image   23 1036   Dressing Status New dressing applied 23 1123   Wound Cleansed Cleansed with saline 23 1123   Dressing/Treatment Alginate;Dry dressing 23 1123   Wound Length (cm) 1.4 cm 23 1036   Wound Width (cm) 0.5 cm 23 1036   Wound Depth (cm) 0.3 cm 23 1036   Wound Surface Area (cm^2) 0.7 cm^2 less prominent. Patient was evaluated by cardiology in the ED. The denies any discomfort besides her usual aches and pain and her R foot pain. In the ED she was found to be hypoxic to 89% but she had just been given morphine, however, in setting of recent sx a CTA was ordered and was negative.     Past Medical History:   Diagnosis Date    CAD (coronary artery disease)     Outside [facility] 6/2014: 20% mLAD, 50% mCx, 20%, mRCA    Carotid stenosis     R CCA stent, L CCA 60%    Dyslipidemia     ESBL (extended spectrum beta-lactamase) producing bacteria infection     UTI    Hypertension     SAH (subarachnoid hemorrhage)     1999, from ruptured aneurysm, s/p clip       Past Surgical History:   Procedure Laterality Date    anneurysm cerebral   1999    ANTERIOR CRUCIATE LIGAMENT REPAIR  2012       Review of patient's allergies indicates:  No Known Allergies    No current facility-administered medications on file prior to encounter.      Current Outpatient Prescriptions on File Prior to Encounter   Medication Sig    amlodipine (NORVASC) 10 MG tablet Take 1 tablet (10 mg total) by mouth once daily.    aspirin (ECOTRIN) 81 MG EC tablet Take 1 tablet (81 mg total) by mouth once daily.    atorvastatin (LIPITOR) 80 MG tablet Take 1 tablet (80 mg total) by mouth once daily.    carvedilol (COREG) 12.5 MG tablet Take 1 tablet (12.5 mg total) by mouth 2 (two) times daily with meals.    hydrochlorothiazide (HYDRODIURIL) 25 MG tablet Take 1 tablet (25 mg total) by mouth once daily.    OXYCODONE HCL (OXYCODONE ORAL) Take by mouth daily as needed.    pantoprazole (PROTONIX) 40 MG tablet Take 40 mg by mouth once daily.     Family History     Problem Relation (Age of Onset)    Alcohol abuse Father    Aneurysm Mother    Heart disease Brother        Social History Main Topics    Smoking status: Former Smoker     Quit date: 1/1/1999    Smokeless tobacco: Not on file    Alcohol use 4.2 oz/week     7 Glasses of wine per week     Drug use: No    Sexual activity: Not on file     Review of Systems   Constitutional: Positive for activity change. Negative for chills and fever.   Respiratory: Negative for cough, shortness of breath and wheezing.    Cardiovascular: Positive for chest pain. Negative for palpitations and leg swelling.   Gastrointestinal: Positive for constipation, nausea and vomiting. Negative for abdominal pain.   Genitourinary: Negative for difficulty urinating and dysuria.   Musculoskeletal: Positive for arthralgias, gait problem and myalgias.   Skin: Positive for wound. Negative for rash.   Neurological: Positive for syncope. Negative for dizziness, tremors, speech difficulty, weakness, light-headedness and headaches.   Psychiatric/Behavioral: Negative for confusion and dysphoric mood.     Objective:     Vital Signs (Most Recent):  Temp: 98.4 °F (36.9 °C) (05/25/17 2100)  Pulse: 92 (05/25/17 2100)  Resp: 16 (05/25/17 2100)  BP: 124/74 (05/25/17 2100)  SpO2: 96 % (05/25/17 2100) Vital Signs (24h Range):  Temp:  [98.2 °F (36.8 °C)-98.4 °F (36.9 °C)] 98.4 °F (36.9 °C)  Pulse:  [83-92] 92  Resp:  [16-21] 16  SpO2:  [92 %-97 %] 96 %  BP: (102-152)/(66-84) 124/74     Weight: 71.7 kg (158 lb)  Body mass index is 27.12 kg/m².    Physical Exam   Constitutional: She is oriented to person, place, and time. She appears well-developed and well-nourished. No distress.   HENT:   Head: Normocephalic and atraumatic.   Eyes: EOM are normal. Pupils are equal, round, and reactive to light.   Neck: Normal range of motion. Neck supple.   Cardiovascular: Normal rate and regular rhythm.  Exam reveals no friction rub.    No murmur heard.  Pulmonary/Chest: Effort normal and breath sounds normal. No respiratory distress. She has no wheezes. She exhibits no tenderness.   Abdominal: Soft. Bowel sounds are normal. She exhibits no distension. There is no tenderness.   Musculoskeletal: Normal range of motion. She exhibits no edema, tenderness or deformity.    R foot in post op boot, N/V intact   Neurological: She is alert and oriented to person, place, and time. No cranial nerve deficit.   No focal deficits   Skin: Skin is warm and dry. Capillary refill takes less than 2 seconds.   Psychiatric: She has a normal mood and affect. Her speech is normal and behavior is normal. Judgment and thought content normal. Cognition and memory are normal.   Nursing note and vitals reviewed.       Significant Labs:   CBC:   Recent Labs  Lab 05/25/17  1716   WBC 11.91   HGB 14.4   HCT 42.0        CMP:   Recent Labs  Lab 05/25/17  1716      K 3.0*   CL 96   CO2 28   *   BUN 14   CREATININE 1.1   CALCIUM 10.0   PROT 7.9   ALBUMIN 4.0   BILITOT 0.8   ALKPHOS 75   AST 36   ALT 33   ANIONGAP 13   EGFRNONAA 52.1*     Cardiac Markers:   Recent Labs  Lab 05/25/17  1716   BNP 75     Lipase: No results for input(s): LIPASE in the last 48 hours.  TSH:   Recent Labs  Lab 05/25/17  2318   TSH 0.169*     Recent Lab Results       05/25/17  2318 05/25/17  1716      Albumin  4.0     Alkaline Phosphatase  75     ALT  33     Anion Gap  13     AST  36     Baso #  0.04     Basophil%  0.3     Total Bilirubin  0.8  Comment:  For infants and newborns, interpretation of results should be based  on gestational age, weight and in agreement with clinical  observations.  Premature Infant recommended reference ranges:  Up to 24 hours.............<8.0 mg/dL  Up to 48 hours............<12.0 mg/dL  3-5 days..................<15.0 mg/dL  6-29 days.................<15.0 mg/dL       BNP  75  Comment:  Values of less than 100 pg/ml are consistent with non-CHF populations.     BUN, Bld  14     Calcium  10.0     Chloride  96     CO2  28     Creatinine  1.1     Differential Method  Automated     eGFR if African American  >60.0     eGFR if non   52.1  Comment:  Calculation used to obtain the estimated glomerular filtration  rate (eGFR) is the CKD-EPI equation. Since race is unknown   in our  information system, the eGFR values for   -American and Non--American patients are given   for each creatinine result.  (A)     Eos #  0.0     Eosinophil%  0.2     Free T4 1.14      Glucose  118(H)     Gran #  7.4     Gran%  62.0     Hematocrit  42.0     Hemoglobin  14.4     Lymph #  2.7     Lymph%  22.3     Magnesium 1.9      MCH  34.0(H)     MCHC  34.3     MCV  99(H)     Mono #  1.8(H)     Mono%  14.9     MPV  10.3     Phosphorus 4.3      Platelets  267     Potassium  3.0(L)     Total Protein  7.9     RBC  4.23     RDW  14.6(H)     Sodium  137     Troponin I 0.006  Comment:  The reference interval for Troponin I represents the 99th percentile   cutoff   for our facility and is consistent with 3rd generation assay   performance.   <0.006  Comment:  The reference interval for Troponin I represents the 99th percentile   cutoff   for our facility and is consistent with 3rd generation assay   performance.       TSH 0.169(L)      WBC  11.91         All pertinent labs within the past 24 hours have been reviewed.    Significant Imaging: CT: I have reviewed all pertinent results/findings within the past 24 hours and my personal findings are:  CTA: no acute findings  CXR: I have reviewed all pertinent results/findings within the past 24 hours and my personal findings are:  no acute findings  EKG: I have reviewed all pertinent results/findings within the past 24 hours and my personal findings are: TWI  I have reviewed all pertinent imaging results/findings within the past 24 hours.    Assessment/Plan:     * Chest pain, rule out acute myocardial infarction    CAD  S/p R CCA stent placement  - patient seen by cardiology in ED, reccs apprecaited: Pt's chest pain seems consistent with Acid reflux from emesis vs MSK from emesis  - In setting of moderate risk based on HEART Score =5, patient admitted for rule out  - troponin negative x 2, will trend one more  - EKG changes may be chronic, though they are now somewhat  less prominent, and no previous EKGs for comparison available  - If enzymes remain negative pt will likely need further risk stratification with SPECT. This can be done as an outpt if she has no further recurrence of chest pain or dynamic EKG changes  - Pending 2D Echo  - monitor on tele        Hypokalemia    - replace PO        Nausea and vomiting    - associated with pain medication  - states that zofran helped the most, will d/c with rx        Gastroesophageal reflux disease without esophagitis    - continue protonix        HTN (hypertension)    HLD  - continue norvasc asa, lipitor, coreg, hctz          VTE Risk Mitigation         Ordered     Medium Risk of VTE  Once      05/25/17 2255     Place sequential compression device  Until discontinued      05/25/17 2255     Place GANESH hose  Until discontinued      05/25/17 2255        Denis Whitman PA-C  Department of Hospital Medicine   [facility]

## 2023-11-22 NOTE — PROGRESS NOTES
Wound Healing Center Followup Visit Note    Referring Physician : Haley Glaser MD  [facility] RECORD NUMBER:  [id number]  AGE: 74 y.o. GENDER: female  : 1950  EPISODE DATE:  2023    Subjective:     Chief Complaint   Patient presents with    Wound Check     Left ankle      HISTORY of PRESENT ILLNESS HPI   Yo Weber is a 74 y.o. female who presents today in regards to follow up evaluation and treatment of wound/ulcer. That patient's past medical, family and social hx were reviewed and changes were made if present. History of Wound Context:  Patient presents in regards to left lower extremity wounds. They have been present since 2023. She has had previous L LE intervention for wound of the left calf which was first noted approximately 2018 after trauma. She followed with Dr. Gary Rosa and after intervention it did heal.      B LE having issues with numbness and tingling in her foot primarily which is stable. Her right leg bothers her more than her left. This has been ongoing for at least a year. She has lumbar spinal stenosis but never had it addressed. 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 Edelmira Chakraborty 2020. She is recovered but has chronic diarrhea. She feels her diarrhea has improved gradually over time. 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 She has known assx carotid stenosis. She remains assx, denies stroke, tia, slurred speech, or amaurosis fugax. She is on eliquis per Dr. Arlet Ruth after developing atrial fibrillation postop after her colon resection. After most recent intervention 23 it was not felt she was a good candidate for bypass due to her nutrition, low weight, and her heart. She is at first apptmt in center 23 not on abx and her wound per her report has been improving.       Previous Vascular Procedure  19 L sfa, popliteal atherectomy, dcb   2020 R LE

## 2023-12-05 NOTE — DISCHARGE INSTRUCTIONS
Visit Discharge/Physician Orders     Discharge condition: Stable     Assessment of pain at discharge:yes     Anesthetic used: 4% lidocaine solution     Discharge to: Home     Left via:Private automobile     Accompanied by:  self     ECF/HHA: [place] for supplies     Dressing Orders: LEFT MEDIAL ANKLE-Cleanse with normal saline, pack loosely with robert, dry dressing and secure. Change daily. Apply spandagrip. On in am and off in pm. Elevate legs as much as possible above level of the heart. Treatment Orders: Eat a diet high in protein and vitamin C. Take a multiple vitamin daily unless contraindicated. Keep all pressure off of wound site. [address] [place] [address] followup visit : 2 weeks _______________________  (Please note your next appointment above and if you are unable to keep, kindly give a 24 hour notice. Thank you.)     Physician signature:__________________________     If you experience any of the following, please call the [facility] during business hours:     * Increase in Pain  * Temperature over 101  * Increase in drainage from your wound  * Drainage with a foul odor  * Bleeding  * Increase in swelling  * Need for compression bandage changes due to slippage, breakthrough drainage. If you need medical attention outside of the business hours of the [facility] please contact your PCP or go to the nearest emergency room.

## 2023-12-06 ENCOUNTER — HOSPITAL ENCOUNTER (OUTPATIENT)
Dept: WOUND CARE | Age: 73
Discharge: HOME | End: 2023-12-06
Attending: SURGERY
Payer: MEDICARE

## 2023-12-06 VITALS
TEMPERATURE: 96.8 F | BODY MASS INDEX: 14.63 KG/M2 | DIASTOLIC BLOOD PRESSURE: 74 MMHG | RESPIRATION RATE: 16 BRPM | SYSTOLIC BLOOD PRESSURE: 146 MMHG | HEIGHT: 66 IN | WEIGHT: 91 LBS | HEART RATE: 67 BPM

## 2023-12-06 DIAGNOSIS — I70.242 ATHEROSCLEROSIS OF NATIVE ARTERY OF LEFT LOWER EXTREMITY WITH ULCERATION OF CALF (HCC): Primary | ICD-10-CM

## 2023-12-06 PROCEDURE — 11042 DBRDMT SUBQ TIS 1ST 20SQCM/<: CPT

## 2023-12-06 PROCEDURE — 11042 DBRDMT SUBQ TIS 1ST 20SQCM/<: CPT | Performed by: SURGERY

## 2023-12-06 RX ORDER — SODIUM CHLOR/HYPOCHLOROUS ACID 0.033 %
SOLUTION, IRRIGATION IRRIGATION ONCE
OUTPATIENT
Start: 2023-12-06 | End: 2023-12-06

## 2023-12-06 RX ORDER — LIDOCAINE HYDROCHLORIDE 20 MG/ML
JELLY TOPICAL ONCE
OUTPATIENT
Start: 2023-12-06 | End: 2023-12-06

## 2023-12-06 RX ORDER — BACITRACIN ZINC AND POLYMYXIN B SULFATE 500; 1000 [USP'U]/G; [USP'U]/G
OINTMENT TOPICAL ONCE
OUTPATIENT
Start: 2023-12-06 | End: 2023-12-06

## 2023-12-06 RX ORDER — GENTAMICIN SULFATE 1 MG/G
OINTMENT TOPICAL ONCE
OUTPATIENT
Start: 2023-12-06 | End: 2023-12-06

## 2023-12-06 RX ORDER — LIDOCAINE 50 MG/G
OINTMENT TOPICAL ONCE
OUTPATIENT
Start: 2023-12-06 | End: 2023-12-06

## 2023-12-06 RX ORDER — LIDOCAINE HYDROCHLORIDE 40 MG/ML
SOLUTION TOPICAL ONCE
Status: COMPLETED | OUTPATIENT
Start: 2023-12-06 | End: 2023-12-06

## 2023-12-06 RX ORDER — IBUPROFEN 200 MG
TABLET ORAL ONCE
OUTPATIENT
Start: 2023-12-06 | End: 2023-12-06

## 2023-12-06 RX ORDER — LIDOCAINE HYDROCHLORIDE 40 MG/ML
SOLUTION TOPICAL ONCE
OUTPATIENT
Start: 2023-12-06 | End: 2023-12-06

## 2023-12-06 RX ORDER — TRIAMCINOLONE ACETONIDE 1 MG/G
OINTMENT TOPICAL ONCE
OUTPATIENT
Start: 2023-12-06 | End: 2023-12-06

## 2023-12-06 RX ORDER — BACITRACIN ZINC 500 [USP'U]/G
OINTMENT TOPICAL ONCE
OUTPATIENT
Start: 2023-12-06 | End: 2023-12-06

## 2023-12-06 RX ORDER — LIDOCAINE 40 MG/G
CREAM TOPICAL ONCE
OUTPATIENT
Start: 2023-12-06 | End: 2023-12-06

## 2023-12-06 RX ORDER — CLOBETASOL PROPIONATE 0.5 MG/G
OINTMENT TOPICAL ONCE
OUTPATIENT
Start: 2023-12-06 | End: 2023-12-06

## 2023-12-06 RX ORDER — BETAMETHASONE DIPROPIONATE 0.05 %
OINTMENT (GRAM) TOPICAL ONCE
OUTPATIENT
Start: 2023-12-06 | End: 2023-12-06

## 2023-12-06 RX ADMIN — LIDOCAINE HYDROCHLORIDE 5 ML: 40 SOLUTION TOPICAL at 10:31

## 2023-12-27 ENCOUNTER — OFFICE VISIT (OUTPATIENT)
Dept: NEUROSURGERY | Age: 73
End: 2023-12-27
Payer: MEDICARE

## 2023-12-27 VITALS — HEIGHT: 66 IN | RESPIRATION RATE: 16 BRPM | BODY MASS INDEX: 14.14 KG/M2 | WEIGHT: 88 LBS

## 2023-12-27 DIAGNOSIS — G91.2 NORMAL PRESSURE HYDROCEPHALUS (HCC): Primary | ICD-10-CM

## 2023-12-27 PROCEDURE — 3017F COLORECTAL CA SCREEN DOC REV: CPT | Performed by: PHYSICIAN ASSISTANT

## 2023-12-27 PROCEDURE — 1090F PRES/ABSN URINE INCON ASSESS: CPT | Performed by: PHYSICIAN ASSISTANT

## 2023-12-27 PROCEDURE — 1123F ACP DISCUSS/DSCN MKR DOCD: CPT | Performed by: PHYSICIAN ASSISTANT

## 2023-12-27 PROCEDURE — 99212 OFFICE O/P EST SF 10 MIN: CPT

## 2023-12-27 PROCEDURE — G8419 CALC BMI OUT NRM PARAM NOF/U: HCPCS | Performed by: PHYSICIAN ASSISTANT

## 2023-12-27 PROCEDURE — G8427 DOCREV CUR MEDS BY ELIG CLIN: HCPCS | Performed by: PHYSICIAN ASSISTANT

## 2023-12-27 PROCEDURE — G8484 FLU IMMUNIZE NO ADMIN: HCPCS | Performed by: PHYSICIAN ASSISTANT

## 2023-12-27 PROCEDURE — 1036F TOBACCO NON-USER: CPT | Performed by: PHYSICIAN ASSISTANT

## 2023-12-27 PROCEDURE — 99213 OFFICE O/P EST LOW 20 MIN: CPT | Performed by: PHYSICIAN ASSISTANT

## 2023-12-27 PROCEDURE — G8400 PT W/DXA NO RESULTS DOC: HCPCS | Performed by: PHYSICIAN ASSISTANT

## 2023-12-27 NOTE — PROGRESS NOTES
Subjective:      Patient ID: Moses Mosqueda is a 74 y.o. female. Neurologic Problem  Primary symptoms comment: History of  shunt place a few years ago. She is doing well. She is ambulating well. She does use a walker when on long walks. Krissy Hernandez does have urinary urgency. . (Denies any memory issues.)       Review of Systems   Constitutional: Negative. HENT: Negative. Eyes: Negative. Respiratory: Negative. Cardiovascular: Negative. Gastrointestinal: Negative. Endocrine: Negative. Genitourinary: Negative. Musculoskeletal:  Positive for gait problem. Skin: Negative. Allergic/Immunologic: Negative. Hematological: Negative. Psychiatric/Behavioral: Negative. Objective:   Physical Exam  Constitutional:       Appearance: Normal appearance. HENT:      Head: Normocephalic and atraumatic. Nose: Nose normal.   Eyes:      Pupils: Pupils are equal, round, and reactive to light. Pulmonary:      Effort: Pulmonary effort is normal.   Abdominal:      General: There is no distension. Skin:     General: Skin is warm and dry. Neurological:      Mental Status: She is alert. GCS: GCS eye subscore is 4. GCS verbal subscore is 5. GCS motor subscore is 6. Cranial Nerves: Cranial nerves 2-12 are intact. Sensory: Sensation is intact. Motor: Motor function is intact. Gait: Gait abnormal.   Psychiatric:         Mood and Affect: Mood normal.     Recent memory 1/3    Assessment:      74year old female with  shunt placed years ago by Dr. Marilyn Wheatley for NPH. She is doing well. Her  shunt was reprogrammed to 1.5      Plan:      She is doing well. She will call with any new issues.         FREDI Lees

## 2023-12-29 NOTE — DISCHARGE INSTRUCTIONS
Discharge Instructions                Visit Discharge/Physician Orders     Discharge condition: Stable     Assessment of pain at discharge:yes     Anesthetic used: 4% lidocaine solution     Discharge to: Home     Left via:Private automobile     Accompanied by:  self     ECF/HHA: [place] for supplies     Dressing Orders: LEFT MEDIAL ANKLE-Cleanse with normal saline, pack loosely with robert, dry dressing and secure. Change daily.     Apply spandagrip. On in am and off in pm. Elevate legs as much as possible above level of the heart.      Treatment Orders: Eat a diet high in protein and vitamin C. Take a multiple vitamin daily unless contraindicated.      Keep all pressure off of wound site.     [facility] followup visit : 2 weeks _______________________  (Please note your next appointment above and if you are unable to keep, kindly give a 24 hour notice. Thank you.)     Physician signature:__________________________     If you experience any of the following, please call the Wound Care Center during business hours:     * Increase in Pain  * Temperature over 101  * Increase in drainage from your wound  * Drainage with a foul odor  * Bleeding  * Increase in swelling  * Need for compression bandage changes due to slippage, breakthrough drainage.     If you need medical attention outside of the business hours of the Wound Care Centers please contact your PCP or go to the nearest emergency room.

## 2024-01-03 ENCOUNTER — HOSPITAL ENCOUNTER (OUTPATIENT)
Dept: WOUND CARE | Age: 74
Discharge: HOME | End: 2024-01-03
Attending: SURGERY
Payer: MEDICARE

## 2024-01-03 VITALS
HEIGHT: 66 IN | WEIGHT: 88 LBS | BODY MASS INDEX: 14.14 KG/M2 | TEMPERATURE: 97.7 F | SYSTOLIC BLOOD PRESSURE: 165 MMHG | RESPIRATION RATE: 16 BRPM | DIASTOLIC BLOOD PRESSURE: 90 MMHG | HEART RATE: 58 BPM

## 2024-01-03 DIAGNOSIS — I70.242 ATHEROSCLEROSIS OF NATIVE ARTERY OF LEFT LOWER EXTREMITY WITH ULCERATION OF CALF (HCC): Primary | ICD-10-CM

## 2024-01-03 PROCEDURE — 11042 DBRDMT SUBQ TIS 1ST 20SQCM/<: CPT

## 2024-01-03 PROCEDURE — 11042 DBRDMT SUBQ TIS 1ST 20SQCM/<: CPT | Performed by: SURGERY

## 2024-01-03 RX ORDER — IBUPROFEN 200 MG
TABLET ORAL ONCE
OUTPATIENT
Start: 2024-01-03 | End: 2024-01-03

## 2024-01-03 RX ORDER — BACITRACIN ZINC 500 [USP'U]/G
OINTMENT TOPICAL ONCE
OUTPATIENT
Start: 2024-01-03 | End: 2024-01-03

## 2024-01-03 RX ORDER — LIDOCAINE HYDROCHLORIDE 20 MG/ML
JELLY TOPICAL ONCE
OUTPATIENT
Start: 2024-01-03 | End: 2024-01-03

## 2024-01-03 RX ORDER — BACITRACIN ZINC AND POLYMYXIN B SULFATE 500; 1000 [USP'U]/G; [USP'U]/G
OINTMENT TOPICAL ONCE
OUTPATIENT
Start: 2024-01-03 | End: 2024-01-03

## 2024-01-03 RX ORDER — TRIAMCINOLONE ACETONIDE 1 MG/G
OINTMENT TOPICAL ONCE
OUTPATIENT
Start: 2024-01-03 | End: 2024-01-03

## 2024-01-03 RX ORDER — BETAMETHASONE DIPROPIONATE 0.05 %
OINTMENT (GRAM) TOPICAL ONCE
OUTPATIENT
Start: 2024-01-03 | End: 2024-01-03

## 2024-01-03 RX ORDER — LIDOCAINE HYDROCHLORIDE 40 MG/ML
SOLUTION TOPICAL ONCE
OUTPATIENT
Start: 2024-01-03 | End: 2024-01-03

## 2024-01-03 RX ORDER — GENTAMICIN SULFATE 1 MG/G
OINTMENT TOPICAL ONCE
OUTPATIENT
Start: 2024-01-03 | End: 2024-01-03

## 2024-01-03 RX ORDER — LIDOCAINE 40 MG/G
CREAM TOPICAL ONCE
OUTPATIENT
Start: 2024-01-03 | End: 2024-01-03

## 2024-01-03 RX ORDER — LIDOCAINE 50 MG/G
OINTMENT TOPICAL ONCE
OUTPATIENT
Start: 2024-01-03 | End: 2024-01-03

## 2024-01-03 RX ORDER — CLOBETASOL PROPIONATE 0.5 MG/G
OINTMENT TOPICAL ONCE
OUTPATIENT
Start: 2024-01-03 | End: 2024-01-03

## 2024-01-03 RX ORDER — SODIUM CHLOR/HYPOCHLOROUS ACID 0.033 %
SOLUTION, IRRIGATION IRRIGATION ONCE
OUTPATIENT
Start: 2024-01-03 | End: 2024-01-03

## 2024-01-03 RX ORDER — LIDOCAINE HYDROCHLORIDE 40 MG/ML
SOLUTION TOPICAL ONCE
Status: COMPLETED | OUTPATIENT
Start: 2024-01-03 | End: 2024-01-03

## 2024-01-03 RX ADMIN — LIDOCAINE HYDROCHLORIDE 5 ML: 40 SOLUTION TOPICAL at 10:41

## 2024-01-03 NOTE — PLAN OF CARE
Problem: Chronic Conditions and Co-morbidities  Goal: Patient's chronic conditions and co-morbidity symptoms are monitored and maintained or improved  1/3/2024 1126 by Mónica Morris RN  Outcome: Progressing  1/3/2024 1110 by Mónica Morris RN  Outcome: Progressing     Problem: Cognitive:  Goal: Knowledge of wound care  Description: Knowledge of wound care  1/3/2024 1126 by Mónica Morris RN  Outcome: Progressing  1/3/2024 1110 by Mónica Morris RN  Outcome: Progressing  Goal: Understands risk factors for wounds  Description: Understands risk factors for wounds  1/3/2024 1126 by Mónica Morris RN  Outcome: Progressing  1/3/2024 1110 by Mónica Morris RN  Outcome: Progressing     Problem: Wound:  Goal: Will show signs of wound healing; wound closure and no evidence of infection  Description: Will show signs of wound healing; wound closure and no evidence of infection  1/3/2024 1126 by Mónica Morris RN  Outcome: Progressing  1/3/2024 1110 by Mónica Morris RN  Outcome: Progressing     Problem: Arterial:  Goal: Optimize blood flow for wound healing  Description: Optimize blood flow for wound healing  1/3/2024 1126 by Mónica Morris RN  Outcome: Progressing  1/3/2024 1110 by Mónica Morris RN  Outcome: Progressing     Problem: Venous:  Goal: Signs of wound healing will improve  Description: Signs of wound healing will improve  1/3/2024 1126 by Mónica Morris RN  Outcome: Progressing  1/3/2024 1110 by Mónica Morris RN  Outcome: Progressing

## 2024-01-03 NOTE — PROGRESS NOTES
Dr. Hobbs at the time of next visit regarding treatment options, for now follow conservatively at the wound of the left calf looks improved  All her questions were answered  12/6/23  Wound improved - minimal drainage - robert  We discussed above results  We did discuss intervention again  Will continue with current as it is improving  12/20/23  Wound smaller 0.3 cm  1/3/24  Appearance improved, stable size at 0.36    Wound/Ulcer Pain Timing/Severity: waxing and waning, moderate, severe  Quality of pain: aching, throbbing, shooting, tender  Severity:  7 / 10   Modifying Factors: Pain worsens with debridements, dressing canges  Associated Signs/Symptoms: drainage, numbness, pain, and tingling    Ulcer Identification:  Ulcer Type: arterial and traumatic  Contributing Factors: arterial insufficiency    Diabetic/Pressure/Non Pressure Ulcers only:  Ulcer: Non-Pressure ulcer, fat layer exposed    Wound: Contusion        PAST MEDICAL HISTORY      Diagnosis Date    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 with rest pain ([facility]) 1/16/2019    Atrial fibrillation ([facility])     Atypical mole     upper right arm - black mole     Bilateral carotid artery stenosis 05/22/2018    Dr. 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

## 2024-01-03 NOTE — PLAN OF CARE
Problem: Chronic Conditions and Co-morbidities  Goal: Patient's chronic conditions and co-morbidity symptoms are monitored and maintained or improved  Outcome: Progressing     Problem: Cognitive:  Goal: Knowledge of wound care  Description: Knowledge of wound care  Outcome: Progressing  Goal: Understands risk factors for wounds  Description: Understands risk factors for wounds  Outcome: Progressing     Problem: Arterial:  Goal: Optimize blood flow for wound healing  Description: Optimize blood flow for wound healing  Outcome: Progressing     Problem: Venous:  Goal: Signs of wound healing will improve  Description: Signs of wound healing will improve  Outcome: Progressing

## 2024-01-24 ENCOUNTER — HOSPITAL ENCOUNTER (OUTPATIENT)
Dept: WOUND CARE | Age: 74
Discharge: HOME | End: 2024-01-24
Attending: SURGERY

## 2024-01-29 NOTE — DISCHARGE INSTRUCTIONS
Discharge Instructions      Visit Discharge/Physician Orders     Discharge condition: Stable     Assessment of pain at discharge:yes     Anesthetic used: 4% lidocaine solution     Discharge to: Home     Left via:Private automobile     Accompanied by:  self     ECF/HHA: [place] for supplies     Dressing Orders: LEFT MEDIAL ANKLE-Cleanse with normal saline, pack loosely with robert, dry dressing and secure. Change daily.     Apply robert to left 5th toe small area.    Apply spandagrip. On in am and off in pm. Elevate legs as much as possible above level of the heart.      Treatment Orders: Eat a diet high in protein and vitamin C. Take a multiple vitamin daily unless contraindicated.      Keep all pressure off of wound site.     [facility] followup visit : 2 weeks _______________________  (Please note your next appointment above and if you are unable to keep, kindly give a 24 hour notice. Thank you.)     Physician signature:__________________________     If you experience any of the following, please call the Wound Care Center during business hours:     * Increase in Pain  * Temperature over 101  * Increase in drainage from your wound  * Drainage with a foul odor  * Bleeding  * Increase in swelling  * Need for compression bandage changes due to slippage, breakthrough drainage.     If you need medical attention outside of the business hours of the Wound Care Centers please contact your PCP or go to the nearest emergency room.

## 2024-01-31 ENCOUNTER — HOSPITAL ENCOUNTER (OUTPATIENT)
Dept: WOUND CARE | Age: 74
Discharge: HOME | End: 2024-01-31
Attending: SURGERY
Payer: MEDICARE

## 2024-01-31 VITALS
HEIGHT: 66 IN | SYSTOLIC BLOOD PRESSURE: 174 MMHG | HEART RATE: 61 BPM | TEMPERATURE: 97 F | BODY MASS INDEX: 14.14 KG/M2 | DIASTOLIC BLOOD PRESSURE: 71 MMHG | WEIGHT: 88 LBS | RESPIRATION RATE: 16 BRPM

## 2024-01-31 DIAGNOSIS — I70.242 ATHEROSCLEROSIS OF NATIVE ARTERY OF LEFT LOWER EXTREMITY WITH ULCERATION OF CALF (HCC): Primary | ICD-10-CM

## 2024-01-31 PROCEDURE — 11042 DBRDMT SUBQ TIS 1ST 20SQCM/<: CPT

## 2024-01-31 PROCEDURE — 11042 DBRDMT SUBQ TIS 1ST 20SQCM/<: CPT | Performed by: SURGERY

## 2024-01-31 RX ORDER — LIDOCAINE HYDROCHLORIDE 40 MG/ML
SOLUTION TOPICAL ONCE
OUTPATIENT
Start: 2024-01-31 | End: 2024-01-31

## 2024-01-31 RX ORDER — CLOBETASOL PROPIONATE 0.5 MG/G
OINTMENT TOPICAL ONCE
OUTPATIENT
Start: 2024-01-31 | End: 2024-01-31

## 2024-01-31 RX ORDER — IBUPROFEN 200 MG
TABLET ORAL ONCE
OUTPATIENT
Start: 2024-01-31 | End: 2024-01-31

## 2024-01-31 RX ORDER — LIDOCAINE HYDROCHLORIDE 20 MG/ML
JELLY TOPICAL ONCE
OUTPATIENT
Start: 2024-01-31 | End: 2024-01-31

## 2024-01-31 RX ORDER — TRIAMCINOLONE ACETONIDE 1 MG/G
OINTMENT TOPICAL ONCE
OUTPATIENT
Start: 2024-01-31 | End: 2024-01-31

## 2024-01-31 RX ORDER — LIDOCAINE 50 MG/G
OINTMENT TOPICAL ONCE
OUTPATIENT
Start: 2024-01-31 | End: 2024-01-31

## 2024-01-31 RX ORDER — LIDOCAINE HYDROCHLORIDE 40 MG/ML
SOLUTION TOPICAL ONCE
Status: COMPLETED | OUTPATIENT
Start: 2024-01-31 | End: 2024-01-31

## 2024-01-31 RX ORDER — BACITRACIN ZINC AND POLYMYXIN B SULFATE 500; 1000 [USP'U]/G; [USP'U]/G
OINTMENT TOPICAL ONCE
OUTPATIENT
Start: 2024-01-31 | End: 2024-01-31

## 2024-01-31 RX ORDER — GENTAMICIN SULFATE 1 MG/G
OINTMENT TOPICAL ONCE
OUTPATIENT
Start: 2024-01-31 | End: 2024-01-31

## 2024-01-31 RX ORDER — SODIUM CHLOR/HYPOCHLOROUS ACID 0.033 %
SOLUTION, IRRIGATION IRRIGATION ONCE
OUTPATIENT
Start: 2024-01-31 | End: 2024-01-31

## 2024-01-31 RX ORDER — LIDOCAINE 40 MG/G
CREAM TOPICAL ONCE
OUTPATIENT
Start: 2024-01-31 | End: 2024-01-31

## 2024-01-31 RX ORDER — BETAMETHASONE DIPROPIONATE 0.05 %
OINTMENT (GRAM) TOPICAL ONCE
OUTPATIENT
Start: 2024-01-31 | End: 2024-01-31

## 2024-01-31 RX ORDER — BACITRACIN ZINC 500 [USP'U]/G
OINTMENT TOPICAL ONCE
OUTPATIENT
Start: 2024-01-31 | End: 2024-01-31

## 2024-01-31 RX ADMIN — LIDOCAINE HYDROCHLORIDE 5 ML: 40 SOLUTION TOPICAL at 11:37

## 2024-01-31 NOTE — PROGRESS NOTES
area.    Apply spandagrip. On in am and off in pm. Elevate legs as much as possible above level of the heart.      Treatment Orders: Eat a diet high in protein and vitamin C. Take a multiple vitamin daily unless contraindicated.      Keep all pressure off of wound site.     [facility] followup visit : 2 weeks _______________________  (Please note your next appointment above and if you are unable to keep, kindly give a 24 hour notice. Thank you.)     Physician signature:__________________________     If you experience any of the following, please call the Wound Care Center during business hours:     * Increase in Pain  * Temperature over 101  * Increase in drainage from your wound  * Drainage with a foul odor  * Bleeding  * Increase in swelling  * Need for compression bandage changes due to slippage, breakthrough drainage.     If you need medical attention outside of the business hours of the Wound Care Centers please contact your PCP or go to the nearest emergency room.            Electronically signed by Chrissy Hobbs MD on 1/31/2024 at 1:45 PM

## 2024-01-31 NOTE — PLAN OF CARE
Problem: Chronic Conditions and Co-morbidities  Goal: Patient's chronic conditions and co-morbidity symptoms are monitored and maintained or improved  Outcome: Progressing     Problem: Cognitive:  Goal: Knowledge of wound care  Description: Knowledge of wound care  Outcome: Progressing  Goal: Understands risk factors for wounds  Description: Understands risk factors for wounds  Outcome: Progressing     Problem: Wound:  Goal: Will show signs of wound healing; wound closure and no evidence of infection  Description: Will show signs of wound healing; wound closure and no evidence of infection  Outcome: Progressing     Problem: Arterial:  Goal: Optimize blood flow for wound healing  Description: Optimize blood flow for wound healing  Outcome: Adequate for Discharge     Problem: Venous:  Goal: Signs of wound healing will improve  Description: Signs of wound healing will improve  Outcome: Adequate for Discharge

## 2024-02-12 NOTE — DISCHARGE INSTRUCTIONS
Discharge Instructions      Visit Discharge/Physician Orders     Discharge condition: Stable     Assessment of pain at discharge:yes     Anesthetic used: 4% lidocaine solution     Discharge to: Home     Left via:Private automobile     Accompanied by:  self     ECF/HHA: [employer] for supplies     Dressing Orders: LEFT MEDIAL ANKLE-Cleanse with normal saline, pack loosely with robert, dry dressing and secure. Change daily.      Apply spandagrip. On in am and off in pm. Elevate legs as much as possible above level of the heart.      Treatment Orders: Eat a diet high in protein and vitamin C. Take a multiple vitamin daily unless contraindicated.      Keep all pressure off of wound site.     [facility] followup visit : 2 weeks _______________________  (Please note your next appointment above and if you are unable to keep, kindly give a 24 hour notice. Thank you.)     Physician signature:__________________________     If you experience any of the following, please call the Wound Care Center during business hours:     * Increase in Pain  * Temperature over 101  * Increase in drainage from your wound  * Drainage with a foul odor  * Bleeding  * Increase in swelling  * Need for compression bandage changes due to slippage, breakthrough drainage.     If you need medical attention outside of the business hours of the Wound Care Centers please contact your PCP or go to the nearest emergency room.

## 2024-02-14 ENCOUNTER — HOSPITAL ENCOUNTER (OUTPATIENT)
Dept: WOUND CARE | Age: 74
Discharge: HOME | End: 2024-02-14
Attending: SURGERY
Payer: MEDICARE

## 2024-02-14 VITALS
DIASTOLIC BLOOD PRESSURE: 72 MMHG | TEMPERATURE: 97.8 F | WEIGHT: 91 LBS | HEIGHT: 66 IN | SYSTOLIC BLOOD PRESSURE: 177 MMHG | RESPIRATION RATE: 16 BRPM | HEART RATE: 56 BPM | BODY MASS INDEX: 14.63 KG/M2

## 2024-02-14 DIAGNOSIS — I70.242 ATHEROSCLEROSIS OF NATIVE ARTERY OF LEFT LOWER EXTREMITY WITH ULCERATION OF CALF (HCC): Primary | ICD-10-CM

## 2024-02-14 PROCEDURE — 11042 DBRDMT SUBQ TIS 1ST 20SQCM/<: CPT

## 2024-02-14 PROCEDURE — 11042 DBRDMT SUBQ TIS 1ST 20SQCM/<: CPT | Performed by: SURGERY

## 2024-02-14 RX ORDER — SODIUM CHLOR/HYPOCHLOROUS ACID 0.033 %
SOLUTION, IRRIGATION IRRIGATION ONCE
OUTPATIENT
Start: 2024-02-14 | End: 2024-02-14

## 2024-02-14 RX ORDER — BACITRACIN ZINC AND POLYMYXIN B SULFATE 500; 1000 [USP'U]/G; [USP'U]/G
OINTMENT TOPICAL ONCE
OUTPATIENT
Start: 2024-02-14 | End: 2024-02-14

## 2024-02-14 RX ORDER — LIDOCAINE HYDROCHLORIDE 40 MG/ML
SOLUTION TOPICAL ONCE
OUTPATIENT
Start: 2024-02-14 | End: 2024-02-14

## 2024-02-14 RX ORDER — LIDOCAINE HYDROCHLORIDE 40 MG/ML
SOLUTION TOPICAL ONCE
Status: COMPLETED | OUTPATIENT
Start: 2024-02-14 | End: 2024-02-14

## 2024-02-14 RX ORDER — GENTAMICIN SULFATE 1 MG/G
OINTMENT TOPICAL ONCE
OUTPATIENT
Start: 2024-02-14 | End: 2024-02-14

## 2024-02-14 RX ORDER — IBUPROFEN 200 MG
TABLET ORAL ONCE
OUTPATIENT
Start: 2024-02-14 | End: 2024-02-14

## 2024-02-14 RX ORDER — BETAMETHASONE DIPROPIONATE 0.05 %
OINTMENT (GRAM) TOPICAL ONCE
OUTPATIENT
Start: 2024-02-14 | End: 2024-02-14

## 2024-02-14 RX ORDER — CLOBETASOL PROPIONATE 0.5 MG/G
OINTMENT TOPICAL ONCE
OUTPATIENT
Start: 2024-02-14 | End: 2024-02-14

## 2024-02-14 RX ORDER — BACITRACIN ZINC 500 [USP'U]/G
OINTMENT TOPICAL ONCE
OUTPATIENT
Start: 2024-02-14 | End: 2024-02-14

## 2024-02-14 RX ORDER — LIDOCAINE HYDROCHLORIDE 20 MG/ML
JELLY TOPICAL ONCE
OUTPATIENT
Start: 2024-02-14 | End: 2024-02-14

## 2024-02-14 RX ORDER — LIDOCAINE 50 MG/G
OINTMENT TOPICAL ONCE
OUTPATIENT
Start: 2024-02-14 | End: 2024-02-14

## 2024-02-14 RX ORDER — TRIAMCINOLONE ACETONIDE 1 MG/G
OINTMENT TOPICAL ONCE
OUTPATIENT
Start: 2024-02-14 | End: 2024-02-14

## 2024-02-14 RX ORDER — LIDOCAINE 40 MG/G
CREAM TOPICAL ONCE
OUTPATIENT
Start: 2024-02-14 | End: 2024-02-14

## 2024-02-14 RX ADMIN — LIDOCAINE HYDROCHLORIDE 5 ML: 40 SOLUTION TOPICAL at 10:37

## 2024-02-14 NOTE — PLAN OF CARE
Problem: Chronic Conditions and Co-morbidities  Goal: Patient's chronic conditions and co-morbidity symptoms are monitored and maintained or improved  Outcome: Progressing     Problem: Cognitive:  Goal: Knowledge of wound care  Description: Knowledge of wound care  Outcome: Progressing  Goal: Understands risk factors for wounds  Description: Understands risk factors for wounds  Outcome: Progressing     Problem: Wound:  Goal: Will show signs of wound healing; wound closure and no evidence of infection  Description: Will show signs of wound healing; wound closure and no evidence of infection  Outcome: Progressing     Problem: Arterial:  Goal: Optimize blood flow for wound healing  Description: Optimize blood flow for wound healing  Outcome: Not Progressing     Problem: Venous:  Goal: Signs of wound healing will improve  Description: Signs of wound healing will improve  Outcome: Adequate for Discharge     Problem: Arterial:  Goal: Optimize blood flow for wound healing  Description: Optimize blood flow for wound healing  Outcome: Not Progressing

## 2024-02-14 NOTE — PROGRESS NOTES
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) - Primary    Relevant Orders    Initiate Outpatient Wound Care Protocol       Pre Debridement Measurements:  Are located in the Wound/Ulcer Documentation Flow Sheet  Post Debridement Measurements:  Wound/Ulcer Descriptions are Pre Debridement except measurements:    Puncture 02/26/20 Back Medial;Right;Upper (Active)   Number of days: 1449       Wound 07/18/23 Leg Left;Medial (Active)   Number of days: 210       Wound 07/18/23 Leg Right;Anterior (Active)   Number of days: 210       Wound 08/23/23 Ankle Left;Medial #1 (Active)   Wound Image   01/31/24 1134   Wound Etiology Venous 12/20/23 1059   Dressing Status New dressing applied 02/14/24 1201   Wound Cleansed Cleansed with saline 02/14/24 1201   Dressing/Treatment Dry dressing 02/14/24 1201   Wound Length (cm) 0.9 cm 02/14/24 1035   Wound Width (cm) 0.4 cm 02/14/24 1035   Wound Depth (cm) 0.2 cm 02/14/24 1035   Wound Surface Area (cm^2) 0.36 cm^2 02/14/24 1035   Change in Wound Size % (l*w) 87.76 02/14/24 1035   Wound Volume (cm^3) 0.072 cm^3 02/14/24 1035   Wound Healing % 94 02/14/24 1035   Post-Procedure Length (cm) 1 cm 02/14/24 1154   Post-Procedure Width (cm) 0.5 cm 02/14/24 1154   Post-Procedure Depth (cm) 0.2 cm 02/14/24 1154   Post-Procedure Surface Area (cm^2) 0.5 cm^2 02/14/24 1154   Post-Procedure Volume (cm^3) 0.1 cm^3 02/14/24 1154   Wound Assessment Fibrin;Pink/red 02/14/24 1035   Drainage Amount Scant (moist but unmeasurable) 02/14/24 1035   Drainage Description Yellow 02/14/24 1035   Odor None 02/14/24 1035   Rhoda-wound Assessment Blanchable erythema

## 2024-02-27 NOTE — DISCHARGE INSTRUCTIONS
Visit Discharge/Physician Orders     Discharge condition: Stable     Assessment of pain at discharge:yes     Anesthetic used: 4% lidocaine solution     Discharge to: Home     Left via:Private automobile     Accompanied by:  self     ECF/HHA: [place] for supplies     Dressing Orders: LEFT MEDIAL ANKLE and LEFT PRETIB-Cleanse with normal saline, pack loosely with robert, dry dressing and secure. Change daily.      Apply spandagrip. On in am and off in pm. Elevate legs as much as possible above level of the heart.      Treatment Orders: Eat a diet high in protein and vitamin C. Take a multiple vitamin daily unless contraindicated.      Keep all pressure off of wound site.     [facility] followup visit : 2 weeks _______________________  (Please note your next appointment above and if you are unable to keep, kindly give a 24 hour notice. Thank you.)     Physician signature:__________________________     If you experience any of the following, please call the Wound Care Center during business hours:     * Increase in Pain  * Temperature over 101  * Increase in drainage from your wound  * Drainage with a foul odor  * Bleeding  * Increase in swelling  * Need for compression bandage changes due to slippage, breakthrough drainage.     If you need medical attention outside of the business hours of the Wound Care Centers please contact your PCP or go to the nearest emergency room.

## 2024-02-28 ENCOUNTER — HOSPITAL ENCOUNTER (OUTPATIENT)
Dept: WOUND CARE | Age: 74
Discharge: HOME | End: 2024-02-28
Attending: SURGERY
Payer: MEDICARE

## 2024-02-28 VITALS
RESPIRATION RATE: 16 BRPM | HEART RATE: 56 BPM | TEMPERATURE: 96.5 F | DIASTOLIC BLOOD PRESSURE: 86 MMHG | SYSTOLIC BLOOD PRESSURE: 188 MMHG

## 2024-02-28 DIAGNOSIS — I70.242 ATHEROSCLEROSIS OF NATIVE ARTERY OF LEFT LOWER EXTREMITY WITH ULCERATION OF CALF (HCC): Primary | ICD-10-CM

## 2024-02-28 PROCEDURE — 11042 DBRDMT SUBQ TIS 1ST 20SQCM/<: CPT

## 2024-02-28 PROCEDURE — 11042 DBRDMT SUBQ TIS 1ST 20SQCM/<: CPT | Performed by: SURGERY

## 2024-02-28 RX ORDER — BACITRACIN ZINC 500 [USP'U]/G
OINTMENT TOPICAL ONCE
OUTPATIENT
Start: 2024-02-28 | End: 2024-02-28

## 2024-02-28 RX ORDER — IBUPROFEN 200 MG
TABLET ORAL ONCE
OUTPATIENT
Start: 2024-02-28 | End: 2024-02-28

## 2024-02-28 RX ORDER — LIDOCAINE 40 MG/G
CREAM TOPICAL ONCE
OUTPATIENT
Start: 2024-02-28 | End: 2024-02-28

## 2024-02-28 RX ORDER — BACITRACIN ZINC AND POLYMYXIN B SULFATE 500; 1000 [USP'U]/G; [USP'U]/G
OINTMENT TOPICAL ONCE
OUTPATIENT
Start: 2024-02-28 | End: 2024-02-28

## 2024-02-28 RX ORDER — LIDOCAINE HYDROCHLORIDE 40 MG/ML
SOLUTION TOPICAL ONCE
OUTPATIENT
Start: 2024-02-28 | End: 2024-02-28

## 2024-02-28 RX ORDER — LIDOCAINE HYDROCHLORIDE 20 MG/ML
JELLY TOPICAL ONCE
OUTPATIENT
Start: 2024-02-28 | End: 2024-02-28

## 2024-02-28 RX ORDER — LIDOCAINE 50 MG/G
OINTMENT TOPICAL ONCE
OUTPATIENT
Start: 2024-02-28 | End: 2024-02-28

## 2024-02-28 RX ORDER — GENTAMICIN SULFATE 1 MG/G
OINTMENT TOPICAL ONCE
OUTPATIENT
Start: 2024-02-28 | End: 2024-02-28

## 2024-02-28 RX ORDER — NITROFURANTOIN 25 MG/5ML
50 SUSPENSION ORAL 4 TIMES DAILY
COMMUNITY

## 2024-02-28 RX ORDER — CLOBETASOL PROPIONATE 0.5 MG/G
OINTMENT TOPICAL ONCE
OUTPATIENT
Start: 2024-02-28 | End: 2024-02-28

## 2024-02-28 RX ORDER — BETAMETHASONE DIPROPIONATE 0.05 %
OINTMENT (GRAM) TOPICAL ONCE
OUTPATIENT
Start: 2024-02-28 | End: 2024-02-28

## 2024-02-28 RX ORDER — SODIUM CHLOR/HYPOCHLOROUS ACID 0.033 %
SOLUTION, IRRIGATION IRRIGATION ONCE
OUTPATIENT
Start: 2024-02-28 | End: 2024-02-28

## 2024-02-28 RX ORDER — TRIAMCINOLONE ACETONIDE 1 MG/G
OINTMENT TOPICAL ONCE
OUTPATIENT
Start: 2024-02-28 | End: 2024-02-28

## 2024-02-28 RX ORDER — LIDOCAINE HYDROCHLORIDE 40 MG/ML
SOLUTION TOPICAL ONCE
Status: COMPLETED | OUTPATIENT
Start: 2024-02-28 | End: 2024-02-28

## 2024-02-28 RX ADMIN — LIDOCAINE HYDROCHLORIDE 5 ML: 40 SOLUTION TOPICAL at 10:42

## 2024-02-28 NOTE — PROGRESS NOTES
angiogram - SFA  - R LE LL claudication - needs fem ak pop bypass   5/3/22 R LE angiogram  R SFA atherectomy, and plasty with 4x250 DCB    6/7/22 L LE angiogram - sfa, popliteal athrectomy - sfa, popliteal plasty - AT, DP plasty    11/8/22 Aortogram  R common iliac stent 7x38 (2)  R proximal sfa, CFA, EIA lithotripsy with 5x60 shockwave   5/23/22 Aortogram  R common, external iliac, CFA SFA lithotripsy with 6x60 shockwave  R SFA,Pop plasty 4x200, 5x250 DCB  R CFA EIA, BRITNEY plasty 6x250 DCB  L BRITNEY, EIA lithotripsy 6x60 shockwave   7/18/23 L LE angiogram  L SFA atherectomy, plasty  L AT, TP trunk, Peroneal plasty 2.5x120        8/23/23  R DP no signal, PT no signal  L DP no signal, Pt monophasic  Will send script for tramadol   8/30/23  Wound improved  9/6/23  Wound slightly larger but more granulation tissue  Lost her tramadol bottle per her report  9/13/23  More granular, slightly larger  Again expressed concern re wound healing, risk of limb loss  9/20/23  Slightly smaller, more granular and clean  9/27/23  More granular, slightly smaller  More painful today, did not tolerate debridement  10/11/23  + granulation   Stable in size  [facility] reviewed, script for tramadol q4 hrs prn #42   10/25/23  Smaller in size  Tolerated debridement  Having left medial ankle pain  11/8/23  Size stable  Wound appearance improved  11/22/2023  Left calf wound looks  with minimal exudate, without any cellulitis  Discussed the patient regarding the results of the carotid ultrasound, wanted to know the results, personally reviewed by me approximately 30% plus stenosis on the right and 40 to 50% stenosis left, unchanged from the previous study, to make sure that she follows up with Dr. 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

## 2024-03-12 NOTE — DISCHARGE INSTRUCTIONS
Visit Discharge/Physician Orders     Discharge condition: Stable     Assessment of pain at discharge:yes     Anesthetic used: 4% lidocaine solution     Discharge to: Home     Left via:Private automobile     Accompanied by:  self     ECF/HHA: [place] for supplies     Dressing Orders: LEFT MEDIAL ANKLE and LEFT PRETIB-Cleanse with normal saline, pack loosely with robert, dry dressing and secure. Change daily.     RIGHT PRETIB: Cleanse with normal saline, apply Calcium Alginate, ABD pad, and dry dressing and secure.       Apply spandagrip. On in am and off in pm. Elevate legs as much as possible above level of the heart.      Treatment Orders: Eat a diet high in protein and vitamin C. Take a multiple vitamin daily unless contraindicated.      Keep all pressure off of wound site.     [facility] followup visit : 2 weeks _______________________  (Please note your next appointment above and if you are unable to keep, kindly give a 24 hour notice. Thank you.)     Physician signature:__________________________     If you experience any of the following, please call the Wound Care Center during business hours:     * Increase in Pain  * Temperature over 101  * Increase in drainage from your wound  * Drainage with a foul odor  * Bleeding  * Increase in swelling  * Need for compression bandage changes due to slippage, breakthrough drainage.     If you need medical attention outside of the business hours of the Wound Care Centers please contact your PCP or go to the nearest emergency room.

## 2024-03-13 ENCOUNTER — HOSPITAL ENCOUNTER (OUTPATIENT)
Dept: WOUND CARE | Age: 74
Discharge: HOME | End: 2024-03-13
Attending: SURGERY
Payer: MEDICARE

## 2024-03-13 VITALS
SYSTOLIC BLOOD PRESSURE: 100 MMHG | DIASTOLIC BLOOD PRESSURE: 60 MMHG | TEMPERATURE: 96.8 F | RESPIRATION RATE: 16 BRPM | WEIGHT: 91 LBS | HEIGHT: 66 IN | BODY MASS INDEX: 14.63 KG/M2 | HEART RATE: 60 BPM

## 2024-03-13 DIAGNOSIS — I70.242 ATHEROSCLEROSIS OF NATIVE ARTERY OF LEFT LOWER EXTREMITY WITH ULCERATION OF CALF (HCC): Primary | ICD-10-CM

## 2024-03-13 PROCEDURE — 11042 DBRDMT SUBQ TIS 1ST 20SQCM/<: CPT

## 2024-03-13 RX ORDER — IBUPROFEN 200 MG
TABLET ORAL ONCE
OUTPATIENT
Start: 2024-03-13 | End: 2024-03-13

## 2024-03-13 RX ORDER — LIDOCAINE 50 MG/G
OINTMENT TOPICAL ONCE
OUTPATIENT
Start: 2024-03-13 | End: 2024-03-13

## 2024-03-13 RX ORDER — TRIAMCINOLONE ACETONIDE 1 MG/G
OINTMENT TOPICAL ONCE
OUTPATIENT
Start: 2024-03-13 | End: 2024-03-13

## 2024-03-13 RX ORDER — BACITRACIN ZINC AND POLYMYXIN B SULFATE 500; 1000 [USP'U]/G; [USP'U]/G
OINTMENT TOPICAL ONCE
OUTPATIENT
Start: 2024-03-13 | End: 2024-03-13

## 2024-03-13 RX ORDER — SODIUM CHLOR/HYPOCHLOROUS ACID 0.033 %
SOLUTION, IRRIGATION IRRIGATION ONCE
OUTPATIENT
Start: 2024-03-13 | End: 2024-03-13

## 2024-03-13 RX ORDER — BACITRACIN ZINC 500 [USP'U]/G
OINTMENT TOPICAL ONCE
OUTPATIENT
Start: 2024-03-13 | End: 2024-03-13

## 2024-03-13 RX ORDER — LIDOCAINE HYDROCHLORIDE 20 MG/ML
JELLY TOPICAL ONCE
OUTPATIENT
Start: 2024-03-13 | End: 2024-03-13

## 2024-03-13 RX ORDER — GENTAMICIN SULFATE 1 MG/G
OINTMENT TOPICAL ONCE
OUTPATIENT
Start: 2024-03-13 | End: 2024-03-13

## 2024-03-13 RX ORDER — CLOBETASOL PROPIONATE 0.5 MG/G
OINTMENT TOPICAL ONCE
OUTPATIENT
Start: 2024-03-13 | End: 2024-03-13

## 2024-03-13 RX ORDER — BETAMETHASONE DIPROPIONATE 0.05 %
OINTMENT (GRAM) TOPICAL ONCE
OUTPATIENT
Start: 2024-03-13 | End: 2024-03-13

## 2024-03-13 RX ORDER — LIDOCAINE HYDROCHLORIDE 40 MG/ML
SOLUTION TOPICAL ONCE
Status: COMPLETED | OUTPATIENT
Start: 2024-03-13 | End: 2024-03-13

## 2024-03-13 RX ORDER — LIDOCAINE HYDROCHLORIDE 40 MG/ML
SOLUTION TOPICAL ONCE
OUTPATIENT
Start: 2024-03-13 | End: 2024-03-13

## 2024-03-13 RX ORDER — LIDOCAINE 40 MG/G
CREAM TOPICAL ONCE
OUTPATIENT
Start: 2024-03-13 | End: 2024-03-13

## 2024-03-13 RX ADMIN — LIDOCAINE HYDROCHLORIDE: 40 SOLUTION TOPICAL at 10:39

## 2024-03-13 ASSESSMENT — PAIN SCALES - GENERAL: PAINLEVEL_OUTOF10: 3

## 2024-03-13 ASSESSMENT — PAIN DESCRIPTION - DESCRIPTORS: DESCRIPTORS: DISCOMFORT

## 2024-03-13 ASSESSMENT — PAIN DESCRIPTION - LOCATION: LOCATION: LEG

## 2024-03-13 ASSESSMENT — PAIN DESCRIPTION - ORIENTATION: ORIENTATION: LEFT;RIGHT

## 2024-03-13 NOTE — PLAN OF CARE
Problem: Chronic Conditions and Co-morbidities  Goal: Patient's chronic conditions and co-morbidity symptoms are monitored and maintained or improved  Outcome: Progressing     Problem: Pain  Goal: Verbalizes/displays adequate comfort level or baseline comfort level  Outcome: Progressing     Problem: Cognitive:  Goal: Knowledge of wound care  Description: Knowledge of wound care  Outcome: Progressing  Goal: Understands risk factors for wounds  Description: Understands risk factors for wounds  Outcome: Progressing     Problem: Wound:  Goal: Will show signs of wound healing; wound closure and no evidence of infection  Description: Will show signs of wound healing; wound closure and no evidence of infection  Outcome: Progressing     Problem: Arterial:  Goal: Optimize blood flow for wound healing  Description: Optimize blood flow for wound healing  Outcome: Progressing     Problem: Venous:  Goal: Signs of wound healing will improve  Description: Signs of wound healing will improve  Outcome: Progressing

## 2024-03-18 PROBLEM — L97.212 SKIN ULCER OF RIGHT CALF WITH FAT LAYER EXPOSED (HCC): Status: ACTIVE | Noted: 2024-03-18

## 2024-03-25 NOTE — DISCHARGE INSTRUCTIONS
Visit Discharge/Physician Orders     Discharge condition: Stable     Assessment of pain at discharge:yes     Anesthetic used: 4% lidocaine solution     Discharge to: Home     Left via:Private automobile     Accompanied by:  self     ECF/HHA: [place] for supplies     Dressing Orders: LEFT MEDIAL ANKLE and LEFT 5th TOE -Cleanse with normal saline, pack loosely with robert, dry dressing and secure. Change daily.      RIGHT PRETIB: Cleanse with normal saline, apply Calcium Alginate, ABD pad, and dry dressing and secure. Change daily.       Apply spandagrip to both legs. On in am and off in pm. Elevate legs as much as possible above level of the heart.      Treatment Orders: Eat a diet high in protein and vitamin C. Take a multiple vitamin daily unless contraindicated.      Keep all pressure off of wound site.     [facility] followup visit : 2 weeks _______________________  (Please note your next appointment above and if you are unable to keep, kindly give a 24 hour notice. Thank you.)     Physician signature:__________________________     If you experience any of the following, please call the Wound Care Center during business hours:     * Increase in Pain  * Temperature over 101  * Increase in drainage from your wound  * Drainage with a foul odor  * Bleeding  * Increase in swelling  * Need for compression bandage changes due to slippage, breakthrough drainage.     If you need medical attention outside of the business hours of the Wound Care Centers please contact your PCP or go to the nearest emergency room.

## 2024-03-27 ENCOUNTER — HOSPITAL ENCOUNTER (OUTPATIENT)
Dept: WOUND CARE | Age: 74
Discharge: HOME | End: 2024-03-27
Attending: SURGERY
Payer: MEDICARE

## 2024-03-27 VITALS
RESPIRATION RATE: 17 BRPM | HEART RATE: 54 BPM | SYSTOLIC BLOOD PRESSURE: 147 MMHG | HEIGHT: 66 IN | WEIGHT: 91 LBS | BODY MASS INDEX: 14.63 KG/M2 | TEMPERATURE: 97.6 F | DIASTOLIC BLOOD PRESSURE: 76 MMHG

## 2024-03-27 DIAGNOSIS — I70.242 ATHEROSCLEROSIS OF NATIVE ARTERY OF LEFT LOWER EXTREMITY WITH ULCERATION OF CALF (HCC): Primary | ICD-10-CM

## 2024-03-27 DIAGNOSIS — L97.212 SKIN ULCER OF RIGHT CALF WITH FAT LAYER EXPOSED (HCC): ICD-10-CM

## 2024-03-27 PROCEDURE — 11042 DBRDMT SUBQ TIS 1ST 20SQCM/<: CPT | Performed by: SURGERY

## 2024-03-27 PROCEDURE — 11042 DBRDMT SUBQ TIS 1ST 20SQCM/<: CPT

## 2024-03-27 RX ORDER — LIDOCAINE HYDROCHLORIDE 40 MG/ML
SOLUTION TOPICAL ONCE
Status: COMPLETED | OUTPATIENT
Start: 2024-03-27 | End: 2024-03-27

## 2024-03-27 RX ORDER — BACITRACIN ZINC 500 [USP'U]/G
OINTMENT TOPICAL ONCE
OUTPATIENT
Start: 2024-03-27 | End: 2024-03-27

## 2024-03-27 RX ORDER — TRIAMCINOLONE ACETONIDE 1 MG/G
OINTMENT TOPICAL ONCE
OUTPATIENT
Start: 2024-03-27 | End: 2024-03-27

## 2024-03-27 RX ORDER — LIDOCAINE HYDROCHLORIDE 20 MG/ML
JELLY TOPICAL ONCE
OUTPATIENT
Start: 2024-03-27 | End: 2024-03-27

## 2024-03-27 RX ORDER — BETAMETHASONE DIPROPIONATE 0.05 %
OINTMENT (GRAM) TOPICAL ONCE
OUTPATIENT
Start: 2024-03-27 | End: 2024-03-27

## 2024-03-27 RX ORDER — SODIUM CHLOR/HYPOCHLOROUS ACID 0.033 %
SOLUTION, IRRIGATION IRRIGATION ONCE
OUTPATIENT
Start: 2024-03-27 | End: 2024-03-27

## 2024-03-27 RX ORDER — LIDOCAINE 40 MG/G
CREAM TOPICAL ONCE
OUTPATIENT
Start: 2024-03-27 | End: 2024-03-27

## 2024-03-27 RX ORDER — LIDOCAINE HYDROCHLORIDE 40 MG/ML
SOLUTION TOPICAL ONCE
OUTPATIENT
Start: 2024-03-27 | End: 2024-03-27

## 2024-03-27 RX ORDER — BACITRACIN ZINC AND POLYMYXIN B SULFATE 500; 1000 [USP'U]/G; [USP'U]/G
OINTMENT TOPICAL ONCE
OUTPATIENT
Start: 2024-03-27 | End: 2024-03-27

## 2024-03-27 RX ORDER — LIDOCAINE 50 MG/G
OINTMENT TOPICAL ONCE
OUTPATIENT
Start: 2024-03-27 | End: 2024-03-27

## 2024-03-27 RX ORDER — GENTAMICIN SULFATE 1 MG/G
OINTMENT TOPICAL ONCE
OUTPATIENT
Start: 2024-03-27 | End: 2024-03-27

## 2024-03-27 RX ORDER — CLOBETASOL PROPIONATE 0.5 MG/G
OINTMENT TOPICAL ONCE
OUTPATIENT
Start: 2024-03-27 | End: 2024-03-27

## 2024-03-27 RX ORDER — IBUPROFEN 200 MG
TABLET ORAL ONCE
OUTPATIENT
Start: 2024-03-27 | End: 2024-03-27

## 2024-03-27 RX ADMIN — LIDOCAINE HYDROCHLORIDE 10 ML: 40 SOLUTION TOPICAL at 11:08

## 2024-03-27 ASSESSMENT — PAIN DESCRIPTION - DESCRIPTORS: DESCRIPTORS: THROBBING

## 2024-03-27 ASSESSMENT — PAIN DESCRIPTION - ONSET: ONSET: ON-GOING

## 2024-03-27 ASSESSMENT — PAIN SCALES - GENERAL: PAINLEVEL_OUTOF10: 8

## 2024-03-27 ASSESSMENT — PAIN DESCRIPTION - FREQUENCY: FREQUENCY: CONTINUOUS

## 2024-03-27 ASSESSMENT — PAIN DESCRIPTION - ORIENTATION: ORIENTATION: RIGHT;LEFT

## 2024-03-27 ASSESSMENT — PAIN - FUNCTIONAL ASSESSMENT: PAIN_FUNCTIONAL_ASSESSMENT: PREVENTS OR INTERFERES SOME ACTIVE ACTIVITIES AND ADLS

## 2024-03-27 ASSESSMENT — PAIN DESCRIPTION - LOCATION: LOCATION: LEG

## 2024-03-27 ASSESSMENT — PAIN DESCRIPTION - PAIN TYPE: TYPE: CHRONIC PAIN

## 2024-03-27 NOTE — PLAN OF CARE
Problem: Chronic Conditions and Co-morbidities  Goal: Patient's chronic conditions and co-morbidity symptoms are monitored and maintained or improved  Outcome: Progressing     Problem: Pain  Goal: Verbalizes/displays adequate comfort level or baseline comfort level  Outcome: Progressing     Problem: Cognitive:  Goal: Knowledge of wound care  Description: Knowledge of wound care  Outcome: Progressing  Goal: Understands risk factors for wounds  Description: Understands risk factors for wounds  Outcome: Progressing     Problem: Wound:  Goal: Will show signs of wound healing; wound closure and no evidence of infection  Description: Will show signs of wound healing; wound closure and no evidence of infection  Outcome: Progressing     Problem: Arterial:  Goal: Optimize blood flow for wound healing  Description: Optimize blood flow for wound healing  Outcome: Adequate for Discharge     Problem: Venous:  Goal: Signs of wound healing will improve  Description: Signs of wound healing will improve  Outcome: Adequate for Discharge

## 2024-03-28 NOTE — PROGRESS NOTES
Wound Healing Center Followup Visit Note    Referring Physician : Toñito Suazo MD  Gladys Elizabeth  MEDICAL RECORD NUMBER:  [id number]  AGE: 74 y.o.   GENDER: female  : 1950  EPISODE DATE:  3/27/2024    Subjective:     Chief Complaint   Patient presents with    Wound Check     Right and left leg/ankle      HISTORY of PRESENT ILLNESS HPI   Gladys Elizabeth is a 74 y.o. female who presents today in regards to follow up evaluation and treatment of wound/ulcer.  That patient's past medical, family and social hx were reviewed and changes were made if present.    History of Wound Context:  Patient presents in regards to left lower extremity wounds.  They have been present since 2023.    She has had previous L LE intervention for wound of the left calf which was first noted approximately 2018 after trauma.  She followed with Dr. Díaz and after intervention it did heal.      B LE having issues with numbness and tingling in her foot primarily which is stable. Her right leg bothers her more than her left.  This has been ongoing for at least a year. She has lumbar spinal stenosis but never had it addressed.    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 Leung 2020.  She is recovered but has chronic diarrhea. She feels her diarrhea has improved gradually over time.    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       She has known assx carotid stenosis.  She remains assx, denies stroke, tia, slurred speech, or amaurosis fugax.    She is on eliquis per Dr. Colvin after developing atrial fibrillation postop after her colon resection.       After most recent intervention 23 it was not felt she was a good candidate for bypass due to her nutrition, low weight, and her heart.      She is at first apptmt in center 23 not on abx and her wound per her report has been improving.      Previous Vascular Procedure  19 L sfa, popliteal atherectomy, dcb

## 2024-04-03 ENCOUNTER — HOSPITAL ENCOUNTER (OUTPATIENT)
Dept: WOUND CARE | Age: 74
Discharge: HOME | End: 2024-04-03
Attending: SURGERY
Payer: MEDICARE

## 2024-04-03 VITALS
HEART RATE: 65 BPM | TEMPERATURE: 97.1 F | DIASTOLIC BLOOD PRESSURE: 81 MMHG | SYSTOLIC BLOOD PRESSURE: 165 MMHG | RESPIRATION RATE: 16 BRPM

## 2024-04-03 DIAGNOSIS — L97.212 SKIN ULCER OF RIGHT CALF WITH FAT LAYER EXPOSED (HCC): ICD-10-CM

## 2024-04-03 DIAGNOSIS — I73.9 PVD (PERIPHERAL VASCULAR DISEASE) WITH CLAUDICATION (HCC): ICD-10-CM

## 2024-04-03 DIAGNOSIS — I70.242 ATHEROSCLEROSIS OF NATIVE ARTERY OF LEFT LOWER EXTREMITY WITH ULCERATION OF CALF (HCC): Primary | ICD-10-CM

## 2024-04-03 DIAGNOSIS — S41.111A SKIN TEAR OF RIGHT UPPER ARM WITHOUT COMPLICATION, INITIAL ENCOUNTER: ICD-10-CM

## 2024-04-03 PROBLEM — S41.119A SKIN TEAR OF UPPER ARM WITHOUT COMPLICATION: Status: ACTIVE | Noted: 2024-04-03

## 2024-04-03 PROCEDURE — 11042 DBRDMT SUBQ TIS 1ST 20SQCM/<: CPT

## 2024-04-03 PROCEDURE — 11042 DBRDMT SUBQ TIS 1ST 20SQCM/<: CPT | Performed by: SURGERY

## 2024-04-03 RX ORDER — BETAMETHASONE DIPROPIONATE 0.05 %
OINTMENT (GRAM) TOPICAL ONCE
OUTPATIENT
Start: 2024-04-03 | End: 2024-04-03

## 2024-04-03 RX ORDER — LIDOCAINE HYDROCHLORIDE 40 MG/ML
SOLUTION TOPICAL ONCE
Status: COMPLETED | OUTPATIENT
Start: 2024-04-03 | End: 2024-04-03

## 2024-04-03 RX ORDER — GENTAMICIN SULFATE 1 MG/G
OINTMENT TOPICAL ONCE
OUTPATIENT
Start: 2024-04-03 | End: 2024-04-03

## 2024-04-03 RX ORDER — LIDOCAINE HYDROCHLORIDE 20 MG/ML
JELLY TOPICAL ONCE
OUTPATIENT
Start: 2024-04-03 | End: 2024-04-03

## 2024-04-03 RX ORDER — IBUPROFEN 200 MG
TABLET ORAL ONCE
OUTPATIENT
Start: 2024-04-03 | End: 2024-04-03

## 2024-04-03 RX ORDER — LIDOCAINE 50 MG/G
OINTMENT TOPICAL ONCE
OUTPATIENT
Start: 2024-04-03 | End: 2024-04-03

## 2024-04-03 RX ORDER — CLOBETASOL PROPIONATE 0.5 MG/G
OINTMENT TOPICAL ONCE
OUTPATIENT
Start: 2024-04-03 | End: 2024-04-03

## 2024-04-03 RX ORDER — SODIUM CHLOR/HYPOCHLOROUS ACID 0.033 %
SOLUTION, IRRIGATION IRRIGATION ONCE
OUTPATIENT
Start: 2024-04-03 | End: 2024-04-03

## 2024-04-03 RX ORDER — BACITRACIN ZINC 500 [USP'U]/G
OINTMENT TOPICAL ONCE
OUTPATIENT
Start: 2024-04-03 | End: 2024-04-03

## 2024-04-03 RX ORDER — BACITRACIN ZINC AND POLYMYXIN B SULFATE 500; 1000 [USP'U]/G; [USP'U]/G
OINTMENT TOPICAL ONCE
OUTPATIENT
Start: 2024-04-03 | End: 2024-04-03

## 2024-04-03 RX ORDER — TRIAMCINOLONE ACETONIDE 1 MG/G
OINTMENT TOPICAL ONCE
OUTPATIENT
Start: 2024-04-03 | End: 2024-04-03

## 2024-04-03 RX ORDER — LIDOCAINE HYDROCHLORIDE 40 MG/ML
SOLUTION TOPICAL ONCE
OUTPATIENT
Start: 2024-04-03 | End: 2024-04-03

## 2024-04-03 RX ORDER — LIDOCAINE 40 MG/G
CREAM TOPICAL ONCE
OUTPATIENT
Start: 2024-04-03 | End: 2024-04-03

## 2024-04-03 RX ADMIN — LIDOCAINE HYDROCHLORIDE 5 ML: 40 SOLUTION TOPICAL at 09:38

## 2024-04-03 NOTE — PROGRESS NOTES
years (5.0 ttl pk-yrs)     Types: Cigarettes     Start date: 5/3/1997     Quit date: 5/3/2017     Years since quittin.9    Smokeless tobacco: Never   Vaping Use    Vaping Use: Never used   Substance Use Topics    Alcohol use: Yes     Comment: rare    Drug use: No     Allergies   Allergen Reactions    Hornet Venom Anaphylaxis    Wasp Venom Anaphylaxis    Flagyl [Metronidazole] Nausea And Vomiting     Current Outpatient Medications on File Prior to Encounter   Medication Sig Dispense Refill    nitrofurantoin (FURADANTIN) 25 MG/5ML suspension Take 10 mLs by mouth 4 times daily      Collagen-Vitamin C-Biotin (COLLAGEN 1500/C PO) Take by mouth      oxybutynin (DITROPAN-XL) 10 MG extended release tablet Take 1 tablet by mouth daily      vitamin D (ERGOCALCIFEROL) 1.25 MG (73837 UT) CAPS capsule Take 1 capsule by mouth once a week Monday's      predniSONE 10 MG (48) TBPK Take by mouth      ibuprofen (ADVIL;MOTRIN) 200 MG tablet Take 1 tablet by mouth every 6 hours as needed for Pain      fluticasone-umeclidin-vilant (TRELEGY ELLIPTA) 200-62.5-25 MCG/ACT AEPB inhaler Inhale 1 puff into the lungs daily      levothyroxine (SYNTHROID) 88 MCG tablet 125 mcg      OXYGEN Inhale 3 L/min into the lungs nightly      Apoaequorin (PREVAGEN PO) Take by mouth      vitamin E 400 UNIT capsule Take 1 capsule by mouth daily      loperamide (IMODIUM) 2 MG capsule Take 1 capsule by mouth 4 times daily as needed for Diarrhea      ascorbic acid (VITAMIN C) 500 MG tablet Take 2 tablets by mouth daily      apixaban (ELIQUIS) 5 MG TABS tablet Take 1 tablet by mouth 2 times daily 60 tablet 0    buPROPion (WELLBUTRIN SR) 100 MG extended release tablet Take 1 tablet by mouth daily (Patient taking differently: Take 150 mg by mouth daily) 60 tablet 3    amLODIPine (NORVASC) 5 MG tablet Take 1 tablet by mouth daily 30 tablet 3     No current facility-administered medications on file prior to encounter.       REVIEW OF SYSTEMS See HPI    Objective:

## 2024-04-03 NOTE — DISCHARGE INSTRUCTIONS
Visit Discharge/Physician Orders     Discharge condition: Stable     Assessment of pain at discharge:yes     Anesthetic used: 4% lidocaine solution     Discharge to: Home     Left via:Private automobile     Accompanied by:  self     ECF/HHA: [place] for supplies     Dressing Orders: LEFT MEDIAL ANKLE and LEFT 5th TOE -Cleanse with normal saline, pack loosely with robert, dry dressing and secure. Change daily.      RIGHT PRETIB: Cleanse with normal saline, apply Calcium Alginate, ABD pad, and dry dressing and secure. Change daily.       RIGHT FOREARM-cleanse with normal saline. Apply xeroform gauze, dry dressing and secure. Change every other day.    Apply spandagrip to both legs. On in am and off in pm. Elevate legs as much as possible above level of the heart.      Treatment Orders: Eat a diet high in protein and vitamin C. Take a multiple vitamin daily unless contraindicated.      Keep all pressure off of wound site.     [facility] followup visit : 2 weeks _______________________  (Please note your next appointment above and if you are unable to keep, kindly give a 24 hour notice. Thank you.)     Physician signature:__________________________     If you experience any of the following, please call the Wound Care Center during business hours:     * Increase in Pain  * Temperature over 101  * Increase in drainage from your wound  * Drainage with a foul odor  * Bleeding  * Increase in swelling  * Need for compression bandage changes due to slippage, breakthrough drainage.     If you need medical attention outside of the business hours of the Wound Care Centers please contact your PCP or go to the nearest emergency room.

## 2024-04-16 NOTE — DISCHARGE INSTRUCTIONS
Visit Discharge/Physician Orders     Discharge condition: Stable     Assessment of pain at discharge:yes     Anesthetic used: 4% lidocaine solution     Discharge to: Home     Left via:Private automobile     Accompanied by:  self     ECF/HHA: [place] for supplies     Dressing Orders: LEFT 5th TOE -Cleanse with normal saline, pack loosely with robert, dry dressing and secure. Change daily.      LEFT PRETIB: Cleanse with normal saline, apply Calcium Alginate, ABD pad, and dry dressing and secure. Change daily.      RIGHT FOREARM &RIGHT PRETIB-healed     Apply spandagrip to both legs. On in am and off in pm. Elevate legs as much as possible above level of the heart.      Treatment Orders: Eat a diet high in protein and vitamin C. Take a multiple vitamin daily unless contraindicated.      Keep all pressure off of wound site.     [facility] followup visit : 2 weeks _______________________  (Please note your next appointment above and if you are unable to keep, kindly give a 24 hour notice. Thank you.)     Physician signature:__________________________     If you experience any of the following, please call the Wound Care Center during business hours:     * Increase in Pain  * Temperature over 101  * Increase in drainage from your wound  * Drainage with a foul odor  * Bleeding  * Increase in swelling  * Need for compression bandage changes due to slippage, breakthrough drainage.     If you need medical attention outside of the business hours of the Wound Care Centers please contact your PCP or go to the nearest emergency room.

## 2024-04-17 ENCOUNTER — HOSPITAL ENCOUNTER (OUTPATIENT)
Dept: WOUND CARE | Age: 74
Discharge: HOME | End: 2024-04-17
Attending: SURGERY
Payer: MEDICARE

## 2024-04-17 VITALS
HEIGHT: 66 IN | WEIGHT: 89 LBS | DIASTOLIC BLOOD PRESSURE: 84 MMHG | SYSTOLIC BLOOD PRESSURE: 162 MMHG | RESPIRATION RATE: 16 BRPM | HEART RATE: 57 BPM | TEMPERATURE: 98.8 F | BODY MASS INDEX: 14.3 KG/M2

## 2024-04-17 DIAGNOSIS — L97.212 SKIN ULCER OF RIGHT CALF WITH FAT LAYER EXPOSED (HCC): ICD-10-CM

## 2024-04-17 DIAGNOSIS — I70.242 ATHEROSCLEROSIS OF NATIVE ARTERY OF LEFT LOWER EXTREMITY WITH ULCERATION OF CALF (HCC): Primary | ICD-10-CM

## 2024-04-17 PROCEDURE — 11042 DBRDMT SUBQ TIS 1ST 20SQCM/<: CPT | Performed by: SURGERY

## 2024-04-17 PROCEDURE — 11042 DBRDMT SUBQ TIS 1ST 20SQCM/<: CPT

## 2024-04-17 RX ORDER — BETAMETHASONE DIPROPIONATE 0.05 %
OINTMENT (GRAM) TOPICAL ONCE
OUTPATIENT
Start: 2024-04-17 | End: 2024-04-17

## 2024-04-17 RX ORDER — LIDOCAINE 50 MG/G
OINTMENT TOPICAL ONCE
OUTPATIENT
Start: 2024-04-17 | End: 2024-04-17

## 2024-04-17 RX ORDER — BACITRACIN ZINC AND POLYMYXIN B SULFATE 500; 1000 [USP'U]/G; [USP'U]/G
OINTMENT TOPICAL ONCE
OUTPATIENT
Start: 2024-04-17 | End: 2024-04-17

## 2024-04-17 RX ORDER — IBUPROFEN 200 MG
TABLET ORAL ONCE
OUTPATIENT
Start: 2024-04-17 | End: 2024-04-17

## 2024-04-17 RX ORDER — TRAMADOL HYDROCHLORIDE 50 MG/1
50 TABLET ORAL EVERY 6 HOURS PRN
COMMUNITY

## 2024-04-17 RX ORDER — TRIAMCINOLONE ACETONIDE 1 MG/G
OINTMENT TOPICAL ONCE
OUTPATIENT
Start: 2024-04-17 | End: 2024-04-17

## 2024-04-17 RX ORDER — GENTAMICIN SULFATE 1 MG/G
OINTMENT TOPICAL ONCE
OUTPATIENT
Start: 2024-04-17 | End: 2024-04-17

## 2024-04-17 RX ORDER — BACITRACIN ZINC 500 [USP'U]/G
OINTMENT TOPICAL ONCE
OUTPATIENT
Start: 2024-04-17 | End: 2024-04-17

## 2024-04-17 RX ORDER — LIDOCAINE HYDROCHLORIDE 20 MG/ML
JELLY TOPICAL ONCE
OUTPATIENT
Start: 2024-04-17 | End: 2024-04-17

## 2024-04-17 RX ORDER — LIDOCAINE HYDROCHLORIDE 40 MG/ML
SOLUTION TOPICAL ONCE
Status: COMPLETED | OUTPATIENT
Start: 2024-04-17 | End: 2024-04-17

## 2024-04-17 RX ORDER — SODIUM CHLOR/HYPOCHLOROUS ACID 0.033 %
SOLUTION, IRRIGATION IRRIGATION ONCE
OUTPATIENT
Start: 2024-04-17 | End: 2024-04-17

## 2024-04-17 RX ORDER — LIDOCAINE HYDROCHLORIDE 40 MG/ML
SOLUTION TOPICAL ONCE
OUTPATIENT
Start: 2024-04-17 | End: 2024-04-17

## 2024-04-17 RX ORDER — CLOBETASOL PROPIONATE 0.5 MG/G
OINTMENT TOPICAL ONCE
OUTPATIENT
Start: 2024-04-17 | End: 2024-04-17

## 2024-04-17 RX ORDER — LIDOCAINE 40 MG/G
CREAM TOPICAL ONCE
OUTPATIENT
Start: 2024-04-17 | End: 2024-04-17

## 2024-04-17 RX ADMIN — LIDOCAINE HYDROCHLORIDE 10 ML: 40 SOLUTION TOPICAL at 10:57

## 2024-04-17 ASSESSMENT — PAIN DESCRIPTION - ORIENTATION: ORIENTATION: LEFT

## 2024-04-17 ASSESSMENT — PAIN - FUNCTIONAL ASSESSMENT: PAIN_FUNCTIONAL_ASSESSMENT: PREVENTS OR INTERFERES SOME ACTIVE ACTIVITIES AND ADLS

## 2024-04-17 ASSESSMENT — PAIN DESCRIPTION - DESCRIPTORS: DESCRIPTORS: THROBBING

## 2024-04-17 ASSESSMENT — PAIN SCALES - GENERAL: PAINLEVEL_OUTOF10: 4

## 2024-04-17 ASSESSMENT — PAIN DESCRIPTION - PAIN TYPE: TYPE: CHRONIC PAIN

## 2024-04-17 ASSESSMENT — PAIN DESCRIPTION - FREQUENCY: FREQUENCY: INTERMITTENT

## 2024-04-17 ASSESSMENT — PAIN DESCRIPTION - ONSET: ONSET: ON-GOING

## 2024-04-17 ASSESSMENT — PAIN DESCRIPTION - LOCATION: LOCATION: FOOT

## 2024-04-17 NOTE — PROGRESS NOTES
Wound Healing Center Followup Visit Note    Referring Physician : Toñito Suazo MD  Gladys Elizabeth  MEDICAL RECORD NUMBER:  [id number]  AGE: 74 y.o.   GENDER: female  : 1950  EPISODE DATE:  2024    Subjective:     Chief Complaint   Patient presents with    Wound Check     Multiple sites      HISTORY of PRESENT ILLNESS HPI   Gladys Elizabeth is a 74 y.o. female who presents today in regards to follow up evaluation and treatment of wound/ulcer.  That patient's past medical, family and social hx were reviewed and changes were made if present.    History of Wound Context:  Patient presents in regards to left lower extremity wounds.  They have been present since 2023.    She has had previous L LE intervention for wound of the left calf which was first noted approximately 2018 after trauma.  She followed with Dr. Díaz and after intervention it did heal.      B LE having issues with numbness and tingling in her foot primarily which is stable. Her right leg bothers her more than her left.  This has been ongoing for at least a year. She has lumbar spinal stenosis but never had it addressed.    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 Leung 2020.  She is recovered but has chronic diarrhea. She feels her diarrhea has improved gradually over time.    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       She has known assx carotid stenosis.  She remains assx, denies stroke, tia, slurred speech, or amaurosis fugax.    She is on eliquis per Dr. Colvin after developing atrial fibrillation postop after her colon resection.       After most recent intervention 23 it was not felt she was a good candidate for bypass due to her nutrition, low weight, and her heart.      She is at first apptmt in center 23 not on abx and her wound per her report has been improving.      Previous Vascular Procedure  19 L sfa, popliteal atherectomy, dcb   2020 R

## 2024-04-25 NOTE — DISCHARGE INSTRUCTIONS
Visit Discharge/Physician Orders     Discharge condition: Stable     Assessment of pain at discharge:yes     Anesthetic used: 4% lidocaine solution     Discharge to: Home     Left via:Private automobile     Accompanied by:  self     ECF/HHA: [place] for supplies     Dressing Orders: LEFT 5th TOE - Healed      LEFT PRETIB: Healed      RIGHT FOREARM &RIGHT PRETIB-healed     Apply spandagrip to both legs. On in am and off in pm. Elevate legs as much as possible above level of the heart.      Treatment Orders: Eat a diet high in protein and vitamin C. Take a multiple vitamin daily unless contraindicated.      Keep all pressure off of wound site.     [facility] followup visit : as needed at [facility], (healed) _____f/u with Dr. MCMAHON in his office in 6-8 weeks__________________  (Please note your next appointment above and if you are unable to keep, kindly give a 24 hour notice. Thank you.)     Physician signature:__________________________     If you experience any of the following, please call the Wound Care Center during business hours:     * Increase in Pain  * Temperature over 101  * Increase in drainage from your wound  * Drainage with a foul odor  * Bleeding  * Increase in swelling  * Need for compression bandage changes due to slippage, breakthrough drainage.     If you need medical attention outside of the business hours of the Wound Care Centers please contact your PCP or go to the nearest emergency room.

## 2024-05-01 ENCOUNTER — HOSPITAL ENCOUNTER (OUTPATIENT)
Dept: WOUND CARE | Age: 74
Discharge: HOME | End: 2024-05-01
Attending: SURGERY
Payer: MEDICARE

## 2024-05-01 ENCOUNTER — TELEPHONE (OUTPATIENT)
Dept: VASCULAR SURGERY | Age: 74
End: 2024-05-01

## 2024-05-01 VITALS
SYSTOLIC BLOOD PRESSURE: 132 MMHG | HEART RATE: 58 BPM | RESPIRATION RATE: 16 BRPM | TEMPERATURE: 97.4 F | DIASTOLIC BLOOD PRESSURE: 68 MMHG

## 2024-05-01 DIAGNOSIS — I70.242 ATHEROSCLEROSIS OF NATIVE ARTERY OF LEFT LOWER EXTREMITY WITH ULCERATION OF CALF (HCC): Primary | ICD-10-CM

## 2024-05-01 DIAGNOSIS — L97.212 SKIN ULCER OF RIGHT CALF WITH FAT LAYER EXPOSED (HCC): ICD-10-CM

## 2024-05-01 PROCEDURE — 99213 OFFICE O/P EST LOW 20 MIN: CPT | Performed by: SURGERY

## 2024-05-01 PROCEDURE — 99213 OFFICE O/P EST LOW 20 MIN: CPT

## 2024-05-01 RX ORDER — BETAMETHASONE DIPROPIONATE 0.05 %
OINTMENT (GRAM) TOPICAL ONCE
Status: CANCELLED | OUTPATIENT
Start: 2024-05-01 | End: 2024-05-01

## 2024-05-01 RX ORDER — LIDOCAINE HYDROCHLORIDE 40 MG/ML
SOLUTION TOPICAL ONCE
Status: CANCELLED | OUTPATIENT
Start: 2024-05-01 | End: 2024-05-01

## 2024-05-01 RX ORDER — TRIAMCINOLONE ACETONIDE 1 MG/G
OINTMENT TOPICAL ONCE
Status: CANCELLED | OUTPATIENT
Start: 2024-05-01 | End: 2024-05-01

## 2024-05-01 RX ORDER — IBUPROFEN 200 MG
TABLET ORAL ONCE
Status: CANCELLED | OUTPATIENT
Start: 2024-05-01 | End: 2024-05-01

## 2024-05-01 RX ORDER — LIDOCAINE HYDROCHLORIDE 40 MG/ML
SOLUTION TOPICAL ONCE
Status: DISCONTINUED | OUTPATIENT
Start: 2024-05-01 | End: 2024-05-02 | Stop reason: HOSPADM

## 2024-05-01 RX ORDER — LIDOCAINE 40 MG/G
CREAM TOPICAL ONCE
Status: CANCELLED | OUTPATIENT
Start: 2024-05-01 | End: 2024-05-01

## 2024-05-01 RX ORDER — BACITRACIN ZINC 500 [USP'U]/G
OINTMENT TOPICAL ONCE
Status: CANCELLED | OUTPATIENT
Start: 2024-05-01 | End: 2024-05-01

## 2024-05-01 RX ORDER — LIDOCAINE HYDROCHLORIDE 20 MG/ML
JELLY TOPICAL ONCE
Status: CANCELLED | OUTPATIENT
Start: 2024-05-01 | End: 2024-05-01

## 2024-05-01 RX ORDER — BACITRACIN ZINC AND POLYMYXIN B SULFATE 500; 1000 [USP'U]/G; [USP'U]/G
OINTMENT TOPICAL ONCE
Status: CANCELLED | OUTPATIENT
Start: 2024-05-01 | End: 2024-05-01

## 2024-05-01 RX ORDER — LIDOCAINE 50 MG/G
OINTMENT TOPICAL ONCE
Status: CANCELLED | OUTPATIENT
Start: 2024-05-01 | End: 2024-05-01

## 2024-05-01 RX ORDER — SODIUM CHLOR/HYPOCHLOROUS ACID 0.033 %
SOLUTION, IRRIGATION IRRIGATION ONCE
Status: CANCELLED | OUTPATIENT
Start: 2024-05-01 | End: 2024-05-01

## 2024-05-01 RX ORDER — CLOBETASOL PROPIONATE 0.5 MG/G
OINTMENT TOPICAL ONCE
Status: CANCELLED | OUTPATIENT
Start: 2024-05-01 | End: 2024-05-01

## 2024-05-01 RX ORDER — GENTAMICIN SULFATE 1 MG/G
OINTMENT TOPICAL ONCE
Status: CANCELLED | OUTPATIENT
Start: 2024-05-01 | End: 2024-05-01

## 2024-05-01 NOTE — PROGRESS NOTES
to: Home     Left via:Private automobile     Accompanied by:  self     ECF/HHA: [place] for supplies     Dressing Orders: LEFT 5th TOE - Healed      LEFT PRETIB: Healed      RIGHT FOREARM &RIGHT PRETIB-healed     Apply spandagrip to both legs. On in am and off in pm. Elevate legs as much as possible above level of the heart.      Treatment Orders: Eat a diet high in protein and vitamin C. Take a multiple vitamin daily unless contraindicated.      Keep all pressure off of wound site.     [facility] followup visit : as needed at [facility], (healed) _____f/u with Dr. MCMAHON in his office in 6-8 weeks__________________  (Please note your next appointment above and if you are unable to keep, kindly give a 24 hour notice. Thank you.)     Physician signature:__________________________     If you experience any of the following, please call the Wound Care Center during business hours:     * Increase in Pain  * Temperature over 101  * Increase in drainage from your wound  * Drainage with a foul odor  * Bleeding  * Increase in swelling  * Need for compression bandage changes due to slippage, breakthrough drainage.     If you need medical attention outside of the business hours of the Wound Care Centers please contact your PCP or go to the nearest emergency room.              Electronically signed by Chrissy Hobbs MD

## 2024-05-01 NOTE — PLAN OF CARE
Problem: Chronic Conditions and Co-morbidities  Goal: Patient's chronic conditions and co-morbidity symptoms are monitored and maintained or improved  Outcome: Progressing     Problem: Cognitive:  Goal: Knowledge of wound care  Description: Knowledge of wound care  Outcome: Progressing  Goal: Understands risk factors for wounds  Description: Understands risk factors for wounds  Outcome: Progressing     Problem: Wound:  Goal: Will show signs of wound healing; wound closure and no evidence of infection  Description: Will show signs of wound healing; wound closure and no evidence of infection  Outcome: Progressing     Problem: Arterial:  Goal: Optimize blood flow for wound healing  Description: Optimize blood flow for wound healing  Outcome: Progressing     Problem: Venous:  Goal: Signs of wound healing will improve  Description: Signs of wound healing will improve  Outcome: Progressing

## 2024-06-10 ENCOUNTER — OFFICE VISIT (OUTPATIENT)
Dept: VASCULAR SURGERY | Age: 74
End: 2024-06-10
Payer: MEDICARE

## 2024-06-10 VITALS — WEIGHT: 93 LBS | BODY MASS INDEX: 15.01 KG/M2

## 2024-06-10 DIAGNOSIS — L03.032 CELLULITIS OF GREAT TOE OF LEFT FOOT: Primary | ICD-10-CM

## 2024-06-10 PROCEDURE — 1090F PRES/ABSN URINE INCON ASSESS: CPT | Performed by: NURSE PRACTITIONER

## 2024-06-10 PROCEDURE — 99213 OFFICE O/P EST LOW 20 MIN: CPT | Performed by: NURSE PRACTITIONER

## 2024-06-10 PROCEDURE — G8419 CALC BMI OUT NRM PARAM NOF/U: HCPCS | Performed by: NURSE PRACTITIONER

## 2024-06-10 PROCEDURE — G8427 DOCREV CUR MEDS BY ELIG CLIN: HCPCS | Performed by: NURSE PRACTITIONER

## 2024-06-10 PROCEDURE — G8400 PT W/DXA NO RESULTS DOC: HCPCS | Performed by: NURSE PRACTITIONER

## 2024-06-10 PROCEDURE — 1036F TOBACCO NON-USER: CPT | Performed by: NURSE PRACTITIONER

## 2024-06-10 PROCEDURE — 1123F ACP DISCUSS/DSCN MKR DOCD: CPT | Performed by: NURSE PRACTITIONER

## 2024-06-10 RX ORDER — DOXYCYCLINE HYCLATE 100 MG
100 TABLET ORAL 2 TIMES DAILY
Qty: 20 TABLET | Refills: 0 | Status: SHIPPED | OUTPATIENT
Start: 2024-06-10 | End: 2024-06-20

## 2024-06-10 RX ORDER — DOXYCYCLINE HYCLATE 100 MG
100 TABLET ORAL 2 TIMES DAILY
Qty: 20 TABLET | Refills: 0 | Status: CANCELLED | OUTPATIENT
Start: 2024-06-10 | End: 2024-06-20

## 2024-06-10 NOTE — PROGRESS NOTES
Vascular Surgery Outpatient Followup    PCP : Toñito Suazo MD    Previous Vascular Procedure  1/29/19 L sfa, popliteal atherectomy, dcb   1/7/2020 R LE angiogram - SFA  - R LE LL claudication - needs fem ak pop bypass   5/3/22 R LE angiogram  R SFA atherectomy, and plasty with 4x250 DCB    6/7/22 L LE angiogram - sfa, popliteal athrectomy - sfa, popliteal plasty - AT, DP plasty    11/8/22 Aortogram  R common iliac stent 7x38 (2)  R proximal sfa, CFA, EIA lithotripsy with 5x60 shockwave   5/23/22 Aortogram  R common, external iliac, CFA SFA lithotripsy with 6x60 shockwave  R SFA,Pop plasty 4x200, 5x250 DCB  R CFA EIA, BRITNEY plasty 6x250 DCB  L BRITNEY, EIA lithotripsy 6x60 shockwave   7/18/23 L LE angiogram  L SFA atherectomy, plasty  L AT, TP trunk, Peroneal plasty 2.5x120     HISTORY OF PRESENT ILLNESS:    This is a 74 year old female patient who presents for left great toe erythema and pain that started over the weekend. She woke up this morning with severe pain in the great toe. After taking a tramadol, the pain subsided, but is very tender to the touch. She denies any fevers or chills.     Past Medical History:        Diagnosis Date    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 with rest pain ([facility]) 01/16/2019    Atrial fibrillation ([facility])     Atypical mole     upper right arm - black mole     Bilateral carotid artery stenosis 05/22/2018    Dr. 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

## 2024-06-24 ENCOUNTER — OFFICE VISIT (OUTPATIENT)
Dept: VASCULAR SURGERY | Age: 74
End: 2024-06-24
Payer: MEDICARE

## 2024-06-24 DIAGNOSIS — I73.9 PVD (PERIPHERAL VASCULAR DISEASE) WITH CLAUDICATION (HCC): Primary | ICD-10-CM

## 2024-06-24 PROCEDURE — G8419 CALC BMI OUT NRM PARAM NOF/U: HCPCS

## 2024-06-24 PROCEDURE — 99213 OFFICE O/P EST LOW 20 MIN: CPT

## 2024-06-24 PROCEDURE — 1123F ACP DISCUSS/DSCN MKR DOCD: CPT

## 2024-06-24 PROCEDURE — G8427 DOCREV CUR MEDS BY ELIG CLIN: HCPCS

## 2024-06-24 PROCEDURE — G8400 PT W/DXA NO RESULTS DOC: HCPCS

## 2024-06-24 PROCEDURE — 1036F TOBACCO NON-USER: CPT

## 2024-06-24 PROCEDURE — 1090F PRES/ABSN URINE INCON ASSESS: CPT

## 2024-06-24 RX ORDER — DOXYCYCLINE HYCLATE 100 MG
100 TABLET ORAL 2 TIMES DAILY
COMMUNITY

## 2024-06-24 RX ORDER — TRAMADOL HYDROCHLORIDE 50 MG/1
50 TABLET ORAL EVERY 6 HOURS PRN
Qty: 28 TABLET | Refills: 0 | Status: SHIPPED | OUTPATIENT
Start: 2024-06-24 | End: 2024-07-01

## 2024-06-24 NOTE — PROGRESS NOTES
Transportation Needs: Not on file   Physical Activity: Not on file   Stress: Not on file   Social Connections: Not on file   Intimate Partner Violence: Not on file   Housing Stability: Not on file     Family History   Problem Relation Age of Onset    Kidney Disease Mother     Coronary Art Dis Mother     High Blood Pressure Mother     Cancer Father     Other Father      Labs  Lab Results   Component Value Date    WBC 6.7 07/21/2023    HGB 10.0 (L) 07/21/2023    HCT 32.0 (L) 07/21/2023     07/21/2023    PROTIME 10.9 07/18/2023    INR 1.0 07/18/2023    APTT 31.3 01/20/2020    K 3.8 07/21/2023    BUN 14 07/21/2023    CREATININE 0.5 07/21/2023       PHYSICAL EXAM:    CONSTITUTIONAL:   Awake, alert, cooperative  PSYCHIATRIC :  Oriented to time, place and person     Appropriate insight to disease process  EYES: Lids and lashes normal  ENT:  External ears and nose without lesions   Hearing deficits present  NECK: Supple, symmetrical, trachea midline  LUNGS:  No increased work of breathing  CARDIOVASCULAR:  regular rate and rhythm   ABDOMEN:  soft, non-distended, non-tender   Aorta is not palpable  SKIN:   Normal skin color   Texture and turgor normal, no induration  EXTREMITIES:   R LE Edema absent  L LE Edema absent   Toes to midfoot with rubor. No open wounds or drainage. Tender to palpation    L femoral  1+   L posterior tibial No signal   L dorsalis pedis monophasic     RADIOLOGY:    A/P PVD, L LE rest pain  Foot does not look infected at this time  Encouraged continued tobacco cessation  Advised pt to keep the foot clean and dry and in the dependent position to help with rest pain  Script for tramadol sent to pts pharmacy  She is on eliquis for afib  Will plan for left lower extremity arteriogram to further evaluate  I asked her to call sooner with any new issues     Pt seen and plan reviewed with Dr. Hobbs.     Lazaro Cain, APRN - CNP

## 2024-07-23 ENCOUNTER — HOSPITAL ENCOUNTER (INPATIENT)
Age: 74
LOS: 3 days | Discharge: HOME | End: 2024-07-26
Attending: SURGERY | Admitting: INTERNAL MEDICINE
Payer: MEDICARE

## 2024-07-23 DIAGNOSIS — I70.242 ATHEROSCLEROSIS OF NATIVE ARTERY OF LEFT LOWER EXTREMITY WITH ULCERATION OF CALF (HCC): ICD-10-CM

## 2024-07-23 DIAGNOSIS — I73.9 PERIPHERAL VASCULAR DISEASE, UNSPECIFIED (HCC): ICD-10-CM

## 2024-07-23 DIAGNOSIS — I70.222 ATHEROSCLEROSIS OF NATIVE ARTERY OF LEFT LOWER EXTREMITY WITH REST PAIN (HCC): Primary | ICD-10-CM

## 2024-07-23 DIAGNOSIS — I73.9 PVD (PERIPHERAL VASCULAR DISEASE) (HCC): ICD-10-CM

## 2024-07-23 LAB
ABO + RH BLD: NORMAL
ANION GAP SERPL CALCULATED.3IONS-SCNC: 14 MMOL/L (ref 7–16)
ANION GAP SERPL CALCULATED.3IONS-SCNC: 8 MMOL/L (ref 7–16)
ARM BAND NUMBER: NORMAL
BLOOD BANK SAMPLE EXPIRATION: NORMAL
BLOOD GROUP ANTIBODIES SERPL: NEGATIVE
BUN SERPL-MCNC: 29 MG/DL (ref 6–23)
BUN SERPL-MCNC: 38 MG/DL (ref 6–23)
CALCIUM SERPL-MCNC: 8.3 MG/DL (ref 8.6–10.2)
CALCIUM SERPL-MCNC: 8.9 MG/DL (ref 8.6–10.2)
CHLORIDE SERPL-SCNC: 105 MMOL/L (ref 98–107)
CHLORIDE SERPL-SCNC: 106 MMOL/L (ref 98–107)
CO2 SERPL-SCNC: 19 MMOL/L (ref 22–29)
CO2 SERPL-SCNC: 27 MMOL/L (ref 22–29)
CREAT SERPL-MCNC: 0.5 MG/DL (ref 0.5–1)
CREAT SERPL-MCNC: 0.7 MG/DL (ref 0.5–1)
ERYTHROCYTE [DISTWIDTH] IN BLOOD BY AUTOMATED COUNT: 13.6 % (ref 11.5–15)
ERYTHROCYTE [DISTWIDTH] IN BLOOD BY AUTOMATED COUNT: 14 % (ref 11.5–15)
GFR, ESTIMATED: >90 ML/MIN/1.73M2
GFR, ESTIMATED: >90 ML/MIN/1.73M2
GLUCOSE SERPL-MCNC: 83 MG/DL (ref 74–99)
GLUCOSE SERPL-MCNC: 99 MG/DL (ref 74–99)
HCT VFR BLD AUTO: 40.4 % (ref 34–48)
HCT VFR BLD AUTO: 42.6 % (ref 34–48)
HGB BLD-MCNC: 12.7 G/DL (ref 11.5–15.5)
HGB BLD-MCNC: 13.4 G/DL (ref 11.5–15.5)
MAGNESIUM SERPL-MCNC: 2.2 MG/DL (ref 1.6–2.6)
MCH RBC QN AUTO: 31.6 PG (ref 26–35)
MCH RBC QN AUTO: 32.2 PG (ref 26–35)
MCHC RBC AUTO-ENTMCNC: 31.4 G/DL (ref 32–34.5)
MCHC RBC AUTO-ENTMCNC: 31.5 G/DL (ref 32–34.5)
MCV RBC AUTO: 100.5 FL (ref 80–99.9)
MCV RBC AUTO: 102.5 FL (ref 80–99.9)
PLATELET # BLD AUTO: 331 K/UL (ref 130–450)
PLATELET # BLD AUTO: 357 K/UL (ref 130–450)
PMV BLD AUTO: 10 FL (ref 7–12)
PMV BLD AUTO: 9.6 FL (ref 7–12)
POTASSIUM SERPL-SCNC: 4.2 MMOL/L (ref 3.5–5)
POTASSIUM SERPL-SCNC: 5.7 MMOL/L (ref 3.5–5)
RBC # BLD AUTO: 3.94 M/UL (ref 3.5–5.5)
RBC # BLD AUTO: 4.24 M/UL (ref 3.5–5.5)
SODIUM SERPL-SCNC: 139 MMOL/L (ref 132–146)
SODIUM SERPL-SCNC: 140 MMOL/L (ref 132–146)
WBC OTHER # BLD: 11.2 K/UL (ref 4.5–11.5)
WBC OTHER # BLD: 15.6 K/UL (ref 4.5–11.5)

## 2024-07-23 PROCEDURE — 7100000011 HC PHASE II RECOVERY - ADDTL 15 MIN: Performed by: SURGERY

## 2024-07-23 PROCEDURE — 37228 HC TIB PER TERRITORY PLASTY: CPT | Performed by: SURGERY

## 2024-07-23 PROCEDURE — C1769 GUIDE WIRE: HCPCS | Performed by: SURGERY

## 2024-07-23 PROCEDURE — 37226 HC FEM POP TERR PLASTY AND STENT: CPT | Performed by: SURGERY

## 2024-07-23 PROCEDURE — C2623 CATH, TRANSLUMIN, DRUG-COAT: HCPCS | Performed by: SURGERY

## 2024-07-23 PROCEDURE — 6370000000 HC RX 637 (ALT 250 FOR IP): Performed by: SURGERY

## 2024-07-23 PROCEDURE — 6360000002 HC RX W HCPCS: Performed by: SURGERY

## 2024-07-23 PROCEDURE — 2500000003 HC RX 250 WO HCPCS: Performed by: SURGERY

## 2024-07-23 PROCEDURE — 75774 ARTERY X-RAY EACH VESSEL: CPT | Performed by: SURGERY

## 2024-07-23 PROCEDURE — 047L36Z DILATION OF LEFT FEMORAL ARTERY WITH THREE DRUG-ELUTING INTRALUMINAL DEVICES, PERCUTANEOUS APPROACH: ICD-10-PCS | Performed by: SURGERY

## 2024-07-23 PROCEDURE — 6360000002 HC RX W HCPCS

## 2024-07-23 PROCEDURE — 75710 ARTERY X-RAYS ARM/LEG: CPT | Performed by: SURGERY

## 2024-07-23 PROCEDURE — 85027 COMPLETE CBC AUTOMATED: CPT

## 2024-07-23 PROCEDURE — 2580000003 HC RX 258: Performed by: SURGERY

## 2024-07-23 PROCEDURE — 047N3Z1 DILATION OF LEFT POPLITEAL ARTERY USING DRUG-COATED BALLOON, PERCUTANEOUS APPROACH: ICD-10-PCS | Performed by: SURGERY

## 2024-07-23 PROCEDURE — C1887 CATHETER, GUIDING: HCPCS | Performed by: SURGERY

## 2024-07-23 PROCEDURE — B41D1ZZ FLUOROSCOPY OF AORTA AND BILATERAL LOWER EXTREMITY ARTERIES USING LOW OSMOLAR CONTRAST: ICD-10-PCS | Performed by: SURGERY

## 2024-07-23 PROCEDURE — 86900 BLOOD TYPING SEROLOGIC ABO: CPT

## 2024-07-23 PROCEDURE — 6360000004 HC RX CONTRAST MEDICATION: Performed by: SURGERY

## 2024-07-23 PROCEDURE — 37226 PR REVSC OPN/PRQ FEM/POP W/STNT/ANGIOP SM VSL: CPT | Performed by: SURGERY

## 2024-07-23 PROCEDURE — 86901 BLOOD TYPING SEROLOGIC RH(D): CPT

## 2024-07-23 PROCEDURE — 7100000010 HC PHASE II RECOVERY - FIRST 15 MIN: Performed by: SURGERY

## 2024-07-23 PROCEDURE — 6360000002 HC RX W HCPCS: Performed by: NURSE PRACTITIONER

## 2024-07-23 PROCEDURE — 83735 ASSAY OF MAGNESIUM: CPT

## 2024-07-23 PROCEDURE — C1894 INTRO/SHEATH, NON-LASER: HCPCS | Performed by: SURGERY

## 2024-07-23 PROCEDURE — 2709999900 HC NON-CHARGEABLE SUPPLY: Performed by: SURGERY

## 2024-07-23 PROCEDURE — 80048 BASIC METABOLIC PNL TOTAL CA: CPT

## 2024-07-23 PROCEDURE — C1760 CLOSURE DEV, VASC: HCPCS | Performed by: SURGERY

## 2024-07-23 PROCEDURE — 86850 RBC ANTIBODY SCREEN: CPT

## 2024-07-23 PROCEDURE — C1725 CATH, TRANSLUMIN NON-LASER: HCPCS | Performed by: SURGERY

## 2024-07-23 PROCEDURE — 75716 ARTERY X-RAYS ARMS/LEGS: CPT | Performed by: SURGERY

## 2024-07-23 PROCEDURE — C1876 STENT, NON-COA/NON-COV W/DEL: HCPCS | Performed by: SURGERY

## 2024-07-23 PROCEDURE — 2580000003 HC RX 258

## 2024-07-23 PROCEDURE — 37228 PR REVSC OPN/PRQ TIB/PERO W/ANGIOPLASTY UNI: CPT | Performed by: SURGERY

## 2024-07-23 DEVICE — STENT PRB35-06-120-120 PROTEGE EF V07
Type: IMPLANTABLE DEVICE | Status: FUNCTIONAL
Brand: EVERFLEX™ PROTÉGÉ™ EVERFLEX™

## 2024-07-23 DEVICE — STENT PRB35-06-200-120 PROTEGE EF V07
Type: IMPLANTABLE DEVICE | Status: FUNCTIONAL
Brand: EVERFLEX™ PROTÉGÉ™ EVERFLEX™

## 2024-07-23 DEVICE — STENT PRB35-05-120-120 PROTEGE EF V08
Type: IMPLANTABLE DEVICE | Status: FUNCTIONAL
Brand: PROTÉGÉ EVERFLEX™

## 2024-07-23 RX ORDER — FENTANYL CITRATE 50 UG/ML
50 INJECTION, SOLUTION INTRAMUSCULAR; INTRAVENOUS ONCE
Status: COMPLETED | OUTPATIENT
Start: 2024-07-23 | End: 2024-07-23

## 2024-07-23 RX ORDER — BUDESONIDE 0.5 MG/2ML
0.5 INHALANT ORAL
Status: DISCONTINUED | OUTPATIENT
Start: 2024-07-23 | End: 2024-07-26 | Stop reason: HOSPADM

## 2024-07-23 RX ORDER — FENTANYL CITRATE 50 UG/ML
25 INJECTION, SOLUTION INTRAMUSCULAR; INTRAVENOUS
Status: DISCONTINUED | OUTPATIENT
Start: 2024-07-23 | End: 2024-07-23

## 2024-07-23 RX ORDER — SODIUM CHLORIDE 9 MG/ML
INJECTION, SOLUTION INTRAVENOUS PRN
Status: DISCONTINUED | OUTPATIENT
Start: 2024-07-23 | End: 2024-07-23 | Stop reason: HOSPADM

## 2024-07-23 RX ORDER — SODIUM CHLORIDE 0.9 % (FLUSH) 0.9 %
5-40 SYRINGE (ML) INJECTION PRN
Status: DISCONTINUED | OUTPATIENT
Start: 2024-07-23 | End: 2024-07-26 | Stop reason: HOSPADM

## 2024-07-23 RX ORDER — SODIUM CHLORIDE 9 MG/ML
INJECTION, SOLUTION INTRAVENOUS CONTINUOUS
Status: DISCONTINUED | OUTPATIENT
Start: 2024-07-23 | End: 2024-07-23 | Stop reason: HOSPADM

## 2024-07-23 RX ORDER — LEVOTHYROXINE SODIUM 0.12 MG/1
125 TABLET ORAL DAILY
Status: DISCONTINUED | OUTPATIENT
Start: 2024-07-24 | End: 2024-07-26 | Stop reason: HOSPADM

## 2024-07-23 RX ORDER — SODIUM CHLORIDE 0.9 % (FLUSH) 0.9 %
5-40 SYRINGE (ML) INJECTION EVERY 12 HOURS SCHEDULED
Status: DISCONTINUED | OUTPATIENT
Start: 2024-07-23 | End: 2024-07-23 | Stop reason: HOSPADM

## 2024-07-23 RX ORDER — OXYCODONE HYDROCHLORIDE 10 MG/1
10 TABLET ORAL EVERY 4 HOURS PRN
Status: DISCONTINUED | OUTPATIENT
Start: 2024-07-23 | End: 2024-07-26 | Stop reason: HOSPADM

## 2024-07-23 RX ORDER — MIDAZOLAM HYDROCHLORIDE 1 MG/ML
INJECTION INTRAMUSCULAR; INTRAVENOUS PRN
Status: DISCONTINUED | OUTPATIENT
Start: 2024-07-23 | End: 2024-07-23 | Stop reason: HOSPADM

## 2024-07-23 RX ORDER — AMLODIPINE BESYLATE 5 MG/1
5 TABLET ORAL DAILY
Status: DISCONTINUED | OUTPATIENT
Start: 2024-07-24 | End: 2024-07-26

## 2024-07-23 RX ORDER — ACETAMINOPHEN 325 MG/1
650 TABLET ORAL EVERY 6 HOURS
Status: DISCONTINUED | OUTPATIENT
Start: 2024-07-23 | End: 2024-07-26 | Stop reason: HOSPADM

## 2024-07-23 RX ORDER — FENTANYL CITRATE 50 UG/ML
INJECTION, SOLUTION INTRAMUSCULAR; INTRAVENOUS PRN
Status: DISCONTINUED | OUTPATIENT
Start: 2024-07-23 | End: 2024-07-23 | Stop reason: HOSPADM

## 2024-07-23 RX ORDER — OXYCODONE HYDROCHLORIDE 5 MG/1
5 TABLET ORAL EVERY 4 HOURS PRN
Status: DISCONTINUED | OUTPATIENT
Start: 2024-07-23 | End: 2024-07-26 | Stop reason: HOSPADM

## 2024-07-23 RX ORDER — ARFORMOTEROL TARTRATE 15 UG/2ML
15 SOLUTION RESPIRATORY (INHALATION)
Status: DISCONTINUED | OUTPATIENT
Start: 2024-07-23 | End: 2024-07-26 | Stop reason: HOSPADM

## 2024-07-23 RX ORDER — LABETALOL HYDROCHLORIDE 5 MG/ML
10 INJECTION, SOLUTION INTRAVENOUS EVERY 30 MIN PRN
Status: DISCONTINUED | OUTPATIENT
Start: 2024-07-23 | End: 2024-07-25

## 2024-07-23 RX ORDER — SODIUM CHLORIDE 9 MG/ML
INJECTION, SOLUTION INTRAVENOUS CONTINUOUS
Status: DISCONTINUED | OUTPATIENT
Start: 2024-07-23 | End: 2024-07-24

## 2024-07-23 RX ORDER — HEPARIN SODIUM 1000 [USP'U]/ML
INJECTION, SOLUTION INTRAVENOUS; SUBCUTANEOUS PRN
Status: DISCONTINUED | OUTPATIENT
Start: 2024-07-23 | End: 2024-07-23 | Stop reason: HOSPADM

## 2024-07-23 RX ORDER — SODIUM CHLORIDE 0.9 % (FLUSH) 0.9 %
5-40 SYRINGE (ML) INJECTION PRN
Status: DISCONTINUED | OUTPATIENT
Start: 2024-07-23 | End: 2024-07-23 | Stop reason: HOSPADM

## 2024-07-23 RX ORDER — SODIUM CHLORIDE 9 MG/ML
INJECTION, SOLUTION INTRAVENOUS CONTINUOUS PRN
Status: COMPLETED | OUTPATIENT
Start: 2024-07-23 | End: 2024-07-23

## 2024-07-23 RX ORDER — PREDNISONE 20 MG/1
20 TABLET ORAL DAILY
Status: DISCONTINUED | OUTPATIENT
Start: 2024-07-24 | End: 2024-07-26 | Stop reason: HOSPADM

## 2024-07-23 RX ORDER — CLOPIDOGREL BISULFATE 75 MG/1
75 TABLET ORAL DAILY
Status: COMPLETED | OUTPATIENT
Start: 2024-07-23 | End: 2024-07-23

## 2024-07-23 RX ORDER — SODIUM CHLORIDE 0.9 % (FLUSH) 0.9 %
5-40 SYRINGE (ML) INJECTION EVERY 12 HOURS SCHEDULED
Status: DISCONTINUED | OUTPATIENT
Start: 2024-07-23 | End: 2024-07-26 | Stop reason: HOSPADM

## 2024-07-23 RX ORDER — BUPROPION HYDROCHLORIDE 150 MG/1
150 TABLET ORAL DAILY
Status: DISCONTINUED | OUTPATIENT
Start: 2024-07-24 | End: 2024-07-26 | Stop reason: HOSPADM

## 2024-07-23 RX ORDER — ONDANSETRON 2 MG/ML
4 INJECTION INTRAMUSCULAR; INTRAVENOUS EVERY 6 HOURS PRN
Status: DISCONTINUED | OUTPATIENT
Start: 2024-07-23 | End: 2024-07-26 | Stop reason: HOSPADM

## 2024-07-23 RX ORDER — HYDRALAZINE HYDROCHLORIDE 20 MG/ML
INJECTION INTRAMUSCULAR; INTRAVENOUS PRN
Status: DISCONTINUED | OUTPATIENT
Start: 2024-07-23 | End: 2024-07-23 | Stop reason: HOSPADM

## 2024-07-23 RX ORDER — CEFAZOLIN SODIUM 1 G/3ML
INJECTION, POWDER, FOR SOLUTION INTRAMUSCULAR; INTRAVENOUS PRN
Status: DISCONTINUED | OUTPATIENT
Start: 2024-07-23 | End: 2024-07-23 | Stop reason: HOSPADM

## 2024-07-23 RX ORDER — HYDRALAZINE HYDROCHLORIDE 20 MG/ML
10 INJECTION INTRAMUSCULAR; INTRAVENOUS EVERY 30 MIN PRN
Status: DISCONTINUED | OUTPATIENT
Start: 2024-07-23 | End: 2024-07-25

## 2024-07-23 RX ORDER — SODIUM CHLORIDE 9 MG/ML
INJECTION, SOLUTION INTRAVENOUS PRN
Status: DISCONTINUED | OUTPATIENT
Start: 2024-07-23 | End: 2024-07-26 | Stop reason: HOSPADM

## 2024-07-23 RX ADMIN — HYDRALAZINE HYDROCHLORIDE 10 MG: 20 INJECTION INTRAMUSCULAR; INTRAVENOUS at 22:25

## 2024-07-23 RX ADMIN — FENTANYL CITRATE 50 MCG: 50 INJECTION, SOLUTION INTRAMUSCULAR; INTRAVENOUS at 13:15

## 2024-07-23 RX ADMIN — SODIUM CHLORIDE: 9 INJECTION, SOLUTION INTRAVENOUS at 14:08

## 2024-07-23 RX ADMIN — FENTANYL CITRATE 25 MCG: 50 INJECTION INTRAMUSCULAR; INTRAVENOUS at 20:02

## 2024-07-23 RX ADMIN — FENTANYL CITRATE 25 MCG: 50 INJECTION INTRAMUSCULAR; INTRAVENOUS at 22:00

## 2024-07-23 RX ADMIN — FENTANYL CITRATE 25 MCG: 50 INJECTION INTRAMUSCULAR; INTRAVENOUS at 14:10

## 2024-07-23 RX ADMIN — HYDROMORPHONE HYDROCHLORIDE 0.5 MG: 1 INJECTION, SOLUTION INTRAMUSCULAR; INTRAVENOUS; SUBCUTANEOUS at 23:29

## 2024-07-23 RX ADMIN — SODIUM CHLORIDE: 9 INJECTION, SOLUTION INTRAVENOUS at 09:36

## 2024-07-23 RX ADMIN — CLOPIDOGREL BISULFATE 75 MG: 75 TABLET ORAL at 15:42

## 2024-07-23 RX ADMIN — FENTANYL CITRATE 25 MCG: 50 INJECTION INTRAMUSCULAR; INTRAVENOUS at 21:05

## 2024-07-23 ASSESSMENT — PAIN DESCRIPTION - ORIENTATION
ORIENTATION: LEFT

## 2024-07-23 ASSESSMENT — PAIN DESCRIPTION - DESCRIPTORS
DESCRIPTORS: DISCOMFORT;BURNING
DESCRIPTORS: BURNING
DESCRIPTORS: ACHING;BURNING
DESCRIPTORS: BURNING;DISCOMFORT

## 2024-07-23 ASSESSMENT — PAIN SCALES - GENERAL
PAINLEVEL_OUTOF10: 8
PAINLEVEL_OUTOF10: 3
PAINLEVEL_OUTOF10: 10
PAINLEVEL_OUTOF10: 8
PAINLEVEL_OUTOF10: 10
PAINLEVEL_OUTOF10: 0
PAINLEVEL_OUTOF10: 10
PAINLEVEL_OUTOF10: 10

## 2024-07-23 ASSESSMENT — PAIN DESCRIPTION - LOCATION
LOCATION: LEG
LOCATION: LEG
LOCATION: FOOT;ANKLE
LOCATION: LEG

## 2024-07-23 ASSESSMENT — PAIN DESCRIPTION - FREQUENCY: FREQUENCY: CONTINUOUS

## 2024-07-23 ASSESSMENT — PAIN - FUNCTIONAL ASSESSMENT
PAIN_FUNCTIONAL_ASSESSMENT: PREVENTS OR INTERFERES SOME ACTIVE ACTIVITIES AND ADLS
PAIN_FUNCTIONAL_ASSESSMENT: 0-10

## 2024-07-23 ASSESSMENT — PAIN DESCRIPTION - PAIN TYPE: TYPE: SURGICAL PAIN

## 2024-07-23 NOTE — PROGRESS NOTES
CVICU Admission Note    Name: Gladys Elizabeth  MRN: [id number]    CC: Postoperative Critical Care Management     Indication for Surgery/Procedure: LLE rest pain     Important/Relevant PMH/PSH: PVD s/p L SFA angioplasty, atrial fibrillation on Eliquis, COPD on 3L O2 NC nightly, CAD, HTN, HPL, BRY, DM, RA, thyroid disease, former smoker     Procedure/Surgeries: 7/23/2024 Procedure:                      Right  common femoral artery access   with US guidance     6 fr angioseal used for closure     TF Left lower extremity angiogram   93135  96237 Angiogram with catheter in aorta, Left   common femoral, popliteal artery   36264 Left SFA Pop plasty with 4x200, 4x150 DCB impact  Stent 5x120, 6x200, 6x120 protege everflex  Post dilation with 7x60, 6x150 proximally, 5x150 distally  Popliteal artery plasty below knee with 5x80 chocolate, 5x150 DCB impact   25066 Left AT plasty [id number]         Physical Exam:    BP (!) 143/77   Pulse 67   Temp 97.8 °F (36.6 °C) (Temporal)   Resp 21   Ht 1.676 m (5' 6\")   Wt 40.8 kg (90 lb)   SpO2 98%   BMI 14.53 kg/m²     Recent Labs     07/23/24  0725   WBC 11.2   RBC 4.24   HGB 13.4   HCT 42.6   .5*   MCH 31.6   MCHC 31.5*   RDW 14.0      MPV 10.0     Recent Labs     07/23/24  0725      K 5.7*      CO2 27   BUN 38*   CREATININE 0.7   GLUCOSE 83   CALCIUM 8.9         General Appearance: Arrived to ICU from CVL, awake, alert. Hemodynamically stable.   Eyes: PERRL  Pulmonary: No wheezes, no accessory muscle use noted   Cardiovascular: RRR, no heaves or thrills palpated   Telemetry: SR  Abdomen: Soft  Extremities: LLE biphasic popliteal signal; RLE biphasic DP/PT signals. LLE wrapped in ace, diminished movement and sensation in toes.   Neurologic/Psych: A&Ox3, MAEx4 to command   Skin: LLE ace wrap, purple discoloration   Incision: Right femoral site C/D/I, soft. LLE wrapped in ace       Assessment/Plan: Day of Surgery     LLE rest pain s/p LLE angiogram; L SFA

## 2024-07-23 NOTE — FLOWSHEET NOTE
1335 Admitted to 3814 via bed from CVL.Alert.Vss.LLE as charted.Oriented to room and unit.Call light within reach.

## 2024-07-23 NOTE — PROGRESS NOTES
Pt developed hematoma in left calf    Weakly biphasic at signal still present    Ace wrap applied   Elevated with blankets    Discussed with pt and daughter high risk for limb loss    Chrissy Hobbs MD

## 2024-07-23 NOTE — PLAN OF CARE
Problem: Cardiovascular - Adult  Goal: Maintains optimal cardiac output and hemodynamic stability  Outcome: Progressing     Problem: Skin/Tissue Integrity - Adult  Goal: Skin integrity remains intact  Outcome: Progressing     Problem: Skin/Tissue Integrity - Adult  Goal: Incisions, wounds, or drain sites healing without S/S of infection  Outcome: Progressing     Problem: Skin/Tissue Integrity - Adult  Goal: Oral mucous membranes remain intact  Outcome: Progressing     Problem: Musculoskeletal - Adult  Goal: Return mobility to safest level of function  Outcome: Progressing     Problem: Musculoskeletal - Adult  Goal: Return ADL status to a safe level of function  Outcome: Progressing     Problem: Genitourinary - Adult  Goal: Absence of urinary retention  Outcome: Progressing     Problem: Infection - Adult  Goal: Absence of infection at discharge  Outcome: Progressing     Problem: Infection - Adult  Goal: Absence of infection during hospitalization  Outcome: Progressing     Problem: Metabolic/Fluid and Electrolytes - Adult  Goal: Electrolytes maintained within normal limits  Outcome: Progressing

## 2024-07-23 NOTE — DISCHARGE INSTRUCTIONS
Discharge Instructions for Lower extremity angiogram    Javed dengamparo 7/24    Call Dr. Hobbs's office [phone] for follow-up appointment.    Groin Care  - keep clean and dry  - ok to shower or sponge bath  - ok to clean site with lukewarm water and mild soap  - use a soft wash cloth to gently wipe the incision area  - do not scrub the incision areas  - no swimming or baths    Home Care    Follow these guidelines after surgery:   Rest. Try to move as tolerated. A mix of rest and light activity improves healing.   The incision area may be sore for a few days. To minimize pain and soreness:   Take pain medicine as directed.   Avoid strenuous activity and heavy lifting.       Diet    You can return to your regular diet. You may work with a dietician who will help you follow a heart-healthy diet.   Physical Activity    You will feel sore after the surgery. Try to walk steadily within two weeks. You may be able to return to normal activities within 1-3 weeks. While recovering, you will need to avoid strenuous activities, like heavy lifting.   Ask your doctor when you will be able to return to work.    Do not drive unless your doctor has given you permission to do so.    Medications    Your doctor may recommend:   Over-the-counter or prescription pain medicine   Aspirin or a cholesterol-lowering drug to prevent complications   If you had to stop medicines before the procedure, ask your doctor when you can start again. Medicines that may have been stopped include:   Anti-inflammatory drugs (eg, aspirin, ibuprofen)   Blood thinners, like warfarin (Coumadin)   Clopidogrel (Plavix)   When taking medicines:   Take your medicine as directed. Do not change the amount or the schedule.   Do not stop taking them without talking to your doctor.   Do not share them.   Know what results and side effects to look for. Report them to your doctor.   Some drugs can be dangerous when mixed. Talk to a doctor or pharmacist if you  are taking more than one drug. This includes over-the-counter medicines and herb or dietary supplements.   Plan ahead for refills so you do not run out.   Lifestyle Changes    You and your doctor will plan lifestyle changes that will help you recover. Some things to keep in mind include:   Atherosclerosis and high blood pressure should be carefully managed. This can be done with medicines and a healthy lifestyle.   If you smoke, talk to your doctor about quitting.   Follow-up   Call Your Doctor If Any of the Following Occurs   After you leave the hospital, call your doctor if any of the following occurs:   Redness, swelling, increasing pain, excessive bleeding, or discharge at the incision site   Signs of infection, including fever and chills   Any change of color or sensation in your legs or feet   Burning, pain, or other problems when urinating   Nausea or vomiting   Abdominal cramps or diarrhea   Unusual fatigue or depression   Disorientation or confusion   Numbness or tingling in the legs   New, unexplained symptoms   Cough   Call 911 or go to the emergency room right away if you have:   Shortness of breath   Chest pain   If you think you have an emergency,  CALL 911.

## 2024-07-23 NOTE — H&P
Vascular Surgery History & Physical Exam    Chief Complaint: Peripheral vascular disease, L LE rest pain    HISTORY OF PRESENT ILLNESS:    The patient is a 74 y.o. female who presents to the hospital for elective arteriogram with possible intervention.  The patient has a history of peripheral vascular disease and L LE rest pain.      ROS : All others Negative if blank [], Positive if [x]  General   [] Fevers   [] Chills   [] Weight Loss   Skin   [x] Tissue Loss   Eyes   [] Wears Glasses/Contacts   [] Vision Changes   Respiratory    [] Shortness of breath   Cardiovascular   [] Chest Pain   [] Shortness of breath with exertion   Gastrointestinal   [] Abdominal Pain     Past Medical History:   Diagnosis Date    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 with rest pain ([facility]) 01/16/2019    Atrial fibrillation ([facility])     Atypical mole     upper right arm - black mole     Bilateral carotid artery stenosis 05/22/2018    Dr. 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    History of tobacco use 05/03/2018    Hydrocephalus ([facility])     Hyperlipidemia     no medications     Hypertension     BRY (obstructive sleep apnea)     uses O2 2L NC     Pain in both feet 05/03/2018    Paroxysmal A-fib ([facility])     Peripheral vascular disease due to secondary diabetes mellitus ([facility]) 05/03/2022    PVD (peripheral vascular disease) ([facility])     PVD (peripheral vascular disease) with claudication ([facility]) 05/03/2018    Rheumatoid arthritis ([facility])     Short-term memory loss     Skin tear of upper arm without complication 04/03/2024    Skin ulcer of right calf with fat layer exposed ([facility]) 03/18/2024    Thyroid disease     Urinary  pk-yrs)     Types: Cigarettes     Start date: 5/3/1997     Quit date: 5/3/2017     Years since quittin.2    Smokeless tobacco: Never   Vaping Use    Vaping Use: Never used   Substance and Sexual Activity    Alcohol use: Yes     Comment: rare    Drug use: No    Sexual activity: Never   Other Topics Concern    Not on file   Social History Narrative    Not on file     Social Determinants of Health     Financial Resource Strain: Not on file   Food Insecurity: Not on file   Transportation Needs: Not on file   Physical Activity: Not on file   Stress: Not on file   Social Connections: Not on file   Intimate Partner Violence: Not on file   Housing Stability: Not on file     Family History   Problem Relation Age of Onset    Kidney Disease Mother     Coronary Art Dis Mother     High Blood Pressure Mother     Cancer Father     Other Father      PHYSICAL EXAM:    Vitals:    24 0653   BP: (!) 193/80   Pulse: 59   Resp: 18   Temp: 97.8 °F (36.6 °C)   SpO2: 95%     CONSTITUTIONAL:  awake, alert, cooperative, no apparent distress, and appears stated age  NECK:  supple, symmetrical, trachea midline  LUNGS:  no increased work of breathing, good air exchange   CARDIOVASCULAR:  rS1S2  ABDOMEN:  soft, non-distended and non-tender   Pulse Exam   R femoral  L femoral    R dorsalis pedis No signal L dorsalis pedis No signal   R posterior tibial monophasic L posterior tibial monophasic     LABS:    Lab Results   Component Value Date    WBC 6.7 2023    HGB 10.0 (L) 2023    HCT 32.0 (L) 2023     2023    PROTIME 10.9 2023    INR 1.0 2023    APTT 31.3 2020    K 3.8 2023    BUN 14 2023    CREATININE 0.5 2023     Assesment:  Peripheral vascular disease.  L LE tissue rest pain  PLAN:    Aortogram,  Bilateral lower extremity arteriogram, possible intervention.  I reviewed the procedure with the patient and family as available.  I discussed the procedure, risks, benefits,

## 2024-07-23 NOTE — OP NOTE
Cardiovascular Lab Procedure Report    Gladys Elizabeth  1950    Date : 7/23/2024  Surgeon: Chrissy Hobbs M.D.  Pre-procedure Diagnosis: LLE rest pain  Post-procedure Diagnosis: Same  Procedure:      Right  common femoral artery access   with US guidance    6 fr angioseal used for closure    TF Left lower extremity angiogram   32213  24798 Angiogram with catheter in aorta, Left   common femoral, popliteal artery   45869 Left SFA Pop plasty with 4x200, 4x150 DCB impact  Stent 5x120, 6x200, 6x120 protege everflex  Post dilation with 7x60, 6x150 proximally, 5x150 distally  Popliteal artery plasty below knee with 5x80 chocolate, 5x150 DCB impact   33921 Left AT plasty [id number]   Anesthesia: Local with IV sedation  Assistants: Cath Lab Staff  Estimated Blood Loss: Minimal  Complications: none  Findings:    Right Left Left s/p Intervention   Common Iliac Art Patent stent Patent Patent   External Iliac Art Patent some disease Patent Patet   Internal Iliac Art Patent occluded occluded   Common Femoral Art Patent Patent Patent   Superficial Femoral Art Not imaged occluded Patent with stent   Profunda Femoral Art Not imaged Patent Patent   AK Popliteal Art Not imaged Patent Patent   BK Popliteal Art Not imaged > 70% stenosis Patent < 30% residual stenosis   Anterior Tibial Art Not imaged Patent but diseased Patent, primary outflow to foot   Tibioperoneal Trunk Not imaged Patent Patent   Peroneal Art Not imaged Proximal patent occluded mid calf Proximal patent occluded mid calf   Posterior Tibial Art Not imaged occluded occluded     Procedure Details :  There was not previous CTA , or catheter based diagnostic imaging preformed prior to today's procedure.  Timeout preformed identifying pt and procedure. Groins prepped and draped in sterile fashion. Patient given sedation as needed throughout the case.     Right  common femoral artery was noted to be patent and was accessed under ultrasound guidance after

## 2024-07-24 LAB
ANION GAP SERPL CALCULATED.3IONS-SCNC: 9 MMOL/L (ref 7–16)
BUN SERPL-MCNC: 24 MG/DL (ref 6–23)
CALCIUM SERPL-MCNC: 8.2 MG/DL (ref 8.6–10.2)
CHLORIDE SERPL-SCNC: 105 MMOL/L (ref 98–107)
CO2 SERPL-SCNC: 26 MMOL/L (ref 22–29)
CREAT SERPL-MCNC: 0.6 MG/DL (ref 0.5–1)
ERYTHROCYTE [DISTWIDTH] IN BLOOD BY AUTOMATED COUNT: 13.9 % (ref 11.5–15)
GFR, ESTIMATED: >90 ML/MIN/1.73M2
GLUCOSE SERPL-MCNC: 96 MG/DL (ref 74–99)
HCT VFR BLD AUTO: 37.6 % (ref 34–48)
HGB BLD-MCNC: 12.1 G/DL (ref 11.5–15.5)
MCH RBC QN AUTO: 33 PG (ref 26–35)
MCHC RBC AUTO-ENTMCNC: 32.2 G/DL (ref 32–34.5)
MCV RBC AUTO: 102.5 FL (ref 80–99.9)
PLATELET # BLD AUTO: 315 K/UL (ref 130–450)
PMV BLD AUTO: 9.7 FL (ref 7–12)
POTASSIUM SERPL-SCNC: 3.5 MMOL/L (ref 3.5–5)
RBC # BLD AUTO: 3.67 M/UL (ref 3.5–5.5)
SODIUM SERPL-SCNC: 140 MMOL/L (ref 132–146)
WBC OTHER # BLD: 11.8 K/UL (ref 4.5–11.5)

## 2024-07-24 PROCEDURE — 80048 BASIC METABOLIC PNL TOTAL CA: CPT

## 2024-07-24 PROCEDURE — 94640 AIRWAY INHALATION TREATMENT: CPT

## 2024-07-24 PROCEDURE — 2000000000 HC ICU R&B

## 2024-07-24 PROCEDURE — 6360000002 HC RX W HCPCS: Performed by: NURSE PRACTITIONER

## 2024-07-24 PROCEDURE — 97161 PT EVAL LOW COMPLEX 20 MIN: CPT

## 2024-07-24 PROCEDURE — 6370000000 HC RX 637 (ALT 250 FOR IP): Performed by: NURSE PRACTITIONER

## 2024-07-24 PROCEDURE — 2580000003 HC RX 258: Performed by: SURGERY

## 2024-07-24 PROCEDURE — 94664 DEMO&/EVAL PT USE INHALER: CPT

## 2024-07-24 PROCEDURE — 2700000000 HC OXYGEN THERAPY PER DAY

## 2024-07-24 PROCEDURE — 97535 SELF CARE MNGMENT TRAINING: CPT

## 2024-07-24 PROCEDURE — 97530 THERAPEUTIC ACTIVITIES: CPT

## 2024-07-24 PROCEDURE — 97166 OT EVAL MOD COMPLEX 45 MIN: CPT

## 2024-07-24 PROCEDURE — 6370000000 HC RX 637 (ALT 250 FOR IP)

## 2024-07-24 PROCEDURE — 85027 COMPLETE CBC AUTOMATED: CPT

## 2024-07-24 PROCEDURE — 99232 SBSQ HOSP IP/OBS MODERATE 35: CPT | Performed by: SURGERY

## 2024-07-24 RX ADMIN — OXYCODONE 5 MG: 5 TABLET ORAL at 04:46

## 2024-07-24 RX ADMIN — BUPROPION HYDROCHLORIDE 150 MG: 150 TABLET, EXTENDED RELEASE ORAL at 08:34

## 2024-07-24 RX ADMIN — ARFORMOTEROL TARTRATE 15 MCG: 15 SOLUTION RESPIRATORY (INHALATION) at 08:11

## 2024-07-24 RX ADMIN — OXYCODONE 5 MG: 5 TABLET ORAL at 20:46

## 2024-07-24 RX ADMIN — PREDNISONE 20 MG: 20 TABLET ORAL at 08:34

## 2024-07-24 RX ADMIN — LEVOTHYROXINE SODIUM 125 MCG: 0.12 TABLET ORAL at 07:10

## 2024-07-24 RX ADMIN — SODIUM CHLORIDE, PRESERVATIVE FREE 10 ML: 5 INJECTION INTRAVENOUS at 20:45

## 2024-07-24 RX ADMIN — BUDESONIDE 500 MCG: 0.5 INHALANT RESPIRATORY (INHALATION) at 21:26

## 2024-07-24 RX ADMIN — ACETAMINOPHEN 650 MG: 325 TABLET ORAL at 04:45

## 2024-07-24 RX ADMIN — SODIUM CHLORIDE, PRESERVATIVE FREE 10 ML: 5 INJECTION INTRAVENOUS at 08:34

## 2024-07-24 RX ADMIN — ARFORMOTEROL TARTRATE 15 MCG: 15 SOLUTION RESPIRATORY (INHALATION) at 21:26

## 2024-07-24 RX ADMIN — BUDESONIDE 500 MCG: 0.5 INHALANT RESPIRATORY (INHALATION) at 08:11

## 2024-07-24 RX ADMIN — AMLODIPINE BESYLATE 5 MG: 5 TABLET ORAL at 08:34

## 2024-07-24 ASSESSMENT — PAIN - FUNCTIONAL ASSESSMENT: PAIN_FUNCTIONAL_ASSESSMENT: ACTIVITIES ARE NOT PREVENTED

## 2024-07-24 ASSESSMENT — PAIN SCALES - GENERAL
PAINLEVEL_OUTOF10: 0
PAINLEVEL_OUTOF10: 3
PAINLEVEL_OUTOF10: 8
PAINLEVEL_OUTOF10: 0
PAINLEVEL_OUTOF10: 6
PAINLEVEL_OUTOF10: 0
PAINLEVEL_OUTOF10: 0
PAINLEVEL_OUTOF10: 2

## 2024-07-24 ASSESSMENT — PAIN DESCRIPTION - LOCATION
LOCATION: LEG

## 2024-07-24 ASSESSMENT — PAIN DESCRIPTION - ONSET: ONSET: AWAKENED FROM SLEEP

## 2024-07-24 ASSESSMENT — PAIN DESCRIPTION - DESCRIPTORS
DESCRIPTORS: ACHING
DESCRIPTORS: JABBING
DESCRIPTORS: ACHING

## 2024-07-24 ASSESSMENT — PAIN DESCRIPTION - ORIENTATION
ORIENTATION: LEFT

## 2024-07-24 ASSESSMENT — PAIN DESCRIPTION - FREQUENCY
FREQUENCY: INTERMITTENT
FREQUENCY: INTERMITTENT

## 2024-07-24 NOTE — PLAN OF CARE
Problem: Pain  Goal: Verbalizes/displays adequate comfort level or baseline comfort level  Outcome: Progressing  Flowsheets (Taken 7/24/2024 0616)  Verbalizes/displays adequate comfort level or baseline comfort level:   Encourage patient to monitor pain and request assistance   Assess pain using appropriate pain scale   Administer analgesics based on type and severity of pain and evaluate response   Implement non-pharmacological measures as appropriate and evaluate response     Problem: Skin/Tissue Integrity - Adult  Goal: Skin integrity remains intact  Outcome: Progressing  Flowsheets (Taken 7/24/2024 0616)  Skin Integrity Remains Intact:   Monitor for areas of redness and/or skin breakdown   Assess vascular access sites hourly     Problem: Skin/Tissue Integrity  Goal: Absence of new skin breakdown  Description: 1.  Monitor for areas of redness and/or skin breakdown  2.  Assess vascular access sites hourly  3.  Every 4-6 hours minimum:  Change oxygen saturation probe site  4.  Every 4-6 hours:  If on nasal continuous positive airway pressure, respiratory therapy assess nares and determine need for appliance change or resting period.  Outcome: Progressing

## 2024-07-24 NOTE — CARE COORDINATION
7/24 Care Coordination: Pt admit to [facility] S/P L SFA, Popliteal, AT angioplasty. Spoke with pt in her room. PTA Pt's daughter lives with her, She has FWW at home. Has a Hx at [facility] last year. Her paln is to Return Home. She does not want [facility] or C.Her daughter will transport her homme. PT/OT working with Pat.   CM/SW will continue to follow for discharge planning.   Baldev CABRERA,RN-CV-BC  [phone]

## 2024-07-24 NOTE — PROGRESS NOTES
4 Eyes Skin Assessment     NAME:  Gladys Elizabeth  YOB: 1950  MEDICAL RECORD NUMBER:  [id number]    The patient is being assessed for  Post-Op Surgical    I agree that at least one RN has performed a thorough Head to Toe Skin Assessment on the patient. ALL assessment sites listed below have been assessed.      Areas assessed by both nurses:    Head, Face, Ears, Shoulders, Back, Chest, Arms, Elbows, Hands, Sacrum. Buttock, Coccyx, Ischium, Legs. Feet and Heels, and Under Medical Devices         Does the Patient have a Wound? No noted wound(s)       Tony Prevention initiated by RN: No  Wound Care Orders initiated by RN: No    Pressure Injury (Stage 3,4, Unstageable, DTI, NWPT, and Complex wounds) if present, place Wound referral order by RN under : No    New Ostomies, if present place, Ostomy referral order under : No     Nurse 1 eSignature: Electronically signed by Meredith Avendaño RN on 7/23/24 at 9:32 PM EDT    **SHARE this note so that the co-signing nurse can place an eSignature**    Nurse 2 eSignature: Electronically signed by Rashmi Doe RN on 7/24/24 at 4:10 AM EDT

## 2024-07-24 NOTE — PROGRESS NOTES
CVICU Progress Note    Name: Gladys Elizabeth  MRN: [id number]    CC: Postoperative Critical Care Management      Indication for Surgery/Procedure: LLE rest pain      Important/Relevant PMH/PSH: PVD s/p L SFA angioplasty, atrial fibrillation on Eliquis, COPD on 3L O2 NC nightly, CAD, HTN, HPL, BRY, DM, RA, thyroid disease, former smoker      Procedure/Surgeries: 7/23/2024 Procedure:                      Right  common femoral artery access   with US guidance     6 fr angioseal used for closure     TF Left lower extremity angiogram   49301  04052 Angiogram with catheter in aorta, Left   common femoral, popliteal artery   28555 Left SFA Pop plasty with 4x200, 4x150 DCB impact  Stent 5x120, 6x200, 6x120 protege everflex  Post dilation with 7x60, 6x150 proximally, 5x150 distally  Popliteal artery plasty below knee with 5x80 chocolate, 5x150 DCB impact   01919 Left AT plasty [id number]            Intake/Output Summary (Last 24 hours) at 7/24/2024 1514  Last data filed at 7/24/2024 0900  Gross per 24 hour   Intake 579.22 ml   Output 1550 ml   Net -970.78 ml       Recent Labs     07/23/24  0725 07/23/24  1433 07/24/24  0421   WBC 11.2 15.6* 11.8*   HGB 13.4 12.7 12.1   HCT 42.6 40.4 37.6    331 315      Lab Results   Component Value Date/Time     07/24/2024 04:21 AM    K 3.5 07/24/2024 04:21 AM    K 4.0 05/23/2023 10:10 AM     07/24/2024 04:21 AM    CO2 26 07/24/2024 04:21 AM    BUN 24 07/24/2024 04:21 AM    CREATININE 0.6 07/24/2024 04:21 AM    GLUCOSE 96 07/24/2024 04:21 AM    GLUCOSE 102 10/07/2010 11:51 AM    CALCIUM 8.2 07/24/2024 04:21 AM    MG 2.2 07/23/2024 02:33 PM    PHOS 3.9 09/24/2020 06:05 AM         Physical Exam:    BP (!) 145/80   Pulse 89   Temp 97.6 °F (36.4 °C) (Temporal)   Resp 23   Ht 1.676 m (5' 6\")   Wt 45 kg (99 lb 3.3 oz)   SpO2 99%   BMI 16.01 kg/m²       General: Awake, alert. States her left leg feels better today.   Eyes: PERRL, anicteric   Pulmonary: No wheezes, no

## 2024-07-24 NOTE — PROGRESS NOTES
VASCULAR SURGERY PROGRESS NOTE    Pt is being seen in f/u today regarding L SFA pop and AT angioplasty 7/23    SUBJECTIVE  Pt seen/examined.  Doing very well this am, she had some pain overnight but it is well controlled this am. No sensory deficits, no motor loss. Hematomas are soft non expanding, compression wrap in place and leg elevated.     +UOP 1.0 cc/kg/hr   Hb 12.7 to 12.1     CURRENT MEDICATIONS    sodium chloride 75 mL/hr at 07/23/24 1840    sodium chloride        sodium chloride flush, sodium chloride, ondansetron, hydrALAZINE, labetalol, oxyCODONE **OR** oxyCODONE, HYDROmorphone    sodium chloride flush  5-40 mL IntraVENous 2 times per day    levothyroxine  125 mcg Oral Daily    budesonide  0.5 mg Nebulization BID RT    arformoterol tartrate  15 mcg Nebulization BID RT    buPROPion  150 mg Oral Daily    amLODIPine  5 mg Oral Daily    predniSONE  20 mg Oral Daily    acetaminophen  650 mg Oral Q6H        PHYSICAL EXAM   BP (!) 168/71   Pulse 77   Temp 98 °F (36.7 °C) (Temporal)   Resp 24   Ht 1.676 m (5' 6\")   Wt 40.8 kg (90 lb)   SpO2 96%   BMI 14.53 kg/m²     Intake/Output Summary (Last 24 hours) at 7/24/2024 0712  Last data filed at 7/23/2024 2300  Gross per 24 hour   Intake 579.22 ml   Output 1425 ml   Net -845.78 ml          GENERAL:  NAD. A&Ox3.  LUNGS:  No increased work of breathing. On 2L NC  CARDIOVASCULAR: RR  ABDOMEN:  Soft, non-distended, non-tender. No guarding, rigidity, rebound.  EXTREMITIES:    LLE: multiphasic DP/PT, soft compressible hematomas, wrap replaced. 5/5 strength, no sensory changes. Groins without hematoma or drainage   RLE: PT weak signal present, no DP signal appeciated. 5/5 strength no sensory deficits   LABS    Lab Results   Component Value Date    WBC 11.8 (H) 07/24/2024    HGB 12.1 07/24/2024    HCT 37.6 07/24/2024     07/24/2024    PROTIME 10.9 07/18/2023    INR 1.0 07/18/2023    APTT 31.3 01/20/2020    K 3.5 07/24/2024    BUN 24 (H) 07/24/2024     CREATININE 0.6 07/24/2024       ASSESSMENT/PLAN  74 y.o. female with PVD s/p L SFA, Popliteal, AT angioplasty 7/23    Monitor hematomas, keep leg compression wrapped and elevated   Monitor PV exam   Monitor H/H stable today   Multimodal pain control   PT/OT today   Diet as tolerated, protein supplements ordered for nutritional support, stop IVF   Home medications- continue holding eliquis for now       Shila Osborne MD  Surgery Resident PGY-4  7/24/2024  7:12 AM    Pt seen and examined  Pain improved but still has some in left calf  Left foot pain resolved    R groin no hematoma  R DP no signal, PT monophasic    L calf Hematoma softened, less prominent  + ecchymosis  L AT weakly biphasic, DP monophasic, PT monophasic    Continue elevation  Pt ot  No plans for surgical intervention at this time with improvement in hematoma  Pt understands still at risk for limb loss  L LE arterial flow improved    Likely tx to floor this afternoon    Chrissy Hobbs MD

## 2024-07-24 NOTE — H&P
[facility]  History and Physical      CHIEF COMPLAINT:    No chief complaint on file.       Patient of Toñito Suazo MD presents with:  PVD (peripheral vascular disease) ([facility])    History of Present Illness:   Patient is a 74-year-old female with a past medical history of atherosclerosis of native artery of left lower extremity, A-fib, bilateral carotid artery stenosis, CAD, COPD, depression, hyperlipidemia, hypertension, A-fib, PVD, rheumatoid arthritis, hypothyroidism.  Patient presented to the hospital with vascular surgery for an elective arteriogram with possible intervention.  Patient was seen in vascular surgery's office for left great toe erythema and pain.  Patient was given antibiotics however she does not feel there was any improvement.  Decision was made to schedule patient for arteriogram.  After intervention it was determined that patient has her high risk for limb loss.  She had significant hematomas status post left SFA, popliteal, AT angioplasty on 723    Patient was admitted to the CV ICU for further treatment.  Intervention was successful, limb was salvaged, she denies any acute pain discomfort on my evaluation when seen in CVICU setting.  No family members are at bedside.  Her pain is adequately controlled.        REVIEW OF SYSTEMS:  Pertinent negatives are above in HPI.  10 point ROS otherwise negative.      Past Medical History:   Diagnosis Date    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 with rest pain ([facility]) 01/16/2019    Atrial fibrillation ([facility])     Atypical mole     upper right arm - black mole     Bilateral carotid artery stenosis 05/22/2018    Dr. Hobbs     Bruit of right carotid artery 05/03/2018    CAD (coronary artery disease)     f/u w/ PCP     Cancer ([facility])     SKIN CA/Thigh    COPD (chronic obstructive pulmonary disease) ([facility])     uses O2 2L NC at night - and during day prn     COVID     Depression     Diarrhea     for colonoscopy 2-19-21      07/24/2024 04:21 AM    BILITOT 0.3 07/20/2023 06:17 AM    ALKPHOS 55 07/20/2023 06:17 AM    AST 15 07/20/2023 06:17 AM    ALT 13 07/20/2023 06:17 AM       Imaging:      EKG:  I've personally reviewed the patient's EKG:      Telemetry:  I've personally reviewed the patient's telemetry:      ASSESSMENT/PLAN:  Principal Problem:    PVD (peripheral vascular disease) (HCC)  Resolved Problems:    * No resolved hospital problems. *    74 year old female with a history of PVD is admitted to the ICU following aortogram with    PVD status post elective procedure for left lower extremity rest pain  S/p-7/23-aortogram, angioplasty superficial femoral artery, angioplasty popliteal artery  Okay for diet from medicine standpoint unless further plans for intervention by general surgery  IV hydration  Pain management  Continue to hold Eliquis, resume when okay with vascular  Monitor neuro checks  Keep left lower extremity elevated wrapped in Ace bandage, monitor left calf hematoma which apparently has softened and is less prominent  Strict bedrest  Smoking cessation discussed at length  Okay for transfer out of CVICU setting if okay with vascular service    Medication for other comorbidities continue as appropriate dose adjustment as necessary.    DVT prophylaxis  PT OT  Discharge planning       Code status: Full  Requires inpatient  level of care      I have spent a total time of 70 minutes of this patient encounter reviewing chart, labs, coordinating care with interdisciplinary teams, face to face encounter with patient, providing counseling/education to patient/family.      Carmita Escobar MD  7/24/2024

## 2024-07-24 NOTE — PROGRESS NOTES
Physical Therapy  Physical Therapy Initial Assessment     Name: Glayds Elizabeth  : 1950  MRN: [id number]      Date of Service: 2024    Evaluating PT:  Kayley Gipson, PT, DPT [id number]    Room #:  3814/3814-A  Diagnosis:  Peripheral vascular disease, unspecified (HCC) [I73.9]  Peripheral vascular disease, unspecified [I73.9]  PVD (peripheral vascular disease) (HCC) [I73.9]  PMHx/PSHx:   has a past medical history of 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 with rest pain (HCC), Atrial fibrillation (HCC), Atypical mole, Bilateral carotid artery stenosis, Bruit of right carotid artery, CAD (coronary artery disease), Cancer (HCC), COPD (chronic obstructive pulmonary disease) (HCC), COVID, Depression, Diarrhea, Femoro-popliteal artery disease (HCC), History of tobacco use, Hydrocephalus (HCC), Hyperlipidemia, Hypertension, BRY (obstructive sleep apnea), Pain in both feet, Paroxysmal A-fib (HCC), Peripheral vascular disease due to secondary diabetes mellitus (HCC), PVD (peripheral vascular disease) (HCC), PVD (peripheral vascular disease) with claudication (HCC), Rheumatoid arthritis (HCC), Short-term memory loss, Skin tear of upper arm without complication, Skin ulcer of right calf with fat layer exposed (HCC), Thyroid disease, Urinary incontinence, Venous stasis ulcer of left calf with fat layer exposed without varicose veins (HCC), and Weight loss.   has a past surgical history that includes  section; Colonoscopy; Tubal ligation; Dilation and curettage of uterus; PERIPHERAL PERCUTANEOUS ARTERIAL INTERVENTION (2019); laparotomy (N/A, 2020); lumbar drain implantation (N/A, 2020); Ventriculoperitoneal shunt (N/A, 2020); eye surgery; and Colonoscopy (N/A, 2021).  Procedure/Surgery:  L SFA, popliteal, AT angioplasty (24)  Precautions:  Falls, alarms, impulsive, incontinent  Equipment Needs:  TBD    SUBJECTIVE:    Pt lives with her daughter in a 2 story home  goals.    Patient and or family understand(s) diagnosis, prognosis, and plan of care.  Yes     PHYSICAL THERAPY PLAN OF CARE:    PT POC is established based on physician order and patient diagnosis     Referring provider/PT Order:    Start   Ordering Provider    07/24/24 0730  PT eval and treat  Start:  07/24/24 0730,   End:  07/24/24 0730,   ONE TIME,   Standing Count:  1 Occurrences,   R         Shila Osborne MD     PT eval and treat  Diagnosis:  Peripheral vascular disease, unspecified (HCC) [I73.9]  Peripheral vascular disease, unspecified [I73.9]  PVD (peripheral vascular disease) (HCC) [I73.9]  Specific instructions for next treatment:  Progress mobility    Current Treatment Recommendations:     [x] Strengthening to improve independence with functional mobility   [x] ROM to improve independence with functional mobility   [x] Balance Training to improve static/dynamic balance and to reduce fall risk  [x] Endurance Training to improve activity tolerance during functional mobility   [x] Transfer Training to improve safety and independence with all functional transfers   [x] Gait Training to improve gait mechanics, endurance and asses need for appropriate assistive device  [x] Stair Training in preparation for safe discharge home and/or into the community   [x] Positioning to prevent skin breakdown and contractures  [x] Safety and Education Training   [x] Patient/Caregiver Education   [x] HEP  [] Other     PT long term treatment goals are located in above grid    Frequency of treatments: 2-5x/week x 1-2 weeks.    Time in  0925  Time out  1003    Total Treatment Time  23 minutes     Evaluation Time includes thorough review of current medical information, gathering information on past medical history/social history and prior level of function, completion of standardized testing/informal observation of tasks, assessment of data and education on plan of care and goals.    CPT codes:  [x] Low Complexity PT

## 2024-07-24 NOTE — PROGRESS NOTES
OCCUPATIONAL THERAPY INITIAL EVALUATION    [facility]  [address], [place]       Date:2024                                                               Patient Name: Gladys Elizabeth  MRN: [id number]  : 1950  Room: [address] [place], [place] [address]A    Evaluating OT: Yanci Ackerman, OTR/L [id number]    Referring Provider:   Shila Meadows MD      Specific Provider Orders/Date: OT eval and treat (24)        Diagnosis: Peripheral vascular disease, unspecified ([facility]) [I73.9]  Peripheral vascular disease, unspecified [I73.9]  PVD (peripheral vascular disease) ([facility]) [I73.9]       Surgery/Procedures:   L SFA, Popliteal, AT angioplasty.           Pertinent Medical History:    Past Medical History:   Diagnosis Date    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 with rest pain ([facility]) 2019    Atrial fibrillation ([facility])     Atypical mole     upper right arm - black mole     Bilateral carotid artery stenosis 2018    Dr. Hobbs     Bruit of right carotid artery 2018    CAD (coronary artery disease)     f/u w/ PCP     Cancer ([facility])     SKIN CA/Thigh    COPD (chronic obstructive pulmonary disease) ([facility])     uses O2 2L NC at night - and during day prn     COVID     Depression     Diarrhea     for colonoscopy -     Femoro-popliteal artery disease ([facility]) 2019    History of tobacco use 2018    Hydrocephalus ([facility])     Hyperlipidemia     no medications     Hypertension     BRY (obstructive sleep apnea)     uses O2 2L NC     Pain in both feet 2018    Paroxysmal A-fib ([facility])     Peripheral vascular disease due to secondary diabetes mellitus ([facility]) 2022    PVD (peripheral vascular disease) ([facility])     PVD (peripheral vascular disease) with claudication ([facility]) 2018    Rheumatoid arthritis ([facility])     Short-term memory loss     Skin tear of upper arm without complication 2024    Skin ulcer  education/techniques.  At end of session, patient left seated in chair to increase activity tolerance.chair alarm activated. RN aware.  Call light within reach, all lines and tubes intact. Pt instructed on use of call light for assistance and fall prevention. .    Pt can benefit from continued skilled OT to increase safety, functional independence and quality of life.     Rehab Potential: good for established goals    Patient / Family Goal: to return to PLOF    Patient and/or family were instructed/educated on diagnosis, prognosis/goals and plan of care. Pt demonstrated good understanding.      Evaluation Complexity: Moderate     History: Expanded chart review of consults, imaging, and psychosocial history related to current functional performance.   Exam: 5+ performance deficits identified limiting functional independence and safe return home   Assistance/Modification: Min/mod assistance or modifications required to perform tasks. May have comorbidities that affect occupational performance.    [] Malnutrition indicators have been identified and nursing has been notified to ensure a dietitian consult is ordered.      Time In:0900             Time Out: 0928         Total Treatment time: 13   Min Units   OT Eval Low 13362     OT Eval Medium 09220 X    OT Eval High 92241     OT Re-Eval 64465     Therapeutic Ex 47979     Therapeutic Activities 07132 5 1   ADL/Self Care 59567 8 1   Orthotic Management 76282     Neuro Re-Ed 57157     Non-Billable Time        Evaluation time includes thorough review of current medical information, gathering information on past medical history/social history and prior level of function, completion of standardized testing/informal observation of tasks, assessment of data and development of POC/Goals.     Yanci Ackerman, OTR/L [id number]

## 2024-07-24 NOTE — PLAN OF CARE
Problem: Chronic Conditions and Co-morbidities  Goal: Patient's chronic conditions and co-morbidity symptoms are monitored and maintained or improved  Outcome: Progressing     Problem: Pain  Goal: Verbalizes/displays adequate comfort level or baseline comfort level  7/24/2024 1018 by Kaylynn Salgado RN  Outcome: Progressing  7/24/2024 0616 by Meredith Avendaño RN  Outcome: Progressing  Flowsheets (Taken 7/24/2024 0616)  Verbalizes/displays adequate comfort level or baseline comfort level:   Encourage patient to monitor pain and request assistance   Assess pain using appropriate pain scale   Administer analgesics based on type and severity of pain and evaluate response   Implement non-pharmacological measures as appropriate and evaluate response     Problem: Discharge Planning  Goal: Discharge to home or other facility with appropriate resources  Outcome: Progressing     Problem: Cardiovascular - Adult  Goal: Maintains optimal cardiac output and hemodynamic stability  Outcome: Progressing     Problem: Skin/Tissue Integrity - Adult  Goal: Skin integrity remains intact  7/24/2024 1018 by Kaylynn Salgado RN  Outcome: Progressing  7/24/2024 0616 by Meredith Avendaño RN  Outcome: Progressing  Flowsheets (Taken 7/24/2024 0616)  Skin Integrity Remains Intact:   Monitor for areas of redness and/or skin breakdown   Assess vascular access sites hourly  Goal: Incisions, wounds, or drain sites healing without S/S of infection  Outcome: Progressing  Goal: Oral mucous membranes remain intact  Outcome: Progressing     Problem: Musculoskeletal - Adult  Goal: Return mobility to safest level of function  Outcome: Progressing  Goal: Return ADL status to a safe level of function  Outcome: Progressing     Problem: Genitourinary - Adult  Goal: Absence of urinary retention  Outcome: Progressing     Problem: Infection - Adult  Goal: Absence of infection at discharge  Outcome: Progressing  Goal: Absence of infection during

## 2024-07-25 LAB
ANION GAP SERPL CALCULATED.3IONS-SCNC: 12 MMOL/L (ref 7–16)
BUN SERPL-MCNC: 20 MG/DL (ref 6–23)
CALCIUM SERPL-MCNC: 8.4 MG/DL (ref 8.6–10.2)
CHLORIDE SERPL-SCNC: 104 MMOL/L (ref 98–107)
CO2 SERPL-SCNC: 24 MMOL/L (ref 22–29)
CREAT SERPL-MCNC: 0.6 MG/DL (ref 0.5–1)
ERYTHROCYTE [DISTWIDTH] IN BLOOD BY AUTOMATED COUNT: 13.9 % (ref 11.5–15)
GFR, ESTIMATED: >90 ML/MIN/1.73M2
GLUCOSE SERPL-MCNC: 85 MG/DL (ref 74–99)
HCT VFR BLD AUTO: 35.9 % (ref 34–48)
HGB BLD-MCNC: 11.4 G/DL (ref 11.5–15.5)
MCH RBC QN AUTO: 31.8 PG (ref 26–35)
MCHC RBC AUTO-ENTMCNC: 31.8 G/DL (ref 32–34.5)
MCV RBC AUTO: 100.3 FL (ref 80–99.9)
PLATELET # BLD AUTO: 284 K/UL (ref 130–450)
PMV BLD AUTO: 9.6 FL (ref 7–12)
POTASSIUM SERPL-SCNC: 4 MMOL/L (ref 3.5–5)
RBC # BLD AUTO: 3.58 M/UL (ref 3.5–5.5)
SODIUM SERPL-SCNC: 140 MMOL/L (ref 132–146)
WBC OTHER # BLD: 12.7 K/UL (ref 4.5–11.5)

## 2024-07-25 PROCEDURE — 2580000003 HC RX 258: Performed by: NURSE PRACTITIONER

## 2024-07-25 PROCEDURE — 97110 THERAPEUTIC EXERCISES: CPT

## 2024-07-25 PROCEDURE — 6370000000 HC RX 637 (ALT 250 FOR IP)

## 2024-07-25 PROCEDURE — 6370000000 HC RX 637 (ALT 250 FOR IP): Performed by: NURSE PRACTITIONER

## 2024-07-25 PROCEDURE — 97530 THERAPEUTIC ACTIVITIES: CPT

## 2024-07-25 PROCEDURE — 94640 AIRWAY INHALATION TREATMENT: CPT

## 2024-07-25 PROCEDURE — 6360000002 HC RX W HCPCS: Performed by: NURSE PRACTITIONER

## 2024-07-25 PROCEDURE — 6360000002 HC RX W HCPCS

## 2024-07-25 PROCEDURE — 2140000000 HC CCU INTERMEDIATE R&B

## 2024-07-25 PROCEDURE — 85027 COMPLETE CBC AUTOMATED: CPT

## 2024-07-25 PROCEDURE — 80048 BASIC METABOLIC PNL TOTAL CA: CPT

## 2024-07-25 RX ORDER — DOCUSATE SODIUM 100 MG/1
100 CAPSULE, LIQUID FILLED ORAL DAILY
Status: DISCONTINUED | OUTPATIENT
Start: 2024-07-25 | End: 2024-07-26 | Stop reason: HOSPADM

## 2024-07-25 RX ADMIN — LABETALOL HYDROCHLORIDE 10 MG: 5 INJECTION, SOLUTION INTRAVENOUS at 06:11

## 2024-07-25 RX ADMIN — OXYCODONE 5 MG: 5 TABLET ORAL at 07:38

## 2024-07-25 RX ADMIN — SODIUM CHLORIDE, PRESERVATIVE FREE 10 ML: 5 INJECTION INTRAVENOUS at 08:50

## 2024-07-25 RX ADMIN — ACETAMINOPHEN 650 MG: 325 TABLET ORAL at 10:45

## 2024-07-25 RX ADMIN — OXYCODONE 5 MG: 5 TABLET ORAL at 15:53

## 2024-07-25 RX ADMIN — AMLODIPINE BESYLATE 5 MG: 5 TABLET ORAL at 08:50

## 2024-07-25 RX ADMIN — BUDESONIDE 500 MCG: 0.5 INHALANT RESPIRATORY (INHALATION) at 20:13

## 2024-07-25 RX ADMIN — LEVOTHYROXINE SODIUM 125 MCG: 0.12 TABLET ORAL at 06:36

## 2024-07-25 RX ADMIN — SODIUM CHLORIDE, PRESERVATIVE FREE 10 ML: 5 INJECTION INTRAVENOUS at 20:46

## 2024-07-25 RX ADMIN — BUPROPION HYDROCHLORIDE 150 MG: 150 TABLET, EXTENDED RELEASE ORAL at 10:45

## 2024-07-25 RX ADMIN — OXYCODONE HYDROCHLORIDE 10 MG: 10 TABLET ORAL at 11:43

## 2024-07-25 RX ADMIN — DOCUSATE SODIUM 100 MG: 100 CAPSULE, LIQUID FILLED ORAL at 17:36

## 2024-07-25 RX ADMIN — PREDNISONE 20 MG: 20 TABLET ORAL at 10:45

## 2024-07-25 RX ADMIN — ARFORMOTEROL TARTRATE 15 MCG: 15 SOLUTION RESPIRATORY (INHALATION) at 20:14

## 2024-07-25 ASSESSMENT — PAIN SCALES - GENERAL
PAINLEVEL_OUTOF10: 4
PAINLEVEL_OUTOF10: 5
PAINLEVEL_OUTOF10: 7
PAINLEVEL_OUTOF10: 7
PAINLEVEL_OUTOF10: 0
PAINLEVEL_OUTOF10: 1
PAINLEVEL_OUTOF10: 4
PAINLEVEL_OUTOF10: 1
PAINLEVEL_OUTOF10: 10
PAINLEVEL_OUTOF10: 0
PAINLEVEL_OUTOF10: 3
PAINLEVEL_OUTOF10: 0
PAINLEVEL_OUTOF10: 0

## 2024-07-25 ASSESSMENT — PAIN DESCRIPTION - LOCATION
LOCATION: LEG
LOCATION: LEG;FOOT
LOCATION: LEG
LOCATION: FOOT;LEG
LOCATION: LEG;FOOT
LOCATION: LEG;FOOT

## 2024-07-25 ASSESSMENT — PAIN DESCRIPTION - ORIENTATION
ORIENTATION: LEFT

## 2024-07-25 ASSESSMENT — PAIN - FUNCTIONAL ASSESSMENT
PAIN_FUNCTIONAL_ASSESSMENT: ACTIVITIES ARE NOT PREVENTED
PAIN_FUNCTIONAL_ASSESSMENT: 0-10
PAIN_FUNCTIONAL_ASSESSMENT: ACTIVITIES ARE NOT PREVENTED

## 2024-07-25 ASSESSMENT — PAIN DESCRIPTION - DESCRIPTORS
DESCRIPTORS: BURNING;SHOOTING;PINS AND NEEDLES
DESCRIPTORS: BURNING;PINS AND NEEDLES;THROBBING
DESCRIPTORS: BURNING
DESCRIPTORS: BURNING;SHOOTING;PINS AND NEEDLES

## 2024-07-25 NOTE — CARE COORDINATION
Transition of care update: Received pt in transfer from [facility] this am. S/P L SFA, Popliteal, AT angioplasty on 7/23. Wbc 12.7, hgb 11.4 and other labs noted. BMP pending. On RA. Left leg with compression wrap and elevated.  Met with pt in room. Plan remains to home with her daughter, Thalia, when medically ready. PT eval from 07/24 am-pac 16/24 and ambulated 25ft x 2 with FWW and Hany. Pt owns 2 FWWs and a wheelchair. OT eval from 07/24 am-pac 17/24. Pt declines hhc and rehab at discharge. She said that PK told her to work with PT here. Spoke with physical therapist and she was notified to work with pt today. Cm/sw will follow.

## 2024-07-25 NOTE — FLOWSHEET NOTE
Patient arrived to PCCU from [facility] with the following belongings.  Patient oriented to room and use of call light.   07/25/24 0850   Belongings   Dental Appliances None   Vision - Corrective Lenses Eyeglasses;At bedside   Hearing Aid None   Clothing Footwear;Pants;Shirt;Undergarments;At bedside   Jewelry None   Electronic Devices Cell Phone;;At bedside   Weapons (Notify Protective Services/Security) None   Home Medications None   Valuables Given To Family (Comment)   Provide Name(s) of Who Valuable(s) Were Given To N/A   Orientation to Room   Orientation to Room Performed Yes

## 2024-07-25 NOTE — PLAN OF CARE
Problem: Chronic Conditions and Co-morbidities  Goal: Patient's chronic conditions and co-morbidity symptoms are monitored and maintained or improved  Outcome: Progressing  Flowsheets (Taken 7/25/2024 0804)  Care Plan - Patient's Chronic Conditions and Co-Morbidity Symptoms are Monitored and Maintained or Improved:   Monitor and assess patient's chronic conditions and comorbid symptoms for stability, deterioration, or improvement   Collaborate with multidisciplinary team to address chronic and comorbid conditions and prevent exacerbation or deterioration   Update acute care plan with appropriate goals if chronic or comorbid symptoms are exacerbated and prevent overall improvement and discharge     Problem: Pain  Goal: Verbalizes/displays adequate comfort level or baseline comfort level  Outcome: Progressing  Flowsheets (Taken 7/24/2024 0616 by Meredith Avendaño RN)  Verbalizes/displays adequate comfort level or baseline comfort level:   Encourage patient to monitor pain and request assistance   Assess pain using appropriate pain scale   Administer analgesics based on type and severity of pain and evaluate response   Implement non-pharmacological measures as appropriate and evaluate response     Problem: Discharge Planning  Goal: Discharge to home or other facility with appropriate resources  Outcome: Progressing  Flowsheets (Taken 7/25/2024 0804)  Discharge to home or other facility with appropriate resources:   Identify barriers to discharge with patient and caregiver   Refer to discharge planning if patient needs post-hospital services based on physician order or complex needs related to functional status, cognitive ability or social support system     Problem: Cardiovascular - Adult  Goal: Maintains optimal cardiac output and hemodynamic stability  Outcome: Progressing  Flowsheets (Taken 7/25/2024 0804)  Maintains optimal cardiac output and hemodynamic stability:   Monitor blood pressure and heart rate    Monitor urine output and notify Licensed Independent Practitioner for values outside of normal range   Assess for signs of decreased cardiac output

## 2024-07-25 NOTE — PLAN OF CARE
Problem: Chronic Conditions and Co-morbidities  Goal: Patient's chronic conditions and co-morbidity symptoms are monitored and maintained or improved  7/25/2024 1117 by Padmini Cooper RN  Outcome: Progressing     Problem: Pain  Goal: Verbalizes/displays adequate comfort level or baseline comfort level  7/25/2024 1117 by Padmini Cooper RN  Outcome: Progressing     Problem: Cardiovascular - Adult  Goal: Maintains optimal cardiac output and hemodynamic stability  7/25/2024 1117 by Padmini Cooper RN  Outcome: Progressing     Problem: Skin/Tissue Integrity - Adult  Goal: Skin integrity remains intact  Outcome: Progressing     Problem: Musculoskeletal - Adult  Goal: Return mobility to safest level of function  Outcome: Progressing     Problem: Musculoskeletal - Adult  Goal: Return ADL status to a safe level of function  Outcome: Progressing     Problem: Safety - Adult  Goal: Free from fall injury  Outcome: Progressing     Problem: Skin/Tissue Integrity  Goal: Absence of new skin breakdown  Outcome: Progressing     Problem: ABCDS Injury Assessment  Goal: Absence of physical injury  Outcome: Progressing     Problem: Discharge Planning  Goal: Discharge to home or other facility with appropriate resources  7/25/2024 1117 by Padmini Cooper RN  Outcome: Progressing

## 2024-07-25 NOTE — PROGRESS NOTES
[facility]                                Progress note    Subjective:    The patient is awake and alert.    Resting in bed  States that her pain is worse today  Denies any complaints of shortness of breath or chest discomfort    Objective:    BP (!) 163/68   Pulse 88   Temp 97.8 °F (36.6 °C) (Temporal)   Resp 20   Ht 1.676 m (5' 6\")   Wt 45 kg (99 lb 3.3 oz)   SpO2 93%   BMI 16.01 kg/m²     In: -   Out: 150   In: -   Out: 150 [Urine:150]    General appearance: NAD, conversant  HEENT: AT/NC, MMM  Neck: FROM, supple  Lungs: Clear to auscultation  CV: RRR, no MRGs  Vasc: Radial pulses 2+  Abdomen: Soft, non-tender; no masses or HSM  Extremities: No peripheral edema, LLE wrapped in Ace bandage  Skin: no rash, lesions or ulcers  Psych: Alert and oriented to person, place and time  Neuro: Alert and interactive     Recent Labs     07/23/24  1433 07/24/24  0421 07/25/24  0628   WBC 15.6* 11.8* 12.7*   HGB 12.7 12.1 11.4*   HCT 40.4 37.6 35.9    315 284       Recent Labs     07/23/24  1433 07/24/24  0421 07/25/24  0628    140 140   K 4.2 3.5 4.0    105 104   CO2 19* 26 24   BUN 29* 24* 20   CREATININE 0.5 0.6 0.6   CALCIUM 8.3* 8.2* 8.4*       Assessment:    Principal Problem:    PVD (peripheral vascular disease) ([facility])  Resolved Problems:    * No resolved hospital problems. *      Plan:  74 year old female with a history of PVD is admitted to the ICU following aortogram with     PVD status post elective procedure for left lower extremity rest pain  S/p-7/23-aortogram, angioplasty superficial femoral artery, angioplasty popliteal artery  Okay for diet from medicine standpoint unless further plans for intervention by general surgery  IV hydration  Pain management  Continue to hold Eliquis, resume when okay with vascular  Monitor neuro checks  Keep left lower extremity elevated wrapped in Ace bandage, monitor left calf hematoma which apparently has softened and is less  prominent  Strict bedrest  Smoking cessation discussed at length  Okay for transfer out of CVICU setting if okay with vascular service       7/25/24  -Leukocytosis remains present at 12.7 however on p.o. prednisone  -H&H stable at 11.4/35.9  -Vascular wanting to hold off on resuming Eliquis at this time  -PT OT evaluations yesterday recommending rehab however patient has declined [facility] or home health care on discharge  -BPs remain mildly elevated, may need titration in BP meds for tighter control  -Continue to monitor labs and vitals    Code status: Full  Consultants: Vascular surgery    DVT Prophylaxis PCD's  PT/OT  Discharge planning       I have spent a total time of 30 minutes of this patient encounter reviewing chart, labs, coordinating care with interdisciplinary teams, face to face encounter with patient, providing counseling/education to patient/family.      Dacia Doe, BRIAN - CNP  2:38 PM  7/25/2024

## 2024-07-25 NOTE — PROGRESS NOTES
Physical Therapy  Physical Therapy Treatment     Name: Gladys Elizabeth  : 1950  MRN: [id number]  Date of Service: 2024  Evaluating PT:  Kayley Gipson PT, DPT [id number]    Room #:  6514/6514-A  Diagnosis:  Peripheral vascular disease, unspecified (HCC) [I73.9]  Peripheral vascular disease, unspecified [I73.9]  PVD (peripheral vascular disease) (HCC) [I73.9]  PMHx/PSHx:   has a past medical history of 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 with rest pain (HCC), Atrial fibrillation (HCC), Atypical mole, Bilateral carotid artery stenosis, Bruit of right carotid artery, CAD (coronary artery disease), Cancer (HCC), COPD (chronic obstructive pulmonary disease) (HCC), COVID, Depression, Diarrhea, Femoro-popliteal artery disease (HCC), History of tobacco use, Hydrocephalus (HCC), Hyperlipidemia, Hypertension, BRY (obstructive sleep apnea), Pain in both feet, Paroxysmal A-fib (HCC), Peripheral vascular disease due to secondary diabetes mellitus (HCC), PVD (peripheral vascular disease) (HCC), PVD (peripheral vascular disease) with claudication (HCC), Rheumatoid arthritis (HCC), Short-term memory loss, Skin tear of upper arm without complication, Skin ulcer of right calf with fat layer exposed (HCC), Thyroid disease, Urinary incontinence, Venous stasis ulcer of left calf with fat layer exposed without varicose veins (HCC), and Weight loss.   has a past surgical history that includes  section; Colonoscopy; Tubal ligation; Dilation and curettage of uterus; PERIPHERAL PERCUTANEOUS ARTERIAL INTERVENTION (2019); laparotomy (N/A, 2020); lumbar drain implantation (N/A, 2020); Ventriculoperitoneal shunt (N/A, 2020); eye surgery; and Colonoscopy (N/A, 2021).  Procedure/Surgery:  L SFA, popliteal, AT angioplasty (24)  Precautions:  Falls, alarms, impulsive, incontinent  Equipment Needs:  TBD    SUBJECTIVE:    Pt lives with her daughter in a 2 story home with 2 steps to  treat  Diagnosis:  Peripheral vascular disease, unspecified (HCC) [I73.9]  Peripheral vascular disease, unspecified [I73.9]  PVD (peripheral vascular disease) (HCC) [I73.9]  Specific instructions for next treatment:  Progress mobility    Current Treatment Recommendations:     [x] Strengthening to improve independence with functional mobility   [x] ROM to improve independence with functional mobility   [x] Balance Training to improve static/dynamic balance and to reduce fall risk  [x] Endurance Training to improve activity tolerance during functional mobility   [x] Transfer Training to improve safety and independence with all functional transfers   [x] Gait Training to improve gait mechanics, endurance and asses need for appropriate assistive device  [x] Stair Training in preparation for safe discharge home and/or into the community   [x] Positioning to prevent skin breakdown and contractures  [x] Safety and Education Training   [x] Patient/Caregiver Education   [x] HEP  [] Other     PT long term treatment goals are located in above grid    Frequency of treatments: 2-5x/week x 1-2 weeks.    Time in  1445  Time out  1510    Total Treatment Time  25 minutes     Evaluation Time includes thorough review of current medical information, gathering information on past medical history/social history and prior level of function, completion of standardized testing/informal observation of tasks, assessment of data and education on plan of care and goals.    CPT codes:  [] Low Complexity PT evaluation 40554  [] Moderate Complexity PT evaluation 81472  [] High Complexity PT evaluation 66853  [] PT Re-evaluation 28782  [] Gait training 94142 0 minutes  [] Manual therapy 95396 0 minutes  [x] Therapeutic activities 80692 15 minutes  [x] Therapeutic exercises 96382 10 minutes  [] Neuromuscular reeducation 60015 0 minutes     Vida Solis, PT, DPT  [id number]

## 2024-07-25 NOTE — PROGRESS NOTES
Vascular Surgery Progress Note    Pt is being seen in f/u today regarding     Subjective  Pt s/e.  Overall feeling better.  Pain to left leg with palpation.  Sitting up in a chair.  Has only had a small bowel movement with colace.  Feels constipated.  Possibly ready to go home today or tomorrow.     Current Medications:    sodium chloride        sodium chloride flush, sodium chloride, ondansetron, oxyCODONE **OR** oxyCODONE, HYDROmorphone    sodium chloride flush  5-40 mL IntraVENous 2 times per day    levothyroxine  125 mcg Oral Daily    budesonide  0.5 mg Nebulization BID RT    arformoterol tartrate  15 mcg Nebulization BID RT    buPROPion  150 mg Oral Daily    amLODIPine  5 mg Oral Daily    predniSONE  20 mg Oral Daily    acetaminophen  650 mg Oral Q6H      PHYSICAL EXAM:    BP (!) 163/68   Pulse 88   Temp 97.8 °F (36.6 °C) (Temporal)   Resp 20   Ht 1.676 m (5' 6\")   Wt 45 kg (99 lb 3.3 oz)   SpO2 93%   BMI 16.01 kg/m²   No intake or output data in the 24 hours ending 07/25/24 1334     Gen Awake, alert, oriented x3, in no apparent distress   CVS S1S2   Resp No increased work of breathing   Abd Soft, non-tender, non-distended   R LE Groin soft, no hematoma   DP no signal, PT monophasic  L LE Calf hematoma present - wrapped   DP/PT monophasic    LABS:    Lab Results   Component Value Date    WBC 12.7 (H) 07/25/2024    HGB 11.4 (L) 07/25/2024    HCT 35.9 07/25/2024     07/25/2024    PROTIME 10.9 07/18/2023    INR 1.0 07/18/2023    APTT 31.3 01/20/2020    K 3.5 07/24/2024    BUN 24 (H) 07/24/2024    CREATININE 0.6 07/24/2024     A/P PVD, L LE rest pain, s/p L LE angio 7/23 - L SFA, pop plasty  Continue elevation  Wrap L LE, tubigrip ordered and advised pt to use at home  PT/OT  No plans for surgical intervention at this time with improvement in hematoma  Pt understands still at risk for limb loss  L LE arterial flow improved  PRN pain control   Glycolax, colace for constipation  Discharge planning -  possibly later today or tomorrow if ok with primary    Lazaro Cain, APRN - CNP

## 2024-07-25 NOTE — PROGRESS NOTES
Messaged attending via [employer] regarding patient's request for a stool softener.      1626: Colace 100 mg daily ordered per Dacia Doe NP.

## 2024-07-25 NOTE — PROGRESS NOTES
Patient attended Phase 2 Cardiac Rehab Exercise Session. Further documentation will be completed in [facility] [employer] and will be scanned into the medical record upon discharge.     VASCULAR SURGERY PROGRESS NOTE    Pt is being seen in f/u today regarding L SFA pop and AT angioplasty 7/23    SUBJECTIVE  Pt seen/examined.  Doing well still, tolerating diet. +UOP. Pain is improving. 16/24 with PT. Some hypertension overnight intermittently 2x prns     Wbc 11.8 to 12.7   Hb 12.1 to 11.4 from yesterday      CURRENT MEDICATIONS    sodium chloride        sodium chloride flush, sodium chloride, ondansetron, hydrALAZINE, labetalol, oxyCODONE **OR** oxyCODONE, HYDROmorphone    sodium chloride flush  5-40 mL IntraVENous 2 times per day    levothyroxine  125 mcg Oral Daily    budesonide  0.5 mg Nebulization BID RT    arformoterol tartrate  15 mcg Nebulization BID RT    buPROPion  150 mg Oral Daily    amLODIPine  5 mg Oral Daily    predniSONE  20 mg Oral Daily    acetaminophen  650 mg Oral Q6H        PHYSICAL EXAM   BP (!) 143/73   Pulse 77   Temp 97.8 °F (36.6 °C)   Resp 18   Ht 1.676 m (5' 6\")   Wt 45 kg (99 lb 3.3 oz)   SpO2 93%   BMI 16.01 kg/m²     Intake/Output Summary (Last 24 hours) at 7/25/2024 0705  Last data filed at 7/24/2024 0900  Gross per 24 hour   Intake --   Output 150 ml   Net -150 ml            GENERAL:  NAD. A&Ox3.  LUNGS:  No increased work of breathing. RA  CARDIOVASCULAR: RR  ABDOMEN:  Soft, non-distended, non-tender. No guarding, rigidity, rebound.  EXTREMITIES:    LLE: + signal DP/PT, soft compressible hematomas, wrap replaced. 5/5 strength, no sensory changes. Groins without hematoma or drainage   RLE: PT weak signal present, no DP signal appeciated. 5/5 strength no sensory deficits   LABS    Lab Results   Component Value Date    WBC 12.7 (H) 07/25/2024    HGB 11.4 (L) 07/25/2024    HCT 35.9 07/25/2024     07/25/2024    PROTIME 10.9 07/18/2023    INR 1.0 07/18/2023    APTT 31.3 01/20/2020    K 3.5 07/24/2024    BUN 24 (H) 07/24/2024    CREATININE 0.6 07/24/2024       ASSESSMENT/PLAN  74 y.o. female with PVD s/p L SFA, Popliteal, AT angioplasty 7/23    Monitor  hematomas, keep leg compression wrapped and elevated   Monitor PV exam   Monitor H/H stable today   Monitor wbc her prednisone was restarted yesterday but did have small increase today to watch   Multimodal pain control   PT/OT continue to reduce risk of deterioration   Diet as tolerated, protein supplements ordered for nutritional support  Home medications- continue holding eliquis for now   Transfer to floor   Discharge planning       Shila Osborne MD  Surgery Resident PGY-4  7/25/2024  7:05 AM    Pt seen and examined    Hematoma stable  Doppler exam stable    Overall feeling better    Bp per primary    Dc planning  Likely dc tommorow if ok from PT pov and if still doing better    Chrissy Hobbs MD

## 2024-07-25 NOTE — PROGRESS NOTES
4 Eyes Skin Assessment     NAME:  Gladys Elizabeth  YOB: 1950  MEDICAL RECORD NUMBER:  [id number]    The patient is being assessed for  Transfer to New Unit    I agree that at least one RN has performed a thorough Head to Toe Skin Assessment on the patient. ALL assessment sites listed below have been assessed.      Areas assessed by both nurses:    Head, Face, Ears, Shoulders, Back, Chest, Arms, Elbows, Hands, Sacrum. Buttock, Coccyx, Ischium, Legs. Feet and Heels, and Under Medical Devices         Does the Patient have a Wound? No noted wound(s)       Tony Prevention initiated by RN: Yes  Wound Care Orders initiated by RN: No    Pressure Injury (Stage 3,4, Unstageable, DTI, NWPT, and Complex wounds) if present, place Wound referral order by RN under : No    New Ostomies, if present place, Ostomy referral order under : No     Nurse 1 eSignature: Electronically signed by Padmini Cooper RN on 7/25/24 at 10:27 AM EDT    **SHARE this note so that the co-signing nurse can place an eSignature**    Nurse 2 eSignature: Electronically signed by Sandra Garg RN on 7/25/24 at 10:28 AM EDT

## 2024-07-26 VITALS
OXYGEN SATURATION: 96 % | HEIGHT: 66 IN | TEMPERATURE: 98 F | RESPIRATION RATE: 18 BRPM | BODY MASS INDEX: 16.05 KG/M2 | WEIGHT: 99.9 LBS | SYSTOLIC BLOOD PRESSURE: 131 MMHG | DIASTOLIC BLOOD PRESSURE: 62 MMHG | HEART RATE: 59 BPM

## 2024-07-26 LAB — ECHO BSA: 1.38 M2

## 2024-07-26 PROCEDURE — 6370000000 HC RX 637 (ALT 250 FOR IP)

## 2024-07-26 PROCEDURE — 6370000000 HC RX 637 (ALT 250 FOR IP): Performed by: NURSE PRACTITIONER

## 2024-07-26 PROCEDURE — 2580000003 HC RX 258: Performed by: NURSE PRACTITIONER

## 2024-07-26 PROCEDURE — 6360000002 HC RX W HCPCS: Performed by: NURSE PRACTITIONER

## 2024-07-26 PROCEDURE — 97530 THERAPEUTIC ACTIVITIES: CPT

## 2024-07-26 PROCEDURE — 97535 SELF CARE MNGMENT TRAINING: CPT

## 2024-07-26 PROCEDURE — 2700000000 HC OXYGEN THERAPY PER DAY

## 2024-07-26 PROCEDURE — 94640 AIRWAY INHALATION TREATMENT: CPT

## 2024-07-26 RX ORDER — AMLODIPINE BESYLATE 2.5 MG/1
2.5 TABLET ORAL ONCE
Status: COMPLETED | OUTPATIENT
Start: 2024-07-26 | End: 2024-07-26

## 2024-07-26 RX ORDER — POLYETHYLENE GLYCOL 3350 17 G/17G
17 POWDER, FOR SOLUTION ORAL DAILY PRN
Status: DISCONTINUED | OUTPATIENT
Start: 2024-07-26 | End: 2024-07-26 | Stop reason: HOSPADM

## 2024-07-26 RX ORDER — HYDROXYZINE PAMOATE 25 MG/1
25 CAPSULE ORAL 3 TIMES DAILY PRN
Status: DISCONTINUED | OUTPATIENT
Start: 2024-07-26 | End: 2024-07-26 | Stop reason: HOSPADM

## 2024-07-26 RX ORDER — OXYCODONE HYDROCHLORIDE AND ACETAMINOPHEN 5; 325 MG/1; MG/1
1 TABLET ORAL EVERY 6 HOURS PRN
Qty: 28 TABLET | Refills: 0 | Status: SHIPPED | OUTPATIENT
Start: 2024-07-26 | End: 2024-07-31 | Stop reason: SDUPTHER

## 2024-07-26 RX ORDER — AMLODIPINE BESYLATE 2.5 MG/1
7.5 TABLET ORAL DAILY
Qty: 30 TABLET | Refills: 0 | Status: SHIPPED | OUTPATIENT
Start: 2024-07-27

## 2024-07-26 RX ORDER — PSEUDOEPHEDRINE HCL 30 MG
100 TABLET ORAL DAILY
COMMUNITY
Start: 2024-07-27

## 2024-07-26 RX ADMIN — DOCUSATE SODIUM 100 MG: 100 CAPSULE, LIQUID FILLED ORAL at 09:14

## 2024-07-26 RX ADMIN — PREDNISONE 20 MG: 20 TABLET ORAL at 09:14

## 2024-07-26 RX ADMIN — LEVOTHYROXINE SODIUM 125 MCG: 0.12 TABLET ORAL at 06:37

## 2024-07-26 RX ADMIN — POLYETHYLENE GLYCOL 3350 17 G: 17 POWDER, FOR SOLUTION ORAL at 09:15

## 2024-07-26 RX ADMIN — BUDESONIDE 500 MCG: 0.5 INHALANT RESPIRATORY (INHALATION) at 09:52

## 2024-07-26 RX ADMIN — BUPROPION HYDROCHLORIDE 150 MG: 150 TABLET, EXTENDED RELEASE ORAL at 09:15

## 2024-07-26 RX ADMIN — ACETAMINOPHEN 650 MG: 325 TABLET ORAL at 06:37

## 2024-07-26 RX ADMIN — AMLODIPINE BESYLATE 5 MG: 5 TABLET ORAL at 09:14

## 2024-07-26 RX ADMIN — ACETAMINOPHEN 650 MG: 325 TABLET ORAL at 12:03

## 2024-07-26 RX ADMIN — AMLODIPINE BESYLATE 2.5 MG: 2.5 TABLET ORAL at 10:51

## 2024-07-26 RX ADMIN — ARFORMOTEROL TARTRATE 15 MCG: 15 SOLUTION RESPIRATORY (INHALATION) at 09:52

## 2024-07-26 RX ADMIN — SODIUM CHLORIDE, PRESERVATIVE FREE 10 ML: 5 INJECTION INTRAVENOUS at 09:15

## 2024-07-26 ASSESSMENT — PAIN DESCRIPTION - DESCRIPTORS
DESCRIPTORS: ACHING;HEAVINESS;DISCOMFORT
DESCRIPTORS: ACHING;DISCOMFORT;SORE

## 2024-07-26 ASSESSMENT — PAIN SCALES - GENERAL
PAINLEVEL_OUTOF10: 0
PAINLEVEL_OUTOF10: 1
PAINLEVEL_OUTOF10: 2
PAINLEVEL_OUTOF10: 2
PAINLEVEL_OUTOF10: 1
PAINLEVEL_OUTOF10: 1
PAINLEVEL_OUTOF10: 0

## 2024-07-26 ASSESSMENT — PAIN DESCRIPTION - ORIENTATION
ORIENTATION: RIGHT
ORIENTATION: LEFT

## 2024-07-26 ASSESSMENT — PAIN DESCRIPTION - FREQUENCY: FREQUENCY: INTERMITTENT

## 2024-07-26 ASSESSMENT — PAIN - FUNCTIONAL ASSESSMENT
PAIN_FUNCTIONAL_ASSESSMENT: 0-10
PAIN_FUNCTIONAL_ASSESSMENT: ACTIVITIES ARE NOT PREVENTED

## 2024-07-26 ASSESSMENT — PAIN DESCRIPTION - LOCATION
LOCATION: LEG
LOCATION: LEG

## 2024-07-26 ASSESSMENT — PAIN DESCRIPTION - PAIN TYPE: TYPE: ACUTE PAIN

## 2024-07-26 NOTE — PROGRESS NOTES
Physical Therapy  Physical Therapy Treatment     Name: Gladys Elizabeth  : 1950  MRN: [id number]  Date of Service: 2024  Evaluating PT:  Kayley Gipson PT, DPT [id number]    Room #:  6514/6514-A  Diagnosis:  Peripheral vascular disease, unspecified (HCC) [I73.9]  Peripheral vascular disease, unspecified [I73.9]  PVD (peripheral vascular disease) (HCC) [I73.9]  PMHx/PSHx:   has a past medical history of 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 with rest pain (HCC), Atrial fibrillation (HCC), Atypical mole, Bilateral carotid artery stenosis, Bruit of right carotid artery, CAD (coronary artery disease), Cancer (HCC), COPD (chronic obstructive pulmonary disease) (HCC), COVID, Depression, Diarrhea, Femoro-popliteal artery disease (HCC), History of tobacco use, Hydrocephalus (HCC), Hyperlipidemia, Hypertension, BRY (obstructive sleep apnea), Pain in both feet, Paroxysmal A-fib (HCC), Peripheral vascular disease due to secondary diabetes mellitus (HCC), PVD (peripheral vascular disease) (HCC), PVD (peripheral vascular disease) with claudication (HCC), Rheumatoid arthritis (HCC), Short-term memory loss, Skin tear of upper arm without complication, Skin ulcer of right calf with fat layer exposed (HCC), Thyroid disease, Urinary incontinence, Venous stasis ulcer of left calf with fat layer exposed without varicose veins (HCC), and Weight loss.   has a past surgical history that includes  section; Colonoscopy; Tubal ligation; Dilation and curettage of uterus; PERIPHERAL PERCUTANEOUS ARTERIAL INTERVENTION (2019); laparotomy (N/A, 2020); lumbar drain implantation (N/A, 2020); Ventriculoperitoneal shunt (N/A, 2020); eye surgery; and Colonoscopy (N/A, 2021).  Procedure/Surgery:  L SFA, popliteal, AT angioplasty (24)  Precautions:  Falls, alarms, Equipment Needs:  TBD    SUBJECTIVE:    Pt lives with her daughter in a 2 story home with 2 steps to enter and one HR. Pt's  Peripheral vascular disease, unspecified (HCC) [I73.9]  Peripheral vascular disease, unspecified [I73.9]  PVD (peripheral vascular disease) (HCC) [I73.9]  Specific instructions for next treatment:  Progress mobility    Current Treatment Recommendations:     [x] Strengthening to improve independence with functional mobility   [x] ROM to improve independence with functional mobility   [x] Balance Training to improve static/dynamic balance and to reduce fall risk  [x] Endurance Training to improve activity tolerance during functional mobility   [x] Transfer Training to improve safety and independence with all functional transfers   [x] Gait Training to improve gait mechanics, endurance and asses need for appropriate assistive device  [x] Stair Training in preparation for safe discharge home and/or into the community   [x] Positioning to prevent skin breakdown and contractures  [x] Safety and Education Training   [x] Patient/Caregiver Education   [x] HEP  [] Other     PT long term treatment goals are located in above grid    Frequency of treatments: 2-5x/week x 1-2 weeks.    Time in  0840  Time out  0907    Total Treatment Time  27  minutes     Evaluation Time includes thorough review of current medical information, gathering information on past medical history/social history and prior level of function, completion of standardized testing/informal observation of tasks, assessment of data and education on plan of care and goals.    CPT codes:  [] Low Complexity PT evaluation 87420  [] Moderate Complexity PT evaluation 30247  [] High Complexity PT evaluation 99958  [] PT Re-evaluation 96349  [] Gait training 07253 0 minutes  [] Manual therapy 22486 0 minutes  [x] Therapeutic activities 80313 27 minutes  [] Therapeutic exercises 29696 0 minutes  [] Neuromuscular reeducation 09711 0 minutes     Vida Solis, PT, DPT  [id number]

## 2024-07-26 NOTE — PROGRESS NOTES
Patient discharged with all belongings, including stent card. Educated patient on use of incentive spirometer and tubular support bandages. All questions answered and understanding verbalized by patient. Peripheral IV removed without complication. Heart monitor removed and returned to nurse's station.

## 2024-07-26 NOTE — PATIENT CARE CONFERENCE
P Quality Flow/Interdisciplinary Rounds Progress Note        Quality Flow Rounds held on July 26, 2024    Disciplines Attending:  Bedside Nurse, , , and Nursing Unit Leadership    Gladys Elizabeth was admitted on 7/23/2024  6:12 AM    Anticipated Discharge Date:       Disposition:    Tony Score:  Tony Scale Score: 18    Readmission Risk              Risk of Unplanned Readmission:  7           Discussed patient goal for the day, patient clinical progression, and barriers to discharge.  The following Goal(s) of the Day/Commitment(s) have been identified:  Labs - Report Results      April Mc RN  July 26, 2024

## 2024-07-26 NOTE — PROGRESS NOTES
VASCULAR SURGERY PROGRESS NOTE    Pt is being seen in f/u today regarding L SFA pop and AT angioplasty 7/23    SUBJECTIVE  Pt seen/examined.  Doing well still, tolerating diet.   Good UOP   +BM small with colace overnight, feels better.     Am labs pending     CURRENT MEDICATIONS    sodium chloride        sodium chloride flush, sodium chloride, ondansetron, oxyCODONE **OR** oxyCODONE, HYDROmorphone    docusate sodium  100 mg Oral Daily    sodium chloride flush  5-40 mL IntraVENous 2 times per day    levothyroxine  125 mcg Oral Daily    budesonide  0.5 mg Nebulization BID RT    arformoterol tartrate  15 mcg Nebulization BID RT    buPROPion  150 mg Oral Daily    amLODIPine  5 mg Oral Daily    predniSONE  20 mg Oral Daily    acetaminophen  650 mg Oral Q6H        PHYSICAL EXAM   BP (!) 164/62   Pulse 88   Temp 98.3 °F (36.8 °C) (Temporal)   Resp 18   Ht 1.676 m (5' 6\")   Wt 45 kg (99 lb 3.3 oz)   SpO2 98%   BMI 16.01 kg/m²     Intake/Output Summary (Last 24 hours) at 7/26/2024 0718  Last data filed at 7/26/2024 0511  Gross per 24 hour   Intake --   Output 470 ml   Net -470 ml            GENERAL:  NAD. A&Ox3.  LUNGS:  No increased work of breathing. RA  CARDIOVASCULAR: RR  ABDOMEN:  Soft, non-distended, non-tender. No guarding, rigidity, rebound.  EXTREMITIES:    LLE: + signal DP/PT, soft compressible hematoma improving, wrap replaced. 5/5 strength, no sensory changes. Groins without hematoma or drainage   RLE: PT weak signal present, no DP signal appeciated. 5/5 strength no sensory deficits     LABS    Lab Results   Component Value Date    WBC 12.7 (H) 07/25/2024    HGB 11.4 (L) 07/25/2024    HCT 35.9 07/25/2024     07/25/2024    PROTIME 10.9 07/18/2023    INR 1.0 07/18/2023    APTT 31.3 01/20/2020    K 4.0 07/25/2024    BUN 20 07/25/2024    CREATININE 0.6 07/25/2024       ASSESSMENT/PLAN  74 y.o. female with PVD s/p L SFA, Popliteal, AT angioplasty 7/23    Monitor hematomas, keep leg compression wrapped

## 2024-07-26 NOTE — PROGRESS NOTES
Occupational Therapy  OCCUPATIONAL THERAPY TREATMENT SESSION    [facility]  [address], [place]      OT BEDSIDE TREATMENT NOTE      Date:2024  Patient Name: Gladys Elizabeth  MRN: [id number]  : 1950  Room: [address] [place] [address]A     Per OT Eval:      Evaluating OT: Yanci Ackerman, OTR/L [id number]     Referring Provider:   Shila Meadows MD       Specific Provider Orders/Date: OT eval and treat (24)        Diagnosis: Peripheral vascular disease, unspecified ([facility]) [I73.9]  Peripheral vascular disease, unspecified [I73.9]  PVD (peripheral vascular disease) ([facility]) [I73.9]        Surgery/Procedures:   L SFA, Popliteal, AT angioplasty.           Pertinent Medical History:    Past Medical History        Past Medical History:   Diagnosis Date    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 with rest pain ([facility]) 2019    Atrial fibrillation ([facility])      Atypical mole       upper right arm - black mole     Bilateral carotid artery stenosis 2018     Dr. Hobbs     Bruit of right carotid artery 2018    CAD (coronary artery disease)       f/u w/ PCP     Cancer ([facility])       SKIN CA/Thigh    COPD (chronic obstructive pulmonary disease) ([facility])       uses O2 2L NC at night - and during day prn     COVID      Depression      Diarrhea       for colonoscopy 2--21     Femoro-popliteal artery disease ([facility]) 2019    History of tobacco use 2018    Hydrocephalus ([facility])      Hyperlipidemia       no medications     Hypertension      BRY (obstructive sleep apnea)       uses O2 2L NC     Pain in both feet 2018    Paroxysmal A-fib ([facility])      Peripheral vascular disease due to secondary diabetes mellitus ([facility]) 2022    PVD (peripheral vascular disease) ([facility])      PVD (peripheral vascular disease) with claudication ([facility]) 2018    Rheumatoid arthritis ([facility])      Short-term memory loss      Skin tear

## 2024-07-26 NOTE — PLAN OF CARE
Problem: Chronic Conditions and Co-morbidities  Goal: Patient's chronic conditions and co-morbidity symptoms are monitored and maintained or improved  7/26/2024 1635 by Padmini Cooper RN  Outcome: Adequate for Discharge     Problem: Pain  Goal: Verbalizes/displays adequate comfort level or baseline comfort level  7/26/2024 1635 by Padmini Cooper RN  Outcome: Adequate for Discharge     Problem: Discharge Planning  Goal: Discharge to home or other facility with appropriate resources  7/26/2024 1635 by Padmini Cooper RN  Outcome: Adequate for Discharge     Problem: Cardiovascular - Adult  Goal: Maintains optimal cardiac output and hemodynamic stability  7/26/2024 1635 by Padmini Cooper RN  Outcome: Adequate for Discharge     Problem: Skin/Tissue Integrity - Adult  Goal: Skin integrity remains intact  7/26/2024 1635 by Padmini Cooper RN  Outcome: Adequate for Discharge     Problem: Skin/Tissue Integrity - Adult  Goal: Incisions, wounds, or drain sites healing without S/S of infection  7/26/2024 1635 by Padmini Cooper RN  Outcome: Adequate for Discharge     Problem: Skin/Tissue Integrity - Adult  Goal: Oral mucous membranes remain intact  7/26/2024 1635 by Padmini Cooper RN  Outcome: Adequate for Discharge     Problem: Musculoskeletal - Adult  Goal: Return mobility to safest level of function  7/26/2024 1635 by Padmini Cooper RN  Outcome: Adequate for Discharge     Problem: Musculoskeletal - Adult  Goal: Return ADL status to a safe level of function  7/26/2024 1635 by Padmini Cooper RN  Outcome: Adequate for Discharge     Problem: Genitourinary - Adult  Goal: Absence of urinary retention  7/26/2024 1635 by Padmini Cooper RN  Outcome: Adequate for Discharge     Problem: Infection - Adult  Goal: Absence of infection at discharge  7/26/2024 1635 by Padmini Cooper RN  Outcome: Adequate for Discharge     Problem: Infection - Adult  Goal: Absence of infection during  hospitalization  7/26/2024 1635 by Padmini Cooper RN  Outcome: Adequate for Discharge     Problem: Metabolic/Fluid and Electrolytes - Adult  Goal: Electrolytes maintained within normal limits  7/26/2024 1635 by Padmini Cooper RN  Outcome: Adequate for Discharge     Problem: Safety - Adult  Goal: Free from fall injury  7/26/2024 1635 by Padmini Cooper RN  Outcome: Adequate for Discharge     Problem: Skin/Tissue Integrity  Goal: Absence of new skin breakdown  7/26/2024 1635 by Padmini Cooper RN  Outcome: Adequate for Discharge     Problem: ABCDS Injury Assessment  Goal: Absence of physical injury  7/26/2024 1635 by Padmini Cooper RN  Outcome: Adequate for Discharge

## 2024-07-26 NOTE — PROGRESS NOTES
RN messaged Vascular regarding when patient is to restart her Eliquis per Attending.     April Mc RN

## 2024-07-26 NOTE — PLAN OF CARE
Problem: Chronic Conditions and Co-morbidities  Goal: Patient's chronic conditions and co-morbidity symptoms are monitored and maintained or improved  7/26/2024 0027 by Courtney Johansen RN  Outcome: Progressing     Problem: Pain  Goal: Verbalizes/displays adequate comfort level or baseline comfort level  7/26/2024 0027 by Courtney Johansen RN  Outcome: Progressing     Problem: Discharge Planning  Goal: Discharge to home or other facility with appropriate resources  7/26/2024 0027 by Courtney Johansen RN  Outcome: Progressing     Problem: Cardiovascular - Adult  Goal: Maintains optimal cardiac output and hemodynamic stability  7/26/2024 0027 by Courtney Johansen RN  Outcome: Progressing     Problem: Skin/Tissue Integrity - Adult  Goal: Skin integrity remains intact  7/26/2024 0027 by Courtney Johansen RN  Outcome: Progressing     Problem: Musculoskeletal - Adult  Goal: Return mobility to safest level of function  7/26/2024 0027 by Courtney Johansen RN  Outcome: Progressing     Problem: Genitourinary - Adult  Goal: Absence of urinary retention  7/26/2024 0027 by Courtney Johansen RN  Outcome: Progressing     Problem: Infection - Adult  Goal: Absence of infection at discharge  7/26/2024 0027 by Courtney Johansen RN  Outcome: Progressing     Problem: Metabolic/Fluid and Electrolytes - Adult  Goal: Electrolytes maintained within normal limits  7/26/2024 0027 by Courtney Johansen RN  Outcome: Progressing     Problem: Safety - Adult  Goal: Free from fall injury  7/26/2024 0027 by Courtney Johansen RN  Outcome: Progressing     Problem: Skin/Tissue Integrity  Goal: Absence of new skin breakdown  Description: 1.  Monitor for areas of redness and/or skin breakdown  2.  Assess vascular access sites hourly  3.  Every 4-6 hours minimum:  Change oxygen saturation probe site  4.  Every 4-6 hours:  If on nasal continuous positive airway pressure, respiratory therapy assess nares and determine need for appliance change or resting period.  7/26/2024 0027 by Courtney oJhansen

## 2024-07-26 NOTE — PLAN OF CARE
Problem: Pain  Goal: Verbalizes/displays adequate comfort level or baseline comfort level  7/26/2024 1104 by Padmini Cooper RN  Outcome: Progressing     Problem: Cardiovascular - Adult  Goal: Maintains optimal cardiac output and hemodynamic stability  7/26/2024 1104 by Padmini Cooper RN  Outcome: Progressing     Problem: Skin/Tissue Integrity - Adult  Goal: Skin integrity remains intact  7/26/2024 1104 by Padmini Cooper RN  Outcome: Progressing     Problem: Skin/Tissue Integrity - Adult  Goal: Incisions, wounds, or drain sites healing without S/S of infection  Outcome: Progressing     Problem: Musculoskeletal - Adult  Goal: Return mobility to safest level of function  7/26/2024 1104 by Padmini Cooper RN  Outcome: Progressing     Problem: Musculoskeletal - Adult  Goal: Return ADL status to a safe level of function  Outcome: Progressing     Problem: Genitourinary - Adult  Goal: Absence of urinary retention  7/26/2024 1104 by Padmini Cooper RN  Outcome: Progressing     Problem: Infection - Adult  Goal: Absence of infection at discharge  7/26/2024 1104 by Padmini Cooper RN  Outcome: Progressing     Problem: Infection - Adult  Goal: Absence of infection during hospitalization  Outcome: Progressing     Problem: Metabolic/Fluid and Electrolytes - Adult  Goal: Electrolytes maintained within normal limits  7/26/2024 1104 by Padmini Cooper RN  Outcome: Progressing     Problem: Safety - Adult  Goal: Free from fall injury  7/26/2024 1104 by Padmini Cooper RN  Outcome: Progressing     Problem: Skin/Tissue Integrity  Goal: Absence of new skin breakdown  7/26/2024 1104 by Padmini Cooper RN  Outcome: Progressing     Problem: ABCDS Injury Assessment  Goal: Absence of physical injury  7/26/2024 1104 by Padmini Cooper RN  Outcome: Progressing     Problem: Discharge Planning  Goal: Discharge to home or other facility with appropriate resources  7/26/2024 1104 by Padmini Cooper RN  Outcome:

## 2024-07-26 NOTE — PROGRESS NOTES
[facility]                                Progress note    Subjective:    Patient resting comfortably after working with therapy this a.m.      Objective:    /63   Pulse 92   Temp 97.8 °F (36.6 °C) (Temporal)   Resp 18   Ht 1.676 m (5' 6\")   Wt 45.3 kg (99 lb 14.4 oz)   SpO2 92%   BMI 16.12 kg/m²     In: 180 [P.O.:180]  Out: 470   In: 180   Out: 470 [Urine:470]    General appearance: NAD, conversant  HEENT: AT/NC, MMM  Neck: FROM, supple  Lungs: Clear to auscultation  CV: RRR, no MRGs  Vasc: Radial pulses 2+  Abdomen: Soft, non-tender; no masses or HSM  Extremities: No peripheral edema, LLE wrapped in Ace bandage  Skin: no rash, lesions or ulcers  Psych: Alert and oriented to person, place and time  Neuro: Alert and interactive     Recent Labs     07/23/24  1433 07/24/24  0421 07/25/24  0628   WBC 15.6* 11.8* 12.7*   HGB 12.7 12.1 11.4*   HCT 40.4 37.6 35.9    315 284         Recent Labs     07/23/24  1433 07/24/24  0421 07/25/24  0628    140 140   K 4.2 3.5 4.0    105 104   CO2 19* 26 24   BUN 29* 24* 20   CREATININE 0.5 0.6 0.6   CALCIUM 8.3* 8.2* 8.4*         Assessment:    Principal Problem:    PVD (peripheral vascular disease) ([facility])  Resolved Problems:    * No resolved hospital problems. *      Plan:  74 year old female with a history of PVD is admitted to the ICU following aortogram with     PVD status post elective procedure for left lower extremity rest pain  S/p-7/23-aortogram, angioplasty superficial femoral artery, angioplasty popliteal artery  Okay for diet from medicine standpoint unless further plans for intervention by general surgery  IV hydration  Pain management  Continue to hold Eliquis, resume when okay with vascular  Monitor neuro checks  Keep left lower extremity elevated wrapped in Ace bandage, monitor left calf hematoma which apparently has softened and is less prominent  Strict bedrest  Smoking cessation discussed at length  Okay for

## 2024-07-26 NOTE — CARE COORDINATION
Transition of care update: L SFA pop and AT angioplasty on 07/23. Left leg compression wrapped and elevated. Wbc 12.7, hgb 11.4 and other labs noted. PT update today am-pac 18/24 and ambulated 2 x 200ft FWW Sup and 8 steps 1 rail Sup. OT update today am-pac 19/24. Met with pt in room earlier today. Up in chair. On RA. Plan remains to home with her daughter. Pt does NOT want C setup. Pt said she has 2 FWWs and a WC at home. Denies any other dme needs. Cm/sw will follow.

## 2024-07-29 NOTE — DISCHARGE SUMMARY
[facility]   Discharge summary   Patient ID:  Gladys Elizabeth  [id number]  74 y.o.  1950    Admit date: 7/23/2024    Discharge date and time:7/26/24    Admission Diagnoses:   Patient Active Problem List   Diagnosis    Essential hypertension    Depression    PVD (peripheral vascular disease) with claudication (HCC)    History of tobacco use    PVD (peripheral vascular disease) (HCC)    Hyperlipidemia LDL goal <100    Acquired hypothyroidism    Severe protein-calorie malnutrition (HCC)    Atherosclerosis of aortic bifurcation and common iliac arteries (HCC)    CAD (coronary artery disease)    Paroxysmal atrial fibrillation (HCC)    Hydrocephalus (HCC)    Peripheral vascular disease due to secondary diabetes mellitus (HCC)    S/P  shunt    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 female of Toñito Suazo MD   74 year old female with a history of PVD is admitted to the ICU following aortogram with     PVD status post elective procedure for left lower extremity rest pain  S/p-7/23-aortogram, angioplasty superficial femoral artery, angioplasty popliteal artery  Okay for diet from medicine standpoint unless further plans for intervention by general surgery  IV hydration  Pain management  Continue to hold Eliquis, resume when okay with vascular  Monitor neuro checks  Keep left lower extremity elevated wrapped in Ace bandage, monitor left calf hematoma which apparently has softened and is less prominent  Strict bedrest  Smoking cessation discussed at length  Okay for transfer out of CVICU setting if okay with vascular service        7/25/24  -Leukocytosis remains present at 12.7 however  was discussed in detail and plans for care were established. Review of BRIAN Luz-CNP   , documentation was conducted and revisions were made as appropriate directly by me. I agree with the above documented exam, problem list, and plan of care.     Carmita Escobar MD

## 2024-07-30 ENCOUNTER — TELEPHONE (OUTPATIENT)
Dept: VASCULAR SURGERY | Age: 74
End: 2024-07-30

## 2024-07-30 NOTE — TELEPHONE ENCOUNTER
Pt notified OK to take Percocet q 4 hrs per Dr. Hobbs.  She was also offered an office visit with Dr. Price for tomorrow due to the increased pain; she will check with her daughter in regards to transportation and call the office back.

## 2024-07-30 NOTE — TELEPHONE ENCOUNTER
Pt phoned c/o increased (L) leg pain beginning this morning asking if OK to either take Percocet more often than q 6 hrs or if Tramadol may be taken as well.

## 2024-07-31 ENCOUNTER — OFFICE VISIT (OUTPATIENT)
Dept: VASCULAR SURGERY | Age: 74
End: 2024-07-31
Payer: MEDICARE

## 2024-07-31 DIAGNOSIS — I70.222 ATHEROSCLEROSIS OF NATIVE ARTERY OF LEFT LOWER EXTREMITY WITH REST PAIN (HCC): ICD-10-CM

## 2024-07-31 DIAGNOSIS — I73.9 PVD (PERIPHERAL VASCULAR DISEASE) (HCC): ICD-10-CM

## 2024-07-31 PROCEDURE — 1123F ACP DISCUSS/DSCN MKR DOCD: CPT | Performed by: SURGERY

## 2024-07-31 PROCEDURE — 99213 OFFICE O/P EST LOW 20 MIN: CPT | Performed by: SURGERY

## 2024-07-31 RX ORDER — OXYCODONE HYDROCHLORIDE AND ACETAMINOPHEN 5; 325 MG/1; MG/1
1 TABLET ORAL EVERY 4 HOURS PRN
Qty: 28 TABLET | Refills: 0 | Status: SHIPPED | OUTPATIENT
Start: 2024-07-31 | End: 2024-08-07

## 2024-07-31 RX ORDER — ASPIRIN 81 MG/1
81 TABLET, CHEWABLE ORAL DAILY
COMMUNITY

## 2024-07-31 RX ORDER — SOTALOL HYDROCHLORIDE 120 MG/1
120 TABLET ORAL 2 TIMES DAILY
COMMUNITY
Start: 2024-05-18

## 2024-07-31 NOTE — PROGRESS NOTES
years (5.0 ttl pk-yrs)     Types: Cigarettes     Start date: 5/3/1997     Quit date: 5/3/2017     Years since quittin.2    Smokeless tobacco: Never   Vaping Use    Vaping Use: Never used   Substance and Sexual Activity    Alcohol use: Yes     Comment: rare    Drug use: No    Sexual activity: Never   Other Topics Concern    Not on file   Social History Narrative    Not on file     Social Determinants of Health     Financial Resource Strain: Not on file   Food Insecurity: No Food Insecurity (2024)    Hunger Vital Sign     Worried About Running Out of Food in the Last Year: Never true     Ran Out of Food in the Last Year: Never true   Transportation Needs: No Transportation Needs (2024)    PRAPARE - Transportation     Lack of Transportation (Medical): No     Lack of Transportation (Non-Medical): No   Physical Activity: Not on file   Stress: Not on file   Social Connections: Not on file   Intimate Partner Violence: Not on file   Housing Stability: Low Risk  (2024)    Housing Stability Vital Sign     Unable to Pay for Housing in the Last Year: No     Number of Places Lived in the Last Year: 1     Unstable Housing in the Last Year: No        Family History   Problem Relation Age of Onset    Kidney Disease Mother     Coronary Art Dis Mother     High Blood Pressure Mother     Cancer Father     Other Father        REVIEW OF SYSTEMS (Recent symptoms): Negative if blank [], Positive if [x]       Constitutional    [] Fevers / chills  [] General weakness   [] Poor appetite    [] Weight gain or loss  Eyes    [] Blurred vision  [] Wear glasses / contacts   [] Visual disturbances   [] Blindness  Ears, Nose, Throat    [] Hearing loss  [] Ringing in ears   [] Nose bleeding    [] Voice hoarseness  [] Difficulty swallowing      Lungs    [] Cough  [] Chest pain   [] Wheezing    [] Difficult breathing  Heart    [] Chest pain during activity  [] Dizziness   [] Irregular heart beat    [] Fainting episode    [] Leg

## 2024-08-01 ENCOUNTER — TELEPHONE (OUTPATIENT)
Dept: VASCULAR SURGERY | Age: 74
End: 2024-08-01

## 2024-08-01 NOTE — TELEPHONE ENCOUNTER
Has increased left leg pain and wants to take her Percocet every 2 hours and get a new Rx for stronger pain medication.

## 2024-08-01 NOTE — TELEPHONE ENCOUNTER
Left message regarding dosage of Percocet, call office.  (May take 2 Percocet 5-325 q 6 hours, do not take pain medication every 2 hours!)  If pain becomes worse, go to  ER in [place].

## 2024-08-01 NOTE — TELEPHONE ENCOUNTER
Patient returned call, notified her that she may take 2 Percocet 5-325 every six hours if needed, go to  [facility] in [place] if pain is not controlled, do not take pain medication every 2 hours.

## 2024-08-05 ENCOUNTER — TELEPHONE (OUTPATIENT)
Dept: VASCULAR SURGERY | Age: 74
End: 2024-08-05

## 2024-08-05 DIAGNOSIS — I99.8 LOWER LIMB ISCHEMIA: Primary | ICD-10-CM

## 2024-08-05 RX ORDER — OXYCODONE HYDROCHLORIDE 5 MG/1
5 TABLET ORAL EVERY 6 HOURS PRN
Qty: 12 TABLET | Refills: 0 | Status: SHIPPED | OUTPATIENT
Start: 2024-08-05 | End: 2024-08-08

## 2024-08-05 NOTE — TELEPHONE ENCOUNTER
Patient cancelled appointment with Dr. Price on 8-7-24 as this is a \"bad week\" for her.  She will keep her appt with Dr. Hbobs on 8-12-24.  Pain medication was ordered this morning for a 3 day supply. She was told no further pain medication will be given at least until seen in the office. She states she understands that.

## 2024-08-12 ENCOUNTER — TELEPHONE (OUTPATIENT)
Dept: VASCULAR SURGERY | Age: 74
End: 2024-08-12

## 2024-08-12 ENCOUNTER — OFFICE VISIT (OUTPATIENT)
Dept: VASCULAR SURGERY | Age: 74
End: 2024-08-12

## 2024-08-12 VITALS — WEIGHT: 90 LBS | BODY MASS INDEX: 14.53 KG/M2

## 2024-08-12 DIAGNOSIS — I73.9 PVD (PERIPHERAL VASCULAR DISEASE) (HCC): Primary | ICD-10-CM

## 2024-08-12 PROCEDURE — 99024 POSTOP FOLLOW-UP VISIT: CPT | Performed by: SURGERY

## 2024-08-12 RX ORDER — OXYCODONE HCL 10 MG/1
10 TABLET, FILM COATED, EXTENDED RELEASE ORAL EVERY 12 HOURS
COMMUNITY
End: 2024-09-11

## 2024-08-12 RX ORDER — OXYCODONE AND ACETAMINOPHEN 5; 325 MG/1; MG/1
1 TABLET ORAL EVERY 6 HOURS PRN
Qty: 28 TABLET | Refills: 0 | Status: SHIPPED | OUTPATIENT
Start: 2024-08-12 | End: 2024-08-19

## 2024-08-12 NOTE — PROGRESS NOTES
Pt seen and examined    New wounds on right calf from trauma  R DP and PT monophasic    L LE  Overall appearance improved  Calf hematoma lateral present but soft, + ecchymosis  Toes are better perfused  Pop biphasic  DP no signal, PT monophasic    FU in wound center - pt would prefer to be seen 9/4    Overall pain is doing much better    PERCOCET 5/325 MG #28 SCRIPT SENT, OARRS REVIEWED    Chrissy Hobbs MD

## 2024-08-12 NOTE — TELEPHONE ENCOUNTER
I left a message on the answering machine of the Wound Care clinic , Dr. Hobbs wanted to see Gladys Glenn on September 4, 2024. Gladys's daughter Thalia requested the latest appointment and left her number with the message to be notified of the appointment.  Claudine number is [phone].

## 2024-08-20 ENCOUNTER — TELEPHONE (OUTPATIENT)
Dept: VASCULAR SURGERY | Age: 74
End: 2024-08-20

## 2024-08-20 DIAGNOSIS — I70.222 ATHEROSCLEROSIS OF NATIVE ARTERY OF LEFT LOWER EXTREMITY WITH REST PAIN (HCC): Primary | ICD-10-CM

## 2024-08-20 RX ORDER — OXYCODONE HYDROCHLORIDE AND ACETAMINOPHEN 5; 325 MG/1; MG/1
1 TABLET ORAL EVERY 6 HOURS PRN
Qty: 28 TABLET | Refills: 0 | Status: SHIPPED | OUTPATIENT
Start: 2024-08-20 | End: 2024-08-27

## 2024-08-20 NOTE — TELEPHONE ENCOUNTER
Patient called to request Rx pain medication be sent to [facility] in [place].    Script sent  Thanks lore

## 2024-08-27 ENCOUNTER — TELEPHONE (OUTPATIENT)
Dept: VASCULAR SURGERY | Age: 74
End: 2024-08-27

## 2024-08-27 DIAGNOSIS — I70.222 ATHEROSCLEROSIS OF NATIVE ARTERY OF LEFT LOWER EXTREMITY WITH REST PAIN (HCC): Primary | ICD-10-CM

## 2024-08-28 ENCOUNTER — TELEPHONE (OUTPATIENT)
Dept: VASCULAR SURGERY | Age: 74
End: 2024-08-28

## 2024-08-28 RX ORDER — OXYCODONE AND ACETAMINOPHEN 5; 325 MG/1; MG/1
1 TABLET ORAL EVERY 6 HOURS PRN
Qty: 28 TABLET | Refills: 0 | Status: SHIPPED | OUTPATIENT
Start: 2024-08-28 | End: 2024-09-04

## 2024-08-28 NOTE — TELEPHONE ENCOUNTER
Message left on pt's voicemail that Rx has been sent and to be sure to keep her appt at Wound Care next week.

## 2024-08-29 NOTE — DISCHARGE INSTRUCTIONS
Visit Discharge/Physician Orders    Discharge condition: {STABLE/UNSTABLE:137592037}    Assessment of pain at discharge:    Anesthetic used:     Discharge to: {P Wound Discharge To:20937}    Left via:{Left Via:20938}    Accompanied by: accompanied by {:[id number]}    ECF/HHA:     Dressing Orders:    Treatment Orders:FOLLOW NUTRITIOUS DIET. CHOOSE FOODS HIGH IN PROTEIN -CHICKEN- FISH-AND EGGS,  CHOOSE FOODS HIGH IN VITAMIN C.   MULTIVITAMIN DAILY.      [facility] followup visit _____________________________  (Please note your next appointment above and if you are unable to keep, kindly give a 24 hour notice. Thank you.)    Physician signature:__________________________      If you experience any of the following, please call the Wound Care Center during business hours:    * Increase in Pain  * Temperature over 101  * Increase in drainage from your wound  * Drainage with a foul odor  * Bleeding  * Increase in swelling  * Need for compression bandage changes due to slippage, breakthrough drainage.    If you need medical attention outside of the business hours of the Wound Care Centers please contact your PCP or go to the nearest emergency room.

## 2024-09-04 ENCOUNTER — HOSPITAL ENCOUNTER (OUTPATIENT)
Dept: WOUND CARE | Age: 74
Discharge: HOME | End: 2024-09-04

## 2024-09-11 ENCOUNTER — HOSPITAL ENCOUNTER (OUTPATIENT)
Dept: WOUND CARE | Age: 74
Discharge: HOME | End: 2024-09-11
Payer: MEDICARE

## 2024-09-11 VITALS
RESPIRATION RATE: 18 BRPM | SYSTOLIC BLOOD PRESSURE: 170 MMHG | TEMPERATURE: 97.6 F | HEART RATE: 68 BPM | DIASTOLIC BLOOD PRESSURE: 71 MMHG

## 2024-09-11 DIAGNOSIS — L97.212 SKIN ULCER OF RIGHT CALF WITH FAT LAYER EXPOSED (HCC): Primary | ICD-10-CM

## 2024-09-11 DIAGNOSIS — I70.242 ATHEROSCLEROSIS OF NATIVE ARTERY OF LEFT LOWER EXTREMITY WITH ULCERATION OF CALF (HCC): ICD-10-CM

## 2024-09-11 PROCEDURE — 97597 DBRDMT OPN WND 1ST 20 CM/<: CPT

## 2024-09-11 PROCEDURE — 99213 OFFICE O/P EST LOW 20 MIN: CPT

## 2024-09-11 RX ORDER — BETAMETHASONE DIPROPIONATE 0.5 MG/G
CREAM TOPICAL ONCE
OUTPATIENT
Start: 2024-09-11 | End: 2024-09-11

## 2024-09-11 RX ORDER — LIDOCAINE 40 MG/G
CREAM TOPICAL ONCE
OUTPATIENT
Start: 2024-09-11 | End: 2024-09-11

## 2024-09-11 RX ORDER — CLOBETASOL PROPIONATE 0.5 MG/G
OINTMENT TOPICAL ONCE
OUTPATIENT
Start: 2024-09-11 | End: 2024-09-11

## 2024-09-11 RX ORDER — LIDOCAINE HYDROCHLORIDE 40 MG/ML
SOLUTION TOPICAL ONCE
OUTPATIENT
Start: 2024-09-11 | End: 2024-09-11

## 2024-09-11 RX ORDER — TRIAMCINOLONE ACETONIDE 1 MG/G
OINTMENT TOPICAL ONCE
OUTPATIENT
Start: 2024-09-11 | End: 2024-09-11

## 2024-09-11 RX ORDER — LIDOCAINE 50 MG/G
OINTMENT TOPICAL ONCE
OUTPATIENT
Start: 2024-09-11 | End: 2024-09-11

## 2024-09-11 RX ORDER — SODIUM CHLOR/HYPOCHLOROUS ACID 0.033 %
SOLUTION, IRRIGATION IRRIGATION ONCE
OUTPATIENT
Start: 2024-09-11 | End: 2024-09-11

## 2024-09-11 RX ORDER — SILVER SULFADIAZINE 10 MG/G
CREAM TOPICAL ONCE
OUTPATIENT
Start: 2024-09-11 | End: 2024-09-11

## 2024-09-11 RX ORDER — LIDOCAINE HYDROCHLORIDE 20 MG/ML
JELLY TOPICAL ONCE
OUTPATIENT
Start: 2024-09-11 | End: 2024-09-11

## 2024-09-11 RX ORDER — ACETAMINOPHEN 500 MG
500 TABLET ORAL EVERY 6 HOURS PRN
COMMUNITY

## 2024-09-11 RX ORDER — BACITRACIN ZINC 500 [USP'U]/G
OINTMENT TOPICAL ONCE
OUTPATIENT
Start: 2024-09-11 | End: 2024-09-11

## 2024-09-11 RX ORDER — GENTAMICIN SULFATE 1 MG/G
OINTMENT TOPICAL ONCE
OUTPATIENT
Start: 2024-09-11 | End: 2024-09-11

## 2024-09-11 RX ORDER — BACITRACIN ZINC AND POLYMYXIN B SULFATE 500; 1000 [USP'U]/G; [USP'U]/G
OINTMENT TOPICAL ONCE
OUTPATIENT
Start: 2024-09-11 | End: 2024-09-11

## 2024-09-11 RX ORDER — NEOMYCIN/BACITRACIN/POLYMYXINB 3.5-400-5K
OINTMENT (GRAM) TOPICAL ONCE
OUTPATIENT
Start: 2024-09-11 | End: 2024-09-11

## 2024-09-11 RX ORDER — MUPIROCIN 20 MG/G
OINTMENT TOPICAL ONCE
OUTPATIENT
Start: 2024-09-11 | End: 2024-09-11

## 2024-09-11 ASSESSMENT — PAIN DESCRIPTION - PAIN TYPE: TYPE: ACUTE PAIN

## 2024-09-11 ASSESSMENT — PAIN SCALES - GENERAL: PAINLEVEL_OUTOF10: 3

## 2024-09-11 ASSESSMENT — PAIN DESCRIPTION - FREQUENCY: FREQUENCY: INTERMITTENT

## 2024-09-11 ASSESSMENT — PAIN DESCRIPTION - DESCRIPTORS: DESCRIPTORS: DISCOMFORT

## 2024-09-11 ASSESSMENT — PAIN DESCRIPTION - LOCATION: LOCATION: LEG

## 2024-09-11 ASSESSMENT — PAIN DESCRIPTION - ORIENTATION: ORIENTATION: RIGHT

## 2024-09-11 ASSESSMENT — PAIN DESCRIPTION - ONSET: ONSET: ON-GOING

## 2024-09-11 ASSESSMENT — PAIN - FUNCTIONAL ASSESSMENT: PAIN_FUNCTIONAL_ASSESSMENT: PREVENTS OR INTERFERES SOME ACTIVE ACTIVITIES AND ADLS

## 2024-09-25 ENCOUNTER — HOSPITAL ENCOUNTER (OUTPATIENT)
Dept: WOUND CARE | Age: 74
Discharge: HOME | End: 2024-09-25
Attending: SURGERY | Admitting: SURGERY

## 2024-09-26 NOTE — DISCHARGE INSTRUCTIONS
Visit Discharge/Physician Orders     Discharge condition: Stable     Assessment of pain at discharge:assessed     Anesthetic used: 4% Lido Soln     Discharge to: Home     Left via:Private automobile     Accompanied by: accompanied by family     ECF/HHA: [facility] [phone]     Dressing Orders:LEFT LEG WOUNDS AND LEFT GREAT TOE WOUNDS: Cleanse with normal saline, apply calcium alginate, apply 4x4 between toes, ABD, Kerlix, and Spandagrip. Change daily.      Treatment Orders:FOLLOW NUTRITIOUS DIET. CHOOSE FOODS HIGH IN PROTEIN -CHICKEN- FISH-AND EGGS,  CHOOSE FOODS HIGH IN VITAMIN C.   MULTIVITAMIN DAILY.       [facility] followup visit _____Dr. MCMAHON 2 week ________________________  (Please note your next appointment above and if you are unable to keep, kindly give a 24 hour notice. Thank you.)     Physician signature:__________________________        If you experience any of the following, please call the Wound Care Center during business hours:     * Increase in Pain  * Temperature over 101  * Increase in drainage from your wound  * Drainage with a foul odor  * Bleeding  * Increase in swelling  * Need for compression bandage changes due to slippage, breakthrough drainage.     If you need medical attention outside of the business hours of the Wound Care Centers please contact your PCP or go to the nearest emergency room.

## 2024-10-02 ENCOUNTER — HOSPITAL ENCOUNTER (OUTPATIENT)
Dept: WOUND CARE | Age: 74
Discharge: HOME | End: 2024-10-02
Attending: SURGERY | Admitting: SURGERY
Payer: MEDICARE

## 2024-10-02 VITALS
SYSTOLIC BLOOD PRESSURE: 146 MMHG | HEART RATE: 62 BPM | RESPIRATION RATE: 17 BRPM | TEMPERATURE: 98.1 F | DIASTOLIC BLOOD PRESSURE: 74 MMHG

## 2024-10-02 DIAGNOSIS — L97.212 SKIN ULCER OF RIGHT CALF WITH FAT LAYER EXPOSED (HCC): Primary | ICD-10-CM

## 2024-10-02 DIAGNOSIS — L97.522 DIABETIC ULCER OF TOE OF LEFT FOOT ASSOCIATED WITH TYPE 2 DIABETES MELLITUS, WITH FAT LAYER EXPOSED (HCC): Chronic | ICD-10-CM

## 2024-10-02 DIAGNOSIS — E11.621 DIABETIC ULCER OF TOE OF LEFT FOOT ASSOCIATED WITH TYPE 2 DIABETES MELLITUS, WITH FAT LAYER EXPOSED (HCC): Chronic | ICD-10-CM

## 2024-10-02 DIAGNOSIS — I70.242 ATHEROSCLEROSIS OF NATIVE ARTERY OF LEFT LOWER EXTREMITY WITH ULCERATION OF CALF (HCC): ICD-10-CM

## 2024-10-02 PROCEDURE — 11042 DBRDMT SUBQ TIS 1ST 20SQCM/<: CPT

## 2024-10-02 RX ORDER — LIDOCAINE HYDROCHLORIDE 20 MG/ML
JELLY TOPICAL ONCE
OUTPATIENT
Start: 2024-10-02 | End: 2024-10-02

## 2024-10-02 RX ORDER — CLOBETASOL PROPIONATE 0.5 MG/G
OINTMENT TOPICAL ONCE
OUTPATIENT
Start: 2024-10-02 | End: 2024-10-02

## 2024-10-02 RX ORDER — LIDOCAINE 40 MG/G
CREAM TOPICAL ONCE
OUTPATIENT
Start: 2024-10-02 | End: 2024-10-02

## 2024-10-02 RX ORDER — MUPIROCIN 20 MG/G
OINTMENT TOPICAL ONCE
OUTPATIENT
Start: 2024-10-02 | End: 2024-10-02

## 2024-10-02 RX ORDER — BETAMETHASONE DIPROPIONATE 0.5 MG/G
CREAM TOPICAL ONCE
OUTPATIENT
Start: 2024-10-02 | End: 2024-10-02

## 2024-10-02 RX ORDER — LIDOCAINE HYDROCHLORIDE 40 MG/ML
SOLUTION TOPICAL ONCE
OUTPATIENT
Start: 2024-10-02 | End: 2024-10-02

## 2024-10-02 RX ORDER — BACITRACIN ZINC 500 [USP'U]/G
OINTMENT TOPICAL ONCE
OUTPATIENT
Start: 2024-10-02 | End: 2024-10-02

## 2024-10-02 RX ORDER — SILVER SULFADIAZINE 10 MG/G
CREAM TOPICAL ONCE
OUTPATIENT
Start: 2024-10-02 | End: 2024-10-02

## 2024-10-02 RX ORDER — NEOMYCIN/BACITRACIN/POLYMYXINB 3.5-400-5K
OINTMENT (GRAM) TOPICAL ONCE
OUTPATIENT
Start: 2024-10-02 | End: 2024-10-02

## 2024-10-02 RX ORDER — GENTAMICIN SULFATE 1 MG/G
OINTMENT TOPICAL ONCE
OUTPATIENT
Start: 2024-10-02 | End: 2024-10-02

## 2024-10-02 RX ORDER — TRIAMCINOLONE ACETONIDE 1 MG/G
OINTMENT TOPICAL ONCE
OUTPATIENT
Start: 2024-10-02 | End: 2024-10-02

## 2024-10-02 RX ORDER — TRAMADOL HYDROCHLORIDE 50 MG/1
50 TABLET ORAL EVERY 6 HOURS PRN
COMMUNITY

## 2024-10-02 RX ORDER — LIDOCAINE HYDROCHLORIDE 40 MG/ML
SOLUTION TOPICAL ONCE
Status: COMPLETED | OUTPATIENT
Start: 2024-10-02 | End: 2024-10-02

## 2024-10-02 RX ORDER — LIDOCAINE 50 MG/G
OINTMENT TOPICAL ONCE
OUTPATIENT
Start: 2024-10-02 | End: 2024-10-02

## 2024-10-02 RX ORDER — BACITRACIN ZINC AND POLYMYXIN B SULFATE 500; 1000 [USP'U]/G; [USP'U]/G
OINTMENT TOPICAL ONCE
OUTPATIENT
Start: 2024-10-02 | End: 2024-10-02

## 2024-10-02 RX ORDER — SODIUM CHLOR/HYPOCHLOROUS ACID 0.033 %
SOLUTION, IRRIGATION IRRIGATION ONCE
OUTPATIENT
Start: 2024-10-02 | End: 2024-10-02

## 2024-10-02 RX ADMIN — LIDOCAINE HYDROCHLORIDE 5 ML: 40 SOLUTION TOPICAL at 13:23

## 2024-10-02 ASSESSMENT — PAIN SCALES - GENERAL: PAINLEVEL_OUTOF10: 4

## 2024-10-02 ASSESSMENT — PAIN DESCRIPTION - DESCRIPTORS: DESCRIPTORS: DISCOMFORT

## 2024-10-02 NOTE — PROGRESS NOTES
Blister on first toe  Small open areas on 3rd lateral and 4th medial toe  4th toe nail fell off    Alginate

## 2024-10-02 NOTE — PROGRESS NOTES
Wound Care Supplies      Supply Company:     [facility] [address] [place] [address]  p: [phone] f: [phone]     Ordering Center:     [facility]  [address] [facility] [address]  [phone]  WOUND CARE Dept: [phone]   FAX NUMBER [phone]    Patient Information:      Gladys Croft  [address] [address] [address]   [phone]   : 1950  AGE: 74 y.o.     GENDER: female   EPISODE DATE: 10/2/2024    Insurance:      PRIMARY INSURANCE:  Plan: [employer] MEDICARE  Coverage: [employer] MEDICARE  Effective Date: 2015  Group Number: [unfilled]  Subscriber Number: [id number] - (Medicare Managed)    Payer/Plan Subscr  Sex Relation Sub. Ins. ID Effective Group Num   1. [employer] MEDICA* [place] CROFT* 1950 Female Self [id number] 2/1/23 [id number]                                   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)       Endocrine    Diabetic ulcer of toe of left foot associated with type 2 diabetes mellitus, with fat layer exposed (HCC) (Chronic)       Other    Skin ulcer of right calf with fat layer exposed (HCC) - Primary    Relevant Orders    Initiate Outpatient Wound Care Protocol       WOUNDS REQUIRING DRESSING SUPPLIES:     Wound 24 Pretibial Right #1 (Active)   Wound Image   10/02/24 1324   Wound Etiology Traumatic 24 1335   Dressing Status New dressing applied 10/02/24 1500   Wound Cleansed Cleansed with saline 10/02/24 1500   Dressing/Treatment Alginate;ABD;Dry dressing 10/02/24 1500   Wound Length (cm) 0 cm 10/02/24 1324   Wound Width (cm) 0 cm 10/02/24 1324   Wound Depth (cm) 0 cm 10/02/24 1324   Wound Surface Area (cm^2) 0 cm^2 10/02/24 1324   Change in Wound Size % (l*w) 100 10/02/24 1324   Wound Volume (cm^3)

## 2024-10-02 NOTE — PLAN OF CARE
Problem: Cognitive:  Goal: Knowledge of wound care  Description: Knowledge of wound care  Outcome: Progressing  Goal: Understands risk factors for wounds  Description: Understands risk factors for wounds  Outcome: Progressing     Problem: Wound:  Goal: Will show signs of wound healing; wound closure and no evidence of infection  Description: Will show signs of wound healing; wound closure and no evidence of infection  Outcome: Progressing     Problem: Arterial:  Goal: Optimize blood flow for wound healing  Description: Optimize blood flow for wound healing  Outcome: Progressing

## 2024-10-15 NOTE — DISCHARGE INSTRUCTIONS
Visit Discharge/Physician Orders     Discharge condition: Stable     Assessment of pain at discharge:assessed     Anesthetic used: 4% Lido Soln     Discharge to: Home     Left via:Private automobile     Accompanied by: accompanied by family     ECF/HHA: [place] [phone]     Dressing Orders:LEFT LEG WOUNDS AND LEFT TOE WOUNDS: Cleanse with normal saline, apply calcium alginate, apply 4x4 between toes, ABD, Kerlix, and Spandagrip. Change daily.      Treatment Orders:FOLLOW NUTRITIOUS DIET. CHOOSE FOODS HIGH IN PROTEIN -CHICKEN- FISH-AND EGGS,  CHOOSE FOODS HIGH IN VITAMIN C.   MULTIVITAMIN DAILY.       Doppler study to be scheduled.     [facility] followup visit _____Dr. MCMAHON 2 week in AM  ________________________  (Please note your next appointment above and if you are unable to keep, kindly give a 24 hour notice. Thank you.)     Physician signature:__________________________        If you experience any of the following, please call the Wound Care Center during business hours:     * Increase in Pain  * Temperature over 101  * Increase in drainage from your wound  * Drainage with a foul odor  * Bleeding  * Increase in swelling  * Need for compression bandage changes due to slippage, breakthrough drainage.     If you need medical attention outside of the business hours of the Wound Care Centers please contact your PCP or go to the nearest emergency room.

## 2024-10-16 ENCOUNTER — HOSPITAL ENCOUNTER (OUTPATIENT)
Dept: WOUND CARE | Age: 74
Discharge: HOME | End: 2024-10-16
Attending: SURGERY | Admitting: SURGERY
Payer: MEDICARE

## 2024-10-16 VITALS
DIASTOLIC BLOOD PRESSURE: 73 MMHG | WEIGHT: 90 LBS | BODY MASS INDEX: 14.46 KG/M2 | HEIGHT: 66 IN | HEART RATE: 65 BPM | SYSTOLIC BLOOD PRESSURE: 153 MMHG | TEMPERATURE: 97.3 F | RESPIRATION RATE: 18 BRPM

## 2024-10-16 DIAGNOSIS — L97.212 SKIN ULCER OF RIGHT CALF WITH FAT LAYER EXPOSED (HCC): Primary | ICD-10-CM

## 2024-10-16 DIAGNOSIS — I70.242 ATHEROSCLEROSIS OF NATIVE ARTERY OF LEFT LOWER EXTREMITY WITH ULCERATION OF CALF (HCC): ICD-10-CM

## 2024-10-16 DIAGNOSIS — Z95.820 STATUS POST PERIPHERAL ARTERY ANGIOPLASTY WITH INSERTION OF STENT: ICD-10-CM

## 2024-10-16 DIAGNOSIS — L97.522 DIABETIC ULCER OF TOE OF LEFT FOOT ASSOCIATED WITH TYPE 2 DIABETES MELLITUS, WITH FAT LAYER EXPOSED (HCC): Chronic | ICD-10-CM

## 2024-10-16 DIAGNOSIS — E11.621 DIABETIC ULCER OF TOE OF LEFT FOOT ASSOCIATED WITH TYPE 2 DIABETES MELLITUS, WITH FAT LAYER EXPOSED (HCC): Chronic | ICD-10-CM

## 2024-10-16 DIAGNOSIS — I73.9 PVD (PERIPHERAL VASCULAR DISEASE) WITH CLAUDICATION (HCC): ICD-10-CM

## 2024-10-16 PROBLEM — S41.119A SKIN TEAR OF UPPER ARM WITHOUT COMPLICATION: Status: RESOLVED | Noted: 2024-04-03 | Resolved: 2024-10-16

## 2024-10-16 PROCEDURE — 11045 DBRDMT SUBQ TISS EACH ADDL: CPT

## 2024-10-16 PROCEDURE — 11042 DBRDMT SUBQ TIS 1ST 20SQCM/<: CPT

## 2024-10-16 RX ORDER — LIDOCAINE HYDROCHLORIDE 40 MG/ML
SOLUTION TOPICAL ONCE
OUTPATIENT
Start: 2024-10-16 | End: 2024-10-16

## 2024-10-16 RX ORDER — BETAMETHASONE DIPROPIONATE 0.5 MG/G
CREAM TOPICAL ONCE
OUTPATIENT
Start: 2024-10-16 | End: 2024-10-16

## 2024-10-16 RX ORDER — MUPIROCIN 20 MG/G
OINTMENT TOPICAL ONCE
OUTPATIENT
Start: 2024-10-16 | End: 2024-10-16

## 2024-10-16 RX ORDER — GENTAMICIN SULFATE 1 MG/G
OINTMENT TOPICAL ONCE
OUTPATIENT
Start: 2024-10-16 | End: 2024-10-16

## 2024-10-16 RX ORDER — CLOBETASOL PROPIONATE 0.5 MG/G
OINTMENT TOPICAL ONCE
OUTPATIENT
Start: 2024-10-16 | End: 2024-10-16

## 2024-10-16 RX ORDER — LIDOCAINE 50 MG/G
OINTMENT TOPICAL ONCE
OUTPATIENT
Start: 2024-10-16 | End: 2024-10-16

## 2024-10-16 RX ORDER — LIDOCAINE HYDROCHLORIDE 20 MG/ML
JELLY TOPICAL ONCE
OUTPATIENT
Start: 2024-10-16 | End: 2024-10-16

## 2024-10-16 RX ORDER — NEOMYCIN/BACITRACIN/POLYMYXINB 3.5-400-5K
OINTMENT (GRAM) TOPICAL ONCE
OUTPATIENT
Start: 2024-10-16 | End: 2024-10-16

## 2024-10-16 RX ORDER — LIDOCAINE 40 MG/G
CREAM TOPICAL ONCE
OUTPATIENT
Start: 2024-10-16 | End: 2024-10-16

## 2024-10-16 RX ORDER — BACITRACIN ZINC 500 [USP'U]/G
OINTMENT TOPICAL ONCE
OUTPATIENT
Start: 2024-10-16 | End: 2024-10-16

## 2024-10-16 RX ORDER — SODIUM CHLOR/HYPOCHLOROUS ACID 0.033 %
SOLUTION, IRRIGATION IRRIGATION ONCE
OUTPATIENT
Start: 2024-10-16 | End: 2024-10-16

## 2024-10-16 RX ORDER — SILVER SULFADIAZINE 10 MG/G
CREAM TOPICAL ONCE
OUTPATIENT
Start: 2024-10-16 | End: 2024-10-16

## 2024-10-16 RX ORDER — LIDOCAINE HYDROCHLORIDE 40 MG/ML
SOLUTION TOPICAL ONCE
Status: COMPLETED | OUTPATIENT
Start: 2024-10-16 | End: 2024-10-16

## 2024-10-16 RX ORDER — BACITRACIN ZINC AND POLYMYXIN B SULFATE 500; 1000 [USP'U]/G; [USP'U]/G
OINTMENT TOPICAL ONCE
OUTPATIENT
Start: 2024-10-16 | End: 2024-10-16

## 2024-10-16 RX ORDER — TRIAMCINOLONE ACETONIDE 1 MG/G
OINTMENT TOPICAL ONCE
OUTPATIENT
Start: 2024-10-16 | End: 2024-10-16

## 2024-10-16 RX ADMIN — LIDOCAINE HYDROCHLORIDE 10 ML: 40 SOLUTION TOPICAL at 14:10

## 2024-10-16 ASSESSMENT — PAIN DESCRIPTION - PAIN TYPE: TYPE: CHRONIC PAIN

## 2024-10-16 ASSESSMENT — PAIN DESCRIPTION - DESCRIPTORS: DESCRIPTORS: DISCOMFORT;ACHING

## 2024-10-16 ASSESSMENT — PAIN DESCRIPTION - LOCATION: LOCATION: LEG

## 2024-10-16 ASSESSMENT — PAIN DESCRIPTION - ONSET: ONSET: ON-GOING

## 2024-10-16 ASSESSMENT — PAIN DESCRIPTION - FREQUENCY: FREQUENCY: INTERMITTENT

## 2024-10-16 ASSESSMENT — PAIN DESCRIPTION - ORIENTATION: ORIENTATION: LEFT

## 2024-10-16 ASSESSMENT — PAIN SCALES - GENERAL: PAINLEVEL_OUTOF10: 5

## 2024-10-16 ASSESSMENT — PAIN - FUNCTIONAL ASSESSMENT: PAIN_FUNCTIONAL_ASSESSMENT: PREVENTS OR INTERFERES SOME ACTIVE ACTIVITIES AND ADLS

## 2024-10-16 NOTE — PROGRESS NOTES
Wound Cleansed Cleansed with saline 10/02/24 1500   Dressing/Treatment ABD;Alginate;Roll gauze 10/02/24 1500   Wound Length (cm) 3.5 cm 10/16/24 1355   Wound Width (cm) 3.6 cm 10/16/24 1355   Wound Depth (cm) 0.2 cm 10/16/24 1355   Wound Surface Area (cm^2) 12.6 cm^2 10/16/24 1355   Change in Wound Size % (l*w) -3400 10/16/24 1355   Wound Volume (cm^3) 2.52 cm^3 10/16/24 1355   Wound Healing % -6900 10/16/24 1355   Post-Procedure Length (cm) 0.4 cm 10/02/24 1431   Post-Procedure Width (cm) 0.9 cm 10/02/24 1431   Post-Procedure Depth (cm) 0.2 cm 10/02/24 1431   Post-Procedure Surface Area (cm^2) 0.36 cm^2 10/02/24 1431   Post-Procedure Volume (cm^3) 0.072 cm^3 10/02/24 1431   Wound Assessment Eschar dry;Pink/red 10/16/24 1355   Drainage Amount Moderate (25-50%) 10/16/24 1355   Drainage Description Serosanguinous 10/16/24 1355   Odor None 10/16/24 1355   Rhoda-wound Assessment Fragile 10/16/24 1355   Number of days: 14       Wound 10/02/24 Toe (Comment  which one) Left #5 Great toe (Active)   Dressing Status New dressing applied 10/02/24 1500   Wound Cleansed Cleansed with saline 10/02/24 1500   Dressing/Treatment ABD;Alginate;Roll gauze 10/02/24 1500   Wound Length (cm) 1.2 cm 10/16/24 1355   Wound Width (cm) 2 cm 10/16/24 1355   Wound Depth (cm) 0.1 cm 10/16/24 1355   Wound Surface Area (cm^2) 2.4 cm^2 10/16/24 1355   Change in Wound Size % (l*w) 43.53 10/16/24 1355   Wound Volume (cm^3) 0.24 cm^3 10/16/24 1355   Wound Healing % 44 10/16/24 1355   Post-Procedure Length (cm) 1.7 cm 10/02/24 1431   Post-Procedure Width (cm) 2.5 cm 10/02/24 1431   Post-Procedure Depth (cm) 0.2 cm 10/02/24 1431   Post-Procedure Surface Area (cm^2) 4.25 cm^2 10/02/24 1431   Post-Procedure Volume (cm^3) 0.85 cm^3 10/02/24 1431   Wound Assessment Eschar dry 10/16/24 1355   Drainage Amount None (dry) 10/16/24 1355   Odor None 10/16/24 1355   Rhoda-wound Assessment Fragile;Ecchymosis 10/16/24 1355   Number of days: 14       Wound 10/16/24

## 2024-10-22 ENCOUNTER — TELEPHONE (OUTPATIENT)
Dept: WOUND CARE | Age: 74
End: 2024-10-22

## 2024-10-22 DIAGNOSIS — L97.212 SKIN ULCER OF RIGHT CALF WITH FAT LAYER EXPOSED (HCC): Primary | ICD-10-CM

## 2024-10-22 NOTE — TELEPHONE ENCOUNTER
Notified Dr. Hobbs of patient calling [facility] earlier and stating,  \"3rd to 5th toes (left) are turning color and look as if they are about to fall off\". Dr. Hobbs requesting patient to go to [facility] ER for evaluation. Called and spoke with patient who verbalized understanding. Patient stated she will go in AM. Educated patient on rationale for reporting to ER for evaluation as soon as possible and patient verbalized understanding. Patient stated she still plans on going in AM. Dr. Hobbs updated.

## 2024-10-22 NOTE — TELEPHONE ENCOUNTER
Patient called into wound care stating that her left foot, \"3rd to 5th toes are turning color and look as if they are about to fall off\". Patient stated that her toes are worse than the last appointment when seen. Patient is not scheduled to see wound care until 10/30. This nurse notified patient that if she feels her wound has worsen in any way or if her toe circulation is impaired in anyway (turning black, numbness, pain, etc) she needs to be seen in the ER. Patient stated she may only be able to go to [place] ER if she is able to get a ride.

## 2024-10-23 ENCOUNTER — HOSPITAL ENCOUNTER (INPATIENT)
Age: 74
LOS: 5 days | Discharge: NURSING, REHAB OR LONG-TERM CARE | DRG: 853 | End: 2024-10-28
Attending: EMERGENCY MEDICINE | Admitting: INTERNAL MEDICINE
Payer: MEDICARE

## 2024-10-23 ENCOUNTER — APPOINTMENT (OUTPATIENT)
Dept: CT IMAGING | Age: 74
DRG: 853 | End: 2024-10-23
Payer: MEDICARE

## 2024-10-23 ENCOUNTER — APPOINTMENT (OUTPATIENT)
Dept: INTERVENTIONAL RADIOLOGY/VASCULAR | Age: 74
DRG: 853 | End: 2024-10-23
Payer: MEDICARE

## 2024-10-23 DIAGNOSIS — Z89.612 S/P AKA (ABOVE KNEE AMPUTATION) UNILATERAL, LEFT (HCC): ICD-10-CM

## 2024-10-23 DIAGNOSIS — I73.9 PERIPHERAL VASCULAR DISEASE, UNSPECIFIED (HCC): ICD-10-CM

## 2024-10-23 DIAGNOSIS — T14.8XXA OPEN WOUND: ICD-10-CM

## 2024-10-23 DIAGNOSIS — M79.672 LEFT FOOT PAIN: Primary | ICD-10-CM

## 2024-10-23 DIAGNOSIS — L03.116 CELLULITIS OF LEFT FOOT: ICD-10-CM

## 2024-10-23 DIAGNOSIS — I73.9 PVD (PERIPHERAL VASCULAR DISEASE) (HCC): ICD-10-CM

## 2024-10-23 PROBLEM — L03.90 CELLULITIS: Status: ACTIVE | Noted: 2024-10-23

## 2024-10-23 LAB
ALBUMIN SERPL-MCNC: 3.9 G/DL (ref 3.5–5.2)
ALP SERPL-CCNC: 77 U/L (ref 35–104)
ALT SERPL-CCNC: 15 U/L (ref 0–32)
ANION GAP SERPL CALCULATED.3IONS-SCNC: 11 MMOL/L (ref 7–16)
AST SERPL-CCNC: 15 U/L (ref 0–31)
BASOPHILS # BLD: 0.02 K/UL (ref 0–0.2)
BASOPHILS NFR BLD: 0 % (ref 0–2)
BILIRUB SERPL-MCNC: 0.6 MG/DL (ref 0–1.2)
BUN SERPL-MCNC: 29 MG/DL (ref 6–23)
CALCIUM SERPL-MCNC: 9.7 MG/DL (ref 8.6–10.2)
CHLORIDE SERPL-SCNC: 100 MMOL/L (ref 98–107)
CO2 SERPL-SCNC: 29 MMOL/L (ref 22–29)
CREAT SERPL-MCNC: 0.8 MG/DL (ref 0.5–1)
CRP SERPL HS-MCNC: 114 MG/L (ref 0–5)
ECHO BSA: 1.38 M2
EOSINOPHIL # BLD: 0 K/UL (ref 0.05–0.5)
EOSINOPHILS RELATIVE PERCENT: 0 % (ref 0–6)
ERYTHROCYTE [DISTWIDTH] IN BLOOD BY AUTOMATED COUNT: 13.8 % (ref 11.5–15)
ERYTHROCYTE [SEDIMENTATION RATE] IN BLOOD BY WESTERGREN METHOD: 53 MM/HR (ref 0–20)
GFR, ESTIMATED: 74 ML/MIN/1.73M2
GLUCOSE SERPL-MCNC: 131 MG/DL (ref 74–99)
HCT VFR BLD AUTO: 38.9 % (ref 34–48)
HGB BLD-MCNC: 12.1 G/DL (ref 11.5–15.5)
IMM GRANULOCYTES # BLD AUTO: 0.08 K/UL (ref 0–0.58)
IMM GRANULOCYTES NFR BLD: 1 % (ref 0–5)
INR PPP: 1.5
LACTATE BLDV-SCNC: 1.2 MMOL/L (ref 0.5–1.9)
LACTATE BLDV-SCNC: 1.9 MMOL/L (ref 0.5–1.9)
LYMPHOCYTES NFR BLD: 1.41 K/UL (ref 1.5–4)
LYMPHOCYTES RELATIVE PERCENT: 9 % (ref 20–42)
MCH RBC QN AUTO: 30.6 PG (ref 26–35)
MCHC RBC AUTO-ENTMCNC: 31.1 G/DL (ref 32–34.5)
MCV RBC AUTO: 98.5 FL (ref 80–99.9)
MONOCYTES NFR BLD: 1.43 K/UL (ref 0.1–0.95)
MONOCYTES NFR BLD: 9 % (ref 2–12)
NEUTROPHILS NFR BLD: 82 % (ref 43–80)
NEUTS SEG NFR BLD: 13.72 K/UL (ref 1.8–7.3)
PLATELET # BLD AUTO: 382 K/UL (ref 130–450)
PMV BLD AUTO: 9.3 FL (ref 7–12)
POTASSIUM SERPL-SCNC: 4.2 MMOL/L (ref 3.5–5)
PROT SERPL-MCNC: 6.7 G/DL (ref 6.4–8.3)
PROTHROMBIN TIME: 16.7 SEC (ref 9.3–12.4)
RBC # BLD AUTO: 3.95 M/UL (ref 3.5–5.5)
SODIUM SERPL-SCNC: 140 MMOL/L (ref 132–146)
WBC OTHER # BLD: 16.7 K/UL (ref 4.5–11.5)

## 2024-10-23 PROCEDURE — 85025 COMPLETE CBC W/AUTO DIFF WBC: CPT

## 2024-10-23 PROCEDURE — 6360000002 HC RX W HCPCS: Performed by: INTERNAL MEDICINE

## 2024-10-23 PROCEDURE — 6360000002 HC RX W HCPCS: Performed by: NURSE PRACTITIONER

## 2024-10-23 PROCEDURE — 99285 EMERGENCY DEPT VISIT HI MDM: CPT

## 2024-10-23 PROCEDURE — 83605 ASSAY OF LACTIC ACID: CPT

## 2024-10-23 PROCEDURE — 93923 UPR/LXTR ART STDY 3+ LVLS: CPT

## 2024-10-23 PROCEDURE — 94640 AIRWAY INHALATION TREATMENT: CPT

## 2024-10-23 PROCEDURE — 80053 COMPREHEN METABOLIC PANEL: CPT

## 2024-10-23 PROCEDURE — 6370000000 HC RX 637 (ALT 250 FOR IP): Performed by: INTERNAL MEDICINE

## 2024-10-23 PROCEDURE — 2700000000 HC OXYGEN THERAPY PER DAY

## 2024-10-23 PROCEDURE — 85652 RBC SED RATE AUTOMATED: CPT

## 2024-10-23 PROCEDURE — 2500000003 HC RX 250 WO HCPCS: Performed by: EMERGENCY MEDICINE

## 2024-10-23 PROCEDURE — 99223 1ST HOSP IP/OBS HIGH 75: CPT | Performed by: SURGERY

## 2024-10-23 PROCEDURE — 2580000003 HC RX 258: Performed by: INTERNAL MEDICINE

## 2024-10-23 PROCEDURE — 2580000003 HC RX 258: Performed by: NURSE PRACTITIONER

## 2024-10-23 PROCEDURE — 6360000002 HC RX W HCPCS

## 2024-10-23 PROCEDURE — 6360000004 HC RX CONTRAST MEDICATION: Performed by: RADIOLOGY

## 2024-10-23 PROCEDURE — 6370000000 HC RX 637 (ALT 250 FOR IP): Performed by: NURSE PRACTITIONER

## 2024-10-23 PROCEDURE — 2580000003 HC RX 258: Performed by: EMERGENCY MEDICINE

## 2024-10-23 PROCEDURE — 6360000002 HC RX W HCPCS: Performed by: EMERGENCY MEDICINE

## 2024-10-23 PROCEDURE — 96374 THER/PROPH/DIAG INJ IV PUSH: CPT

## 2024-10-23 PROCEDURE — 87040 BLOOD CULTURE FOR BACTERIA: CPT

## 2024-10-23 PROCEDURE — 73706 CT ANGIO LWR EXTR W/O&W/DYE: CPT

## 2024-10-23 PROCEDURE — 86140 C-REACTIVE PROTEIN: CPT

## 2024-10-23 PROCEDURE — 93923 UPR/LXTR ART STDY 3+ LVLS: CPT | Performed by: SURGERY

## 2024-10-23 PROCEDURE — 36415 COLL VENOUS BLD VENIPUNCTURE: CPT

## 2024-10-23 PROCEDURE — 85610 PROTHROMBIN TIME: CPT

## 2024-10-23 PROCEDURE — 1200000000 HC SEMI PRIVATE

## 2024-10-23 RX ORDER — GLUCAGON 1 MG/ML
1 KIT INJECTION PRN
Status: DISCONTINUED | OUTPATIENT
Start: 2024-10-23 | End: 2024-10-29 | Stop reason: HOSPADM

## 2024-10-23 RX ORDER — LEVOTHYROXINE SODIUM 125 UG/1
125 TABLET ORAL DAILY
COMMUNITY
End: 2024-10-23

## 2024-10-23 RX ORDER — HYDRALAZINE HYDROCHLORIDE 20 MG/ML
10 INJECTION INTRAMUSCULAR; INTRAVENOUS EVERY 6 HOURS PRN
Status: DISCONTINUED | OUTPATIENT
Start: 2024-10-23 | End: 2024-10-29 | Stop reason: HOSPADM

## 2024-10-23 RX ORDER — PROCHLORPERAZINE EDISYLATE 5 MG/ML
5 INJECTION INTRAMUSCULAR; INTRAVENOUS EVERY 6 HOURS PRN
Status: DISCONTINUED | OUTPATIENT
Start: 2024-10-23 | End: 2024-10-29 | Stop reason: HOSPADM

## 2024-10-23 RX ORDER — SODIUM CHLORIDE 0.9 % (FLUSH) 0.9 %
5-40 SYRINGE (ML) INJECTION PRN
Status: DISCONTINUED | OUTPATIENT
Start: 2024-10-23 | End: 2024-10-29 | Stop reason: HOSPADM

## 2024-10-23 RX ORDER — ACETAMINOPHEN 325 MG/1
650 TABLET ORAL EVERY 6 HOURS PRN
Status: DISCONTINUED | OUTPATIENT
Start: 2024-10-23 | End: 2024-10-29 | Stop reason: HOSPADM

## 2024-10-23 RX ORDER — SODIUM CHLORIDE 9 MG/ML
INJECTION, SOLUTION INTRAVENOUS PRN
Status: DISCONTINUED | OUTPATIENT
Start: 2024-10-23 | End: 2024-10-29 | Stop reason: HOSPADM

## 2024-10-23 RX ORDER — ENOXAPARIN SODIUM 100 MG/ML
30 INJECTION SUBCUTANEOUS DAILY
Status: DISCONTINUED | OUTPATIENT
Start: 2024-10-23 | End: 2024-10-27

## 2024-10-23 RX ORDER — HYDROMORPHONE HYDROCHLORIDE 1 MG/ML
1 INJECTION, SOLUTION INTRAMUSCULAR; INTRAVENOUS; SUBCUTANEOUS ONCE
Status: COMPLETED | OUTPATIENT
Start: 2024-10-23 | End: 2024-10-23

## 2024-10-23 RX ORDER — ARFORMOTEROL TARTRATE 15 UG/2ML
15 SOLUTION RESPIRATORY (INHALATION)
Status: DISCONTINUED | OUTPATIENT
Start: 2024-10-23 | End: 2024-10-29 | Stop reason: HOSPADM

## 2024-10-23 RX ORDER — DEXTROSE MONOHYDRATE 100 MG/ML
INJECTION, SOLUTION INTRAVENOUS CONTINUOUS PRN
Status: DISCONTINUED | OUTPATIENT
Start: 2024-10-23 | End: 2024-10-29 | Stop reason: HOSPADM

## 2024-10-23 RX ORDER — DOCUSATE SODIUM 100 MG/1
100 CAPSULE, LIQUID FILLED ORAL DAILY PRN
COMMUNITY

## 2024-10-23 RX ORDER — IOPAMIDOL 755 MG/ML
100 INJECTION, SOLUTION INTRAVASCULAR
Status: COMPLETED | OUTPATIENT
Start: 2024-10-23 | End: 2024-10-23

## 2024-10-23 RX ORDER — AMLODIPINE BESYLATE 5 MG/1
5 TABLET ORAL DAILY
COMMUNITY
End: 2024-10-23

## 2024-10-23 RX ORDER — PSEUDOEPHEDRINE HCL 30 MG
100 TABLET ORAL DAILY
Status: DISCONTINUED | OUTPATIENT
Start: 2024-10-23 | End: 2024-10-23 | Stop reason: CLARIF

## 2024-10-23 RX ORDER — DOCUSATE SODIUM 100 MG/1
100 CAPSULE, LIQUID FILLED ORAL DAILY PRN
Status: DISCONTINUED | OUTPATIENT
Start: 2024-10-23 | End: 2024-10-23

## 2024-10-23 RX ORDER — ASPIRIN 81 MG/1
81 TABLET, CHEWABLE ORAL DAILY
Status: DISCONTINUED | OUTPATIENT
Start: 2024-10-23 | End: 2024-10-29 | Stop reason: HOSPADM

## 2024-10-23 RX ORDER — HYDRALAZINE HYDROCHLORIDE 20 MG/ML
10 INJECTION INTRAMUSCULAR; INTRAVENOUS ONCE
Status: COMPLETED | OUTPATIENT
Start: 2024-10-23 | End: 2024-10-23

## 2024-10-23 RX ORDER — DOCUSATE SODIUM 100 MG/1
100 CAPSULE, LIQUID FILLED ORAL DAILY PRN
Status: DISCONTINUED | OUTPATIENT
Start: 2024-10-23 | End: 2024-10-29 | Stop reason: HOSPADM

## 2024-10-23 RX ORDER — SODIUM CHLORIDE 0.9 % (FLUSH) 0.9 %
5-40 SYRINGE (ML) INJECTION EVERY 12 HOURS SCHEDULED
Status: DISCONTINUED | OUTPATIENT
Start: 2024-10-23 | End: 2024-10-29 | Stop reason: HOSPADM

## 2024-10-23 RX ORDER — ACETAMINOPHEN 650 MG/1
650 SUPPOSITORY RECTAL EVERY 6 HOURS PRN
Status: DISCONTINUED | OUTPATIENT
Start: 2024-10-23 | End: 2024-10-29 | Stop reason: HOSPADM

## 2024-10-23 RX ORDER — SOTALOL HYDROCHLORIDE 120 MG/1
120 TABLET ORAL 2 TIMES DAILY
Status: DISCONTINUED | OUTPATIENT
Start: 2024-10-23 | End: 2024-10-29 | Stop reason: HOSPADM

## 2024-10-23 RX ORDER — PREDNISONE 10 MG/1
10 TABLET ORAL DAILY
Status: DISCONTINUED | OUTPATIENT
Start: 2024-10-23 | End: 2024-10-29 | Stop reason: HOSPADM

## 2024-10-23 RX ORDER — POLYETHYLENE GLYCOL 3350 17 G/17G
17 POWDER, FOR SOLUTION ORAL DAILY PRN
Status: DISCONTINUED | OUTPATIENT
Start: 2024-10-23 | End: 2024-10-29 | Stop reason: HOSPADM

## 2024-10-23 RX ORDER — AMLODIPINE BESYLATE 5 MG/1
5 TABLET ORAL DAILY
Status: DISCONTINUED | OUTPATIENT
Start: 2024-10-23 | End: 2024-10-29 | Stop reason: HOSPADM

## 2024-10-23 RX ORDER — TRAMADOL HYDROCHLORIDE 50 MG/1
50 TABLET ORAL EVERY 6 HOURS PRN
Status: DISCONTINUED | OUTPATIENT
Start: 2024-10-23 | End: 2024-10-25 | Stop reason: SDUPTHER

## 2024-10-23 RX ORDER — MORPHINE SULFATE 4 MG/ML
4 INJECTION, SOLUTION INTRAMUSCULAR; INTRAVENOUS ONCE
Status: DISCONTINUED | OUTPATIENT
Start: 2024-10-23 | End: 2024-10-23

## 2024-10-23 RX ORDER — ASCORBIC ACID 500 MG
1000 TABLET ORAL DAILY
Status: DISCONTINUED | OUTPATIENT
Start: 2024-10-23 | End: 2024-10-29 | Stop reason: HOSPADM

## 2024-10-23 RX ORDER — BUPROPION HYDROCHLORIDE 150 MG/1
150 TABLET, EXTENDED RELEASE ORAL DAILY
COMMUNITY
End: 2024-10-23

## 2024-10-23 RX ORDER — BUDESONIDE 0.5 MG/2ML
1 INHALANT ORAL
Status: DISCONTINUED | OUTPATIENT
Start: 2024-10-23 | End: 2024-10-29 | Stop reason: HOSPADM

## 2024-10-23 RX ORDER — BUPROPION HYDROCHLORIDE 150 MG/1
150 TABLET, EXTENDED RELEASE ORAL DAILY
Status: DISCONTINUED | OUTPATIENT
Start: 2024-10-23 | End: 2024-10-29 | Stop reason: HOSPADM

## 2024-10-23 RX ORDER — ERGOCALCIFEROL 1.25 MG/1
50000 CAPSULE, LIQUID FILLED ORAL WEEKLY
Status: DISCONTINUED | OUTPATIENT
Start: 2024-10-28 | End: 2024-10-29 | Stop reason: HOSPADM

## 2024-10-23 RX ADMIN — BUDESONIDE 1000 MCG: 0.5 SUSPENSION RESPIRATORY (INHALATION) at 19:07

## 2024-10-23 RX ADMIN — PREDNISONE 10 MG: 10 TABLET ORAL at 16:04

## 2024-10-23 RX ADMIN — IOPAMIDOL 100 ML: 755 INJECTION, SOLUTION INTRAVENOUS at 04:24

## 2024-10-23 RX ADMIN — IPRATROPIUM BROMIDE 0.5 MG: 0.5 SOLUTION RESPIRATORY (INHALATION) at 09:13

## 2024-10-23 RX ADMIN — HYDRALAZINE HYDROCHLORIDE 10 MG: 20 INJECTION INTRAMUSCULAR; INTRAVENOUS at 06:05

## 2024-10-23 RX ADMIN — HYDROMORPHONE HYDROCHLORIDE 1 MG: 1 INJECTION, SOLUTION INTRAMUSCULAR; INTRAVENOUS; SUBCUTANEOUS at 05:30

## 2024-10-23 RX ADMIN — VANCOMYCIN HYDROCHLORIDE 750 MG: 750 INJECTION, POWDER, LYOPHILIZED, FOR SOLUTION INTRAVENOUS at 13:50

## 2024-10-23 RX ADMIN — SODIUM CHLORIDE 1000 MG: 9 INJECTION INTRAMUSCULAR; INTRAVENOUS; SUBCUTANEOUS at 16:07

## 2024-10-23 RX ADMIN — HYDROMORPHONE HYDROCHLORIDE 0.5 MG: 1 INJECTION, SOLUTION INTRAMUSCULAR; INTRAVENOUS; SUBCUTANEOUS at 13:43

## 2024-10-23 RX ADMIN — SODIUM CHLORIDE, PRESERVATIVE FREE 10 ML: 5 INJECTION INTRAVENOUS at 20:13

## 2024-10-23 RX ADMIN — ARFORMOTEROL TARTRATE 15 MCG: 15 SOLUTION RESPIRATORY (INHALATION) at 09:13

## 2024-10-23 RX ADMIN — HYDRALAZINE HYDROCHLORIDE 10 MG: 20 INJECTION INTRAMUSCULAR; INTRAVENOUS at 17:56

## 2024-10-23 RX ADMIN — ARFORMOTEROL TARTRATE 15 MCG: 15 SOLUTION RESPIRATORY (INHALATION) at 19:07

## 2024-10-23 RX ADMIN — IPRATROPIUM BROMIDE 0.5 MG: 0.5 SOLUTION RESPIRATORY (INHALATION) at 12:03

## 2024-10-23 RX ADMIN — LEVOTHYROXINE SODIUM 125 MCG: 0.1 TABLET ORAL at 12:50

## 2024-10-23 RX ADMIN — AMLODIPINE BESYLATE 5 MG: 5 TABLET ORAL at 12:50

## 2024-10-23 RX ADMIN — ENOXAPARIN SODIUM 30 MG: 100 INJECTION SUBCUTANEOUS at 16:16

## 2024-10-23 RX ADMIN — BUDESONIDE 1000 MCG: 0.5 SUSPENSION RESPIRATORY (INHALATION) at 09:13

## 2024-10-23 RX ADMIN — SOTALOL HYDROCHLORIDE 120 MG: 120 TABLET ORAL at 21:49

## 2024-10-23 RX ADMIN — IPRATROPIUM BROMIDE 0.5 MG: 0.5 SOLUTION RESPIRATORY (INHALATION) at 19:07

## 2024-10-23 RX ADMIN — HYDROMORPHONE HYDROCHLORIDE 0.5 MG: 1 INJECTION, SOLUTION INTRAMUSCULAR; INTRAVENOUS; SUBCUTANEOUS at 20:12

## 2024-10-23 RX ADMIN — WATER 2000 MG: 1 INJECTION INTRAMUSCULAR; INTRAVENOUS; SUBCUTANEOUS at 05:37

## 2024-10-23 RX ADMIN — BUPROPION HYDROCHLORIDE 150 MG: 150 TABLET, FILM COATED, EXTENDED RELEASE ORAL at 16:04

## 2024-10-23 RX ADMIN — Medication 1000 MG: at 12:50

## 2024-10-23 ASSESSMENT — PAIN DESCRIPTION - DESCRIPTORS
DESCRIPTORS: STABBING;BURNING;SHOOTING
DESCRIPTORS: ACHING;BURNING;DISCOMFORT
DESCRIPTORS: ACHING;SHOOTING;SHARP
DESCRIPTORS: ACHING;SHOOTING;DISCOMFORT
DESCRIPTORS: ACHING;BURNING;DISCOMFORT

## 2024-10-23 ASSESSMENT — PAIN DESCRIPTION - ORIENTATION
ORIENTATION: LEFT

## 2024-10-23 ASSESSMENT — PAIN SCALES - GENERAL
PAINLEVEL_OUTOF10: 10
PAINLEVEL_OUTOF10: 10
PAINLEVEL_OUTOF10: 3
PAINLEVEL_OUTOF10: 7
PAINLEVEL_OUTOF10: 0
PAINLEVEL_OUTOF10: 10

## 2024-10-23 ASSESSMENT — PAIN SCALES - WONG BAKER: WONGBAKER_NUMERICALRESPONSE: NO HURT

## 2024-10-23 ASSESSMENT — PAIN DESCRIPTION - LOCATION
LOCATION: FOOT

## 2024-10-23 ASSESSMENT — LIFESTYLE VARIABLES
HOW MANY STANDARD DRINKS CONTAINING ALCOHOL DO YOU HAVE ON A TYPICAL DAY: PATIENT DOES NOT DRINK
HOW OFTEN DO YOU HAVE A DRINK CONTAINING ALCOHOL: NEVER

## 2024-10-23 ASSESSMENT — PAIN - FUNCTIONAL ASSESSMENT
PAIN_FUNCTIONAL_ASSESSMENT: 0-10
PAIN_FUNCTIONAL_ASSESSMENT: PREVENTS OR INTERFERES SOME ACTIVE ACTIVITIES AND ADLS

## 2024-10-23 NOTE — CONSULTS
insertion of stent 10/16/2024    Patient underwent multiple vascular interventions by Dr. Hobbs including angioplasty stenting, atherectomy, lithotripsy etc. 5 different occasions between  and       Thyroid disease     Urinary incontinence     Venous stasis ulcer of left calf with fat layer exposed without varicose veins (HCC) 2019    Weight loss          Past Surgical History:      Past Surgical History:   Procedure Laterality Date     SECTION      COLONOSCOPY      COLONOSCOPY N/A 2021    COLONOSCOPY WITH BIOPSY performed by Monty Leung MD at [facility] ENDOSCOPY    DILATION AND CURETTAGE OF UTERUS      EYE SURGERY      bilat cataract     INVASIVE VASCULAR N/A 2024    Aortagram abdominal performed by Chrissy Hobbs MD at [facility] CARDIAC CATH LAB    INVASIVE VASCULAR N/A 2024    Angioplasty superficial femoral artery performed by Chrissy Hobbs MD at [facility] CARDIAC CATH LAB    INVASIVE VASCULAR N/A 2024    Angioplasty popliteal artery performed by Chrissy Hobbs MD at [facility] CARDIAC CATH LAB    LAPAROTOMY N/A 2020    LAPAROTOMY EXPLORATORY, RIGHT HEMICOLECTOMY performed by Monty Lenug MD at [facility] OR    LUMBAR DRAIN IMPLANTATION N/A 2020    PLACEMENT OF INTRATHECAL CATHETER FOR LUMBAR DRAIN performed by Stanley Orozco MD at [facility] OR    PERIPHERAL PERCUTANEOUS ARTERIAL INTERVENTION  2019    L SFA angioplasty    TUBAL LIGATION      VENTRICULOPERITONEAL SHUNT N/A 2020    VENTRICULAR PERITONEAL SHUNT PLACEMENT performed by Stanley Orzoco MD at [facility] OR         Current Medications:      Current Facility-Administered Medications   Medication Dose Route Frequency Provider Last Rate Last Admin    sodium chloride flush 0.9 % injection 5-40 mL  5-40 mL IntraVENous 2 times per day Dante, N - CNP        sodium chloride flush 0.9 % injection 5-40 mL  5-40 mL IntraVENous PRN Dante, N - CNP        0.9 % 
appears stated age  NEURO:  Normal  EYES:  lids and lashes normal, sclera clear and conjunctiva normal  ENT:  normocepalic, without obvious abnormality, external ears without lesions  NECK:  supple, symmetrical, trachea midline, no jugular venous distension  LUNGS:  no increased work of breathing  CARDIOVASCULAR:  regular rate and rhythm  ABDOMEN:  soft, non-distended, non-tender, well healed prior midline laparotomy incision   SKIN:  Wounds present over lower extremities     EXTREMITIES:    R UE Swelling absent Incisions absent       5/5 Strength    L UE Swelling absent Incisions absent       5/5 Strength    R LE Edema absent     Incisions present    Varicose veins absent    Wounds present    5/5 Strength   Neuropathy is absent    L LE Rubor noted over dorsum of foot, tender to palpation, areas of necrotic appearing skin over the dorsum of the great toe and medial sides of the 4th and 3rd digit, alginate packing in place, Calf wound with dressing in place, No Neuropathy, able to move extremities, sensation intact           R brachial  L brachial    R radial 2+ L radial 2+   R femoral 2+ L femoral 2+   R popliteal Biphasic  L popliteal No Signal    R posterior tibial Monophasic  L posterior tibial No Signal    R dorsalis pedis Monophasic  L dorsalis pedis No Signal        LABS:    Lab Results   Component Value Date    WBC 16.7 (H) 10/23/2024    HGB 12.1 10/23/2024    HCT 38.9 10/23/2024     10/23/2024    PROTIME 16.7 (H) 10/23/2024    INR 1.5 10/23/2024    K 4.2 10/23/2024    BUN 29 (H) 10/23/2024    CREATININE 0.8 10/23/2024       RADIOLOGY:  CTA LOWER EXTREMITY LEFT W CONTRAST    Result Date: 10/23/2024  EXAMINATION: CTA OF THE LEFT LOWER EXTREMITY 10/23/2024 4:21 am TECHNIQUE: CTA of the left lower extremity was performed after the administration of intravenous contrast. Multiplanar reformatted images are provided for review. MIP images are provided for review. Automated exposure control, iterative

## 2024-10-23 NOTE — ED NOTES
Radiology Procedure Waiver   Name: Gladys Elizabeth  : 1950  MRN: [id number]    Date:  10/23/24    Time: 4:03 AM EDT    Benefits of immediately proceeding with Radiology exam(s) without pre-testing outweigh the risks or are not indicated as specified below and therefore the following is/are being waived:    [] Pregnancy test   [] Patients LMP on-time and regular.   [] Patient had Tubal Ligation or has other Contraception Device.   [] Patient  is Menopausal or Premenarcheal.    [] Patient had Full or Partial Hysterectomy.    [] Protocol for Iodine allergy    [] MRI Questionnaire     [x] BUN/Creatinine   [] Patient age w/no hx of renal dysfunction.   [] Patient on Dialysis.   [] Recent Normal Labs.  Electronically signed by Austin Salazar DO on 10/23/24 at 4:03 AM Austin Montilla DO  10/23/24 [id number]

## 2024-10-23 NOTE — H&P
Final Result   1. Occluded left SFA through above knee popliteal stent.  The below-knee   popliteal artery is not visible and likely chronically occluded.   2. Reconstitution of the diminutive left anterior tibial and peroneal   arteries via profundus and Rhoda geniculate collaterals.   3. Occluded left proximal posterior tibial artery which is segmentally   reconstituted at the level of the ankle joint.   4. Patent right common iliac artery stent.   5. Chronic calcified and occluded bilateral internal iliac arteries.      RECOMMENDATIONS:   Careful clinical correlation and vascular surgery follow up recommended.             ASSESSMENT:    Active Hospital Problems    Diagnosis Date Noted    Cellulitis of left foot [L03.116] 10/23/2024    PVD (peripheral vascular disease) ([facility]) [I73.9] 01/29/2019    HTN         PLAN:  VASCULAR   PREDNISONE   SOTALOL        DVT Prophylaxis:  LOVENOX  Diet: ADULT DIET; Regular; 4 carb choices (60 gm/meal); Low Fat/Low Chol/High Fiber/2 gm Na  ADULT ORAL NUTRITION SUPPLEMENT; Breakfast, Lunch, Dinner, PM Snack; Low Calorie/High Protein Oral Supplement  Code Status: Full Code    PT/OT Eval Status: ORDERED    Dispo -  HOME    Electronically signed by Yohannes Sykes DO on 10/23/2024 at 2:20 PM FACOI       Thank you Toñito Suazo MD for the opportunity to be involved in this patient's care. If you have any questions or concerns please feel free to contact me at [phone]

## 2024-10-23 NOTE — ED PROVIDER NOTES
by the radiologist. Plain radiographic images are visualized and preliminarily interpreted by the ED Provider with the below findings:    CTA chest showed chronic occlusions.  Collaterals noted    Interpretation per the Radiologist below, if available at the time of this note:    CTA LOWER EXTREMITY LEFT W CONTRAST   Final Result   1. Occluded left SFA through above knee popliteal stent.  The below-knee   popliteal artery is not visible and likely chronically occluded.   2. Reconstitution of the diminutive left anterior tibial and peroneal   arteries via profundus and Rhoda geniculate collaterals.   3. Occluded left proximal posterior tibial artery which is segmentally   reconstituted at the level of the ankle joint.   4. Patent right common iliac artery stent.   5. Chronic calcified and occluded bilateral internal iliac arteries.      RECOMMENDATIONS:   Careful clinical correlation and vascular surgery follow up recommended.           No results found.    No results found.    PROCEDURES   Unless otherwise noted below, none          CRITICAL CARE TIME (.cct)       PAST MEDICAL HISTORY/Chronic Conditions Affecting Care      has a past medical history of 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 with rest pain ([facility]) (01/16/2019), Atrial fibrillation ([facility]), Atypical mole, Bilateral carotid artery stenosis (05/22/2018), Bruit of right carotid artery (05/03/2018), CAD (coronary artery disease), Cancer ([facility]), COPD (chronic obstructive pulmonary disease) ([facility]), COVID, Depression, Diabetic ulcer of toe of left foot associated with type 2 diabetes mellitus, with fat layer exposed ([facility]) (10/02/2024), Diarrhea, 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

## 2024-10-24 ENCOUNTER — PREP FOR PROCEDURE (OUTPATIENT)
Dept: VASCULAR SURGERY | Age: 74
End: 2024-10-24

## 2024-10-24 DIAGNOSIS — I73.9 PERIPHERAL VASCULAR DISEASE, UNSPECIFIED (HCC): ICD-10-CM

## 2024-10-24 LAB
ANION GAP SERPL CALCULATED.3IONS-SCNC: 13 MMOL/L (ref 7–16)
BASOPHILS # BLD: 0.02 K/UL (ref 0–0.2)
BASOPHILS NFR BLD: 0 % (ref 0–2)
BUN SERPL-MCNC: 27 MG/DL (ref 6–23)
CALCIUM SERPL-MCNC: 9.7 MG/DL (ref 8.6–10.2)
CHLORIDE SERPL-SCNC: 102 MMOL/L (ref 98–107)
CO2 SERPL-SCNC: 27 MMOL/L (ref 22–29)
CREAT SERPL-MCNC: 0.7 MG/DL (ref 0.5–1)
EOSINOPHIL # BLD: 0.01 K/UL (ref 0.05–0.5)
EOSINOPHILS RELATIVE PERCENT: 0 % (ref 0–6)
ERYTHROCYTE [DISTWIDTH] IN BLOOD BY AUTOMATED COUNT: 14.2 % (ref 11.5–15)
GFR, ESTIMATED: 90 ML/MIN/1.73M2
GLUCOSE SERPL-MCNC: 96 MG/DL (ref 74–99)
HBA1C MFR BLD: 5.9 % (ref 4–5.6)
HCT VFR BLD AUTO: 37.4 % (ref 34–48)
HGB BLD-MCNC: 11.7 G/DL (ref 11.5–15.5)
IMM GRANULOCYTES # BLD AUTO: 0.05 K/UL (ref 0–0.58)
IMM GRANULOCYTES NFR BLD: 0 % (ref 0–5)
LYMPHOCYTES NFR BLD: 1.23 K/UL (ref 1.5–4)
LYMPHOCYTES RELATIVE PERCENT: 10 % (ref 20–42)
MCH RBC QN AUTO: 30.7 PG (ref 26–35)
MCHC RBC AUTO-ENTMCNC: 31.3 G/DL (ref 32–34.5)
MCV RBC AUTO: 98.2 FL (ref 80–99.9)
MONOCYTES NFR BLD: 1.12 K/UL (ref 0.1–0.95)
MONOCYTES NFR BLD: 9 % (ref 2–12)
NEUTROPHILS NFR BLD: 81 % (ref 43–80)
NEUTS SEG NFR BLD: 10.57 K/UL (ref 1.8–7.3)
PLATELET # BLD AUTO: 404 K/UL (ref 130–450)
PMV BLD AUTO: 9.9 FL (ref 7–12)
POTASSIUM SERPL-SCNC: 4 MMOL/L (ref 3.5–5)
PREALB SERPL-MCNC: 14 MG/DL (ref 20–40)
RBC # BLD AUTO: 3.81 M/UL (ref 3.5–5.5)
SODIUM SERPL-SCNC: 142 MMOL/L (ref 132–146)
WBC OTHER # BLD: 13 K/UL (ref 4.5–11.5)

## 2024-10-24 PROCEDURE — 6360000002 HC RX W HCPCS: Performed by: NURSE PRACTITIONER

## 2024-10-24 PROCEDURE — 2580000003 HC RX 258: Performed by: INTERNAL MEDICINE

## 2024-10-24 PROCEDURE — 80048 BASIC METABOLIC PNL TOTAL CA: CPT

## 2024-10-24 PROCEDURE — 6360000002 HC RX W HCPCS: Performed by: INTERNAL MEDICINE

## 2024-10-24 PROCEDURE — 36415 COLL VENOUS BLD VENIPUNCTURE: CPT

## 2024-10-24 PROCEDURE — 99233 SBSQ HOSP IP/OBS HIGH 50: CPT | Performed by: SURGERY

## 2024-10-24 PROCEDURE — 6370000000 HC RX 637 (ALT 250 FOR IP): Performed by: NURSE PRACTITIONER

## 2024-10-24 PROCEDURE — 2580000003 HC RX 258: Performed by: NURSE PRACTITIONER

## 2024-10-24 PROCEDURE — 6370000000 HC RX 637 (ALT 250 FOR IP): Performed by: INTERNAL MEDICINE

## 2024-10-24 PROCEDURE — 85025 COMPLETE CBC W/AUTO DIFF WBC: CPT

## 2024-10-24 PROCEDURE — 2700000000 HC OXYGEN THERAPY PER DAY

## 2024-10-24 PROCEDURE — 84134 ASSAY OF PREALBUMIN: CPT

## 2024-10-24 PROCEDURE — 1200000000 HC SEMI PRIVATE

## 2024-10-24 PROCEDURE — 94640 AIRWAY INHALATION TREATMENT: CPT

## 2024-10-24 PROCEDURE — 6360000002 HC RX W HCPCS

## 2024-10-24 PROCEDURE — 83036 HEMOGLOBIN GLYCOSYLATED A1C: CPT

## 2024-10-24 RX ADMIN — SODIUM CHLORIDE, PRESERVATIVE FREE 10 ML: 5 INJECTION INTRAVENOUS at 20:38

## 2024-10-24 RX ADMIN — TRAMADOL HYDROCHLORIDE 50 MG: 50 TABLET, COATED ORAL at 06:16

## 2024-10-24 RX ADMIN — ARFORMOTEROL TARTRATE 15 MCG: 15 SOLUTION RESPIRATORY (INHALATION) at 08:53

## 2024-10-24 RX ADMIN — AMLODIPINE BESYLATE 5 MG: 5 TABLET ORAL at 08:05

## 2024-10-24 RX ADMIN — TRAMADOL HYDROCHLORIDE 50 MG: 50 TABLET, COATED ORAL at 20:43

## 2024-10-24 RX ADMIN — ARFORMOTEROL TARTRATE 15 MCG: 15 SOLUTION RESPIRATORY (INHALATION) at 21:25

## 2024-10-24 RX ADMIN — BUDESONIDE 1000 MCG: 0.5 SUSPENSION RESPIRATORY (INHALATION) at 08:53

## 2024-10-24 RX ADMIN — HYDROMORPHONE HYDROCHLORIDE 0.5 MG: 1 INJECTION, SOLUTION INTRAMUSCULAR; INTRAVENOUS; SUBCUTANEOUS at 11:31

## 2024-10-24 RX ADMIN — PREDNISONE 10 MG: 10 TABLET ORAL at 08:05

## 2024-10-24 RX ADMIN — HYDROMORPHONE HYDROCHLORIDE 0.5 MG: 1 INJECTION, SOLUTION INTRAMUSCULAR; INTRAVENOUS; SUBCUTANEOUS at 08:02

## 2024-10-24 RX ADMIN — BUDESONIDE 1000 MCG: 0.5 SUSPENSION RESPIRATORY (INHALATION) at 21:25

## 2024-10-24 RX ADMIN — IPRATROPIUM BROMIDE 0.5 MG: 0.5 SOLUTION RESPIRATORY (INHALATION) at 21:26

## 2024-10-24 RX ADMIN — Medication 1000 MG: at 08:04

## 2024-10-24 RX ADMIN — SODIUM CHLORIDE, PRESERVATIVE FREE 10 ML: 5 INJECTION INTRAVENOUS at 08:02

## 2024-10-24 RX ADMIN — HYDROMORPHONE HYDROCHLORIDE 0.5 MG: 1 INJECTION, SOLUTION INTRAMUSCULAR; INTRAVENOUS; SUBCUTANEOUS at 18:29

## 2024-10-24 RX ADMIN — SODIUM CHLORIDE 1000 MG: 9 INJECTION INTRAMUSCULAR; INTRAVENOUS; SUBCUTANEOUS at 11:26

## 2024-10-24 RX ADMIN — ASPIRIN 81 MG 81 MG: 81 TABLET ORAL at 08:05

## 2024-10-24 RX ADMIN — IPRATROPIUM BROMIDE 0.5 MG: 0.5 SOLUTION RESPIRATORY (INHALATION) at 08:53

## 2024-10-24 RX ADMIN — SOTALOL HYDROCHLORIDE 120 MG: 120 TABLET ORAL at 20:38

## 2024-10-24 RX ADMIN — SOTALOL HYDROCHLORIDE 120 MG: 120 TABLET ORAL at 08:06

## 2024-10-24 RX ADMIN — LEVOTHYROXINE SODIUM 125 MCG: 0.1 TABLET ORAL at 05:15

## 2024-10-24 RX ADMIN — HYDROMORPHONE HYDROCHLORIDE 0.5 MG: 1 INJECTION, SOLUTION INTRAMUSCULAR; INTRAVENOUS; SUBCUTANEOUS at 00:28

## 2024-10-24 RX ADMIN — IPRATROPIUM BROMIDE 0.5 MG: 0.5 SOLUTION RESPIRATORY (INHALATION) at 13:13

## 2024-10-24 RX ADMIN — SODIUM CHLORIDE, PRESERVATIVE FREE 10 ML: 5 INJECTION INTRAVENOUS at 11:28

## 2024-10-24 RX ADMIN — VANCOMYCIN HYDROCHLORIDE 750 MG: 750 INJECTION, POWDER, LYOPHILIZED, FOR SOLUTION INTRAVENOUS at 11:28

## 2024-10-24 RX ADMIN — BUPROPION HYDROCHLORIDE 150 MG: 150 TABLET, FILM COATED, EXTENDED RELEASE ORAL at 09:39

## 2024-10-24 ASSESSMENT — PAIN DESCRIPTION - LOCATION
LOCATION: FOOT

## 2024-10-24 ASSESSMENT — PAIN DESCRIPTION - ORIENTATION
ORIENTATION: LEFT

## 2024-10-24 ASSESSMENT — PAIN SCALES - GENERAL
PAINLEVEL_OUTOF10: 7
PAINLEVEL_OUTOF10: 0
PAINLEVEL_OUTOF10: 6
PAINLEVEL_OUTOF10: 0
PAINLEVEL_OUTOF10: 8
PAINLEVEL_OUTOF10: 7
PAINLEVEL_OUTOF10: 8
PAINLEVEL_OUTOF10: 10

## 2024-10-24 ASSESSMENT — PAIN DESCRIPTION - DESCRIPTORS
DESCRIPTORS: ACHING;BURNING;DISCOMFORT
DESCRIPTORS: ACHING;BURNING;SHARP
DESCRIPTORS: BURNING;STABBING;THROBBING
DESCRIPTORS: ACHING;GNAWING;THROBBING
DESCRIPTORS: ACHING;THROBBING;GNAWING
DESCRIPTORS: ACHING;THROBBING;SORE

## 2024-10-24 ASSESSMENT — PAIN DESCRIPTION - FREQUENCY: FREQUENCY: INTERMITTENT

## 2024-10-24 ASSESSMENT — PAIN DESCRIPTION - ONSET: ONSET: ON-GOING

## 2024-10-24 ASSESSMENT — PAIN SCALES - WONG BAKER: WONGBAKER_NUMERICALRESPONSE: NO HURT

## 2024-10-24 ASSESSMENT — PAIN DESCRIPTION - PAIN TYPE: TYPE: CHRONIC PAIN

## 2024-10-24 ASSESSMENT — PAIN - FUNCTIONAL ASSESSMENT: PAIN_FUNCTIONAL_ASSESSMENT: PREVENTS OR INTERFERES SOME ACTIVE ACTIVITIES AND ADLS

## 2024-10-24 NOTE — CARE COORDINATION
Patient with hx of A fib, COPD, skin cancer, HTN PAD is admitted with LLE cellulitis and increasing eschar noted over L great toe and 3rd and 4th digits. ID and vascular surgery are both following. Met with vascular who said patient likely for surgery tomorrow and will need BRI at discharge. I met with patient and daughter in room to explain role and discuss transition of care. Patient lives at home with her daughter, Thalia. Pt owns 2 FWWs and a wheelchair. They want Bri at discharge and would like [facility]. Referral made to Vee at [place], they are not in network with [employer]'s insurance. I informed the patient and her daughter and then then chose [facility]. Referral made to Jany at [facility] via confidential voicemail. Await acceptance. Will need a 7000 for the accepting facility. Will need post op PT/OT lance panchaliidylon have been ordered. Will determine mode of transportation once PT/OT sees.  Jess Maddox RN   [phone]

## 2024-10-25 ENCOUNTER — ANESTHESIA (OUTPATIENT)
Dept: OPERATING ROOM | Age: 74
End: 2024-10-25
Payer: MEDICARE

## 2024-10-25 ENCOUNTER — ANESTHESIA EVENT (OUTPATIENT)
Dept: OPERATING ROOM | Age: 74
End: 2024-10-25
Payer: MEDICARE

## 2024-10-25 LAB
ABO + RH BLD: NORMAL
ANION GAP SERPL CALCULATED.3IONS-SCNC: 9 MMOL/L (ref 7–16)
ARM BAND NUMBER: NORMAL
BASOPHILS # BLD: 0.03 K/UL (ref 0–0.2)
BASOPHILS NFR BLD: 0 % (ref 0–2)
BLOOD BANK SAMPLE EXPIRATION: NORMAL
BLOOD GROUP ANTIBODIES SERPL: NEGATIVE
BUN SERPL-MCNC: 28 MG/DL (ref 6–23)
CALCIUM SERPL-MCNC: 9 MG/DL (ref 8.6–10.2)
CHLORIDE SERPL-SCNC: 102 MMOL/L (ref 98–107)
CO2 SERPL-SCNC: 29 MMOL/L (ref 22–29)
CREAT SERPL-MCNC: 0.7 MG/DL (ref 0.5–1)
DATE LAST DOSE: NORMAL
EOSINOPHIL # BLD: 0.19 K/UL (ref 0.05–0.5)
EOSINOPHILS RELATIVE PERCENT: 1 % (ref 0–6)
ERYTHROCYTE [DISTWIDTH] IN BLOOD BY AUTOMATED COUNT: 13.8 % (ref 11.5–15)
GFR, ESTIMATED: >90 ML/MIN/1.73M2
GLUCOSE SERPL-MCNC: 109 MG/DL (ref 74–99)
HCT VFR BLD AUTO: 35.1 % (ref 34–48)
HGB BLD-MCNC: 11 G/DL (ref 11.5–15.5)
IMM GRANULOCYTES # BLD AUTO: 0.05 K/UL (ref 0–0.58)
IMM GRANULOCYTES NFR BLD: 0 % (ref 0–5)
LYMPHOCYTES NFR BLD: 0.85 K/UL (ref 1.5–4)
LYMPHOCYTES RELATIVE PERCENT: 6 % (ref 20–42)
MCH RBC QN AUTO: 30.9 PG (ref 26–35)
MCHC RBC AUTO-ENTMCNC: 31.3 G/DL (ref 32–34.5)
MCV RBC AUTO: 98.6 FL (ref 80–99.9)
MONOCYTES NFR BLD: 1.02 K/UL (ref 0.1–0.95)
MONOCYTES NFR BLD: 7 % (ref 2–12)
NEUTROPHILS NFR BLD: 85 % (ref 43–80)
NEUTS SEG NFR BLD: 11.76 K/UL (ref 1.8–7.3)
PLATELET # BLD AUTO: 345 K/UL (ref 130–450)
PMV BLD AUTO: 9.6 FL (ref 7–12)
POTASSIUM SERPL-SCNC: 3.8 MMOL/L (ref 3.5–5)
RBC # BLD AUTO: 3.56 M/UL (ref 3.5–5.5)
SODIUM SERPL-SCNC: 140 MMOL/L (ref 132–146)
TME LAST DOSE: NORMAL H
VANCOMYCIN DOSE: NORMAL MG
VANCOMYCIN TROUGH SERPL-MCNC: 15.1 UG/ML (ref 5–16)
WBC OTHER # BLD: 13.9 K/UL (ref 4.5–11.5)

## 2024-10-25 PROCEDURE — 6360000002 HC RX W HCPCS: Performed by: INTERNAL MEDICINE

## 2024-10-25 PROCEDURE — 7100000001 HC PACU RECOVERY - ADDTL 15 MIN: Performed by: SURGERY

## 2024-10-25 PROCEDURE — 94640 AIRWAY INHALATION TREATMENT: CPT

## 2024-10-25 PROCEDURE — 88307 TISSUE EXAM BY PATHOLOGIST: CPT

## 2024-10-25 PROCEDURE — 3700000000 HC ANESTHESIA ATTENDED CARE: Performed by: SURGERY

## 2024-10-25 PROCEDURE — 36415 COLL VENOUS BLD VENIPUNCTURE: CPT

## 2024-10-25 PROCEDURE — 6360000002 HC RX W HCPCS: Performed by: STUDENT IN AN ORGANIZED HEALTH CARE EDUCATION/TRAINING PROGRAM

## 2024-10-25 PROCEDURE — 80202 ASSAY OF VANCOMYCIN: CPT

## 2024-10-25 PROCEDURE — 2580000003 HC RX 258: Performed by: STUDENT IN AN ORGANIZED HEALTH CARE EDUCATION/TRAINING PROGRAM

## 2024-10-25 PROCEDURE — 2700000000 HC OXYGEN THERAPY PER DAY

## 2024-10-25 PROCEDURE — 6360000002 HC RX W HCPCS: Performed by: NURSE ANESTHETIST, CERTIFIED REGISTERED

## 2024-10-25 PROCEDURE — 86850 RBC ANTIBODY SCREEN: CPT

## 2024-10-25 PROCEDURE — 6370000000 HC RX 637 (ALT 250 FOR IP): Performed by: NURSE PRACTITIONER

## 2024-10-25 PROCEDURE — 2500000003 HC RX 250 WO HCPCS: Performed by: NURSE ANESTHETIST, CERTIFIED REGISTERED

## 2024-10-25 PROCEDURE — 6370000000 HC RX 637 (ALT 250 FOR IP): Performed by: INTERNAL MEDICINE

## 2024-10-25 PROCEDURE — 85025 COMPLETE CBC W/AUTO DIFF WBC: CPT

## 2024-10-25 PROCEDURE — 2580000003 HC RX 258: Performed by: NURSE ANESTHETIST, CERTIFIED REGISTERED

## 2024-10-25 PROCEDURE — 3600000003 HC SURGERY LEVEL 3 BASE: Performed by: SURGERY

## 2024-10-25 PROCEDURE — 86900 BLOOD TYPING SEROLOGIC ABO: CPT

## 2024-10-25 PROCEDURE — 2709999900 HC NON-CHARGEABLE SUPPLY: Performed by: SURGERY

## 2024-10-25 PROCEDURE — 2580000003 HC RX 258: Performed by: NURSE PRACTITIONER

## 2024-10-25 PROCEDURE — 88311 DECALCIFY TISSUE: CPT

## 2024-10-25 PROCEDURE — 0Y6D0Z3 DETACHMENT AT LEFT UPPER LEG, LOW, OPEN APPROACH: ICD-10-PCS | Performed by: SURGERY

## 2024-10-25 PROCEDURE — 27590 AMPUTATE LEG AT THIGH: CPT | Performed by: SURGERY

## 2024-10-25 PROCEDURE — 1200000000 HC SEMI PRIVATE

## 2024-10-25 PROCEDURE — 3700000001 HC ADD 15 MINUTES (ANESTHESIA): Performed by: SURGERY

## 2024-10-25 PROCEDURE — 86901 BLOOD TYPING SEROLOGIC RH(D): CPT

## 2024-10-25 PROCEDURE — 6370000000 HC RX 637 (ALT 250 FOR IP): Performed by: STUDENT IN AN ORGANIZED HEALTH CARE EDUCATION/TRAINING PROGRAM

## 2024-10-25 PROCEDURE — 80048 BASIC METABOLIC PNL TOTAL CA: CPT

## 2024-10-25 PROCEDURE — 6360000002 HC RX W HCPCS: Performed by: NURSE PRACTITIONER

## 2024-10-25 PROCEDURE — 2500000003 HC RX 250 WO HCPCS

## 2024-10-25 PROCEDURE — 3600000013 HC SURGERY LEVEL 3 ADDTL 15MIN: Performed by: SURGERY

## 2024-10-25 PROCEDURE — 7100000000 HC PACU RECOVERY - FIRST 15 MIN: Performed by: SURGERY

## 2024-10-25 RX ORDER — PHENYLEPHRINE HCL IN 0.9% NACL 1 MG/10 ML
SYRINGE (ML) INTRAVENOUS
Status: DISCONTINUED | OUTPATIENT
Start: 2024-10-25 | End: 2024-10-25 | Stop reason: SDUPTHER

## 2024-10-25 RX ORDER — ROCURONIUM BROMIDE 10 MG/ML
INJECTION, SOLUTION INTRAVENOUS
Status: DISCONTINUED | OUTPATIENT
Start: 2024-10-25 | End: 2024-10-25 | Stop reason: SDUPTHER

## 2024-10-25 RX ORDER — PROPOFOL 10 MG/ML
INJECTION, EMULSION INTRAVENOUS
Status: DISCONTINUED | OUTPATIENT
Start: 2024-10-25 | End: 2024-10-25 | Stop reason: SDUPTHER

## 2024-10-25 RX ORDER — OXYCODONE AND ACETAMINOPHEN 5; 325 MG/1; MG/1
1 TABLET ORAL EVERY 4 HOURS PRN
Status: DISCONTINUED | OUTPATIENT
Start: 2024-10-25 | End: 2024-10-29 | Stop reason: HOSPADM

## 2024-10-25 RX ORDER — KETOROLAC TROMETHAMINE 30 MG/ML
INJECTION, SOLUTION INTRAMUSCULAR; INTRAVENOUS
Status: DISCONTINUED | OUTPATIENT
Start: 2024-10-25 | End: 2024-10-25 | Stop reason: SDUPTHER

## 2024-10-25 RX ORDER — HYDROMORPHONE HYDROCHLORIDE 2 MG/ML
INJECTION, SOLUTION INTRAMUSCULAR; INTRAVENOUS; SUBCUTANEOUS
Status: DISCONTINUED | OUTPATIENT
Start: 2024-10-25 | End: 2024-10-25 | Stop reason: SDUPTHER

## 2024-10-25 RX ORDER — EPHEDRINE SULFATE/0.9% NACL/PF 25 MG/5 ML
SYRINGE (ML) INTRAVENOUS
Status: DISCONTINUED | OUTPATIENT
Start: 2024-10-25 | End: 2024-10-25 | Stop reason: SDUPTHER

## 2024-10-25 RX ORDER — FENTANYL CITRATE 50 UG/ML
INJECTION, SOLUTION INTRAMUSCULAR; INTRAVENOUS
Status: DISCONTINUED | OUTPATIENT
Start: 2024-10-25 | End: 2024-10-25 | Stop reason: SDUPTHER

## 2024-10-25 RX ORDER — OXYCODONE AND ACETAMINOPHEN 5; 325 MG/1; MG/1
2 TABLET ORAL EVERY 4 HOURS PRN
Status: DISCONTINUED | OUTPATIENT
Start: 2024-10-25 | End: 2024-10-29 | Stop reason: HOSPADM

## 2024-10-25 RX ORDER — ONDANSETRON 2 MG/ML
INJECTION INTRAMUSCULAR; INTRAVENOUS
Status: DISCONTINUED | OUTPATIENT
Start: 2024-10-25 | End: 2024-10-25 | Stop reason: SDUPTHER

## 2024-10-25 RX ORDER — SODIUM CHLORIDE, SODIUM LACTATE, POTASSIUM CHLORIDE, CALCIUM CHLORIDE 600; 310; 30; 20 MG/100ML; MG/100ML; MG/100ML; MG/100ML
INJECTION, SOLUTION INTRAVENOUS
Status: DISCONTINUED | OUTPATIENT
Start: 2024-10-25 | End: 2024-10-25 | Stop reason: SDUPTHER

## 2024-10-25 RX ORDER — DEXTROSE MONOHYDRATE, SODIUM CHLORIDE, AND POTASSIUM CHLORIDE 50; 1.49; 4.5 G/1000ML; G/1000ML; G/1000ML
INJECTION, SOLUTION INTRAVENOUS CONTINUOUS
Status: DISCONTINUED | OUTPATIENT
Start: 2024-10-25 | End: 2024-10-29 | Stop reason: HOSPADM

## 2024-10-25 RX ORDER — DEXAMETHASONE SODIUM PHOSPHATE 10 MG/ML
INJECTION INTRAMUSCULAR; INTRAVENOUS
Status: DISCONTINUED | OUTPATIENT
Start: 2024-10-25 | End: 2024-10-25 | Stop reason: SDUPTHER

## 2024-10-25 RX ORDER — LIDOCAINE HYDROCHLORIDE 10 MG/ML
INJECTION, SOLUTION EPIDURAL; INFILTRATION; INTRACAUDAL; PERINEURAL
Status: DISCONTINUED | OUTPATIENT
Start: 2024-10-25 | End: 2024-10-25 | Stop reason: SDUPTHER

## 2024-10-25 RX ADMIN — SODIUM CHLORIDE 1000 MG: 9 INJECTION INTRAMUSCULAR; INTRAVENOUS; SUBCUTANEOUS at 14:46

## 2024-10-25 RX ADMIN — FENTANYL CITRATE 100 MCG: 50 INJECTION, SOLUTION INTRAMUSCULAR; INTRAVENOUS at 10:36

## 2024-10-25 RX ADMIN — POTASSIUM CHLORIDE, DEXTROSE MONOHYDRATE AND SODIUM CHLORIDE: 150; 5; 450 INJECTION, SOLUTION INTRAVENOUS at 08:44

## 2024-10-25 RX ADMIN — LIDOCAINE HYDROCHLORIDE 2 ML: 10 INJECTION, SOLUTION EPIDURAL; INFILTRATION; INTRACAUDAL; PERINEURAL at 10:36

## 2024-10-25 RX ADMIN — KETOROLAC TROMETHAMINE 15 MG: 30 INJECTION INTRAMUSCULAR; INTRAVENOUS at 11:31

## 2024-10-25 RX ADMIN — BUDESONIDE 1000 MCG: 0.5 SUSPENSION RESPIRATORY (INHALATION) at 20:19

## 2024-10-25 RX ADMIN — ROCURONIUM BROMIDE 30 MG: 10 INJECTION, SOLUTION INTRAVENOUS at 10:36

## 2024-10-25 RX ADMIN — SUGAMMADEX 74 MG: 100 INJECTION, SOLUTION INTRAVENOUS at 11:31

## 2024-10-25 RX ADMIN — IPRATROPIUM BROMIDE 0.5 MG: 0.5 SOLUTION RESPIRATORY (INHALATION) at 07:37

## 2024-10-25 RX ADMIN — ONDANSETRON 4 MG: 2 INJECTION INTRAMUSCULAR; INTRAVENOUS at 10:50

## 2024-10-25 RX ADMIN — HYDROMORPHONE HYDROCHLORIDE 0.5 MG: 2 INJECTION, SOLUTION INTRAMUSCULAR; INTRAVENOUS; SUBCUTANEOUS at 11:18

## 2024-10-25 RX ADMIN — PROPOFOL 90 MG: 10 INJECTION, EMULSION INTRAVENOUS at 10:36

## 2024-10-25 RX ADMIN — Medication 100 MCG: at 10:40

## 2024-10-25 RX ADMIN — SOTALOL HYDROCHLORIDE 120 MG: 120 TABLET ORAL at 08:47

## 2024-10-25 RX ADMIN — SODIUM CHLORIDE, PRESERVATIVE FREE 10 ML: 5 INJECTION INTRAVENOUS at 08:45

## 2024-10-25 RX ADMIN — LEVOTHYROXINE SODIUM 125 MCG: 0.1 TABLET ORAL at 07:47

## 2024-10-25 RX ADMIN — BUPROPION HYDROCHLORIDE 150 MG: 150 TABLET, FILM COATED, EXTENDED RELEASE ORAL at 08:46

## 2024-10-25 RX ADMIN — IPRATROPIUM BROMIDE 0.5 MG: 0.5 SOLUTION RESPIRATORY (INHALATION) at 02:46

## 2024-10-25 RX ADMIN — EPHEDRINE SULFATE 10 MG: 5 INJECTION INTRAVENOUS at 10:53

## 2024-10-25 RX ADMIN — HYDROMORPHONE HYDROCHLORIDE 0.5 MG: 1 INJECTION, SOLUTION INTRAMUSCULAR; INTRAVENOUS; SUBCUTANEOUS at 01:43

## 2024-10-25 RX ADMIN — PREDNISONE 10 MG: 10 TABLET ORAL at 08:46

## 2024-10-25 RX ADMIN — HYDROMORPHONE HYDROCHLORIDE 0.5 MG: 1 INJECTION, SOLUTION INTRAMUSCULAR; INTRAVENOUS; SUBCUTANEOUS at 14:26

## 2024-10-25 RX ADMIN — AMLODIPINE BESYLATE 5 MG: 5 TABLET ORAL at 08:47

## 2024-10-25 RX ADMIN — DEXAMETHASONE SODIUM PHOSPHATE 10 MG: 10 INJECTION INTRAMUSCULAR; INTRAVENOUS at 10:50

## 2024-10-25 RX ADMIN — SODIUM CHLORIDE, PRESERVATIVE FREE 10 ML: 5 INJECTION INTRAVENOUS at 19:57

## 2024-10-25 RX ADMIN — Medication 1000 MG: at 08:47

## 2024-10-25 RX ADMIN — SODIUM CHLORIDE, POTASSIUM CHLORIDE, SODIUM LACTATE AND CALCIUM CHLORIDE: 600; 310; 30; 20 INJECTION, SOLUTION INTRAVENOUS at 10:30

## 2024-10-25 RX ADMIN — Medication 100 MCG: at 10:46

## 2024-10-25 RX ADMIN — ARFORMOTEROL TARTRATE 15 MCG: 15 SOLUTION RESPIRATORY (INHALATION) at 20:19

## 2024-10-25 RX ADMIN — BUDESONIDE 1000 MCG: 0.5 SUSPENSION RESPIRATORY (INHALATION) at 07:37

## 2024-10-25 RX ADMIN — IPRATROPIUM BROMIDE 0.5 MG: 0.5 SOLUTION RESPIRATORY (INHALATION) at 20:19

## 2024-10-25 RX ADMIN — ARFORMOTEROL TARTRATE 15 MCG: 15 SOLUTION RESPIRATORY (INHALATION) at 07:37

## 2024-10-25 RX ADMIN — VANCOMYCIN HYDROCHLORIDE 750 MG: 750 INJECTION, POWDER, LYOPHILIZED, FOR SOLUTION INTRAVENOUS at 10:43

## 2024-10-25 RX ADMIN — SOTALOL HYDROCHLORIDE 120 MG: 120 TABLET ORAL at 19:57

## 2024-10-25 RX ADMIN — OXYCODONE AND ACETAMINOPHEN 1 TABLET: 5; 325 TABLET ORAL at 19:56

## 2024-10-25 RX ADMIN — TRAMADOL HYDROCHLORIDE 50 MG: 50 TABLET, COATED ORAL at 03:06

## 2024-10-25 ASSESSMENT — PAIN SCALES - GENERAL
PAINLEVEL_OUTOF10: 0
PAINLEVEL_OUTOF10: 9
PAINLEVEL_OUTOF10: 0
PAINLEVEL_OUTOF10: 6
PAINLEVEL_OUTOF10: 7
PAINLEVEL_OUTOF10: 0
PAINLEVEL_OUTOF10: 5
PAINLEVEL_OUTOF10: 6

## 2024-10-25 ASSESSMENT — LIFESTYLE VARIABLES: SMOKING_STATUS: 1

## 2024-10-25 ASSESSMENT — PAIN DESCRIPTION - ORIENTATION
ORIENTATION: LEFT

## 2024-10-25 ASSESSMENT — PAIN - FUNCTIONAL ASSESSMENT: PAIN_FUNCTIONAL_ASSESSMENT: ADULT NONVERBAL PAIN SCALE (NPVS)

## 2024-10-25 ASSESSMENT — PAIN DESCRIPTION - LOCATION
LOCATION: LEG
LOCATION: BACK;FOOT
LOCATION: LEG
LOCATION: FOOT;BACK

## 2024-10-25 ASSESSMENT — PAIN DESCRIPTION - DESCRIPTORS
DESCRIPTORS: ACHING;DISCOMFORT;GNAWING
DESCRIPTORS: ACHING;DISCOMFORT;THROBBING
DESCRIPTORS: ACHING;GNAWING;DISCOMFORT
DESCRIPTORS: ACHING;DISCOMFORT;THROBBING

## 2024-10-25 NOTE — ANESTHESIA POSTPROCEDURE EVALUATION
Department of Anesthesiology  Postprocedure Note    Patient: Gladys Elizabeth  MRN: [id number]  YOB: 1950  Date of evaluation: 10/25/2024    Procedure Summary       Date: 10/25/24 Room / Location: [address]    Anesthesia Start: 1031 Anesthesia Stop: 1149    Procedure: Left Leg Above Knee Amputation (Left: Knee) Diagnosis:       Peripheral vascular disease, unspecified (HCC)      (Peripheral vascular disease, unspecified (HCC) [I73.9])    Surgeons: Chrissy Hobbs MD Responsible Provider: Torrie Alicea MD    Anesthesia Type: general ASA Status: 4            Anesthesia Type: No value filed.    Obie Phase I: Obie Score: 8    Obie Phase II:      Anesthesia Post Evaluation    Patient location during evaluation: PACU  Patient participation: complete - patient participated  Level of consciousness: awake and alert  Airway patency: patent  Nausea & Vomiting: no nausea and no vomiting  Cardiovascular status: blood pressure returned to baseline and hemodynamically stable  Respiratory status: acceptable and spontaneous ventilation  Hydration status: euvolemic  Multimodal analgesia pain management approach  Pain management: adequate    No notable events documented.

## 2024-10-25 NOTE — OP NOTE
Operative Note      Patient: Gladys Elizabeth  YOB: 1950  MRN: [id number]    Date of Procedure: 10/25/2024    Preop Dx  L LE tissue loss, PVD    Post-Op Diagnosis: Same       Procedure(s):  Left Leg Above Knee Amputation    Surgeon(s):  hCrissy Hobbs MD    Assistant:   Surgical Assistant: Edu Shultz  Resident: Jo Veloz DPM; Jeff Garcia MD    Anesthesia: General    Estimated Blood Loss (mL): less than 100     Complications: None    Specimens:   ID Type Source Tests Collected by Time Destination   A : Left Leg - Left Leg Below Knee Amputation Specimen Leg SURGICAL PATHOLOGY Chrissy Hobbs MD 10/25/2024 1055        Implants:  * No implants in log *      Drains: * No LDAs found *    Findings:  Infection Present At Time Of Surgery (PATOS) (choose all levels that have infection present):  No infection present at level of amputation   Other Findings: tissue appeared perfused    OPERATIVE PROCEDURE: Patient was placed supine on the operative table.Under adequate general anesthesia, the Left lower extremity was prepped circumferentially and the Left thigh region, groin region, and upper abdomenwere prepped and draped in usual sterile fashion. Patient was already on broad-spectrum preoperative antibiotics.     Approximately 12 cm proximal to the most distal portion of the femoral condyle incision pattern was made in a fishmouth type pattern. Incision was made with scalpel blade. Electrocautery was used to dissect through the dermis and subcutaneous tissues. The muscles of the quadriceps tendons and anterior compartments were taken down. Small blood vessels were controlled with electrocautery silk ties.  The muscles themselves looked healthy. There was good healthy bleeding from the skin edges. The popliteal artery and veins were individually clamped and suture ligated with 2-0 silk sutures  and individually tied with 2-0 silk suture.    A pneumatic bone saw was then used to

## 2024-10-25 NOTE — ANESTHESIA PRE PROCEDURE
Department of Anesthesiology  Preprocedure Note       Name:  Gladys Elizabeth   Age:  74 y.o.  :  1950                                          MRN:  [id number]         Date:  10/25/2024      Surgeon: Surgeon(s):  Chrissy Hobbs MD    Procedure: Procedure(s):  ABOVE KNEE AMPUTATION LEFT LEG- wants to follow Dr. Price    Medications prior to admission:   Prior to Admission medications    Medication Sig Start Date End Date Taking? Authorizing Provider   docusate sodium (COLACE) 100 MG capsule Take 1 capsule by mouth daily as needed for Constipation   Yes ProviderMeghan MD   traMADol (ULTRAM) 50 MG tablet Take 1 tablet by mouth every 6 hours as needed for Pain.   Yes Meghan Werner MD   acetaminophen (TYLENOL) 500 MG tablet Take 1 tablet by mouth every 6 hours as needed for Pain   Yes Meghan Werner MD   aspirin 81 MG chewable tablet Take 1 tablet by mouth daily   Yes Meghan Werner MD   sotalol (BETAPACE) 120 MG tablet Take 1 tablet by mouth 2 times daily 24  Yes Meghan Werner MD   apixaban (ELIQUIS) 5 MG TABS tablet Take 1 tablet by mouth 2 times daily 24  Yes Dacia Doe APRN - CNP   vitamin D (ERGOCALCIFEROL) 1.25 MG (57391 UT) CAPS capsule Take 1 capsule by mouth once a week Monday's   Yes ProviderMeghan MD   predniSONE 10 MG (48) TBPK Take by mouth   Yes Meghan Werner MD   fluticasone-umeclidin-vilant (TRELEGY ELLIPTA) 200-62.5-25 MCG/ACT AEPB inhaler Inhale 1 puff into the lungs daily   Yes Meghan Werner MD   loperamide (IMODIUM) 2 MG capsule Take 1 capsule by mouth 4 times daily as needed for Diarrhea   Yes Meghan Werner MD   ascorbic acid (VITAMIN C) 500 MG tablet Take 2 tablets by mouth daily   Yes Meghan Werner MD   Collagen-Vitamin C-Biotin (COLLAGEN 1500/C PO) Take by mouth  Patient not taking: Reported on 10/23/2024    Meghan Werner MD   OXYGEN Inhale 3 L/min into the lungs nightly

## 2024-10-25 NOTE — CARE COORDINATION
Patient had Left Leg Above Knee Amputation for L LE tissue loss, PVD today by vascular surgery. Per ID note today, Stop vanco and Invanz. Wound VAC. Per Jany from [facility], they are not in network with the patient's insurance.  I called and informed patient's daughter Thalia who said she would now like to try 1. [facility] 2. [place] 3. [facility] [place] 4. [place]. Referrals made to all 4 facilities. Await acceptance. Will need a 7000 for the accepting facility. Will need post op PT/OT lance felipe have been ordered. Will determine mode of transportation once PT/OT sees.   Jess Maddox RN CM  [phone]

## 2024-10-25 NOTE — FLOWSHEET NOTE
10/24/24 2025 10/24/24 2030   Oxygen Therapy   SpO2 (!) 82 %  (found on room air) 94 %   O2 Device None (Room air) Nasal cannula   O2 Flow Rate (L/min)  --  2 L/min

## 2024-10-26 LAB
ANION GAP SERPL CALCULATED.3IONS-SCNC: 9 MMOL/L (ref 7–16)
BASOPHILS # BLD: 0 K/UL (ref 0–0.2)
BASOPHILS NFR BLD: 0 % (ref 0–2)
BUN SERPL-MCNC: 27 MG/DL (ref 6–23)
CALCIUM SERPL-MCNC: 8.6 MG/DL (ref 8.6–10.2)
CHLORIDE SERPL-SCNC: 102 MMOL/L (ref 98–107)
CO2 SERPL-SCNC: 29 MMOL/L (ref 22–29)
CREAT SERPL-MCNC: 0.6 MG/DL (ref 0.5–1)
EOSINOPHIL # BLD: 0 K/UL (ref 0.05–0.5)
EOSINOPHILS RELATIVE PERCENT: 0 % (ref 0–6)
ERYTHROCYTE [DISTWIDTH] IN BLOOD BY AUTOMATED COUNT: 13.7 % (ref 11.5–15)
GFR, ESTIMATED: >90 ML/MIN/1.73M2
GLUCOSE SERPL-MCNC: 97 MG/DL (ref 74–99)
HCT VFR BLD AUTO: 34.4 % (ref 34–48)
HGB BLD-MCNC: 10.8 G/DL (ref 11.5–15.5)
LYMPHOCYTES NFR BLD: 0.27 K/UL (ref 1.5–4)
LYMPHOCYTES RELATIVE PERCENT: 2 % (ref 20–42)
MCH RBC QN AUTO: 30.9 PG (ref 26–35)
MCHC RBC AUTO-ENTMCNC: 31.4 G/DL (ref 32–34.5)
MCV RBC AUTO: 98.6 FL (ref 80–99.9)
MONOCYTES NFR BLD: 0.94 K/UL (ref 0.1–0.95)
MONOCYTES NFR BLD: 6 % (ref 2–12)
NEUTROPHILS NFR BLD: 92 % (ref 43–80)
NEUTS SEG NFR BLD: 14.19 K/UL (ref 1.8–7.3)
PLATELET # BLD AUTO: 326 K/UL (ref 130–450)
PMV BLD AUTO: 9.7 FL (ref 7–12)
POTASSIUM SERPL-SCNC: 4.2 MMOL/L (ref 3.5–5)
RBC # BLD AUTO: 3.49 M/UL (ref 3.5–5.5)
RBC # BLD: ABNORMAL 10*6/UL
SODIUM SERPL-SCNC: 140 MMOL/L (ref 132–146)
WBC OTHER # BLD: 15.4 K/UL (ref 4.5–11.5)

## 2024-10-26 PROCEDURE — 94640 AIRWAY INHALATION TREATMENT: CPT

## 2024-10-26 PROCEDURE — 97535 SELF CARE MNGMENT TRAINING: CPT

## 2024-10-26 PROCEDURE — 6370000000 HC RX 637 (ALT 250 FOR IP): Performed by: SURGERY

## 2024-10-26 PROCEDURE — 6360000002 HC RX W HCPCS: Performed by: STUDENT IN AN ORGANIZED HEALTH CARE EDUCATION/TRAINING PROGRAM

## 2024-10-26 PROCEDURE — 80048 BASIC METABOLIC PNL TOTAL CA: CPT

## 2024-10-26 PROCEDURE — 85025 COMPLETE CBC W/AUTO DIFF WBC: CPT

## 2024-10-26 PROCEDURE — 6370000000 HC RX 637 (ALT 250 FOR IP): Performed by: STUDENT IN AN ORGANIZED HEALTH CARE EDUCATION/TRAINING PROGRAM

## 2024-10-26 PROCEDURE — 2580000003 HC RX 258: Performed by: STUDENT IN AN ORGANIZED HEALTH CARE EDUCATION/TRAINING PROGRAM

## 2024-10-26 PROCEDURE — 1200000000 HC SEMI PRIVATE

## 2024-10-26 PROCEDURE — 97112 NEUROMUSCULAR REEDUCATION: CPT

## 2024-10-26 PROCEDURE — 97530 THERAPEUTIC ACTIVITIES: CPT

## 2024-10-26 PROCEDURE — 36415 COLL VENOUS BLD VENIPUNCTURE: CPT

## 2024-10-26 PROCEDURE — 97165 OT EVAL LOW COMPLEX 30 MIN: CPT

## 2024-10-26 PROCEDURE — 2700000000 HC OXYGEN THERAPY PER DAY

## 2024-10-26 PROCEDURE — 6370000000 HC RX 637 (ALT 250 FOR IP): Performed by: NURSE PRACTITIONER

## 2024-10-26 PROCEDURE — 97161 PT EVAL LOW COMPLEX 20 MIN: CPT

## 2024-10-26 RX ORDER — PREGABALIN 50 MG/1
50 CAPSULE ORAL 2 TIMES DAILY
Status: DISCONTINUED | OUTPATIENT
Start: 2024-10-26 | End: 2024-10-29 | Stop reason: HOSPADM

## 2024-10-26 RX ADMIN — IPRATROPIUM BROMIDE 0.5 MG: 0.5 SOLUTION RESPIRATORY (INHALATION) at 04:02

## 2024-10-26 RX ADMIN — BUPROPION HYDROCHLORIDE 150 MG: 150 TABLET, FILM COATED, EXTENDED RELEASE ORAL at 08:17

## 2024-10-26 RX ADMIN — SOTALOL HYDROCHLORIDE 120 MG: 120 TABLET ORAL at 20:19

## 2024-10-26 RX ADMIN — HYDROMORPHONE HYDROCHLORIDE 0.5 MG: 1 INJECTION, SOLUTION INTRAMUSCULAR; INTRAVENOUS; SUBCUTANEOUS at 17:55

## 2024-10-26 RX ADMIN — LEVOTHYROXINE SODIUM 125 MCG: 0.1 TABLET ORAL at 05:27

## 2024-10-26 RX ADMIN — IPRATROPIUM BROMIDE 0.5 MG: 0.5 SOLUTION RESPIRATORY (INHALATION) at 19:40

## 2024-10-26 RX ADMIN — BUDESONIDE 1000 MCG: 0.5 SUSPENSION RESPIRATORY (INHALATION) at 19:39

## 2024-10-26 RX ADMIN — ARFORMOTEROL TARTRATE 15 MCG: 15 SOLUTION RESPIRATORY (INHALATION) at 19:40

## 2024-10-26 RX ADMIN — SODIUM CHLORIDE, PRESERVATIVE FREE 10 ML: 5 INJECTION INTRAVENOUS at 20:20

## 2024-10-26 RX ADMIN — AMLODIPINE BESYLATE 5 MG: 5 TABLET ORAL at 08:15

## 2024-10-26 RX ADMIN — SODIUM CHLORIDE, PRESERVATIVE FREE 10 ML: 5 INJECTION INTRAVENOUS at 08:17

## 2024-10-26 RX ADMIN — Medication 1000 MG: at 08:16

## 2024-10-26 RX ADMIN — ASPIRIN 81 MG 81 MG: 81 TABLET ORAL at 08:15

## 2024-10-26 RX ADMIN — HYDROMORPHONE HYDROCHLORIDE 0.5 MG: 1 INJECTION, SOLUTION INTRAMUSCULAR; INTRAVENOUS; SUBCUTANEOUS at 21:46

## 2024-10-26 RX ADMIN — PREDNISONE 10 MG: 10 TABLET ORAL at 08:16

## 2024-10-26 RX ADMIN — PREGABALIN 50 MG: 50 CAPSULE ORAL at 20:20

## 2024-10-26 RX ADMIN — SOTALOL HYDROCHLORIDE 120 MG: 120 TABLET ORAL at 08:17

## 2024-10-26 RX ADMIN — OXYCODONE HYDROCHLORIDE AND ACETAMINOPHEN 2 TABLET: 5; 325 TABLET ORAL at 06:44

## 2024-10-26 RX ADMIN — HYDROMORPHONE HYDROCHLORIDE 0.5 MG: 1 INJECTION, SOLUTION INTRAMUSCULAR; INTRAVENOUS; SUBCUTANEOUS at 12:34

## 2024-10-26 RX ADMIN — PREGABALIN 50 MG: 50 CAPSULE ORAL at 08:15

## 2024-10-26 RX ADMIN — BUDESONIDE 1000 MCG: 0.5 SUSPENSION RESPIRATORY (INHALATION) at 07:44

## 2024-10-26 RX ADMIN — IPRATROPIUM BROMIDE 0.5 MG: 0.5 SOLUTION RESPIRATORY (INHALATION) at 07:44

## 2024-10-26 RX ADMIN — ARFORMOTEROL TARTRATE 15 MCG: 15 SOLUTION RESPIRATORY (INHALATION) at 07:44

## 2024-10-26 ASSESSMENT — PAIN DESCRIPTION - DESCRIPTORS
DESCRIPTORS: ACHING
DESCRIPTORS: ACHING;DISCOMFORT;DULL;CRAMPING
DESCRIPTORS: ACHING;DISCOMFORT;GNAWING

## 2024-10-26 ASSESSMENT — PAIN DESCRIPTION - LOCATION
LOCATION: LEG
LOCATION: OTHER (COMMENT)
LOCATION: LEG
LOCATION: LEG
LOCATION: LEG;HIP

## 2024-10-26 ASSESSMENT — PAIN SCALES - GENERAL
PAINLEVEL_OUTOF10: 2
PAINLEVEL_OUTOF10: 9
PAINLEVEL_OUTOF10: 10
PAINLEVEL_OUTOF10: 7

## 2024-10-26 ASSESSMENT — PAIN - FUNCTIONAL ASSESSMENT: PAIN_FUNCTIONAL_ASSESSMENT: PREVENTS OR INTERFERES WITH MANY ACTIVE NOT PASSIVE ACTIVITIES

## 2024-10-26 ASSESSMENT — PAIN DESCRIPTION - ORIENTATION
ORIENTATION: RIGHT;LEFT
ORIENTATION: LEFT

## 2024-10-26 NOTE — PLAN OF CARE
Problem: Chronic Conditions and Co-morbidities  Goal: Patient's chronic conditions and co-morbidity symptoms are monitored and maintained or improved  10/26/2024 1019 by Charlotte Hardy RN  Outcome: Progressing     Problem: Pain  Goal: Verbalizes/displays adequate comfort level or baseline comfort level  10/26/2024 1019 by Charlotte Hardy RN  Outcome: Progressing     Problem: Safety - Adult  Goal: Free from fall injury  10/26/2024 1019 by Charlotte Hardy RN  Outcome: Progressing     Problem: Skin/Tissue Integrity  Goal: Absence of new skin breakdown  Description: 1.  Monitor for areas of redness and/or skin breakdown  2.  Assess vascular access sites hourly  3.  Every 4-6 hours minimum:  Change oxygen saturation probe site  4.  Every 4-6 hours:  If on nasal continuous positive airway pressure, respiratory therapy assess nares and determine need for appliance change or resting period.  10/26/2024 1019 by Charlotte Hardy RN  Outcome: Progressing     Problem: Discharge Planning  Goal: Discharge to home or other facility with appropriate resources  10/26/2024 1019 by Charlotte Hardy RN  Outcome: Progressing     Problem: Nutrition Deficit:  Goal: Optimize nutritional status  10/26/2024 1019 by Charlotte Hardy RN  Outcome: Progressing

## 2024-10-26 NOTE — PLAN OF CARE
Problem: Chronic Conditions and Co-morbidities  Goal: Patient's chronic conditions and co-morbidity symptoms are monitored and maintained or improved  Outcome: Progressing     Problem: Pain  Goal: Verbalizes/displays adequate comfort level or baseline comfort level  Outcome: Progressing     Problem: Safety - Adult  Goal: Free from fall injury  Outcome: Progressing     Problem: Skin/Tissue Integrity  Goal: Absence of new skin breakdown  Description: 1.  Monitor for areas of redness and/or skin breakdown  2.  Assess vascular access sites hourly  3.  Every 4-6 hours minimum:  Change oxygen saturation probe site  4.  Every 4-6 hours:  If on nasal continuous positive airway pressure, respiratory therapy assess nares and determine need for appliance change or resting period.  Outcome: Progressing     Problem: Discharge Planning  Goal: Discharge to home or other facility with appropriate resources  Outcome: Progressing     Problem: Nutrition Deficit:  Goal: Optimize nutritional status  Outcome: Progressing

## 2024-10-27 LAB
ANION GAP SERPL CALCULATED.3IONS-SCNC: 6 MMOL/L (ref 7–16)
BASOPHILS # BLD: 0.01 K/UL (ref 0–0.2)
BASOPHILS NFR BLD: 0 % (ref 0–2)
BUN SERPL-MCNC: 27 MG/DL (ref 6–23)
CALCIUM SERPL-MCNC: 8.9 MG/DL (ref 8.6–10.2)
CHLORIDE SERPL-SCNC: 103 MMOL/L (ref 98–107)
CO2 SERPL-SCNC: 32 MMOL/L (ref 22–29)
CREAT SERPL-MCNC: 0.6 MG/DL (ref 0.5–1)
EOSINOPHIL # BLD: 0.06 K/UL (ref 0.05–0.5)
EOSINOPHILS RELATIVE PERCENT: 0 % (ref 0–6)
ERYTHROCYTE [DISTWIDTH] IN BLOOD BY AUTOMATED COUNT: 13.7 % (ref 11.5–15)
GFR, ESTIMATED: >90 ML/MIN/1.73M2
GLUCOSE SERPL-MCNC: 80 MG/DL (ref 74–99)
HCT VFR BLD AUTO: 34.2 % (ref 34–48)
HGB BLD-MCNC: 10.5 G/DL (ref 11.5–15.5)
IMM GRANULOCYTES # BLD AUTO: 0.07 K/UL (ref 0–0.58)
IMM GRANULOCYTES NFR BLD: 1 % (ref 0–5)
LYMPHOCYTES NFR BLD: 1.13 K/UL (ref 1.5–4)
LYMPHOCYTES RELATIVE PERCENT: 8 % (ref 20–42)
MCH RBC QN AUTO: 30.8 PG (ref 26–35)
MCHC RBC AUTO-ENTMCNC: 30.7 G/DL (ref 32–34.5)
MCV RBC AUTO: 100.3 FL (ref 80–99.9)
MONOCYTES NFR BLD: 1.43 K/UL (ref 0.1–0.95)
MONOCYTES NFR BLD: 11 % (ref 2–12)
NEUTROPHILS NFR BLD: 80 % (ref 43–80)
NEUTS SEG NFR BLD: 10.82 K/UL (ref 1.8–7.3)
PLATELET # BLD AUTO: 366 K/UL (ref 130–450)
PMV BLD AUTO: 9.8 FL (ref 7–12)
POTASSIUM SERPL-SCNC: 4.2 MMOL/L (ref 3.5–5)
RBC # BLD AUTO: 3.41 M/UL (ref 3.5–5.5)
SODIUM SERPL-SCNC: 141 MMOL/L (ref 132–146)
WBC OTHER # BLD: 13.5 K/UL (ref 4.5–11.5)

## 2024-10-27 PROCEDURE — 6370000000 HC RX 637 (ALT 250 FOR IP): Performed by: NURSE PRACTITIONER

## 2024-10-27 PROCEDURE — 36415 COLL VENOUS BLD VENIPUNCTURE: CPT

## 2024-10-27 PROCEDURE — 6370000000 HC RX 637 (ALT 250 FOR IP): Performed by: SURGERY

## 2024-10-27 PROCEDURE — 80048 BASIC METABOLIC PNL TOTAL CA: CPT

## 2024-10-27 PROCEDURE — 1200000000 HC SEMI PRIVATE

## 2024-10-27 PROCEDURE — 2580000003 HC RX 258: Performed by: STUDENT IN AN ORGANIZED HEALTH CARE EDUCATION/TRAINING PROGRAM

## 2024-10-27 PROCEDURE — 6370000000 HC RX 637 (ALT 250 FOR IP): Performed by: STUDENT IN AN ORGANIZED HEALTH CARE EDUCATION/TRAINING PROGRAM

## 2024-10-27 PROCEDURE — 94640 AIRWAY INHALATION TREATMENT: CPT

## 2024-10-27 PROCEDURE — 85025 COMPLETE CBC W/AUTO DIFF WBC: CPT

## 2024-10-27 PROCEDURE — 6360000002 HC RX W HCPCS: Performed by: STUDENT IN AN ORGANIZED HEALTH CARE EDUCATION/TRAINING PROGRAM

## 2024-10-27 PROCEDURE — 2700000000 HC OXYGEN THERAPY PER DAY

## 2024-10-27 RX ADMIN — APIXABAN 5 MG: 5 TABLET, FILM COATED ORAL at 21:06

## 2024-10-27 RX ADMIN — SODIUM CHLORIDE, PRESERVATIVE FREE 10 ML: 5 INJECTION INTRAVENOUS at 21:07

## 2024-10-27 RX ADMIN — Medication 1000 MG: at 08:26

## 2024-10-27 RX ADMIN — IPRATROPIUM BROMIDE 0.5 MG: 0.5 SOLUTION RESPIRATORY (INHALATION) at 19:30

## 2024-10-27 RX ADMIN — POLYETHYLENE GLYCOL 3350 17 G: 17 POWDER, FOR SOLUTION ORAL at 11:03

## 2024-10-27 RX ADMIN — BUDESONIDE 1000 MCG: 0.5 SUSPENSION RESPIRATORY (INHALATION) at 19:30

## 2024-10-27 RX ADMIN — BUDESONIDE 1000 MCG: 0.5 SUSPENSION RESPIRATORY (INHALATION) at 07:56

## 2024-10-27 RX ADMIN — SODIUM CHLORIDE, PRESERVATIVE FREE 10 ML: 5 INJECTION INTRAVENOUS at 08:27

## 2024-10-27 RX ADMIN — HYDROMORPHONE HYDROCHLORIDE 0.5 MG: 1 INJECTION, SOLUTION INTRAMUSCULAR; INTRAVENOUS; SUBCUTANEOUS at 05:17

## 2024-10-27 RX ADMIN — ASPIRIN 81 MG 81 MG: 81 TABLET ORAL at 08:26

## 2024-10-27 RX ADMIN — PREDNISONE 10 MG: 10 TABLET ORAL at 08:26

## 2024-10-27 RX ADMIN — BUPROPION HYDROCHLORIDE 150 MG: 150 TABLET, FILM COATED, EXTENDED RELEASE ORAL at 08:26

## 2024-10-27 RX ADMIN — AMLODIPINE BESYLATE 5 MG: 5 TABLET ORAL at 08:27

## 2024-10-27 RX ADMIN — LEVOTHYROXINE SODIUM 125 MCG: 0.1 TABLET ORAL at 05:27

## 2024-10-27 RX ADMIN — IPRATROPIUM BROMIDE 0.5 MG: 0.5 SOLUTION RESPIRATORY (INHALATION) at 12:57

## 2024-10-27 RX ADMIN — ARFORMOTEROL TARTRATE 15 MCG: 15 SOLUTION RESPIRATORY (INHALATION) at 07:56

## 2024-10-27 RX ADMIN — HYDROMORPHONE HYDROCHLORIDE 0.5 MG: 1 INJECTION, SOLUTION INTRAMUSCULAR; INTRAVENOUS; SUBCUTANEOUS at 16:02

## 2024-10-27 RX ADMIN — HYDROMORPHONE HYDROCHLORIDE 0.5 MG: 1 INJECTION, SOLUTION INTRAMUSCULAR; INTRAVENOUS; SUBCUTANEOUS at 11:00

## 2024-10-27 RX ADMIN — IPRATROPIUM BROMIDE 0.5 MG: 0.5 SOLUTION RESPIRATORY (INHALATION) at 07:56

## 2024-10-27 RX ADMIN — SOTALOL HYDROCHLORIDE 120 MG: 120 TABLET ORAL at 08:26

## 2024-10-27 RX ADMIN — ARFORMOTEROL TARTRATE 15 MCG: 15 SOLUTION RESPIRATORY (INHALATION) at 19:30

## 2024-10-27 RX ADMIN — PREGABALIN 50 MG: 50 CAPSULE ORAL at 08:26

## 2024-10-27 RX ADMIN — APIXABAN 5 MG: 5 TABLET, FILM COATED ORAL at 08:27

## 2024-10-27 RX ADMIN — PREGABALIN 50 MG: 50 CAPSULE ORAL at 21:03

## 2024-10-27 RX ADMIN — OXYCODONE AND ACETAMINOPHEN 1 TABLET: 5; 325 TABLET ORAL at 21:03

## 2024-10-27 ASSESSMENT — PAIN DESCRIPTION - ORIENTATION
ORIENTATION: LEFT
ORIENTATION: RIGHT;LEFT

## 2024-10-27 ASSESSMENT — PAIN SCALES - GENERAL
PAINLEVEL_OUTOF10: 6
PAINLEVEL_OUTOF10: 9

## 2024-10-27 ASSESSMENT — PAIN DESCRIPTION - LOCATION
LOCATION: LEG
LOCATION: LEG

## 2024-10-27 ASSESSMENT — PAIN SCALES - WONG BAKER: WONGBAKER_NUMERICALRESPONSE: NO HURT

## 2024-10-27 ASSESSMENT — PAIN DESCRIPTION - DESCRIPTORS
DESCRIPTORS: ACHING
DESCRIPTORS: ACHING;DISCOMFORT;GNAWING

## 2024-10-28 VITALS
HEIGHT: 66 IN | BODY MASS INDEX: 13.66 KG/M2 | HEART RATE: 81 BPM | WEIGHT: 85 LBS | TEMPERATURE: 98.2 F | DIASTOLIC BLOOD PRESSURE: 75 MMHG | SYSTOLIC BLOOD PRESSURE: 136 MMHG | RESPIRATION RATE: 18 BRPM | OXYGEN SATURATION: 93 %

## 2024-10-28 LAB
ANION GAP SERPL CALCULATED.3IONS-SCNC: 7 MMOL/L (ref 7–16)
BASOPHILS # BLD: 0.01 K/UL (ref 0–0.2)
BASOPHILS NFR BLD: 0 % (ref 0–2)
BUN SERPL-MCNC: 27 MG/DL (ref 6–23)
CALCIUM SERPL-MCNC: 8.9 MG/DL (ref 8.6–10.2)
CHLORIDE SERPL-SCNC: 104 MMOL/L (ref 98–107)
CO2 SERPL-SCNC: 30 MMOL/L (ref 22–29)
CREAT SERPL-MCNC: 0.6 MG/DL (ref 0.5–1)
EOSINOPHIL # BLD: 0.01 K/UL (ref 0.05–0.5)
EOSINOPHILS RELATIVE PERCENT: 0 % (ref 0–6)
ERYTHROCYTE [DISTWIDTH] IN BLOOD BY AUTOMATED COUNT: 13.7 % (ref 11.5–15)
GFR, ESTIMATED: >90 ML/MIN/1.73M2
GLUCOSE SERPL-MCNC: 83 MG/DL (ref 74–99)
HCT VFR BLD AUTO: 36.1 % (ref 34–48)
HGB BLD-MCNC: 11.1 G/DL (ref 11.5–15.5)
IMM GRANULOCYTES # BLD AUTO: 0.04 K/UL (ref 0–0.58)
IMM GRANULOCYTES NFR BLD: 0 % (ref 0–5)
LYMPHOCYTES NFR BLD: 0.56 K/UL (ref 1.5–4)
LYMPHOCYTES RELATIVE PERCENT: 5 % (ref 20–42)
MCH RBC QN AUTO: 30.7 PG (ref 26–35)
MCHC RBC AUTO-ENTMCNC: 30.7 G/DL (ref 32–34.5)
MCV RBC AUTO: 100 FL (ref 80–99.9)
MICROORGANISM SPEC CULT: NORMAL
MICROORGANISM SPEC CULT: NORMAL
MONOCYTES NFR BLD: 0.63 K/UL (ref 0.1–0.95)
MONOCYTES NFR BLD: 6 % (ref 2–12)
NEUTROPHILS NFR BLD: 88 % (ref 43–80)
NEUTS SEG NFR BLD: 9.18 K/UL (ref 1.8–7.3)
PLATELET # BLD AUTO: 446 K/UL (ref 130–450)
PMV BLD AUTO: 9.3 FL (ref 7–12)
POTASSIUM SERPL-SCNC: 4 MMOL/L (ref 3.5–5)
RBC # BLD AUTO: 3.61 M/UL (ref 3.5–5.5)
RBC # BLD: NORMAL 10*6/UL
SERVICE CMNT-IMP: NORMAL
SERVICE CMNT-IMP: NORMAL
SODIUM SERPL-SCNC: 141 MMOL/L (ref 132–146)
SPECIMEN DESCRIPTION: NORMAL
SPECIMEN DESCRIPTION: NORMAL
WBC OTHER # BLD: 10.4 K/UL (ref 4.5–11.5)

## 2024-10-28 PROCEDURE — 85025 COMPLETE CBC W/AUTO DIFF WBC: CPT

## 2024-10-28 PROCEDURE — 6370000000 HC RX 637 (ALT 250 FOR IP): Performed by: STUDENT IN AN ORGANIZED HEALTH CARE EDUCATION/TRAINING PROGRAM

## 2024-10-28 PROCEDURE — 2700000000 HC OXYGEN THERAPY PER DAY

## 2024-10-28 PROCEDURE — 80048 BASIC METABOLIC PNL TOTAL CA: CPT

## 2024-10-28 PROCEDURE — 6370000000 HC RX 637 (ALT 250 FOR IP): Performed by: NURSE PRACTITIONER

## 2024-10-28 PROCEDURE — 6360000002 HC RX W HCPCS: Performed by: STUDENT IN AN ORGANIZED HEALTH CARE EDUCATION/TRAINING PROGRAM

## 2024-10-28 PROCEDURE — 36415 COLL VENOUS BLD VENIPUNCTURE: CPT

## 2024-10-28 PROCEDURE — 6370000000 HC RX 637 (ALT 250 FOR IP): Performed by: SURGERY

## 2024-10-28 PROCEDURE — 2580000003 HC RX 258: Performed by: STUDENT IN AN ORGANIZED HEALTH CARE EDUCATION/TRAINING PROGRAM

## 2024-10-28 PROCEDURE — 94640 AIRWAY INHALATION TREATMENT: CPT

## 2024-10-28 RX ORDER — BUPROPION HYDROCHLORIDE 150 MG/1
150 TABLET, EXTENDED RELEASE ORAL DAILY
Qty: 30 TABLET | Refills: 0 | Status: SHIPPED | OUTPATIENT
Start: 2024-10-28

## 2024-10-28 RX ORDER — OXYCODONE AND ACETAMINOPHEN 5; 325 MG/1; MG/1
2 TABLET ORAL EVERY 4 HOURS PRN
Qty: 36 TABLET | Refills: 0 | Status: SHIPPED | OUTPATIENT
Start: 2024-10-28 | End: 2024-10-31

## 2024-10-28 RX ORDER — LIDOCAINE 4 G/G
1 PATCH TOPICAL DAILY
Qty: 30 EACH | Refills: 0 | Status: SHIPPED | OUTPATIENT
Start: 2024-10-29

## 2024-10-28 RX ORDER — LIDOCAINE 4 G/G
1 PATCH TOPICAL DAILY
Status: DISCONTINUED | OUTPATIENT
Start: 2024-10-28 | End: 2024-10-29 | Stop reason: HOSPADM

## 2024-10-28 RX ORDER — AMLODIPINE BESYLATE 5 MG/1
5 TABLET ORAL DAILY
Qty: 30 TABLET | Refills: 3 | Status: SHIPPED | OUTPATIENT
Start: 2024-10-29

## 2024-10-28 RX ORDER — PREGABALIN 50 MG/1
50 CAPSULE ORAL 2 TIMES DAILY
Qty: 60 CAPSULE | Refills: 0 | Status: SHIPPED | OUTPATIENT
Start: 2024-10-28 | End: 2024-11-27

## 2024-10-28 RX ADMIN — SOTALOL HYDROCHLORIDE 120 MG: 120 TABLET ORAL at 20:38

## 2024-10-28 RX ADMIN — BUDESONIDE 1000 MCG: 0.5 SUSPENSION RESPIRATORY (INHALATION) at 08:03

## 2024-10-28 RX ADMIN — BUDESONIDE 1000 MCG: 0.5 SUSPENSION RESPIRATORY (INHALATION) at 19:11

## 2024-10-28 RX ADMIN — APIXABAN 5 MG: 5 TABLET, FILM COATED ORAL at 08:01

## 2024-10-28 RX ADMIN — ERGOCALCIFEROL 50000 UNITS: 1.25 CAPSULE ORAL at 08:01

## 2024-10-28 RX ADMIN — OXYCODONE AND ACETAMINOPHEN 1 TABLET: 5; 325 TABLET ORAL at 20:46

## 2024-10-28 RX ADMIN — SODIUM CHLORIDE, PRESERVATIVE FREE 10 ML: 5 INJECTION INTRAVENOUS at 08:03

## 2024-10-28 RX ADMIN — ARFORMOTEROL TARTRATE 15 MCG: 15 SOLUTION RESPIRATORY (INHALATION) at 08:03

## 2024-10-28 RX ADMIN — LEVOTHYROXINE SODIUM 125 MCG: 0.1 TABLET ORAL at 05:40

## 2024-10-28 RX ADMIN — PREGABALIN 50 MG: 50 CAPSULE ORAL at 20:38

## 2024-10-28 RX ADMIN — IPRATROPIUM BROMIDE 0.5 MG: 0.5 SOLUTION RESPIRATORY (INHALATION) at 19:11

## 2024-10-28 RX ADMIN — IPRATROPIUM BROMIDE 0.5 MG: 0.5 SOLUTION RESPIRATORY (INHALATION) at 12:33

## 2024-10-28 RX ADMIN — HYDROMORPHONE HYDROCHLORIDE 0.5 MG: 1 INJECTION, SOLUTION INTRAMUSCULAR; INTRAVENOUS; SUBCUTANEOUS at 08:04

## 2024-10-28 RX ADMIN — ASPIRIN 81 MG 81 MG: 81 TABLET ORAL at 07:58

## 2024-10-28 RX ADMIN — IPRATROPIUM BROMIDE 0.5 MG: 0.5 SOLUTION RESPIRATORY (INHALATION) at 08:03

## 2024-10-28 RX ADMIN — AMLODIPINE BESYLATE 5 MG: 5 TABLET ORAL at 08:01

## 2024-10-28 RX ADMIN — ARFORMOTEROL TARTRATE 15 MCG: 15 SOLUTION RESPIRATORY (INHALATION) at 19:11

## 2024-10-28 RX ADMIN — SOTALOL HYDROCHLORIDE 120 MG: 120 TABLET ORAL at 08:01

## 2024-10-28 RX ADMIN — POLYETHYLENE GLYCOL 3350 17 G: 17 POWDER, FOR SOLUTION ORAL at 18:39

## 2024-10-28 RX ADMIN — Medication 1000 MG: at 07:59

## 2024-10-28 RX ADMIN — APIXABAN 5 MG: 5 TABLET, FILM COATED ORAL at 20:38

## 2024-10-28 RX ADMIN — IPRATROPIUM BROMIDE 0.5 MG: 0.5 SOLUTION RESPIRATORY (INHALATION) at 04:59

## 2024-10-28 RX ADMIN — SODIUM CHLORIDE, PRESERVATIVE FREE 10 ML: 5 INJECTION INTRAVENOUS at 20:42

## 2024-10-28 RX ADMIN — PREGABALIN 50 MG: 50 CAPSULE ORAL at 08:01

## 2024-10-28 RX ADMIN — PREDNISONE 10 MG: 10 TABLET ORAL at 08:01

## 2024-10-28 RX ADMIN — BUPROPION HYDROCHLORIDE 150 MG: 150 TABLET, FILM COATED, EXTENDED RELEASE ORAL at 08:00

## 2024-10-28 RX ADMIN — DOCUSATE SODIUM 100 MG: 100 CAPSULE, LIQUID FILLED ORAL at 18:39

## 2024-10-28 RX ADMIN — OXYCODONE HYDROCHLORIDE AND ACETAMINOPHEN 2 TABLET: 5; 325 TABLET ORAL at 05:40

## 2024-10-28 ASSESSMENT — PAIN DESCRIPTION - ORIENTATION
ORIENTATION: LEFT
ORIENTATION: LEFT
ORIENTATION: RIGHT;LEFT

## 2024-10-28 ASSESSMENT — PAIN SCALES - GENERAL
PAINLEVEL_OUTOF10: 5
PAINLEVEL_OUTOF10: 5
PAINLEVEL_OUTOF10: 10

## 2024-10-28 ASSESSMENT — PAIN DESCRIPTION - LOCATION
LOCATION: LEG;BACK
LOCATION: LEG
LOCATION: LEG

## 2024-10-28 ASSESSMENT — PAIN DESCRIPTION - DESCRIPTORS: DESCRIPTORS: ACHING;DISCOMFORT;PINS AND NEEDLES

## 2024-10-28 NOTE — PLAN OF CARE
Problem: Chronic Conditions and Co-morbidities  Goal: Patient's chronic conditions and co-morbidity symptoms are monitored and maintained or improved  10/27/2024 1012 by Rohini Mitchell RN  Outcome: Progressing     Problem: Pain  Goal: Verbalizes/displays adequate comfort level or baseline comfort level  10/27/2024 2248 by Lyly Byrnes RN  Outcome: Progressing  10/27/2024 1012 by Rohini Mitchell RN  Outcome: Progressing     Problem: Safety - Adult  Goal: Free from fall injury  10/27/2024 2248 by Lyly Byrnes RN  Outcome: Progressing  10/27/2024 1012 by Rohini Mitchell RN  Outcome: Progressing     Problem: Skin/Tissue Integrity  Goal: Absence of new skin breakdown  Description: 1.  Monitor for areas of redness and/or skin breakdown  2.  Assess vascular access sites hourly  3.  Every 4-6 hours minimum:  Change oxygen saturation probe site  4.  Every 4-6 hours:  If on nasal continuous positive airway pressure, respiratory therapy assess nares and determine need for appliance change or resting period.  10/27/2024 1012 by Rohini Mitchell RN  Outcome: Progressing     Problem: Discharge Planning  Goal: Discharge to home or other facility with appropriate resources  10/27/2024 1012 by Rohini Mitchell RN  Outcome: Progressing     Problem: Nutrition Deficit:  Goal: Optimize nutritional status  10/27/2024 1012 by Rohini Mitchell RN  Outcome: Progressing

## 2024-10-28 NOTE — CARE COORDINATION
Social Work/Case Management Transition of Care Planning (Fernanda Barnett -[phone]):  Pre-cert has been approved.  ID and vascular cleared patient for discharge.  Discharge order noted. Transport scheduled via PAS  with a 7:00 pm  time.  Notified patient, floor nurse and Paulino of [facility].  Patient indicated she will notify family.  Discharge envelope with ambulance form is in the soft chart.    Fernanda Barnett, HAILEY  10/28/2024

## 2024-10-28 NOTE — DISCHARGE INSTR - COC
Continuity of Care Form    Patient Name: Gladys Elizabeth   :  1950  MRN:  [id number]    Admit date:  10/23/2024  Discharge date:  10/28/24    Code Status Order: Full Code   Advance Directives:   Advance Care Flowsheet Documentation             Admitting Physician:  Carmita Escobar MD  PCP: Toñito Suazo MD    Discharging Nurse: ДМИТИРЙ Spencer   Discharging Hospital Unit/Room#: 8405/8405-B  Discharging Unit Phone Number: [phone]    Emergency Contact:   Extended Emergency Contact Information  Primary Emergency Contact: Thalia Elizabeth  Address: [address] [address] of [facility]  Home Phone: [phone]  Work Phone: [phone]  Mobile Phone: [phone]  Relation: Child    Past Surgical History:  Past Surgical History:   Procedure Laterality Date     SECTION      COLONOSCOPY      COLONOSCOPY N/A 2021    COLONOSCOPY WITH BIOPSY performed by Monty Leung MD at [facility] ENDOSCOPY    DILATION AND CURETTAGE OF UTERUS      EYE SURGERY      bilat cataract     INVASIVE VASCULAR N/A 2024    Aortagram abdominal performed by Chrissy Hobbs MD at [facility] CARDIAC CATH LAB    INVASIVE VASCULAR N/A 2024    Angioplasty superficial femoral artery performed by Chrissy Hobbs MD at [facility] CARDIAC CATH LAB    INVASIVE VASCULAR N/A 2024    Angioplasty popliteal artery performed by Chrissy Hobbs MD at [facility] CARDIAC CATH LAB    LAPAROTOMY N/A 2020    LAPAROTOMY EXPLORATORY, RIGHT HEMICOLECTOMY performed by Monty Leung MD at [facility] OR    LEG SURGERY Left 10/25/2024    Left Leg Above Knee Amputation performed by Chrissy Hobbs MD at [facility] OR    LUMBAR DRAIN IMPLANTATION N/A 2020    PLACEMENT OF INTRATHECAL CATHETER FOR LUMBAR DRAIN performed by Stanley Orozco MD at [facility] OR    PERIPHERAL PERCUTANEOUS ARTERIAL INTERVENTION  2019    L SFA angioplasty    TUBAL LIGATION      VENTRICULOPERITONEAL SHUNT N/A 2020

## 2024-10-28 NOTE — PLAN OF CARE
Problem: Chronic Conditions and Co-morbidities  Goal: Patient's chronic conditions and co-morbidity symptoms are monitored and maintained or improved  Outcome: Progressing     Problem: Pain  Goal: Verbalizes/displays adequate comfort level or baseline comfort level  10/28/2024 1128 by Johanna Stinson, RN  Outcome: Progressing  10/27/2024 2248 by Lyly Byrnes, RN  Outcome: Progressing     Problem: Safety - Adult  Goal: Free from fall injury  10/28/2024 1128 by Johanna Stinson, RN  Outcome: Progressing  10/27/2024 2248 by Lyly Byrnes, RN  Outcome: Progressing

## 2024-10-28 NOTE — DISCHARGE INSTRUCTIONS
F/U with Dr. Hobbs ([facility]) Vascular Surgery in 4 weeks  Office # [phone]  Keep L AKA stump clean and dry    Daily dressing with abdominal pad until no further drainage or moisture

## 2024-10-28 NOTE — DISCHARGE INSTRUCTIONS
Visit Discharge/Physician Orders     Discharge condition: Stable     Assessment of pain at discharge:assessed     Anesthetic used: 4% Lido Soln     Discharge to: Home     Left via:Private automobile     Accompanied by: accompanied by family     ECF/[facility]: [place] [phone]     Dressing Orders:LEFT LEG WOUNDS AND LEFT TOE WOUNDS: Cleanse with normal saline, apply calcium alginate, apply 4x4 between toes, ABD, Kerlix, and Spandagrip. Change daily.      Treatment Orders:FOLLOW NUTRITIOUS DIET. CHOOSE FOODS HIGH IN PROTEIN -CHICKEN- FISH-AND EGGS,  CHOOSE FOODS HIGH IN VITAMIN C.   MULTIVITAMIN DAILY.       Doppler study to be scheduled.      [facility] followup visit _____Dr. MCMAHON 2 week in AM  ________________________  (Please note your next appointment above and if you are unable to keep, kindly give a 24 hour notice. Thank you.)     Physician signature:__________________________        If you experience any of the following, please call the Wound Care Center during business hours:     * Increase in Pain  * Temperature over 101  * Increase in drainage from your wound  * Drainage with a foul odor  * Bleeding  * Increase in swelling  * Need for compression bandage changes due to slippage, breakthrough drainage.     If you need medical attention outside of the business hours of the Wound Care Centers please contact your PCP or go to the nearest emergency room.

## 2024-10-28 NOTE — CARE COORDINATION
Patient is POD#3 Left Leg Above Knee Amputation for L LE tissue loss, PVD. [facility] replied. Per vascular surgery note from yesterday, [facility] wound vac to be taken down 10/28. Per vascular surgery note today, If drainage from wound then apply dry 4x4s and cover with ABD. Per Paulino from [facility], they can accept patient and will start precert today if patient and daughter are agreeable to $10 co pay per day 1-20 anf fter day 20 $203/per day co pay. I met with patient and daughter in room to inform them of this and they are agreeable. Paulino at [facility] notified to start precert. PT/OT notes are in from Saturday. JOSE DAVID/Destination complete. [id number] completed and placed in the envelope in soft chart. Ambulance form in envelope in sift chart will need to be completed, signed and dated by nursing on day of discharge.   Jess Maddox RN   [phone]

## 2024-10-29 NOTE — PROGRESS NOTES
Hospitalist Progress Note      PCP: Toñito Suazo MD    Date of Admission: 10/23/2024        Hospital Course: 74 y.o. female presented with  LEFT FOOT PAIN.  HAS A KNOWN HISTORY OF PVD.  SEES DR. MCMAHON.   TOES 3&4 HAVE TURNED BLACK, FOOT IS RED AND PAINFUL, DENIES TRAUMA.         10/25 IN OR FOR LEFT AKA      Subjective:      Resting comfortably in bed  Complaining of left lower extremity pain      Medications:  Reviewed    Infusion Medications    dextrose 5% and 0.45% NaCl with KCl 20 mEq 50 mL/hr at 10/25/24 0844    sodium chloride      dextrose       Scheduled Medications    pregabalin  50 mg Oral BID    sodium chloride flush  5-40 mL IntraVENous 2 times per day    amLODIPine  5 mg Oral Daily    apixaban  5 mg Oral BID    ascorbic acid  1,000 mg Oral Daily    aspirin  81 mg Oral Daily    buPROPion  150 mg Oral Daily    budesonide  1 mg Nebulization BID RT    And    arformoterol tartrate  15 mcg Nebulization BID RT    And    ipratropium  0.5 mg Nebulization Q6H RT    levothyroxine  125 mcg Oral Daily    [START ON 10/28/2024] vitamin D  50,000 Units Oral Weekly    predniSONE  10 mg Oral Daily    sotalol  120 mg Oral BID     PRN Meds: oxyCODONE-acetaminophen **OR** oxyCODONE-acetaminophen, HYDROmorphone **OR** HYDROmorphone, sodium chloride flush, sodium chloride, acetaminophen **OR** acetaminophen, polyethylene glycol, prochlorperazine, glucose, dextrose bolus **OR** dextrose bolus, glucagon (rDNA), dextrose, hydrALAZINE, docusate sodium    No intake or output data in the 24 hours ending 10/27/24 1526      Exam:    BP (!) 126/59   Pulse 80   Temp 97.8 °F (36.6 °C) (Temporal)   Resp 18   Ht 1.676 m (5' 5.98\")   Wt 38.7 kg (85 lb 5.1 oz)   SpO2 96%   BMI 13.78 kg/m²       General appearance: NAD, conversant  HEENT: AT/NC, MMM  Neck: FROM, supple  Lungs: Clear to auscultation  CV: RRR, no MRGs  Vasc: Radial pulses 2+  Abdomen: Soft, non-tender; no masses or HSM  Extremities: No peripheral edema or 
      Hospitalist Progress Note      PCP: Toñito Suazo MD    Date of Admission: 10/23/2024        Hospital Course: 74 y.o. female presented with  LEFT FOOT PAIN.  HAS A KNOWN HISTORY OF PVD.  SEES DR. MCMAHON.   TOES 3&4 HAVE TURNED BLACK, FOOT IS RED AND PAINFUL, DENIES TRAUMA.         10/25 IN OR FOR LEFT AKA      Subjective:  IN OR          Medications:  Reviewed    Infusion Medications    dextrose 5% and 0.45% NaCl with KCl 20 mEq 50 mL/hr at 10/25/24 0844    sodium chloride      dextrose       Scheduled Medications    sodium chloride flush  5-40 mL IntraVENous 2 times per day    amLODIPine  5 mg Oral Daily    [Held by provider] apixaban  5 mg Oral BID    ascorbic acid  1,000 mg Oral Daily    aspirin  81 mg Oral Daily    buPROPion  150 mg Oral Daily    budesonide  1 mg Nebulization BID RT    And    arformoterol tartrate  15 mcg Nebulization BID RT    And    ipratropium  0.5 mg Nebulization Q6H RT    levothyroxine  125 mcg Oral Daily    [START ON 10/28/2024] vitamin D  50,000 Units Oral Weekly    enoxaparin  30 mg SubCUTAneous Daily    vancomycin  750 mg IntraVENous Q24H    ertapenem (INVanz) IV  1,000 mg IntraVENous Q24H    predniSONE  10 mg Oral Daily    sotalol  120 mg Oral BID     PRN Meds: HYDROmorphone **OR** HYDROmorphone, sodium chloride flush, sodium chloride, acetaminophen **OR** acetaminophen, polyethylene glycol, prochlorperazine, traMADol, glucose, dextrose bolus **OR** dextrose bolus, glucagon (rDNA), dextrose, hydrALAZINE, docusate sodium      Intake/Output Summary (Last 24 hours) at 10/25/2024 1250  Last data filed at 10/25/2024 1148  Gross per 24 hour   Intake 810 ml   Output --   Net 810 ml       Exam:    /63   Pulse 65   Temp 97.8 °F (36.6 °C)   Resp 17   Ht 1.676 m (5' 5.98\")   Wt 37 kg (81 lb 9.1 oz)   SpO2 99%   BMI 13.17 kg/m²                   Labs:   Recent Labs     10/23/24  0359 10/24/24  0432 10/25/24  0533   WBC 16.7* 13.0* 13.9*   HGB 12.1 11.7 11.0*   HCT 38.9 37.4 35.1 
    [facility]                                Progress note    Subjective:  Denies chest pain and dyspnea  Complains of mild pain at left AKA site, alleviated with as needed medication    Objective:  Sitting up in bed, conversing without difficulty  No acute distress  No family present at the bedside  /79   Pulse 67   Temp 98.1 °F (36.7 °C) (Temporal)   Resp 20   Ht 1.676 m (5' 5.98\")   Wt 38.6 kg (85 lb)   SpO2 95%   BMI 13.73 kg/m²   CONST:  Well developed, well nourished elderly  female who appears stated age. Awake, alert, cooperative, no apparent distress  HEENT:   Head- Normocephalic, atraumatic   Eyes- Conjunctivae pink, anicteric  Throat- Oral mucosa pink and moist  Neck-  No stridor, trachea midline, no jugular venous distention. No adenopathy   CHEST: Chest symmetrical and non-tender to palpation. No accessory muscle use or intercostal retractions  RESPIRATORY: Lung sounds - clear throughout fields   CARDIOVASCULAR:     No carotid bruit  Heart Inspection- shows no noted pulsations  Heart Palpation- no heaves or thrills; PMI is non-displaced   Heart Ausculation- Regular rate and rhythm, no murmur. No s3, s4 or rub   PV: No lower extremity edema. No varicosities.  Left AKA noted with staples in place and minimal bloody drainage.  Right foot with heel protector in place  ABDOMEN: Soft, non-tender to light palpation. Bowel sounds present. No palpable masses no organomegaly; no abdominal bruit  MS: Good muscle strength and tone. No atrophy or abnormal movements.   : Deferred  SKIN: Warm and dry no statis dermatitis or ulcers   NEURO / PSYCH: Oriented to person, place and time. Speech clear and appropriate. Follows all commands. Pleasant affect        Recent Labs     10/26/24  0415 10/27/24  0529   WBC 15.4* 13.5*   HGB 10.8* 10.5*   HCT 34.4 34.2    366       Recent Labs     10/26/24  0415 10/27/24  0529 10/28/24  0521    141 141   K 4.2 4.2 4.0    103 
  Physician Progress Note      PATIENT:               MANJU CROFT  CSN #:                  [id number]  :                       1950  ADMIT DATE:       10/23/2024 3:33 AM  DISCH DATE:        10/28/2024 10:08 PM  RESPONDING  PROVIDER #:        Carmita Escobar MD          QUERY TEXT:    Pt admitted with Cellulitis of left foot and PAD. Pt noted to have WBC, 16.7,   .0 ; Heart rate 91; Respiratory rate 18-26 on 10/23/24 . If possible,   please document in the progress notes and discharge summary if you are   evaluating and /or treating any of the following:    The medical record reflects the following:  Risk Factors: Diabetes Mellitus; Cellulites left foot; PAD  Clinical Indicators:  10/23/24: Per infections Disease: Cellulitis left foot, Leukocytosis;  Plan IV   vancomycin, Invanz IV  10/23/24: WBC: 16.7; .0; Heart rate 69-91; Respiratory rate 18-26  10/24/24: WBC: 13.0  10/25/24: WBC: 13.9  10/26/24: WBC: 15.4  Treatment: Infectious Disease consult; laboratory studies; Vancocin IV: Invanz   IV; IV fluids; Blood cultures  Options provided:  -- Sepsis due to Left foot cellulitis, present on admission  -- Cellulitis of left foot without Sepsis  -- Other - I will add my own diagnosis  -- Disagree - Not applicable / Not valid  -- Disagree - Clinically unable to determine / Unknown  -- Refer to Clinical Documentation Reviewer    PROVIDER RESPONSE TEXT:    This patient has sepsis due to left foot which was present on admission.    Query created by: Rosa Sinclair on 10/28/2024 11:38 AM      Electronically signed by:  Carmita Escobar MD 10/29/2024 4:00 PM          
4 Eyes Skin Assessment     NAME:  Gladys Elizabeth  YOB: 1950  MEDICAL RECORD NUMBER:  [id number]    The patient is being assessed for  Admission    I agree that at least one RN has performed a thorough Head to Toe Skin Assessment on the patient. ALL assessment sites listed below have been assessed.      Areas assessed by both nurses:    Head, Face, Ears, Shoulders, Back, Chest, Arms, Elbows, Hands, Sacrum. Buttock, Coccyx, Ischium, Legs. Feet and Heels, and Under Medical Devices         Does the Patient have a Wound? Yes wound(s) were present on assessment. LDA wound assessment was Initiated and completed by RN       Tony Prevention initiated by RN: No  Wound Care Orders initiated by RN: Yes    Pressure Injury (Stage 3,4, Unstageable, DTI, NWPT, and Complex wounds) if present, place Wound referral order by RN under : Yes    New Ostomies, if present place, Ostomy referral order under : No     Nurse 1 eSignature: Electronically signed by Jazzy Shetty RN on 10/23/24 at 5:24 PM EDT    **SHARE this note so that the co-signing nurse can place an eSignature**    Nurse 2 eSignature: Electronically signed by Jocelyne Stinson RN on 10/23/24 at 6:03 PM EDT   
Comprehensive Nutrition Assessment    Type and Reason for Visit:  Initial, Consult, Wound    Nutrition Recommendations/Plan:   NPO for OR. Recommend to start ONS w/ diet: Ensure + TID and Lonnie BID to promote nutrient/PO intake and post op healing.     Malnutrition Assessment:  Malnutrition Status:  Severe malnutrition (10/25/24 1210)    Context:  Chronic Illness     Findings of the 6 clinical characteristics of malnutrition:  Energy Intake:  75% or less estimated energy requirements for 1 month or longer  Weight Loss:  Greater than 7.5% over 3 months (17.8 % x 3 months)     Body Fat Loss:    Orbital, Triceps   Muscle Mass Loss:  Severe muscle mass loss Clavicles (pectoralis & deltoids), Temples (temporalis)  Fluid Accumulation:  Unable to assess     Strength:  Not Performed    Nutrition Assessment:    Pt. admitted for L LE pain/cellulitis/tissue loss. Pt. currently in OR for L AKA. Hx of COPD,CAD,skin CA, PVD w/ claudication s/p Multiple vascular interventions, chronic wounds. hx of malnutrition. Pt. does meet criteria for severe malnutrition. Currently NPO for OR. Will start ONS w/ diet to promote post op healing and nutrient/PO intake.    Nutrition Related Findings:    A&Ox4, +I/O(1.1L), abd/BS WDL, MIKHAIL edema, elevated BUN, hyperglycemia, Wound Type: Surgical Incision, Open Wounds (s/p L AKA today)       Current Nutrition Intake & Therapies:    Average Meal Intake: 1-25%  Average Supplements Intake: None Ordered  Diet NPO Exceptions are: Sips of Water with Meds, Ice Chips    Anthropometric Measures:  Height: 167.6 cm (5' 5.98\")  Ideal Body Weight (IBW): 130 lbs (59 kg)    Admission Body Weight: 37 kg (81 lb 9.1 oz) (10/24 actual BS)  Current Body Weight: 37 kg (81 lb 9.1 oz) (10/24 actual BS), 62.7 % IBW. Weight Source: Bed Scale  Current BMI (kg/m2): 13.2  Usual Body Weight: 45 kg (99 lb 3.3 oz) (7/24/24 x ~3months)  % Weight Change (Calculated): -17.8  Weight Adjustment For: Amputation  Total Adjusted 
Department of Internal Medicine  Infectious Diseases  Progress  Note      C/C :  Left foot cellulitis , leukocytosis     Denies fever or chills  Reports pain in the foot and leg   Afebrile       Current Facility-Administered Medications   Medication Dose Route Frequency Provider Last Rate Last Admin    pregabalin (LYRICA) capsule 50 mg  50 mg Oral BID Tish Price MD   50 mg at 10/26/24 0815    dextrose 5 % and 0.45 % NaCl with KCl 20 mEq infusion   IntraVENous Continuous Jeff Garcia MD 50 mL/hr at 10/25/24 0844 New Bag at 10/25/24 0844    oxyCODONE-acetaminophen (PERCOCET) 5-325 MG per tablet 1 tablet  1 tablet Oral Q4H PRN Dona Perales, APRN - CNP   1 tablet at 10/25/24 1956    Or    oxyCODONE-acetaminophen (PERCOCET) 5-325 MG per tablet 2 tablet  2 tablet Oral Q4H PRN Dona Perales, APRN - CNP   2 tablet at 10/26/24 0644    HYDROmorphone (DILAUDID) injection 0.5 mg  0.5 mg IntraVENous Q3H PRN Jeff Garcia MD        Or    HYDROmorphone (DILAUDID) injection 0.5 mg  0.5 mg IntraVENous Q4H PRN Jeff Garcia MD   0.5 mg at 10/26/24 1234    sodium chloride flush 0.9 % injection 5-40 mL  5-40 mL IntraVENous 2 times per day Jeff Garcia MD   10 mL at 10/26/24 0817    sodium chloride flush 0.9 % injection 5-40 mL  5-40 mL IntraVENous PRN Jeff Garcia MD   10 mL at 10/24/24 1128    0.9 % sodium chloride infusion   IntraVENous PRN Jeff Garcia MD        acetaminophen (TYLENOL) tablet 650 mg  650 mg Oral Q6H PRN Jeff Garcia MD        Or    acetaminophen (TYLENOL) suppository 650 mg  650 mg Rectal Q6H PRN Jeff Garcia MD        polyethylene glycol (GLYCOLAX) packet 17 g  17 g Oral Daily PRN Jeff Garcia MD        prochlorperazine (COMPAZINE) injection 5 mg  5 mg IntraVENous Q6H PRN Jeff Garcia MD        amLODIPine (NORVASC) tablet 5 mg  5 mg Oral Daily Jeff Garcia MD   5 mg at 10/26/24 0815    [Held by provider] apixaban (ELIQUIS) tablet 5 mg  5 mg Oral BID 
LOVENOX PROPHYLAXIS EVALUATION  (Populations not addressed in this protocol: trauma, obstetrics, or COVID-19)    Wt Readings from Last 3 Encounters:   10/23/24 40.8 kg (90 lb)   10/16/24 40.8 kg (90 lb)   08/12/24 40.8 kg (90 lb)       Estimated Creatinine Clearance: 39 mL/min (based on SCr of 0.8 mg/dL).  Recent Labs     10/23/24  0359   BUN 29*   CREATININE 0.8      HGB 12.1   HCT 38.9   INR 1.5       Weight Range: 50.9 kg and below    CRCL = 30 or greater    50.9 kg   and below     .9  kg   101-150.9 kg   151-174.9  kg   175 kg  or greater     30mg subq  daily     40mg subq daily    (or 30mg subq BID orthopedic)     30mg subq  BID   40mg subq  BID   60mg subq BID       Per P/T protocol for appropriate subq anticoagulation by weight and CRCL change to:    Enoxparin 30mg subq daily      Destin Conteh RPH  10:06 AM  10/23/24        
[place] [facility] [place].     Electronically signed by Majo Trujillo RN on 10/25/24 at 11:23 AM EDT     
Nurse to nurse called to NATACHA Joaquin    
Occupational Therapy    OCCUPATIONAL THERAPY INITIAL EVALUATION    [facility]  [address] [place], [place]        Date:10/26/2024                                                  Patient Name: Gladys Elizabeth    MRN: [id number]    : 1950    Room: [address] [place] [address]          Evaluating OT: Stacia Grimaldo, OTD, OTR/L; [id number]      Occupational therapy physician order:     Start   Ordering Provider    10/24/24 1600  OT eval and treat  Start:  10/24/24 1600,   End:  10/24/24 1600,   ONE TIME,   Standing Count:  1 Occurrences,   R        Last continued at transfer on Fri Oct 25, 2024  2:31 PM    Jeff Garcia MD          Diagnosis:    1. Left foot pain    2. PVD (peripheral vascular disease) (HCC)    3. Open wound    4. Cellulitis of left foot    5. Peripheral vascular disease, unspecified (HCC)       Patient Active Problem List   Diagnosis    Essential hypertension    Depression    PVD (peripheral vascular disease) with claudication (HCC)    History of tobacco use    PVD (peripheral vascular disease) (HCC)    Hyperlipidemia LDL goal <100    Acquired hypothyroidism    Severe protein-calorie malnutrition (HCC)    Atherosclerosis of aortic bifurcation and common iliac arteries (HCC)    CAD (coronary artery disease)    Paroxysmal atrial fibrillation (HCC)    Hydrocephalus (HCC)    Peripheral vascular disease due to secondary diabetes mellitus (HCC)    S/P  shunt    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 with ulceration of calf (HCC)    Bilateral carotid artery stenosis    Skin ulcer of right calf with fat layer exposed (HCC)    Diabetic ulcer of toe of left foot associated with type 2 diabetes mellitus, with fat layer exposed (HCC)    Status post peripheral artery angioplasty with insertion of stent    Cellulitis of left foot    Peripheral vascular disease, unspecified (HCC)          Pertinent Medical History:   Past Medical History: 
Occupational Therapy  Occupational Therapy    Date:10/25/2024  Patient Name: Gladys Elizabeth  MRN: [id number]  : 1950  Room: [address] [place] [address]     OT orders received and chart reviewed. OT eval on hold at this time, pt scheduled for L AKA today.   OT will follow and re-attempt eval as appropriate post op.      KENRICK Ling, OTR/L [id number]    
Patient refused compression device due to leg pain  
Patient refusing to wear heart monitor.   
Patient was anxious, she verbalized she needs a shower, new clothes, feel terrible and filthy and wants her personal stuff at home.   She reiterated that she does not want to wear a gown here, she wants her own clothing.    RN called the daughter on the phone and she will bring her own stuff early morning.     Patient refusing telemetry too.      Standard safety measures observed.       
Pharmacy Consultation Note  (Antibiotic Dosing and Monitoring)    Initial consult date: 10/23/2024  Consulting physician/provider: Hoang Ashby MD.   Drug: Vancomycin  Indication: SSTI     Age/  Gender Height Weight IBW  Allergy Information   74 y.o./female 167.6 cm (5' 6\") 40.8 kg (90 lb)     Ideal body weight: 61 kg (134 lb 8.8 oz)   Hornet venom, Wasp venom, and Flagyl [metronidazole]      Renal Function:  Recent Labs     10/23/24  0359   BUN 29*   CREATININE 0.8     No intake or output data in the 24 hours ending 10/23/24 5048    Assessment:  Patient is a 74 y.o. female who has been initiated on vancomycin for a SSTI.   Estimated Creatinine Clearance: 39 mL/min (based on SCr of 0.8 mg/dL).  To dose vancomycin, pharmacy will be dosign to target a trough of 10-20mg/dL.      Plan:  Will continue vancomycin 750 mg IV every 24 hours  Will check vancomycin trough on 10/25.   Will continue to monitor renal function   Pharmacy to follow      Isa Rowan, PharmD, BCIDP, BCPS 10/23/2024 1:39 PM  [phone]  
Pharmacy Consultation Note  (Antibiotic Dosing and Monitoring)    Initial consult date: 10/23/2024  Consulting physician/provider: Hoang Ashby MD.   Drug: Vancomycin  Indication: SSTI     Age/  Gender Height Weight IBW  Allergy Information   74 y.o./female 167.6 cm (5' 6\") 40.8 kg (90 lb)     Ideal body weight: 61 kg (134 lb 8.8 oz)   Hornet venom, Wasp venom, and Flagyl [metronidazole]      Renal Function:  Recent Labs     10/23/24  0359 10/24/24  0432   BUN 29* 27*   CREATININE 0.8 0.7       Intake/Output Summary (Last 24 hours) at 10/24/2024 0922  Last data filed at 10/24/2024 0526  Gross per 24 hour   Intake 120 ml   Output --   Net 120 ml        Vancomycin Administration Times:  Recent vancomycin administrations                     vancomycin (VANCOCIN) 750 mg in sodium chloride 0.9 % 250 mL IVPB ([id number]) (mg) 750 mg New Bag 10/23/24 1350                  Assessment:  Patient is a 74 y.o. female who has been initiated on vancomycin for a SSTI.   Estimated Creatinine Clearance: 40 mL/min (based on SCr of 0.7 mg/dL).  To dose vancomycin, pharmacy will be dosign to target a trough of 10-20mg/dL.      Plan:  Will continue vancomycin 750 mg IV every 24 hours  Will check vancomycin trough on 10/25.   Will continue to monitor renal function   Pharmacy to follow      Brianna Rodriguez PharmD, BCPS 10/24/2024 9:22 AM  Ext. [phone]  
Please call vascular when patient's daughter is in the room tomorrow.   
Procedure consent was signed by patient at 6 PM.   
Pt stated she wears 2 liters o2 at night    sliter o2 placed per pt request.  
Spoke with [facility], transport is delayed. Patient will be picked up at 2030.  
Vancomycin has been discontinued   Clinical Pharmacy to sign-off  Physician to re-consult pharmacy if future dosing is needed    Thank you for the consult,  Brianna Rodriguze PharmD, BCPS 10/25/2024 2:51 PM            Pharmacy Consultation Note  (Antibiotic Dosing and Monitoring)    Initial consult date: 10/23/2024  Consulting physician/provider: Hoang Ashby MD.   Drug: Vancomycin  Indication: SSTI     Age/  Gender Height Weight IBW  Allergy Information   74 y.o./female 167.6 cm (5' 6\") 40.8 kg (90 lb)     Ideal body weight: 61 kg (134 lb 7.8 oz)   Hornet venom, Wasp venom, and Flagyl [metronidazole]      Renal Function:  Recent Labs     10/23/24  0359 10/24/24  0432 10/25/24  0533   BUN 29* 27* 28*   CREATININE 0.8 0.7 0.7       Intake/Output Summary (Last 24 hours) at 10/25/2024 1444  Last data filed at 10/25/2024 1245  Gross per 24 hour   Intake 860 ml   Output --   Net 860 ml     Vancomycin Monitoring:  Trough:  No results for input(s): \"VANCOTROUGH\" in the last 72 hours.  Random:  No results for input(s): \"VANCORANDOM\" in the last 72 hours.    Vancomycin Administration Times:  Recent vancomycin administrations                     vancomycin (VANCOCIN) 750 mg in sodium chloride 0.9 % 250 mL IVPB ([id number]) (mg) 750 mg Bolus 10/25/24 1043     750 mg New Bag 10/24/24 1128     750 mg New Bag 10/23/24 1350                  Assessment:  Patient is a 74 y.o. female who has been initiated on vancomycin for a SSTI.   Estimated Creatinine Clearance: 40 mL/min (based on SCr of 0.7 mg/dL).  To dose vancomycin, pharmacy will be dosign to target a trough of 10-20mg/dL.    10/25: Trough level not drawn this morning. Patient in OR for L AKA.     Plan:  Will continue vancomycin 750 mg IV every 24 hours  Will check vancomycin trough on 10/25.   Will continue to monitor renal function   Pharmacy to follow      Brianna Rodriguez PharmD, BCPS 10/25/2024 2:44 PM  Ext. [phone]    
Vascular Surgery Progress Note    Pt is being seen in f/u today regarding LLE tissue loss, s/p L AKA    Subjective  Pt s/e.  States her pain is well controlled. Was having some discomfort to her right heel which is improved now.    Current Medications:    dextrose 5% and 0.45% NaCl with KCl 20 mEq 50 mL/hr at 10/25/24 0844    sodium chloride      dextrose        oxyCODONE-acetaminophen **OR** oxyCODONE-acetaminophen, HYDROmorphone **OR** HYDROmorphone, sodium chloride flush, sodium chloride, acetaminophen **OR** acetaminophen, polyethylene glycol, prochlorperazine, glucose, dextrose bolus **OR** dextrose bolus, glucagon (rDNA), dextrose, hydrALAZINE, docusate sodium    lidocaine  1 patch TransDERmal Daily    pregabalin  50 mg Oral BID    sodium chloride flush  5-40 mL IntraVENous 2 times per day    amLODIPine  5 mg Oral Daily    apixaban  5 mg Oral BID    ascorbic acid  1,000 mg Oral Daily    aspirin  81 mg Oral Daily    buPROPion  150 mg Oral Daily    budesonide  1 mg Nebulization BID RT    And    arformoterol tartrate  15 mcg Nebulization BID RT    And    ipratropium  0.5 mg Nebulization Q6H RT    levothyroxine  125 mcg Oral Daily    vitamin D  50,000 Units Oral Weekly    predniSONE  10 mg Oral Daily    sotalol  120 mg Oral BID      PHYSICAL EXAM:    /79   Pulse 74   Temp 98.1 °F (36.7 °C) (Temporal)   Resp 18   Ht 1.676 m (5' 5.98\")   Wt 38.6 kg (85 lb)   SpO2 93%   BMI 13.73 kg/m²     Intake/Output Summary (Last 24 hours) at 10/28/2024 1239  Last data filed at 10/28/2024 0834  Gross per 24 hour   Intake 240 ml   Output --   Net 240 ml        Gen awake alert, pleasant  CVS RRR  Resp no increased effort of breathing  Abd soft, nondistended  R LE Slight erythema to heel, no open wounds  L LE AKA incision is c/d/I, flap warm and well perfused  LABS:    Lab Results   Component Value Date    WBC 13.5 (H) 10/27/2024    HGB 10.5 (L) 10/27/2024    HCT 34.2 10/27/2024     10/27/2024    PROTIME 16.7 (H) 
Vascular Surgery Progress Note    Pt is being seen in f/u today regarding Peripheral vascular disease and tissue loss     Subjective  Mildly confused this morning upon being awakened. No complaints, pain minimal.     Current Medications:    dextrose 5% and 0.45% NaCl with KCl 20 mEq 50 mL/hr at 10/25/24 0844    sodium chloride      dextrose        oxyCODONE-acetaminophen **OR** oxyCODONE-acetaminophen, HYDROmorphone **OR** HYDROmorphone, sodium chloride flush, sodium chloride, acetaminophen **OR** acetaminophen, polyethylene glycol, prochlorperazine, glucose, dextrose bolus **OR** dextrose bolus, glucagon (rDNA), dextrose, hydrALAZINE, docusate sodium    sodium chloride flush  5-40 mL IntraVENous 2 times per day    amLODIPine  5 mg Oral Daily    [Held by provider] apixaban  5 mg Oral BID    ascorbic acid  1,000 mg Oral Daily    aspirin  81 mg Oral Daily    buPROPion  150 mg Oral Daily    budesonide  1 mg Nebulization BID RT    And    arformoterol tartrate  15 mcg Nebulization BID RT    And    ipratropium  0.5 mg Nebulization Q6H RT    levothyroxine  125 mcg Oral Daily    [START ON 10/28/2024] vitamin D  50,000 Units Oral Weekly    enoxaparin  30 mg SubCUTAneous Daily    predniSONE  10 mg Oral Daily    sotalol  120 mg Oral BID        PHYSICAL EXAM:    /65   Pulse 72   Temp 98.2 °F (36.8 °C) (Temporal)   Resp 20   Ht 1.676 m (5' 5.98\")   Wt 37 kg (81 lb 9.1 oz)   SpO2 94%   BMI 13.17 kg/m²     Intake/Output Summary (Last 24 hours) at 10/26/2024 0348  Last data filed at 10/25/2024 1245  Gross per 24 hour   Intake 500 ml   Output --   Net 500 ml          Gen: sleeping, then startled/confused/terrified, answering questions appropriately  CVS: RRR  Resp: No increased work of breathing  Abd: Soft, non-tender, non-distended  R LE: Edema absent, Incisions present               Varicose veins absent               Wounds present               5/5 Strength              Neuropathy is absent  L LE:  prevena present 
Vascular Surgery Progress Note    Pt is being seen in f/u today regarding Peripheral vascular disease and tissue loss     Subjective  Pt anxious this morning about having infection over stump. She is able to move RLE    Current Medications:    dextrose 5% and 0.45% NaCl with KCl 20 mEq 50 mL/hr at 10/25/24 0844    sodium chloride      dextrose        oxyCODONE-acetaminophen **OR** oxyCODONE-acetaminophen, HYDROmorphone **OR** HYDROmorphone, sodium chloride flush, sodium chloride, acetaminophen **OR** acetaminophen, polyethylene glycol, prochlorperazine, glucose, dextrose bolus **OR** dextrose bolus, glucagon (rDNA), dextrose, hydrALAZINE, docusate sodium    pregabalin  50 mg Oral BID    sodium chloride flush  5-40 mL IntraVENous 2 times per day    amLODIPine  5 mg Oral Daily    [Held by provider] apixaban  5 mg Oral BID    ascorbic acid  1,000 mg Oral Daily    aspirin  81 mg Oral Daily    buPROPion  150 mg Oral Daily    budesonide  1 mg Nebulization BID RT    And    arformoterol tartrate  15 mcg Nebulization BID RT    And    ipratropium  0.5 mg Nebulization Q6H RT    levothyroxine  125 mcg Oral Daily    [START ON 10/28/2024] vitamin D  50,000 Units Oral Weekly    enoxaparin  30 mg SubCUTAneous Daily    predniSONE  10 mg Oral Daily    sotalol  120 mg Oral BID        PHYSICAL EXAM:    /77   Pulse 69   Temp 97 °F (36.1 °C) (Temporal)   Resp 18   Ht 1.676 m (5' 5.98\")   Wt 38.7 kg (85 lb 5.1 oz)   SpO2 95%   BMI 13.78 kg/m²   No intake or output data in the 24 hours ending 10/27/24 0709         Gen: resting in bed  CVS: RRR  Resp: No increased work of breathing  Abd: Soft, non-tender, non-distended  R LE: Edema absent, Incisions present               Varicose veins absent               Wounds present               5/5 Strength              Neuropathy is absent   Signals present DP/PT  L LE:  prevena present to L AKA stump with good seal, no leak or drainage noted. Tissue appears heathy    LABS:    Lab 
Vascular Surgery Progress Note    Pt is being seen in f/u today regarding Peripheral vascular disease and tissue loss     Subjective  Pt s/e. Overall doing ok. Is waiting for daughter this morning to discuss further plans moving forward.     WBC 13.0 from 16.7    Current Medications:    sodium chloride      dextrose        sodium chloride flush, sodium chloride, acetaminophen **OR** acetaminophen, polyethylene glycol, prochlorperazine, traMADol, glucose, dextrose bolus **OR** dextrose bolus, glucagon (rDNA), dextrose, hydrALAZINE, HYDROmorphone **OR** HYDROmorphone, docusate sodium    sodium chloride flush  5-40 mL IntraVENous 2 times per day    amLODIPine  5 mg Oral Daily    [Held by provider] apixaban  5 mg Oral BID    ascorbic acid  1,000 mg Oral Daily    aspirin  81 mg Oral Daily    buPROPion  150 mg Oral Daily    budesonide  1 mg Nebulization BID RT    And    arformoterol tartrate  15 mcg Nebulization BID RT    And    ipratropium  0.5 mg Nebulization Q6H RT    levothyroxine  125 mcg Oral Daily    [START ON 10/28/2024] vitamin D  50,000 Units Oral Weekly    enoxaparin  30 mg SubCUTAneous Daily    vancomycin  750 mg IntraVENous Q24H    ertapenem (INVanz) IV  1,000 mg IntraVENous Q24H    predniSONE  10 mg Oral Daily    sotalol  120 mg Oral BID        PHYSICAL EXAM:    BP (!) 157/88   Pulse 87   Temp 97.3 °F (36.3 °C) (Temporal)   Resp 19   Ht 1.676 m (5' 6\")   Wt 37 kg (81 lb 9.1 oz)   SpO2 93%   BMI 13.17 kg/m²     Intake/Output Summary (Last 24 hours) at 10/24/2024 0622  Last data filed at 10/24/2024 0526  Gross per 24 hour   Intake 120 ml   Output --   Net 120 ml          Gen: awake, alert and oriented x3, no apparent distress  CVS: RRR  Resp: No increased work of breathing  Abd: Soft, non-tender, non-distended  R LE: Edema absent, Incisions present               Varicose veins absent               Wounds present               5/5 Strength              Neuropathy is absent  L LE:  Wrapped in Kerlix, 
Vascular Surgery Progress Note    Pt is being seen in f/u today regarding Peripheral vascular disease and tissue loss     Subjective  Pt s/e. Some anxiety overnight. Doing better this morning.     WBC 13.9 from 13.0  Hgb Stable     Current Medications:    sodium chloride      dextrose        HYDROmorphone **OR** HYDROmorphone, sodium chloride flush, sodium chloride, acetaminophen **OR** acetaminophen, polyethylene glycol, prochlorperazine, traMADol, glucose, dextrose bolus **OR** dextrose bolus, glucagon (rDNA), dextrose, hydrALAZINE, docusate sodium    sodium chloride flush  5-40 mL IntraVENous 2 times per day    amLODIPine  5 mg Oral Daily    [Held by provider] apixaban  5 mg Oral BID    ascorbic acid  1,000 mg Oral Daily    aspirin  81 mg Oral Daily    buPROPion  150 mg Oral Daily    budesonide  1 mg Nebulization BID RT    And    arformoterol tartrate  15 mcg Nebulization BID RT    And    ipratropium  0.5 mg Nebulization Q6H RT    levothyroxine  125 mcg Oral Daily    [START ON 10/28/2024] vitamin D  50,000 Units Oral Weekly    enoxaparin  30 mg SubCUTAneous Daily    vancomycin  750 mg IntraVENous Q24H    ertapenem (INVanz) IV  1,000 mg IntraVENous Q24H    predniSONE  10 mg Oral Daily    sotalol  120 mg Oral BID        PHYSICAL EXAM:    BP (!) 154/69   Pulse 82   Temp 98.9 °F (37.2 °C) (Temporal)   Resp 18   Ht 1.676 m (5' 6\")   Wt 37 kg (81 lb 9.1 oz)   SpO2 95%   BMI 13.17 kg/m²     Intake/Output Summary (Last 24 hours) at 10/25/2024 0708  Last data filed at 10/25/2024 0616  Gross per 24 hour   Intake 600 ml   Output --   Net 600 ml          Gen: awake, alert and oriented x3, no apparent distress  CVS: RRR  Resp: No increased work of breathing  Abd: Soft, non-tender, non-distended  R LE: Edema absent, Incisions present               Varicose veins absent               Wounds present               5/5 Strength              Neuropathy is absent  L LE:  Wrapped in Kerlix, Rubor is still noted over dorsum of 
Wound care: Vascular following for left foot wounds, per patients nurse no other need for wound care to see patient. Wound care will sign off, re-consult if needed. Promise Tobias RN    
Wound vac taken down by ELMER  
13.5 (H) 10/27/2024    HGB 10.5 (L) 10/27/2024    HCT 34.2 10/27/2024     10/27/2024    PROTIME 16.7 (H) 10/23/2024    INR 1.5 10/23/2024    APTT 31.3 01/20/2020    K 4.0 10/28/2024    BUN 27 (H) 10/28/2024    CREATININE 0.6 10/28/2024       A/P  74 y.o. female with Hx of PVD w/ claudication s/p Multiple vascular interventions, chronic wounds now presents with extensive L LE tissue loss s/p L AKA on 10/25    - Pain control PRN  - Encourage IS  - Hgb stable, monitor  - Okay for ASA and Eliquis  - Defer abx to ID  - If drainage from wound then apply dry 4x4s and cover with ABD  - Diet as tolerated  - PT/OT    Jeff Garcia MD  10/28/2024  7:02 AM    Pt seen and examined  AKA site  Some bruising, minimal drainage  Daily dressing with abdominal pad    Fu as outpt  Chrissy Hobbs MD    
effort. Clear to auscultation,   Cardiovascular:  RRR  Abdomen: Soft, non-tender, non-distended with normal bowel sounds.  Musculoskeletal:  No clubbing, cyanosis or edema bilaterally.   LEFT FOOT ERYTHEMA, TOES 3&4 ARE BLACK,  Skin: smooth and dry   Neurologic:   Cranial nerves: II-XII intact,   Psychiatric:  Alert and oriented x 3            Labs:   Recent Labs     10/23/24  0359 10/24/24  0432   WBC 16.7* 13.0*   HGB 12.1 11.7   HCT 38.9 37.4    404     Recent Labs     10/23/24  0359 10/24/24  0432    142   K 4.2 4.0    102   CO2 29 27   BUN 29* 27*   CREATININE 0.8 0.7   CALCIUM 9.7 9.7     Recent Labs     10/23/24  0359   AST 15   ALT 15   BILITOT 0.6   ALKPHOS 77     Recent Labs     10/23/24  0359   INR 1.5     No results for input(s): \"CKTOTAL\", \"TROPONINI\" in the last 72 hours.  Recent Labs     10/23/24  0359   AST 15   ALT 15   BILITOT 0.6   ALKPHOS 77     No results for input(s): \"LACTA\" in the last 72 hours.  Lab Results   Component Value Date    URICACID 5.0 10/07/2010     No results found for: \"AMMONIA\"    Assessment:    Active Hospital Problems    Diagnosis Date Noted    Cellulitis of left foot [L03.116] 10/23/2024    PVD (peripheral vascular disease) ([facility]) [I73.9] 01/29/2019    HTN           PLAN:  VASCULAR   PREDNISONE   SOTALOL           DVT Prophylaxis:  LOVENOX  Diet: ADULT DIET; Regular; 4 carb choices (60 gm/meal); Low Fat/Low Chol/High Fiber/2 gm Na  ADULT ORAL NUTRITION SUPPLEMENT; Breakfast, Lunch, Dinner, PM Snack; Low Calorie/High Protein Oral Supplement  Code Status: Full Code     PT/OT Eval Status: ORDERED     Dispo -  HOME  Time/Communication  Greater than 50% of time spent, in counseling and coordination of care at the bedside regarding goals of care, diagnosis and prognosis.    Electronically signed by Yohannes Sykes DO on 10/24/2024 at 2:12 PM FACOI    
10/28/24 0801       REVIEW OF SYSTEMS:    CONSTITUTIONAL:  Denies fever, chill or rigors.  HEENT: denies blurring of vision or double vision, denies hearing problem  RESPIRATORY: denies cough, shortness of breath, sputum expectoration.  CARDIOVASCULAR:  Denies palpitation or chest pain   GASTROINTESTINAL:  Denies abdomen pain, diarrhea or constipation,, nausea or vomiting.  GENITOURINARY:  Denies burning urination or frequency of urination  INTEGUMENT: leg wounds, foot wound   HEMATOLOGIC/LYMPHATIC:  Denies lymph node swelling, gum bleeding or easy bruising.  MUSCULOSKELETAL: left foot pain, swelling, redness   NEUROLOGICAL:  Denies light headed, dizziness, loss of consciousness, weakness of lower extremities, bowel or bladder incontinence.      PHYSICAL EXAM:      Vitals:     Blood Pressure 139/79   Pulse 74   Temperature 98.1 °F (36.7 °C) (Temporal)   Respiration 18   Height 1.676 m (5' 5.98\")   Weight 38.6 kg (85 lb)   Oxygen Saturation 93%   Body Mass Index 13.73 kg/m²     General Appearance:    Awake, alert , no acute distress.   Head:    Normocephalic, atraumatic   Eyes:    No pallor, no icterus,   Ears:    No obvious deformity or drainage.   Nose:   No nasal drainage   Throat:   Mucosa moist, no oral thrush   Neck:   Supple, no lymphadenopathy   Back:     no CVA tenderness   Lungs:     Clear to auscultation bilaterally, no wheeze    Heart:    Irregular    Abdomen:     Soft, non-tender, bowel sounds present    Extremities:   Left AKA stump with VAC    Skin:   Wounds        CBC with Differential:      Lab Results   Component Value Date/Time    WBC 13.5 10/27/2024 05:29 AM    RBC 3.41 10/27/2024 05:29 AM    HGB 10.5 10/27/2024 05:29 AM    HCT 34.2 10/27/2024 05:29 AM     10/27/2024 05:29 AM    .3 10/27/2024 05:29 AM    MCH 30.8 10/27/2024 05:29 AM    MCHC 30.7 10/27/2024 05:29 AM    RDW 13.7 10/27/2024 05:29 AM    NRBC 1.7 02/23/2020 04:25 AM    METASPCT 1.7 03/05/2020 05:12 AM    LYMPHOPCT 
sodium (COLACE) capsule 100 mg  100 mg Oral Daily PRN Yohannes Sykes DO        sotalol (BETAPACE) tablet 120 mg  120 mg Oral BID Yohannes Sykes DO   120 mg at 10/24/24 0806       REVIEW OF SYSTEMS:    CONSTITUTIONAL:  Denies fever, chill or rigors.  HEENT: denies blurring of vision or double vision, denies hearing problem  RESPIRATORY: denies cough, shortness of breath, sputum expectoration.  CARDIOVASCULAR:  Denies palpitation or chest pain   GASTROINTESTINAL:  Denies abdomen pain, diarrhea or constipation,, nausea or vomiting.  GENITOURINARY:  Denies burning urination or frequency of urination  INTEGUMENT: leg wounds, foot wound   HEMATOLOGIC/LYMPHATIC:  Denies lymph node swelling, gum bleeding or easy bruising.  MUSCULOSKELETAL: left foot pain, swelling, redness   NEUROLOGICAL:  Denies light headed, dizziness, loss of consciousness, weakness of lower extremities, bowel or bladder incontinence.      PHYSICAL EXAM:      Vitals:     Blood Pressure (Abnormal) 145/69   Pulse 70   Temperature 97 °F (36.1 °C) (Temporal)   Respiration 20   Height 1.676 m (5' 6\")   Weight 37 kg (81 lb 9.1 oz)   Oxygen Saturation 95%   Body Mass Index 13.17 kg/m²     General Appearance:    Awake, alert , no acute distress.   Head:    Normocephalic, atraumatic   Eyes:    No pallor, no icterus,   Ears:    No obvious deformity or drainage.   Nose:   No nasal drainage   Throat:   Mucosa moist, no oral thrush   Neck:   Supple, no lymphadenopathy   Back:     no CVA tenderness   Lungs:     Clear to auscultation bilaterally, no wheeze    Heart:    Irregular    Abdomen:     Soft, non-tender, bowel sounds present    Extremities:            Skin:   Wounds        CBC with Differential:      Lab Results   Component Value Date/Time    WBC 13.0 10/24/2024 04:32 AM    RBC 3.81 10/24/2024 04:32 AM    HGB 11.7 10/24/2024 04:32 AM    HCT 37.4 10/24/2024 04:32 AM     10/24/2024 04:32 AM    MCV 98.2 10/24/2024 04:32 AM    MCH 30.7 
AM    MCH 30.9 10/25/2024 05:33 AM    MCHC 31.3 10/25/2024 05:33 AM    RDW 13.8 10/25/2024 05:33 AM    NRBC 1.7 02/23/2020 04:25 AM    METASPCT 1.7 03/05/2020 05:12 AM    LYMPHOPCT 6 10/25/2024 05:33 AM    MONOPCT 7 10/25/2024 05:33 AM    MYELOPCT 0.9 02/22/2020 04:30 AM    EOSPCT 1 10/25/2024 05:33 AM    BASOPCT 0 10/25/2024 05:33 AM    MONOSABS 1.02 10/25/2024 05:33 AM    LYMPHSABS 0.85 10/25/2024 05:33 AM    EOSABS 0.19 10/25/2024 05:33 AM    BASOSABS 0.03 10/25/2024 05:33 AM       CMP     Lab Results   Component Value Date/Time     10/25/2024 05:33 AM    K 3.8 10/25/2024 05:33 AM    K 4.0 05/23/2023 10:10 AM     10/25/2024 05:33 AM    CO2 29 10/25/2024 05:33 AM    BUN 28 10/25/2024 05:33 AM    CREATININE 0.7 10/25/2024 05:33 AM    GFRAA >60 06/07/2022 09:55 AM    LABGLOM >90 10/25/2024 05:33 AM    LABGLOM >60 07/21/2023 08:17 AM    GLUCOSE 109 10/25/2024 05:33 AM    GLUCOSE 102 10/07/2010 11:51 AM    CALCIUM 9.0 10/25/2024 05:33 AM    BILITOT 0.6 10/23/2024 03:59 AM    ALKPHOS 77 10/23/2024 03:59 AM    AST 15 10/23/2024 03:59 AM    ALT 15 10/23/2024 03:59 AM         Hepatic Function Panel:    Lab Results   Component Value Date/Time    ALKPHOS 77 10/23/2024 03:59 AM    ALT 15 10/23/2024 03:59 AM    AST 15 10/23/2024 03:59 AM    BILITOT 0.6 10/23/2024 03:59 AM       PT/INR:    Lab Results   Component Value Date/Time    PROTIME 16.7 10/23/2024 03:59 AM    PROTIME 11.1 04/13/2011 04:10 PM    INR 1.5 10/23/2024 03:59 AM       TSH:    Lab Results   Component Value Date/Time    TSH 1.120 01/25/2022 01:33 PM       U/A:    Lab Results   Component Value Date/Time    COLORU Yellow 09/16/2020 06:48 AM    PHUR 5.5 09/16/2020 06:48 AM    WBCUA 2-5 09/16/2020 06:48 AM    WBCUA 5-10 10/07/2010 12:00 PM    RBCUA 2-5 09/16/2020 06:48 AM    RBCUA 2-5 10/07/2010 12:00 PM    MUCUS Present 05/29/2014 03:30 PM    YEAST Present 06/12/2020 10:39 AM    BACTERIA FEW 09/16/2020 06:48 AM    CLARITYU Clear 09/16/2020 06:48 AM    
12:00 PM       ABG:  No results found for: \"CFE5CCB\", \"BEART\", \"U2TZXXSK\", \"PHART\", \"THGBART\", \"QCG3RWB\", \"PO2ART\", \"SHQ4WCK\"    MICROBIOLOGY:    Blood culture - neg to date       Radiology :    CTA lower ext -      Impression:        1. Occluded left SFA through above knee popliteal stent.  The below-knee  popliteal artery is not visible and likely chronically occluded.  2. Reconstitution of the diminutive left anterior tibial and peroneal  arteries via profundus and Rhoda geniculate collaterals.  3. Occluded left proximal posterior tibial artery which is segmentally  reconstituted at the level of the ankle joint.  4. Patent right common iliac artery stent.  5. Chronic calcified and occluded bilateral internal iliac arteries.         IMPRESSION:     Cellulitis left foot- s/p Left AKA ( 10/25)   Leukocytosis 13 K - reactive  -trending down   PAD         RECOMMENDATIONS:    Monitor off abx   CBC with diff   Wound VAC     
training 80112 0 minutes  [] Manual therapy 00348 0 minutes  [x] Therapeutic activities 04228 23 minutes  [] Therapeutic exercises 86234 0 minutes  [] Neuromuscular reeducation 39467 0 minutes     Kayley Gipson, PT, DPT  [id number]

## 2024-10-30 ENCOUNTER — HOSPITAL ENCOUNTER (OUTPATIENT)
Dept: WOUND CARE | Age: 74
Discharge: HOME | End: 2024-10-30
Attending: SURGERY | Admitting: SURGERY

## 2024-10-30 LAB — SURGICAL PATHOLOGY REPORT: NORMAL

## 2024-10-31 ENCOUNTER — TELEPHONE (OUTPATIENT)
Dept: WOUND CARE | Age: 74
End: 2024-10-31

## 2024-10-31 ENCOUNTER — HOSPITAL ENCOUNTER (OUTPATIENT)
Dept: INTERVENTIONAL RADIOLOGY/VASCULAR | Age: 74
Discharge: HOME | End: 2024-11-02
Attending: SURGERY

## 2024-10-31 DIAGNOSIS — E11.621 DIABETIC ULCER OF TOE OF LEFT FOOT ASSOCIATED WITH TYPE 2 DIABETES MELLITUS, WITH FAT LAYER EXPOSED (HCC): Chronic | ICD-10-CM

## 2024-10-31 DIAGNOSIS — I70.242 ATHEROSCLEROSIS OF NATIVE ARTERY OF LEFT LOWER EXTREMITY WITH ULCERATION OF CALF (HCC): ICD-10-CM

## 2024-10-31 DIAGNOSIS — L97.522 DIABETIC ULCER OF TOE OF LEFT FOOT ASSOCIATED WITH TYPE 2 DIABETES MELLITUS, WITH FAT LAYER EXPOSED (HCC): Chronic | ICD-10-CM

## 2024-10-31 DIAGNOSIS — L97.212 SKIN ULCER OF RIGHT CALF WITH FAT LAYER EXPOSED (HCC): ICD-10-CM

## 2024-10-31 DIAGNOSIS — I73.9 PVD (PERIPHERAL VASCULAR DISEASE) WITH CLAUDICATION (HCC): ICD-10-CM

## 2024-11-04 ENCOUNTER — TELEPHONE (OUTPATIENT)
Dept: VASCULAR SURGERY | Age: 74
End: 2024-11-04

## 2024-11-04 NOTE — TELEPHONE ENCOUNTER
Paulino from [facility] [place] [facility] called, patient is complaining of pain. She has Lyrica 50 mg bid and Percocet 5/325 q 6 hrs prn for pain.  Spoke with Dr. Hobbs.   Changed order for Percocet to 5/325 mg q 4 hours as needed for pain.

## 2024-11-06 ENCOUNTER — TELEPHONE (OUTPATIENT)
Dept: VASCULAR SURGERY | Age: 74
End: 2024-11-06

## 2024-11-07 ENCOUNTER — TELEPHONE (OUTPATIENT)
Dept: VASCULAR SURGERY | Age: 74
End: 2024-11-07

## 2024-11-07 NOTE — TELEPHONE ENCOUNTER
[facility] called, patient is refusing to take Lyrica which was helping with phantom pain because she didn't like the way it made her feel.  Patient is requesting increase in pain medication for the excruciating pain.    Recommend she start taking percocet 7.5/325 mg q 4 hrs prn pain    Thanks pk

## 2024-11-25 ENCOUNTER — OFFICE VISIT (OUTPATIENT)
Dept: VASCULAR SURGERY | Age: 74
End: 2024-11-25

## 2024-11-25 VITALS — BODY MASS INDEX: 14.69 KG/M2 | WEIGHT: 91 LBS

## 2024-11-25 DIAGNOSIS — Z89.612 STATUS POST ABOVE-KNEE AMPUTATION OF LEFT LOWER EXTREMITY (HCC): Primary | ICD-10-CM

## 2024-11-25 PROCEDURE — 99024 POSTOP FOLLOW-UP VISIT: CPT | Performed by: SURGERY

## 2024-11-25 RX ORDER — LEVOTHYROXINE SODIUM 125 UG/1
125 TABLET ORAL DAILY
COMMUNITY

## 2024-11-25 NOTE — PROGRESS NOTES
11/25/2024    Gladys Elizabeth  1950      Previous Vascular Procedure  1/29/19 L sfa, popliteal atherectomy, dcb   1/7/2020 R LE angiogram - SFA  - R LE LL claudication - needs fem ak pop bypass   5/3/22 R LE angiogram  R SFA atherectomy, and plasty with 4x250 DCB    6/7/22 L LE angiogram - sfa, popliteal athrectomy - sfa, popliteal plasty - AT, DP plasty    11/8/22 Aortogram  R common iliac stent 7x38 (2)  R proximal sfa, CFA, EIA lithotripsy with 5x60 shockwave   5/23/22 Aortogram  R common, external iliac, CFA SFA lithotripsy with 6x60 shockwave  R SFA,Pop plasty 4x200, 5x250 DCB  R CFA EIA, BRITNEY plasty 6x250 DCB  L BRITNEY, EIA lithotripsy 6x60 shockwave   7/18/23 L LE angiogram  L SFA atherectomy, plasty  L AT, TP trunk, Peroneal plasty 2.5x120   7/23/24 Left SFA Pop plasty  Popliteal artery plasty below knee  L AT plasty   10/25/24 L AKA     Patient returns for post operative evaluation following L AKA.  It was done for LLE tissue loss. Pain is improving but she still does have pain in her mid thigh.    The patient denies any unexpected problems since the procedure other than developing a skin tear to her right shin while at therapy this morning. She bumped her shin off equipment.     Physical Exam:    Gen Awake and Alert  CVS RRR  Right LE   - 0.5 cm skin tear present  Left LE   - incision is c/d/I   -Staples intact        Right      Left   Femoral       Dorsalis Pedis biphasic    Posterior Tibial 1, triphasic      A/P S/P L AKA  Incision is well appearing  Staples removed without issue  Pain is improving  Will see her back in 6 weeks to assess how she is doing and make decision regarding prosthesis at that time  She is currently very motivated in therapy and she was encouraged to continue    PVD  R anterior lower leg skin tear  Continue Medical management with ASA and statin  Daily wound care  to right shin with xeroform and dry dressing - very small and should heal without issue  Call with any concerns

## 2025-01-01 ENCOUNTER — OFFICE VISIT (OUTPATIENT)
Dept: VASCULAR SURGERY | Age: 75
End: 2025-01-01
Payer: MEDICARE

## 2025-01-01 DIAGNOSIS — I73.9 PVD (PERIPHERAL VASCULAR DISEASE): Primary | ICD-10-CM

## 2025-01-01 PROCEDURE — G8419 CALC BMI OUT NRM PARAM NOF/U: HCPCS | Performed by: SURGERY

## 2025-01-01 PROCEDURE — 99213 OFFICE O/P EST LOW 20 MIN: CPT | Performed by: SURGERY

## 2025-01-01 PROCEDURE — 1090F PRES/ABSN URINE INCON ASSESS: CPT | Performed by: SURGERY

## 2025-01-01 PROCEDURE — G8400 PT W/DXA NO RESULTS DOC: HCPCS | Performed by: SURGERY

## 2025-01-01 PROCEDURE — 1123F ACP DISCUSS/DSCN MKR DOCD: CPT | Performed by: SURGERY

## 2025-01-01 PROCEDURE — 1036F TOBACCO NON-USER: CPT | Performed by: SURGERY

## 2025-01-01 PROCEDURE — G8427 DOCREV CUR MEDS BY ELIG CLIN: HCPCS | Performed by: SURGERY

## 2025-01-06 ENCOUNTER — OFFICE VISIT (OUTPATIENT)
Dept: VASCULAR SURGERY | Age: 75
End: 2025-01-06

## 2025-01-06 DIAGNOSIS — I73.9 PVD (PERIPHERAL VASCULAR DISEASE) (HCC): Primary | ICD-10-CM

## 2025-01-06 DIAGNOSIS — Z89.612 STATUS POST ABOVE-KNEE AMPUTATION OF LEFT LOWER EXTREMITY (HCC): ICD-10-CM

## 2025-01-06 PROCEDURE — 99024 POSTOP FOLLOW-UP VISIT: CPT | Performed by: SURGERY

## 2025-01-06 RX ORDER — TRAMADOL HYDROCHLORIDE 50 MG/1
50 TABLET ORAL
COMMUNITY
Start: 2025-01-04

## 2025-01-06 RX ORDER — OXYBUTYNIN CHLORIDE 5 MG/1
5 TABLET ORAL 2 TIMES DAILY
COMMUNITY
Start: 2024-12-14

## 2025-01-06 NOTE — PROGRESS NOTES
Vascular Surgery Outpatient Followup    PCP : Toñito Suazo MD    Previous Vascular Procedure  1/29/19 L sfa, popliteal atherectomy, dcb   1/7/2020 R LE angiogram - SFA  - R LE LL claudication - needs fem ak pop bypass   5/3/22 R LE angiogram  R SFA atherectomy, and plasty with 4x250 DCB    6/7/22 L LE angiogram - sfa, popliteal athrectomy - sfa, popliteal plasty - AT, DP plasty    11/8/22 Aortogram  R common iliac stent 7x38 (2)  R proximal sfa, CFA, EIA lithotripsy with 5x60 shockwave   5/23/22 Aortogram  R common, external iliac, CFA SFA lithotripsy with 6x60 shockwave  R SFA,Pop plasty 4x200, 5x250 DCB  R CFA EIA, BRITNEY plasty 6x250 DCB  L BRITNEY, EIA lithotripsy 6x60 shockwave   7/18/23 L LE angiogram  L SFA atherectomy, plasty  L AT, TP trunk, Peroneal plasty 2.5x120   7/23/24 Left SFA Pop plasty  Popliteal artery plasty below knee  L AT plasty   10/25/24 L AKA     HISTORY OF PRESENT ILLNESS:    This is a 74 y.o. female patient who presents back for PVD with history of left AKA.  She remains at a nursing facility and has been doing therapy daily.  She feels her right leg is strong and she is ready for a prosthetic.  She has been hopping on one leg and using a walker.  She currently has 2 skin tears on the right distal leg due to bumping the leg on her wheelchair.     Past Medical History:        Diagnosis Date    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 with rest pain (HCC) 01/16/2019    Atrial fibrillation ([facility])     Atypical mole     upper right arm - black mole     Bilateral carotid artery stenosis 05/22/2018    Dr. Hobbs     Bruit of right carotid artery 05/03/2018    CAD (coronary artery disease)     f/u w/ PCP     Cancer ([facility])     SKIN CA/Thigh    COPD (chronic obstructive pulmonary disease) ([facility])     uses O2 2L NC at night - and during day prn     COVID     Depression     Diabetic ulcer of toe of left foot associated with type 2 diabetes mellitus, with fat layer exposed ([facility])

## 2025-01-21 NOTE — PLAN OF CARE
Problem: Chronic Conditions and Co-morbidities  Goal: Patient's chronic conditions and co-morbidity symptoms are monitored and maintained or improved  Outcome: Progressing     Problem: Cognitive:  Goal: Knowledge of wound care  Description: Knowledge of wound care  Outcome: Progressing  Goal: Understands risk factors for wounds  Description: Understands risk factors for wounds  Outcome: Progressing     Problem: Wound:  Goal: Will show signs of wound healing; wound closure and no evidence of infection  Description: Will show signs of wound healing; wound closure and no evidence of infection  Outcome: Progressing     Problem: Arterial:  Goal: Optimize blood flow for wound healing  Description: Optimize blood flow for wound healing  Outcome: Adequate for Discharge     Problem: Venous:  Goal: Signs of wound healing will improve  Description: Signs of wound healing will improve  Outcome: Adequate for Discharge
21-Jan-2025

## 2025-01-23 LAB
BACTERIA URNS QL MICRO: ABNORMAL
BILIRUB UR QL STRIP: NEGATIVE
CLARITY UR: ABNORMAL
COLOR UR: YELLOW
CRYSTALS URNS MICRO: ABNORMAL /HPF
GLUCOSE UR STRIP-MCNC: NEGATIVE MG/DL
HGB UR QL STRIP.AUTO: ABNORMAL
KETONES UR STRIP-MCNC: NEGATIVE MG/DL
LEUKOCYTE ESTERASE UR QL STRIP: ABNORMAL
NITRITE UR QL STRIP: POSITIVE
PH UR STRIP: 8.5 [PH] (ref 5–9)
PROT UR STRIP-MCNC: 100 MG/DL
RBC #/AREA URNS HPF: ABNORMAL /HPF
SP GR UR STRIP: 1.01 (ref 1–1.03)
UROBILINOGEN UR STRIP-ACNC: 0.2 EU/DL (ref 0–1)
WBC #/AREA URNS HPF: ABNORMAL /HPF

## 2025-01-25 LAB
MICROORGANISM SPEC CULT: ABNORMAL
SPECIMEN DESCRIPTION: ABNORMAL

## 2025-01-31 ENCOUNTER — HOSPITAL ENCOUNTER (EMERGENCY)
Age: 75
Discharge: HOME | End: 2025-01-31
Attending: STUDENT IN AN ORGANIZED HEALTH CARE EDUCATION/TRAINING PROGRAM
Payer: MEDICARE

## 2025-01-31 ENCOUNTER — APPOINTMENT (OUTPATIENT)
Dept: CT IMAGING | Age: 75
End: 2025-01-31
Payer: MEDICARE

## 2025-01-31 VITALS
HEART RATE: 70 BPM | DIASTOLIC BLOOD PRESSURE: 88 MMHG | BODY MASS INDEX: 13.73 KG/M2 | WEIGHT: 85 LBS | TEMPERATURE: 98.6 F | OXYGEN SATURATION: 98 % | RESPIRATION RATE: 18 BRPM | SYSTOLIC BLOOD PRESSURE: 168 MMHG

## 2025-01-31 DIAGNOSIS — I73.9 PVD (PERIPHERAL VASCULAR DISEASE) (HCC): Primary | ICD-10-CM

## 2025-01-31 LAB
ALBUMIN SERPL-MCNC: 3.7 G/DL (ref 3.5–5.2)
ALP SERPL-CCNC: 79 U/L (ref 35–104)
ALT SERPL-CCNC: 15 U/L (ref 0–32)
ANION GAP SERPL CALCULATED.3IONS-SCNC: 10 MMOL/L (ref 7–16)
AST SERPL-CCNC: 17 U/L (ref 0–31)
BASOPHILS # BLD: 0.02 K/UL (ref 0–0.2)
BASOPHILS NFR BLD: 0 % (ref 0–2)
BILIRUB SERPL-MCNC: 0.2 MG/DL (ref 0–1.2)
BUN SERPL-MCNC: 28 MG/DL (ref 6–23)
CALCIUM SERPL-MCNC: 8.9 MG/DL (ref 8.6–10.2)
CHLORIDE SERPL-SCNC: 105 MMOL/L (ref 98–107)
CK SERPL-CCNC: 52 U/L (ref 20–180)
CO2 SERPL-SCNC: 24 MMOL/L (ref 22–29)
CREAT SERPL-MCNC: 0.7 MG/DL (ref 0.5–1)
EOSINOPHIL # BLD: 0.01 K/UL (ref 0.05–0.5)
EOSINOPHILS RELATIVE PERCENT: 0 % (ref 0–6)
ERYTHROCYTE [DISTWIDTH] IN BLOOD BY AUTOMATED COUNT: 13.6 % (ref 11.5–15)
GFR, ESTIMATED: 90 ML/MIN/1.73M2
GLUCOSE SERPL-MCNC: 138 MG/DL (ref 74–99)
HCT VFR BLD AUTO: 41.4 % (ref 34–48)
HGB BLD-MCNC: 13.1 G/DL (ref 11.5–15.5)
IMM GRANULOCYTES # BLD AUTO: 0.04 K/UL (ref 0–0.58)
IMM GRANULOCYTES NFR BLD: 0 % (ref 0–5)
INR PPP: 1.4
LYMPHOCYTES NFR BLD: 0.82 K/UL (ref 1.5–4)
LYMPHOCYTES RELATIVE PERCENT: 8 % (ref 20–42)
MAGNESIUM SERPL-MCNC: 2.2 MG/DL (ref 1.6–2.6)
MCH RBC QN AUTO: 31.1 PG (ref 26–35)
MCHC RBC AUTO-ENTMCNC: 31.6 G/DL (ref 32–34.5)
MCV RBC AUTO: 98.3 FL (ref 80–99.9)
MONOCYTES NFR BLD: 0.62 K/UL (ref 0.1–0.95)
MONOCYTES NFR BLD: 6 % (ref 2–12)
NEUTROPHILS NFR BLD: 86 % (ref 43–80)
NEUTS SEG NFR BLD: 9.17 K/UL (ref 1.8–7.3)
PARTIAL THROMBOPLASTIN TIME: 34.4 SEC (ref 24.5–35.1)
PLATELET # BLD AUTO: 483 K/UL (ref 130–450)
PMV BLD AUTO: 9.5 FL (ref 7–12)
POTASSIUM SERPL-SCNC: 3.9 MMOL/L (ref 3.5–5)
PROT SERPL-MCNC: 6.2 G/DL (ref 6.4–8.3)
PROTHROMBIN TIME: 15 SEC (ref 9.3–12.4)
RBC # BLD AUTO: 4.21 M/UL (ref 3.5–5.5)
SODIUM SERPL-SCNC: 139 MMOL/L (ref 132–146)
WBC OTHER # BLD: 10.7 K/UL (ref 4.5–11.5)

## 2025-01-31 PROCEDURE — 85025 COMPLETE CBC W/AUTO DIFF WBC: CPT

## 2025-01-31 PROCEDURE — 75635 CT ANGIO ABDOMINAL ARTERIES: CPT

## 2025-01-31 PROCEDURE — 99285 EMERGENCY DEPT VISIT HI MDM: CPT

## 2025-01-31 PROCEDURE — 83735 ASSAY OF MAGNESIUM: CPT

## 2025-01-31 PROCEDURE — 85610 PROTHROMBIN TIME: CPT

## 2025-01-31 PROCEDURE — 82550 ASSAY OF CK (CPK): CPT

## 2025-01-31 PROCEDURE — 80053 COMPREHEN METABOLIC PANEL: CPT

## 2025-01-31 PROCEDURE — 6360000004 HC RX CONTRAST MEDICATION: Performed by: RADIOLOGY

## 2025-01-31 PROCEDURE — 85730 THROMBOPLASTIN TIME PARTIAL: CPT

## 2025-01-31 PROCEDURE — 2500000003 HC RX 250 WO HCPCS: Performed by: RADIOLOGY

## 2025-01-31 RX ORDER — IOPAMIDOL 755 MG/ML
120 INJECTION, SOLUTION INTRAVASCULAR
Status: COMPLETED | OUTPATIENT
Start: 2025-01-31 | End: 2025-01-31

## 2025-01-31 RX ORDER — SODIUM CHLORIDE 0.9 % (FLUSH) 0.9 %
10 SYRINGE (ML) INJECTION PRN
Status: COMPLETED | OUTPATIENT
Start: 2025-01-31 | End: 2025-01-31

## 2025-01-31 RX ADMIN — SODIUM CHLORIDE, PRESERVATIVE FREE 10 ML: 5 INJECTION INTRAVENOUS at 18:35

## 2025-01-31 RX ADMIN — IOPAMIDOL 120 ML: 755 INJECTION, SOLUTION INTRAVENOUS at 18:34

## 2025-01-31 ASSESSMENT — PAIN DESCRIPTION - FREQUENCY: FREQUENCY: CONTINUOUS

## 2025-01-31 ASSESSMENT — PAIN DESCRIPTION - LOCATION: LOCATION: FOOT;LEG

## 2025-01-31 ASSESSMENT — PAIN - FUNCTIONAL ASSESSMENT: PAIN_FUNCTIONAL_ASSESSMENT: 0-10

## 2025-01-31 ASSESSMENT — LIFESTYLE VARIABLES: HOW MANY STANDARD DRINKS CONTAINING ALCOHOL DO YOU HAVE ON A TYPICAL DAY: PATIENT DOES NOT DRINK

## 2025-01-31 ASSESSMENT — PAIN DESCRIPTION - ORIENTATION: ORIENTATION: RIGHT

## 2025-01-31 ASSESSMENT — PAIN SCALES - GENERAL: PAINLEVEL_OUTOF10: 8

## 2025-01-31 ASSESSMENT — PAIN DESCRIPTION - PAIN TYPE: TYPE: ACUTE PAIN

## 2025-01-31 ASSESSMENT — PAIN DESCRIPTION - ONSET: ONSET: ON-GOING

## 2025-01-31 ASSESSMENT — PAIN DESCRIPTION - DESCRIPTORS: DESCRIPTORS: ACHING

## 2025-01-31 NOTE — ED PROVIDER NOTES
native artery of left lower extremity with rest pain (HCC), Atrial fibrillation (HCC), Atypical mole, Bilateral carotid artery stenosis, Bruit of right carotid artery, CAD (coronary artery disease), Cancer (HCC), COPD (chronic obstructive pulmonary disease) (HCC), COVID, Depression, Diabetic ulcer of toe of left foot associated with type 2 diabetes mellitus, with fat layer exposed (HCC), Diarrhea, Femoro-popliteal artery disease (HCC), History of tobacco use, Hydrocephalus (HCC), Hyperlipidemia, Hypertension, BRY (obstructive sleep apnea), Pain in both feet, Paroxysmal A-fib (HCC), Peripheral vascular disease due to secondary diabetes mellitus (HCC), PVD (peripheral vascular disease) (HCC), PVD (peripheral vascular disease) with claudication (HCC), Rheumatoid arthritis (HCC), Short-term memory loss, Skin tear of upper arm without complication, Skin ulcer of right calf with fat layer exposed (HCC), Status post peripheral artery angioplasty with insertion of stent, Thyroid disease, Urinary incontinence, Venous stasis ulcer of left calf with fat layer exposed without varicose veins (HCC), and Weight loss.    Past Surgical History:  has a past surgical history that includes  section; Colonoscopy; Tubal ligation; Dilation and curettage of uterus; PERIPHERAL PERCUTANEOUS ARTERIAL INTERVENTION (2019); laparotomy (N/A, 2020); lumbar drain implantation (N/A, 2020); Ventriculoperitoneal shunt (N/A, 2020); eye surgery; Colonoscopy (N/A, 2021); invasive vascular (N/A, 2024); invasive vascular (N/A, 2024); invasive vascular (N/A, 2024); and Leg Surgery (Left, 10/25/2024).    Social History:  reports that she quit smoking about 7 years ago. Her smoking use included cigarettes. She started smoking about 27 years ago. She has a 5 pack-year smoking history. She has never used smokeless tobacco. She reports that she does not currently use alcohol. She reports that she does not use    Result Value Ref Range    Magnesium 2.2 1.6 - 2.6 mg/dL   CK   Result Value Ref Range    Total CK 52 20 - 180 U/L   Protime-INR   Result Value Ref Range    Protime 15.0 (H) 9.3 - 12.4 sec    INR 1.4    APTT   Result Value Ref Range    APTT 34.4 24.5 - 35.1 sec       Radiology:  CTA ABDOMINAL AORTA W BILAT RUNOFF W CONTRAST   Final Result   1.  Severe atherosclerotic changes throughout the abdominal aorta, major   mesenteric branches and renal arteries, and into the right left common iliac   arteries.      2.  The mesenteric circulation is compromised due diminished flow and   stenosis by atherosclerotic changes in the SMA with the collateral   circulation for the mesenteric vessels done through the right common hepatic   artery/gastro duodenal artery which reconstitutes the right middle colic   artery which supplies the JESSICA.      3.  There also atherosclerotic stenosis of both renal arteries at the ostium   to calcified plaques formation.  There is a small secondary accessory renal   artery for the left kidney.      4.  Findings for severe peripheral vascular disease to the arteries of both   lower extremities.      5.  Patient has previous left AKA.  There is an occluded graft for the left   superficial femoral artery.  Circulation for the left thigh is supplied by   the left profunda femoral artery.      6.  There is an occluded the right superficial femoral artery, supply for the   right thigh and right leg is done by the right profunda femoral artery with   collateral vessels reconstituting flow in the right popliteal artery and   directly into distal right popliteal artery/posterior tibial trunk/anterior   tibial artery.      7.  Very poor runoff for the right anterior tibial artery.      8.  Poor runoff for the right posterior tibial artery which forms the   proximal plantar arch but no opacifications of distal plantar branches.      9.  Right peroneal artery has also poor run off extending down to above the

## 2025-01-31 NOTE — PROGRESS NOTES
Radiology Procedure Waiver   Name: Gladys Elizabeth  : 1950  MRN: [id number]    Date:  25    Time: 5:57 PM EST    Benefits of immediately proceeding with radiology exam(s) without pre-testing outweigh the risks or are not indicated as specified below and therefore the following is/are being waived:    [x] Benefits of immediate radiology exam(s) outweigh any risk.                                               OR    Pre-exam testing is not indicated for the following reason(s):  [] Pregnancy test   [] Patients LMP on-time and regular.   [] Patient had Tubal Ligation or has other Contraception Device.   [] Patient  is Menopausal or Premenarcheal.    [] Patient had Full or Partial Hysterectomy.    [] Protocol for CT contrast allegry   [] Patient has tolerated well previously   [] Patient does not have a true allergy    [] MRI Questionnaire     [] BUN/Creatinine   [] Patient age w/no hx of renal dysfunction.   [] Patient on Dialysis.   [] Recent Normal Labs.  Electronically signed by Mao Badillo DO on 25 at 5:57 PM EST

## 2025-02-01 NOTE — DISCHARGE INSTRUCTIONS
Vascular DISCHARGE INSTRUCTIONS    ACTIVITY INSTRUCTIONS:  [x]Resume activity as tolerated.           MEDICATION INSTRUCTIONS:    [x] Continue taking your blood thinners as prescribed. Take pain medication as needed for the right foot pain.       Call physician if they or any other problems occur:   Redness, swelling, hardness or warmth of the foot or right leg   Unrelieved pain    Increasing weakness and inability to move your leg, foot or toes   Increasing numbness

## 2025-02-01 NOTE — CONSULTS
Vascular Surgery Consultation Note    Reason for Consult:    Chief Complaint   Patient presents with    Circulatory Problem     Lives at home [facility] nurse, recent D/C from [facility] for amputation of left leg, aching right foot, discolored, [facility] nurse said weak pulse. Hx PVD, no DM hx     on atb for uti     HPI :    This is a 74 y.o. female with a history of bilateral PVD who presents for evaluation of right foot poikilothermia and mild numbness.  Patient has an extensive vascular disease history starting in 2019 where she had left SFA and popliteal atherectomy and angioplasty.  Multiple LLE vascular interventions with the last being left AKA on 10/2024.  RLE Angiogram in 2020 showed patent but diseased common femoral artery, proximally occluded SFA with reconstitution, proximally occluded AFSHAN, distally occluded peroneal artery.  She underwent right SFA and popliteal atherectomy with angioplasty in 5/2022. In 11/2022 she underwent right EIA, CFA, proximal SFA lithotripsy with shockwave and right BRITNEY stent placement.  In 2023 she underwent right SFA popliteal plasty and dilation as well as BRITNEY, EIA, CFA, SFA lithotripsy with shockwave and dilation.  ABIs on 10/24 showed ANTONY 0.89 on the right side with mild to moderate femoral-popliteal arterial close of disease with biphasic tracings and satisfactory flow to the foot.      She presents today as she started having poikilothermia in her right foot 4 days ago with associated mild achy pain  that is localized to her metatarsals.  Reports that she her foot looks paler and is numb at the sole of her first metatarsal. Denies numbness elsewhere.  Reports having weakness but she is still able to move around and hop on her leg with no issues.  Pain is improved with tramadol.  Patient was advised to go to the ED by her [facility] nurse after she felt that the pt's foot was cold and was unable to get Doppler signals on her.  CTA abdominal aorta with runoff today shows occlusion of the

## 2025-03-03 ENCOUNTER — HOSPITAL ENCOUNTER (INPATIENT)
Age: 75
LOS: 5 days | Discharge: NURSING, REHAB OR LONG-TERM CARE | End: 2025-03-08
Attending: INTERNAL MEDICINE | Admitting: INTERNAL MEDICINE
Payer: MEDICARE

## 2025-03-03 ENCOUNTER — APPOINTMENT (OUTPATIENT)
Dept: CT IMAGING | Age: 75
End: 2025-03-03
Payer: MEDICARE

## 2025-03-03 ENCOUNTER — TELEPHONE (OUTPATIENT)
Dept: VASCULAR SURGERY | Age: 75
End: 2025-03-03

## 2025-03-03 DIAGNOSIS — R07.9 CHEST PAIN, UNSPECIFIED TYPE: Primary | ICD-10-CM

## 2025-03-03 DIAGNOSIS — Z89.612 STATUS POST ABOVE-KNEE AMPUTATION OF LEFT LOWER EXTREMITY (HCC): ICD-10-CM

## 2025-03-03 DIAGNOSIS — M79.604 RIGHT LEG PAIN: ICD-10-CM

## 2025-03-03 DIAGNOSIS — Z89.612 LEFT ABOVE-KNEE AMPUTEE (HCC): ICD-10-CM

## 2025-03-03 DIAGNOSIS — I73.9 PERIPHERAL VASCULAR DISEASE: ICD-10-CM

## 2025-03-03 PROBLEM — I10 SYSTOLIC HYPERTENSION: Chronic | Status: ACTIVE | Noted: 2025-03-03

## 2025-03-03 LAB
ABO + RH BLD: NORMAL
ALBUMIN SERPL-MCNC: 3.8 G/DL (ref 3.5–5.2)
ALP SERPL-CCNC: 78 U/L (ref 35–104)
ALT SERPL-CCNC: 11 U/L (ref 0–32)
ANION GAP SERPL CALCULATED.3IONS-SCNC: 11 MMOL/L (ref 7–16)
ARM BAND NUMBER: NORMAL
AST SERPL-CCNC: 14 U/L (ref 0–31)
BASOPHILS # BLD: 0.05 K/UL (ref 0–0.2)
BASOPHILS NFR BLD: 0 % (ref 0–2)
BILIRUB DIRECT SERPL-MCNC: <0.2 MG/DL (ref 0–0.3)
BILIRUB INDIRECT SERPL-MCNC: NORMAL MG/DL (ref 0–1)
BILIRUB SERPL-MCNC: 0.3 MG/DL (ref 0–1.2)
BLOOD BANK SAMPLE EXPIRATION: NORMAL
BLOOD GROUP ANTIBODIES SERPL: NEGATIVE
BUN SERPL-MCNC: 31 MG/DL (ref 6–23)
CALCIUM SERPL-MCNC: 9.2 MG/DL (ref 8.6–10.2)
CHLORIDE SERPL-SCNC: 103 MMOL/L (ref 98–107)
CO2 SERPL-SCNC: 29 MMOL/L (ref 22–29)
CREAT SERPL-MCNC: 0.9 MG/DL (ref 0.5–1)
EKG ATRIAL RATE: 66 BPM
EKG Q-T INTERVAL: 466 MS
EKG QRS DURATION: 70 MS
EKG QTC CALCULATION (BAZETT): 492 MS
EKG R AXIS: 80 DEGREES
EKG T AXIS: 66 DEGREES
EKG VENTRICULAR RATE: 67 BPM
EOSINOPHIL # BLD: 0.04 K/UL (ref 0.05–0.5)
EOSINOPHILS RELATIVE PERCENT: 0 % (ref 0–6)
ERYTHROCYTE [DISTWIDTH] IN BLOOD BY AUTOMATED COUNT: 14.6 % (ref 11.5–15)
ERYTHROCYTE [DISTWIDTH] IN BLOOD BY AUTOMATED COUNT: 14.7 % (ref 11.5–15)
GFR, ESTIMATED: 71 ML/MIN/1.73M2
GLUCOSE SERPL-MCNC: 102 MG/DL (ref 74–99)
HCT VFR BLD AUTO: 31.8 % (ref 34–48)
HCT VFR BLD AUTO: 35.4 % (ref 34–48)
HGB BLD-MCNC: 10.1 G/DL (ref 11.5–15.5)
HGB BLD-MCNC: 11.2 G/DL (ref 11.5–15.5)
IMM GRANULOCYTES # BLD AUTO: 0.1 K/UL (ref 0–0.58)
IMM GRANULOCYTES NFR BLD: 1 % (ref 0–5)
INR PPP: 1.8
LACTATE BLDV-SCNC: 1.9 MMOL/L (ref 0.5–2.2)
LIPASE SERPL-CCNC: 47 U/L (ref 13–60)
LYMPHOCYTES NFR BLD: 1.04 K/UL (ref 1.5–4)
LYMPHOCYTES RELATIVE PERCENT: 8 % (ref 20–42)
MAGNESIUM SERPL-MCNC: 2 MG/DL (ref 1.6–2.6)
MCH RBC QN AUTO: 31.3 PG (ref 26–35)
MCH RBC QN AUTO: 31.5 PG (ref 26–35)
MCHC RBC AUTO-ENTMCNC: 31.6 G/DL (ref 32–34.5)
MCHC RBC AUTO-ENTMCNC: 31.8 G/DL (ref 32–34.5)
MCV RBC AUTO: 98.5 FL (ref 80–99.9)
MCV RBC AUTO: 99.7 FL (ref 80–99.9)
MONOCYTES NFR BLD: 1.22 K/UL (ref 0.1–0.95)
MONOCYTES NFR BLD: 9 % (ref 2–12)
NEUTROPHILS NFR BLD: 82 % (ref 43–80)
NEUTS SEG NFR BLD: 11.49 K/UL (ref 1.8–7.3)
PARTIAL THROMBOPLASTIN TIME: 34.9 SEC (ref 24.5–35.1)
PARTIAL THROMBOPLASTIN TIME: 36.1 SEC (ref 24.5–35.1)
PLATELET # BLD AUTO: 570 K/UL (ref 130–450)
PLATELET # BLD AUTO: 676 K/UL (ref 130–450)
PMV BLD AUTO: 8.8 FL (ref 7–12)
PMV BLD AUTO: 9.4 FL (ref 7–12)
POTASSIUM SERPL-SCNC: 4 MMOL/L (ref 3.5–5)
PROT SERPL-MCNC: 6.7 G/DL (ref 6.4–8.3)
PROTHROMBIN TIME: 19.6 SEC (ref 9.3–12.4)
RBC # BLD AUTO: 3.23 M/UL (ref 3.5–5.5)
RBC # BLD AUTO: 3.55 M/UL (ref 3.5–5.5)
SODIUM SERPL-SCNC: 143 MMOL/L (ref 132–146)
TROPONIN I SERPL HS-MCNC: 18 NG/L (ref 0–9)
TROPONIN I SERPL HS-MCNC: 20 NG/L (ref 0–9)
TROPONIN I SERPL HS-MCNC: 26 NG/L (ref 0–9)
TROPONIN I SERPL HS-MCNC: 34 NG/L (ref 0–9)
WBC OTHER # BLD: 10.9 K/UL (ref 4.5–11.5)
WBC OTHER # BLD: 13.9 K/UL (ref 4.5–11.5)

## 2025-03-03 PROCEDURE — 83605 ASSAY OF LACTIC ACID: CPT

## 2025-03-03 PROCEDURE — 84484 ASSAY OF TROPONIN QUANT: CPT

## 2025-03-03 PROCEDURE — 85730 THROMBOPLASTIN TIME PARTIAL: CPT

## 2025-03-03 PROCEDURE — 6360000002 HC RX W HCPCS: Performed by: NURSE PRACTITIONER

## 2025-03-03 PROCEDURE — 93005 ELECTROCARDIOGRAM TRACING: CPT

## 2025-03-03 PROCEDURE — 87075 CULTR BACTERIA EXCEPT BLOOD: CPT

## 2025-03-03 PROCEDURE — 94640 AIRWAY INHALATION TREATMENT: CPT

## 2025-03-03 PROCEDURE — 93010 ELECTROCARDIOGRAM REPORT: CPT | Performed by: INTERNAL MEDICINE

## 2025-03-03 PROCEDURE — 2500000003 HC RX 250 WO HCPCS: Performed by: RADIOLOGY

## 2025-03-03 PROCEDURE — 85025 COMPLETE CBC W/AUTO DIFF WBC: CPT

## 2025-03-03 PROCEDURE — 6360000004 HC RX CONTRAST MEDICATION: Performed by: RADIOLOGY

## 2025-03-03 PROCEDURE — 6370000000 HC RX 637 (ALT 250 FOR IP): Performed by: INTERNAL MEDICINE

## 2025-03-03 PROCEDURE — 83690 ASSAY OF LIPASE: CPT

## 2025-03-03 PROCEDURE — 86901 BLOOD TYPING SEROLOGIC RH(D): CPT

## 2025-03-03 PROCEDURE — 87070 CULTURE OTHR SPECIMN AEROBIC: CPT

## 2025-03-03 PROCEDURE — 2500000003 HC RX 250 WO HCPCS

## 2025-03-03 PROCEDURE — 87205 SMEAR GRAM STAIN: CPT

## 2025-03-03 PROCEDURE — 85610 PROTHROMBIN TIME: CPT

## 2025-03-03 PROCEDURE — 86850 RBC ANTIBODY SCREEN: CPT

## 2025-03-03 PROCEDURE — 6370000000 HC RX 637 (ALT 250 FOR IP): Performed by: NURSE PRACTITIONER

## 2025-03-03 PROCEDURE — 85027 COMPLETE CBC AUTOMATED: CPT

## 2025-03-03 PROCEDURE — 86403 PARTICLE AGGLUT ANTBDY SCRN: CPT

## 2025-03-03 PROCEDURE — 86900 BLOOD TYPING SEROLOGIC ABO: CPT

## 2025-03-03 PROCEDURE — 75635 CT ANGIO ABDOMINAL ARTERIES: CPT

## 2025-03-03 PROCEDURE — 83735 ASSAY OF MAGNESIUM: CPT

## 2025-03-03 PROCEDURE — 6360000002 HC RX W HCPCS

## 2025-03-03 PROCEDURE — 99285 EMERGENCY DEPT VISIT HI MDM: CPT

## 2025-03-03 PROCEDURE — 82248 BILIRUBIN DIRECT: CPT

## 2025-03-03 PROCEDURE — 2580000003 HC RX 258

## 2025-03-03 PROCEDURE — 80053 COMPREHEN METABOLIC PANEL: CPT

## 2025-03-03 PROCEDURE — 2060000000 HC ICU INTERMEDIATE R&B

## 2025-03-03 PROCEDURE — 71275 CT ANGIOGRAPHY CHEST: CPT

## 2025-03-03 RX ORDER — BUDESONIDE 0.25 MG/2ML
0.25 INHALANT ORAL
Status: DISCONTINUED | OUTPATIENT
Start: 2025-03-03 | End: 2025-03-08 | Stop reason: HOSPADM

## 2025-03-03 RX ORDER — ONDANSETRON 2 MG/ML
4 INJECTION INTRAMUSCULAR; INTRAVENOUS EVERY 6 HOURS PRN
Status: DISCONTINUED | OUTPATIENT
Start: 2025-03-03 | End: 2025-03-08 | Stop reason: HOSPADM

## 2025-03-03 RX ORDER — MAGNESIUM SULFATE IN WATER 40 MG/ML
2000 INJECTION, SOLUTION INTRAVENOUS PRN
Status: DISCONTINUED | OUTPATIENT
Start: 2025-03-03 | End: 2025-03-04

## 2025-03-03 RX ORDER — LEVOTHYROXINE SODIUM 125 UG/1
125 TABLET ORAL DAILY
Status: DISCONTINUED | OUTPATIENT
Start: 2025-03-03 | End: 2025-03-08 | Stop reason: HOSPADM

## 2025-03-03 RX ORDER — BUPROPION HYDROCHLORIDE 150 MG/1
150 TABLET, EXTENDED RELEASE ORAL DAILY
Status: DISCONTINUED | OUTPATIENT
Start: 2025-03-03 | End: 2025-03-08 | Stop reason: HOSPADM

## 2025-03-03 RX ORDER — SODIUM CHLORIDE 0.9 % (FLUSH) 0.9 %
10 SYRINGE (ML) INJECTION PRN
Status: DISCONTINUED | OUTPATIENT
Start: 2025-03-03 | End: 2025-03-08 | Stop reason: HOSPADM

## 2025-03-03 RX ORDER — AMLODIPINE BESYLATE 5 MG/1
5 TABLET ORAL 2 TIMES DAILY
Status: DISCONTINUED | OUTPATIENT
Start: 2025-03-03 | End: 2025-03-08 | Stop reason: HOSPADM

## 2025-03-03 RX ORDER — SODIUM CHLORIDE 0.9 % (FLUSH) 0.9 %
5-40 SYRINGE (ML) INJECTION EVERY 12 HOURS SCHEDULED
Status: DISCONTINUED | OUTPATIENT
Start: 2025-03-03 | End: 2025-03-08 | Stop reason: HOSPADM

## 2025-03-03 RX ORDER — ASPIRIN 81 MG/1
81 TABLET, CHEWABLE ORAL DAILY
Status: DISCONTINUED | OUTPATIENT
Start: 2025-03-04 | End: 2025-03-03 | Stop reason: SDUPTHER

## 2025-03-03 RX ORDER — HEPARIN SODIUM 1000 [USP'U]/ML
60 INJECTION, SOLUTION INTRAVENOUS; SUBCUTANEOUS PRN
Status: DISCONTINUED | OUTPATIENT
Start: 2025-03-03 | End: 2025-03-06

## 2025-03-03 RX ORDER — ACETAMINOPHEN 650 MG/1
650 SUPPOSITORY RECTAL EVERY 6 HOURS PRN
Status: DISCONTINUED | OUTPATIENT
Start: 2025-03-03 | End: 2025-03-08 | Stop reason: HOSPADM

## 2025-03-03 RX ORDER — HEPARIN SODIUM 1000 [USP'U]/ML
30 INJECTION, SOLUTION INTRAVENOUS; SUBCUTANEOUS PRN
Status: DISCONTINUED | OUTPATIENT
Start: 2025-03-03 | End: 2025-03-06

## 2025-03-03 RX ORDER — AMLODIPINE BESYLATE 5 MG/1
5 TABLET ORAL DAILY
Status: DISCONTINUED | OUTPATIENT
Start: 2025-03-03 | End: 2025-03-03

## 2025-03-03 RX ORDER — ARFORMOTEROL TARTRATE 15 UG/2ML
15 SOLUTION RESPIRATORY (INHALATION)
Status: DISCONTINUED | OUTPATIENT
Start: 2025-03-03 | End: 2025-03-08 | Stop reason: HOSPADM

## 2025-03-03 RX ORDER — MORPHINE SULFATE 2 MG/ML
1 INJECTION, SOLUTION INTRAMUSCULAR; INTRAVENOUS ONCE
Status: DISCONTINUED | OUTPATIENT
Start: 2025-03-03 | End: 2025-03-03

## 2025-03-03 RX ORDER — ONDANSETRON 4 MG/1
4 TABLET, ORALLY DISINTEGRATING ORAL EVERY 8 HOURS PRN
Status: DISCONTINUED | OUTPATIENT
Start: 2025-03-03 | End: 2025-03-08 | Stop reason: HOSPADM

## 2025-03-03 RX ORDER — HEPARIN SODIUM 10000 [USP'U]/100ML
5-30 INJECTION, SOLUTION INTRAVENOUS CONTINUOUS
Status: DISCONTINUED | OUTPATIENT
Start: 2025-03-03 | End: 2025-03-06

## 2025-03-03 RX ORDER — POTASSIUM CHLORIDE 7.45 MG/ML
10 INJECTION INTRAVENOUS PRN
Status: DISCONTINUED | OUTPATIENT
Start: 2025-03-03 | End: 2025-03-04

## 2025-03-03 RX ORDER — FENTANYL CITRATE 50 UG/ML
25 INJECTION, SOLUTION INTRAMUSCULAR; INTRAVENOUS ONCE
Status: DISCONTINUED | OUTPATIENT
Start: 2025-03-03 | End: 2025-03-03

## 2025-03-03 RX ORDER — 0.9 % SODIUM CHLORIDE 0.9 %
500 INTRAVENOUS SOLUTION INTRAVENOUS ONCE
Status: COMPLETED | OUTPATIENT
Start: 2025-03-03 | End: 2025-03-03

## 2025-03-03 RX ORDER — SOTALOL HYDROCHLORIDE 120 MG/1
120 TABLET ORAL 2 TIMES DAILY
Status: DISCONTINUED | OUTPATIENT
Start: 2025-03-03 | End: 2025-03-08 | Stop reason: HOSPADM

## 2025-03-03 RX ORDER — ASPIRIN 81 MG/1
81 TABLET, CHEWABLE ORAL DAILY
Status: DISCONTINUED | OUTPATIENT
Start: 2025-03-03 | End: 2025-03-08 | Stop reason: HOSPADM

## 2025-03-03 RX ORDER — POTASSIUM CHLORIDE 1500 MG/1
40 TABLET, EXTENDED RELEASE ORAL PRN
Status: DISCONTINUED | OUTPATIENT
Start: 2025-03-03 | End: 2025-03-04

## 2025-03-03 RX ORDER — MORPHINE SULFATE 2 MG/ML
1 INJECTION, SOLUTION INTRAMUSCULAR; INTRAVENOUS ONCE
Status: COMPLETED | OUTPATIENT
Start: 2025-03-03 | End: 2025-03-03

## 2025-03-03 RX ORDER — HEPARIN SODIUM 1000 [USP'U]/ML
60 INJECTION, SOLUTION INTRAVENOUS; SUBCUTANEOUS ONCE
Status: COMPLETED | OUTPATIENT
Start: 2025-03-03 | End: 2025-03-03

## 2025-03-03 RX ORDER — IOPAMIDOL 755 MG/ML
170 INJECTION, SOLUTION INTRAVASCULAR
Status: COMPLETED | OUTPATIENT
Start: 2025-03-03 | End: 2025-03-03

## 2025-03-03 RX ORDER — ACETAMINOPHEN 325 MG/1
650 TABLET ORAL EVERY 6 HOURS PRN
Status: DISCONTINUED | OUTPATIENT
Start: 2025-03-03 | End: 2025-03-08 | Stop reason: HOSPADM

## 2025-03-03 RX ORDER — ATORVASTATIN CALCIUM 40 MG/1
40 TABLET, FILM COATED ORAL NIGHTLY
Status: DISCONTINUED | OUTPATIENT
Start: 2025-03-03 | End: 2025-03-08 | Stop reason: HOSPADM

## 2025-03-03 RX ORDER — SODIUM CHLORIDE 9 MG/ML
INJECTION, SOLUTION INTRAVENOUS PRN
Status: DISCONTINUED | OUTPATIENT
Start: 2025-03-03 | End: 2025-03-08 | Stop reason: HOSPADM

## 2025-03-03 RX ORDER — OXYBUTYNIN CHLORIDE 5 MG/1
5 TABLET ORAL 2 TIMES DAILY
Status: DISCONTINUED | OUTPATIENT
Start: 2025-03-03 | End: 2025-03-04

## 2025-03-03 RX ORDER — DOCUSATE SODIUM 100 MG/1
100 CAPSULE, LIQUID FILLED ORAL DAILY PRN
Status: DISCONTINUED | OUTPATIENT
Start: 2025-03-03 | End: 2025-03-07

## 2025-03-03 RX ADMIN — OXYBUTYNIN CHLORIDE 5 MG: 5 TABLET ORAL at 21:44

## 2025-03-03 RX ADMIN — BUDESONIDE 250 MCG: 0.25 SUSPENSION RESPIRATORY (INHALATION) at 18:10

## 2025-03-03 RX ADMIN — ACETAMINOPHEN 650 MG: 325 TABLET ORAL at 19:06

## 2025-03-03 RX ADMIN — MORPHINE SULFATE 1 MG: 2 INJECTION, SOLUTION INTRAMUSCULAR; INTRAVENOUS at 08:01

## 2025-03-03 RX ADMIN — IOPAMIDOL 170 ML: 755 INJECTION, SOLUTION INTRAVENOUS at 08:37

## 2025-03-03 RX ADMIN — SODIUM CHLORIDE 500 ML: 9 INJECTION, SOLUTION INTRAVENOUS at 07:54

## 2025-03-03 RX ADMIN — SOTALOL HYDROCHLORIDE 120 MG: 120 TABLET ORAL at 20:14

## 2025-03-03 RX ADMIN — MORPHINE SULFATE 1 MG: 2 INJECTION, SOLUTION INTRAMUSCULAR; INTRAVENOUS at 23:26

## 2025-03-03 RX ADMIN — HEPARIN SODIUM 2300 UNITS: 1000 INJECTION INTRAVENOUS; SUBCUTANEOUS at 17:50

## 2025-03-03 RX ADMIN — AMLODIPINE BESYLATE 5 MG: 5 TABLET ORAL at 21:44

## 2025-03-03 RX ADMIN — ATORVASTATIN CALCIUM 40 MG: 40 TABLET, FILM COATED ORAL at 20:14

## 2025-03-03 RX ADMIN — LEVOTHYROXINE SODIUM 125 MCG: 0.11 TABLET ORAL at 20:14

## 2025-03-03 RX ADMIN — ASPIRIN 81 MG CHEWABLE TABLET 81 MG: 81 TABLET CHEWABLE at 19:06

## 2025-03-03 RX ADMIN — IPRATROPIUM BROMIDE 0.5 MG: 0.5 SOLUTION RESPIRATORY (INHALATION) at 18:10

## 2025-03-03 RX ADMIN — HEPARIN SODIUM 12 UNITS/KG/HR: 10000 INJECTION, SOLUTION INTRAVENOUS at 17:52

## 2025-03-03 RX ADMIN — Medication 10 ML: at 08:33

## 2025-03-03 RX ADMIN — MORPHINE SULFATE 1 MG: 2 INJECTION, SOLUTION INTRAMUSCULAR; INTRAVENOUS at 21:43

## 2025-03-03 RX ADMIN — ARFORMOTEROL TARTRATE 15 MCG: 15 SOLUTION RESPIRATORY (INHALATION) at 18:10

## 2025-03-03 ASSESSMENT — PAIN DESCRIPTION - ORIENTATION
ORIENTATION: RIGHT

## 2025-03-03 ASSESSMENT — PAIN DESCRIPTION - LOCATION
LOCATION: LEG
LOCATION: BACK
LOCATION: LEG
LOCATION: LEG

## 2025-03-03 ASSESSMENT — PAIN DESCRIPTION - DESCRIPTORS
DESCRIPTORS: ACHING
DESCRIPTORS: SHARP;DISCOMFORT;STABBING
DESCRIPTORS: DISCOMFORT;SHARP;STABBING

## 2025-03-03 ASSESSMENT — LIFESTYLE VARIABLES
HOW OFTEN DO YOU HAVE A DRINK CONTAINING ALCOHOL: NEVER
HOW MANY STANDARD DRINKS CONTAINING ALCOHOL DO YOU HAVE ON A TYPICAL DAY: PATIENT DOES NOT DRINK

## 2025-03-03 ASSESSMENT — PAIN SCALES - GENERAL
PAINLEVEL_OUTOF10: 10
PAINLEVEL_OUTOF10: 6
PAINLEVEL_OUTOF10: 10
PAINLEVEL_OUTOF10: 10

## 2025-03-03 ASSESSMENT — PAIN - FUNCTIONAL ASSESSMENT: PAIN_FUNCTIONAL_ASSESSMENT: 0-10

## 2025-03-03 NOTE — ED NOTES
THIS NURSE ROUNDING IN ER WAITING ROOM, PT AWARE OF BEING ADMITTED AND THAT WE ARE WAITING FOR BED ASSIGNMENT. PT VITAL SIGNS RECHECKED AT PRESENT TIME. PT VOICES NO NEEDS AND /OR COMPLAINTS.

## 2025-03-03 NOTE — TELEPHONE ENCOUNTER
Daughter Thalia called, cancel appointment for 3-3-25, patient is hospitalized for chest pain and UTI.

## 2025-03-03 NOTE — ED NOTES
Radiology Procedure Waiver   Name: Gladys Elizabeth  : 1950  MRN: [id number]    Date:  3/3/25    Time: 7:34 AM EST    Benefits of immediately proceeding with Radiology exam(s) without pre-testing outweigh the risks or are not indicated as specified below and therefore the following is/are being waived:    [] Pregnancy test   [] Patients LMP on-time and regular.   [] Patient had Tubal Ligation or has other Contraception Device.   [] Patient  is Menopausal or Premenarcheal.    [] Patient had Full or Partial Hysterectomy.    [] Protocol for Iodine allergy    [] MRI Questionnaire     [x] BUN/Creatinine   [] Patient age w/no hx of renal dysfunction.   [] Patient on Dialysis.   [] Recent Normal Labs.  Electronically signed by Regina Sinclair MD on 3/3/25 at 7:34 AM EST

## 2025-03-03 NOTE — ED PROVIDER NOTES
Comments: Right lower extremity wounds as shown below  Cool to touch, pulses not palpable   Neurological:      Mental Status: She is alert and oriented to person, place, and time. Mental status is at baseline.          Chart review: Evaluated by vascular surgery on 2025 for peripheral vascular disease    Social determinants: Unable to ambulate due to previous above-knee amputation    Acute or chronic illness limiting or affecting care: Concern for severe right sided peripheral vascular disease, plan to obtain r/o limb ischemia    ------------------------- PAST HISTORY -------------------------    I personally reviewed the following history:    Past Medical History:  has a past medical history of 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 with rest pain ([facility]), Atrial fibrillation ([facility]), Atypical mole, Bilateral carotid artery stenosis, Bruit of right carotid artery, CAD (coronary artery disease), Cancer ([facility]), COPD (chronic obstructive pulmonary disease) ([facility]), COVID, Depression, Diabetic ulcer of toe of left foot associated with type 2 diabetes mellitus, with fat layer exposed ([facility]), Diarrhea, Femoro-popliteal artery disease, History of tobacco use, Hydrocephalus ([facility]), Hyperlipidemia, Hypertension, BRY (obstructive sleep apnea), Pain in both feet, Paroxysmal A-fib ([facility]), Peripheral vascular disease due to secondary diabetes mellitus ([facility]), PVD (peripheral vascular disease), PVD (peripheral vascular disease) with claudication, Rheumatoid arthritis ([facility]), Short-term memory loss, Skin tear of upper arm without complication, Skin ulcer of right calf with fat layer exposed ([facility]), Status post peripheral artery angioplasty with insertion of stent, Thyroid disease, Urinary incontinence, Venous stasis ulcer of left calf with fat layer exposed without varicose veins ([facility]), and Weight loss.    Past Surgical History:  has a past surgical history that includes  section; Colonoscopy; Tubal ligation;  stent with patent left deep   profundus and muscular collaterals sub serving the left lower extremity which   has a above the knee amputation site.   7.  shunt catheter with small volume low-density fluid in the right upper   quadrant adjacent to the terminus of the  shunt and in the pelvis.         CTA ABDOMINAL AORTA W BILAT RUNOFF W CONTRAST   Final Result   1. No evidence of thoracic aortic aneurysm or dissection.   2. No evidence of abdominal aortic aneurysm or dissection.   3. Right paratracheal and right perihilar adenopathy greater than expected   for reactive adenopathy.   4. Right upper lobe mucous plugging with associated nodular density a or mild   convergent area of involvement and plugging 11 mm maximum dimension with   adjacent patchy opacifications in the right upper lung posteriorly likely   bronchiolitis associated.   5. Occluded right superficial femoral artery with reconstitution of flow at   the distal popliteal margin trifurcation which has patent proximal   trifurcation distal dissipation of opacification with posterior tibial artery   single-vessel runoff patent at the level the right ankle.   6. Occluded left superficial femoral artery stent with patent left deep   profundus and muscular collaterals sub serving the left lower extremity which   has a above the knee amputation site.   7.  shunt catheter with small volume low-density fluid in the right upper   quadrant adjacent to the terminus of the  shunt and in the pelvis.           Procedures     None    MDM and ED course   History is obtained from patient and family for patient's acute onset of moderate chest pain and right lower extremity pain    Differential diagnoses: Peripheral vascular disease, phantom pain, stoma pain, infection, compartment syndrome     ED Course as of 03/03/25 1306   Mon Mar 03, 2025   0702 EKG interpreted by me: Normal sinus rhythm, rate 67, normal TX interval, normal QRS duration, QTc is 492, no acute ST

## 2025-03-04 LAB
ANION GAP SERPL CALCULATED.3IONS-SCNC: 15 MMOL/L (ref 7–16)
BUN SERPL-MCNC: 27 MG/DL (ref 6–23)
CALCIUM SERPL-MCNC: 8.7 MG/DL (ref 8.6–10.2)
CHLORIDE SERPL-SCNC: 106 MMOL/L (ref 98–107)
CHOLEST SERPL-MCNC: 168 MG/DL
CO2 SERPL-SCNC: 23 MMOL/L (ref 22–29)
CREAT SERPL-MCNC: 0.8 MG/DL (ref 0.5–1)
ERYTHROCYTE [DISTWIDTH] IN BLOOD BY AUTOMATED COUNT: 14.8 % (ref 11.5–15)
GFR, ESTIMATED: 72 ML/MIN/1.73M2
GLUCOSE SERPL-MCNC: 115 MG/DL (ref 74–99)
HBA1C MFR BLD: 5.2 % (ref 4–5.6)
HCT VFR BLD AUTO: 32.3 % (ref 34–48)
HDLC SERPL-MCNC: 61 MG/DL
HGB BLD-MCNC: 10 G/DL (ref 11.5–15.5)
LDLC SERPL CALC-MCNC: 66 MG/DL
MCH RBC QN AUTO: 31.4 PG (ref 26–35)
MCHC RBC AUTO-ENTMCNC: 31 G/DL (ref 32–34.5)
MCV RBC AUTO: 101.6 FL (ref 80–99.9)
PARTIAL THROMBOPLASTIN TIME: 46 SEC (ref 24.5–35.1)
PARTIAL THROMBOPLASTIN TIME: 56.2 SEC (ref 24.5–35.1)
PARTIAL THROMBOPLASTIN TIME: 91.7 SEC (ref 24.5–35.1)
PLATELET # BLD AUTO: 575 K/UL (ref 130–450)
PMV BLD AUTO: 9 FL (ref 7–12)
POTASSIUM SERPL-SCNC: 3.7 MMOL/L (ref 3.5–5)
RBC # BLD AUTO: 3.18 M/UL (ref 3.5–5.5)
SODIUM SERPL-SCNC: 144 MMOL/L (ref 132–146)
TRIGL SERPL-MCNC: 207 MG/DL
VLDLC SERPL CALC-MCNC: 41 MG/DL
WBC OTHER # BLD: 9.6 K/UL (ref 4.5–11.5)

## 2025-03-04 PROCEDURE — 2060000000 HC ICU INTERMEDIATE R&B

## 2025-03-04 PROCEDURE — 83036 HEMOGLOBIN GLYCOSYLATED A1C: CPT

## 2025-03-04 PROCEDURE — 2700000000 HC OXYGEN THERAPY PER DAY

## 2025-03-04 PROCEDURE — 6370000000 HC RX 637 (ALT 250 FOR IP): Performed by: INTERNAL MEDICINE

## 2025-03-04 PROCEDURE — 36415 COLL VENOUS BLD VENIPUNCTURE: CPT

## 2025-03-04 PROCEDURE — 6370000000 HC RX 637 (ALT 250 FOR IP): Performed by: NURSE PRACTITIONER

## 2025-03-04 PROCEDURE — 99223 1ST HOSP IP/OBS HIGH 75: CPT | Performed by: SURGERY

## 2025-03-04 PROCEDURE — 6360000002 HC RX W HCPCS: Performed by: NURSE PRACTITIONER

## 2025-03-04 PROCEDURE — 85027 COMPLETE CBC AUTOMATED: CPT

## 2025-03-04 PROCEDURE — 85730 THROMBOPLASTIN TIME PARTIAL: CPT

## 2025-03-04 PROCEDURE — 2500000003 HC RX 250 WO HCPCS: Performed by: NURSE PRACTITIONER

## 2025-03-04 PROCEDURE — 80048 BASIC METABOLIC PNL TOTAL CA: CPT

## 2025-03-04 PROCEDURE — 6360000002 HC RX W HCPCS

## 2025-03-04 PROCEDURE — 80061 LIPID PANEL: CPT

## 2025-03-04 PROCEDURE — 94640 AIRWAY INHALATION TREATMENT: CPT

## 2025-03-04 RX ORDER — OXYBUTYNIN CHLORIDE 10 MG/1
10 TABLET, EXTENDED RELEASE ORAL EVERY EVENING
Status: DISCONTINUED | OUTPATIENT
Start: 2025-03-04 | End: 2025-03-08 | Stop reason: HOSPADM

## 2025-03-04 RX ORDER — OXYBUTYNIN CHLORIDE 10 MG/1
10 TABLET, EXTENDED RELEASE ORAL EVERY EVENING
COMMUNITY

## 2025-03-04 RX ORDER — LIDOCAINE 4 G/G
1 PATCH TOPICAL DAILY PRN
Status: ON HOLD | COMMUNITY
End: 2025-03-08 | Stop reason: HOSPADM

## 2025-03-04 RX ORDER — FERROUS SULFATE 325(65) MG
325 TABLET ORAL
COMMUNITY

## 2025-03-04 RX ORDER — TRAMADOL HYDROCHLORIDE 50 MG/1
50 TABLET ORAL EVERY 8 HOURS PRN
Status: ON HOLD | COMMUNITY
End: 2025-03-08 | Stop reason: HOSPADM

## 2025-03-04 RX ORDER — BUPROPION HYDROCHLORIDE 150 MG/1
150 TABLET ORAL EVERY MORNING
COMMUNITY

## 2025-03-04 RX ORDER — CIPROFLOXACIN 500 MG/1
500 TABLET, FILM COATED ORAL 2 TIMES DAILY
COMMUNITY
Start: 2025-03-01 | End: 2025-03-11

## 2025-03-04 RX ADMIN — ARFORMOTEROL TARTRATE 15 MCG: 15 SOLUTION RESPIRATORY (INHALATION) at 08:04

## 2025-03-04 RX ADMIN — IPRATROPIUM BROMIDE 0.5 MG: 0.5 SOLUTION RESPIRATORY (INHALATION) at 08:04

## 2025-03-04 RX ADMIN — SODIUM CHLORIDE, PRESERVATIVE FREE 10 ML: 5 INJECTION INTRAVENOUS at 13:31

## 2025-03-04 RX ADMIN — OXYBUTYNIN CHLORIDE 5 MG: 5 TABLET ORAL at 13:31

## 2025-03-04 RX ADMIN — ACETAMINOPHEN 650 MG: 325 TABLET ORAL at 17:33

## 2025-03-04 RX ADMIN — SODIUM CHLORIDE, PRESERVATIVE FREE 10 ML: 5 INJECTION INTRAVENOUS at 21:30

## 2025-03-04 RX ADMIN — IPRATROPIUM BROMIDE 0.5 MG: 0.5 SOLUTION RESPIRATORY (INHALATION) at 19:49

## 2025-03-04 RX ADMIN — BUPROPION HYDROCHLORIDE 150 MG: 150 TABLET, FILM COATED, EXTENDED RELEASE ORAL at 15:11

## 2025-03-04 RX ADMIN — OXYBUTYNIN CHLORIDE 10 MG: 10 TABLET, EXTENDED RELEASE ORAL at 17:33

## 2025-03-04 RX ADMIN — AMLODIPINE BESYLATE 5 MG: 5 TABLET ORAL at 21:30

## 2025-03-04 RX ADMIN — BUDESONIDE 250 MCG: 0.25 SUSPENSION RESPIRATORY (INHALATION) at 19:49

## 2025-03-04 RX ADMIN — HEPARIN SODIUM 1200 UNITS: 1000 INJECTION INTRAVENOUS; SUBCUTANEOUS at 07:03

## 2025-03-04 RX ADMIN — IPRATROPIUM BROMIDE 0.5 MG: 0.5 SOLUTION RESPIRATORY (INHALATION) at 00:36

## 2025-03-04 RX ADMIN — IPRATROPIUM BROMIDE 0.5 MG: 0.5 SOLUTION RESPIRATORY (INHALATION) at 13:59

## 2025-03-04 RX ADMIN — LEVOTHYROXINE SODIUM 125 MCG: 0.11 TABLET ORAL at 05:26

## 2025-03-04 RX ADMIN — ASPIRIN 81 MG CHEWABLE TABLET 81 MG: 81 TABLET CHEWABLE at 12:05

## 2025-03-04 RX ADMIN — BUDESONIDE 250 MCG: 0.25 SUSPENSION RESPIRATORY (INHALATION) at 08:04

## 2025-03-04 RX ADMIN — ATORVASTATIN CALCIUM 40 MG: 40 TABLET, FILM COATED ORAL at 21:30

## 2025-03-04 RX ADMIN — ARFORMOTEROL TARTRATE 15 MCG: 15 SOLUTION RESPIRATORY (INHALATION) at 19:49

## 2025-03-04 ASSESSMENT — PAIN DESCRIPTION - DESCRIPTORS: DESCRIPTORS: ACHING;SORE

## 2025-03-04 ASSESSMENT — PAIN SCALES - GENERAL
PAINLEVEL_OUTOF10: 0
PAINLEVEL_OUTOF10: 7
PAINLEVEL_OUTOF10: 5
PAINLEVEL_OUTOF10: 5

## 2025-03-04 ASSESSMENT — PAIN DESCRIPTION - LOCATION
LOCATION: LEG
LOCATION: BACK

## 2025-03-04 NOTE — PROGRESS NOTES
4 Eyes Skin Assessment     NAME:  Gladys Elizabeth  YOB: 1950  MEDICAL RECORD NUMBER:  [id number]    The patient is being assessed for  Admission    I agree that at least one RN has performed a thorough Head to Toe Skin Assessment on the patient. ALL assessment sites listed below have been assessed.      Areas assessed by both nurses:    Head, Face, Ears, Shoulders, Back, Chest, Arms, Elbows, Hands, Sacrum. Buttock, Coccyx, Ischium, Legs. Feet and Heels, and Under Medical Devices         Does the Patient have a Wound? Yes wound(s) were present on assessment. LDA wound assessment was Initiated and completed by RN       Tony Prevention initiated by RN: Yes  Wound Care Orders initiated by RN: Yes    Pressure Injury (Stage 3,4, Unstageable, DTI, NWPT, and Complex wounds) if present, place Wound referral order by RN under : No    New Ostomies, if present place, Ostomy referral order under : No     Nurse 1 eSignature: Electronically signed by Leif Brown RN on 3/4/25 at 5:59 PM EST    **SHARE this note so that the co-signing nurse can place an eSignature**    Nurse 2 eSignature: Electronically signed by Ashanti Roberto RN on 3/4/25 at 6:51 PM EST

## 2025-03-04 NOTE — CONSULTS
in both feet 2018    Paroxysmal A-fib (HCC)     Peripheral vascular disease due to secondary diabetes mellitus ([facility]) 2022    PVD (peripheral vascular disease)     PVD (peripheral vascular disease) with claudication 2018    Rheumatoid arthritis ([facility])     Short-term memory loss     Skin tear of upper arm without complication 2024    Skin ulcer of right calf with fat layer exposed (HCC) 2024    Status post peripheral artery angioplasty with insertion of stent 10/16/2024    Patient underwent multiple vascular interventions by Dr. Hobbs including angioplasty stenting, atherectomy, lithotripsy etc. 5 different occasions between  and       Thyroid disease     Urinary incontinence     Venous stasis ulcer of left calf with fat layer exposed without varicose veins ([facility]) 2019    Weight loss         Past Surgical History:   Procedure Laterality Date     SECTION      COLONOSCOPY      COLONOSCOPY N/A 2021    COLONOSCOPY WITH BIOPSY performed by Monty Leung MD at [facility] ENDOSCOPY    DILATION AND CURETTAGE OF UTERUS      EYE SURGERY      bilat cataract     INVASIVE VASCULAR N/A 2024    Aortagram abdominal performed by Chrissy Hobbs MD at [facility]    INVASIVE VASCULAR N/A 2024    Angioplasty superficial femoral artery performed by Chrissy Hobbs MD at [facility]    INVASIVE VASCULAR N/A 2024    Angioplasty popliteal artery performed by Chrissy Hobbs MD at [facility]    LAPAROTOMY N/A 2020    LAPAROTOMY EXPLORATORY, RIGHT HEMICOLECTOMY performed by Monty Leung MD at [facility] OR    LEG SURGERY Left 10/25/2024    Left Leg Above Knee Amputation performed by Chrissy Hobbs MD at [facility]    LUMBAR DRAIN IMPLANTATION N/A 2020    PLACEMENT OF INTRATHECAL CATHETER FOR LUMBAR DRAIN performed by Stanley Orozco MD at [facility]    PERIPHERAL PERCUTANEOUS ARTERIAL INTERVENTION  2019  lung posteriorly likely bronchiolitis associated.  Minimal atelectasis at the left lung base medially.  No evidence of pleural effusion or pneumothorax. Soft Tissues/Bones: No acute bone or soft tissue abnormality. CTA ABDOMEN: Abdominal aorta/Branches: Patent atherosclerotic nonaneurysmal abdominal aorta with patent celiac SMA and renal artery origins.  JESSICA patent.  No evidence for dissection or periaortic hemorrhage/hematoma. Unless otherwise indicated or stated incidental findings do not require dedicated follow-up imaging. Lower Chest: Lung bases are clear. Organs: Liver without focal lesion.  Low attenuation throughout the liver of hepatic steatosis.  Gallbladder unremarkable. Pancreas and spleen unremarkable.  Adrenals without nodule.  Kidneys without suspicious renal lesion and no hydronephrosis. GI/Bowel: No focal thickening or disproportion dilatation of bowel.  No inflammatory findings.  Mild-to-moderate colonic stool burden greatest proximal. Peritoneum/Retroperitoneum: No bulky retroperitoneal adenopathy. No suspicious peritoneal or mesenteric process.   shunt catheter with small volume low-density fluid in the right upper quadrant adjacent to the terminus of the  shunt and in the pelvis. Bones/Soft Tissues: No acute osseous or soft tissue findings.  Postsurgical changes left above the knee amputation without focal fluid collection or soft tissue gas expected postsurgical changes the site.  Right-sided  shunt catheter terminates in the right upper quadrant. CTA PELVIS and runoff: Vascular: Aorta/Iliacs: Iliofemoral vessels widely patent without focal severe stenosis, aneurysm, dissection or occlusion with patency through the external iliac arteries. Bilateral common femoral arteries are patent with occluded right superficial femoral artery demonstrating components of reconstitution in the distal portion with patent right deep profundus.  Left superficial femoral artery stent is occluded with patent

## 2025-03-04 NOTE — CARE COORDINATION
Patient Cognition: Alert    Hospitalization in the last 30 days (Readmission):  No    If yes, Readmission Assessment in  Navigator will be completed.    Advance Directives:      Code Status: Full Code   Patient's Primary Decision Maker is: Legal Next of Kin    Primary Decision Maker: Thalia Elizabeth - Child - [phone]    Discharge Planning:    Patient lives with: Alone Type of Home: Acute Rehab  Primary Care Giver: Family  Patient Support Systems include: Children   Current Financial resources:    Current community resources:    Current services prior to admission: Oxygen Therapy, Other (Comment) (PT/OT)            Current DME:              Type of Home Care services:  PT, OT    ADLS  Prior functional level: Assistance with the following:, Mobility, Shopping, Housework, Cooking, Bathing  Current functional level: Assistance with the following:, Bathing, Cooking, Housework, Shopping, Mobility    PT AM-PAC:   /24  OT AM-PAC:   /24    Family can provide assistance at DC: Yes  Would you like Case Management to discuss the discharge plan with any other family members/significant others, and if so, who? Yes (Daughter Thalia)  Plans to Return to Present Housing: Yes  Other Identified Issues/Barriers to RETURNING to current housing:   Potential Assistance needed at discharge: N/A            Potential DME:    Patient expects to discharge to: Acute rehab  Plan for transportation at discharge:      Financial    Payor: [employer] MEDICARE / Plan: [employer] CHOICE-PPO MEDICARE / Product Type: *No Product type* /     Does insurance require precert for SNF: Yes    Potential assistance Purchasing Medications: No  Meds-to-Beds request:        [id number] - [place], [place] - [address] [phone] -  [phone]  [address] [place] [address]  Phone: [phone] Fax: [phone]    [facility] #[facility] [address] [facility] [phone] - f [phone] [id number]  [place] [id number]  Phone: [phone] Fax: [phone]      Notes:    Factors facilitating achievement of predicted outcomes: Family support, Cooperative, and Pleasant    Barriers to discharge: Medical complications    Additional Case Management Notes:     The Plan for Transition of Care is related to the following treatment goals of Chest pain [R07.9]    IF APPLICABLE: The Patient and/or patient representative Gladys and her family were provided with a choice of provider and agrees with the discharge plan. Freedom of choice list with basic dialogue that supports the patient's individualized plan of care/goals and shares the quality data associated with the providers was provided to: Patient, Patient Representative   Patient Representative Name: Jarod Vásquez     The Patient and/or Patient Representative Agree with the Discharge Plan? Yes    Ebony Berman RN  Case Management Department  Ph: [phone] Fax:

## 2025-03-04 NOTE — H&P
Hospital Medicine History & Physical      PCP: Toñito Suazo MD    Date of Admission: 3/3/2025    Date of Service: . MAR. 4, 2025    Chief Complaint:   LLE PAIN      History Of Present Illness:     74 y.o. female presented with  LLE PAIN, AND CHEST PAIN. LOCATED ACW, RADIATING DOWN HER RIGHT ARM AND TO HER BACK .  ACHY IN CHARACTER,  NO NV, OCCASIONAL SOB , NO DIAPHORESIS . HAS A KNOWN HISTORY OF SEVERE PVD, HAD A LEFT AKA.  RLE  LEG HAS BEEN COLD AND TURNING BLUE.BLACK.      Past Medical History:          Diagnosis Date    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 with rest pain ([facility]) 01/16/2019    Atrial fibrillation ([facility])     Atypical mole     upper right arm - black mole     Bilateral carotid artery stenosis 05/22/2018    Dr. Hobbs     Bruit of right carotid artery 05/03/2018    CAD (coronary artery disease)     f/u w/ PCP     Cancer ([facility])     SKIN CA/Thigh    COPD (chronic obstructive pulmonary disease) ([facility])     uses O2 2L NC at night - and during day prn     COVID     Depression     Diabetic ulcer of toe of left foot associated with type 2 diabetes mellitus, with fat layer exposed ([facility]) 10/02/2024    Diarrhea     for colonoscopy 2-19-21     Femoro-popliteal artery disease 01/16/2019    History of tobacco use 05/03/2018    Hydrocephalus ([facility])     Hyperlipidemia     no medications     Hypertension     BRY (obstructive sleep apnea)     uses O2 2L NC     Pain in both feet 05/03/2018    Paroxysmal A-fib ([facility])     Peripheral vascular disease due to secondary diabetes mellitus ([facility]) 05/03/2022    PVD (peripheral vascular disease)     PVD (peripheral vascular disease) with claudication 05/03/2018    Rheumatoid arthritis ([facility])     Short-term memory loss     Skin tear of upper arm without complication 04/03/2024    Skin ulcer of right calf with fat layer exposed ([facility])  03/03/25  0745 03/03/25  2336 03/04/25  0603   WBC 13.9* 10.9 9.6   HGB 11.2* 10.1* 10.0*   HCT 35.4 31.8* 32.3*   * 570* 575*     Recent Labs     03/03/25  0745 03/04/25  0603    144   K 4.0 3.7    106   CO2 29 23   BUN 31* 27*   CREATININE 0.9 0.8   CALCIUM 9.2 8.7     Recent Labs     03/03/25  0745   AST 14   ALT 11   BILIDIR <0.2   BILITOT 0.3   ALKPHOS 78     Recent Labs     03/03/25  0745   INR 1.8     No results for input(s): \"CKTOTAL\", \"TROPONINI\" in the last 72 hours.    Urinalysis:      Lab Results   Component Value Date/Time    NITRU POSITIVE 01/23/2025 09:10 AM    WBCUA 0 TO 5 01/23/2025 09:10 AM    WBCUA 5-10 10/07/2010 12:00 PM    BACTERIA 3+ 01/23/2025 09:10 AM    RBCUA 0 TO 2 01/23/2025 09:10 AM    RBCUA 2-5 10/07/2010 12:00 PM    BLOODU TRACE-INTACT 09/16/2020 06:48 AM    GLUCOSEU NEGATIVE 01/23/2025 09:10 AM    GLUCOSEU NEGATIVE 10/07/2010 12:00 PM       Radiology:           CTA CHEST W CONTRAST   Final Result   1. No evidence of thoracic aortic aneurysm or dissection.   2. No evidence of abdominal aortic aneurysm or dissection.   3. Right paratracheal and right perihilar adenopathy greater than expected   for reactive adenopathy.   4. Right upper lobe mucous plugging with associated nodular density a or mild   convergent area of involvement and plugging 11 mm maximum dimension with   adjacent patchy opacifications in the right upper lung posteriorly likely   bronchiolitis associated.   5. Occluded right superficial femoral artery with reconstitution of flow at   the distal popliteal margin trifurcation which has patent proximal   trifurcation distal dissipation of opacification with posterior tibial artery   single-vessel runoff patent at the level the right ankle.   6. Occluded left superficial femoral artery stent with patent left deep   profundus and muscular collaterals sub serving the left lower extremity which   has a above the knee amputation site.   7.  shunt catheter

## 2025-03-04 NOTE — ED NOTES
Patient noted scooting too the end of the bed.  States \"she is going to get dressed then go get breakfast.\" Redirected that she is in hospital and needs assistance, agreed, then continued to end of bed.  PSC requested due to short term redirection, amputee & Heparin therapy.  PSC in Dept & at bedside.

## 2025-03-04 NOTE — PROGRESS NOTES
Chart accessed for pending admission to 4500.   Electronically signed by Kasey Weber RN on 3/4/2025 at 11:09 AM

## 2025-03-04 NOTE — CONSULTS
rub.  Nose/Sinuses: Nares normal. Septum midline. Mucosa normal. No drainage or sinus tenderness.  Oropharynx: Lips, mucosa, and tongue normal. Oropharynx clear with no exudate seen.  Neck: Neck supple and symmetric. No adenopathy. Thyroid symmetric, normal size, without nodules. Trachea is midline. Carotids brisk in upstroke without bruits, no abnormal JVP noted at 45°.  Chest: Even excursion  Lungs: Lungs grossly clear to auscultation bilaterally. No retractions or use of accessory muscles. No tactile vocal fremitus. No rhonchi, crackles or rales.  Heart:  S1 > S2.  Regular rhythm. No gallop or murmur. No rub, palpable thrill or heave noted. PMI 5th intercostal space midclavicular line.  Abdomen: Abdomen soft, mildly protuberant, non-tender. BS normal. No masses, organomegaly. No hernia noted.  Extremities: Extremities normal.  Left AKA deformity edema, or skin discoloration.  No cyanosis or clubbing noted to the nails. Peripheral pulses present 2+ upper extremities and present 0+  lower extremities.  Right foot cool to touch  Musculoskeletal: Spine ROM normal. Muscular strength intact.  Neuro: Cranial nerves intact. Motor: Strength 5/5 in all extremities. Reflexes 2+ in all extremities. No focal weakness. Sensory: grossly normal to touch. Coordination intact.    Pertinent Labs:  CBC:   Recent Labs     03/03/25  0745   WBC 13.9*   HGB 11.2*   *     BMP:  Recent Labs     03/03/25  0745      K 4.0      CO2 29   BUN 31*   CREATININE 0.9   GLUCOSE 102*   LABGLOM 71     ABGs:   Lab Results   Component Value Date/Time    PH 7.378 01/25/2022 02:03 PM    PO2 135.8 01/25/2022 02:03 PM    PCO2 39.7 01/25/2022 02:03 PM     INR:   Recent Labs     03/03/25  0745   INR 1.8     PRO-BNP:   Lab Results   Component Value Date    PROBNP 2,172 (H) 12/29/2021    PROBNP 2,233 (H) 05/21/2021      Cardiac Injury Profile:   No results for input(s): \"CKTOTAL\", \"CKMB\", \"CKMBINDEX\", \"TROPONINI\" in the last 72 hours.    posteriorly likely bronchiolitis associated. 5. Occluded right superficial femoral artery with reconstitution of flow at the distal popliteal margin trifurcation which has patent proximal trifurcation distal dissipation of opacification with posterior tibial artery single-vessel runoff patent at the level the right ankle. 6. Occluded left superficial femoral artery stent with patent left deep profundus and muscular collaterals sub serving the left lower extremity which has a above the knee amputation site. 7.  shunt catheter with small volume low-density fluid in the right upper quadrant adjacent to the terminus of the  shunt and in the pelvis.         Assessment:    Principal Problem:    Chest pain  Active Problems:    PVD (peripheral vascular disease) with claudication    Hyperlipidemia LDL goal < 55mg/dl    CAD (coronary artery disease)    Paroxysmal atrial fibrillation (HCC)    Systolic hypertension  Resolved Problems:    * No resolved hospital problems. *      Plan:  Mrs. Elizabeth unfortunately has multiple problems related to her underlying global cardiovascular status.  Interestingly however her chest discomfort is associated with right arm discomfort which occurred early this morning with movement of her right arm also with left lower thorax discomfort and left lower anterior thorax discomfort made worse by change in position and not associated with exertion.  In fact she is proudly active working with rehabilitation to hop utilizing her walker however now her right lower extremity from the knee down to her foot is quite painful with numbness in her right foot as well.  And suspect compromised blood flow.  Her CT angiograms obviously suggest severe peripheral vascular areas of stenosis and occlusion.  I have offered the potential for cardiac catheterization which she is not interested in knowing full well that she has coronary artery disease as previously documented on a CT coronary angiogram obtained

## 2025-03-05 LAB
ANION GAP SERPL CALCULATED.3IONS-SCNC: 10 MMOL/L (ref 7–16)
BUN SERPL-MCNC: 19 MG/DL (ref 6–23)
CALCIUM SERPL-MCNC: 8.6 MG/DL (ref 8.6–10.2)
CHLORIDE SERPL-SCNC: 106 MMOL/L (ref 98–107)
CO2 SERPL-SCNC: 26 MMOL/L (ref 22–29)
CREAT SERPL-MCNC: 0.6 MG/DL (ref 0.5–1)
ERYTHROCYTE [DISTWIDTH] IN BLOOD BY AUTOMATED COUNT: 14.9 % (ref 11.5–15)
GFR, ESTIMATED: >90 ML/MIN/1.73M2
GLUCOSE SERPL-MCNC: 82 MG/DL (ref 74–99)
HCT VFR BLD AUTO: 28.8 % (ref 34–48)
HGB BLD-MCNC: 9 G/DL (ref 11.5–15.5)
MCH RBC QN AUTO: 31.1 PG (ref 26–35)
MCHC RBC AUTO-ENTMCNC: 31.3 G/DL (ref 32–34.5)
MCV RBC AUTO: 99.7 FL (ref 80–99.9)
PARTIAL THROMBOPLASTIN TIME: 44.8 SEC (ref 24.5–35.1)
PARTIAL THROMBOPLASTIN TIME: 58.2 SEC (ref 24.5–35.1)
PARTIAL THROMBOPLASTIN TIME: 62.1 SEC (ref 24.5–35.1)
PARTIAL THROMBOPLASTIN TIME: 78.9 SEC (ref 24.5–35.1)
PLATELET # BLD AUTO: 559 K/UL (ref 130–450)
PMV BLD AUTO: 9.6 FL (ref 7–12)
POTASSIUM SERPL-SCNC: 3.7 MMOL/L (ref 3.5–5)
RBC # BLD AUTO: 2.89 M/UL (ref 3.5–5.5)
SODIUM SERPL-SCNC: 142 MMOL/L (ref 132–146)
WBC OTHER # BLD: 9.8 K/UL (ref 4.5–11.5)

## 2025-03-05 PROCEDURE — 6360000002 HC RX W HCPCS: Performed by: NURSE PRACTITIONER

## 2025-03-05 PROCEDURE — 6370000000 HC RX 637 (ALT 250 FOR IP): Performed by: NURSE PRACTITIONER

## 2025-03-05 PROCEDURE — 6370000000 HC RX 637 (ALT 250 FOR IP): Performed by: INTERNAL MEDICINE

## 2025-03-05 PROCEDURE — 80048 BASIC METABOLIC PNL TOTAL CA: CPT

## 2025-03-05 PROCEDURE — 2060000000 HC ICU INTERMEDIATE R&B

## 2025-03-05 PROCEDURE — 97535 SELF CARE MNGMENT TRAINING: CPT

## 2025-03-05 PROCEDURE — 85730 THROMBOPLASTIN TIME PARTIAL: CPT

## 2025-03-05 PROCEDURE — 2700000000 HC OXYGEN THERAPY PER DAY

## 2025-03-05 PROCEDURE — 36415 COLL VENOUS BLD VENIPUNCTURE: CPT

## 2025-03-05 PROCEDURE — 2500000003 HC RX 250 WO HCPCS: Performed by: NURSE PRACTITIONER

## 2025-03-05 PROCEDURE — 6360000002 HC RX W HCPCS

## 2025-03-05 PROCEDURE — 85027 COMPLETE CBC AUTOMATED: CPT

## 2025-03-05 PROCEDURE — 97165 OT EVAL LOW COMPLEX 30 MIN: CPT

## 2025-03-05 RX ORDER — TRAMADOL HYDROCHLORIDE 50 MG/1
50 TABLET ORAL EVERY 6 HOURS PRN
Status: DISCONTINUED | OUTPATIENT
Start: 2025-03-05 | End: 2025-03-05

## 2025-03-05 RX ORDER — MORPHINE SULFATE 2 MG/ML
2 INJECTION, SOLUTION INTRAMUSCULAR; INTRAVENOUS EVERY 6 HOURS PRN
Status: DISCONTINUED | OUTPATIENT
Start: 2025-03-05 | End: 2025-03-08 | Stop reason: HOSPADM

## 2025-03-05 RX ORDER — MORPHINE SULFATE 2 MG/ML
1 INJECTION, SOLUTION INTRAMUSCULAR; INTRAVENOUS EVERY 6 HOURS PRN
Status: DISCONTINUED | OUTPATIENT
Start: 2025-03-05 | End: 2025-03-08 | Stop reason: HOSPADM

## 2025-03-05 RX ORDER — TRAMADOL HYDROCHLORIDE 50 MG/1
25 TABLET ORAL EVERY 6 HOURS PRN
Status: DISCONTINUED | OUTPATIENT
Start: 2025-03-05 | End: 2025-03-05

## 2025-03-05 RX ADMIN — AMLODIPINE BESYLATE 5 MG: 5 TABLET ORAL at 20:41

## 2025-03-05 RX ADMIN — SODIUM CHLORIDE, PRESERVATIVE FREE 10 ML: 5 INJECTION INTRAVENOUS at 08:27

## 2025-03-05 RX ADMIN — ASPIRIN 81 MG CHEWABLE TABLET 81 MG: 81 TABLET CHEWABLE at 08:26

## 2025-03-05 RX ADMIN — AMLODIPINE BESYLATE 5 MG: 5 TABLET ORAL at 08:26

## 2025-03-05 RX ADMIN — OXYBUTYNIN CHLORIDE 10 MG: 10 TABLET, EXTENDED RELEASE ORAL at 18:36

## 2025-03-05 RX ADMIN — HEPARIN SODIUM 14 UNITS/KG/HR: 10000 INJECTION, SOLUTION INTRAVENOUS at 20:41

## 2025-03-05 RX ADMIN — ACETAMINOPHEN 650 MG: 325 TABLET ORAL at 16:07

## 2025-03-05 RX ADMIN — IPRATROPIUM BROMIDE 0.5 MG: 0.5 SOLUTION RESPIRATORY (INHALATION) at 17:10

## 2025-03-05 RX ADMIN — SOTALOL HYDROCHLORIDE 120 MG: 120 TABLET ORAL at 20:41

## 2025-03-05 RX ADMIN — IPRATROPIUM BROMIDE 0.5 MG: 0.5 SOLUTION RESPIRATORY (INHALATION) at 19:56

## 2025-03-05 RX ADMIN — BUDESONIDE 250 MCG: 0.25 SUSPENSION RESPIRATORY (INHALATION) at 19:56

## 2025-03-05 RX ADMIN — TRAMADOL HYDROCHLORIDE 25 MG: 50 TABLET, COATED ORAL at 14:29

## 2025-03-05 RX ADMIN — ATORVASTATIN CALCIUM 40 MG: 40 TABLET, FILM COATED ORAL at 20:41

## 2025-03-05 RX ADMIN — SODIUM CHLORIDE, PRESERVATIVE FREE 10 ML: 5 INJECTION INTRAVENOUS at 20:43

## 2025-03-05 RX ADMIN — HEPARIN SODIUM 1200 UNITS: 1000 INJECTION INTRAVENOUS; SUBCUTANEOUS at 08:43

## 2025-03-05 RX ADMIN — BUPROPION HYDROCHLORIDE 150 MG: 150 TABLET, FILM COATED, EXTENDED RELEASE ORAL at 08:26

## 2025-03-05 RX ADMIN — TRAMADOL HYDROCHLORIDE 50 MG: 50 TABLET, COATED ORAL at 05:55

## 2025-03-05 RX ADMIN — LEVOTHYROXINE SODIUM 125 MCG: 0.11 TABLET ORAL at 06:08

## 2025-03-05 RX ADMIN — ACETAMINOPHEN 650 MG: 325 TABLET ORAL at 23:48

## 2025-03-05 RX ADMIN — ACETAMINOPHEN 650 MG: 325 TABLET ORAL at 04:23

## 2025-03-05 RX ADMIN — ARFORMOTEROL TARTRATE 15 MCG: 15 SOLUTION RESPIRATORY (INHALATION) at 19:56

## 2025-03-05 RX ADMIN — MORPHINE SULFATE 1 MG: 2 INJECTION, SOLUTION INTRAMUSCULAR; INTRAVENOUS at 20:42

## 2025-03-05 RX ADMIN — HEPARIN SODIUM 14 UNITS/KG/HR: 10000 INJECTION, SOLUTION INTRAVENOUS at 08:47

## 2025-03-05 RX ADMIN — ONDANSETRON 4 MG: 2 INJECTION, SOLUTION INTRAMUSCULAR; INTRAVENOUS at 05:58

## 2025-03-05 RX ADMIN — SOTALOL HYDROCHLORIDE 120 MG: 120 TABLET ORAL at 08:26

## 2025-03-05 ASSESSMENT — PAIN DESCRIPTION - LOCATION
LOCATION: FOOT
LOCATION: LEG
LOCATION: FOOT
LOCATION: FOOT
LOCATION: LEG
LOCATION: FOOT
LOCATION: LEG

## 2025-03-05 ASSESSMENT — PAIN DESCRIPTION - ORIENTATION
ORIENTATION: RIGHT
ORIENTATION: RIGHT;LEFT;ANTERIOR;POSTERIOR
ORIENTATION: RIGHT

## 2025-03-05 ASSESSMENT — PAIN SCALES - GENERAL
PAINLEVEL_OUTOF10: 10
PAINLEVEL_OUTOF10: 6
PAINLEVEL_OUTOF10: 6
PAINLEVEL_OUTOF10: 3
PAINLEVEL_OUTOF10: 3
PAINLEVEL_OUTOF10: 5
PAINLEVEL_OUTOF10: 10
PAINLEVEL_OUTOF10: 3
PAINLEVEL_OUTOF10: 8

## 2025-03-05 ASSESSMENT — PAIN DESCRIPTION - DESCRIPTORS
DESCRIPTORS: SHOOTING;SHARP;ACHING
DESCRIPTORS: SHOOTING;STABBING;SORE
DESCRIPTORS: ACHING;DISCOMFORT;NAGGING
DESCRIPTORS: ACHING;DISCOMFORT;NAGGING
DESCRIPTORS: ACHING;DISCOMFORT;SORE
DESCRIPTORS: ACHING;DISCOMFORT;SHARP

## 2025-03-05 ASSESSMENT — PAIN - FUNCTIONAL ASSESSMENT
PAIN_FUNCTIONAL_ASSESSMENT: ACTIVITIES ARE NOT PREVENTED
PAIN_FUNCTIONAL_ASSESSMENT: PREVENTS OR INTERFERES SOME ACTIVE ACTIVITIES AND ADLS
PAIN_FUNCTIONAL_ASSESSMENT: PREVENTS OR INTERFERES SOME ACTIVE ACTIVITIES AND ADLS

## 2025-03-05 NOTE — CARE COORDINATION
[facility] rehab declined. Spoke with patient. Choiced for 1. [facility], 2. [facility], 3. [facility]. Referral pending to [facility]. BECKY list left at bedside for alternate choices.     For questions I can be reached at [phone]. HAILEY Nassar    The Plan for Transition of Care is related to the following treatment goals: discharge planning when stable     The Patient and/or patient representative Gladys Elizabeth was provided with a choice of provider and agrees   with the discharge plan. [x] Yes [] No    Freedom of choice list was provided with basic dialogue that supports the patient's individualized plan of care/goals, treatment preferences and shares the quality data associated with the providers. [x] Yes [] No

## 2025-03-05 NOTE — PROGRESS NOTES
Vascular Surgery Progress Note    Pt is being seen in f/u today regarding PVD    Subjective  Pt s/e. Having pain all over this AM. States she is having some RLE pain, back pain and arm pain. Foot is warmer     Current Medications:    sodium chloride      heparin (PORCINE) Infusion 12 Units/kg/hr (03/04/25 0704)      traMADol **OR** traMADol, sodium chloride flush, docusate sodium, sodium chloride flush, sodium chloride, ondansetron **OR** ondansetron, acetaminophen **OR** acetaminophen, magnesium hydroxide, heparin (porcine), heparin (porcine)    oxyBUTYnin  10 mg Oral QPM    [Held by provider] apixaban  5 mg Oral BID    aspirin  81 mg Oral Daily    buPROPion  150 mg Oral Daily    budesonide  0.25 mg Nebulization BID RT    And    arformoterol tartrate  15 mcg Nebulization BID RT    And    ipratropium  0.5 mg Nebulization Q6H RT    levothyroxine  125 mcg Oral Daily    sotalol  120 mg Oral BID    sodium chloride flush  5-40 mL IntraVENous 2 times per day    atorvastatin  40 mg Oral Nightly    amLODIPine  5 mg Oral BID        PHYSICAL EXAM:    /81   Pulse 79   Temp 98.2 °F (36.8 °C) (Oral)   Resp 15   Ht 1.651 m (5' 5\")   Wt 40.9 kg (90 lb 1.6 oz)   SpO2 93%   BMI 14.99 kg/m²   No intake or output data in the 24 hours ending 03/05/25 0719       Gen: awake, alert and oriented x3, no apparent distress  CVS: RRR  Resp: No increased work of breathing  Abd: Soft, non-tender, non-distended  R LE: superficial wounds. Foot is warmer compared to yesterday with some improvement in coloration. 2+ fem. No signals distally   L LE: AKA stump intact     LABS:    Lab Results   Component Value Date    WBC 9.6 03/04/2025    HGB 10.0 (L) 03/04/2025    HCT 32.3 (L) 03/04/2025     (H) 03/04/2025    PROTIME 19.6 (H) 03/03/2025    INR 1.8 03/03/2025    APTT 44.8 (H) 03/05/2025    K 3.7 03/04/2025    BUN 27 (H) 03/04/2025    CREATININE 0.8 03/04/2025       A/P  74 y.o. female with hx of PVD with multiple prior

## 2025-03-05 NOTE — PROGRESS NOTES
Physical Therapy    Date: 3/5/2025       Patient Name: Gladys Elizabeth  : 1950      MRN: [id number]    Physical therapy order received and chart reviewed. PT evaluation attempted this AM. Pt was returning to bed post PT session with c/o R scapular pain and requested to rest.   Will follow up as appropriate.     Kayleen Morrow PT, DPT  [id number]

## 2025-03-05 NOTE — PROGRESS NOTES
[facility]                                Progress note    Subjective:    The patient is awake and alert.  Lying in bed stating she is finally comfortable asking me not to touch her.  No acute events overnight.    Denies chest pain, angina, SOB     Objective:    /84   Pulse 58   Temp 97.9 °F (36.6 °C) (Oral)   Resp 18   Ht 1.651 m (5' 5\")   Wt 40.9 kg (90 lb 1.6 oz)   SpO2 91%   BMI 14.99 kg/m²     No intake/output data recorded.  No intake/output data recorded.    General appearance: NAD, conversant, pleasant  HEENT: AT/NC, MMM  Neck: FROM, supple  Lungs: Clear to auscultation  CV: RRR, no MRGs  Vasc: Radial pulses 2+  Abdomen: Soft, non-tender; no masses or HSM  Extremities: No peripheral edema or digital cyanosis left aka  Skin: leg wounds  Psych: Alert and oriented to person, place and time  Neuro: Alert and interactive     Recent Labs     03/03/25  2336 03/04/25  0603 03/05/25  0419   WBC 10.9 9.6 9.8   HGB 10.1* 10.0* 9.0*   HCT 31.8* 32.3* 28.8*   * 575* 559*       Recent Labs     03/03/25  0745 03/04/25  0603 03/05/25  0419    144 142   K 4.0 3.7 3.7    106 106   CO2 29 23 26   BUN 31* 27* 19   CREATININE 0.9 0.8 0.6   CALCIUM 9.2 8.7 8.6       Assessment:    Principal Problem:    Chest pain  Active Problems:    PVD (peripheral vascular disease) with claudication    Hyperlipidemia LDL goal <100    CAD (coronary artery disease)    Paroxysmal atrial fibrillation (HCC)    Systolic hypertension  Resolved Problems:    * No resolved hospital problems. *      Plan:  74 year old female admitted to telemetry unit with chest pain, PVD.    3/5/2025  Vital signs stable  Labs stable  Continue heparin drip  Continue wound care  Appreciate all consultants input  Rck am labs  Continue pain control    Code status:   Consultants:  cardiology, vascular    DVT Prophylaxis   PT/OT  Discharge planning         BRIAN Haji - CNP,  2:30 PM  3/5/2025

## 2025-03-05 NOTE — PROGRESS NOTES
OCCUPATIONAL THERAPY INITIAL EVALUATION    [facility]  [address], [place]      Date:3/5/2025                                                  Patient Name: Gladys Elizabeth  MRN: [id number]  : 1950  Room: [address] [place], [place] [address]    Evaluating OT: LIGIA Garay, OTR/L  # [id number]    Referring Provider:  Yohannes Sykes DO   Specific Provider Orders:  \"OT Eval and Treat\"  3-4-25    Diagnosis: Peripheral vascular disease [I73.9]  Chest pain [R07.9]  Right leg pain [M79.604]  Chest pain, unspecified type [R07.9]  Left above-knee amputee (HCC) [Z89.612]    Pt was admitted w/ c/o Chest pain, Pain Right UE/Scapula, back pain.  C/o Right LE Pain/numbness - hx of PVD, L AKA    Pertinent Medical History:  Pt has a past medical history of 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 with rest pain (HCC), Atrial fibrillation (HCC), Atypical mole, Bilateral carotid artery stenosis, Bruit of right carotid artery, CAD (coronary artery disease), Cancer (HCC), COPD (chronic obstructive pulmonary disease) (HCC), COVID, Depression, Diabetic ulcer of toe of left foot associated with type 2 diabetes mellitus, with fat layer exposed (HCC), Diarrhea, Femoro-popliteal artery disease, History of tobacco use, Hydrocephalus (HCC), Hyperlipidemia, Hypertension, BRY (obstructive sleep apnea), Pain in both feet, Paroxysmal A-fib (HCC), Peripheral vascular disease due to secondary diabetes mellitus (HCC), PVD (peripheral vascular disease), PVD (peripheral vascular disease) with claudication, Rheumatoid arthritis (HCC), Short-term memory loss, Skin tear of upper arm without complication, Skin ulcer of right calf with fat layer exposed (HCC), Status post peripheral artery angioplasty with insertion of stent, Thyroid disease, Urinary incontinence, Venous stasis ulcer of left calf with fat layer exposed without varicose veins (HCC), and Weight loss.,  has a past

## 2025-03-05 NOTE — PROGRESS NOTES
Vascular Surgery Progress Note    Pt is being seen in f/u today regarding R LE ischemia    Subjective  Pt s/e.  Working with therapy.  Had an episode of right foot pain that started early this morning.  Got tramadol and the pain improved.  Having back and right shoulder pain as well.      Current Medications:    sodium chloride      heparin (PORCINE) Infusion 14 Units/kg/hr (03/05/25 0847)      traMADol **OR** traMADol, sodium chloride flush, docusate sodium, sodium chloride flush, sodium chloride, ondansetron **OR** ondansetron, acetaminophen **OR** acetaminophen, magnesium hydroxide, heparin (porcine), heparin (porcine)    oxyBUTYnin  10 mg Oral QPM    [Held by provider] apixaban  5 mg Oral BID    aspirin  81 mg Oral Daily    buPROPion  150 mg Oral Daily    budesonide  0.25 mg Nebulization BID RT    And    arformoterol tartrate  15 mcg Nebulization BID RT    And    ipratropium  0.5 mg Nebulization Q6H RT    levothyroxine  125 mcg Oral Daily    sotalol  120 mg Oral BID    sodium chloride flush  5-40 mL IntraVENous 2 times per day    atorvastatin  40 mg Oral Nightly    amLODIPine  5 mg Oral BID      PHYSICAL EXAM:    BP (!) 151/75   Pulse 83   Temp 97.9 °F (36.6 °C) (Temporal)   Resp 16   Ht 1.651 m (5' 5\")   Wt 40.9 kg (90 lb 1.6 oz)   SpO2 93%   BMI 14.99 kg/m²   No intake or output data in the 24 hours ending 03/05/25 1034     Gen Awake, alert, oriented x3, in no apparent distress   CVS S1S2   Resp No increased work of breathing   Abd Soft, non-tender, non-distended   R LE superficial wounds to leg scabbed, open wound to lateral ankle  2+ fem. No signals distally   +motor/sensation  L LE AKA healed    LABS:    Lab Results   Component Value Date    WBC 9.8 03/05/2025    HGB 9.0 (L) 03/05/2025    HCT 28.8 (L) 03/05/2025     (H) 03/05/2025    PROTIME 19.6 (H) 03/03/2025    INR 1.8 03/03/2025    APTT 44.8 (H) 03/05/2025    K 3.7 03/05/2025    BUN 19 03/05/2025    CREATININE 0.6 03/05/2025     A/P PVD, R

## 2025-03-05 NOTE — PROGRESS NOTES
Sitter has been discontinued. Pt is calm pleasant, and eager to walk. She stated Dr Colvin just left her room and stated she'd probably will need a heart cath in the future.     0100 aPTT 58.6  No bolus. No change in rate. Next aPTT is scheduled for 0600.    0545 pt is experiencing nausea, and 10/10 pain in her foot, leg, and back (sharp/shooting). She said she's \"about to have them cut it off, to get rid of the pain. Pt's team has been notified, and ultram has been ordered.

## 2025-03-05 NOTE — PROGRESS NOTES
Patient received the Sacrament of the Anointing of the Sick on Tuesday, March 5, 2025, by Father Stepan Anderson.     If additional support is requested or needed please reach out to Spiritual Health ([id number]).     Chap. Edin Ramirez MDIV, BCC

## 2025-03-06 ENCOUNTER — APPOINTMENT (OUTPATIENT)
Dept: GENERAL RADIOLOGY | Age: 75
End: 2025-03-06
Payer: MEDICARE

## 2025-03-06 ENCOUNTER — APPOINTMENT (OUTPATIENT)
Dept: CT IMAGING | Age: 75
End: 2025-03-06
Payer: MEDICARE

## 2025-03-06 LAB
ANION GAP SERPL CALCULATED.3IONS-SCNC: 14 MMOL/L (ref 7–16)
BACTERIA URNS QL MICRO: ABNORMAL
BILIRUB UR QL STRIP: NEGATIVE
BUN SERPL-MCNC: 15 MG/DL (ref 6–23)
CALCIUM SERPL-MCNC: 8.5 MG/DL (ref 8.6–10.2)
CHLORIDE SERPL-SCNC: 101 MMOL/L (ref 98–107)
CLARITY UR: CLEAR
CO2 SERPL-SCNC: 24 MMOL/L (ref 22–29)
COLOR UR: YELLOW
CREAT SERPL-MCNC: 0.6 MG/DL (ref 0.5–1)
EPI CELLS #/AREA URNS HPF: ABNORMAL /HPF
ERYTHROCYTE [DISTWIDTH] IN BLOOD BY AUTOMATED COUNT: 15 % (ref 11.5–15)
GFR, ESTIMATED: >90 ML/MIN/1.73M2
GLUCOSE SERPL-MCNC: 82 MG/DL (ref 74–99)
GLUCOSE UR STRIP-MCNC: NEGATIVE MG/DL
HCT VFR BLD AUTO: 31.4 % (ref 34–48)
HGB BLD-MCNC: 9.7 G/DL (ref 11.5–15.5)
HGB UR QL STRIP.AUTO: NEGATIVE
KETONES UR STRIP-MCNC: NEGATIVE MG/DL
LEUKOCYTE ESTERASE UR QL STRIP: ABNORMAL
MCH RBC QN AUTO: 31.4 PG (ref 26–35)
MCHC RBC AUTO-ENTMCNC: 30.9 G/DL (ref 32–34.5)
MCV RBC AUTO: 101.6 FL (ref 80–99.9)
MICROORGANISM SPEC CULT: ABNORMAL
MICROORGANISM SPEC CULT: ABNORMAL
MICROORGANISM SPEC CULT: NO GROWTH
MICROORGANISM/AGENT SPEC: NORMAL
NITRITE UR QL STRIP: NEGATIVE
PARTIAL THROMBOPLASTIN TIME: 64.2 SEC (ref 24.5–35.1)
PH UR STRIP: 7 [PH] (ref 5–8)
PLATELET # BLD AUTO: 598 K/UL (ref 130–450)
PMV BLD AUTO: 9.8 FL (ref 7–12)
POTASSIUM SERPL-SCNC: 3.8 MMOL/L (ref 3.5–5)
PREALB SERPL-MCNC: 15 MG/DL (ref 20–40)
PROT UR STRIP-MCNC: NEGATIVE MG/DL
RBC # BLD AUTO: 3.09 M/UL (ref 3.5–5.5)
RBC #/AREA URNS HPF: ABNORMAL /HPF
SODIUM SERPL-SCNC: 139 MMOL/L (ref 132–146)
SP GR UR STRIP: 1.01 (ref 1–1.03)
SPECIMEN DESCRIPTION: ABNORMAL
SPECIMEN DESCRIPTION: NORMAL
UROBILINOGEN UR STRIP-ACNC: 0.2 EU/DL (ref 0–1)
WBC #/AREA URNS HPF: ABNORMAL /HPF
WBC OTHER # BLD: 10.8 K/UL (ref 4.5–11.5)

## 2025-03-06 PROCEDURE — 2700000000 HC OXYGEN THERAPY PER DAY

## 2025-03-06 PROCEDURE — 6370000000 HC RX 637 (ALT 250 FOR IP): Performed by: FAMILY MEDICINE

## 2025-03-06 PROCEDURE — 2500000003 HC RX 250 WO HCPCS: Performed by: NURSE PRACTITIONER

## 2025-03-06 PROCEDURE — 6370000000 HC RX 637 (ALT 250 FOR IP): Performed by: INTERNAL MEDICINE

## 2025-03-06 PROCEDURE — 97530 THERAPEUTIC ACTIVITIES: CPT

## 2025-03-06 PROCEDURE — 94640 AIRWAY INHALATION TREATMENT: CPT

## 2025-03-06 PROCEDURE — 2060000000 HC ICU INTERMEDIATE R&B

## 2025-03-06 PROCEDURE — 6360000004 HC RX CONTRAST MEDICATION: Performed by: RADIOLOGY

## 2025-03-06 PROCEDURE — 6370000000 HC RX 637 (ALT 250 FOR IP): Performed by: NURSE PRACTITIONER

## 2025-03-06 PROCEDURE — 84134 ASSAY OF PREALBUMIN: CPT

## 2025-03-06 PROCEDURE — 81001 URINALYSIS AUTO W/SCOPE: CPT

## 2025-03-06 PROCEDURE — 85730 THROMBOPLASTIN TIME PARTIAL: CPT

## 2025-03-06 PROCEDURE — 36415 COLL VENOUS BLD VENIPUNCTURE: CPT

## 2025-03-06 PROCEDURE — 85027 COMPLETE CBC AUTOMATED: CPT

## 2025-03-06 PROCEDURE — 74178 CT ABD&PLV WO CNTR FLWD CNTR: CPT

## 2025-03-06 PROCEDURE — 97161 PT EVAL LOW COMPLEX 20 MIN: CPT

## 2025-03-06 PROCEDURE — 80048 BASIC METABOLIC PNL TOTAL CA: CPT

## 2025-03-06 PROCEDURE — 6360000002 HC RX W HCPCS: Performed by: NURSE PRACTITIONER

## 2025-03-06 PROCEDURE — 74018 RADEX ABDOMEN 1 VIEW: CPT

## 2025-03-06 RX ORDER — BISACODYL 10 MG
10 SUPPOSITORY, RECTAL RECTAL ONCE
Status: COMPLETED | OUTPATIENT
Start: 2025-03-06 | End: 2025-03-06

## 2025-03-06 RX ORDER — IOPAMIDOL 755 MG/ML
75 INJECTION, SOLUTION INTRAVASCULAR
Status: COMPLETED | OUTPATIENT
Start: 2025-03-06 | End: 2025-03-06

## 2025-03-06 RX ORDER — HYDROCODONE BITARTRATE AND ACETAMINOPHEN 5; 325 MG/1; MG/1
1 TABLET ORAL ONCE
Status: COMPLETED | OUTPATIENT
Start: 2025-03-06 | End: 2025-03-06

## 2025-03-06 RX ADMIN — BUDESONIDE 250 MCG: 0.25 SUSPENSION RESPIRATORY (INHALATION) at 07:58

## 2025-03-06 RX ADMIN — DOCUSATE SODIUM 100 MG: 100 CAPSULE, LIQUID FILLED ORAL at 00:26

## 2025-03-06 RX ADMIN — APIXABAN 5 MG: 5 TABLET, FILM COATED ORAL at 20:12

## 2025-03-06 RX ADMIN — IOPAMIDOL 75 ML: 755 INJECTION, SOLUTION INTRAVENOUS at 23:32

## 2025-03-06 RX ADMIN — BISACODYL 10 MG: 10 SUPPOSITORY RECTAL at 17:14

## 2025-03-06 RX ADMIN — IPRATROPIUM BROMIDE 0.5 MG: 0.5 SOLUTION RESPIRATORY (INHALATION) at 07:58

## 2025-03-06 RX ADMIN — HYDROCODONE BITARTRATE AND ACETAMINOPHEN 1 TABLET: 5; 325 TABLET ORAL at 22:56

## 2025-03-06 RX ADMIN — SOTALOL HYDROCHLORIDE 120 MG: 120 TABLET ORAL at 20:13

## 2025-03-06 RX ADMIN — ASPIRIN 81 MG CHEWABLE TABLET 81 MG: 81 TABLET CHEWABLE at 09:30

## 2025-03-06 RX ADMIN — MORPHINE SULFATE 1 MG: 2 INJECTION, SOLUTION INTRAMUSCULAR; INTRAVENOUS at 04:45

## 2025-03-06 RX ADMIN — ACETAMINOPHEN 650 MG: 325 TABLET ORAL at 09:30

## 2025-03-06 RX ADMIN — OXYBUTYNIN CHLORIDE 10 MG: 10 TABLET, EXTENDED RELEASE ORAL at 18:21

## 2025-03-06 RX ADMIN — ACETAMINOPHEN 650 MG: 325 TABLET ORAL at 20:12

## 2025-03-06 RX ADMIN — BUPROPION HYDROCHLORIDE 150 MG: 150 TABLET, FILM COATED, EXTENDED RELEASE ORAL at 09:30

## 2025-03-06 RX ADMIN — ATORVASTATIN CALCIUM 40 MG: 40 TABLET, FILM COATED ORAL at 20:12

## 2025-03-06 RX ADMIN — ARFORMOTEROL TARTRATE 15 MCG: 15 SOLUTION RESPIRATORY (INHALATION) at 07:58

## 2025-03-06 RX ADMIN — AMLODIPINE BESYLATE 5 MG: 5 TABLET ORAL at 09:30

## 2025-03-06 RX ADMIN — MAGNESIUM HYDROXIDE 30 ML: 400 SUSPENSION ORAL at 09:31

## 2025-03-06 RX ADMIN — LEVOTHYROXINE SODIUM 125 MCG: 0.11 TABLET ORAL at 05:20

## 2025-03-06 RX ADMIN — AMLODIPINE BESYLATE 5 MG: 5 TABLET ORAL at 20:12

## 2025-03-06 RX ADMIN — SOTALOL HYDROCHLORIDE 120 MG: 120 TABLET ORAL at 09:30

## 2025-03-06 RX ADMIN — SODIUM CHLORIDE, PRESERVATIVE FREE 10 ML: 5 INJECTION INTRAVENOUS at 09:33

## 2025-03-06 ASSESSMENT — PAIN SCALES - GENERAL
PAINLEVEL_OUTOF10: 6
PAINLEVEL_OUTOF10: 8
PAINLEVEL_OUTOF10: 4
PAINLEVEL_OUTOF10: 7
PAINLEVEL_OUTOF10: 8
PAINLEVEL_OUTOF10: 10
PAINLEVEL_OUTOF10: 10
PAINLEVEL_OUTOF10: 8
PAINLEVEL_OUTOF10: 9
PAINLEVEL_OUTOF10: 1
PAINLEVEL_OUTOF10: 8
PAINLEVEL_OUTOF10: 3
PAINLEVEL_OUTOF10: 10
PAINLEVEL_OUTOF10: 10

## 2025-03-06 ASSESSMENT — PAIN DESCRIPTION - ORIENTATION
ORIENTATION: MID

## 2025-03-06 ASSESSMENT — PAIN SCALES - WONG BAKER
WONGBAKER_NUMERICALRESPONSE: NO HURT

## 2025-03-06 ASSESSMENT — PAIN DESCRIPTION - DESCRIPTORS
DESCRIPTORS: ACHING;DISCOMFORT;SORE
DESCRIPTORS: ACHING;DULL;DISCOMFORT
DESCRIPTORS: ACHING;DULL;DISCOMFORT
DESCRIPTORS: ACHING;DISCOMFORT;DULL
DESCRIPTORS: ACHING;DISCOMFORT;SORE

## 2025-03-06 ASSESSMENT — PAIN DESCRIPTION - LOCATION
LOCATION: BACK
LOCATION: FOOT
LOCATION: ABDOMEN;BACK
LOCATION: BACK
LOCATION: BACK

## 2025-03-06 ASSESSMENT — PAIN - FUNCTIONAL ASSESSMENT
PAIN_FUNCTIONAL_ASSESSMENT: ACTIVITIES ARE NOT PREVENTED
PAIN_FUNCTIONAL_ASSESSMENT: PREVENTS OR INTERFERES WITH MANY ACTIVE NOT PASSIVE ACTIVITIES

## 2025-03-06 NOTE — CARE COORDINATION
[facility], no beds. Referral to [facility]. Next choice [facility]. [facility] not in network with [employer] policy. Referral to [facility]. Will request [facility] start auth if they can accept once PT available.    1341 Patient also asked for referral to [facility], referral made, they are full. Patient also asked me to call her daughter, called and left voicemail for daughter while in the room with patient.     1527 Message left with [facility] liaison to check on referral. Spoke with patient, next choice [facility]. Referral there.     For questions I can be reached at [phone]. Erica Wood, NIKKO         The Plan for Transition of Care is related to the following treatment goals: discharge planning when stable     The Patient and/or patient representative Gladys Elizabeth was provided with a choice of provider and agrees   with the discharge plan. [x] Yes [] No    Freedom of choice list was provided with basic dialogue that supports the patient's individualized plan of care/goals, treatment preferences and shares the quality data associated with the providers. [x] Yes [] No

## 2025-03-06 NOTE — PROGRESS NOTES
Vascular Surgery Progress Note    Pt is being seen in f/u today regarding R LE ischemia    Subjective  Pt s/e.  Resting in bed comfortably.  Having RUQ pain.  Pain medications help.  KUB ordered.     Current Medications:    sodium chloride      heparin (PORCINE) Infusion 14 Units/kg/hr (03/06/25 0933)      morphine **OR** morphine, sodium chloride flush, docusate sodium, sodium chloride flush, sodium chloride, ondansetron **OR** ondansetron, acetaminophen **OR** acetaminophen, magnesium hydroxide, heparin (porcine), heparin (porcine)    oxyBUTYnin  10 mg Oral QPM    [Held by provider] apixaban  5 mg Oral BID    aspirin  81 mg Oral Daily    buPROPion  150 mg Oral Daily    budesonide  0.25 mg Nebulization BID RT    And    arformoterol tartrate  15 mcg Nebulization BID RT    And    ipratropium  0.5 mg Nebulization Q6H RT    levothyroxine  125 mcg Oral Daily    sotalol  120 mg Oral BID    sodium chloride flush  5-40 mL IntraVENous 2 times per day    atorvastatin  40 mg Oral Nightly    amLODIPine  5 mg Oral BID      PHYSICAL EXAM:    /68   Pulse 63   Temp 97.5 °F (36.4 °C) (Oral)   Resp 21   Ht 1.651 m (5' 5\")   Wt 40.9 kg (90 lb 1.6 oz)   SpO2 94%   BMI 14.99 kg/m²     Intake/Output Summary (Last 24 hours) at 3/6/2025 1218  Last data filed at 3/5/2025 1445  Gross per 24 hour   Intake 180 ml   Output --   Net 180 ml        Gen Awake, alert, oriented x3, in no apparent distress   CVS S1S2   Resp No increased work of breathing - 2L nc  Abd Soft, non-tender, non-distended.  RUQ pain  R LE superficial wounds to leg scabbed, open wound to lateral ankle  2+ fem. No signals distally   +motor/sensation  L LE AKA healed    LABS:    Lab Results   Component Value Date    WBC 10.8 03/06/2025    HGB 9.7 (L) 03/06/2025    HCT 31.4 (L) 03/06/2025     (H) 03/06/2025    PROTIME 19.6 (H) 03/03/2025    INR 1.8 03/03/2025    APTT 64.2 (H) 03/06/2025    K 3.8 03/06/2025    BUN 15 03/06/2025    CREATININE 0.6 03/06/2025

## 2025-03-06 NOTE — CONSULTS
Comprehensive Nutrition Assessment    Type and Reason for Visit:  Initial, Consult, Wound    Nutrition Recommendations/Plan:   Continue current diet  Start manny BID and Ensure BID to  promote oral intake and optimize wound healing  Will monitor     Malnutrition Assessment:  Malnutrition Status:  Severe malnutrition (03/06/25 1439)    Context:  Chronic Illness     Findings of the 6 clinical characteristics of malnutrition:  Energy Intake:  75% or less estimated energy requirements for 1 month or longer  Weight Loss:  No weight loss     Body Fat Loss:  Severe body fat loss Orbital, Triceps   Muscle Mass Loss:  Severe muscle mass loss Temples (temporalis), Clavicles (pectoralis & deltoids), Calf (gastrocnemius), Thigh (quadriceps)  Fluid Accumulation:  No fluid accumulation     Strength:  Not Performed    Nutrition Assessment:    pt adm d/t shoulder pain/leg pain/chest pain; PMhx of L-AKA (10/25/24), CAD, PVD, COPD; pt meets criteria for severe malnutrition; will start ONS to promote oral intake and optimize wound healing; will monitor.    Nutrition Related Findings:    L-AKA; A&Ox4 (oriented/disoriented at times); poor dentition; abd WNL; no edema; I/O WNL Wound Type: Wound Consult Pending (ulcer noted)       Current Nutrition Intake & Therapies:    Average Meal Intake: 51-75%  Average Supplements Intake: Unable to assess  ADULT DIET; Regular  ADULT ORAL NUTRITION SUPPLEMENT; Breakfast, Lunch; Wound Healing Oral Supplement  ADULT ORAL NUTRITION SUPPLEMENT; Lunch, Dinner; Standard High Calorie/High Protein Oral Supplement    Anthropometric Measures:  Height: 165.1 cm (5' 5\")  Ideal Body Weight (IBW): 125 lbs (57 kg)       Current Body Weight: 40.9 kg (90 lb 2.7 oz) (3/5-BS'), 72.1 % IBW. Weight Source: Bed scale  Current BMI (kg/m2): 15  Usual Body Weight: 38.7 kg (85 lb 5.1 oz) (10/26/24-BS/ s/p L-AKA)     % Weight Change (Calculated): 5.7  Weight Adjustment For: Amputation  Total Adjusted Percentage

## 2025-03-06 NOTE — PROGRESS NOTES
lithotripsy etc. 5 different occasions between  and       Thyroid disease     Urinary incontinence     Venous stasis ulcer of left calf with fat layer exposed without varicose veins (HCC) 2019    Weight loss       Past Surgical History:   Procedure Laterality Date     SECTION      COLONOSCOPY      COLONOSCOPY N/A 2021    COLONOSCOPY WITH BIOPSY performed by Monty Leung MD at [facility] ENDOSCOPY    DILATION AND CURETTAGE OF UTERUS      EYE SURGERY      bilat cataract     INVASIVE VASCULAR N/A 2024    Aortagram abdominal performed by Chrissy Hobbs MD at [facility] CARDIAC CATH LAB    INVASIVE VASCULAR N/A 2024    Angioplasty superficial femoral artery performed by Chrissy Hobbs MD at [facility] CARDIAC CATH LAB    INVASIVE VASCULAR N/A 2024    Angioplasty popliteal artery performed by Chrissy Hobbs MD at [facility] CARDIAC CATH LAB    LAPAROTOMY N/A 2020    LAPAROTOMY EXPLORATORY, RIGHT HEMICOLECTOMY performed by Monty Leung MD at [facility] OR    LEG SURGERY Left 10/25/2024    Left Leg Above Knee Amputation performed by Chrissy Hobbs MD at [facility] OR    LUMBAR DRAIN IMPLANTATION N/A 2020    PLACEMENT OF INTRATHECAL CATHETER FOR LUMBAR DRAIN performed by Stanley Orozco MD at [facility] OR    PERIPHERAL PERCUTANEOUS ARTERIAL INTERVENTION  2019    L SFA angioplasty    TUBAL LIGATION      VENTRICULOPERITONEAL SHUNT N/A 2020    VENTRICULAR PERITONEAL SHUNT PLACEMENT performed by Stanley Orozco MD at [facility] OR      Procedure/Surgery:  none this admission   Precautions:  Fall Risk, L AKA, RLE wounds, [place], + Alarms, Incontinence  Equipment Needs:  TBD    SUBJECTIVE:    Pt lives with her daughter in a 2 story home with ramped entry.  Bed is on 2 floor and bath is on 2 floor with stair lift to access.  Pt ambulated with WW with assist vs w/c independently PTA. Owns: w/c, SPC, WW, s.c., 2.5L/min O2, recently received LLE prosthetic     OBJECTIVE:   Initial  placement. Pt was returned to supine and positioned in chair position with all needs met and call light in reach. RN notified.     Treatment:  Patient practiced and was instructed in the following treatment:    Bed mobility training - pt given verbal and tactile cues to facilitate proper sequencing and safety during rolling and supine>sit as well as provided with physical assistance to complete task   Sitting EOB for >10 minutes for upright tolerance, postural awareness and BLE ROM  Transfer training - pt was given verbal and tactile cues to facilitate proper hand placement, technique and safety during sit to stand and stand to sit as well as provided with physical assistance.  Skilled positioning - Pt placed in the chair position with pillows utilized to facilitate upright posture, joint and skin integrity, and interaction with environment.     Skilled monitoring of vitals throughout session.     Pt's/ family goals   1. To go to [facility]    Prognosis is good for reaching above PT goals.    Patient and or family understand(s) diagnosis, prognosis, and plan of care.  yes    PHYSICAL THERAPY PLAN OF CARE:    PT POC is established based on physician order and patient diagnosis     Referring provider/PT Order:    Start   Ordering Provider    03/04/25 1130  PT eval and treat  Start:  03/04/25 1130,   End:  03/04/25 1130,   ONE TIME,   Standing Count:  1 Occurrences,   R         Yohannes Sykes, DO      Diagnosis:  Peripheral vascular disease [I73.9]  Chest pain [R07.9]  Right leg pain [M79.604]  Chest pain, unspecified type [R07.9]  Left above-knee amputee (HCC) [Z89.612]  Specific instructions for next treatment:  Maximize safe and independent functional mobility     Current Treatment Recommendations:     [x] Strengthening to improve independence with functional mobility   [] ROM to improve independence with functional mobility   [x] Balance Training to improve static/dynamic balance and to reduce fall risk  [x]

## 2025-03-06 NOTE — PROGRESS NOTES
Vascular Surgery Progress Note    Pt is being seen in f/u today regarding PVD    Subjective  Pt s/e. Minimal to no RLE pain this AM but had some throughout the day yesterday. Having some RUQ pain this AM     Current Medications:    sodium chloride      heparin (PORCINE) Infusion 14 Units/kg/hr (03/05/25 2307)      morphine **OR** morphine, sodium chloride flush, docusate sodium, sodium chloride flush, sodium chloride, ondansetron **OR** ondansetron, acetaminophen **OR** acetaminophen, magnesium hydroxide, heparin (porcine), heparin (porcine)    oxyBUTYnin  10 mg Oral QPM    [Held by provider] apixaban  5 mg Oral BID    aspirin  81 mg Oral Daily    buPROPion  150 mg Oral Daily    budesonide  0.25 mg Nebulization BID RT    And    arformoterol tartrate  15 mcg Nebulization BID RT    And    ipratropium  0.5 mg Nebulization Q6H RT    levothyroxine  125 mcg Oral Daily    sotalol  120 mg Oral BID    sodium chloride flush  5-40 mL IntraVENous 2 times per day    atorvastatin  40 mg Oral Nightly    amLODIPine  5 mg Oral BID        PHYSICAL EXAM:    BP (!) 141/75   Pulse 62   Temp 97.8 °F (36.6 °C) (Temporal)   Resp 18   Ht 1.651 m (5' 5\")   Wt 40.9 kg (90 lb 1.6 oz)   SpO2 92%   BMI 14.99 kg/m²     Intake/Output Summary (Last 24 hours) at 3/6/2025 0615  Last data filed at 3/5/2025 1445  Gross per 24 hour   Intake 180 ml   Output --   Net 180 ml          Gen: awake, alert and oriented x3, no apparent distress  CVS: RRR  Resp: No increased work of breathing  Abd: Soft, non-tender, non-distended  R LE: superficial wounds. 2+ fem. No signals distally   L LE: AKA stump intact     LABS:    Lab Results   Component Value Date    WBC 9.8 03/05/2025    HGB 9.0 (L) 03/05/2025    HCT 28.8 (L) 03/05/2025     (H) 03/05/2025    PROTIME 19.6 (H) 03/03/2025    INR 1.8 03/03/2025    APTT 62.1 (H) 03/05/2025    K 3.7 03/05/2025    BUN 19 03/05/2025    CREATININE 0.6 03/05/2025       A/P  74 y.o. female with hx of PVD with multiple  prior interventions, L AKA 10/2024 and now with wounds of RLE     Local wound care to RLE - dressing changed this AM w alginate, abd, kerlix   Hep gtt per cardiology   Wound healing shakes added   No acute vascular interventions at this time.   Vascular exam remains stable     aHilee Shah MD

## 2025-03-06 NOTE — PLAN OF CARE
Problem: Safety - Adult  Goal: Free from fall injury  Outcome: Progressing     Problem: Chronic Conditions and Co-morbidities  Goal: Patient's chronic conditions and co-morbidity symptoms are monitored and maintained or improved  Outcome: Progressing     Problem: Discharge Planning  Goal: Discharge to home or other facility with appropriate resources  Outcome: Progressing     Problem: Pain  Goal: Verbalizes/displays adequate comfort level or baseline comfort level  3/5/2025 2317 by Omer Joel RN  Outcome: Progressing  3/5/2025 1025 by Jean-Paul Taylor RN  Outcome: Progressing     Problem: Cardiovascular - Adult  Goal: Maintains optimal cardiac output and hemodynamic stability  Outcome: Progressing  Goal: Absence of cardiac dysrhythmias or at baseline  Outcome: Progressing     Problem: Hematologic - Adult  Goal: Maintains hematologic stability  Outcome: Progressing     Problem: Neurosensory - Adult  Goal: Achieves stable or improved neurological status  Outcome: Progressing  Goal: Achieves maximal functionality and self care  Outcome: Progressing     Problem: Respiratory - Adult  Goal: Achieves optimal ventilation and oxygenation  Outcome: Progressing     Problem: Skin/Tissue Integrity - Adult  Goal: Skin integrity remains intact  Outcome: Progressing  Goal: Incisions, wounds, or drain sites healing without S/S of infection  Outcome: Progressing     Problem: Musculoskeletal - Adult  Goal: Return mobility to safest level of function  Outcome: Progressing  Goal: Return ADL status to a safe level of function  Outcome: Progressing     Problem: Genitourinary - Adult  Goal: Absence of urinary retention  Outcome: Progressing  Goal: Urinary catheter remains patent  Outcome: Progressing

## 2025-03-06 NOTE — PROGRESS NOTES
[facility]                                Progress note    Subjective:    The patient is awake and alert. Lying in bed complaining of some abdominal pain.  No acute events overnight.    Denies chest pain, angina, SOB     Objective:    /68   Pulse 63   Temp 97.5 °F (36.4 °C) (Oral)   Resp 21   Ht 1.651 m (5' 5\")   Wt 40.9 kg (90 lb 1.6 oz)   SpO2 94%   BMI 14.99 kg/m²     In: 180 [P.O.:180]  Out: -   In: 180   Out: -     General appearance: NAD, conversant, pleasant  HEENT: AT/NC, MMM  Neck: FROM, supple  Lungs: Clear to auscultation  CV: RRR, no MRGs  Vasc: Radial pulses 2+  Abdomen: Soft, non-tender; no masses or HSM  Extremities: No peripheral edema or digital cyanosis left aka  Skin: leg wounds  Psych: Alert and oriented to person, place and time  Neuro: Alert and interactive     Recent Labs     03/04/25  0603 03/05/25 0419 03/06/25  0416   WBC 9.6 9.8 10.8   HGB 10.0* 9.0* 9.7*   HCT 32.3* 28.8* 31.4*   * 559* 598*       Recent Labs     03/04/25  0603 03/05/25 0419 03/06/25  0416    142 139   K 3.7 3.7 3.8    106 101   CO2 23 26 24   BUN 27* 19 15   CREATININE 0.8 0.6 0.6   CALCIUM 8.7 8.6 8.5*       Assessment:    Principal Problem:    Chest pain  Active Problems:    PVD (peripheral vascular disease) with claudication    Hyperlipidemia LDL goal <100    Severe protein-calorie malnutrition    CAD (coronary artery disease)    Paroxysmal atrial fibrillation (HCC)    Systolic hypertension  Resolved Problems:    * No resolved hospital problems. *      Plan:  74 year old female admitted to telemetry unit with chest pain, PVD.    3/5/2025  Vital signs stable  Labs stable  Continue heparin drip  Continue wound care  Appreciate all consultants input  Rck am labs  Continue pain control    3/6/2025  Vital signs stable  Labs stable  Obtain KUB - fecal retention  Dulcolax suppository  Glycolax daily  Pain control  Vascular singed off  Am/Pac - 10/24  Cardiology signed off

## 2025-03-07 LAB
ANION GAP SERPL CALCULATED.3IONS-SCNC: 16 MMOL/L (ref 7–16)
BUN SERPL-MCNC: 20 MG/DL (ref 6–23)
CALCIUM SERPL-MCNC: 8.5 MG/DL (ref 8.6–10.2)
CHLORIDE SERPL-SCNC: 100 MMOL/L (ref 98–107)
CO2 SERPL-SCNC: 20 MMOL/L (ref 22–29)
CREAT SERPL-MCNC: 0.6 MG/DL (ref 0.5–1)
GFR, ESTIMATED: >90 ML/MIN/1.73M2
GLUCOSE SERPL-MCNC: 68 MG/DL (ref 74–99)
POTASSIUM SERPL-SCNC: 4.8 MMOL/L (ref 3.5–5)
SODIUM SERPL-SCNC: 136 MMOL/L (ref 132–146)

## 2025-03-07 PROCEDURE — 6370000000 HC RX 637 (ALT 250 FOR IP): Performed by: STUDENT IN AN ORGANIZED HEALTH CARE EDUCATION/TRAINING PROGRAM

## 2025-03-07 PROCEDURE — 6370000000 HC RX 637 (ALT 250 FOR IP): Performed by: NURSE PRACTITIONER

## 2025-03-07 PROCEDURE — 2700000000 HC OXYGEN THERAPY PER DAY

## 2025-03-07 PROCEDURE — 94640 AIRWAY INHALATION TREATMENT: CPT

## 2025-03-07 PROCEDURE — 6370000000 HC RX 637 (ALT 250 FOR IP): Performed by: FAMILY MEDICINE

## 2025-03-07 PROCEDURE — 6360000002 HC RX W HCPCS: Performed by: INTERNAL MEDICINE

## 2025-03-07 PROCEDURE — 6370000000 HC RX 637 (ALT 250 FOR IP): Performed by: INTERNAL MEDICINE

## 2025-03-07 PROCEDURE — 2500000003 HC RX 250 WO HCPCS: Performed by: NURSE PRACTITIONER

## 2025-03-07 PROCEDURE — 6360000002 HC RX W HCPCS: Performed by: NURSE PRACTITIONER

## 2025-03-07 PROCEDURE — 6370000000 HC RX 637 (ALT 250 FOR IP)

## 2025-03-07 PROCEDURE — 2060000000 HC ICU INTERMEDIATE R&B

## 2025-03-07 PROCEDURE — 36415 COLL VENOUS BLD VENIPUNCTURE: CPT

## 2025-03-07 PROCEDURE — 80048 BASIC METABOLIC PNL TOTAL CA: CPT

## 2025-03-07 RX ORDER — POLYETHYLENE GLYCOL 3350 17 G/17G
17 POWDER, FOR SOLUTION ORAL DAILY
Status: DISCONTINUED | OUTPATIENT
Start: 2025-03-07 | End: 2025-03-08 | Stop reason: HOSPADM

## 2025-03-07 RX ORDER — BISACODYL 10 MG
10 SUPPOSITORY, RECTAL RECTAL DAILY PRN
Status: DISCONTINUED | OUTPATIENT
Start: 2025-03-07 | End: 2025-03-08 | Stop reason: HOSPADM

## 2025-03-07 RX ORDER — LACTULOSE 10 G/15ML
30 SOLUTION ORAL 3 TIMES DAILY
Status: DISCONTINUED | OUTPATIENT
Start: 2025-03-07 | End: 2025-03-08 | Stop reason: HOSPADM

## 2025-03-07 RX ORDER — KETOROLAC TROMETHAMINE 30 MG/ML
15 INJECTION, SOLUTION INTRAMUSCULAR; INTRAVENOUS EVERY 6 HOURS PRN
Status: DISCONTINUED | OUTPATIENT
Start: 2025-03-07 | End: 2025-03-08 | Stop reason: HOSPADM

## 2025-03-07 RX ORDER — SENNA AND DOCUSATE SODIUM 50; 8.6 MG/1; MG/1
2 TABLET, FILM COATED ORAL 2 TIMES DAILY
Status: DISCONTINUED | OUTPATIENT
Start: 2025-03-07 | End: 2025-03-08 | Stop reason: HOSPADM

## 2025-03-07 RX ORDER — SODIUM PHOSPHATE, DIBASIC AND SODIUM PHOSPHATE, MONOBASIC 7; 19 G/230ML; G/230ML
1 ENEMA RECTAL ONCE
Status: COMPLETED | OUTPATIENT
Start: 2025-03-07 | End: 2025-03-07

## 2025-03-07 RX ORDER — LACTULOSE 10 G/15ML
20 SOLUTION ORAL 3 TIMES DAILY
Status: DISCONTINUED | OUTPATIENT
Start: 2025-03-07 | End: 2025-03-07

## 2025-03-07 RX ORDER — DOCUSATE SODIUM 100 MG/1
100 CAPSULE, LIQUID FILLED ORAL 2 TIMES DAILY
Status: DISCONTINUED | OUTPATIENT
Start: 2025-03-07 | End: 2025-03-08 | Stop reason: HOSPADM

## 2025-03-07 RX ORDER — SODIUM PHOSPHATE, DIBASIC AND SODIUM PHOSPHATE, MONOBASIC 7; 19 G/230ML; G/230ML
1 ENEMA RECTAL ONCE
Status: DISCONTINUED | OUTPATIENT
Start: 2025-03-07 | End: 2025-03-07

## 2025-03-07 RX ORDER — BISACODYL 10 MG
10 SUPPOSITORY, RECTAL RECTAL DAILY
Status: DISCONTINUED | OUTPATIENT
Start: 2025-03-07 | End: 2025-03-08 | Stop reason: HOSPADM

## 2025-03-07 RX ADMIN — Medication: at 08:41

## 2025-03-07 RX ADMIN — APIXABAN 5 MG: 5 TABLET, FILM COATED ORAL at 08:14

## 2025-03-07 RX ADMIN — SODIUM PHOSPHATE: 7; 19 ENEMA RECTAL at 02:38

## 2025-03-07 RX ADMIN — SENNOSIDES AND DOCUSATE SODIUM 2 TABLET: 50; 8.6 TABLET ORAL at 08:14

## 2025-03-07 RX ADMIN — AMLODIPINE BESYLATE 5 MG: 5 TABLET ORAL at 20:43

## 2025-03-07 RX ADMIN — SOTALOL HYDROCHLORIDE 120 MG: 120 TABLET ORAL at 08:15

## 2025-03-07 RX ADMIN — IPRATROPIUM BROMIDE 0.5 MG: 0.5 SOLUTION RESPIRATORY (INHALATION) at 14:03

## 2025-03-07 RX ADMIN — KETOROLAC TROMETHAMINE 15 MG: 30 INJECTION, SOLUTION INTRAMUSCULAR at 20:47

## 2025-03-07 RX ADMIN — POLYETHYLENE GLYCOL 3350 17 G: 17 POWDER, FOR SOLUTION ORAL at 08:14

## 2025-03-07 RX ADMIN — BISACODYL 10 MG: 10 SUPPOSITORY RECTAL at 02:38

## 2025-03-07 RX ADMIN — ACETAMINOPHEN 650 MG: 325 TABLET ORAL at 16:56

## 2025-03-07 RX ADMIN — AMLODIPINE BESYLATE 5 MG: 5 TABLET ORAL at 08:14

## 2025-03-07 RX ADMIN — ARFORMOTEROL TARTRATE 15 MCG: 15 SOLUTION RESPIRATORY (INHALATION) at 07:47

## 2025-03-07 RX ADMIN — ACETAMINOPHEN 650 MG: 325 TABLET ORAL at 10:59

## 2025-03-07 RX ADMIN — LACTULOSE 30 G: 20 SOLUTION ORAL at 08:14

## 2025-03-07 RX ADMIN — SOTALOL HYDROCHLORIDE 120 MG: 120 TABLET ORAL at 20:43

## 2025-03-07 RX ADMIN — LEVOTHYROXINE SODIUM 125 MCG: 0.11 TABLET ORAL at 05:47

## 2025-03-07 RX ADMIN — IPRATROPIUM BROMIDE 0.5 MG: 0.5 SOLUTION RESPIRATORY (INHALATION) at 07:49

## 2025-03-07 RX ADMIN — ACETAMINOPHEN 650 MG: 325 TABLET ORAL at 04:36

## 2025-03-07 RX ADMIN — ASPIRIN 81 MG CHEWABLE TABLET 81 MG: 81 TABLET CHEWABLE at 08:14

## 2025-03-07 RX ADMIN — BUPROPION HYDROCHLORIDE 150 MG: 150 TABLET, FILM COATED, EXTENDED RELEASE ORAL at 08:14

## 2025-03-07 RX ADMIN — BISACODYL 10 MG: 10 SUPPOSITORY RECTAL at 08:40

## 2025-03-07 RX ADMIN — DOCUSATE SODIUM 100 MG: 100 CAPSULE, LIQUID FILLED ORAL at 08:14

## 2025-03-07 RX ADMIN — ATORVASTATIN CALCIUM 40 MG: 40 TABLET, FILM COATED ORAL at 20:43

## 2025-03-07 RX ADMIN — BUDESONIDE 250 MCG: 0.25 SUSPENSION RESPIRATORY (INHALATION) at 07:48

## 2025-03-07 RX ADMIN — APIXABAN 5 MG: 5 TABLET, FILM COATED ORAL at 20:43

## 2025-03-07 RX ADMIN — SODIUM CHLORIDE, PRESERVATIVE FREE 10 ML: 5 INJECTION INTRAVENOUS at 20:44

## 2025-03-07 RX ADMIN — PETROLATUM: 420 OINTMENT TOPICAL at 20:53

## 2025-03-07 RX ADMIN — OXYBUTYNIN CHLORIDE 10 MG: 10 TABLET, EXTENDED RELEASE ORAL at 16:56

## 2025-03-07 ASSESSMENT — PAIN DESCRIPTION - ONSET: ONSET: ON-GOING

## 2025-03-07 ASSESSMENT — PAIN SCALES - GENERAL
PAINLEVEL_OUTOF10: 10
PAINLEVEL_OUTOF10: 8
PAINLEVEL_OUTOF10: 8
PAINLEVEL_OUTOF10: 5
PAINLEVEL_OUTOF10: 10
PAINLEVEL_OUTOF10: 7
PAINLEVEL_OUTOF10: 10
PAINLEVEL_OUTOF10: 6
PAINLEVEL_OUTOF10: 5
PAINLEVEL_OUTOF10: 6
PAINLEVEL_OUTOF10: 5
PAINLEVEL_OUTOF10: 6
PAINLEVEL_OUTOF10: 5
PAINLEVEL_OUTOF10: 7
PAINLEVEL_OUTOF10: 5
PAINLEVEL_OUTOF10: 5
PAINLEVEL_OUTOF10: 6

## 2025-03-07 ASSESSMENT — PAIN DESCRIPTION - LOCATION
LOCATION: BACK;FOOT
LOCATION: FOOT
LOCATION: ABDOMEN;FOOT

## 2025-03-07 ASSESSMENT — PAIN DESCRIPTION - ORIENTATION
ORIENTATION: RIGHT
ORIENTATION: RIGHT
ORIENTATION: RIGHT;MID

## 2025-03-07 ASSESSMENT — ENCOUNTER SYMPTOMS
VOMITING: 0
RHINORRHEA: 0
BLOOD IN STOOL: 0
NAUSEA: 1
CHEST TIGHTNESS: 0
ABDOMINAL PAIN: 1
RECTAL PAIN: 0
CONSTIPATION: 1
SORE THROAT: 0
DIARRHEA: 0
COUGH: 0
ABDOMINAL DISTENTION: 0
SHORTNESS OF BREATH: 0

## 2025-03-07 ASSESSMENT — PAIN SCALES - WONG BAKER

## 2025-03-07 ASSESSMENT — PAIN DESCRIPTION - DESCRIPTORS
DESCRIPTORS: ACHING;DISCOMFORT;SHARP;SORE
DESCRIPTORS: ACHING;THROBBING;SORE
DESCRIPTORS: ACHING;DISCOMFORT;DULL

## 2025-03-07 ASSESSMENT — PAIN - FUNCTIONAL ASSESSMENT: PAIN_FUNCTIONAL_ASSESSMENT: PREVENTS OR INTERFERES SOME ACTIVE ACTIVITIES AND ADLS

## 2025-03-07 ASSESSMENT — PAIN DESCRIPTION - FREQUENCY: FREQUENCY: CONTINUOUS

## 2025-03-07 ASSESSMENT — PAIN DESCRIPTION - PAIN TYPE: TYPE: ACUTE PAIN

## 2025-03-07 NOTE — PLAN OF CARE
Problem: Safety - Adult  Goal: Free from fall injury  Outcome: Progressing     Problem: Chronic Conditions and Co-morbidities  Goal: Patient's chronic conditions and co-morbidity symptoms are monitored and maintained or improved  Outcome: Progressing     Problem: Discharge Planning  Goal: Discharge to home or other facility with appropriate resources  Outcome: Progressing     Problem: Pain  Goal: Verbalizes/displays adequate comfort level or baseline comfort level  Outcome: Progressing     Problem: Cardiovascular - Adult  Goal: Maintains optimal cardiac output and hemodynamic stability  Outcome: Progressing  Goal: Absence of cardiac dysrhythmias or at baseline  Outcome: Progressing     Problem: Hematologic - Adult  Goal: Maintains hematologic stability  Outcome: Progressing     Problem: Neurosensory - Adult  Goal: Achieves stable or improved neurological status  Outcome: Progressing  Goal: Achieves maximal functionality and self care  Outcome: Progressing     Problem: Respiratory - Adult  Goal: Achieves optimal ventilation and oxygenation  Outcome: Progressing     Problem: Skin/Tissue Integrity - Adult  Goal: Skin integrity remains intact  Description: 1.  Monitor for areas of redness and/or skin breakdown  2.  Assess vascular access sites hourly  3.  Every 4-6 hours minimum:  Change oxygen saturation probe site  4.  Every 4-6 hours:  If on nasal continuous positive airway pressure, respiratory therapy assess nares and determine need for appliance change or resting period  Outcome: Progressing  Goal: Incisions, wounds, or drain sites healing without S/S of infection  Outcome: Progressing     Problem: Musculoskeletal - Adult  Goal: Return mobility to safest level of function  Outcome: Progressing  Goal: Return ADL status to a safe level of function  Outcome: Progressing     Problem: Genitourinary - Adult  Goal: Absence of urinary retention  Outcome: Progressing  Goal: Urinary catheter remains patent  Outcome:

## 2025-03-07 NOTE — PROGRESS NOTES
Vascular Surgery Progress Note    Pt is being seen in f/u today regarding PVD    Subjective  Pt s/e. RLE pain stable. No worsening exam. Having abdominal pain and CTAP obtained yesterday with large stool burden. General surgery following for abdominal pain.     Current Medications:    sodium chloride        white petrolatum, bisacodyl, [Held by provider] morphine **OR** [Held by provider] morphine, sodium chloride flush, sodium chloride flush, sodium chloride, ondansetron **OR** ondansetron, acetaminophen **OR** acetaminophen    docusate sodium  100 mg Oral BID    polyethylene glycol  17 g Oral Daily    bisacodyl  10 mg Rectal Daily    sennosides-docusate sodium  2 tablet Oral BID    magnesium - glycerin - water   Rectal Once    lactulose  30 g Oral TID    oxyBUTYnin  10 mg Oral QPM    apixaban  5 mg Oral BID    aspirin  81 mg Oral Daily    buPROPion  150 mg Oral Daily    budesonide  0.25 mg Nebulization BID RT    And    arformoterol tartrate  15 mcg Nebulization BID RT    And    ipratropium  0.5 mg Nebulization Q6H RT    levothyroxine  125 mcg Oral Daily    sotalol  120 mg Oral BID    sodium chloride flush  5-40 mL IntraVENous 2 times per day    atorvastatin  40 mg Oral Nightly    amLODIPine  5 mg Oral BID        PHYSICAL EXAM:    BP (!) 143/73   Pulse 69   Temp 99.4 °F (37.4 °C) (Temporal)   Resp 20   Ht 1.651 m (5' 5\")   Wt 41.3 kg (91 lb)   SpO2 92%   BMI 15.14 kg/m²     Intake/Output Summary (Last 24 hours) at 3/7/2025 0735  Last data filed at 3/6/2025 1447  Gross per 24 hour   Intake 360 ml   Output --   Net 360 ml          Gen: awake, alert and oriented x3, no apparent distress  CVS: RRR  Resp: No increased work of breathing  Abd: Soft, RLQ TTP   R LE: superficial wounds. 2+ fem. No signals distally   L LE: AKA stump intact     LABS:    Lab Results   Component Value Date    WBC 10.8 03/06/2025    HGB 9.7 (L) 03/06/2025    HCT 31.4 (L) 03/06/2025     (H) 03/06/2025    PROTIME 19.6 (H) 03/03/2025

## 2025-03-07 NOTE — CONSULTS
General Surgery   Consult Note      Patient's Name/Date of Birth: Gladys Elizabeth / 1950    Date: March 7, 2025     PCP: Toñito Suazo MD     Chief Complaint:   Chief Complaint   Patient presents with    Shoulder Pain     Complaint of right shoulder/arm and back pain with some chest pain.     Leg Pain     Pt complaint of leg numbness in her right leg.     Chest Pain     HPI:   Gladys Elizabeth is a 74 y.o. female who presents for evaluation of RLE PVD and chest pain.  Vascular surgery and cardiology have been following the patient and heparin drip was started.  General surgery was consulted for abdominal pain and constipation.  Patient is well-known to the general surgery service and was seen for perforated cecal volvulus s/p right hemicolectomy with side-to-side anastomosis in February 2020 with Dr. Leung.  Last colonoscopy in 2021 was performed for diarrhea and was normal with patent ileocolonic anastomosis.  Last EGD in 2012 showed moderate reactive gastropathy, negative H. Pylori. Upon evaluation patient states that she has been having intermittent abdominal week x 1 week or longer which worsened this morning.  Complaining of RLQ pain that radiates to her back with mild nausea.  Denies emesis, hematochezia, melena, postprandial pain, decreased appetite, fevers, chills.  Patient has been passing flatus and had a large bowel movement this morning.  Patient also has an extensive history of bilateral PVD s/p multiple vascular interventions.     PMHx also includes normal pressure hydrocephalus s/p ventriculoperitoneal shunt in September 2020, A-fib on Eliquis, COPD, CAD, BRY.     Patient is afebrile and saturating at 92% on 2L NC.  Labs this morning with normal electrolytes, CBC this a.m. pending, CBC yesterday with no leukocytosis, Hgb 9.7, UA with trace leukocyte esterase, 1+ bacteria.  CTAP yesterday with colonic fecal burden, distended urinary bladder.  CTA abdomen pelvis on 3/3/25 showed some SMA

## 2025-03-07 NOTE — PROGRESS NOTES
[facility]                                Progress note    Subjective:    The patient is awake and aler  Resting comfortably in no acute distress  No family at bedside  Complaining of right-sided abdominal pain  Objective:    BP (!) 151/82   Pulse 66   Temp 97.7 °F (36.5 °C) (Oral)   Resp 20   Ht 1.651 m (5' 5\")   Wt 41.3 kg (91 lb)   SpO2 95%   BMI 15.14 kg/m²     In: 360 [P.O.:360]  Out: -   In: 360   Out: -     General appearance: NAD, conversant, pleasant  HEENT: AT/NC, MMM  Neck: FROM, supple  Lungs: Clear to auscultation  CV: RRR, no MRGs  Vasc: Radial pulses 2+  Abdomen: Soft, non-tender; no masses or HSM  Extremities: No peripheral edema or digital cyanosis left aka  Skin: leg wounds  Psych: Alert and oriented to person, place and time  Neuro: Alert and interactive     Recent Labs     03/05/25 0419 03/06/25  0416   WBC 9.8 10.8   HGB 9.0* 9.7*   HCT 28.8* 31.4*   * 598*       Recent Labs     03/05/25  0419 03/06/25  0416 03/07/25  0419    139 136   K 3.7 3.8 4.8    101 100   CO2 26 24 20*   BUN 19 15 20   CREATININE 0.6 0.6 0.6   CALCIUM 8.6 8.5* 8.5*       Assessment:    Principal Problem:    Chest pain  Active Problems:    PVD (peripheral vascular disease) with claudication    Hyperlipidemia LDL goal <100    Severe protein-calorie malnutrition    CAD (coronary artery disease)    Paroxysmal atrial fibrillation (HCC)    Systolic hypertension  Resolved Problems:    * No resolved hospital problems. *      Plan:  74 year old female admitted to telemetry unit with chest pain, PVD.    3/5/2025  Vital signs stable  Labs stable  Continue heparin drip  Continue wound care  Appreciate all consultants input  Rck am labs  Continue pain control    3/6/2025  Vital signs stable  Labs stable  Obtain KUB - fecal retention  Dulcolax suppository  Glycolax daily  Pain control  Vascular singed off  Am/Pac - 10/24  Cardiology signed off   Discontinue heparin drip and resume  eliquis    3/7/2025  Resting comfortably in no acute distress  Complaining of ongoing abdominal pain in the right lower quadrant  She tells me she has 2 significant bowel movements this morning however nursing indicates they were tiny.  Continue aggressive bowel regimen per general surgery  IV Toradol for pain management  Await pre-CERT  No further plans from other services such as cardiology and vascular      Code status: Full code  Consultants:  cardiology, vascular, general surgery    DVT Prophylaxis   PT/OT  Discharge planning         Carmita Escobar MD,  11:42 AM  3/7/2025

## 2025-03-07 NOTE — PROGRESS NOTES
Jus Howell, April, NP for pain medication per pt's request.     Plan of care ongoing   Electronically signed by Flores Andrade RN on 3/6/2025 at 10:35 PM

## 2025-03-07 NOTE — PLAN OF CARE
Problem: Discharge Planning  Goal: Discharge to home or other facility with appropriate resources  3/7/2025 1156 by Jean-Paul Taylor, RN  Outcome: Progressing  3/6/2025 2214 by Flroes Andrade, RN  Outcome: Progressing

## 2025-03-07 NOTE — CARE COORDINATION
[facility]ente accepted and started precert. Patient updated. Called and updated daughter, Thalia as well. Aggressive bowel regimen today.     1225 Auth obtained for Vicente, good through the weekend. Pasrr and ambulette on soft chart.     For questions I can be reached at [phone]. Erica Wood, HAILEY  \

## 2025-03-07 NOTE — PROGRESS NOTES
General surgical consult sent to resident, Dr. Imani Ram. Answering service for attending on call, Dr. Valdez, received consult. Dr. Valdez called back to [facility]. Spoke with him about pt's reason for admission and purpose of gen surg consult. Also made him aware that surgical resident was contacted as well.     Plan of care ongoing   Electronically signed by Flores Andrade RN on 3/7/2025 at 6:13 AM

## 2025-03-07 NOTE — FLOWSHEET NOTE
Inpatient Wound Care(initial evaluation) 4502 a    Admit Date: 3/3/2025 12:40 PM    Reason for consult:  right lower leg    Significant history:  per H & P  Chief Complaint:   LLE PAIN  Presented with  LLE PAIN, AND CHEST PAIN. LOCATED ACW, RADIATING DOWN HER RIGHT ARM AND TO HER BACK .  ACHY IN CHARACTER,  NO NV, OCCASIONAL SOB , NO DIAPHORESIS . HAS A KNOWN HISTORY OF SEVERE PVD, HAD A LEFT AKA.  RLE  LEG HAS BEEN COLD AND TURNING BLUE.BLACK.      Findings:     03/07/25 1514   Skin Integumentary    Skin Integrity Ecchymosis   Location bue   Skin Integrity Site 2   Skin Integrity Location 2 Vascular discoloration  (dried scabs, dry flaky skin)   Location 2 RLE   Wound 03/07/25 Ankle Right;Lateral   Date First Assessed/Time First Assessed: 03/07/25 1500   Present on Original Admission: Yes  Primary Wound Type: Venous Ulcer  Location: Ankle  Wound Location Orientation: Right;Lateral   Wound Image    Wound Etiology Venous   Dressing Status New dressing applied   Wound Cleansed Soap and water   Dressing/Treatment ABD;Dry dressing;Roll gauze   Wound Length (cm) 2 cm   Wound Width (cm) 1 cm   Wound Depth (cm) 0.1 cm   Wound Surface Area (cm^2) 2 cm^2   Wound Volume (cm^3) 0.2 cm^3   Wound Assessment   (dry)   Drainage Amount None (dry)   Rhoda-wound Assessment Dry/flaky   Wound Thickness Description not for Pressure Injury Partial thickness       **Informed Consent**    photos taken of right lower leg and inserted into their chart as part of their permanent medical record for purposes of documentation, treatment management and/or medical review.   All Images taken on 3/7/25 of patient name: Gladys Elizabeth were transmitted and stored on secured Epic  Site located within Media Folder Tab by a registered [employer] Mobile Application Device.   Left AKA  Incontinent of stool    Plan:  Vascular following  No drainage to right leg- dry dressing  Dietary consult  Will need continued preventative care  Aquaphor to

## 2025-03-08 VITALS
SYSTOLIC BLOOD PRESSURE: 128 MMHG | BODY MASS INDEX: 15.16 KG/M2 | TEMPERATURE: 97 F | HEART RATE: 69 BPM | RESPIRATION RATE: 18 BRPM | WEIGHT: 91 LBS | DIASTOLIC BLOOD PRESSURE: 59 MMHG | HEIGHT: 65 IN | OXYGEN SATURATION: 94 %

## 2025-03-08 LAB
ANION GAP SERPL CALCULATED.3IONS-SCNC: 11 MMOL/L (ref 7–16)
BUN SERPL-MCNC: 28 MG/DL (ref 6–23)
CALCIUM SERPL-MCNC: 8.7 MG/DL (ref 8.6–10.2)
CHLORIDE SERPL-SCNC: 96 MMOL/L (ref 98–107)
CO2 SERPL-SCNC: 27 MMOL/L (ref 22–29)
CREAT SERPL-MCNC: 0.7 MG/DL (ref 0.5–1)
GFR, ESTIMATED: >90 ML/MIN/1.73M2
GLUCOSE SERPL-MCNC: 101 MG/DL (ref 74–99)
MAGNESIUM SERPL-MCNC: 2.1 MG/DL (ref 1.6–2.6)
PARTIAL THROMBOPLASTIN TIME: 40.9 SEC (ref 24.5–35.1)
PHOSPHATE SERPL-MCNC: 3.7 MG/DL (ref 2.5–4.5)
POTASSIUM SERPL-SCNC: 3.8 MMOL/L (ref 3.5–5)
SODIUM SERPL-SCNC: 134 MMOL/L (ref 132–146)

## 2025-03-08 PROCEDURE — 6360000002 HC RX W HCPCS: Performed by: INTERNAL MEDICINE

## 2025-03-08 PROCEDURE — 80048 BASIC METABOLIC PNL TOTAL CA: CPT

## 2025-03-08 PROCEDURE — 2500000003 HC RX 250 WO HCPCS: Performed by: NURSE PRACTITIONER

## 2025-03-08 PROCEDURE — 6370000000 HC RX 637 (ALT 250 FOR IP): Performed by: NURSE PRACTITIONER

## 2025-03-08 PROCEDURE — 85730 THROMBOPLASTIN TIME PARTIAL: CPT

## 2025-03-08 PROCEDURE — 83735 ASSAY OF MAGNESIUM: CPT

## 2025-03-08 PROCEDURE — 6370000000 HC RX 637 (ALT 250 FOR IP): Performed by: INTERNAL MEDICINE

## 2025-03-08 PROCEDURE — 36415 COLL VENOUS BLD VENIPUNCTURE: CPT

## 2025-03-08 PROCEDURE — 84100 ASSAY OF PHOSPHORUS: CPT

## 2025-03-08 PROCEDURE — 6370000000 HC RX 637 (ALT 250 FOR IP): Performed by: FAMILY MEDICINE

## 2025-03-08 RX ORDER — ATORVASTATIN CALCIUM 40 MG/1
40 TABLET, FILM COATED ORAL NIGHTLY
Qty: 30 TABLET | Refills: 3 | Status: SHIPPED | OUTPATIENT
Start: 2025-03-08

## 2025-03-08 RX ORDER — AMLODIPINE BESYLATE 5 MG/1
5 TABLET ORAL 2 TIMES DAILY
Qty: 30 TABLET | Refills: 3 | Status: SHIPPED | OUTPATIENT
Start: 2025-03-08

## 2025-03-08 RX ORDER — TRAMADOL HYDROCHLORIDE 50 MG/1
50 TABLET ORAL EVERY 6 HOURS PRN
Qty: 18 TABLET | Refills: 0 | Status: SHIPPED | OUTPATIENT
Start: 2025-03-08 | End: 2025-03-13

## 2025-03-08 RX ORDER — BUPROPION HYDROCHLORIDE 150 MG/1
150 TABLET, EXTENDED RELEASE ORAL DAILY
Qty: 60 TABLET | Refills: 3 | Status: SHIPPED | OUTPATIENT
Start: 2025-03-09

## 2025-03-08 RX ORDER — TRAMADOL HYDROCHLORIDE 50 MG/1
50 TABLET ORAL EVERY 6 HOURS PRN
Status: DISCONTINUED | OUTPATIENT
Start: 2025-03-08 | End: 2025-03-08 | Stop reason: HOSPADM

## 2025-03-08 RX ADMIN — TRAMADOL HYDROCHLORIDE 50 MG: 50 TABLET, COATED ORAL at 08:36

## 2025-03-08 RX ADMIN — ACETAMINOPHEN 650 MG: 325 TABLET ORAL at 00:57

## 2025-03-08 RX ADMIN — PETROLATUM: 420 OINTMENT TOPICAL at 08:37

## 2025-03-08 RX ADMIN — AMLODIPINE BESYLATE 5 MG: 5 TABLET ORAL at 08:36

## 2025-03-08 RX ADMIN — SODIUM CHLORIDE, PRESERVATIVE FREE 10 ML: 5 INJECTION INTRAVENOUS at 08:37

## 2025-03-08 RX ADMIN — ACETAMINOPHEN 650 MG: 325 TABLET ORAL at 08:36

## 2025-03-08 RX ADMIN — KETOROLAC TROMETHAMINE 15 MG: 30 INJECTION, SOLUTION INTRAMUSCULAR at 02:23

## 2025-03-08 RX ADMIN — BUPROPION HYDROCHLORIDE 150 MG: 150 TABLET, FILM COATED, EXTENDED RELEASE ORAL at 08:36

## 2025-03-08 RX ADMIN — APIXABAN 5 MG: 5 TABLET, FILM COATED ORAL at 08:36

## 2025-03-08 RX ADMIN — DOCUSATE SODIUM 100 MG: 100 CAPSULE, LIQUID FILLED ORAL at 08:39

## 2025-03-08 RX ADMIN — LEVOTHYROXINE SODIUM 125 MCG: 0.11 TABLET ORAL at 05:28

## 2025-03-08 RX ADMIN — ASPIRIN 81 MG CHEWABLE TABLET 81 MG: 81 TABLET CHEWABLE at 08:36

## 2025-03-08 RX ADMIN — SOTALOL HYDROCHLORIDE 120 MG: 120 TABLET ORAL at 08:36

## 2025-03-08 ASSESSMENT — PAIN DESCRIPTION - ONSET: ONSET: ON-GOING

## 2025-03-08 ASSESSMENT — PAIN DESCRIPTION - DESCRIPTORS: DESCRIPTORS: SHARP;SHOOTING;STABBING;TENDER

## 2025-03-08 ASSESSMENT — PAIN DESCRIPTION - FREQUENCY: FREQUENCY: CONTINUOUS

## 2025-03-08 ASSESSMENT — PAIN SCALES - GENERAL
PAINLEVEL_OUTOF10: 10
PAINLEVEL_OUTOF10: 6
PAINLEVEL_OUTOF10: 10
PAINLEVEL_OUTOF10: 10

## 2025-03-08 ASSESSMENT — PAIN DESCRIPTION - PAIN TYPE: TYPE: ACUTE PAIN

## 2025-03-08 ASSESSMENT — PAIN DESCRIPTION - LOCATION: LOCATION: FOOT;LEG

## 2025-03-08 ASSESSMENT — PAIN DESCRIPTION - ORIENTATION: ORIENTATION: RIGHT

## 2025-03-08 NOTE — PROGRESS NOTES
AVS completed and faxed to [facility]   Nurse to nurse called.   Patient and family updated

## 2025-03-08 NOTE — PLAN OF CARE
Problem: Safety - Adult  Goal: Free from fall injury  3/8/2025 1239 by Majo Elizabeth RN  Outcome: Completed  3/8/2025 0959 by Majo Elizabeth RN  Outcome: Progressing     Problem: Chronic Conditions and Co-morbidities  Goal: Patient's chronic conditions and co-morbidity symptoms are monitored and maintained or improved  Outcome: Completed     Problem: Discharge Planning  Goal: Discharge to home or other facility with appropriate resources  3/8/2025 1239 by Majo Elizabeth RN  Outcome: Completed  3/8/2025 0959 by Majo Elizabeth RN  Outcome: Progressing     Problem: Pain  Goal: Verbalizes/displays adequate comfort level or baseline comfort level  Outcome: Completed     Problem: Cardiovascular - Adult  Goal: Maintains optimal cardiac output and hemodynamic stability  Outcome: Completed  Goal: Absence of cardiac dysrhythmias or at baseline  Outcome: Completed     Problem: Hematologic - Adult  Goal: Maintains hematologic stability  Outcome: Completed     Problem: Neurosensory - Adult  Goal: Achieves stable or improved neurological status  Outcome: Completed  Goal: Achieves maximal functionality and self care  Outcome: Completed     Problem: Respiratory - Adult  Goal: Achieves optimal ventilation and oxygenation  Outcome: Completed     Problem: Skin/Tissue Integrity - Adult  Goal: Skin integrity remains intact  Description: 1.  Monitor for areas of redness and/or skin breakdown  2.  Assess vascular access sites hourly  3.  Every 4-6 hours minimum:  Change oxygen saturation probe site  4.  Every 4-6 hours:  If on nasal continuous positive airway pressure, respiratory therapy assess nares and determine need for appliance change or resting period  Outcome: Completed  Goal: Incisions, wounds, or drain sites healing without S/S of infection  Outcome: Completed     Problem: Musculoskeletal - Adult  Goal: Return mobility to safest level of function  Outcome: Completed  Goal: Return ADL status to a safe level of

## 2025-03-08 NOTE — PROGRESS NOTES
GENERAL SURGERY  DAILY PROGRESS NOTE  3/8/2025    CHIEF COMPLAINT:  Chief Complaint   Patient presents with    Shoulder Pain     Complaint of right shoulder/arm and back pain with some chest pain.     Leg Pain     Pt complaint of leg numbness in her right leg.     Chest Pain       SUBJECTIVE:  Had multiple bowel movements yesterday, complaining of some cramping pain.     OBJECTIVE:  /62   Pulse 80   Temp 97.7 °F (36.5 °C) (Temporal)   Resp 21   Ht 1.651 m (5' 5\")   Wt 41.3 kg (91 lb)   SpO2 94%   BMI 15.14 kg/m²     GENERAL:  NAD. A&Ox3.  HEENT: normocephalic  LUNGS:  on room air. No acute respiratory distress  CARDIOVASCULAR: hemodynamically stable  SKIN: warm and dry  ABDOMEN:  Soft, non-distended, mildly tender. No guarding, rigidity, rebound.  EXT: ROM intact all 4 extremities, no obvious deformities    CBC  Recent Labs     03/06/25  0416   WBC 10.8   HGB 9.7*   HCT 31.4*   .6*   *   MPV 9.8       CMP  Recent Labs     03/06/25  0416 03/07/25  0419    136   K 3.8 4.8    100   CO2 24 20*   BUN 15 20   CREATININE 0.6 0.6   GLUCOSE 82 68*   CALCIUM 8.5* 8.5*         ASSESSMENT/PLAN:  75 y.o. female with constipation     Plan  -continue bowel regimen  -limit narcotics as able  -prn pain and nausea control  -diet as tolerated   -no surgical plans at this time     Artemio Cunningham MD  Surgery Resident PGY-4  3/8/2025  7:42 AM     The patient was seen and examined and the chart was reviewed.  I agree with the assessment and plan.  The patient will continue conservative management for constipation.  The patient will continue bowel regimen.  Samy Davenport MD

## 2025-03-08 NOTE — CARE COORDINATION
3/8:  Update CM Note:  Cm spoke with the floor & pt can be dc today.  Cm set up the pt with ambulette for [facility] for 130pm with PAS.  Cm advise the floor/RN, family & facility. Electronically signed by Rasheeda Casas RN on 3/8/2025 at 12:08 PM

## 2025-03-08 NOTE — DISCHARGE SUMMARY
popliteal with reconstitution of flow at the distal popliteal margin trifurcation which has patent proximal trifurcation distal dissipation of opacification with posterior tibial artery single-vessel runoff patent at the level the right ankle. Pelvis: No suspicious pelvic lesion or bulky pelvic adenopathy/free fluid. Mild to moderate distension the urinary bladder without bladder wall thickening. Bones/Soft Tissues:  No acute abnormality of the visualized osseous structures.     1. No evidence of thoracic aortic aneurysm or dissection. 2. No evidence of abdominal aortic aneurysm or dissection. 3. Right paratracheal and right perihilar adenopathy greater than expected for reactive adenopathy. 4. Right upper lobe mucous plugging with associated nodular density a or mild convergent area of involvement and plugging 11 mm maximum dimension with adjacent patchy opacifications in the right upper lung posteriorly likely bronchiolitis associated. 5. Occluded right superficial femoral artery with reconstitution of flow at the distal popliteal margin trifurcation which has patent proximal trifurcation distal dissipation of opacification with posterior tibial artery single-vessel runoff patent at the level the right ankle. 6. Occluded left superficial femoral artery stent with patent left deep profundus and muscular collaterals sub serving the left lower extremity which has a above the knee amputation site. 7.  shunt catheter with small volume low-density fluid in the right upper quadrant adjacent to the terminus of the  shunt and in the pelvis.     CTA ABDOMINAL AORTA W BILAT RUNOFF W CONTRAST  Result Date: 3/3/2025  EXAMINATION: CTA abdominal AORTA and pelvis with bilateral lower extremity RUNOFF WITH CONTRAST; CTA OF THE CHEST 3/3/2025 8:24 am TECHNIQUE: CTA of the abdomen and pelvis and bilateral lower extremities was performed after the administration of intravenous contrast.   Multiplanar reformatted images are  extended release tablet  traMADol 50 MG tablet       Activity: {discharge activity:51677}  Diet: {diet:51299}    Pt has been advised to:    Follow-up with Toñito Suazo MD in 1 week.  Follow-up with consultants as recommended by them      Signed:  BRIAN Haji - CNP  3/8/2025  11:02 AM

## 2025-03-08 NOTE — DISCHARGE INSTR - COC
Continuity of Care Form    Patient Name: Gladys Elizabeth   :  1950  MRN:  [id number]    Admit date:  3/3/2025  Discharge date:  3/8/2025    Code Status Order: Full Code   Advance Directives:     Admitting Physician:  Carmita Escobar MD  PCP: Toñito Suazo MD    Discharging Nurse: Renetta Elizabeth   Discharging Hospital Unit/Room#: 4502/4502-A  Discharging Unit Phone Number: [phone]    Emergency Contact:   Extended Emergency Contact Information  Primary Emergency Contact: Thalia Elizabeth  Address: [address] [address] of [place]  Home Phone: [phone]  Work Phone: [phone]  Mobile Phone: [phone]  Relation: Child    Past Surgical History:  Past Surgical History:   Procedure Laterality Date     SECTION      COLONOSCOPY      COLONOSCOPY N/A 2021    COLONOSCOPY WITH BIOPSY performed by Monty Leung MD at [facility] ENDOSCOPY    DILATION AND CURETTAGE OF UTERUS      EYE SURGERY      bilat cataract     INVASIVE VASCULAR N/A 2024    Aortagram abdominal performed by Chrissy Hobbs MD at [facility] CARDIAC CATH LAB    INVASIVE VASCULAR N/A 2024    Angioplasty superficial femoral artery performed by Chrissy Hobbs MD at [facility] CARDIAC CATH LAB    INVASIVE VASCULAR N/A 2024    Angioplasty popliteal artery performed by Chrissy Hobbs MD at [facility] CARDIAC CATH LAB    LAPAROTOMY N/A 2020    LAPAROTOMY EXPLORATORY, RIGHT HEMICOLECTOMY performed by Monty Leung MD at [facility] OR    LEG SURGERY Left 10/25/2024    Left Leg Above Knee Amputation performed by Chrissy Hobbs MD at [facility] OR    LUMBAR DRAIN IMPLANTATION N/A 2020    PLACEMENT OF INTRATHECAL CATHETER FOR LUMBAR DRAIN performed by Stanley Orozco MD at [facility] OR    PERIPHERAL PERCUTANEOUS ARTERIAL INTERVENTION  2019    L SFA angioplasty    TUBAL LIGATION      VENTRICULOPERITONEAL SHUNT N/A 2020    VENTRICULAR PERITONEAL SHUNT PLACEMENT  Test): not done    Treatments at the Time of Hospital Discharge:   Respiratory Treatments: NA  Oxygen Therapy:  is on oxygen at 3 L/min per nasal cannula.  Ventilator:    - No ventilator support    Rehab Therapies: Physical Therapy, Occupational Therapy, and Orthotics/Prosthetics  Weight Bearing Status/Restrictions: No weight bearing restrictions  Other Medical Equipment (for information only, NOT a DME order):  wheelchair, bedside commode, and hospital bed  Other Treatments: Heel dressing change    Patient's personal belongings (please select all that are sent with patient):  Wheelchair, shoe    RN SIGNATURE:  Electronically signed by Majo Elizabeth RN on 3/8/25 at 12:25 PM EST    CASE MANAGEMENT/SOCIAL WORK SECTION    Inpatient Status Date: ***    Readmission Risk Assessment Score:  [facility] RISK OF UNPLANNED READMISSION 2.0             19.2 Total Score        Discharging to Facility/ Agency   Name:   Address:  Phone:  Fax:    Dialysis Facility (if applicable)   Name:  Address:  Dialysis Schedule:  Phone:  Fax:    / signature: {Esignature:[id number]}    PHYSICIAN SECTION    Prognosis: {Prognosis:6843614714}    Condition at Discharge: { Patient Condition:999873667}    Rehab Potential (if transferring to Rehab): {Prognosis:3153870868}    Recommended Labs or Other Treatments After Discharge: ***    Physician Certification: I certify the above information and transfer of Gladys Elizabeth  is necessary for the continuing treatment of the diagnosis listed and that she requires {Admit to Appropriate Level of Care:16938} for {GREATER/LESS:644562386} 30 days.     Update Admission H&P: {CHP DME Changes in HandP:666672141}    PHYSICIAN SIGNATURE:  {Esignature:[id number]}

## 2025-04-14 ENCOUNTER — TELEPHONE (OUTPATIENT)
Dept: VASCULAR SURGERY | Age: 75
End: 2025-04-14

## 2025-04-14 NOTE — PROGRESS NOTES
venom, Wasp venom, and Flagyl [metronidazole]  Social History     Socioeconomic History    Marital status:      Spouse name: Not on file    Number of children: Not on file    Years of education: Not on file    Highest education level: Not on file   Occupational History    Occupation: Retired    Tobacco Use    Smoking status: Former     Current packs/day: 0.00     Average packs/day: 0.3 packs/day for 20.0 years (5.0 ttl pk-yrs)     Types: Cigarettes     Start date: 5/3/1997     Quit date: 5/3/2017     Years since quittin.9    Smokeless tobacco: Never   Vaping Use    Vaping status: Never Used   Substance and Sexual Activity    Alcohol use: Not Currently     Comment: rare    Drug use: No    Sexual activity: Never   Other Topics Concern    Not on file   Social History Narrative    Not on file     Social Drivers of Health     Financial Resource Strain: Not on file   Food Insecurity: No Food Insecurity (3/4/2025)    Hunger Vital Sign     Worried About Running Out of Food in the Last Year: Never true     Ran Out of Food in the Last Year: Never true   Transportation Needs: Unmet Transportation Needs (3/4/2025)    PRAPARE - Transportation     Lack of Transportation (Medical): Yes     Lack of Transportation (Non-Medical): Yes   Physical Activity: Not on file   Stress: Not on file   Social Connections: Not on file   Intimate Partner Violence: Not on file   Housing Stability: High Risk (3/4/2025)    Housing Stability Vital Sign     Unable to Pay for Housing in the Last Year: No     Number of Times Moved in the Last Year: 14     Homeless in the Last Year: No     Family History   Problem Relation Age of Onset    Kidney Disease Mother     Coronary Art Dis Mother     High Blood Pressure Mother     Cancer Father     Other Father      Labs  Lab Results   Component Value Date    WBC 10.8 2025    HGB 9.7 (L) 2025    HCT 31.4 (L) 2025     (H) 2025    PROTIME 19.6 (H) 2025

## 2025-04-14 NOTE — TELEPHONE ENCOUNTER
Pt sent a message via [employer] asking for a sooner ov with Dr. Hobbs; I left a message for a return call on her voicemail.

## 2025-04-14 NOTE — TELEPHONE ENCOUNTER
Pt's daughter Thalia Elizabeth would like to talk to you about mom's visit. Please call  [phone].    Extensive discussion with daughter  As long as pain controlled and wounds do not significantly progress would not recommend aka    If things get significantly worse than would recommend amputation or hospice    Currently she has multiple superficial wounds.  Since starting on fentanyl patch per pcp pain seems to be better.      Recommended daily alginate dressings to wounds to facility    Chrissy Hbobs MD

## 2025-04-17 ENCOUNTER — TELEPHONE (OUTPATIENT)
Dept: VASCULAR SURGERY | Age: 75
End: 2025-04-17

## 2025-04-17 NOTE — TELEPHONE ENCOUNTER
Spoke with pt's daughter, Thalia, who reported the pt has been diagnosed with a UTI.  She is also still complaining of (R) foot pain.  Thalia states she will bring her [place] [facility] [place] should the pain continue to increase.

## 2025-05-05 ENCOUNTER — HOSPITAL ENCOUNTER (INPATIENT)
Age: 75
LOS: 4 days | Discharge: HOSPICE | DRG: 189 | End: 2025-05-09
Attending: EMERGENCY MEDICINE | Admitting: STUDENT IN AN ORGANIZED HEALTH CARE EDUCATION/TRAINING PROGRAM
Payer: MEDICARE

## 2025-05-05 ENCOUNTER — APPOINTMENT (OUTPATIENT)
Dept: CT IMAGING | Age: 75
DRG: 189 | End: 2025-05-05
Payer: MEDICARE

## 2025-05-05 ENCOUNTER — APPOINTMENT (OUTPATIENT)
Dept: GENERAL RADIOLOGY | Age: 75
DRG: 189 | End: 2025-05-05
Payer: MEDICARE
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
## 2025-05-05 DIAGNOSIS — J44.1 COPD EXACERBATION (HCC): ICD-10-CM

## 2025-05-05 DIAGNOSIS — R09.02 HYPOXEMIA: ICD-10-CM

## 2025-05-05 DIAGNOSIS — S09.90XA CLOSED HEAD INJURY, INITIAL ENCOUNTER: ICD-10-CM

## 2025-05-05 PROBLEM — J96.01 ACUTE RESPIRATORY FAILURE WITH HYPOXIA (HCC): Status: ACTIVE | Noted: 2025-05-05

## 2025-05-05 LAB
ALBUMIN SERPL-MCNC: 3.7 G/DL (ref 3.5–5.2)
ALP SERPL-CCNC: 109 U/L (ref 35–104)
ALT SERPL-CCNC: 20 U/L (ref 0–32)
ANION GAP SERPL CALCULATED.3IONS-SCNC: 14 MMOL/L (ref 7–16)
AST SERPL-CCNC: 33 U/L (ref 0–31)
B.E.: 0.4 MMOL/L (ref -3–3)
B.E.: 2.2 MMOL/L (ref -3–3)
BASOPHILS # BLD: 0.03 K/UL (ref 0–0.2)
BASOPHILS NFR BLD: 0 % (ref 0–2)
BILIRUB SERPL-MCNC: 0.3 MG/DL (ref 0–1.2)
BNP SERPL-MCNC: 3446 PG/ML (ref 0–450)
BUN SERPL-MCNC: 31 MG/DL (ref 6–23)
CALCIUM SERPL-MCNC: 9.2 MG/DL (ref 8.6–10.2)
CHLORIDE SERPL-SCNC: 90 MMOL/L (ref 98–107)
CK SERPL-CCNC: 163 U/L (ref 20–180)
CO2 SERPL-SCNC: 27 MMOL/L (ref 22–29)
COHB: 1.5 % (ref 0–1.5)
COHB: 1.5 % (ref 0–1.5)
CREAT SERPL-MCNC: 0.6 MG/DL (ref 0.5–1)
CRITICAL: ABNORMAL
CRITICAL: ABNORMAL
DATE ANALYZED: ABNORMAL
DATE ANALYZED: ABNORMAL
DATE OF COLLECTION: ABNORMAL
DATE OF COLLECTION: ABNORMAL
EOSINOPHIL # BLD: 0 K/UL (ref 0.05–0.5)
EOSINOPHILS RELATIVE PERCENT: 0 % (ref 0–6)
ERYTHROCYTE [DISTWIDTH] IN BLOOD BY AUTOMATED COUNT: 14.8 % (ref 11.5–15)
FIO2: 50 %
GFR, ESTIMATED: >90 ML/MIN/1.73M2
GLUCOSE SERPL-MCNC: 130 MG/DL (ref 74–99)
HCO3: 25 MMOL/L (ref 22–26)
HCO3: 26.6 MMOL/L (ref 22–26)
HCT VFR BLD AUTO: 25.6 % (ref 34–48)
HGB BLD-MCNC: 7.8 G/DL (ref 11.5–15.5)
HHB: 0.9 % (ref 0–5)
HHB: 1.7 % (ref 0–5)
IMM GRANULOCYTES # BLD AUTO: 0.2 K/UL (ref 0–0.58)
IMM GRANULOCYTES NFR BLD: 1 % (ref 0–5)
INR PPP: 2.8
LAB: ABNORMAL
LAB: ABNORMAL
LYMPHOCYTES NFR BLD: 0.6 K/UL (ref 1.5–4)
LYMPHOCYTES RELATIVE PERCENT: 3 % (ref 20–42)
Lab: 1625
Lab: 2008
MAGNESIUM SERPL-MCNC: 2.1 MG/DL (ref 1.6–2.6)
MCH RBC QN AUTO: 31.2 PG (ref 26–35)
MCHC RBC AUTO-ENTMCNC: 30.5 G/DL (ref 32–34.5)
MCV RBC AUTO: 102.4 FL (ref 80–99.9)
METHB: 0.2 % (ref 0–1.5)
METHB: 0.3 % (ref 0–1.5)
MODE: ABNORMAL
MODE: ABNORMAL
MONOCYTES NFR BLD: 1.35 K/UL (ref 0.1–0.95)
MONOCYTES NFR BLD: 6 % (ref 2–12)
NEUTROPHILS NFR BLD: 91 % (ref 43–80)
NEUTS SEG NFR BLD: 21.27 K/UL (ref 1.8–7.3)
O2 CONTENT: 10.7 ML/DL
O2 CONTENT: 11.5 ML/DL
O2 SATURATION: 98.3 % (ref 92–98.5)
O2 SATURATION: 99.1 % (ref 92–98.5)
O2HB: 96.6 % (ref 94–97)
O2HB: 97.3 % (ref 94–97)
OPERATOR ID: 6032
OPERATOR ID: 6032
PATIENT TEMP: 37 C
PATIENT TEMP: 37 C
PCO2: 39.9 MMHG (ref 35–45)
PCO2: 40.6 MMHG (ref 35–45)
PFO2: 2.2 MMHG/%
PH BLOOD GAS: 7.41 (ref 7.35–7.45)
PH BLOOD GAS: 7.44 (ref 7.35–7.45)
PLATELET # BLD AUTO: 707 K/UL (ref 130–450)
PMV BLD AUTO: 8.5 FL (ref 7–12)
PO2: 109.8 MMHG (ref 75–100)
PO2: 141.5 MMHG (ref 75–100)
POTASSIUM SERPL-SCNC: 4.6 MMOL/L (ref 3.5–5)
PROT SERPL-MCNC: 6.8 G/DL (ref 6.4–8.3)
PROTHROMBIN TIME: 30.9 SEC (ref 9.3–12.4)
RBC # BLD AUTO: 2.5 M/UL (ref 3.5–5.5)
RBC # BLD: ABNORMAL 10*6/UL
RI(T): 1.84
SODIUM SERPL-SCNC: 131 MMOL/L (ref 132–146)
SOURCE, BLOOD GAS: ABNORMAL
SOURCE, BLOOD GAS: ABNORMAL
THB: 7.7 G/DL (ref 11.5–16.5)
THB: 8.2 G/DL (ref 11.5–16.5)
TIME ANALYZED: 1633
TIME ANALYZED: 2024
TROPONIN I SERPL HS-MCNC: 17 NG/L (ref 0–14)
WBC OTHER # BLD: 23.5 K/UL (ref 4.5–11.5)

## 2025-05-05 PROCEDURE — 96365 THER/PROPH/DIAG IV INF INIT: CPT

## 2025-05-05 PROCEDURE — 96375 TX/PRO/DX INJ NEW DRUG ADDON: CPT

## 2025-05-05 PROCEDURE — 84484 ASSAY OF TROPONIN QUANT: CPT

## 2025-05-05 PROCEDURE — 71045 X-RAY EXAM CHEST 1 VIEW: CPT

## 2025-05-05 PROCEDURE — 82550 ASSAY OF CK (CPK): CPT

## 2025-05-05 PROCEDURE — 99285 EMERGENCY DEPT VISIT HI MDM: CPT

## 2025-05-05 PROCEDURE — 85025 COMPLETE CBC W/AUTO DIFF WBC: CPT

## 2025-05-05 PROCEDURE — 94660 CPAP INITIATION&MGMT: CPT

## 2025-05-05 PROCEDURE — 2580000003 HC RX 258: Performed by: STUDENT IN AN ORGANIZED HEALTH CARE EDUCATION/TRAINING PROGRAM

## 2025-05-05 PROCEDURE — 82805 BLOOD GASES W/O2 SATURATION: CPT

## 2025-05-05 PROCEDURE — 80053 COMPREHEN METABOLIC PANEL: CPT

## 2025-05-05 PROCEDURE — 72170 X-RAY EXAM OF PELVIS: CPT

## 2025-05-05 PROCEDURE — 5A0945A ASSISTANCE WITH RESPIRATORY VENTILATION, 24-96 CONSECUTIVE HOURS, HIGH NASAL FLOW/VELOCITY: ICD-10-PCS | Performed by: INTERNAL MEDICINE

## 2025-05-05 PROCEDURE — 86901 BLOOD TYPING SEROLOGIC RH(D): CPT

## 2025-05-05 PROCEDURE — 2500000003 HC RX 250 WO HCPCS: Performed by: STUDENT IN AN ORGANIZED HEALTH CARE EDUCATION/TRAINING PROGRAM

## 2025-05-05 PROCEDURE — 6360000002 HC RX W HCPCS: Performed by: STUDENT IN AN ORGANIZED HEALTH CARE EDUCATION/TRAINING PROGRAM

## 2025-05-05 PROCEDURE — 86923 COMPATIBILITY TEST ELECTRIC: CPT

## 2025-05-05 PROCEDURE — 86850 RBC ANTIBODY SCREEN: CPT

## 2025-05-05 PROCEDURE — 6360000004 HC RX CONTRAST MEDICATION

## 2025-05-05 PROCEDURE — 86900 BLOOD TYPING SEROLOGIC ABO: CPT

## 2025-05-05 PROCEDURE — 93005 ELECTROCARDIOGRAM TRACING: CPT | Performed by: EMERGENCY MEDICINE

## 2025-05-05 PROCEDURE — 2060000000 HC ICU INTERMEDIATE R&B

## 2025-05-05 PROCEDURE — 70450 CT HEAD/BRAIN W/O DYE: CPT

## 2025-05-05 PROCEDURE — 87040 BLOOD CULTURE FOR BACTERIA: CPT

## 2025-05-05 PROCEDURE — 83880 ASSAY OF NATRIURETIC PEPTIDE: CPT

## 2025-05-05 PROCEDURE — 83735 ASSAY OF MAGNESIUM: CPT

## 2025-05-05 PROCEDURE — 6370000000 HC RX 637 (ALT 250 FOR IP): Performed by: EMERGENCY MEDICINE

## 2025-05-05 PROCEDURE — 85610 PROTHROMBIN TIME: CPT

## 2025-05-05 PROCEDURE — 71275 CT ANGIOGRAPHY CHEST: CPT

## 2025-05-05 PROCEDURE — 94640 AIRWAY INHALATION TREATMENT: CPT

## 2025-05-05 PROCEDURE — 72125 CT NECK SPINE W/O DYE: CPT

## 2025-05-05 RX ORDER — IOPAMIDOL 755 MG/ML
75 INJECTION, SOLUTION INTRAVASCULAR
Status: COMPLETED | OUTPATIENT
Start: 2025-05-05 | End: 2025-05-05

## 2025-05-05 RX ORDER — LORAZEPAM 2 MG/ML
1 INJECTION INTRAMUSCULAR ONCE
Status: COMPLETED | OUTPATIENT
Start: 2025-05-05 | End: 2025-05-05

## 2025-05-05 RX ORDER — IPRATROPIUM BROMIDE AND ALBUTEROL SULFATE 2.5; .5 MG/3ML; MG/3ML
1 SOLUTION RESPIRATORY (INHALATION) ONCE
Status: COMPLETED | OUTPATIENT
Start: 2025-05-05 | End: 2025-05-05

## 2025-05-05 RX ADMIN — IOPAMIDOL 75 ML: 755 INJECTION, SOLUTION INTRAVENOUS at 20:26

## 2025-05-05 RX ADMIN — DOXYCYCLINE 100 MG: 100 INJECTION, POWDER, LYOPHILIZED, FOR SOLUTION INTRAVENOUS at 21:02

## 2025-05-05 RX ADMIN — IPRATROPIUM BROMIDE AND ALBUTEROL SULFATE 1 DOSE: .5; 2.5 SOLUTION RESPIRATORY (INHALATION) at 16:41

## 2025-05-05 RX ADMIN — WATER 2000 MG: 1 INJECTION INTRAMUSCULAR; INTRAVENOUS; SUBCUTANEOUS at 20:51

## 2025-05-05 RX ADMIN — LORAZEPAM 1 MG: 2 INJECTION INTRAMUSCULAR; INTRAVENOUS at 23:39

## 2025-05-05 ASSESSMENT — PAIN - FUNCTIONAL ASSESSMENT: PAIN_FUNCTIONAL_ASSESSMENT: NONE - DENIES PAIN

## 2025-05-05 NOTE — PROGRESS NOTES
05/05/25 1821   NIV Type   $NIV $Daily Charge   NIV Started/Stopped On   Equipment Type v60   Mode Bilevel   Mask Type Full face mask   Mask Size Small   Assessment   Pulse 80   Respirations 18   SpO2 97 %   Level of Consciousness 0   Comfort Level Good   Using Accessory Muscles No   Mask Compliance Good   Skin Assessment Clean, dry, & intact   Settings/Measurements   PIP Observed 13 cm H20   IPAP 12 cmH20   CPAP/EPAP 5 cmH2O   Vt (Measured) 462 mL   Rate Ordered 14   Insp Rise Time (%) 3 %   FiO2  50 %   I Time/ I Time % 0.85 s   Minute Volume (L/min) 11.1 Liters   Mask Leak (lpm) 50 lpm   Patient's Home Machine No   Alarm Settings   Alarms On Y

## 2025-05-05 NOTE — ED PROVIDER NOTES
HPI:  5/5/25,   Time: 4:02 PM EDT       Gladys Elizabeth is a 75 y.o. female presenting to the ED for fall/unk downtime, found by home health, unable to get up, beginning unk ago.  The complaint has been persistent, severe in severity, and worsened by nothing.  Brought in by EMS.  Vasculopath.  Previous amputations.  Fell today.  Unknown how long down.  Found to be hypoxic.  Home health unable to get up.  Brought for further evaluation.  No fevers chills or sweats.  Positive cough.    Review of Systems:   Pertinent positives and negatives are stated within HPI, all other systems reviewed and are negative.          --------------------------------------------- PAST HISTORY ---------------------------------------------  Past Medical History:  has a past medical history of 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 with rest pain ([facility]), Atrial fibrillation ([facility]), Atypical mole, Bilateral carotid artery stenosis, Bruit of right carotid artery, CAD (coronary artery disease), Cancer ([facility]), COPD (chronic obstructive pulmonary disease) ([facility]), COVID, Depression, Diabetic ulcer of toe of left foot associated with type 2 diabetes mellitus, with fat layer exposed ([facility]), Diarrhea, Femoro-popliteal artery disease, History of tobacco use, Hydrocephalus ([facility]), Hyperlipidemia, Hypertension, BRY (obstructive sleep apnea), Pain in both feet, Paroxysmal A-fib ([facility]), Peripheral vascular disease due to secondary diabetes mellitus ([facility]), PVD (peripheral vascular disease), PVD (peripheral vascular disease) with claudication, Rheumatoid arthritis ([facility]), Short-term memory loss, Skin tear of upper arm without complication, Skin ulcer of right calf with fat layer exposed ([facility]), Status post peripheral artery angioplasty with insertion of stent, Thyroid disease, Urinary incontinence, Venous stasis ulcer of left calf with fat layer exposed without varicose veins ([facility]), and Weight loss.    Past Surgical History:  has a past  g/dL    RDW 14.8 11.5 - 15.0 %    Platelets 707 (H) 130 - 450 k/uL    MPV 8.5 7.0 - 12.0 fL    Neutrophils % 91 (H) 43.0 - 80.0 %    Lymphocytes % 3 (L) 20.0 - 42.0 %    Monocytes % 6 2.0 - 12.0 %    Eosinophils % 0 0 - 6 %    Basophils % 0 0.0 - 2.0 %    Immature Granulocytes % 1 0.0 - 5.0 %    Neutrophils Absolute 21.27 (H) 1.80 - 7.30 k/uL    Lymphocytes Absolute 0.60 (L) 1.50 - 4.00 k/uL    Monocytes Absolute 1.35 (H) 0.10 - 0.95 k/uL    Eosinophils Absolute 0.00 (L) 0.05 - 0.50 k/uL    Basophils Absolute 0.03 0.00 - 0.20 k/uL    Immature Granulocytes Absolute 0.20 0.00 - 0.58 k/uL    RBC Morphology 1+ ANISOCYTOSIS     RBC Morphology 1+ HYPOCHROMIA     RBC Morphology 1+ OVALOCYTES     RBC Morphology 1+ POIKILOCYTOSIS     RBC Morphology 1+ POLYCHROMASIA     RBC Morphology 1+ TARGET CELLS    Magnesium   Result Value Ref Range    Magnesium 2.1 1.6 - 2.6 mg/dL   Protime-INR   Result Value Ref Range    Protime 30.9 (H) 9.3 - 12.4 sec    INR 2.8    Troponin   Result Value Ref Range    Troponin, High Sensitivity 17 (H) 0 - 14 ng/L   Brain Natriuretic Peptide   Result Value Ref Range    NT Pro-BNP 3,446 (H) 0 - 450 pg/mL   CK   Result Value Ref Range    Total  20 - 180 U/L   Blood Gas, Arterial   Result Value Ref Range    Date Analyzed 20250505     Time Analyzed 1633     Source: Blood Arterial     pH, Blood Gas 7.408 7.350 - 7.450    PCO2 40.6 35.0 - 45.0 mmHg    PO2 141.5 (H) 75.0 - 100.0 mmHg    HCO3 25.0 22.0 - 26.0 mmol/L    B.E. 0.4 -3.0 - 3.0 mmol/L    O2 Sat 99.1 (H) 92.0 - 98.5 %    O2Hb 97.3 (H) 94.0 - 97.0 %    COHb 1.5 0.0 - 1.5 %    MetHb 0.3 0.0 - 1.5 %    O2 Content 11.5 mL/dL    HHb 0.9 0.0 - 5.0 %    tHb (est) 8.2 (L) 11.5 - 16.5 g/dL    Mode NRB 15L     Date Of Collection 20250505     Time Collected 1625     Pt Temp 37.0 C     ID [id number]     Lab [id number]     Critical(s) Notified . No Critical Values    Blood Gas, Arterial   Result Value Ref Range    Date Analyzed 20250505     Time Analyzed 2024      Source: Blood Arterial     pH, Blood Gas 7.441 7.350 - 7.450    PCO2 39.9 35.0 - 45.0 mmHg    PO2 109.8 (H) 75.0 - 100.0 mmHg    HCO3 26.6 (H) 22.0 - 26.0 mmol/L    B.E. 2.2 -3.0 - 3.0 mmol/L    O2 Sat 98.3 92.0 - 98.5 %    PO2/FIO2 2.20 mmHg/%    RI(T) 1.84     O2Hb 96.6 94.0 - 97.0 %    COHb 1.5 0.0 - 1.5 %    MetHb 0.2 0.0 - 1.5 %    O2 Content 10.7 mL/dL    HHb 1.7 0.0 - 5.0 %    tHb (est) 7.7 (L) 11.5 - 16.5 g/dL    Mode BILEVEL     FIO2 50.0 %    Date Of Collection 20250505     Time Collected 2008     Pt Temp 37.0 C     ID [id number]     Lab [id number]     Critical(s) Notified . No Critical Values    Procalcitonin   Result Value Ref Range    Procalcitonin 0.53 (H) 0.00 - 0.08 ng/mL   Basic Metabolic Panel w/ Reflex to MG   Result Value Ref Range    Sodium 136 132 - 146 mmol/L    Potassium 3.4 (L) 3.5 - 5.0 mmol/L    Chloride 95 (L) 98 - 107 mmol/L    CO2 24 22 - 29 mmol/L    Anion Gap 17 (H) 7 - 16 mmol/L    Glucose 68 (L) 74 - 99 mg/dL    BUN 20 6 - 23 mg/dL    Creatinine 0.5 0.50 - 1.00 mg/dL    Est, Glom Filt Rate >90 >60 mL/min/1.73m2    Calcium 8.7 8.6 - 10.2 mg/dL   CBC with Auto Differential   Result Value Ref Range    WBC 18.8 (H) 4.5 - 11.5 k/uL    RBC 2.38 (L) 3.50 - 5.50 m/uL    Hemoglobin 7.4 (L) 11.5 - 15.5 g/dL    Hematocrit 24.4 (L) 34.0 - 48.0 %    .5 (H) 80.0 - 99.9 fL    MCH 31.1 26.0 - 35.0 pg    MCHC 30.3 (L) 32.0 - 34.5 g/dL    RDW 14.9 11.5 - 15.0 %    Platelets 661 (H) 130 - 450 k/uL    MPV 8.3 7.0 - 12.0 fL    Neutrophils % 97 (H) 43.0 - 80.0 %    Lymphocytes % 3 (L) 20.0 - 42.0 %    Monocytes % 1 (L) 2.0 - 12.0 %    Eosinophils % 0 0 - 6 %    Basophils % 0 0.0 - 2.0 %    Neutrophils Absolute 18.15 (H) 1.80 - 7.30 k/uL    Lymphocytes Absolute 0.49 (L) 1.50 - 4.00 k/uL    Monocytes Absolute 0.16 0.10 - 0.95 k/uL    Eosinophils Absolute 0.00 (L) 0.05 - 0.50 k/uL    Basophils Absolute 0.00 0.00 - 0.20 k/uL    RBC Morphology 1+ HYPOCHROMIA     RBC Morphology 1+ POIKILOCYTOSIS     RBC

## 2025-05-06 LAB
ANION GAP SERPL CALCULATED.3IONS-SCNC: 17 MMOL/L (ref 7–16)
B PARAP IS1001 DNA NPH QL NAA+NON-PROBE: NOT DETECTED
B PERT DNA SPEC QL NAA+PROBE: NOT DETECTED
B.E.: -0.4 MMOL/L (ref -3–3)
BASOPHILS # BLD: 0 K/UL (ref 0–0.2)
BASOPHILS NFR BLD: 0 % (ref 0–2)
BUN SERPL-MCNC: 20 MG/DL (ref 6–23)
C PNEUM DNA NPH QL NAA+NON-PROBE: NOT DETECTED
CALCIUM SERPL-MCNC: 8.7 MG/DL (ref 8.6–10.2)
CHLORIDE SERPL-SCNC: 95 MMOL/L (ref 98–107)
CO2 SERPL-SCNC: 24 MMOL/L (ref 22–29)
COHB: 1.4 % (ref 0–1.5)
CREAT SERPL-MCNC: 0.5 MG/DL (ref 0.5–1)
CRITICAL: ABNORMAL
CRP SERPL HS-MCNC: 117 MG/L (ref 0–5)
DATE ANALYZED: ABNORMAL
DATE OF COLLECTION: ABNORMAL
EKG ATRIAL RATE: 159 BPM
EKG ATRIAL RATE: 77 BPM
EKG P AXIS: 73 DEGREES
EKG P-R INTERVAL: 136 MS
EKG Q-T INTERVAL: 272 MS
EKG Q-T INTERVAL: 340 MS
EKG QRS DURATION: 68 MS
EKG QRS DURATION: 78 MS
EKG QTC CALCULATION (BAZETT): 384 MS
EKG QTC CALCULATION (BAZETT): 435 MS
EKG R AXIS: 74 DEGREES
EKG R AXIS: 80 DEGREES
EKG T AXIS: 239 DEGREES
EKG T AXIS: 78 DEGREES
EKG VENTRICULAR RATE: 154 BPM
EKG VENTRICULAR RATE: 77 BPM
EOSINOPHIL # BLD: 0 K/UL (ref 0.05–0.5)
EOSINOPHILS RELATIVE PERCENT: 0 % (ref 0–6)
ERYTHROCYTE [DISTWIDTH] IN BLOOD BY AUTOMATED COUNT: 14.9 % (ref 11.5–15)
ERYTHROCYTE [SEDIMENTATION RATE] IN BLOOD BY WESTERGREN METHOD: 54 MM/HR (ref 0–20)
FIO2: 50 %
FLUAV RNA NPH QL NAA+NON-PROBE: NOT DETECTED
FLUBV RNA NPH QL NAA+NON-PROBE: NOT DETECTED
GFR, ESTIMATED: >90 ML/MIN/1.73M2
GLUCOSE SERPL-MCNC: 68 MG/DL (ref 74–99)
HADV DNA NPH QL NAA+NON-PROBE: NOT DETECTED
HCO3: 24 MMOL/L (ref 22–26)
HCOV 229E RNA NPH QL NAA+NON-PROBE: NOT DETECTED
HCOV HKU1 RNA NPH QL NAA+NON-PROBE: NOT DETECTED
HCOV NL63 RNA NPH QL NAA+NON-PROBE: NOT DETECTED
HCOV OC43 RNA NPH QL NAA+NON-PROBE: NOT DETECTED
HCT VFR BLD AUTO: 24.4 % (ref 34–48)
HGB BLD-MCNC: 7.4 G/DL (ref 11.5–15.5)
HHB: 9.8 % (ref 0–5)
HMPV RNA NPH QL NAA+NON-PROBE: DETECTED
HPIV1 RNA NPH QL NAA+NON-PROBE: NOT DETECTED
HPIV2 RNA NPH QL NAA+NON-PROBE: NOT DETECTED
HPIV3 RNA NPH QL NAA+NON-PROBE: NOT DETECTED
HPIV4 RNA NPH QL NAA+NON-PROBE: NOT DETECTED
L PNEUMO1 AG UR QL IA.RAPID: NEGATIVE
LAB: ABNORMAL
LYMPHOCYTES NFR BLD: 0.49 K/UL (ref 1.5–4)
LYMPHOCYTES RELATIVE PERCENT: 3 % (ref 20–42)
Lab: 1023
M PNEUMO DNA NPH QL NAA+NON-PROBE: NOT DETECTED
MAGNESIUM SERPL-MCNC: 1.9 MG/DL (ref 1.6–2.6)
MCH RBC QN AUTO: 31.1 PG (ref 26–35)
MCHC RBC AUTO-ENTMCNC: 30.3 G/DL (ref 32–34.5)
MCV RBC AUTO: 102.5 FL (ref 80–99.9)
METHB: 0.3 % (ref 0–1.5)
MONOCYTES NFR BLD: 0.16 K/UL (ref 0.1–0.95)
MONOCYTES NFR BLD: 1 % (ref 2–12)
NEUTROPHILS NFR BLD: 97 % (ref 43–80)
NEUTS SEG NFR BLD: 18.15 K/UL (ref 1.8–7.3)
O2 CONTENT: 10.2 ML/DL
O2 SATURATION: 90 % (ref 92–98.5)
O2HB: 88.5 % (ref 94–97)
OPERATOR ID: 1115
PATIENT TEMP: 37 C
PCO2: 38.2 MMHG (ref 35–45)
PFO2: 1.24 MMHG/%
PH BLOOD GAS: 7.42 (ref 7.35–7.45)
PLATELET # BLD AUTO: 661 K/UL (ref 130–450)
PMV BLD AUTO: 8.3 FL (ref 7–12)
PO2: 62.2 MMHG (ref 75–100)
POTASSIUM SERPL-SCNC: 3.4 MMOL/L (ref 3.5–5)
PROCALCITONIN SERPL-MCNC: 0.53 NG/ML (ref 0–0.08)
RBC # BLD AUTO: 2.38 M/UL (ref 3.5–5.5)
RBC # BLD: ABNORMAL 10*6/UL
RI(T): 4.04
RSV RNA NPH QL NAA+NON-PROBE: NOT DETECTED
RV+EV RNA NPH QL NAA+NON-PROBE: NOT DETECTED
S PNEUM AG SPEC QL: NEGATIVE
SARS-COV-2 RNA NPH QL NAA+NON-PROBE: NOT DETECTED
SODIUM SERPL-SCNC: 136 MMOL/L (ref 132–146)
SOURCE, BLOOD GAS: ABNORMAL
SPECIMEN DESCRIPTION: ABNORMAL
SPECIMEN SOURCE: NORMAL
THB: 8.1 G/DL (ref 11.5–16.5)
TIME ANALYZED: 1028
WBC OTHER # BLD: 18.8 K/UL (ref 4.5–11.5)

## 2025-05-06 PROCEDURE — 2580000003 HC RX 258

## 2025-05-06 PROCEDURE — 87449 NOS EACH ORGANISM AG IA: CPT

## 2025-05-06 PROCEDURE — 51798 US URINE CAPACITY MEASURE: CPT

## 2025-05-06 PROCEDURE — 83735 ASSAY OF MAGNESIUM: CPT

## 2025-05-06 PROCEDURE — 6370000000 HC RX 637 (ALT 250 FOR IP)

## 2025-05-06 PROCEDURE — 2500000003 HC RX 250 WO HCPCS

## 2025-05-06 PROCEDURE — 6360000002 HC RX W HCPCS

## 2025-05-06 PROCEDURE — 85025 COMPLETE CBC W/AUTO DIFF WBC: CPT

## 2025-05-06 PROCEDURE — 93010 ELECTROCARDIOGRAM REPORT: CPT | Performed by: INTERNAL MEDICINE

## 2025-05-06 PROCEDURE — 5A09457 ASSISTANCE WITH RESPIRATORY VENTILATION, 24-96 CONSECUTIVE HOURS, CONTINUOUS POSITIVE AIRWAY PRESSURE: ICD-10-PCS | Performed by: INTERNAL MEDICINE

## 2025-05-06 PROCEDURE — 2060000000 HC ICU INTERMEDIATE R&B

## 2025-05-06 PROCEDURE — 2580000003 HC RX 258: Performed by: EMERGENCY MEDICINE

## 2025-05-06 PROCEDURE — 80048 BASIC METABOLIC PNL TOTAL CA: CPT

## 2025-05-06 PROCEDURE — 6360000002 HC RX W HCPCS: Performed by: STUDENT IN AN ORGANIZED HEALTH CARE EDUCATION/TRAINING PROGRAM

## 2025-05-06 PROCEDURE — 2580000003 HC RX 258: Performed by: STUDENT IN AN ORGANIZED HEALTH CARE EDUCATION/TRAINING PROGRAM

## 2025-05-06 PROCEDURE — 85652 RBC SED RATE AUTOMATED: CPT

## 2025-05-06 PROCEDURE — 94640 AIRWAY INHALATION TREATMENT: CPT

## 2025-05-06 PROCEDURE — 94660 CPAP INITIATION&MGMT: CPT

## 2025-05-06 PROCEDURE — 99223 1ST HOSP IP/OBS HIGH 75: CPT | Performed by: NURSE PRACTITIONER

## 2025-05-06 PROCEDURE — 93005 ELECTROCARDIOGRAM TRACING: CPT | Performed by: STUDENT IN AN ORGANIZED HEALTH CARE EDUCATION/TRAINING PROGRAM

## 2025-05-06 PROCEDURE — 0202U NFCT DS 22 TRGT SARS-COV-2: CPT

## 2025-05-06 PROCEDURE — 82805 BLOOD GASES W/O2 SATURATION: CPT

## 2025-05-06 PROCEDURE — 87899 AGENT NOS ASSAY W/OPTIC: CPT

## 2025-05-06 PROCEDURE — 84145 PROCALCITONIN (PCT): CPT

## 2025-05-06 PROCEDURE — 86140 C-REACTIVE PROTEIN: CPT

## 2025-05-06 RX ORDER — AMIODARONE HYDROCHLORIDE 200 MG/1
100 TABLET ORAL DAILY
Status: DISCONTINUED | OUTPATIENT
Start: 2025-05-07 | End: 2025-05-10 | Stop reason: HOSPADM

## 2025-05-06 RX ORDER — ARFORMOTEROL TARTRATE 15 UG/2ML
15 SOLUTION RESPIRATORY (INHALATION)
Status: DISCONTINUED | OUTPATIENT
Start: 2025-05-06 | End: 2025-05-10 | Stop reason: HOSPADM

## 2025-05-06 RX ORDER — 0.9 % SODIUM CHLORIDE 0.9 %
500 INTRAVENOUS SOLUTION INTRAVENOUS ONCE
Status: COMPLETED | OUTPATIENT
Start: 2025-05-06 | End: 2025-05-07

## 2025-05-06 RX ORDER — METHYLPREDNISOLONE SODIUM SUCCINATE 40 MG/ML
40 INJECTION INTRAMUSCULAR; INTRAVENOUS EVERY 12 HOURS
Status: DISCONTINUED | OUTPATIENT
Start: 2025-05-06 | End: 2025-05-08

## 2025-05-06 RX ORDER — ERGOCALCIFEROL 1.25 MG/1
50000 CAPSULE, LIQUID FILLED ORAL WEEKLY
Status: DISCONTINUED | OUTPATIENT
Start: 2025-05-12 | End: 2025-05-10 | Stop reason: HOSPADM

## 2025-05-06 RX ORDER — ONDANSETRON 4 MG/1
4 TABLET, ORALLY DISINTEGRATING ORAL EVERY 8 HOURS PRN
Status: DISCONTINUED | OUTPATIENT
Start: 2025-05-06 | End: 2025-05-10 | Stop reason: HOSPADM

## 2025-05-06 RX ORDER — IPRATROPIUM BROMIDE AND ALBUTEROL SULFATE 2.5; .5 MG/3ML; MG/3ML
1 SOLUTION RESPIRATORY (INHALATION)
Status: DISCONTINUED | OUTPATIENT
Start: 2025-05-06 | End: 2025-05-06 | Stop reason: SDUPTHER

## 2025-05-06 RX ORDER — ENOXAPARIN SODIUM 100 MG/ML
1 INJECTION SUBCUTANEOUS 2 TIMES DAILY
Status: DISCONTINUED | OUTPATIENT
Start: 2025-05-06 | End: 2025-05-10 | Stop reason: HOSPADM

## 2025-05-06 RX ORDER — ONDANSETRON 2 MG/ML
4 INJECTION INTRAMUSCULAR; INTRAVENOUS EVERY 6 HOURS PRN
Status: DISCONTINUED | OUTPATIENT
Start: 2025-05-06 | End: 2025-05-10 | Stop reason: HOSPADM

## 2025-05-06 RX ORDER — TRAMADOL HYDROCHLORIDE 50 MG/1
50 TABLET ORAL 3 TIMES DAILY PRN
Status: ON HOLD | COMMUNITY
End: 2025-05-09 | Stop reason: HOSPADM

## 2025-05-06 RX ORDER — LEVALBUTEROL INHALATION SOLUTION 0.63 MG/3ML
0.63 SOLUTION RESPIRATORY (INHALATION) EVERY 8 HOURS PRN
Status: DISCONTINUED | OUTPATIENT
Start: 2025-05-06 | End: 2025-05-10 | Stop reason: HOSPADM

## 2025-05-06 RX ORDER — DILTIAZEM HYDROCHLORIDE 5 MG/ML
INJECTION INTRAVENOUS
Status: COMPLETED
Start: 2025-05-06 | End: 2025-05-06

## 2025-05-06 RX ORDER — DILTIAZEM HYDROCHLORIDE 5 MG/ML
10 INJECTION INTRAVENOUS ONCE
Status: COMPLETED | OUTPATIENT
Start: 2025-05-06 | End: 2025-05-06

## 2025-05-06 RX ORDER — OXYBUTYNIN CHLORIDE 10 MG/1
10 TABLET, EXTENDED RELEASE ORAL EVERY EVENING
Status: DISCONTINUED | OUTPATIENT
Start: 2025-05-06 | End: 2025-05-10 | Stop reason: HOSPADM

## 2025-05-06 RX ORDER — LEVALBUTEROL INHALATION SOLUTION 0.63 MG/3ML
0.63 SOLUTION RESPIRATORY (INHALATION)
Status: DISCONTINUED | OUTPATIENT
Start: 2025-05-06 | End: 2025-05-10 | Stop reason: HOSPADM

## 2025-05-06 RX ORDER — SODIUM CHLORIDE 9 MG/ML
INJECTION, SOLUTION INTRAVENOUS PRN
Status: DISCONTINUED | OUTPATIENT
Start: 2025-05-06 | End: 2025-05-10 | Stop reason: HOSPADM

## 2025-05-06 RX ORDER — SODIUM CHLORIDE 0.9 % (FLUSH) 0.9 %
5-40 SYRINGE (ML) INJECTION EVERY 12 HOURS SCHEDULED
Status: DISCONTINUED | OUTPATIENT
Start: 2025-05-06 | End: 2025-05-10 | Stop reason: HOSPADM

## 2025-05-06 RX ORDER — ALBUTEROL SULFATE 0.83 MG/ML
2.5 SOLUTION RESPIRATORY (INHALATION)
Status: DISCONTINUED | OUTPATIENT
Start: 2025-05-06 | End: 2025-05-06

## 2025-05-06 RX ORDER — ASPIRIN 81 MG/1
81 TABLET, CHEWABLE ORAL DAILY
Status: DISCONTINUED | OUTPATIENT
Start: 2025-05-06 | End: 2025-05-10 | Stop reason: HOSPADM

## 2025-05-06 RX ORDER — SODIUM CHLORIDE FOR INHALATION 3 %
4 VIAL, NEBULIZER (ML) INHALATION
Status: DISCONTINUED | OUTPATIENT
Start: 2025-05-06 | End: 2025-05-10 | Stop reason: HOSPADM

## 2025-05-06 RX ORDER — ACETAMINOPHEN 650 MG/1
650 SUPPOSITORY RECTAL EVERY 6 HOURS PRN
Status: DISCONTINUED | OUTPATIENT
Start: 2025-05-06 | End: 2025-05-10 | Stop reason: HOSPADM

## 2025-05-06 RX ORDER — DOCUSATE SODIUM 100 MG/1
100 CAPSULE, LIQUID FILLED ORAL DAILY PRN
Status: DISCONTINUED | OUTPATIENT
Start: 2025-05-06 | End: 2025-05-10 | Stop reason: HOSPADM

## 2025-05-06 RX ORDER — ATORVASTATIN CALCIUM 40 MG/1
40 TABLET, FILM COATED ORAL NIGHTLY
Status: DISCONTINUED | OUTPATIENT
Start: 2025-05-06 | End: 2025-05-10 | Stop reason: HOSPADM

## 2025-05-06 RX ORDER — ONDANSETRON 4 MG/1
4 TABLET, FILM COATED ORAL EVERY 6 HOURS PRN
Status: ON HOLD | COMMUNITY
End: 2025-05-09 | Stop reason: HOSPADM

## 2025-05-06 RX ORDER — BUDESONIDE 0.25 MG/2ML
0.25 INHALANT ORAL
Status: DISCONTINUED | OUTPATIENT
Start: 2025-05-06 | End: 2025-05-10 | Stop reason: HOSPADM

## 2025-05-06 RX ORDER — SOTALOL HYDROCHLORIDE 120 MG/1
120 TABLET ORAL 2 TIMES DAILY
Status: DISCONTINUED | OUTPATIENT
Start: 2025-05-06 | End: 2025-05-06

## 2025-05-06 RX ORDER — BUPROPION HYDROCHLORIDE 150 MG/1
150 TABLET ORAL DAILY
Status: DISCONTINUED | OUTPATIENT
Start: 2025-05-06 | End: 2025-05-10 | Stop reason: HOSPADM

## 2025-05-06 RX ORDER — AMLODIPINE BESYLATE 5 MG/1
5 TABLET ORAL 2 TIMES DAILY
Status: DISCONTINUED | OUTPATIENT
Start: 2025-05-06 | End: 2025-05-10 | Stop reason: HOSPADM

## 2025-05-06 RX ORDER — SODIUM CHLORIDE 9 MG/ML
INJECTION, SOLUTION INTRAVENOUS CONTINUOUS
Status: DISCONTINUED | OUTPATIENT
Start: 2025-05-06 | End: 2025-05-10 | Stop reason: HOSPADM

## 2025-05-06 RX ORDER — ACETAMINOPHEN 325 MG/1
650 TABLET ORAL EVERY 6 HOURS PRN
Status: DISCONTINUED | OUTPATIENT
Start: 2025-05-06 | End: 2025-05-10 | Stop reason: HOSPADM

## 2025-05-06 RX ORDER — DOXYCYCLINE 100 MG/1
100 CAPSULE ORAL EVERY 12 HOURS SCHEDULED
Status: DISCONTINUED | OUTPATIENT
Start: 2025-05-06 | End: 2025-05-06

## 2025-05-06 RX ORDER — SODIUM CHLORIDE 0.9 % (FLUSH) 0.9 %
10 SYRINGE (ML) INJECTION PRN
Status: DISCONTINUED | OUTPATIENT
Start: 2025-05-06 | End: 2025-05-10 | Stop reason: HOSPADM

## 2025-05-06 RX ORDER — LIDOCAINE 50 MG/G
1 PATCH TOPICAL EVERY 12 HOURS PRN
COMMUNITY

## 2025-05-06 RX ORDER — FERROUS SULFATE 325(65) MG
325 TABLET ORAL
Status: DISCONTINUED | OUTPATIENT
Start: 2025-05-06 | End: 2025-05-10 | Stop reason: HOSPADM

## 2025-05-06 RX ADMIN — WATER 1000 MG: 1 INJECTION INTRAMUSCULAR; INTRAVENOUS; SUBCUTANEOUS at 20:49

## 2025-05-06 RX ADMIN — ENOXAPARIN SODIUM 40 MG: 100 INJECTION SUBCUTANEOUS at 20:54

## 2025-05-06 RX ADMIN — ARFORMOTEROL TARTRATE 15 MCG: 15 SOLUTION RESPIRATORY (INHALATION) at 07:50

## 2025-05-06 RX ADMIN — BUDESONIDE 250 MCG: 0.25 SUSPENSION RESPIRATORY (INHALATION) at 20:40

## 2025-05-06 RX ADMIN — SODIUM CHLORIDE, PRESERVATIVE FREE 10 ML: 5 INJECTION INTRAVENOUS at 12:33

## 2025-05-06 RX ADMIN — Medication 4 ML: at 20:46

## 2025-05-06 RX ADMIN — SODIUM CHLORIDE 500 ML: 9 INJECTION, SOLUTION INTRAVENOUS at 10:56

## 2025-05-06 RX ADMIN — DILTIAZEM HYDROCHLORIDE 10 MG: 5 INJECTION, SOLUTION INTRAVENOUS at 09:44

## 2025-05-06 RX ADMIN — METHYLPREDNISOLONE SODIUM SUCCINATE 40 MG: 40 INJECTION, POWDER, LYOPHILIZED, FOR SOLUTION INTRAMUSCULAR; INTRAVENOUS at 23:20

## 2025-05-06 RX ADMIN — DILTIAZEM HYDROCHLORIDE 10 MG: 5 INJECTION INTRAVENOUS at 09:44

## 2025-05-06 RX ADMIN — LEVALBUTEROL 0.63 MG: 0.63 SOLUTION RESPIRATORY (INHALATION) at 12:04

## 2025-05-06 RX ADMIN — METHYLPREDNISOLONE SODIUM SUCCINATE 40 MG: 40 INJECTION, POWDER, LYOPHILIZED, FOR SOLUTION INTRAMUSCULAR; INTRAVENOUS at 12:21

## 2025-05-06 RX ADMIN — DOXYCYCLINE 100 MG: 100 INJECTION, POWDER, LYOPHILIZED, FOR SOLUTION INTRAVENOUS at 08:03

## 2025-05-06 RX ADMIN — SODIUM CHLORIDE: 0.9 INJECTION, SOLUTION INTRAVENOUS at 12:32

## 2025-05-06 RX ADMIN — DOXYCYCLINE 100 MG: 100 INJECTION, POWDER, LYOPHILIZED, FOR SOLUTION INTRAVENOUS at 20:50

## 2025-05-06 RX ADMIN — ALBUTEROL SULFATE 2.5 MG: 2.5 SOLUTION RESPIRATORY (INHALATION) at 07:50

## 2025-05-06 RX ADMIN — Medication 4 ML: at 07:50

## 2025-05-06 RX ADMIN — ARFORMOTEROL TARTRATE 15 MCG: 15 SOLUTION RESPIRATORY (INHALATION) at 20:41

## 2025-05-06 RX ADMIN — SODIUM CHLORIDE: 0.9 INJECTION, SOLUTION INTRAVENOUS at 06:16

## 2025-05-06 RX ADMIN — SOTALOL HYDROCHLORIDE 120 MG: 120 TABLET ORAL at 10:00

## 2025-05-06 RX ADMIN — BUDESONIDE 250 MCG: 0.25 SUSPENSION RESPIRATORY (INHALATION) at 07:50

## 2025-05-06 RX ADMIN — LEVALBUTEROL 0.63 MG: 0.63 SOLUTION RESPIRATORY (INHALATION) at 20:41

## 2025-05-06 RX ADMIN — ENOXAPARIN SODIUM 40 MG: 100 INJECTION SUBCUTANEOUS at 12:22

## 2025-05-06 NOTE — CONSULTS
CARDIOLOGY CONSULTATION    Patient Name:  Gladys Elizbaeth    :  1950    Reason for Consultation:   Recurrent persistent atrial fibrillation     History of Present Illness:   Gladys Elizabeth presents to [facility] following history of an apparent fall while at home associated and/or followed by increasing shortness of breath.  She apparently had been living in a nursing home care facility and wished to be discharged to her daughter's home for further care.  She has a rather complex history involving severe peripheral vascular disease and apparently awaiting potential BKA of the right lower extremity which for up to this time she has refused.  She also readily admits to losing a significant amount of weight and now appears quite cachectic.  While in the emergency room she developed recurrent atrial fibrillation with a rapid ventricular response for which she has been on chronic medical therapy and anticoagulation..  Despite taking chronic sotalol therapy she was placed on a diltiazem drip.  Her heart rate has decreased somewhat but she remains in obvious respiratory distress.  Past Medical History:   has a past medical history of 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 with rest pain ([facility]), Atrial fibrillation ([facility]), Atypical mole, Bilateral carotid artery stenosis, Bruit of right carotid artery, CAD (coronary artery disease), Cancer ([facility]), COPD (chronic obstructive pulmonary disease) ([facility]), COVID, Depression, Diabetic ulcer of toe of left foot associated with type 2 diabetes mellitus, with fat layer exposed ([facility]), Diarrhea, Femoro-popliteal artery disease, History of tobacco use, Hydrocephalus ([facility]), Hyperlipidemia, Hypertension, BRY (obstructive sleep apnea), Pain in both feet, Paroxysmal A-fib ([facility]), Peripheral vascular disease due to secondary diabetes mellitus ([facility]), PVD (peripheral vascular disease), PVD (peripheral vascular disease) with claudication, Rheumatoid  arthritis (HCC), Short-term memory loss, Skin tear of upper arm without complication, Skin ulcer of right calf with fat layer exposed ([facility]), Status post peripheral artery angioplasty with insertion of stent, Thyroid disease, Urinary incontinence, Venous stasis ulcer of left calf with fat layer exposed without varicose veins ([facility]), and Weight loss.    Surgical History:   has a past surgical history that includes  section; Colonoscopy; Tubal ligation; Dilation and curettage of uterus; PERIPHERAL PERCUTANEOUS ARTERIAL INTERVENTION (2019); laparotomy (N/A, 2020); lumbar drain implantation (N/A, 2020); Ventriculoperitoneal shunt (N/A, 2020); eye surgery; Colonoscopy (N/A, 2021); invasive vascular (N/A, 2024); invasive vascular (N/A, 2024); invasive vascular (N/A, 2024); and Leg Surgery (Left, 10/25/2024).     Social History:   reports that she quit smoking about 8 years ago. Her smoking use included cigarettes. She started smoking about 28 years ago. She has a 5 pack-year smoking history. She has never used smokeless tobacco. She reports that she does not currently use alcohol. She reports that she does not use drugs.     Family History:  family history includes Cancer in her father; Coronary Art Dis in her mother; High Blood Pressure in her mother; Kidney Disease in her mother; Other in her father.  Brother alive with coronary artery stents.    Medications:  Prior to Admission medications    Medication Sig Start Date End Date Taking? Authorizing Provider   atorvastatin (LIPITOR) 40 MG tablet Take 1 tablet by mouth nightly 3/8/25  Yes Bryanna Rodriguez APRN - CNP   amLODIPine (NORVASC) 5 MG tablet Take 1 tablet by mouth in the morning and at bedtime 3/8/25  Yes Bryanna Rodriguez APRN - CNP   buPROPion (WELLBUTRIN XL) 150 MG extended release tablet Take 1 tablet by mouth every morning   Yes Provider, MD Meghan   oxyBUTYnin (DITROPAN-XL) 10 MG extended release  PVD (peripheral vascular disease) with claudication    Hyperlipidemia LDL goal < 55mg/dl    CAD (coronary artery disease)  Recurrent atrial fibrillation (HCC) rapid ventricular response    Systolic hypertension  Resolved Problems:    * No resolved hospital problems. *      Plan:  Mrs. Elizabeth presents with a rather complex multisystem pathologic state.  As for her cardiac status certainly she has documented coronary artery disease and severe diffuse peripheral vascular disease and unfortunately recurrent persistent atrial fibrillation for which she has been under medical therapy but without follow-up.  Importantly she has lost a considerable amount of weight and appears relatively speaking end-stage and pulmonary disease and thus would recommend that we switch her from sotalol with beta-blocker activity in addition to potassium channel blockade to simple amiodarone and low-dose with the hope to at least control her cardiac rhythm and deal with the other issues the best we can utilizing microvascular vasodilators etc.  Her prognosis must be considered guarded at best.  While as an outpatient she is to continue on her present lipid-lowering statin to maintain an LDL cholesterol within updated 2020 ACC/AHA/AACE/ESC/EAS cholesterol guidelines we will withhold a statin presently due to potential drug-drug interactions and reinstitute when stable..    I have spent more than 55 minutes face to face with Gladys Elizabeth,and reviewing notes and laboratory data with greater than 50% of this time instructing and counseling the patient  regarding my findings and recommendations and I have answered all questions as posed to me by Ms. Elizabeth. Thank you, for allowing me to consult in the care of this patient.    Carlos Colvin DO, FACP, FACC, [facility]    NOTE:  This report was transcribed using voice recognition software.  Every effort was made to ensure accuracy; however, inadvertent computerized transcription errors may

## 2025-05-06 NOTE — CONSULTS
Palliative Care Department  [phone]  Palliative Care Initial Consult  Provider BRIAN Palacios CNP    Gladys Elizabeth  [id number]  Hospital Day: 2  Date of Initial Consult: 5/6/2025  Referring Provider: ERICK Kennedy  Palliative Medicine was consulted for assistance with: Goals of care    HPI:   Gladys Elizabeth is a 75 y.o. with a past medical history of atrial fibrillation, carotid artery stenosis, skin cancer, COPD, diabetes mellitus, hyperlipidemia, hypertension, BRY, hydrocephalus s/p  shunt, PVD s/p left AKA, hypothyroidism, recurrent right pleural effusion, former nicotine dependence who was admitted on 5/5/2025 from home with a CHIEF COMPLAINT of fall.  Patient was found down at home by home health for an unknown amount of time.  She was hypoxic on room air and placed on BiPAP support in the ED.  Labs significant for sodium 131, BNP 3446, WBC 23.5, H&H 7.8/25.6 in ED. chest x-ray showed interval development of right hilar fullness, right suprahilar atelectasis, mild cardiomegaly.  CTA of the chest was negative for PE, did show bulky mediastinal adenopathy with right perihilar and right lower paratracheal lymph nodes up to 2.9 cm, bronchial wall thickening and mucous plugging or endobronchial involvement in right upper lobe greatest posteriorly with a few scattered areas in the right upper lobe anteriorly could represent component of bronchiolitis or postobstructive changes with adjacent adenopathy, trace right pleural effusion and at least 2-3 areas of undulating margination associated the right pleural margin up to 1.4 cm could represent pleural deposit or pleural based nodular lesion.  She developed atrial fibrillation with RVR.  She was admitted to the hospital for further management.  She is being followed by pulmonology, oncology, and cardiology.  Patient needs EBUS bronchoscopy for tissue sampling however requires transfer to main campus and documentation states she is not

## 2025-05-06 NOTE — CONSULTS
[facility] Cardiology Consult:    Patient is known to Dr. Colvin. Will defer consult at this time to Dr. Colvin. Nursing staff notified of needed consult change.     Please call if you have any questions or concerns.    Electronically signed by BRIAN Mahajan CNP on 5/6/25 at 9:54 AM EDT

## 2025-05-06 NOTE — PROGRESS NOTES
Database initiated to the best of my ability. Patient is ISABELLE comes in from home. She has a L AKA and requires a wheelchair. Information taken mostly from chart history d/t patients current condition. She has HHC. She is here for a fall.

## 2025-05-06 NOTE — PLAN OF CARE
Problem: Skin/Tissue Integrity  Goal: Skin integrity remains intact  Outcome: Progressing     Problem: ABCDS Injury Assessment  Goal: Absence of physical injury  Outcome: Progressing     Problem: Discharge Planning  Goal: Discharge to home or other facility with appropriate resources  Outcome: Progressing     Problem: Chronic Conditions and Co-morbidities  Goal: Patient's chronic conditions and co-morbidity symptoms are monitored and maintained or improved  Outcome: Progressing     Problem: Safety - Adult  Goal: Free from fall injury  Outcome: Progressing

## 2025-05-06 NOTE — PROGRESS NOTES
4 Eyes Skin Assessment     NAME:  Gladys Elizabeth  YOB: 1950  MEDICAL RECORD NUMBER:  [id number]    The patient is being assessed for  Admission    I agree that at least one RN has performed a thorough Head to Toe Skin Assessment on the patient. ALL assessment sites listed below have been assessed.      Areas assessed by both nurses:    Head, Face, Ears, Shoulders, Back, Chest, Arms, Elbows, Hands, Sacrum. Buttock, Coccyx, Ischium, Legs. Feet and Heels, and Under Medical Devices         Does the Patient have a Wound? Yes wound(s) were present on assessment. LDA wound assessment was Initiated and completed by RN       Tony Prevention initiated by RN: Yes  Wound Care Orders initiated by RN: Yes    Pressure Injury (Stage 3,4, Unstageable, DTI, NWPT, and Complex wounds) if present, place Wound referral order by RN under : Yes    New Ostomies, if present place, Ostomy referral order under : No     Nurse 1 eSignature: Electronically signed by Cale Alvarado RN on 5/6/25 at 2:27 PM EDT    **SHARE this note so that the co-signing nurse can place an eSignature**    Nurse 2 eSignature: Electronically signed by Kayley Curtis RN on 5/6/25 at 2:32 PM EDT   " Abdominal pain, nauseas, vomiting since 10PM last night "

## 2025-05-06 NOTE — ED NOTES
RN to patietns bedside due to HR alarming at 160. Tech at bedside to get repeat EKG. Provider called and notified. Order placed for cardiology consult.

## 2025-05-06 NOTE — ED NOTES
ED to Inpatient Handoff Report    Notified shannan that electronic handoff available and patient ready for transport to room 428.    Safety Risks: Lives alone, Hearing problems, Home safety issues, Difficulty with daily activities, and Risk of falls    Patient in Restraints: no    Constant Observer or Patient : no    Telemetry Monitoring Ordered :Yes           Order to transfer to unit without monitor:NO    Last MEWS: 2 Time completed: 1100    Deterioration Index Score:   Predictive Model Details          42 (Caution)  Factor Value    Calculated 5/6/2025 11:03 29% Age 75 years old    Deterioration Index Model 26% Supplemental oxygen PAP (positive airway pressure)     10% Systolic 97     9% WBC count abnormal (18.8 k/uL)     6% Pulse oximetry 91 %     6% Respiratory rate 18     5% Pulse 100     5% Hematocrit abnormal (24.4 %)     2% Potassium abnormal (3.4 mmol/L)     1% Sodium 136 mmol/L     1% Platelet count abnormal (661 k/uL)     1% Blood pH 7.416     0% Temperature 98.1 °F (36.7 °C)        Vitals:    05/06/25 0940 05/06/25 1000 05/06/25 1035 05/06/25 1100   BP:  (!) 135/123 110/73 97/70   Pulse:  (!) 148 (!) 130 100   Resp:   18 18   Temp:    98.1 °F (36.7 °C)   TempSrc:    Axillary   SpO2:   92% 91%   Weight: 38.6 kg (85 lb)            Opportunity for questions and clarification was provided.

## 2025-05-06 NOTE — CONSULTS
Anmol Chavez M.D.,[facility]  Dejuan Cobb D.O., F.JAVED.MARGRET.ODEBORAH., [facility]  Arlene Cuadra M.D.  Antoinette Arce M.D.   Arturo Cadena D.O.  Toñito Dumont M.D.       Patient:  lGadys Elizabeth 75 y.o. female MRN: [id number]           PULMONARY CONSULTATION    Reason for Consultation: acute resp failure, mucous plugging/obstructive changes, possible malignancy  Referring Physician: BRIAN Zarate    Communication with the referring physician will be sent via the electronic medical record.    Chief Complaint: Fall    CODE STATUS: FULL CODE    SUBJECTIVE:  HPI:  Gladys Elizabeth is a 75 y.o. female we were asked to evaluate for acute respiratory failure, mucous plugging, obstructive changes, possible malignancy.    Gladys is known to our service as we have seen her several years ago during a hospitalization where we treated her for a recurrent right pleural effusion. She was brought in to the hospital after sustaining a fall and was reported to be hypoxic. She was placed on BiPap 12/5 for respiratory support. ProBNP 3446, WBC elevated at 23.5. she was given one dose each of Rocephin and Doxycycline in the ED. Procalcitonin, ESR, CRP, blood cultures and step and legionella bacterial urine antigens all pending. Chest CTA is negative for PE but does show bulky mediastinal adenopathy with right perihilar and right lower paratracheal lymph nodes up to 2.9 cm, bronchial wall thickening and mucous plugging or endobronchial involvement in the right upper lobe that could represent components of bronchiolitis or post obstructive changes with adjacent adenopathy concerning for endobronchial extension or potential underlying malignancy or soft tissue involvement. There is also trace right pleural effusion with several areas of undulating margination that could represent pleural deposit or pleural based nodular lesion. There is a Chest CTA that was just recently done on 3/3/25 for comparison and shows all the same abnormal  care  Patient needs EBUS Bronchoscopy for tissue sampling to obtain diagnosis however this requires to be transferred to main [facility] but more importantly she is not stable enough to be placed under sedation for the procedure.  Consider CT guided bx of pleura  Will need outpatient PET/CT, will defer to Oncology      Thank you for allowing me to participate in the care of Gladys Elizabeth.   Please feel free to call with questions.     This plan of care was reviewed in collaboration with Dr. Cadena    Electronically signed by BRIAN Kennedy CNP on 5/6/2025 at 7:08 AM      Note: This report was completed utilizing computer voice recognition software. Every effort has been made to ensure accuracy, however; inadvertent computerized transcription errors may be present    I personally saw, examined, and cared for the patient. I performed the substantive portion of the visit. Labs, medications, radiographs reviewed. I agree with history exam and plans detailed in NP note.    Patient presents with difficulty breathing.  CT chest shows mucous plugging mediastinal adenopathy, and pleural nodularity.  Previously had hilar adenopathy on previous imaging.  Patient hypoxic and in A-fib with RVR.  Cachectic with BMI 14.  Active tobacco use.  Severe pulmonary vascular disease with left AKA.  Previously was in nursing facility, but living alone at home.    Will treat as COPD exacerbation and with pneumonia.  IV steroids, bronchodilators, mucolytics.  I am concerned for malignancy, reviewed imaging with patient.  Would need biopsy, however is too unstable for bronchoscopy at this time.  Would need bronchoscopy EBUS.  Possible CT biopsy of pleura, however still unstable.  Would benefit from outpatient PET/CT to help direct biopsy.  However patient is very frail cachectic.  Will consult oncology and palliative care for input.    Arturo Cadena, DO

## 2025-05-06 NOTE — CONSULTS
[facility]  Hematology/Oncology  Consult      Patient Name: Gladys Elizabeth  YOB: 1950  PCP: Toñito Suazo MD   Referring Provider:      Reason for Consultation:   Chief Complaint   Patient presents with    Fall     Takes elequis. Denies LOC or hitting head    Respiratory Distress     76% on room air        History of Present Illness: This is a 75-year-old male patient atrial fibrillation, carotid stenosis, skin cancer of the thigh, COPD, depression, diabetic foot ulcer, HTN, HLD, smoker, PVD, who presented to the ED for evaluation after sustaining a fall and was reported to be hypoxic. She was placed on BiPap ProBNP 3446, WBC elevated at 23.5. she was given one dose each of Rocephin and Doxycycline in the ED. Chest CTA is negative for PE but does show bulky mediastinal adenopathy with right perihilar and right lower paratracheal lymph nodes up to 2.9 cm, bronchial wall thickening and mucous plugging or endobronchial involvement in the right upper lobe that could represent components of bronchiolitis or post obstructive changes with adjacent adenopathy concerning for endobronchial extension or potential underlying malignancy or soft tissue involvement. There is also trace right pleural effusion with several areas of undulating margination that could represent pleural deposit or pleural based nodular lesion. There is a Chest CTA that was just recently done on 3/3/25 for comparison and shows all the same abnormal findings with persistence. Pulmonology consulted. Cultures drawn. Rocephin and doxycycline for CAP coverage. Needs EBUS Bronchoscopy for tissue sampling to obtain diagnosis however this requires to be transferred to [facility] however concern she is not stable enough to be placed under sedation for the procedure. May consider CT guided bx.  She was found to be in AF with RVR and cardiology has been consulted. She is on Lovenox. CMP with K 3.4. CBC with WBC 18.8, Hgb 7.4,  radiation dose to as low as reasonably achievable. COMPARISON: CT head and cervical spine without contrast, 05/19/2021 HISTORY: ORDERING SYSTEM PROVIDED HISTORY: fall TECHNOLOGIST PROVIDED HISTORY: Has a \"code stroke\" or \"stroke alert\" been called?->No Reason for exam:->fall Decision Support Exception - unselect if not a suspected or confirmed emergency medical condition->Emergency Medical Condition (MA) FINDINGS: CT OF THE BRAIN: BRAIN/VENTRICLES: No mass effect, edema or hemorrhage is seen.  Mild volume loss is seen in the cerebrum with mild chronic microvascular ischemic changes. A right trans parietal ventriculostomy is present, with the tip terminating in the left lateral ventricle. Compared to 05/19/2021, there is interval development of small subdural hygromas along the cerebral convexities bilaterally, measuring approximately 4 mm in width.  No hydrocephalus. ORBITS: The visualized portion of the orbits demonstrate no acute abnormality. SINUSES: The visualized paranasal sinuses and mastoid air cells demonstrate no acute abnormality. SOFT TISSUES/SKULL: No acute abnormality of the visualized skull or soft tissues. CT CERVICAL SPINE: BONES/ALIGNMENT:  No fracture or joint dislocation.  No bony destructive changes. DEGENERATIVE CHANGES: Prominent loss of disc heights at C5-6 and C6-7.  No significant central canal stenosis is evident by CT.  Facet hypertrophic changes noted, worse on the left.  Multilevel neural foraminal stenoses, worst (severe) at the left C3-4 and C4-5 levels. SOFT TISSUES: There is no prevertebral soft tissue swelling.     CT HEAD: 1. No acute intracranial abnormality. 2. Interval development of small, 4 mm wide subdural hygromas along the cerebral convexities bilaterally. 3. Right transparietal ventriculostomy in place. No hydrocephalus. CT CERVICAL SPINE: 1. No fracture or joint dislocation is noted. 2. Degenerative changes, as described.     XR PELVIS (1-2 VIEWS)  Result Date:

## 2025-05-06 NOTE — ED NOTES
Patient signed out to me pending CT scans.  CT scans evaluated.  No evidence of PE.  Patient does have evidence of bronchiolitis and pretty significant adenopathy and concern for potentially underlying cancer and a trace pleural effusion.  Patient was already given IV Rocephin and IV doxycycline as she does have a white blood cell count over 23,000.  Patient is stable on BiPAP at this time.  I went and reevaluated the patient and did discuss the CT scans.  Discussed the case with Tiarra, nurse practitioner with Dr. Escobar, who accepted for admission.  Patient admitted in stable condition for further care.  Discussed with her that she would probably benefit from a pulmonology consult.      Celina Shukla,   05/05/25 [id number]

## 2025-05-06 NOTE — H&P
[facility]  History and Physical      CHIEF COMPLAINT:    Chief Complaint   Patient presents with    Fall     Takes elequis. Denies LOC or hitting head    Respiratory Distress     76% on room air        Patient of Toñito Suazo MD presents with:  Acute respiratory failure with hypoxia (HCC)    History of Present Illness:   Patient is a 75-year-old female with past medical history of A-fib, carotid artery stenosis, skin cancer, COPD, diabetes, hyperlipidemia, hypertension, BRY, hydrocephalus with  shunt, hypothyroidism.  Patient presented to the ED with complaints of hypoxia.  Patient also experienced a fall as she was found by home health.  Patient was brought in via EMS.  Patient follows with pulmonary outpatient.  Patient is on Eliquis however she denied hitting her head or loss of consciousness.  Patient was 76% on room air when she arrived to the ED patient was placed on BiPAP.  ER workup revealed sodium level 131, BNP 3446, WBC 23.5, H&H 7.8/25.6, blood cultures and urine culture obtained, imaging revealed mediastinal adenopathy, bronchiolitis.  Patient was started on IV antibiotics pulmonary was consulted and patient is admitted to telemetry unit for further treatment.    On exam, patient appears stable.  She was on BiPAP tolerating.  She denies fever, chills and denies chest pain.    REVIEW OF SYSTEMS:  Pertinent negatives are above in HPI.  10 point ROS otherwise negative.      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 Hobbs     Bruit of right carotid artery 05/03/2018    CAD (coronary artery disease)     f/u w/ PCP     Cancer ([facility])     SKIN CA/Thigh    COPD (chronic obstructive pulmonary disease) ([facility])     uses O2 2L NC at night - and during day prn      necessary.    DVT prophylaxis.  On Lovenox.   PT OT  Discharge planning  Code status: Full  Requires inpatient level of care      Electronically signed by Lazaro Henderson DO on 5/6/2025 at 2:41 PM

## 2025-05-07 PROBLEM — E43 SEVERE PROTEIN-CALORIE MALNUTRITION: Chronic | Status: ACTIVE | Noted: 2025-01-01

## 2025-05-07 PROCEDURE — 2500000003 HC RX 250 WO HCPCS

## 2025-05-07 PROCEDURE — 6360000002 HC RX W HCPCS

## 2025-05-07 PROCEDURE — 6370000000 HC RX 637 (ALT 250 FOR IP): Performed by: INTERNAL MEDICINE

## 2025-05-07 PROCEDURE — 2060000000 HC ICU INTERMEDIATE R&B

## 2025-05-07 PROCEDURE — 6370000000 HC RX 637 (ALT 250 FOR IP)

## 2025-05-07 PROCEDURE — 94669 MECHANICAL CHEST WALL OSCILL: CPT

## 2025-05-07 PROCEDURE — 2700000000 HC OXYGEN THERAPY PER DAY

## 2025-05-07 PROCEDURE — 2580000003 HC RX 258: Performed by: INTERNAL MEDICINE

## 2025-05-07 PROCEDURE — 36415 COLL VENOUS BLD VENIPUNCTURE: CPT

## 2025-05-07 PROCEDURE — 6360000002 HC RX W HCPCS: Performed by: STUDENT IN AN ORGANIZED HEALTH CARE EDUCATION/TRAINING PROGRAM

## 2025-05-07 PROCEDURE — 94660 CPAP INITIATION&MGMT: CPT

## 2025-05-07 PROCEDURE — 2580000003 HC RX 258: Performed by: STUDENT IN AN ORGANIZED HEALTH CARE EDUCATION/TRAINING PROGRAM

## 2025-05-07 PROCEDURE — 94640 AIRWAY INHALATION TREATMENT: CPT

## 2025-05-07 PROCEDURE — 6370000000 HC RX 637 (ALT 250 FOR IP): Performed by: STUDENT IN AN ORGANIZED HEALTH CARE EDUCATION/TRAINING PROGRAM

## 2025-05-07 PROCEDURE — 2580000003 HC RX 258

## 2025-05-07 PROCEDURE — 6360000002 HC RX W HCPCS: Performed by: INTERNAL MEDICINE

## 2025-05-07 PROCEDURE — 92610 EVALUATE SWALLOWING FUNCTION: CPT

## 2025-05-07 RX ORDER — TRAMADOL HYDROCHLORIDE 50 MG/1
50 TABLET ORAL 2 TIMES DAILY
Status: DISCONTINUED | OUTPATIENT
Start: 2025-05-07 | End: 2025-05-10 | Stop reason: HOSPADM

## 2025-05-07 RX ADMIN — AMLODIPINE BESYLATE 5 MG: 5 TABLET ORAL at 21:21

## 2025-05-07 RX ADMIN — BUDESONIDE 250 MCG: 0.25 SUSPENSION RESPIRATORY (INHALATION) at 07:47

## 2025-05-07 RX ADMIN — ENOXAPARIN SODIUM 40 MG: 100 INJECTION SUBCUTANEOUS at 21:21

## 2025-05-07 RX ADMIN — SODIUM CHLORIDE, PRESERVATIVE FREE 10 ML: 5 INJECTION INTRAVENOUS at 21:32

## 2025-05-07 RX ADMIN — BUDESONIDE 250 MCG: 0.25 SUSPENSION RESPIRATORY (INHALATION) at 19:27

## 2025-05-07 RX ADMIN — LEVALBUTEROL 0.63 MG: 0.63 SOLUTION RESPIRATORY (INHALATION) at 11:59

## 2025-05-07 RX ADMIN — Medication 4 ML: at 19:27

## 2025-05-07 RX ADMIN — DOXYCYCLINE 100 MG: 100 INJECTION, POWDER, LYOPHILIZED, FOR SOLUTION INTRAVENOUS at 21:30

## 2025-05-07 RX ADMIN — DOXYCYCLINE 100 MG: 100 INJECTION, POWDER, LYOPHILIZED, FOR SOLUTION INTRAVENOUS at 09:19

## 2025-05-07 RX ADMIN — HYDROMORPHONE HYDROCHLORIDE 0.25 MG: 1 INJECTION, SOLUTION INTRAMUSCULAR; INTRAVENOUS; SUBCUTANEOUS at 13:02

## 2025-05-07 RX ADMIN — MAGNESIUM HYDROXIDE 30 ML: 400 SUSPENSION ORAL at 17:03

## 2025-05-07 RX ADMIN — LEVALBUTEROL 0.63 MG: 0.63 SOLUTION RESPIRATORY (INHALATION) at 07:46

## 2025-05-07 RX ADMIN — TRAMADOL HYDROCHLORIDE 50 MG: 50 TABLET, COATED ORAL at 21:21

## 2025-05-07 RX ADMIN — AMIODARONE HYDROCHLORIDE 150 MG: 50 INJECTION, SOLUTION INTRAVENOUS at 06:08

## 2025-05-07 RX ADMIN — Medication 4 ML: at 07:47

## 2025-05-07 RX ADMIN — ENOXAPARIN SODIUM 40 MG: 100 INJECTION SUBCUTANEOUS at 08:57

## 2025-05-07 RX ADMIN — WATER 1000 MG: 1 INJECTION INTRAMUSCULAR; INTRAVENOUS; SUBCUTANEOUS at 21:22

## 2025-05-07 RX ADMIN — OXYBUTYNIN CHLORIDE 10 MG: 10 TABLET, EXTENDED RELEASE ORAL at 21:35

## 2025-05-07 RX ADMIN — ARFORMOTEROL TARTRATE 15 MCG: 15 SOLUTION RESPIRATORY (INHALATION) at 19:27

## 2025-05-07 RX ADMIN — TRAMADOL HYDROCHLORIDE 50 MG: 50 TABLET, COATED ORAL at 17:02

## 2025-05-07 RX ADMIN — METHYLPREDNISOLONE SODIUM SUCCINATE 40 MG: 40 INJECTION, POWDER, LYOPHILIZED, FOR SOLUTION INTRAMUSCULAR; INTRAVENOUS at 15:53

## 2025-05-07 RX ADMIN — LEVALBUTEROL 0.63 MG: 0.63 SOLUTION RESPIRATORY (INHALATION) at 19:27

## 2025-05-07 RX ADMIN — METHYLPREDNISOLONE SODIUM SUCCINATE 40 MG: 40 INJECTION, POWDER, LYOPHILIZED, FOR SOLUTION INTRAMUSCULAR; INTRAVENOUS at 21:35

## 2025-05-07 RX ADMIN — ARFORMOTEROL TARTRATE 15 MCG: 15 SOLUTION RESPIRATORY (INHALATION) at 07:47

## 2025-05-07 RX ADMIN — AMIODARONE HYDROCHLORIDE 100 MG: 200 TABLET ORAL at 10:29

## 2025-05-07 ASSESSMENT — PAIN SCALES - GENERAL
PAINLEVEL_OUTOF10: 10
PAINLEVEL_OUTOF10: 10

## 2025-05-07 ASSESSMENT — PAIN DESCRIPTION - ORIENTATION: ORIENTATION: RIGHT

## 2025-05-07 ASSESSMENT — PAIN DESCRIPTION - LOCATION: LOCATION: LEG

## 2025-05-07 NOTE — PROGRESS NOTES
Spiritual Health History and Assessment/Progress Note  [facility]    Spiritual/Emotional Needs,  ,  ,      Name: Gladys Elizabeth MRN: [id number]    Age: 75 y.o.     Sex: female   Language: English   Confucianism: Judaism   Acute respiratory failure with hypoxia (HCC)     Date: 5/7/2025                           Spiritual Assessment began in [facility] 4S INTERMEDIATE 1        Referral/Consult From: Patient   Encounter Overview/Reason: Spiritual/Emotional Needs  Service Provided For: Patient    Sheryl, Belief, Meaning:   Patient identifies as spiritual, is connected with a sheryl tradition or spiritual practice, and has beliefs or practices that help with coping during difficult times  Family/Friends No family/friends present      Importance and Influence:  Patient unable to assess at this time  Family/Friends No family/friends present    Community:  Patient is connected with a spiritual community and feels well-supported. Support system includes: Children  Family/Friends No family/friends present    Assessment and Plan of Care:     Patient Interventions include: Facilitated expression of thoughts and feelings and Provided sacramental/Yazdanism ritual  Family/Friends Interventions include: No family/friends present    Patient Plan of Care: Spiritual Care available upon further referral  Family/Friends Plan of Care: No family/friends present    Electronically signed by Chaplain Dalia on 5/7/2025 at 11:22 AM

## 2025-05-07 NOTE — PROGRESS NOTES
[facility]   Progress note      Subjective:    75 y.o. female admitted for acute respiratory with hypoxia     Patient seen and examined at bedside. The patient is awake and alert.  She was on BAP therapy  Overnight events reviewed    Medications Prior to Admission:    Medications Prior to Admission: atorvastatin (LIPITOR) 40 MG tablet, Take 1 tablet by mouth nightly  amLODIPine (NORVASC) 5 MG tablet, Take 1 tablet by mouth in the morning and at bedtime  buPROPion (WELLBUTRIN XL) 150 MG extended release tablet, Take 1 tablet by mouth every morning  oxyBUTYnin (DITROPAN-XL) 10 MG extended release tablet, Take 1 tablet by mouth every evening  ferrous sulfate (IRON 325) 325 (65 Fe) MG tablet, Take 1 tablet by mouth daily (with breakfast)  levothyroxine (SYNTHROID) 125 MCG tablet, Take 1 tablet by mouth Daily  aspirin 81 MG chewable tablet, Take 1 tablet by mouth daily  sotalol (BETAPACE) 120 MG tablet, Take 1 tablet by mouth 2 times daily  apixaban (ELIQUIS) 5 MG TABS tablet, Take 1 tablet by mouth 2 times daily  vitamin D (ERGOCALCIFEROL) 1.25 MG (37699 UT) CAPS capsule, Take 1 capsule by mouth once a week *MONDAYS*  predniSONE (DELTASONE) 10 MG tablet, Take 1 tablet by mouth daily  fluticasone-umeclidin-vilant (TRELEGY ELLIPTA) 100-62.5-25 MCG/ACT AEPB inhaler, Inhale 1 puff into the lungs daily  OXYGEN, Inhale 2.5 L into the lungs at bedtime  ondansetron (ZOFRAN) 4 MG tablet, Take 1 tablet by mouth every 6 hours as needed for Nausea for nausea  lidocaine (LIDODERM) 5 %, Place 1 patch onto the skin every 12 hours as needed for Pain  traMADol (ULTRAM) 50 MG tablet, Take 1 tablet by mouth 3 times daily as needed for Pain.  docusate sodium (COLACE) 100 MG capsule, Take 1 capsule by mouth daily as needed for Constipation  loperamide (IMODIUM) 2 MG capsule, Take 1 capsule by mouth 4 times daily as needed for Diarrhea    Current Medications    Current Facility-Administered Medications:     traMADol  Continue wound care and pain control.  On Toradol at home.  Will restart  History of A-fib.  On amiodarone.  Eliquis currently on hold.  Continue to telemetry monitor.    Code Status: Full code  Consultants: pulm, cardiology  DVT Prophylaxis :   PT/OT:  Discharge planning/Disposition:       Lazaro Henderson, DO  7:56 PM  5/7/2025

## 2025-05-07 NOTE — CONSULTS
Comprehensive Nutrition Assessment    Type and Reason for Visit:  Initial, Wound, Consult    Nutrition Recommendations/Plan:     In the setting of severe malnutrition, recommend consider EN support, as evident pt has been unable to consume adequate calories/protein PO    Continue inpatient monitoring POC/GOC     Malnutrition Assessment:  Malnutrition Status:  Severe malnutrition (05/07/25 1605)    Context:  Chronic Illness     Findings of the 6 clinical characteristics of malnutrition:  Energy Intake:  75% or less estimated energy requirements for 1 month or longer  Weight Loss:  Unable to assess     Body Fat Loss:  Severe body fat loss Triceps, Orbital   Muscle Mass Loss:  Severe muscle mass loss Calf (gastrocnemius), Thigh (quadriceps), Clavicles (pectoralis & deltoids)  Fluid Accumulation:  No fluid accumulation     Strength:  Not Performed    Nutrition Assessment:    Pt found down at home by C s/p fall. Admit 2/2 resp failure w/hypoxia and on Bipap/NPO. PMHx=DM, PVD, COPD, L AKA. Pt meets criteria for severe PCM. Muliple areas of impaired skin. If PO not medically feasible, recommend consider EN support    Nutrition Related Findings:    A&Ox1, on BiPap, hypokalemia(3.4), I/O not available, abd WDL, no edema Wound Type: Multiple, Venous Stasis, Skin Tears, Open Wounds       Current Nutrition Intake & Therapies:    Average Meal Intake: NPO  Average Supplements Intake: NPO  Diet NPO    Anthropometric Measures:  Height: 165.1 cm (5' 5\")  Ideal Body Weight (IBW): 125 lbs (57 kg)    Admission Body Weight: 38.6 kg (85 lb) (5/6)  Current Body Weight: 38.6 kg (85 lb), 68 % IBW. Weight Source: Not specified (5/6)  Current BMI (kg/m2): 14.1  Usual Body Weight: 38.7 kg (85 lb 5.1 oz) ([facility] 10/26/2024 s/p AKA)     % Weight Change (Calculated): -0.4  Weight Adjustment For: Amputation  Total Adjusted Percentage (Calculated): 10.1  Adjusted Ideal Body Weight (lbs) (Calculated): 112.4 lbs  Adjusted Ideal Body Weight

## 2025-05-07 NOTE — CARE COORDINATION
CARE MANAGEMENT....Attempted to meet with patient, but she is sleeping on bipap. PSA in room. Called daughter, Thalia [phone]. No answer. Left vm. Will follow.

## 2025-05-07 NOTE — PLAN OF CARE
Problem: ABCDS Injury Assessment  Goal: Absence of physical injury  5/7/2025 1337 by Kaila Jurado RN  Outcome: Progressing  5/7/2025 0508 by Jimi Jose RN  Outcome: Progressing     Problem: Skin/Tissue Integrity  Goal: Skin integrity remains intact  Description: 1.  Monitor for areas of redness and/or skin breakdown2.  Assess vascular access sites hourly3.  Every 4-6 hours minimum:  Change oxygen saturation probe site4.  Every 4-6 hours:  If on nasal continuous positive airway pressure, respiratory therapy assess nares and determine need for appliance change or resting period  5/7/2025 1337 by Kaila Jurado, RN  Outcome: Progressing  5/7/2025 0508 by Jimi Jose RN  Outcome: Progressing     Problem: Discharge Planning  Goal: Discharge to home or other facility with appropriate resources  5/7/2025 0508 by Jimi Jose RN  Outcome: Progressing     Problem: Chronic Conditions and Co-morbidities  Goal: Patient's chronic conditions and co-morbidity symptoms are monitored and maintained or improved  5/7/2025 0508 by Jimi Jose, RN  Outcome: Progressing     Problem: Safety - Adult  Goal: Free from fall injury  5/7/2025 1337 by Kaila Jurado RN  Outcome: Progressing  5/7/2025 0508 by Jimi Jose RN  Outcome: Progressing

## 2025-05-07 NOTE — CARE COORDINATION
CARE MANAGEMENT.... Daughter, Thalia, called back to discuss her mothers hospital stay and dc plans. Mrs Elizabeth lives with Cynthia in a 2 story home and stair lift to bed/bath on 2. Has 3 wc, 2 ww and nebulizer. Home o2 through [facility]. Confirmed with Erica from [employer] that patient has concentrator/portable tanks. Order reads o2 2Lnc HS/PRN. She is currently requiring bipap 70%. Patient has history at [facility], [facility] and [facility]. Thalia would like for her mother to return home at dc, if appropriate. If not home, she is interested in [facility]. PT/OT on consult to assist with dc planning. Cardio/Pulm/Hemonc/Palliative and Speech also on board. She is receiving iv doxy bid, iv rocephin qd, iv solumedrol bid and aerosols. On Eliquis, Betapace, Trelogy inhaler pta. Will cont to follow along and assist with needs accordingly.

## 2025-05-07 NOTE — PROGRESS NOTES
SPEECH/LANGUAGE PATHOLOGY  CLINICAL ASSESSMENT OF SWALLOWING FUNCTION   and PLAN OF CARE      PATIENT NAME:  Gladys Elizabeth  (female)     MRN:  [id number]    :  1950  (75 y.o.)  STATUS:  Inpatient: Room 0428/0428-A    TODAY'S DATE:  2025  ORDER DATE, DESCRIPTION AND REFERRING PROVIDER: Dr. Henderson  REASON FOR REFERRAL: Assess swallow function    EVALUATING THERAPIST: Tisha Hartman, SLP                 RESULTS:    DYSPHAGIA DIAGNOSIS:   Clinical indicators of mild-moderate oropharyngeal phase dysphagia       DIET RECOMMENDATIONS:  Pureed consistency solids (IDDSI level 4) with  thin liquids (IDDSI level 0) ALL INTAKE BY SPOON     If patient demonstrates increased overt s/s of aspiration, recommend return to NPO until MBSS can be completed. Patient's cognition and HHFNC currently increase risk of aspiration if compensatory strategies are not followed.      FEEDING RECOMMENDATIONS:     Assistance level:  Full assistance is needed during all oral intake      Compensatory strategies recommended: Fully alert for all PO, Thorough oral care to prevent colonization of oral bacteria, Upright in bed/ chair as tolerated  SMALL bites  Liquids by teaspoon only      Discussed recommendations with:  patient nurse in person    SPEECH THERAPY  PLAN OF CARE   The dysphagia POC is established based on physician order, dysphagia diagnosis and results of clinical assessment     Skilled SLP intervention for dysphagia management on acute care up to 5 x per week until goals met, pt plateaus in function and/or discharged from hospital    Conditions Requiring Skilled Therapeutic Intervention for dysphagia:    Patient is performing below functional baseline d/t  current acute condition, respiratory compromise, multiple medications, and/or increased dependency upon caregivers.  Coughing during PO intake    Incoordination of swallow/breathing pattern due to compromised respiratory system    Specific dysphagia interventions to  DIAGNOSIS:   Visit Diagnoses         Codes      Fall, initial encounter    -  Primary W19.XXXA      Closed head injury, initial encounter     S09.90XA      Hypoxemia     R09.02      COPD exacerbation ([facility])     J44.1             PATIENT REPORT/COMPLAINT: patient currently NPO pending results of this evaluation  RN cleared patient for participation in assessment     yes     PRIOR LEVEL OF SWALLOW FUNCTION:    PAST HISTORY OF OROPHARYNGEAL DYSPHAGIA?: none reported    Home diet: Diet information not available     Current Diet Order:  Diet NPO    PROCEDURE:  Consistencies Administered During the Evaluation   Liquids: thin liquid   Solids:  Pureed       Method of Intake:   cup, straw, spoon  Fed by clinician      Position:   Sitting in bed with head elevated above 75 degrees    CLINICAL ASSESSMENT:  Oral Stage:       Inadequate labial seal resulting anterior labial spillage from midline  Impaired oral initiation      Pharyngeal Stage:    Immediate wet cough was noted after presentation of thin liquid by cup     Cognition:   Did not follow commands and Confusion noted    Oral Peripheral Examination   Generalized oral weakness    Current Respiratory Status    60 liters O2 via heated high flow nasal cannula     Parameters of Speech Production  Respiration:  Adequate for speech production  Quality:   Within functional limits  Intensity: Within functional limits    Volitional Swallow: Unable to elicit secondary to inability to follow directions     Volitional Cough:  Unable to elicit secondary to inability to follow directions     Pain: No pain reported.    EDUCATION:   The Speech Language Pathologist (SLP) completed education regarding results of evaluation and that intervention is warranted at this time.  Learner: Patient  Education: Reviewed results and recommendations of this evaluation  Evaluation of Education:  No evidence of learning    This plan may be re-evaluated and revised as warranted.      Evaluation Time  includes thorough review of current medical information, gathering information on past medical history/social history and prior level of function, completion of standardized testing/informal observation of tasks, assessment of data and education on plan of care and goals.    [x]The admitting diagnosis and active problem list, have been reviewed prior to initiation of this evaluation.        ACTIVE PROBLEM LIST:   Patient Active Problem List   Diagnosis    Essential hypertension    Depression    PVD (peripheral vascular disease) with claudication    History of tobacco use    PVD (peripheral vascular disease)    Hyperlipidemia LDL goal <100    Acquired hypothyroidism    Severe protein-calorie malnutrition    Atherosclerosis of aortic bifurcation and common iliac arteries    CAD (coronary artery disease)    Paroxysmal atrial fibrillation (HCC)    Hydrocephalus (HCC)    Peripheral vascular disease due to secondary diabetes mellitus (HCC)    S/P  shunt    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 with ulceration of calf (HCC)    Bilateral carotid artery stenosis    Skin ulcer of right calf with fat layer exposed (HCC)    Diabetic ulcer of toe of left foot associated with type 2 diabetes mellitus, with fat layer exposed (HCC)    Status post peripheral artery angioplasty with insertion of stent    Cellulitis of left foot    Peripheral vascular disease, unspecified    Status post above-knee amputation of left lower extremity (HCC)    Chest pain    Systolic hypertension    Acute respiratory failure with hypoxia (HCC)         CPT code:  27669  bedside swallow elton Hartman M.S. CCC-SLP/L  Speech Language Pathologist  [id number]

## 2025-05-07 NOTE — PROGRESS NOTES
trachea midline, no adenopathy;thyroid:  no enlargement/tenderness/nodules or JVD.  Lung: Breath sounds diminished. Respirations   unlabored. Symmetrical expansion.  Heart:  irregular, tachycardic   Abdomen: Soft, NT, ND. BS present x 4 quadrants. No bruit or organomegaly.   Extremities: left AKA, right lower extremity wrapped, toes dark and dusky  Skin: scattered bruising  Musculokeletal: No joint swelling, no muscle tenderness. ROM normal in all joints of extremities.   Neurologic: Mental status: Alert and Oriented X3 .    Pertinent/ New Labs and Imaging Studies     Imaging personally reviewed:  CXR 5/5/25:  FINDINGS:  The heart is mildly enlarged.  There is interval development of right hilar  fullness.  Right suprahilar atelectasis.  Left lung is clear.  No  pneumothorax or pleural effusion.     IMPRESSION:  1. Interval development of right hilar fullness. Recommend further evaluation  with contrast-enhanced CT chest.  2. Right suprahilar atelectasis.  3. Mild cardiomegaly.       Pulmonary CTA 5/5/25:  FINDINGS:  Pulmonary Arteries: Pulmonary arteries are adequately opacified for  evaluation.  No evidence of intraluminal filling defect to suggest pulmonary  embolism.  Main pulmonary artery is normal in caliber.     Mediastinum: Redemonstration of bulky mediastinal adenopathy with right  paratracheal node or armani conglomerate up to 3.9 x 3.1 cm extending with  right lower paratracheal and right perihilar lymph nodes up to 2.9 cm short  axis dimension enlargement.  The heart and pericardium demonstrate no acute  abnormality.  There is no acute abnormality of the thoracic aorta.     Lungs/pleura: Lungs without consolidation demonstrate bronchial wall  thickening and mucous plugging or endobronchial involvement in the right  upper lobe greatest posteriorly for example series 307 image 35 through 50  with a few scattered areas in the right upper lobe anteriorly could represent  components of bronchiolitis or post  obstructive changes with adjacent  adenopathy concerning for endobronchial extension or potential underlying  malignancy or soft tissue involvement considerations for bronchoscopy.  Trace  right pleural fluid and at least 2-3 areas of undulating margination  associated the right pleural margin up to 1.4 cm series 308, image 140 could  represent pleural deposit or pleural based nodular lesion..     Upper Abdomen: Limited images of the upper abdomen reveals a drainage  catheter adjacent to the right hepatic lobe anteriorly where there is trace  perihepatic fluid.     Soft Tissues/Bones: No acute bone or soft tissue abnormality.     IMPRESSION:  1. No evidence of pulmonary embolism.  2. Bulky mediastinal adenopathy with right perihilar and right lower  paratracheal lymph nodes up to 2.9 cm short axis dimension.  3. Bronchial wall thickening and mucous plugging or endobronchial involvement  in the right upper lobe greatest posteriorly with a few scattered areas in  the right upper lobe anteriorly could represent components of bronchiolitis  or post obstructive changes with adjacent adenopathy concerning for  endobronchial extension or potential underlying malignancy or soft tissue  involvement considerations for bronchoscopy given persistence from prior.  4. Trace right pleural fluid and at least 2-3 areas of undulating margination  associated the right pleural margin up to 1.4 cm could represent pleural  deposit or pleural based nodular lesion.  5. Drainage catheter adjacent to the right hepatic lobe anteriorly where  there is trace perihepatic fluid.          Echo:  2020    Labs:  Lab Results   Component Value Date/Time    WBC 18.8 05/06/2025 06:05 AM    RBC 2.38 05/06/2025 06:05 AM    HGB 7.4 05/06/2025 06:05 AM    HCT 24.4 05/06/2025 06:05 AM    .5 05/06/2025 06:05 AM    MCH 31.1 05/06/2025 06:05 AM    MCHC 30.3 05/06/2025 06:05 AM    RDW 14.9 05/06/2025 06:05 AM     05/06/2025 06:05 AM    MPV 8.3  removed and 3/6/2020 with 400 ml removed  History of PVD, s/p L AKA  Former nicotine dependence      Plan:   Wean oxygen as tolerated to maintain SpO2 greater than 90%, baseline is room air during day, wears a few liters at HS  BiPap 12/5 as needed for respiratory support.  Wean FiO2 as tolerated.  Currently at 70%  Try heated high flow nasal cannula to allow breaks off BiPAP  Scheduled nebulized bronchodilators - brovana and pulmicort BID with xopenex nebs q6h WA and PRN. Patient may resume Trelelgy upon discharge  BPH - 3% nebs BID with EZPAP  Rocephin and doxycycline for CAP coverage  Solu Medrol q12h  Monitor leukocytosis, 23.5 on admission  Procalcitonin 0.53, , strep and legionella bacterial urine antigens negative, blood cultures pending, ESR 54. Respiratory panel + human metapneumovirus  Check respiratory culture if able to collect specimen  Cardiology following for RVR - amiodarone, norvasc  Hold aspirin and eliquis for now. Start therapeutic dose Lovenox 1mg/kg BID  Consult Hematology/Oncology for concerning malignancy  appreciate Palliative Care assistance  Patient needs EBUS Bronchoscopy for tissue sampling to obtain diagnosis however this requires to be transferred to [facility] but more importantly she is not stable enough to be placed under sedation for the procedure.  Consider CT guided bx of pleura if condition improves  Will need outpatient PET/CT  Daughter updated via telephone this morning      This plan of care was reviewed in collaboration with Dr. Cadena    Electronically signed by BRIAN Kennedy CNP on 5/7/2025 at 11:04 AM          I personally saw, examined, and cared for the patient. I performed the substantive portion of the visit. Labs, medications, radiographs reviewed. I agree with history exam and plans detailed in NP note.    Patient presents with difficulty breathing.  CT chest shows mucous plugging mediastinal adenopathy, and pleural nodularity.  Previously had hilar

## 2025-05-07 NOTE — PROGRESS NOTES
Physical Therapy  Facility/Department: [address] INTERMEDIATE 1    Name: Gladys Elizabeth  : 1950  MRN: [id number]    Order received and chart reviewed.  Per nursing hold eval at this time.  Will check back at later time/date.     Evon Cole, PT [id number]

## 2025-05-07 NOTE — PROGRESS NOTES
This RN attempted to place pt on high flow nasal cannula from bipap. Pt sat 93% then desat to 80 on 11L high flow. Pt placed back on bipap at this time. Unable to do bedside swallow and speech eval at this time.

## 2025-05-07 NOTE — ACP (ADVANCE CARE PLANNING)
Advance Care Planning   Healthcare Decision Maker:    Primary Decision Maker: Thalia Elizabeth - Child - [phone]

## 2025-05-07 NOTE — FLOWSHEET NOTE
Inpatient Wound Care (initial consult) 428    Admit Date: 5/5/2025  4:03 PM    Reason for consult:  multiple ulcers, right lower extremity    Patient laying down in bed, awake, alert and answers appropriately. Assist of two people to roll. Patient is on Bipap and has a left AKA. Sitter at bedside.     Significant history:  per H&P    Chief Complaint   Patient presents with    Fall       Takes elequis. Denies LOC or hitting head    Respiratory Distress       76% on room air      Patient of Toñito Suazo MD presents with:  Acute respiratory failure with hypoxia ([facility])     History of Present Illness:   Patient is a 75-year-old female with past medical history of A-fib, carotid artery stenosis, skin cancer, COPD, diabetes, hyperlipidemia, hypertension, BRY, hydrocephalus with  shunt, hypothyroidism.  Patient presented to the ED with complaints of hypoxia.  Patient also experienced a fall as she was found by home health.  Patient was brought in via EMS.  Patient follows with pulmonary outpatient.  Patient is on Eliquis however she denied hitting her head or loss of consciousness.  Patient was 76% on room air when she arrived to the ED patient was placed on BiPAP.     Past Medical History:   Diagnosis Date    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 with rest pain ([facility]) 01/16/2019    Atrial fibrillation ([facility])     Atypical mole     upper right arm - black mole     Bilateral carotid artery stenosis 05/22/2018    Dr. 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    History of tobacco  (cm^2) 108 cm^2   Change in Wound Size % (l*w) -54892   Wound Assessment Purple/maroon;Eschar dry;Pink/red   Drainage Amount Scant (moist but unmeasurable)   Drainage Description Serosanguinous   Odor None   Rhoda-wound Assessment Blanchable erythema;Ecchymosis   Wound 05/07/25 Leg Right;Lateral;Lower;Distal   Date First Assessed/Time First Assessed: 05/07/25 1445   Present on Original Admission: Yes  Primary Wound Type: Vascular Ulcer  Secondary Wound Type - Vascular Ulcer: Arterial  Location: Leg  Wound Location Orientation: Right;Lateral;Lower;Distal   Wound Image   (see right lateral lower leg pic)   Wound Etiology Arterial   Dressing Status New dressing applied   Wound Cleansed Cleansed with saline   Dressing/Treatment ABD;Roll gauze  (Adaptic,)   Wound Length (cm) 2 cm   Wound Width (cm) 1 cm   Wound Depth (cm)   (obscured)   Wound Surface Area (cm^2) 2 cm^2   Wound Assessment Slough   Drainage Amount Scant (moist but unmeasurable)   Drainage Description Serosanguinous   Odor None   Rhoda-wound Assessment Blanchable erythema;Ecchymosis   Wound 05/07/25 Elbow Left   Date First Assessed/Time First Assessed: 05/07/25 1503   Present on Original Admission: No  Primary Wound Type: Traumatic  Secondary Wound Type - Traumatic: Skin Tear  Location: Elbow  Wound Location Orientation: Left   Wound Image    Wound Etiology Skin Tear   Dressing/Treatment   (Versatel)   Wound Length (cm) 2 cm   Wound Width (cm) 5 cm   Wound Depth (cm) 0.1 cm   Wound Surface Area (cm^2) 10 cm^2   Wound Volume (cm^3) 1 cm^3   Wound Assessment Pink/red   Drainage Amount Scant (moist but unmeasurable)   Drainage Description Serosanguinous   Odor None   Rhoda-wound Assessment Ecchymosis    coccyx     Right arm      Right lower leg    **Informed Consent**    The patient has given verbal consent to have photos taken of wounds and inserted into their chart as part of their permanent medical record for purposes of documentation, treatment management

## 2025-05-07 NOTE — PLAN OF CARE
Problem: ABCDS Injury Assessment  Goal: Absence of physical injury  5/7/2025 1931 by Jimi Jose, RN  Outcome: Progressing     Problem: Skin/Tissue Integrity  Goal: Skin integrity remains intact  Description: 1.  Monitor for areas of redness and/or skin breakdown2.  Assess vascular access sites hourly3.  Every 4-6 hours minimum:  Change oxygen saturation probe site4.  Every 4-6 hours:  If on nasal continuous positive airway pressure, respiratory therapy assess nares and determine need for appliance change or resting period  5/7/2025 1931 by Jimi Jose, RN  Outcome: Progressing     Problem: Discharge Planning  Goal: Discharge to home or other facility with appropriate resources  Outcome: Progressing     Problem: Chronic Conditions and Co-morbidities  Goal: Patient's chronic conditions and co-morbidity symptoms are monitored and maintained or improved  Outcome: Progressing

## 2025-05-07 NOTE — PLAN OF CARE
Problem: ABCDS Injury Assessment  Goal: Absence of physical injury  5/7/2025 0508 by Jimi Jose RN  Outcome: Progressing  5/6/2025 1638 by Cale Alvarado RN  Outcome: Progressing     Problem: Skin/Tissue Integrity  Goal: Skin integrity remains intact  Description: 1.  Monitor for areas of redness and/or skin breakdown2.  Assess vascular access sites hourly3.  Every 4-6 hours minimum:  Change oxygen saturation probe site4.  Every 4-6 hours:  If on nasal continuous positive airway pressure, respiratory therapy assess nares and determine need for appliance change or resting period  5/7/2025 0508 by Jimi Jose RN  Outcome: Progressing  5/6/2025 1638 by Cale Alvarado RN  Outcome: Progressing     Problem: Discharge Planning  Goal: Discharge to home or other facility with appropriate resources  5/7/2025 0508 by Jimi Jose RN  Outcome: Progressing  5/6/2025 1638 by Cale Alvarado RN  Outcome: Progressing     Problem: Chronic Conditions and Co-morbidities  Goal: Patient's chronic conditions and co-morbidity symptoms are monitored and maintained or improved  5/7/2025 0508 by Jimi Jose RN  Outcome: Progressing  5/6/2025 1638 by Cale Alvarado RN  Outcome: Progressing     Problem: Safety - Adult  Goal: Free from fall injury  5/7/2025 0508 by Jimi Jose RN  Outcome: Progressing  5/6/2025 1638 by Cale Alvarado RN  Outcome: Progressing

## 2025-05-07 NOTE — PROGRESS NOTES
Speech Language Pathology  NAME:  Gladys Elizabeth  :  1950  DATE: 2025  ROOM:  [facility]    Pt unavailable at 1005 for Clinical Swallow Evaluation Discussed with patient nurse in person     REASON:  HOLD per RN, Pt placed back on BIPAP due to desaturation.     Will re-attempt as appropriate.       Thank You    Tisha Hartman M.S. CCC-SLP/L  Speech Language Pathologist  [id number]

## 2025-05-08 ENCOUNTER — APPOINTMENT (OUTPATIENT)
Dept: GENERAL RADIOLOGY | Age: 75
DRG: 189 | End: 2025-05-08
Payer: MEDICARE

## 2025-05-08 LAB
ANION GAP SERPL CALCULATED.3IONS-SCNC: 17 MMOL/L (ref 7–16)
BASOPHILS # BLD: 0.02 K/UL (ref 0–0.2)
BASOPHILS NFR BLD: 0 % (ref 0–2)
BUN SERPL-MCNC: 19 MG/DL (ref 6–23)
CALCIUM SERPL-MCNC: 8.6 MG/DL (ref 8.6–10.2)
CHLORIDE SERPL-SCNC: 103 MMOL/L (ref 98–107)
CO2 SERPL-SCNC: 24 MMOL/L (ref 22–29)
CREAT SERPL-MCNC: 0.4 MG/DL (ref 0.5–1)
EOSINOPHIL # BLD: 0 K/UL (ref 0.05–0.5)
EOSINOPHILS RELATIVE PERCENT: 0 % (ref 0–6)
ERYTHROCYTE [DISTWIDTH] IN BLOOD BY AUTOMATED COUNT: 15.3 % (ref 11.5–15)
GFR, ESTIMATED: >90 ML/MIN/1.73M2
GLUCOSE SERPL-MCNC: 147 MG/DL (ref 74–99)
HCT VFR BLD AUTO: 22 % (ref 34–48)
HGB BLD-MCNC: 6.6 G/DL (ref 11.5–15.5)
IMM GRANULOCYTES # BLD AUTO: 0.17 K/UL (ref 0–0.58)
IMM GRANULOCYTES NFR BLD: 1 % (ref 0–5)
LYMPHOCYTES NFR BLD: 0.23 K/UL (ref 1.5–4)
LYMPHOCYTES RELATIVE PERCENT: 1 % (ref 20–42)
MAGNESIUM SERPL-MCNC: 2.2 MG/DL (ref 1.6–2.6)
MCH RBC QN AUTO: 30.6 PG (ref 26–35)
MCHC RBC AUTO-ENTMCNC: 30 G/DL (ref 32–34.5)
MCV RBC AUTO: 101.9 FL (ref 80–99.9)
MONOCYTES NFR BLD: 0.42 K/UL (ref 0.1–0.95)
MONOCYTES NFR BLD: 2 % (ref 2–12)
NEUTROPHILS NFR BLD: 96 % (ref 43–80)
NEUTS SEG NFR BLD: 17.66 K/UL (ref 1.8–7.3)
PLATELET # BLD AUTO: 637 K/UL (ref 130–450)
PMV BLD AUTO: 8.6 FL (ref 7–12)
POTASSIUM SERPL-SCNC: 3.2 MMOL/L (ref 3.5–5)
RBC # BLD AUTO: 2.16 M/UL (ref 3.5–5.5)
RBC # BLD: ABNORMAL 10*6/UL
RBC # BLD: ABNORMAL 10*6/UL
SODIUM SERPL-SCNC: 144 MMOL/L (ref 132–146)
WBC OTHER # BLD: 18.5 K/UL (ref 4.5–11.5)

## 2025-05-08 PROCEDURE — 36415 COLL VENOUS BLD VENIPUNCTURE: CPT

## 2025-05-08 PROCEDURE — 30233N1 TRANSFUSION OF NONAUTOLOGOUS RED BLOOD CELLS INTO PERIPHERAL VEIN, PERCUTANEOUS APPROACH: ICD-10-PCS | Performed by: INTERNAL MEDICINE

## 2025-05-08 PROCEDURE — 2500000003 HC RX 250 WO HCPCS

## 2025-05-08 PROCEDURE — 6370000000 HC RX 637 (ALT 250 FOR IP): Performed by: INTERNAL MEDICINE

## 2025-05-08 PROCEDURE — 6360000002 HC RX W HCPCS: Performed by: NURSE PRACTITIONER

## 2025-05-08 PROCEDURE — 2700000000 HC OXYGEN THERAPY PER DAY

## 2025-05-08 PROCEDURE — 94640 AIRWAY INHALATION TREATMENT: CPT

## 2025-05-08 PROCEDURE — P9016 RBC LEUKOCYTES REDUCED: HCPCS

## 2025-05-08 PROCEDURE — 80048 BASIC METABOLIC PNL TOTAL CA: CPT

## 2025-05-08 PROCEDURE — 6360000002 HC RX W HCPCS

## 2025-05-08 PROCEDURE — 71045 X-RAY EXAM CHEST 1 VIEW: CPT

## 2025-05-08 PROCEDURE — 2060000000 HC ICU INTERMEDIATE R&B

## 2025-05-08 PROCEDURE — 6360000002 HC RX W HCPCS: Performed by: STUDENT IN AN ORGANIZED HEALTH CARE EDUCATION/TRAINING PROGRAM

## 2025-05-08 PROCEDURE — 85025 COMPLETE CBC W/AUTO DIFF WBC: CPT

## 2025-05-08 PROCEDURE — 94660 CPAP INITIATION&MGMT: CPT

## 2025-05-08 PROCEDURE — 36430 TRANSFUSION BLD/BLD COMPNT: CPT

## 2025-05-08 PROCEDURE — 2580000003 HC RX 258

## 2025-05-08 PROCEDURE — 6370000000 HC RX 637 (ALT 250 FOR IP): Performed by: NURSE PRACTITIONER

## 2025-05-08 PROCEDURE — 99233 SBSQ HOSP IP/OBS HIGH 50: CPT | Performed by: NURSE PRACTITIONER

## 2025-05-08 PROCEDURE — 6370000000 HC RX 637 (ALT 250 FOR IP)

## 2025-05-08 PROCEDURE — 6370000000 HC RX 637 (ALT 250 FOR IP): Performed by: STUDENT IN AN ORGANIZED HEALTH CARE EDUCATION/TRAINING PROGRAM

## 2025-05-08 PROCEDURE — 2580000003 HC RX 258: Performed by: STUDENT IN AN ORGANIZED HEALTH CARE EDUCATION/TRAINING PROGRAM

## 2025-05-08 PROCEDURE — 83735 ASSAY OF MAGNESIUM: CPT

## 2025-05-08 RX ORDER — PREDNISONE 20 MG/1
40 TABLET ORAL DAILY
Status: DISCONTINUED | OUTPATIENT
Start: 2025-05-09 | End: 2025-05-10 | Stop reason: HOSPADM

## 2025-05-08 RX ORDER — LORAZEPAM 2 MG/ML
0.5 INJECTION INTRAMUSCULAR ONCE
Status: COMPLETED | OUTPATIENT
Start: 2025-05-08 | End: 2025-05-08

## 2025-05-08 RX ORDER — POTASSIUM CHLORIDE 1500 MG/1
40 TABLET, EXTENDED RELEASE ORAL ONCE
Status: COMPLETED | OUTPATIENT
Start: 2025-05-08 | End: 2025-05-08

## 2025-05-08 RX ORDER — SODIUM CHLORIDE 9 MG/ML
INJECTION, SOLUTION INTRAVENOUS PRN
Status: DISCONTINUED | OUTPATIENT
Start: 2025-05-08 | End: 2025-05-10 | Stop reason: HOSPADM

## 2025-05-08 RX ADMIN — BUPROPION HYDROCHLORIDE 150 MG: 150 TABLET, EXTENDED RELEASE ORAL at 09:02

## 2025-05-08 RX ADMIN — LEVOTHYROXINE SODIUM 125 MCG: 0.1 TABLET ORAL at 09:01

## 2025-05-08 RX ADMIN — TRAMADOL HYDROCHLORIDE 50 MG: 50 TABLET, COATED ORAL at 09:01

## 2025-05-08 RX ADMIN — OXYBUTYNIN CHLORIDE 10 MG: 10 TABLET, EXTENDED RELEASE ORAL at 20:24

## 2025-05-08 RX ADMIN — BUDESONIDE 250 MCG: 0.25 SUSPENSION RESPIRATORY (INHALATION) at 19:46

## 2025-05-08 RX ADMIN — POTASSIUM CHLORIDE 40 MEQ: 1500 TABLET, EXTENDED RELEASE ORAL at 14:57

## 2025-05-08 RX ADMIN — SODIUM CHLORIDE, PRESERVATIVE FREE 10 ML: 5 INJECTION INTRAVENOUS at 09:02

## 2025-05-08 RX ADMIN — SODIUM CHLORIDE, PRESERVATIVE FREE 10 ML: 5 INJECTION INTRAVENOUS at 20:19

## 2025-05-08 RX ADMIN — DOXYCYCLINE 100 MG: 100 INJECTION, POWDER, LYOPHILIZED, FOR SOLUTION INTRAVENOUS at 09:42

## 2025-05-08 RX ADMIN — WATER 1000 MG: 1 INJECTION INTRAMUSCULAR; INTRAVENOUS; SUBCUTANEOUS at 20:19

## 2025-05-08 RX ADMIN — ARFORMOTEROL TARTRATE 15 MCG: 15 SOLUTION RESPIRATORY (INHALATION) at 08:01

## 2025-05-08 RX ADMIN — LORAZEPAM 0.5 MG: 2 INJECTION INTRAMUSCULAR; INTRAVENOUS at 22:31

## 2025-05-08 RX ADMIN — LEVALBUTEROL 0.63 MG: 0.63 SOLUTION RESPIRATORY (INHALATION) at 19:46

## 2025-05-08 RX ADMIN — AMLODIPINE BESYLATE 5 MG: 5 TABLET ORAL at 20:16

## 2025-05-08 RX ADMIN — Medication 4 ML: at 19:46

## 2025-05-08 RX ADMIN — LEVALBUTEROL 0.63 MG: 0.63 SOLUTION RESPIRATORY (INHALATION) at 08:01

## 2025-05-08 RX ADMIN — FERROUS SULFATE TAB 325 MG (65 MG ELEMENTAL FE) 325 MG: 325 (65 FE) TAB at 09:01

## 2025-05-08 RX ADMIN — ARFORMOTEROL TARTRATE 15 MCG: 15 SOLUTION RESPIRATORY (INHALATION) at 19:46

## 2025-05-08 RX ADMIN — LEVALBUTEROL 0.63 MG: 0.63 SOLUTION RESPIRATORY (INHALATION) at 12:22

## 2025-05-08 RX ADMIN — AMIODARONE HYDROCHLORIDE 100 MG: 200 TABLET ORAL at 09:01

## 2025-05-08 RX ADMIN — BUDESONIDE 250 MCG: 0.25 SUSPENSION RESPIRATORY (INHALATION) at 08:01

## 2025-05-08 RX ADMIN — METHYLPREDNISOLONE SODIUM SUCCINATE 40 MG: 40 INJECTION, POWDER, LYOPHILIZED, FOR SOLUTION INTRAMUSCULAR; INTRAVENOUS at 12:04

## 2025-05-08 RX ADMIN — AMLODIPINE BESYLATE 5 MG: 5 TABLET ORAL at 09:01

## 2025-05-08 RX ADMIN — Medication 4 ML: at 08:01

## 2025-05-08 RX ADMIN — TRAMADOL HYDROCHLORIDE 50 MG: 50 TABLET, COATED ORAL at 20:15

## 2025-05-08 RX ADMIN — DOXYCYCLINE 100 MG: 100 INJECTION, POWDER, LYOPHILIZED, FOR SOLUTION INTRAVENOUS at 20:26

## 2025-05-08 ASSESSMENT — PAIN SCALES - GENERAL: PAINLEVEL_OUTOF10: 0

## 2025-05-08 NOTE — CARE COORDINATION
CARE MANAGEMENT.... 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 For now, patient now requiring Airvo 50L 65% fio2. Baseline is o2 2Lnc at HS/PRN through [facility]. Continues on iv doxy bid, iv rocephin qd and iv solu-medrol q12hrs. On Eliquis, Betapace, Trelogy inhaler pta. Will cont to follow along and assist with needs accordingly.

## 2025-05-08 NOTE — PROGRESS NOTES
[facility]   Progress note      Subjective:    75 y.o. female admitted for acute respiratory with hypoxia     Patient seen and examined at bedside. The patient is awake and alert.  She was on BAP therapy  Overnight events reviewed    Medications Prior to Admission:    Medications Prior to Admission: atorvastatin (LIPITOR) 40 MG tablet, Take 1 tablet by mouth nightly  amLODIPine (NORVASC) 5 MG tablet, Take 1 tablet by mouth in the morning and at bedtime  buPROPion (WELLBUTRIN XL) 150 MG extended release tablet, Take 1 tablet by mouth every morning  oxyBUTYnin (DITROPAN-XL) 10 MG extended release tablet, Take 1 tablet by mouth every evening  ferrous sulfate (IRON 325) 325 (65 Fe) MG tablet, Take 1 tablet by mouth daily (with breakfast)  levothyroxine (SYNTHROID) 125 MCG tablet, Take 1 tablet by mouth Daily  aspirin 81 MG chewable tablet, Take 1 tablet by mouth daily  sotalol (BETAPACE) 120 MG tablet, Take 1 tablet by mouth 2 times daily  apixaban (ELIQUIS) 5 MG TABS tablet, Take 1 tablet by mouth 2 times daily  vitamin D (ERGOCALCIFEROL) 1.25 MG (07852 UT) CAPS capsule, Take 1 capsule by mouth once a week *MONDAYS*  predniSONE (DELTASONE) 10 MG tablet, Take 1 tablet by mouth daily  fluticasone-umeclidin-vilant (TRELEGY ELLIPTA) 100-62.5-25 MCG/ACT AEPB inhaler, Inhale 1 puff into the lungs daily  OXYGEN, Inhale 2.5 L into the lungs at bedtime  ondansetron (ZOFRAN) 4 MG tablet, Take 1 tablet by mouth every 6 hours as needed for Nausea for nausea  lidocaine (LIDODERM) 5 %, Place 1 patch onto the skin every 12 hours as needed for Pain  traMADol (ULTRAM) 50 MG tablet, Take 1 tablet by mouth 3 times daily as needed for Pain.  docusate sodium (COLACE) 100 MG capsule, Take 1 capsule by mouth daily as needed for Constipation  loperamide (IMODIUM) 2 MG capsule, Take 1 capsule by mouth 4 times daily as needed for Diarrhea    Current Medications    Current Facility-Administered Medications:     0.9 % sodium  Metapneumovirus infection. Continue to monitor vitals and daily labs. Continue oxygen therapy.  Pro-Fracnis is elevated.  Continue Rocephin and doxycycline  Dysphagia.  Rule out aspiration.  Speech therapy recommended  Pureed consistency solids (IDDSI level 4) with  thin liquids and full assistance with oral in take  Anemia.  Monitor H&H.  Transfuse if hemoglobin drops below 7  Extremity wound/AKA.  Continue wound care and pain control.  On Toradol at home.  Will restart  History of A-fib.  On amiodarone.  Eliquis currently on hold.  Continue to telemetry monitor.    05/08/2025  Acute respiratory failure with hypoxia.  2/2 human Metapneumovirus infection. Continue to monitor vitals and daily labs. Continue oxygen therapy.  Pro-Francis is elevated.  Continue Rocephin and doxycycline  Dysphagia.  Rule out aspiration.  Speech therapy recommended  Pureed consistency solids (IDDSI level 4) with  thin liquids and full assistance with oral in take  Anemia.  Monitor H&H.  Transfuse if hemoglobin drops below 7  Extremity wound/AKA.  Continue wound care and pain control.  On Toradol at home.  Will restart  History of A-fib.  On amiodarone.  Eliquis currently on hold.  Continue to telemetry monitor.  Appreciate Palliative Care input  Code status changed to DNRCCA/DNI  Hospice consulted per patient/family request       Code Status:DNR CCA/DNI  Consultants: pulm, cardiology  DVT Prophylaxis :   PT/OT:  Discharge planning/Disposition:       BRIAN Live - CNP  12:39 PM  5/8/2025

## 2025-05-08 NOTE — PROGRESS NOTES
Palliative Care Department  [phone]  Palliative Care Progress Note  Provider BRIAN Palacios - SIMONA    Gladys Elizabeth  [id number]  Hospital Day: 4  Date of Initial Consult: 5/6/2025  Referring Provider: ERICK Kennedy  Palliative Medicine was consulted for assistance with: Goals of care    HPI:   Gladys Elizabeth is a 75 y.o. with a past medical history of atrial fibrillation, carotid artery stenosis, skin cancer, COPD, diabetes mellitus, hyperlipidemia, hypertension, BRY, hydrocephalus s/p  shunt, PVD s/p left AKA, hypothyroidism, recurrent right pleural effusion, former nicotine dependence who was admitted on 5/5/2025 from home with a CHIEF COMPLAINT of fall.  Patient was found down at home by home health for an unknown amount of time.  She was hypoxic on room air and placed on BiPAP support in the ED.  Labs significant for sodium 131, BNP 3446, WBC 23.5, H&H 7.8/25.6 in ED. chest x-ray showed interval development of right hilar fullness, right suprahilar atelectasis, mild cardiomegaly.  CTA of the chest was negative for PE, did show bulky mediastinal adenopathy with right perihilar and right lower paratracheal lymph nodes up to 2.9 cm, bronchial wall thickening and mucous plugging or endobronchial involvement in right upper lobe greatest posteriorly with a few scattered areas in the right upper lobe anteriorly could represent component of bronchiolitis or postobstructive changes with adjacent adenopathy, trace right pleural effusion and at least 2-3 areas of undulating margination associated the right pleural margin up to 1.4 cm could represent pleural deposit or pleural based nodular lesion.  She developed atrial fibrillation with RVR.  She was admitted to the hospital for further management.  She is being followed by pulmonology, oncology, and cardiology.  Patient needs EBUS bronchoscopy for tissue sampling however requires transfer to main campus and documentation states she is not  diagnosis and prognosis, and see above.    Thank you for allowing Palliative Medicine to participate in the care of Gladys Elizabeth.    Note: This report was completed using computerIntelipost voiced recognition software.  Every effort has been made to ensure accuracy; however, inadvertent computerized transcription errors may be present.

## 2025-05-08 NOTE — PROGRESS NOTES
Anmol Chavez M.D.,[facility]  Dejuan Cobb D.O., F.YVAN., [facility]  Link Cuadra M.D.  Antoinette Arce M.D.   Arturo Cadena D.O.  Toñito Dumont M.D.         Daily Pulmonary Progress Note    Patient:  Gladys Elizabeth 75 y.o. female MRN: [id number]            Synopsis     We are following patient for adenopathy    \"CC\" Fall    Code status: LIMITED CODE  Intubation/Re-intubation at the time of arrest No   Defibrillation/Cardioversion No   Chest Compressions No   Resuscitative Medications No   Other DNRCCA/DNI       Subjective   5/6/25: Patient seen and examined in the ICU. She is now in AF RVR an cardiology has been consulted. She is awake, alert and oriented but very ill-appearing. She does report cough and congestion. She is currently on 12 liters HFNC, SpO2 spotty but last reported by nursing at %. Bedside RN goes on to tell us she was told the patient was recently at a nursing facility then went home with care to be provided by her daughter however patient recently declined any help and has been living alone. There is also mention that she requires a right AKA due to chronic infection however she has refused that as well.      5/7/25: Patient was seen and examined lying in bed on BiPap.  Patient  at the bedside.  Patient has not been trying to pull off BiPAP or oxygen however it has been reported that when staff tried to take her off the BiPAP and placed her on nasal cannula her oxygen saturation dropped to the 70s.  Patient appears much more alert today.  Current BiPAP settings at this time are 12/5 with 70% FiO2.  Respiratory panel positive for human metapneumovirus    5/8/25: Patient seen and examined sitting up in bed.  She is awake and alert but very confused.  She is on heated high flow nasal cannula at 50 L, 66% FiO2.  SpO2 91%.  She did pass swallow study with modifications of puréed solids, thin liquids and spoon only.  She is quite anemic today with a hemoglobin of  malignancy or soft tissue  involvement considerations for bronchoscopy given persistence from prior.  4. Trace right pleural fluid and at least 2-3 areas of undulating margination  associated the right pleural margin up to 1.4 cm could represent pleural  deposit or pleural based nodular lesion.  5. Drainage catheter adjacent to the right hepatic lobe anteriorly where  there is trace perihepatic fluid.          Echo:  2020    Swallow evaluation 5/7/2025:  RESULTS:     DYSPHAGIA DIAGNOSIS:   Clinical indicators of mild-moderate oropharyngeal phase dysphagia                           DIET RECOMMENDATIONS:  Pureed consistency solids (IDDSI level 4) with  thin liquids (IDDSI level 0) ALL INTAKE BY SPOON      If patient demonstrates increased overt s/s of aspiration, recommend return to NPO until MBSS can be completed. Patient's cognition and HHFNC currently increase risk of aspiration if compensatory strategies are not followed.                 FEEDING RECOMMENDATIONS:                         Assistance level:  Full assistance is needed during all oral intake                           Compensatory strategies recommended: Fully alert for all PO, Thorough oral care to prevent colonization of oral bacteria, Upright in bed/ chair as tolerated  SMALL bites  Liquids by teaspoon only    Labs:  Lab Results   Component Value Date/Time    WBC 18.5 05/08/2025 04:18 AM    RBC 2.16 05/08/2025 04:18 AM    HGB 6.6 05/08/2025 04:18 AM    HCT 22.0 05/08/2025 04:18 AM    .9 05/08/2025 04:18 AM    MCH 30.6 05/08/2025 04:18 AM    MCHC 30.0 05/08/2025 04:18 AM    RDW 15.3 05/08/2025 04:18 AM     05/08/2025 04:18 AM    MPV 8.6 05/08/2025 04:18 AM     Lab Results   Component Value Date/Time     05/08/2025 04:18 AM    K 3.2 05/08/2025 04:18 AM    K 4.0 05/23/2023 10:10 AM     05/08/2025 04:18 AM    CO2 24 05/08/2025 04:18 AM    BUN 19 05/08/2025 04:18 AM    CREATININE 0.4 05/08/2025 04:18 AM    CALCIUM 8.6 05/08/2025  04:18 AM    GFRAA >60 06/07/2022 09:55 AM    LABGLOM >90 05/08/2025 04:18 AM    LABGLOM >60 07/21/2023 08:17 AM     Lab Results   Component Value Date/Time    PROTIME 30.9 05/05/2025 04:15 PM    PROTIME 11.1 04/13/2011 04:10 PM    INR 2.8 05/05/2025 04:15 PM     Recent Labs     05/05/25  1615   PROBNP 3,446*       Recent Labs     05/06/25  0605   PROCAL 0.53*       Micro:  Component  Ref Range & Units (hover)    Specimen Description .NASOPHARYNGEAL SWAB   Adenovirus PCR Not Detected   Coronavirus 229E PCR Not Detected   Coronavirus HKU1 PCR Not Detected   Coronavirus NL63 PCR Not Detected   Coronavirus OC43 PCR Not Detected   SARS-CoV-2, PCR Not Detected   Human Metapneumovirus PCR DETECTED Abnormal    Rhino/Enterovirus PCR Not Detected   Influenza A by PCR Not Detected   Influenza B by PCR Not Detected   Parainfluenza 1 PCR Not Detected   Parainfluenza 2 PCR Not Detected   Parainfluenza 3 PCR Not Detected   Parainfluenza 4 PCR Not Detected   Resp Syncytial Virus PCR Not Detected   Bordetella parapertussis by PCR Not Detected   B Pertussis by PCR Not Detected   Chlamydia pneumoniae By PCR Not Detected   Mycoplasma pneumo by PCR Not Detected   Comment: Performed by multiplexed nucleic acid assay.   Resulting Agency [facility]             Specimen Collected: 05/06/25 15:30 EDT Last Resulted: 05/06/25 20:50 EDT        Assessment:    Acute hypoxic respiratory failure  Mediastinal, perihilar and paratracheal adenopathy, concerning for malignancy  Human metapneumovirus  Mucous plugging  COPD, unknown severity  History of recurrent right pleural effusion, s/p thoracentesis 2/26/2020 with 600 ml removed and 3/6/2020 with 400 ml removed  History of PVD, s/p L AKA  Former nicotine dependence  Dysphagia      Plan:   Wean oxygen as tolerated to maintain SpO2 greater than 90%, baseline is room air during day, wears a few liters at HS. currently on HHFNC, 50 L, 66% FiO2  BiPap 12/5 as needed for respiratory

## 2025-05-08 NOTE — PROGRESS NOTES
PROGRESS NOTE       PATIENT PROBLEM LIST:  Patient Active Problem List   Diagnosis Code    Essential hypertension I10    Depression F32.A    PVD (peripheral vascular disease) with claudication I73.9    History of tobacco use Z87.891    PVD (peripheral vascular disease) I73.9    Hyperlipidemia LDL goal <100 E78.5    Acquired hypothyroidism E03.9    Severe protein-calorie malnutrition E43    Atherosclerosis of aortic bifurcation and common iliac arteries I70.0, I70.8    CAD (coronary artery disease) I25.10    Paroxysmal atrial fibrillation ([facility]) I48.0    Hydrocephalus ([facility]) G91.9    Peripheral vascular disease due to secondary diabetes mellitus ([facility]) E13.51    S/P  shunt Z98.2    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 with ulceration of calf ([facility]) I70.242    Bilateral carotid artery stenosis I65.23    Skin ulcer of right calf with fat layer exposed ([facility]) L97.212    Diabetic ulcer of toe of left foot associated with type 2 diabetes mellitus, with fat layer exposed ([facility]) E11.621, L97.522    Status post peripheral artery angioplasty with insertion of stent Z95.820    Cellulitis of left foot L03.116    Peripheral vascular disease, unspecified I73.9    Status post above-knee amputation of left lower extremity ([facility]) Z89.612    Chest pain R07.9    Systolic hypertension I10    Acute respiratory failure with hypoxia ([facility]) J96.01       SUBJECTIVE:  Gladys Elizabeth states she remains short of breath and is experiencing ongoing pain in her right lower extremity.  She denies any lightheadedness, nor chest discomfort nor palpitations presently but remains in atrial fibrillation.    REVIEW OF SYSTEMS:  General ROS: positive for - fatigue, malaise,  and weight loss  Psychological ROS: positive for - anxiety , depression  Ophthalmic ROS: negative for - decreased vision or visual distortion.  ENT ROS: negative  Allergy and Immunology ROS: negative  Hematological and Lymphatic ROS: negative  Endocrine: no heat or

## 2025-05-08 NOTE — PROGRESS NOTES
Consent for blood transfusion received from patients daughter Thalia over telephone. Verified with Jenifer CHOE.

## 2025-05-09 VITALS
TEMPERATURE: 97.7 F | BODY MASS INDEX: 14.8 KG/M2 | RESPIRATION RATE: 20 BRPM | WEIGHT: 88.8 LBS | HEIGHT: 65 IN | DIASTOLIC BLOOD PRESSURE: 71 MMHG | OXYGEN SATURATION: 92 % | HEART RATE: 98 BPM | SYSTOLIC BLOOD PRESSURE: 143 MMHG

## 2025-05-09 LAB
ABO/RH: NORMAL
ANION GAP SERPL CALCULATED.3IONS-SCNC: 9 MMOL/L (ref 7–16)
ANTIBODY SCREEN: NEGATIVE
ARM BAND NUMBER: NORMAL
BASOPHILS # BLD: 0.02 K/UL (ref 0–0.2)
BASOPHILS NFR BLD: 0 % (ref 0–2)
BLOOD BANK BLOOD PRODUCT EXPIRATION DATE: NORMAL
BLOOD BANK DISPENSE STATUS: NORMAL
BLOOD BANK ISBT PRODUCT BLOOD TYPE: 7300
BLOOD BANK PRODUCT CODE: NORMAL
BLOOD BANK SAMPLE EXPIRATION: NORMAL
BLOOD BANK UNIT TYPE AND RH: NORMAL
BPU ID: NORMAL
BUN SERPL-MCNC: 13 MG/DL (ref 6–23)
CALCIUM SERPL-MCNC: 8.7 MG/DL (ref 8.6–10.2)
CHLORIDE SERPL-SCNC: 105 MMOL/L (ref 98–107)
CO2 SERPL-SCNC: 31 MMOL/L (ref 22–29)
COMPONENT: NORMAL
CREAT SERPL-MCNC: 0.4 MG/DL (ref 0.5–1)
CROSSMATCH RESULT: NORMAL
EOSINOPHIL # BLD: 0 K/UL (ref 0.05–0.5)
EOSINOPHILS RELATIVE PERCENT: 0 % (ref 0–6)
ERYTHROCYTE [DISTWIDTH] IN BLOOD BY AUTOMATED COUNT: 17.7 % (ref 11.5–15)
GFR, ESTIMATED: >90 ML/MIN/1.73M2
GLUCOSE SERPL-MCNC: 114 MG/DL (ref 74–99)
HCT VFR BLD AUTO: 28.6 % (ref 34–48)
HGB BLD-MCNC: 9.2 G/DL (ref 11.5–15.5)
IMM GRANULOCYTES # BLD AUTO: 0.12 K/UL (ref 0–0.58)
IMM GRANULOCYTES NFR BLD: 1 % (ref 0–5)
LYMPHOCYTES NFR BLD: 0.26 K/UL (ref 1.5–4)
LYMPHOCYTES RELATIVE PERCENT: 1 % (ref 20–42)
MAGNESIUM SERPL-MCNC: 1.9 MG/DL (ref 1.6–2.6)
MCH RBC QN AUTO: 30.7 PG (ref 26–35)
MCHC RBC AUTO-ENTMCNC: 32.2 G/DL (ref 32–34.5)
MCV RBC AUTO: 95.3 FL (ref 80–99.9)
MONOCYTES NFR BLD: 0.92 K/UL (ref 0.1–0.95)
MONOCYTES NFR BLD: 5 % (ref 2–12)
NEUTROPHILS NFR BLD: 93 % (ref 43–80)
NEUTS SEG NFR BLD: 17.51 K/UL (ref 1.8–7.3)
PLATELET # BLD AUTO: 573 K/UL (ref 130–450)
PMV BLD AUTO: 8.9 FL (ref 7–12)
POTASSIUM SERPL-SCNC: 2.8 MMOL/L (ref 3.5–5)
RBC # BLD AUTO: 3 M/UL (ref 3.5–5.5)
RBC # BLD: ABNORMAL 10*6/UL
SODIUM SERPL-SCNC: 145 MMOL/L (ref 132–146)
TRANSFUSION STATUS: NORMAL
UNIT DIVISION: 0
UNIT ISSUE DATE/TIME: NORMAL
WBC OTHER # BLD: 18.8 K/UL (ref 4.5–11.5)

## 2025-05-09 PROCEDURE — 6370000000 HC RX 637 (ALT 250 FOR IP): Performed by: STUDENT IN AN ORGANIZED HEALTH CARE EDUCATION/TRAINING PROGRAM

## 2025-05-09 PROCEDURE — 6360000002 HC RX W HCPCS

## 2025-05-09 PROCEDURE — 85025 COMPLETE CBC W/AUTO DIFF WBC: CPT

## 2025-05-09 PROCEDURE — 6370000000 HC RX 637 (ALT 250 FOR IP)

## 2025-05-09 PROCEDURE — 92526 ORAL FUNCTION THERAPY: CPT

## 2025-05-09 PROCEDURE — 94640 AIRWAY INHALATION TREATMENT: CPT

## 2025-05-09 PROCEDURE — 2580000003 HC RX 258

## 2025-05-09 PROCEDURE — 6360000002 HC RX W HCPCS: Performed by: NURSE PRACTITIONER

## 2025-05-09 PROCEDURE — 99231 SBSQ HOSP IP/OBS SF/LOW 25: CPT | Performed by: NURSE PRACTITIONER

## 2025-05-09 PROCEDURE — 94669 MECHANICAL CHEST WALL OSCILL: CPT

## 2025-05-09 PROCEDURE — 2500000003 HC RX 250 WO HCPCS

## 2025-05-09 PROCEDURE — 80048 BASIC METABOLIC PNL TOTAL CA: CPT

## 2025-05-09 PROCEDURE — 2700000000 HC OXYGEN THERAPY PER DAY

## 2025-05-09 PROCEDURE — 6370000000 HC RX 637 (ALT 250 FOR IP): Performed by: INTERNAL MEDICINE

## 2025-05-09 PROCEDURE — 83735 ASSAY OF MAGNESIUM: CPT

## 2025-05-09 PROCEDURE — 6360000002 HC RX W HCPCS: Performed by: STUDENT IN AN ORGANIZED HEALTH CARE EDUCATION/TRAINING PROGRAM

## 2025-05-09 PROCEDURE — 94660 CPAP INITIATION&MGMT: CPT

## 2025-05-09 PROCEDURE — 2580000003 HC RX 258: Performed by: STUDENT IN AN ORGANIZED HEALTH CARE EDUCATION/TRAINING PROGRAM

## 2025-05-09 RX ORDER — GLYCOPYRROLATE 0.2 MG/ML
0.2 INJECTION INTRAMUSCULAR; INTRAVENOUS EVERY 6 HOURS PRN
Status: DISCONTINUED | OUTPATIENT
Start: 2025-05-09 | End: 2025-05-10 | Stop reason: HOSPADM

## 2025-05-09 RX ORDER — POTASSIUM CHLORIDE 1500 MG/1
40 TABLET, EXTENDED RELEASE ORAL PRN
Status: DISCONTINUED | OUTPATIENT
Start: 2025-05-09 | End: 2025-05-10 | Stop reason: HOSPADM

## 2025-05-09 RX ORDER — LORAZEPAM 2 MG/ML
0.5 INJECTION INTRAMUSCULAR EVERY 30 MIN PRN
Status: DISCONTINUED | OUTPATIENT
Start: 2025-05-09 | End: 2025-05-10 | Stop reason: HOSPADM

## 2025-05-09 RX ORDER — POTASSIUM CHLORIDE 7.45 MG/ML
10 INJECTION INTRAVENOUS PRN
Status: DISCONTINUED | OUTPATIENT
Start: 2025-05-09 | End: 2025-05-10 | Stop reason: HOSPADM

## 2025-05-09 RX ORDER — MORPHINE SULFATE 2 MG/ML
1 INJECTION, SOLUTION INTRAMUSCULAR; INTRAVENOUS EVERY 30 MIN PRN
Status: DISCONTINUED | OUTPATIENT
Start: 2025-05-09 | End: 2025-05-10 | Stop reason: HOSPADM

## 2025-05-09 RX ADMIN — LORAZEPAM 0.5 MG: 2 INJECTION INTRAMUSCULAR; INTRAVENOUS at 14:08

## 2025-05-09 RX ADMIN — ARFORMOTEROL TARTRATE 15 MCG: 15 SOLUTION RESPIRATORY (INHALATION) at 07:28

## 2025-05-09 RX ADMIN — SODIUM CHLORIDE, PRESERVATIVE FREE 10 ML: 5 INJECTION INTRAVENOUS at 09:39

## 2025-05-09 RX ADMIN — LEVALBUTEROL 0.63 MG: 0.63 SOLUTION RESPIRATORY (INHALATION) at 12:14

## 2025-05-09 RX ADMIN — MORPHINE SULFATE 1 MG: 2 INJECTION, SOLUTION INTRAMUSCULAR; INTRAVENOUS at 21:38

## 2025-05-09 RX ADMIN — LEVALBUTEROL 0.63 MG: 0.63 SOLUTION RESPIRATORY (INHALATION) at 07:28

## 2025-05-09 RX ADMIN — Medication 4 ML: at 07:28

## 2025-05-09 RX ADMIN — PREDNISONE 40 MG: 20 TABLET ORAL at 09:37

## 2025-05-09 RX ADMIN — SODIUM CHLORIDE, PRESERVATIVE FREE 10 ML: 5 INJECTION INTRAVENOUS at 20:35

## 2025-05-09 RX ADMIN — LEVOTHYROXINE SODIUM 125 MCG: 0.1 TABLET ORAL at 06:30

## 2025-05-09 RX ADMIN — LORAZEPAM 0.5 MG: 2 INJECTION INTRAMUSCULAR; INTRAVENOUS at 16:46

## 2025-05-09 RX ADMIN — DOXYCYCLINE 100 MG: 100 INJECTION, POWDER, LYOPHILIZED, FOR SOLUTION INTRAVENOUS at 09:49

## 2025-05-09 RX ADMIN — FERROUS SULFATE TAB 325 MG (65 MG ELEMENTAL FE) 325 MG: 325 (65 FE) TAB at 09:37

## 2025-05-09 RX ADMIN — AMLODIPINE BESYLATE 5 MG: 5 TABLET ORAL at 09:38

## 2025-05-09 RX ADMIN — POTASSIUM CHLORIDE 10 MEQ: 7.46 INJECTION, SOLUTION INTRAVENOUS at 06:29

## 2025-05-09 RX ADMIN — BUDESONIDE 250 MCG: 0.25 SUSPENSION RESPIRATORY (INHALATION) at 07:28

## 2025-05-09 RX ADMIN — ACETAMINOPHEN 650 MG: 325 TABLET ORAL at 12:33

## 2025-05-09 RX ADMIN — AMIODARONE HYDROCHLORIDE 100 MG: 200 TABLET ORAL at 09:37

## 2025-05-09 RX ADMIN — BUPROPION HYDROCHLORIDE 150 MG: 150 TABLET, EXTENDED RELEASE ORAL at 09:38

## 2025-05-09 RX ADMIN — TRAMADOL HYDROCHLORIDE 50 MG: 50 TABLET, COATED ORAL at 09:37

## 2025-05-09 RX ADMIN — MORPHINE SULFATE 1 MG: 2 INJECTION, SOLUTION INTRAMUSCULAR; INTRAVENOUS at 13:31

## 2025-05-09 ASSESSMENT — PAIN SCALES - GENERAL
PAINLEVEL_OUTOF10: 3
PAINLEVEL_OUTOF10: 0

## 2025-05-09 ASSESSMENT — PAIN DESCRIPTION - DESCRIPTORS: DESCRIPTORS: ACHING

## 2025-05-09 ASSESSMENT — PAIN DESCRIPTION - ORIENTATION: ORIENTATION: RIGHT;LEFT

## 2025-05-09 ASSESSMENT — PAIN DESCRIPTION - LOCATION: LOCATION: LEG

## 2025-05-09 NOTE — CARE COORDINATION
CARE MANAGEMENT....Ambulance transport to [facility] arranged for 5pm with [facility]. She will wear 15L NRB for transport. Nursing and Hannah with [facility] by Aura notified. Ambulance forms in chart.

## 2025-05-09 NOTE — PROGRESS NOTES
SPEECH LANGUAGE PATHOLOGY  DAILY PROGRESS NOTE      PATIENT NAME:  Gladys Elizabeth      :  1950          TODAY'S DATE:  2025 ROOM:  [facility]8/0428-A    Current Diet: ADULT DIET; Dysphagia - Pureed  ADULT ORAL NUTRITION SUPPLEMENT; Lunch, Dinner; Frozen Oral Supplement    Patient seen for ongoing dysphagia tx during lunch meal. Daughter at bedside assisting patient with meal. Patient with good mastication and oral clearance of puree. Patient with improved oral control when utilizing a cup. No overt s/s of aspiration with intake this date.   It should be noted, silent aspiration can not be ruled out at the bedside, and if silent aspiration is suspected based on clinical correlation an MBSS would be recommended.     Recommendation: cont current diet, okay for cup sips as tolerated       CPT code(s) 73420  dysphagia tx  Total minutes :  15 minutes    Tisha Hartman M.S. CCC-SLP/L  Speech Language Pathologist  [id number]

## 2025-05-09 NOTE — DISCHARGE SUMMARY
to as low as reasonably achievable. COMPARISON: CT head and cervical spine without contrast, 05/19/2021 HISTORY: ORDERING SYSTEM PROVIDED HISTORY: fall TECHNOLOGIST PROVIDED HISTORY: Has a \"code stroke\" or \"stroke alert\" been called?->No Reason for exam:->fall Decision Support Exception - unselect if not a suspected or confirmed emergency medical condition->Emergency Medical Condition (MA) FINDINGS: CT OF THE BRAIN: BRAIN/VENTRICLES: No mass effect, edema or hemorrhage is seen.  Mild volume loss is seen in the cerebrum with mild chronic microvascular ischemic changes. A right trans parietal ventriculostomy is present, with the tip terminating in the left lateral ventricle. Compared to 05/19/2021, there is interval development of small subdural hygromas along the cerebral convexities bilaterally, measuring approximately 4 mm in width.  No hydrocephalus. ORBITS: The visualized portion of the orbits demonstrate no acute abnormality. SINUSES: The visualized paranasal sinuses and mastoid air cells demonstrate no acute abnormality. SOFT TISSUES/SKULL: No acute abnormality of the visualized skull or soft tissues. CT CERVICAL SPINE: BONES/ALIGNMENT:  No fracture or joint dislocation.  No bony destructive changes. DEGENERATIVE CHANGES: Prominent loss of disc heights at C5-6 and C6-7.  No significant central canal stenosis is evident by CT.  Facet hypertrophic changes noted, worse on the left.  Multilevel neural foraminal stenoses, worst (severe) at the left C3-4 and C4-5 levels. SOFT TISSUES: There is no prevertebral soft tissue swelling.     CT HEAD: 1. No acute intracranial abnormality. 2. Interval development of small, 4 mm wide subdural hygromas along the cerebral convexities bilaterally. 3. Right transparietal ventriculostomy in place. No hydrocephalus. CT CERVICAL SPINE: 1. No fracture or joint dislocation is noted. 2. Degenerative changes, as described.     CT CSpine W/O Contrast  Result Date: 5/5/2025  EXAMINATION:  significant central canal stenosis is evident by CT.  Facet hypertrophic changes noted, worse on the left.  Multilevel neural foraminal stenoses, worst (severe) at the left C3-4 and C4-5 levels. SOFT TISSUES: There is no prevertebral soft tissue swelling.     CT HEAD: 1. No acute intracranial abnormality. 2. Interval development of small, 4 mm wide subdural hygromas along the cerebral convexities bilaterally. 3. Right transparietal ventriculostomy in place. No hydrocephalus. CT CERVICAL SPINE: 1. No fracture or joint dislocation is noted. 2. Degenerative changes, as described.     XR PELVIS (1-2 VIEWS)  Result Date: 5/5/2025  EXAMINATION: ONE XRAY VIEW OF THE PELVIS 5/5/2025 4:33 pm COMPARISON: None. HISTORY: ORDERING SYSTEM PROVIDED HISTORY: fall TECHNOLOGIST PROVIDED HISTORY: Reason for exam:->fall FINDINGS: No evidence of pelvic fracture.  Bilateral hips demonstrate normal alignment. No focal osseous lesion.  SI joints are symmetric.     No acute abnormality of the pelvis.     XR CHEST PORTABLE  Result Date: 5/5/2025  EXAMINATION: ONE XRAY VIEW OF THE CHEST 5/5/2025 4:33 pm COMPARISON: May 12, 2023 HISTORY: ORDERING SYSTEM PROVIDED HISTORY: Shortness of Breath TECHNOLOGIST PROVIDED HISTORY: Reason for exam:->Shortness of Breath FINDINGS: The heart is mildly enlarged.  There is interval development of right hilar fullness.  Right suprahilar atelectasis.  Left lung is clear.  No pneumothorax or pleural effusion.     1. Interval development of right hilar fullness. Recommend further evaluation with contrast-enhanced CT chest. 2. Right suprahilar atelectasis. 3. Mild cardiomegaly.       Discharge Exam:    HEENT: NCAT,  PERRLA, No JVD  Heart:  RRR, no murmurs, gallops, or rubs.  Lungs:  CTA bilaterally, no wheeze, rales or rhonchi  Abd: bowel sounds present, nontender, nondistended, no masses  Extrem:  No clubbing, cyanosis, or edema-left AKA, right foot ischemic, cool to touch    Disposition: Hospice house    Patient

## 2025-05-09 NOTE — PROGRESS NOTES
PROGRESS NOTE       PATIENT PROBLEM LIST:  Patient Active Problem List   Diagnosis Code    Essential hypertension I10    Depression F32.A    PVD (peripheral vascular disease) with claudication I73.9    History of tobacco use Z87.891    PVD (peripheral vascular disease) I73.9    Hyperlipidemia LDL goal <100 E78.5    Acquired hypothyroidism E03.9    Severe protein-calorie malnutrition E43    Atherosclerosis of aortic bifurcation and common iliac arteries I70.0, I70.8    CAD (coronary artery disease) I25.10    Paroxysmal atrial fibrillation ([facility]) I48.0    Hydrocephalus ([facility]) G91.9    Peripheral vascular disease due to secondary diabetes mellitus ([facility]) E13.51    S/P  shunt Z98.2    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 with ulceration of calf ([facility]) I70.242    Bilateral carotid artery stenosis I65.23    Skin ulcer of right calf with fat layer exposed ([facility]) L97.212    Diabetic ulcer of toe of left foot associated with type 2 diabetes mellitus, with fat layer exposed ([facility]) E11.621, L97.522    Status post peripheral artery angioplasty with insertion of stent Z95.820    Cellulitis of left foot L03.116    Peripheral vascular disease, unspecified I73.9    Status post above-knee amputation of left lower extremity ([facility]) Z89.612    Chest pain R07.9    Systolic hypertension I10    Acute respiratory failure with hypoxia ([facility]) J96.01       SUBJECTIVE:  Gladys Elizabeth states she remains in significant pain from her right lower extremity which appears somewhat gangrenous as well as continued shortness of breath.  No chest pressure however.    REVIEW OF SYSTEMS:  General ROS: positive for - fatigue, malaise, weight loss  Psychological ROS: positive for - anxiety , depression  Ophthalmic ROS: negative for - decreased vision or visual distortion.  ENT ROS: negative  Allergy and Immunology ROS: negative  Hematological and Lymphatic ROS: positive for-anemia  Endocrine: no heat or cold intolerance and no polyphagia,  polydipsia, or polyuria  Respiratory ROS: positive for - cough and shortness of breath  Cardiovascular ROS: positive for - irregular heartbeat and shortness of breath.  Gastrointestinal ROS: no abdominal pain, change in bowel habits, or black or bloody stools  Genito-Urinary ROS: no nocturia, dysuria, trouble voiding, frequency or hematuria  Musculoskeletal ROS: negative for- myalgias, arthralgias, or claudication  Neurological ROS: no TIA or stroke symptoms otherwise no significant change in symptoms or problems since yesterday as documented in previous progress notes.    SCHEDULED MEDICATIONS:   predniSONE  40 mg Oral Daily    traMADol  50 mg Oral BID    amLODIPine  5 mg Oral BID    [Held by provider] apixaban  5 mg Oral BID    [Held by provider] aspirin  81 mg Oral Daily    [Held by provider] atorvastatin  40 mg Oral Nightly    buPROPion  150 mg Oral Daily    ferrous sulfate  325 mg Oral Daily with breakfast    budesonide  0.25 mg Nebulization BID RT    And    arformoterol tartrate  15 mcg Nebulization BID RT    levothyroxine  125 mcg Oral Daily    oxyBUTYnin  10 mg Oral QPM    [START ON 5/12/2025] vitamin D  50,000 Units Oral Weekly    sodium chloride flush  5-40 mL IntraVENous 2 times per day    sodium chloride (Inhalant)  4 mL Nebulization BID RT    cefTRIAXone (ROCEPHIN) IV  1,000 mg IntraVENous Q24H    levalbuterol  0.63 mg Nebulization Q6H WA RT    enoxaparin  1 mg/kg SubCUTAneous BID    amiodarone  100 mg Oral Daily    doxycycline (VIBRAMYCIN) IV  100 mg IntraVENous Q12H       VITAL SIGNS:                                                                                                                          BP (!) 143/71   Pulse 98   Temp 97.7 °F (36.5 °C) (Oral)   Resp 18   Ht 1.651 m (5' 5\")   Wt 40.3 kg (88 lb 12.8 oz)   SpO2 92%   BMI 14.78 kg/m²   Patient Vitals for the past 96 hrs (Last 3 readings):   Weight   05/09/25 0014 40.3 kg (88 lb 12.8 oz)   05/08/25 0007 39.9 kg (88 lb)   05/06/25

## 2025-05-09 NOTE — PROGRESS NOTES
Anmol Chavez M.D.,[facility]  Dejuan Cobb D.O., F.YVAN., [facility]  Link Cuadra M.D.  Antoinette Arce M.D.   Arturo Cadena D.O.  Toñito Dumont M.D.         Daily Pulmonary Progress Note    Patient:  Gladys Elizabeth 75 y.o. female MRN: [id number]            Synopsis     We are following patient for adenopathy    \"CC\" Fall    Code status: LIMITED CODE  Intubation/Re-intubation at the time of arrest No   Defibrillation/Cardioversion No   Chest Compressions No   Resuscitative Medications No   Other DNRCCA/DNI       Subjective   5/6/25: Patient seen and examined in the ICU. She is now in AF RVR an cardiology has been consulted. She is awake, alert and oriented but very ill-appearing. She does report cough and congestion. She is currently on 12 liters HFNC, SpO2 spotty but last reported by nursing at %. Bedside RN goes on to tell us she was told the patient was recently at a nursing facility then went home with care to be provided by her daughter however patient recently declined any help and has been living alone. There is also mention that she requires a right AKA due to chronic infection however she has refused that as well.      5/7/25: Patient was seen and examined lying in bed on BiPap.  Patient  at the bedside.  Patient has not been trying to pull off BiPAP or oxygen however it has been reported that when staff tried to take her off the BiPAP and placed her on nasal cannula her oxygen saturation dropped to the 70s.  Patient appears much more alert today.  Current BiPAP settings at this time are 12/5 with 70% FiO2.  Respiratory panel positive for human metapneumovirus    5/8/25: Patient seen and examined sitting up in bed.  She is awake and alert but very confused.  She is on heated high flow nasal cannula at 50 L, 66% FiO2.  SpO2 91%.  She did pass swallow study with modifications of puréed solids, thin liquids and spoon only.  She is quite anemic today with a hemoglobin of  CALCIUM 8.7 05/09/2025 04:00 AM    GFRAA >60 06/07/2022 09:55 AM    LABGLOM >90 05/09/2025 04:00 AM    LABGLOM >60 07/21/2023 08:17 AM     Lab Results   Component Value Date/Time    PROTIME 30.9 05/05/2025 04:15 PM    PROTIME 11.1 04/13/2011 04:10 PM    INR 2.8 05/05/2025 04:15 PM     No results for input(s): \"PROBNP\" in the last 72 hours.      No results for input(s): \"PROCAL\" in the last 72 hours.      Micro:  Component  Ref Range & Units (hover)    Specimen Description .NASOPHARYNGEAL SWAB   Adenovirus PCR Not Detected   Coronavirus 229E PCR Not Detected   Coronavirus HKU1 PCR Not Detected   Coronavirus NL63 PCR Not Detected   Coronavirus OC43 PCR Not Detected   SARS-CoV-2, PCR Not Detected   Human Metapneumovirus PCR DETECTED Abnormal    Rhino/Enterovirus PCR Not Detected   Influenza A by PCR Not Detected   Influenza B by PCR Not Detected   Parainfluenza 1 PCR Not Detected   Parainfluenza 2 PCR Not Detected   Parainfluenza 3 PCR Not Detected   Parainfluenza 4 PCR Not Detected   Resp Syncytial Virus PCR Not Detected   Bordetella parapertussis by PCR Not Detected   B Pertussis by PCR Not Detected   Chlamydia pneumoniae By PCR Not Detected   Mycoplasma pneumo by PCR Not Detected   Comment: Performed by multiplexed nucleic acid assay.   [facility]             Specimen Collected: 05/06/25 15:30 EDT Last Resulted: 05/06/25 20:50 EDT        Assessment:    Acute hypoxic respiratory failure  Mediastinal, perihilar and paratracheal adenopathy, concerning for malignancy  Human metapneumovirus  Mucous plugging  COPD, unknown severity  History of recurrent right pleural effusion, s/p thoracentesis 2/26/2020 with 600 ml removed and 3/6/2020 with 400 ml removed  History of PVD, s/p L AKA  Former nicotine dependence  Dysphagia      Plan:   Wean oxygen as tolerated to maintain SpO2 greater than 90%, baseline is room air during day, wears a few liters at HS. currently on HHFNC, 50 L, 63%  FiO2  BiPap 12/5 as needed for respiratory support.  Wean FiO2 as tolerated.    Scheduled nebulized bronchodilators - brovana and pulmicort BID with xopenex nebs q6h WA and PRN. Patient may resume Trelelgy upon discharge  BPH - 3% nebs BID with chest vest  Rocephin and doxycycline for CAP coverage  Transition to prednisone x 5 days  Monitor leukocytosis, 23.5 on admission  Procalcitonin 0.53, , strep and legionella bacterial urine antigens negative, blood cultures pending, ESR 54. Respiratory panel + human metapneumovirus  Check respiratory culture if able to collect specimen  Cardiology following for RVR - amiodarone, norvasc  Hold aspirin and eliquis for now. Start therapeutic dose Lovenox 1mg/kg BID  Aspiration precautions-modified diet per SLP recommendations  Hematology/Oncology following for concerning malignancy  appreciate Palliative Care assistance  Patient needs EBUS Bronchoscopy for tissue sampling to obtain diagnosis however this requires to be transferred to [facility] but more importantly she is not stable enough to be placed under sedation for the procedure.  Consider CT guided bx of pleura if condition improves  Will need outpatient PET/CT  CODE STATUS changed to limited today.  Hospice was consulted for informational purposes. Daughter leaning towards moving forward with comfort measures with hospice care.    This plan of care was reviewed in collaboration with Dr. Cadena    Electronically signed by BRIAN Kennedy CNP on 5/9/2025 at 10:29 AM      I personally saw, examined, and cared for the patient. I performed the substantive portion of the visit. Labs, medications, radiographs reviewed. I agree with history exam and plans detailed in NP note.    Patient is hospice appropriate.  Reviewed with daughter at bedside.    Arturo Cadena,

## 2025-05-09 NOTE — PROGRESS NOTES
Palliative Care Department  [phone]  Palliative Care Progress Note  Provider BRIAN Mccollum CNP    Gladys Elizabeth  [id number]  Hospital Day: 5  Date of Initial Consult: 5/6/2025  Referring Provider: ERICK Kennedy  Palliative Medicine was consulted for assistance with: Goals of care    HPI:   Gladys Elizabeth is a 75 y.o. with a past medical history of atrial fibrillation, carotid artery stenosis, skin cancer, COPD, diabetes mellitus, hyperlipidemia, hypertension, BRY, hydrocephalus s/p  shunt, PVD s/p left AKA, hypothyroidism, recurrent right pleural effusion, former nicotine dependence who was admitted on 5/5/2025 from home with a CHIEF COMPLAINT of fall.  Patient was found down at home by home health for an unknown amount of time.  She was hypoxic on room air and placed on BiPAP support in the ED.  Labs significant for sodium 131, BNP 3446, WBC 23.5, H&H 7.8/25.6 in ED. chest x-ray showed interval development of right hilar fullness, right suprahilar atelectasis, mild cardiomegaly.  CTA of the chest was negative for PE, did show bulky mediastinal adenopathy with right perihilar and right lower paratracheal lymph nodes up to 2.9 cm, bronchial wall thickening and mucous plugging or endobronchial involvement in right upper lobe greatest posteriorly with a few scattered areas in the right upper lobe anteriorly could represent component of bronchiolitis or postobstructive changes with adjacent adenopathy, trace right pleural effusion and at least 2-3 areas of undulating margination associated the right pleural margin up to 1.4 cm could represent pleural deposit or pleural based nodular lesion.  She developed atrial fibrillation with RVR.  She was admitted to the hospital for further management.  She is being followed by pulmonology, oncology, and cardiology.  Patient needs EBUS bronchoscopy for tissue sampling however requires transfer to main campus and documentation states she is not  computerized transcription errors may be present.

## 2025-05-09 NOTE — PROGRESS NOTES
Comprehensive Nutrition Assessment    Type and Reason for Visit:  Reassess    Nutrition Recommendations/Plan:   Continue Current Nutrition Tx  Plan to add Frozen ONS (Magic Cup) BID  Continue Inpatient Monitoring     Malnutrition Assessment:  Malnutrition Status:  Severe malnutrition (05/07/25 1605)    Context:  Chronic Illness     Findings of the 6 clinical characteristics of malnutrition:  Energy Intake:  75% or less estimated energy requirements for 1 month or longer  Weight Loss:  Unable to assess     Body Fat Loss:  Severe body fat loss Triceps, Orbital   Muscle Mass Loss:  Severe muscle mass loss Calf (gastrocnemius), Thigh (quadriceps), Clavicles (pectoralis & deltoids)  Fluid Accumulation:  No fluid accumulation     Strength:  Not Performed    Nutrition Assessment:    Daughter in room w/pt today reports plans for Hospice. Appetite poor, has had Magic Cup ONS previously + likes will add BID per pt    Nutrition Related Findings:    A/O x1, on BiPaP, hypokalemia 2.8 (KCL 5/8), SLP consulted rec's diet advanced to current Puree thin liquids no intake per pt/dtr Wound Type: Multiple, Venous Stasis, Skin Tears, Open Wounds       Current Nutrition Intake & Therapies:    Average Meal Intake: 0% (per pt/dtr)  Average Supplements Intake: NPO  ADULT DIET; Dysphagia - Pureed  ADULT ORAL NUTRITION SUPPLEMENT; Lunch, Dinner; Frozen Oral Supplement    Anthropometric Measures:  Height: 165.1 cm (5' 5\")  Ideal Body Weight (IBW): 125 lbs (57 kg)    Admission Body Weight: 38.6 kg (85 lb)  Current Body Weight: 40.3 kg (88 lb 13.5 oz), 71.1 % IBW. Weight Source: Bed scale (5/9)  Current BMI (kg/m2): 14.8  Usual Body Weight: 38.7 kg (85 lb 5.1 oz)     % Weight Change (Calculated): 4.1  Weight Adjustment For: Amputation  Total Adjusted Percentage (Calculated): 10.1  Adjusted Ideal Body Weight (lbs) (Calculated): 112.4 lbs  Adjusted Ideal Body Weight (kg) (Calculated): 51.09 kg  Adjusted % Ideal Body Weight (Calculated):  75.6  Adjusted BMI (kg/m2) (Calculated): 15.5  BMI Categories: Underweight (BMI less than 22) age over 65    Estimated Daily Nutrient Needs:  Energy Requirements Based On: Kcal/kg  Weight Used for Energy Requirements: Current  Energy (kcal/day):  (30-32 kcal/kg)  Weight Used for Protein Requirements: Current  Protein (g/day): 50-60 (1.3-1.5 g/kg)  Method Used for Fluid Requirements: 1 ml/kcal  Fluid (ml/day):     Nutrition Diagnosis:   Severe malnutrition, in context of chronic illness related to catabolic illness (COPD) as evidenced by criteria as identified in malnutrition assessment    Nutrition Interventions:   Food and/or Nutrient Delivery: Continue Current Diet, Start Oral Nutrition Supplement  Nutrition Education/Counseling: No recommendation at this time  Coordination of Nutrition Care: Continue to monitor while inpatient       Goals:  Goals: Initiation of nutrition  Type of Goal: Continue current goal  Previous Goal Met: Progressing toward Goal(s)    Nutrition Monitoring and Evaluation:   Behavioral-Environmental Outcomes: None Identified  Food/Nutrient Intake Outcomes: Food and Nutrient Intake, Supplement Intake  Physical Signs/Symptoms Outcomes: Biochemical Data, GI Status, Fluid Status or Edema, Nutrition Focused Physical Findings, Skin, Weight    Discharge Planning:    Continue Oral Nutrition Supplement, Continue current diet     Eva Duran RD  Contact: [phone]

## 2025-05-09 NOTE — PROGRESS NOTES
Patient changed from a AIRVO to a 15 liter high flow nasal cannula in anticipation of patient being able to transfer to hospice house.

## 2025-05-09 NOTE — CARE COORDINATION
CARE MANAGEMENT..... Per conversation with Thalia from Palliative Care, patient/family are are agreeable to [facility] (hospitals[facility] by [facility]. Will wean off airvo, observe and keep patient comfortable. hospitals was notified of plans and will be here shortly to discuss arrangements with family. Will follow

## 2025-05-10 LAB
MICROORGANISM SPEC CULT: NORMAL
MICROORGANISM SPEC CULT: NORMAL
SERVICE CMNT-IMP: NORMAL
SERVICE CMNT-IMP: NORMAL
SPECIMEN DESCRIPTION: NORMAL
SPECIMEN DESCRIPTION: NORMAL

## 2025-05-10 NOTE — PROGRESS NOTES
PROGRESS NOTE       PATIENT PROBLEM LIST:  Patient Active Problem List   Diagnosis Code    Essential hypertension I10    Depression F32.A    PVD (peripheral vascular disease) with claudication I73.9    History of tobacco use Z87.891    PVD (peripheral vascular disease) I73.9    Hyperlipidemia LDL goal <100 E78.5    Acquired hypothyroidism E03.9    Severe protein-calorie malnutrition E43    Atherosclerosis of aortic bifurcation and common iliac arteries I70.0, I70.8    CAD (coronary artery disease) I25.10    Paroxysmal atrial fibrillation ([facility]) I48.0    Hydrocephalus ([facility]) G91.9    Peripheral vascular disease due to secondary diabetes mellitus ([facility]) E13.51    S/P  shunt Z98.2    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 with ulceration of calf ([facility]) I70.242    Bilateral carotid artery stenosis I65.23    Skin ulcer of right calf with fat layer exposed ([facility]) L97.212    Diabetic ulcer of toe of left foot associated with type 2 diabetes mellitus, with fat layer exposed ([facility]) E11.621, L97.522    Status post peripheral artery angioplasty with insertion of stent Z95.820    Cellulitis of left foot L03.116    Peripheral vascular disease, unspecified I73.9    Status post above-knee amputation of left lower extremity ([facility]) Z89.612    Chest pain R07.9    Systolic hypertension I10    Acute respiratory failure with hypoxia ([facility]) J96.01       SUBJECTIVE:  Gladys Elizabeth states she feels somewhat better and is breathing better but remains modestly confused to time place and person.While she denies any chest discomfort.  She does complain of pain emanating from her right lower extremity.    REVIEW OF SYSTEMS:  General ROS: negative for - fatigue, malaise,  weight gain or weight loss  Psychological ROS: negative for - anxiety , depression  Ophthalmic ROS: negative for - decreased vision or visual distortion.  ENT ROS: negative  Allergy and Immunology ROS: negative  Hematological and Lymphatic ROS:  regimen and carefully monitor electrolytes blood pressure and heart rhythm.Concern regarding right lower extremity which appears significantly ischemic and perhaps warranting eventual amputation.    I have spent more than 25 minutes face to face with Gladys Elizabeth and reviewing notes and laboratory data, with greater than 50% of this time instructing and counseling the patient  face to face regarding my findings and recommendations and I have answered all questions as posed to me by Ms. Elizabeth.    Carlos Colvin, DO FACP,FACC,[facility]      NOTE:  This report was transcribed using voice recognition software.  Every effort was made to ensure accuracy; however, inadvertent computerized transcription errors may be present

## 2025-05-10 NOTE — DISCHARGE INSTR - COC
Continuity of Care Form    Patient Name: Gladys Elizabeth   :  1950  MRN:  [id number]    Admit date:  2025  Discharge date:  ***    Code Status Order: DNR-CC   Advance Directives:     Admitting Physician:  Lazaro Henderson DO  PCP: Toñito Suazo MD    Discharging Nurse: ***  Discharging Hospital Unit/Room#: 0428/0428-A  Discharging Unit Phone Number: ***    Emergency Contact:   Extended Emergency Contact Information  Primary Emergency Contact: Thalia Elizabeth  Address: [address] [place] [address]  Home Phone: [phone]  Mobile Phone: [phone]  Relation: Child  Preferred language: English   needed? No    Past Surgical History:  Past Surgical History:   Procedure Laterality Date     SECTION      COLONOSCOPY      COLONOSCOPY N/A 2021    COLONOSCOPY WITH BIOPSY performed by Monty Leung MD at [facility] ENDOSCOPY    DILATION AND CURETTAGE OF UTERUS      EYE SURGERY      bilat cataract     INVASIVE VASCULAR N/A 2024    Aortagram abdominal performed by Chrissy Hobbs MD at [facility] [place] CARDIAC CATH LAB    INVASIVE VASCULAR N/A 2024    Angioplasty superficial femoral artery performed by Chrissy Hobbs MD at [facility] [place] CARDIAC CATH LAB    INVASIVE VASCULAR N/A 2024    Angioplasty popliteal artery performed by Chrissy Hobbs MD at [facility] [place] CARDIAC CATH LAB    LAPAROTOMY N/A 2020    LAPAROTOMY EXPLORATORY, RIGHT HEMICOLECTOMY performed by Monty Leung MD at [facility] [place] OR    LEG SURGERY Left 10/25/2024    Left Leg Above Knee Amputation performed by Chrissy Hobbs MD at [facility] [place] [facility] OR    LUMBAR DRAIN IMPLANTATION N/A 2020    PLACEMENT OF INTRATHECAL CATHETER FOR LUMBAR DRAIN performed by Stanley Orozco MD at [facility] OR    PERIPHERAL PERCUTANEOUS ARTERIAL INTERVENTION  2019    L SFA angioplasty    TUBAL LIGATION      VENTRICULOPERITONEAL SHUNT N/A 2020    VENTRICULAR PERITONEAL SHUNT PLACEMENT  performed by Stanley Orozco MD at [facility] OR       Immunization History:   Immunization History   Administered Date(s) Administered    TDaP, ADACEL (age 10y-64y), BOOSTRIX (age 10y+), IM, 0.5mL 08/14/2010       Active Problems:  Patient Active Problem List   Diagnosis Code    Essential hypertension I10    Depression F32.A    PVD (peripheral vascular disease) with claudication I73.9    History of tobacco use Z87.891    PVD (peripheral vascular disease) I73.9    Hyperlipidemia LDL goal <100 E78.5    Acquired hypothyroidism E03.9    Severe protein-calorie malnutrition E43    Atherosclerosis of aortic bifurcation and common iliac arteries I70.0, I70.8    CAD (coronary artery disease) I25.10    Paroxysmal atrial fibrillation ([facility]) I48.0    Hydrocephalus ([facility]) G91.9    Peripheral vascular disease due to secondary diabetes mellitus ([facility]) E13.51    S/P  shunt Z98.2    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 with ulceration of calf ([facility]) I70.242    Bilateral carotid artery stenosis I65.23    Skin ulcer of right calf with fat layer exposed ([facility]) L97.212    Diabetic ulcer of toe of left foot associated with type 2 diabetes mellitus, with fat layer exposed ([facility]) E11.621, L97.522    Status post peripheral artery angioplasty with insertion of stent Z95.820    Cellulitis of left foot L03.116    Peripheral vascular disease, unspecified I73.9    Status post above-knee amputation of left lower extremity ([facility]) Z89.612    Chest pain R07.9    Systolic hypertension I10    Acute respiratory failure with hypoxia ([facility]) J96.01       Isolation/Infection:   Isolation            Contact          Patient Infection Status        Infection Onset Added Last Indicated Last Indicated By Review Planned Expiration    Human Metapneumovirus 05/06/25 05/06/25 05/06/25 Respiratory Panel, Molecular, with COVID-19 (Restricted: peds pts or suitable admitted adults) 05/13/25 05/16/25 history               Resolved       Infection Onset Added  Etiology Arterial 05/07/25 1052   Dressing Status Clean;Dry;Intact 05/09/25 0940   Wound Cleansed Cleansed with saline 05/07/25 1900   Dressing/Treatment ABD;Roll gauze 05/09/25 0940   Dressing Change Due 05/10/25 05/09/25 0940   Wound Length (cm) 2 cm 05/07/25 1052   Wound Width (cm) 1 cm 05/07/25 1052   Wound Surface Area (cm^2) 2 cm^2 05/07/25 1052   Wound Assessment Other (Comment) 05/09/25 0940   Drainage Amount Scant (moist but unmeasurable) 05/07/25 1052   Drainage Description Serosanguinous 05/07/25 1052   Odor None 05/07/25 1052   Rhoda-wound Assessment Blanchable erythema;Ecchymosis 05/07/25 1052   Number of days: 2       Wound 05/07/25 Elbow Left (Active)   Wound Image   05/07/25 1052   Wound Etiology Skin Tear 05/07/25 1052   Dressing Status New dressing applied 05/08/25 0407   Wound Length (cm) 2 cm 05/07/25 1052   Wound Width (cm) 5 cm 05/07/25 1052   Wound Depth (cm) 0.1 cm 05/07/25 1052   Wound Surface Area (cm^2) 10 cm^2 05/07/25 1052   Wound Volume (cm^3) 1 cm^3 05/07/25 1052   Wound Assessment Pink/red 05/09/25 0940   Drainage Amount Scant (moist but unmeasurable) 05/07/25 1900   Drainage Description Serosanguinous 05/07/25 1900   Odor None 05/07/25 1900   Rhoda-wound Assessment Ecchymosis 05/09/25 0940   Number of days: 2        Elimination:  Continence:   Bowel: {YES / NO:19727}  Bladder: {YES / NO:19727}  Urinary Catheter: {Urinary Catheter:702448905}   Colostomy/Ileostomy/Ileal Conduit: {YES / NO:19727}       Date of Last BM: ***    Intake/Output Summary (Last 24 hours) at 5/9/2025 2133  Last data filed at 5/9/2025 2035  Gross per 24 hour   Intake 1334.52 ml   Output --   Net 1334.52 ml     I/O last 3 completed shifts:  In: 338.3 [Blood:338.3]  Out: -     Safety Concerns:     { JOSE DAVID Safety Concerns:363482193}    Impairments/Disabilities:      { JOSE DAVID Impairments/Disabilities:003647084}    Nutrition Therapy:  Current Nutrition Therapy:   { JOSE DAVID Diet List:433202783}    Routes of Feeding: {P

## 2025-05-16 NOTE — PROGRESS NOTES
Physician Progress Note      PATIENT:               MANJU CROFT  CSN #:                  [id number]  :                       1950  ADMIT DATE:       2025 4:03 PM  DISCH DATE:        2025 10:00 PM  RESPONDING  PROVIDER #:        Carmita Escobar MD          QUERY TEXT:    Sepsis is documented in the discharge summary. Based on your medical judgment,   please clarify the POA status at the time of the order to admit the patient   to inpatient status:    The clinical indicators include:  +wbc 23.5  proc 0.53,  tachy  148, 130 + criteria noted with possible   aspiration  pna / cap w  Options provided:  -- Present on admission  -- Not present on admission  -- Clinically unable to determine if the condition was present on admission  -- Other - I will add my own diagnosis  -- Disagree - Not applicable / Not valid  -- Disagree - Clinically unable to determine / Unknown  -- Refer to Clinical Documentation Reviewer    PROVIDER RESPONSE TEXT:    The diagnosis was present on admission.    Query created by: Hannah Alanis on 5/15/2025 7:14 AM      Electronically signed by:  Carmita Escobar MD 5/15/2025 8:18 PM

## 2025-05-16 NOTE — PLAN OF CARE
Problem: Chronic Conditions and Co-morbidities  Goal: Patient's chronic conditions and co-morbidity symptoms are monitored and maintained or improved  Outcome: Progressing      No

## (undated) DEVICE — RELOAD STPL L75MM OPN H3.8MM CLS 1.5MM WIRE DIA0.2MM REG

## (undated) DEVICE — INTENDED FOR TISSUE SEPARATION, AND OTHER PROCEDURES THAT REQUIRE A SHARP SURGICAL BLADE TO PUNCTURE OR CUT.: Brand: BARD-PARKER ® STAINLESS STEEL BLADES

## (undated) DEVICE — DOUBLE BASIN SET: Brand: MEDLINE INDUSTRIES, INC.

## (undated) DEVICE — GOWN,SIRUS,FABRNF,L,20/CS: Brand: MEDLINE

## (undated) DEVICE — RADIFOCUS GLIDEWIRE ADVANTAGE GUIDEWIRE: Brand: GLIDEWIRE ADVANTAGE

## (undated) DEVICE — APPLICATOR PREP 6ML 0.7% IOD POVACRYLEX 74% ISO ALC

## (undated) DEVICE — DESTINATION RENAL GUIDING SHEATH: Brand: DESTINATION

## (undated) DEVICE — CORD,CAUTERY,BIPOLAR,STERILE: Brand: MEDLINE

## (undated) DEVICE — CHLORAPREP 26ML ORANGE

## (undated) DEVICE — PACK,LAPAROTOMY,NO GOWNS: Brand: MEDLINE

## (undated) DEVICE — Device

## (undated) DEVICE — LOWER EXT KNEE DRAPE: Brand: MEDLINE INDUSTRIES, INC.

## (undated) DEVICE — SYRINGE MED 10ML TRNSLUC BRL PLUNG BLK MRK POLYPR CTRL

## (undated) DEVICE — VENTRICULAR DRAINAGE SYSTEM

## (undated) DEVICE — GOWN,SIRUS,FABRNF,XL,20/CS: Brand: MEDLINE

## (undated) DEVICE — COUNTER NDL 30 COUNT DBL MAG

## (undated) DEVICE — DRIP REDUCTION MANIFOLD

## (undated) DEVICE — NEEDLE HYPO 25GA L0.625IN BLU POLYPR HUB S STL REG BVL STR

## (undated) DEVICE — GLOVE SURG SZ 75 STD WHT LTX SYN POLYMER BEAD REINF ANTI RL

## (undated) DEVICE — STRIP,CLOSURE,WOUND,MEDI-STRIP,1/4X3: Brand: MEDLINE

## (undated) DEVICE — 3M™ IOBAN™ 2 ANTIMICROBIAL INCISE DRAPE 6640EZ: Brand: IOBAN™ 2

## (undated) DEVICE — SHEATH INTRO 5FR L11CM 0.038IN SIL TRICSP VLV W/ GWIRE

## (undated) DEVICE — TOWEL,OR,DSP,ST,BLUE,DLX,10/PK,8PK/CS: Brand: MEDLINE

## (undated) DEVICE — CATHETER PTCA L135CM BLLN L60MM DIA7MM INTRO SHTH 6FR 7ATM

## (undated) DEVICE — KIT,ANTI FOG,W/SPONGE & FLUID,SOFT PACK: Brand: MEDLINE

## (undated) DEVICE — CODMAN® BARIUM VENTRICULAR CATHETER 15CM: Brand: CODMAN®

## (undated) DEVICE — Z DUP USE 2257490 ADHESIVE SKIN CLSRE 036ML TPCL 2CTL CNCRLTE HIGH VSCSTY DRMB

## (undated) DEVICE — GAUZE,SPONGE,AVANT,4"X4",4PLY,STRL,10/TR: Brand: MEDLINE

## (undated) DEVICE — CLOTH SURG PREP PREOPERATIVE CHLORHEXIDINE GLUC 2% READYPREP

## (undated) DEVICE — 1LYRTR 16FR10ML100%SIL UMS SNP: Brand: MEDLINE INDUSTRIES, INC.

## (undated) DEVICE — FLEXOR, CHECK-FLO, INTRODUCER ANSEL MODIFICATION: Brand: FLEXOR

## (undated) DEVICE — PATIENT RETURN ELECTRODE, SINGLE-USE, CONTACT QUALITY MONITORING, ADULT, WITH 9FT CORD, FOR PATIENTS WEIGING OVER 33LBS. (15KG): Brand: MEGADYNE

## (undated) DEVICE — PACK,UNIV, II AURORA: Brand: MEDLINE

## (undated) DEVICE — STAPLER INT L75MM CUT LN L73MM STPL LN L77MM BLU B FRM 8

## (undated) DEVICE — 3M™ STERI-DRAPE™ INCISE DRAPE 1050 (60CM X 45CM): Brand: STERI-DRAPE™

## (undated) DEVICE — TOTAL TRAY, 16FR 10ML SIL FOLEY, URN: Brand: MEDLINE

## (undated) DEVICE — SOLUTION IV IRRIG POUR BRL 0.9% SODIUM CHL 2F7124

## (undated) DEVICE — SOLUTION IV IRRIG WATER 1000ML POUR BRL 2F7114

## (undated) DEVICE — SYRINGE MED 10ML LUERLOCK TIP W/O SFTY DISP

## (undated) DEVICE — GRADUATE TRIANG MEASURE 1000ML BLK PRNT

## (undated) DEVICE — RECIPROCATING BLADE HEAVY DUTY (52.6 X 0.64MM)

## (undated) DEVICE — GLOVE SURG SZ 65 THK91MIL LTX FREE SYN POLYISOPRENE

## (undated) DEVICE — GRADUATE

## (undated) DEVICE — SPONGE GZ W4XL4IN RAYON POLY FILL CVR W/ NONWOVEN FAB

## (undated) DEVICE — DRESSING NEG PRSS 13CM PREVENA

## (undated) DEVICE — CANNULA NSL CANN NSL L25FT TBNG AD O2 SUP SFT UC

## (undated) DEVICE — PACK SURG CARDIAC CATH

## (undated) DEVICE — CATH 43522 C/P CATH OPEN END W/SLITS STD

## (undated) DEVICE — LABEL MED 4 IN SURG PANEL W/ PEN STRL

## (undated) DEVICE — CATHETER PTCA L135CM BLLN L200MM DIA5MM INTRO SHTH 5FR

## (undated) DEVICE — TRAILBLAZER L90CM 50MM MRK BND SPC GWIRE 0.035IN

## (undated) DEVICE — CATHETER PTCA 5FR L135CM BLLN L150MM DIA6MM 12ATM OTW LO

## (undated) DEVICE — DRAPE C ARM UNIV W41XL74IN CLR PLAS XR VELC CLSR POLY STRP

## (undated) DEVICE — CATHETER PTCA L135CM BLLN L60MM DIA6MM INTRO SHTH 5FR 8ATM

## (undated) DEVICE — SEALER TISS L20CM DIA13MM ADV BPLR L CRV JAW OPN APPRCH

## (undated) DEVICE — ANGIO-SEAL VIP VASCULAR CLOSURE DEVICE: Brand: ANGIO-SEAL

## (undated) DEVICE — DECANTER BAG 9": Brand: MEDLINE INDUSTRIES, INC.

## (undated) DEVICE — STAPLER SKIN L39MM DIA0.53MM CRWN 5.7MM S STL FIX HD PROX

## (undated) DEVICE — MARKER,SKIN,WI/RULER AND LABELS: Brand: MEDLINE

## (undated) DEVICE — PRECISION ACORN

## (undated) DEVICE — CANISTER KIT W/ GEL VAC

## (undated) DEVICE — CAMERA STRYKER 1488 HD GEN

## (undated) DEVICE — SET INSTRUMENT LAP II

## (undated) DEVICE — BLADE CLP TAPR HD WET DRY CAPABILITY GTT IN CHARGING USE

## (undated) DEVICE — ELECTRODE PT RET AD L9FT HI MOIST COND ADH HYDRGEL CORDED

## (undated) DEVICE — SYRINGE,EAR/ULCER, 3 OZ, STERILE: Brand: MEDLINE

## (undated) DEVICE — THE LUMBAR CATHETER ACCESSORY KIT (LCAK) IS DESIGNED FOR CEREBROSPINAL FLUID DRAINAGE, AND CONSISTS OF: A 80 CM RADIOPAQUE LUMBAR CATHETER (F5) WITH GRAPHITE BASED LENGTH MARKS AT 2 CM INTERVALS (FIRST MARK AT 10 CM FROM THE CLOSED TIP); A GUIDEWIRE IN DISPENSER; A LUER-LOCK CONNECTOR; A BUTTERFLY SUTURE CLAMP; A 14-GA, THIN-WALL TUOHY NEEDLE WITH OBTURATOR.: Brand: LUMBAR CATHETER ACCESSORY KIT

## (undated) DEVICE — YANKAUER,POOLE TIP,STERILE,50/CS: Brand: MEDLINE

## (undated) DEVICE — MICROPUNCTURE INTRODUCER SET SILHOUETTE TRANSITIONLESS PUSH-PLUS DESIGN - STIFFENED CANNULA WITH STAINLESS STEEL WIRE GUIDE: Brand: MICROPUNCTURE

## (undated) DEVICE — PACK,UNIVERSAL,NO GOWNS: Brand: MEDLINE

## (undated) DEVICE — 1000 S-DRAPE TOWEL DRAPE 10/BX: Brand: STERI-DRAPE™

## (undated) DEVICE — SURGICAL PROCEDURE PACK BASIC

## (undated) DEVICE — 3M(TM) MEDIPORE(TM) +PAD SOFT CLOTH ADHESIVE WOUND DRESSING 3569: Brand: 3M™ MEDIPORE™

## (undated) DEVICE — GAUZE,SPONGE,4"X4",16PLY,XRAY,STRL,LF: Brand: MEDLINE

## (undated) DEVICE — FORCEPS BX L240CM JAW DIA2.4MM ORNG L CAP W/ NDL DISP RAD

## (undated) DEVICE — DRAPE THER FLUID WARMING 66X44 IN FLAT SLUSH DBL DISC ORS

## (undated) DEVICE — INFLATION DEVICE KIT: Brand: ENCORE™ 26 ADVANTAGE KIT

## (undated) DEVICE — TUBING, SUCTION, 3/16" X 12', STRAIGHT: Brand: MEDLINE

## (undated) DEVICE — 3M™ STERI-DRAPE™ INCISE DRAPE 1040 (35CM X 35CM): Brand: STERI-DRAPE™

## (undated) DEVICE — STAPLER INT L60MM REG TISS BLU B FRM 8 FIRING 2 ROW AUTO

## (undated) DEVICE — WAX SURG 2.5GM HEMSTAT BNE BEESWAX PARAFFIN ISO PALMITATE

## (undated) DEVICE — STRAP POS MP 30X3 IN HK LOOP CLOSURE FOAM DISP

## (undated) DEVICE — TOWEL,OR,DSP,ST,BLUE,STD,6/PK,12PK/CS: Brand: MEDLINE

## (undated) DEVICE — APPLICATOR PREP 26ML 0.7% IOD POVACRYLEX 74% ISO ALC ST

## (undated) DEVICE — CATHETER IN.PACT 018 4.0 MM X 150 MM X 130 CM

## (undated) DEVICE — SWABSTICK SURG PREP BENZOIN TINCTURE SINGLE ST

## (undated) DEVICE — INCISE SURGICAL DRAPE: Brand: OPSITE INCISE 28X30CM CTN 10

## (undated) DEVICE — SET INSTRUMENT LAP I

## (undated) DEVICE — SET INST MINOR PROCEDURE

## (undated) DEVICE — DRESSING BORDERED ADH GZ UNIV GEN USE 8INX4IN AND 6INX2IN